# Patient Record
Sex: MALE | Race: WHITE | Employment: UNEMPLOYED | URBAN - METROPOLITAN AREA
[De-identification: names, ages, dates, MRNs, and addresses within clinical notes are randomized per-mention and may not be internally consistent; named-entity substitution may affect disease eponyms.]

---

## 2017-02-06 ENCOUNTER — HOSPITAL ENCOUNTER (OUTPATIENT)
Dept: WOUND CARE | Age: 52
Discharge: HOME | End: 2017-02-06
Payer: MEDICARE

## 2017-02-06 PROCEDURE — 87106 FUNGI IDENTIFICATION YEAST: CPT

## 2017-02-06 PROCEDURE — 86403 PARTICLE AGGLUT ANTBDY SCRN: CPT

## 2017-02-06 PROCEDURE — 6370000000 HC RX 637 (ALT 250 FOR IP): Performed by: SURGERY

## 2017-02-06 PROCEDURE — 87102 FUNGUS ISOLATION CULTURE: CPT

## 2017-02-06 PROCEDURE — 87205 SMEAR GRAM STAIN: CPT

## 2017-02-06 PROCEDURE — 87176 TISSUE HOMOGENIZATION CULTR: CPT

## 2017-02-06 PROCEDURE — G0463 HOSPITAL OUTPT CLINIC VISIT: HCPCS

## 2017-02-06 PROCEDURE — 87070 CULTURE OTHR SPECIMN AEROBIC: CPT

## 2017-02-06 PROCEDURE — 99213 OFFICE O/P EST LOW 20 MIN: CPT

## 2017-02-06 PROCEDURE — 11042 DBRDMT SUBQ TIS 1ST 20SQCM/<: CPT

## 2017-02-06 PROCEDURE — 87075 CULTR BACTERIA EXCEPT BLOOD: CPT

## 2017-02-06 RX ADMIN — LIDOCAINE HYDROCHLORIDE: 20 JELLY TOPICAL at 15:38

## 2017-02-11 LAB
CULTURE: ABNORMAL
DIRECT EXAM: ABNORMAL
DIRECT EXAM: ABNORMAL
Lab: ABNORMAL
SPECIMEN DESCRIPTION: ABNORMAL
STATUS: ABNORMAL

## 2017-02-13 ENCOUNTER — HOSPITAL ENCOUNTER (OUTPATIENT)
Dept: WOUND CARE | Age: 52
Discharge: HOME | End: 2017-02-13
Payer: MEDICARE

## 2017-02-14 LAB
CULTURE: ABNORMAL
Lab: ABNORMAL
SPECIMEN DESCRIPTION: ABNORMAL
STATUS: ABNORMAL

## 2017-02-16 ENCOUNTER — HOSPITAL ENCOUNTER (OUTPATIENT)
Dept: WOUND CARE | Age: 52
Discharge: HOME | End: 2017-02-16
Payer: MEDICARE

## 2017-03-01 ENCOUNTER — HOSPITAL ENCOUNTER (OUTPATIENT)
Dept: WOUND CARE | Age: 52
Discharge: HOME | End: 2017-03-01
Payer: MEDICARE

## 2017-03-01 ENCOUNTER — HOSPITAL ENCOUNTER (EMERGENCY)
Age: 52
Discharge: LEFT BEFORE DISCHARGE | End: 2017-03-01
Payer: MEDICARE

## 2017-03-01 VITALS
HEART RATE: 89 BPM | HEIGHT: 74 IN | TEMPERATURE: 98.4 F | OXYGEN SATURATION: 98 % | SYSTOLIC BLOOD PRESSURE: 108 MMHG | DIASTOLIC BLOOD PRESSURE: 59 MMHG | RESPIRATION RATE: 16 BRPM | WEIGHT: 240 LBS | BODY MASS INDEX: 30.8 KG/M2

## 2017-03-01 VITALS
HEART RATE: 109 BPM | RESPIRATION RATE: 16 BRPM | DIASTOLIC BLOOD PRESSURE: 69 MMHG | SYSTOLIC BLOOD PRESSURE: 112 MMHG | TEMPERATURE: 98.1 F

## 2017-03-01 DIAGNOSIS — E11.49 OTHER DIABETIC NEUROLOGICAL COMPLICATION ASSOCIATED WITH TYPE 2 DIABETES MELLITUS (HCC): ICD-10-CM

## 2017-03-01 DIAGNOSIS — L03.115 CELLULITIS OF RIGHT FOOT: ICD-10-CM

## 2017-03-01 PROBLEM — E11.40 DIABETIC NEUROPATHY (HCC): Status: ACTIVE | Noted: 2017-03-01

## 2017-03-01 PROCEDURE — 6370000000 HC RX 637 (ALT 250 FOR IP): Performed by: SURGERY

## 2017-03-01 PROCEDURE — 11042 DBRDMT SUBQ TIS 1ST 20SQCM/<: CPT

## 2017-03-01 PROCEDURE — 11043 DBRDMT MUSC&/FSCA 1ST 20/<: CPT

## 2017-03-01 PROCEDURE — 4500000002 HC ER NO CHARGE

## 2017-03-01 PROCEDURE — 11044 DBRDMT BONE 1ST 20 SQ CM/<: CPT

## 2017-03-01 RX ADMIN — LIDOCAINE HYDROCHLORIDE: 20 JELLY TOPICAL at 11:53

## 2017-03-01 ASSESSMENT — PAIN DESCRIPTION - LOCATION: LOCATION: FOOT

## 2017-03-01 ASSESSMENT — PAIN DESCRIPTION - FREQUENCY: FREQUENCY: CONTINUOUS

## 2017-03-01 ASSESSMENT — PAIN SCALES - GENERAL
PAINLEVEL_OUTOF10: 9
PAINLEVEL_OUTOF10: 9

## 2017-03-01 ASSESSMENT — PAIN DESCRIPTION - PAIN TYPE: TYPE: CHRONIC PAIN

## 2017-03-01 ASSESSMENT — PAIN DESCRIPTION - ONSET: ONSET: ON-GOING

## 2017-03-01 ASSESSMENT — PAIN DESCRIPTION - ORIENTATION: ORIENTATION: RIGHT;LEFT

## 2017-03-01 ASSESSMENT — PAIN DESCRIPTION - DESCRIPTORS: DESCRIPTORS: STABBING;ACHING

## 2017-03-03 ENCOUNTER — APPOINTMENT (OUTPATIENT)
Dept: GENERAL RADIOLOGY | Age: 52
DRG: 710 | End: 2017-03-03
Payer: MEDICARE

## 2017-03-03 ENCOUNTER — HOSPITAL ENCOUNTER (INPATIENT)
Age: 52
LOS: 4 days | Discharge: HOME HEALTH | DRG: 710 | End: 2017-03-07
Attending: EMERGENCY MEDICINE | Admitting: FAMILY MEDICINE
Payer: MEDICARE

## 2017-03-03 DIAGNOSIS — A41.9 SEPSIS DUE TO CELLULITIS (HCC): ICD-10-CM

## 2017-03-03 DIAGNOSIS — L03.115 CELLULITIS OF RIGHT FOOT: ICD-10-CM

## 2017-03-03 DIAGNOSIS — L03.90 SEPSIS DUE TO CELLULITIS (HCC): ICD-10-CM

## 2017-03-03 DIAGNOSIS — L03.116 CELLULITIS OF LEFT FOOT: ICD-10-CM

## 2017-03-03 DIAGNOSIS — M86.9 OSTEOMYELITIS OF TOE OF RIGHT FOOT (HCC): Primary | ICD-10-CM

## 2017-03-03 LAB
ABSOLUTE EOS #: 0.1 K/UL (ref 0–0.4)
ABSOLUTE LYMPH #: 1.2 K/UL (ref 1–4.8)
ABSOLUTE MONO #: 0.7 K/UL (ref 0.1–1.3)
ANION GAP SERPL CALCULATED.3IONS-SCNC: 12 MMOL/L (ref 9–17)
BASOPHILS # BLD: 1 % (ref 0–2)
BASOPHILS ABSOLUTE: 0.1 K/UL (ref 0–0.2)
BUN BLDV-MCNC: 6 MG/DL (ref 6–20)
BUN/CREAT BLD: ABNORMAL (ref 9–20)
C-REACTIVE PROTEIN: 149.6 MG/L (ref 0–5)
CALCIUM SERPL-MCNC: 9.3 MG/DL (ref 8.6–10.4)
CHLORIDE BLD-SCNC: 93 MMOL/L (ref 98–107)
CO2: 31 MMOL/L (ref 20–31)
CREAT SERPL-MCNC: 0.8 MG/DL (ref 0.7–1.2)
DIFFERENTIAL TYPE: ABNORMAL
EOSINOPHILS RELATIVE PERCENT: 1 % (ref 0–4)
GFR AFRICAN AMERICAN: >60 ML/MIN
GFR NON-AFRICAN AMERICAN: >60 ML/MIN
GFR SERPL CREATININE-BSD FRML MDRD: ABNORMAL ML/MIN/{1.73_M2}
GFR SERPL CREATININE-BSD FRML MDRD: ABNORMAL ML/MIN/{1.73_M2}
GLUCOSE BLD-MCNC: 119 MG/DL (ref 75–110)
GLUCOSE BLD-MCNC: 129 MG/DL (ref 70–99)
GLUCOSE BLD-MCNC: 85 MG/DL
GLUCOSE BLD-MCNC: 99 MG/DL (ref 75–110)
HCT VFR BLD CALC: 39.1 % (ref 41–53)
HEMOGLOBIN: 12.7 G/DL (ref 13.5–17.5)
LACTIC ACID: 1.6 MMOL/L (ref 0.5–2.2)
LYMPHOCYTES # BLD: 9 % (ref 24–44)
MCH RBC QN AUTO: 30 PG (ref 26–34)
MCHC RBC AUTO-ENTMCNC: 32.5 G/DL (ref 31–37)
MCV RBC AUTO: 92.4 FL (ref 80–100)
MONOCYTES # BLD: 5 % (ref 1–7)
PDW BLD-RTO: 12.8 % (ref 11.5–14.9)
PLATELET # BLD: 331 K/UL (ref 150–450)
PLATELET ESTIMATE: ABNORMAL
PMV BLD AUTO: 7 FL (ref 6–12)
POTASSIUM SERPL-SCNC: 4.2 MMOL/L (ref 3.7–5.3)
RBC # BLD: 4.23 M/UL (ref 4.5–5.9)
RBC # BLD: ABNORMAL 10*6/UL
SEG NEUTROPHILS: 84 % (ref 36–66)
SEGMENTED NEUTROPHILS ABSOLUTE COUNT: 11.4 K/UL (ref 1.3–9.1)
SODIUM BLD-SCNC: 136 MMOL/L (ref 135–144)
WBC # BLD: 13.5 K/UL (ref 3.5–11)
WBC # BLD: ABNORMAL 10*3/UL

## 2017-03-03 PROCEDURE — 6370000000 HC RX 637 (ALT 250 FOR IP): Performed by: EMERGENCY MEDICINE

## 2017-03-03 PROCEDURE — 2060000000 HC ICU INTERMEDIATE R&B

## 2017-03-03 PROCEDURE — 82947 ASSAY GLUCOSE BLOOD QUANT: CPT | Performed by: FAMILY MEDICINE

## 2017-03-03 PROCEDURE — 6360000002 HC RX W HCPCS: Performed by: EMERGENCY MEDICINE

## 2017-03-03 PROCEDURE — 82947 ASSAY GLUCOSE BLOOD QUANT: CPT

## 2017-03-03 PROCEDURE — 80048 BASIC METABOLIC PNL TOTAL CA: CPT

## 2017-03-03 PROCEDURE — 6370000000 HC RX 637 (ALT 250 FOR IP): Performed by: FAMILY MEDICINE

## 2017-03-03 PROCEDURE — 36415 COLL VENOUS BLD VENIPUNCTURE: CPT

## 2017-03-03 PROCEDURE — 85025 COMPLETE CBC W/AUTO DIFF WBC: CPT

## 2017-03-03 PROCEDURE — 73620 X-RAY EXAM OF FOOT: CPT

## 2017-03-03 PROCEDURE — 96374 THER/PROPH/DIAG INJ IV PUSH: CPT

## 2017-03-03 PROCEDURE — 83605 ASSAY OF LACTIC ACID: CPT

## 2017-03-03 PROCEDURE — 99285 EMERGENCY DEPT VISIT HI MDM: CPT

## 2017-03-03 PROCEDURE — 6360000002 HC RX W HCPCS: Performed by: FAMILY MEDICINE

## 2017-03-03 PROCEDURE — 86140 C-REACTIVE PROTEIN: CPT

## 2017-03-03 PROCEDURE — 87040 BLOOD CULTURE FOR BACTERIA: CPT

## 2017-03-03 PROCEDURE — 2580000003 HC RX 258: Performed by: EMERGENCY MEDICINE

## 2017-03-03 PROCEDURE — 2580000003 HC RX 258: Performed by: FAMILY MEDICINE

## 2017-03-03 RX ORDER — TIZANIDINE 4 MG/1
4 TABLET ORAL EVERY 6 HOURS PRN
Status: DISCONTINUED | OUTPATIENT
Start: 2017-03-03 | End: 2017-03-07 | Stop reason: HOSPADM

## 2017-03-03 RX ORDER — BUPROPION HYDROCHLORIDE 150 MG/1
150 TABLET ORAL DAILY
Status: DISCONTINUED | OUTPATIENT
Start: 2017-03-03 | End: 2017-03-07 | Stop reason: HOSPADM

## 2017-03-03 RX ORDER — DEXTROSE MONOHYDRATE 50 MG/ML
100 INJECTION, SOLUTION INTRAVENOUS PRN
Status: DISCONTINUED | OUTPATIENT
Start: 2017-03-03 | End: 2017-03-07 | Stop reason: HOSPADM

## 2017-03-03 RX ORDER — PANTOPRAZOLE SODIUM 40 MG/1
40 TABLET, DELAYED RELEASE ORAL
Status: DISCONTINUED | OUTPATIENT
Start: 2017-03-04 | End: 2017-03-07 | Stop reason: HOSPADM

## 2017-03-03 RX ORDER — DEXTROSE MONOHYDRATE 25 G/50ML
12.5 INJECTION, SOLUTION INTRAVENOUS PRN
Status: DISCONTINUED | OUTPATIENT
Start: 2017-03-03 | End: 2017-03-07 | Stop reason: HOSPADM

## 2017-03-03 RX ORDER — ACETAMINOPHEN 325 MG/1
650 TABLET ORAL ONCE
Status: COMPLETED | OUTPATIENT
Start: 2017-03-03 | End: 2017-03-03

## 2017-03-03 RX ORDER — TAMSULOSIN HYDROCHLORIDE 0.4 MG/1
0.4 CAPSULE ORAL DAILY
Status: DISCONTINUED | OUTPATIENT
Start: 2017-03-03 | End: 2017-03-07 | Stop reason: HOSPADM

## 2017-03-03 RX ORDER — NICOTINE POLACRILEX 4 MG
15 LOZENGE BUCCAL PRN
Status: DISCONTINUED | OUTPATIENT
Start: 2017-03-03 | End: 2017-03-07 | Stop reason: HOSPADM

## 2017-03-03 RX ORDER — ONDANSETRON 2 MG/ML
4 INJECTION INTRAMUSCULAR; INTRAVENOUS EVERY 6 HOURS PRN
Status: DISCONTINUED | OUTPATIENT
Start: 2017-03-03 | End: 2017-03-07 | Stop reason: HOSPADM

## 2017-03-03 RX ORDER — PAROXETINE HYDROCHLORIDE 20 MG/1
20 TABLET, FILM COATED ORAL DAILY
Status: DISCONTINUED | OUTPATIENT
Start: 2017-03-03 | End: 2017-03-07 | Stop reason: HOSPADM

## 2017-03-03 RX ORDER — ZOLPIDEM TARTRATE 5 MG/1
5 TABLET ORAL NIGHTLY PRN
Status: DISCONTINUED | OUTPATIENT
Start: 2017-03-03 | End: 2017-03-07 | Stop reason: HOSPADM

## 2017-03-03 RX ORDER — 0.9 % SODIUM CHLORIDE 0.9 %
1000 INTRAVENOUS SOLUTION INTRAVENOUS ONCE
Status: COMPLETED | OUTPATIENT
Start: 2017-03-03 | End: 2017-03-03

## 2017-03-03 RX ORDER — FUROSEMIDE 20 MG/1
20 TABLET ORAL 2 TIMES DAILY
Status: DISCONTINUED | OUTPATIENT
Start: 2017-03-03 | End: 2017-03-07 | Stop reason: HOSPADM

## 2017-03-03 RX ORDER — OXYCODONE HYDROCHLORIDE 5 MG/1
15 TABLET ORAL EVERY 4 HOURS PRN
Status: DISCONTINUED | OUTPATIENT
Start: 2017-03-03 | End: 2017-03-07 | Stop reason: HOSPADM

## 2017-03-03 RX ORDER — LISINOPRIL 10 MG/1
10 TABLET ORAL DAILY
Status: DISCONTINUED | OUTPATIENT
Start: 2017-03-03 | End: 2017-03-07 | Stop reason: HOSPADM

## 2017-03-03 RX ORDER — BUPROPION HYDROCHLORIDE 300 MG/1
300 TABLET ORAL DAILY
Status: DISCONTINUED | OUTPATIENT
Start: 2017-03-03 | End: 2017-03-07 | Stop reason: HOSPADM

## 2017-03-03 RX ADMIN — CEFEPIME 2 G: 2 INJECTION, POWDER, FOR SOLUTION INTRAVENOUS at 21:53

## 2017-03-03 RX ADMIN — ENOXAPARIN SODIUM 30 MG: 30 INJECTION SUBCUTANEOUS at 22:38

## 2017-03-03 RX ADMIN — SODIUM CHLORIDE 1000 ML: 9 INJECTION, SOLUTION INTRAVENOUS at 16:52

## 2017-03-03 RX ADMIN — ACETAMINOPHEN 650 MG: 325 TABLET ORAL at 17:56

## 2017-03-03 RX ADMIN — DEXTROSE MONOHYDRATE 1750 MG: 50 INJECTION, SOLUTION INTRAVENOUS at 16:51

## 2017-03-03 RX ADMIN — FUROSEMIDE 20 MG: 20 TABLET ORAL at 22:38

## 2017-03-03 ASSESSMENT — ENCOUNTER SYMPTOMS
SORE THROAT: 0
VOMITING: 0
COUGH: 0
ABDOMINAL PAIN: 0
DIARRHEA: 0
NAUSEA: 0
BACK PAIN: 0
EYE PAIN: 0
SHORTNESS OF BREATH: 0

## 2017-03-04 ENCOUNTER — APPOINTMENT (OUTPATIENT)
Dept: MRI IMAGING | Age: 52
DRG: 710 | End: 2017-03-04
Payer: MEDICARE

## 2017-03-04 PROBLEM — M86.9 OSTEOMYELITIS (HCC): Status: ACTIVE | Noted: 2017-03-04

## 2017-03-04 LAB
ABSOLUTE EOS #: 0.2 K/UL (ref 0–0.4)
ABSOLUTE LYMPH #: 1.3 K/UL (ref 1–4.8)
ABSOLUTE MONO #: 0.9 K/UL (ref 0.1–1.3)
ANION GAP SERPL CALCULATED.3IONS-SCNC: 9 MMOL/L (ref 9–17)
BASOPHILS # BLD: 0 % (ref 0–2)
BASOPHILS ABSOLUTE: 0.1 K/UL (ref 0–0.2)
BUN BLDV-MCNC: 6 MG/DL (ref 6–20)
BUN/CREAT BLD: ABNORMAL (ref 9–20)
CALCIUM SERPL-MCNC: 9 MG/DL (ref 8.6–10.4)
CHLORIDE BLD-SCNC: 98 MMOL/L (ref 98–107)
CO2: 34 MMOL/L (ref 20–31)
CREAT SERPL-MCNC: 0.84 MG/DL (ref 0.7–1.2)
DIFFERENTIAL TYPE: ABNORMAL
EOSINOPHILS RELATIVE PERCENT: 1 % (ref 0–4)
GFR AFRICAN AMERICAN: >60 ML/MIN
GFR NON-AFRICAN AMERICAN: >60 ML/MIN
GFR SERPL CREATININE-BSD FRML MDRD: ABNORMAL ML/MIN/{1.73_M2}
GFR SERPL CREATININE-BSD FRML MDRD: ABNORMAL ML/MIN/{1.73_M2}
GLUCOSE BLD-MCNC: 104 MG/DL (ref 70–99)
GLUCOSE BLD-MCNC: 142 MG/DL (ref 75–110)
GLUCOSE BLD-MCNC: 81 MG/DL (ref 75–110)
GLUCOSE BLD-MCNC: 87 MG/DL (ref 75–110)
GLUCOSE BLD-MCNC: 90 MG/DL (ref 75–110)
HCT VFR BLD CALC: 33.6 % (ref 41–53)
HEMOGLOBIN: 10.9 G/DL (ref 13.5–17.5)
LYMPHOCYTES # BLD: 11 % (ref 24–44)
MCH RBC QN AUTO: 29.8 PG (ref 26–34)
MCHC RBC AUTO-ENTMCNC: 32.6 G/DL (ref 31–37)
MCV RBC AUTO: 91.4 FL (ref 80–100)
MONOCYTES # BLD: 8 % (ref 1–7)
PDW BLD-RTO: 12.4 % (ref 11.5–14.9)
PLATELET # BLD: 283 K/UL (ref 150–450)
PLATELET ESTIMATE: ABNORMAL
PMV BLD AUTO: 6.8 FL (ref 6–12)
POTASSIUM SERPL-SCNC: 4.7 MMOL/L (ref 3.7–5.3)
RBC # BLD: 3.68 M/UL (ref 4.5–5.9)
RBC # BLD: ABNORMAL 10*6/UL
SEG NEUTROPHILS: 80 % (ref 36–66)
SEGMENTED NEUTROPHILS ABSOLUTE COUNT: 9.6 K/UL (ref 1.3–9.1)
SODIUM BLD-SCNC: 141 MMOL/L (ref 135–144)
WBC # BLD: 12.1 K/UL (ref 3.5–11)
WBC # BLD: ABNORMAL 10*3/UL

## 2017-03-04 PROCEDURE — 36415 COLL VENOUS BLD VENIPUNCTURE: CPT

## 2017-03-04 PROCEDURE — 80048 BASIC METABOLIC PNL TOTAL CA: CPT

## 2017-03-04 PROCEDURE — 82947 ASSAY GLUCOSE BLOOD QUANT: CPT

## 2017-03-04 PROCEDURE — 99232 SBSQ HOSP IP/OBS MODERATE 35: CPT | Performed by: FAMILY MEDICINE

## 2017-03-04 PROCEDURE — 99254 IP/OBS CNSLTJ NEW/EST MOD 60: CPT | Performed by: INTERNAL MEDICINE

## 2017-03-04 PROCEDURE — 85025 COMPLETE CBC W/AUTO DIFF WBC: CPT

## 2017-03-04 PROCEDURE — 93923 UPR/LXTR ART STDY 3+ LVLS: CPT

## 2017-03-04 PROCEDURE — 6370000000 HC RX 637 (ALT 250 FOR IP): Performed by: FAMILY MEDICINE

## 2017-03-04 PROCEDURE — 6360000002 HC RX W HCPCS: Performed by: FAMILY MEDICINE

## 2017-03-04 PROCEDURE — 6360000004 HC RX CONTRAST MEDICATION: Performed by: INTERNAL MEDICINE

## 2017-03-04 PROCEDURE — A9579 GAD-BASE MR CONTRAST NOS,1ML: HCPCS | Performed by: INTERNAL MEDICINE

## 2017-03-04 PROCEDURE — 2580000003 HC RX 258: Performed by: INTERNAL MEDICINE

## 2017-03-04 PROCEDURE — 6370000000 HC RX 637 (ALT 250 FOR IP): Performed by: PODIATRIST

## 2017-03-04 PROCEDURE — 73723 MRI JOINT LWR EXTR W/O&W/DYE: CPT

## 2017-03-04 PROCEDURE — 2580000003 HC RX 258: Performed by: FAMILY MEDICINE

## 2017-03-04 PROCEDURE — 2060000000 HC ICU INTERMEDIATE R&B

## 2017-03-04 RX ORDER — SODIUM HYPOCHLORITE 1.25 MG/ML
SOLUTION TOPICAL DAILY
Status: DISCONTINUED | OUTPATIENT
Start: 2017-03-04 | End: 2017-03-07 | Stop reason: HOSPADM

## 2017-03-04 RX ORDER — CLOBETASOL PROPIONATE 0.5 MG/G
CREAM TOPICAL 2 TIMES DAILY
Status: DISCONTINUED | OUTPATIENT
Start: 2017-03-04 | End: 2017-03-07 | Stop reason: HOSPADM

## 2017-03-04 RX ORDER — SODIUM CHLORIDE 0.9 % (FLUSH) 0.9 %
10 SYRINGE (ML) INJECTION PRN
Status: DISCONTINUED | OUTPATIENT
Start: 2017-03-04 | End: 2017-03-07 | Stop reason: HOSPADM

## 2017-03-04 RX ORDER — CETIRIZINE HYDROCHLORIDE 10 MG/1
10 TABLET ORAL DAILY
Status: DISCONTINUED | OUTPATIENT
Start: 2017-03-04 | End: 2017-03-07 | Stop reason: HOSPADM

## 2017-03-04 RX ADMIN — TAMSULOSIN HYDROCHLORIDE 0.4 MG: 0.4 CAPSULE ORAL at 09:32

## 2017-03-04 RX ADMIN — OXYCODONE HYDROCHLORIDE 15 MG: 5 TABLET ORAL at 11:29

## 2017-03-04 RX ADMIN — CEFEPIME 2 G: 2 INJECTION, POWDER, FOR SOLUTION INTRAVENOUS at 09:47

## 2017-03-04 RX ADMIN — PANTOPRAZOLE SODIUM 40 MG: 40 TABLET, DELAYED RELEASE ORAL at 06:29

## 2017-03-04 RX ADMIN — MUPIROCIN: 20 OINTMENT TOPICAL at 09:47

## 2017-03-04 RX ADMIN — BUPROPION HYDROCHLORIDE 300 MG: 300 TABLET, FILM COATED, EXTENDED RELEASE ORAL at 09:38

## 2017-03-04 RX ADMIN — ENOXAPARIN SODIUM 30 MG: 30 INJECTION SUBCUTANEOUS at 23:25

## 2017-03-04 RX ADMIN — FUROSEMIDE 20 MG: 20 TABLET ORAL at 18:41

## 2017-03-04 RX ADMIN — ENOXAPARIN SODIUM 30 MG: 30 INJECTION SUBCUTANEOUS at 09:32

## 2017-03-04 RX ADMIN — FUROSEMIDE 20 MG: 20 TABLET ORAL at 09:32

## 2017-03-04 RX ADMIN — CETIRIZINE HYDROCHLORIDE 10 MG: 10 TABLET, FILM COATED ORAL at 23:25

## 2017-03-04 RX ADMIN — PAROXETINE HYDROCHLORIDE 20 MG: 20 TABLET, FILM COATED ORAL at 09:36

## 2017-03-04 RX ADMIN — CLOBETASOL PROPIONATE: 0.5 CREAM TOPICAL at 23:26

## 2017-03-04 RX ADMIN — BUPROPION HYDROCHLORIDE 150 MG: 150 TABLET, FILM COATED, EXTENDED RELEASE ORAL at 09:41

## 2017-03-04 RX ADMIN — VANCOMYCIN HYDROCHLORIDE 1500 MG: 5 INJECTION, POWDER, LYOPHILIZED, FOR SOLUTION INTRAVENOUS at 06:28

## 2017-03-04 RX ADMIN — CEFEPIME 2 G: 2 INJECTION, POWDER, FOR SOLUTION INTRAVENOUS at 23:25

## 2017-03-04 RX ADMIN — METFORMIN HYDROCHLORIDE 500 MG: 500 TABLET, FILM COATED ORAL at 12:58

## 2017-03-04 RX ADMIN — OXYCODONE HYDROCHLORIDE 15 MG: 5 TABLET ORAL at 20:13

## 2017-03-04 RX ADMIN — METFORMIN HYDROCHLORIDE 500 MG: 500 TABLET, FILM COATED ORAL at 09:32

## 2017-03-04 RX ADMIN — OXYCODONE HYDROCHLORIDE 15 MG: 5 TABLET ORAL at 15:38

## 2017-03-04 RX ADMIN — Medication 10 ML: at 17:56

## 2017-03-04 RX ADMIN — LISINOPRIL 10 MG: 10 TABLET ORAL at 09:36

## 2017-03-04 RX ADMIN — GADOPENTETATE DIMEGLUMINE 20 ML: 469.01 INJECTION INTRAVENOUS at 17:55

## 2017-03-04 RX ADMIN — DAKIN'S SOLUTION 0.125% (QUARTER STRENGTH) 473 ML: 0.12 SOLUTION at 18:41

## 2017-03-04 RX ADMIN — VANCOMYCIN HYDROCHLORIDE 1500 MG: 5 INJECTION, POWDER, LYOPHILIZED, FOR SOLUTION INTRAVENOUS at 18:41

## 2017-03-04 ASSESSMENT — PAIN DESCRIPTION - PAIN TYPE: TYPE: ACUTE PAIN

## 2017-03-04 ASSESSMENT — PAIN DESCRIPTION - PROGRESSION: CLINICAL_PROGRESSION: GRADUALLY WORSENING

## 2017-03-04 ASSESSMENT — PAIN SCALES - GENERAL
PAINLEVEL_OUTOF10: 5
PAINLEVEL_OUTOF10: 7
PAINLEVEL_OUTOF10: 7
PAINLEVEL_OUTOF10: 8
PAINLEVEL_OUTOF10: 8

## 2017-03-04 ASSESSMENT — PAIN DESCRIPTION - ONSET: ONSET: GRADUAL

## 2017-03-04 ASSESSMENT — PAIN DESCRIPTION - FREQUENCY: FREQUENCY: INTERMITTENT

## 2017-03-04 ASSESSMENT — PAIN DESCRIPTION - DESCRIPTORS: DESCRIPTORS: DISCOMFORT

## 2017-03-04 ASSESSMENT — PAIN DESCRIPTION - LOCATION: LOCATION: FOOT;TOE (COMMENT WHICH ONE)

## 2017-03-04 ASSESSMENT — PAIN DESCRIPTION - ORIENTATION: ORIENTATION: RIGHT;LEFT

## 2017-03-05 LAB
ABSOLUTE EOS #: 0.3 K/UL (ref 0–0.4)
ABSOLUTE LYMPH #: 1.5 K/UL (ref 1–4.8)
ABSOLUTE MONO #: 0.7 K/UL (ref 0.1–1.3)
ANION GAP SERPL CALCULATED.3IONS-SCNC: 9 MMOL/L (ref 9–17)
BASOPHILS # BLD: 1 % (ref 0–2)
BASOPHILS ABSOLUTE: 0.1 K/UL (ref 0–0.2)
BUN BLDV-MCNC: 9 MG/DL (ref 6–20)
BUN/CREAT BLD: ABNORMAL (ref 9–20)
C-REACTIVE PROTEIN: 111.9 MG/L (ref 0–5)
CALCIUM SERPL-MCNC: 9.4 MG/DL (ref 8.6–10.4)
CHLORIDE BLD-SCNC: 96 MMOL/L (ref 98–107)
CO2: 33 MMOL/L (ref 20–31)
CREAT SERPL-MCNC: 0.84 MG/DL (ref 0.7–1.2)
DIFFERENTIAL TYPE: ABNORMAL
EOSINOPHILS RELATIVE PERCENT: 4 % (ref 0–4)
GFR AFRICAN AMERICAN: >60 ML/MIN
GFR NON-AFRICAN AMERICAN: >60 ML/MIN
GFR SERPL CREATININE-BSD FRML MDRD: ABNORMAL ML/MIN/{1.73_M2}
GFR SERPL CREATININE-BSD FRML MDRD: ABNORMAL ML/MIN/{1.73_M2}
GLUCOSE BLD-MCNC: 102 MG/DL (ref 75–110)
GLUCOSE BLD-MCNC: 104 MG/DL (ref 75–110)
GLUCOSE BLD-MCNC: 83 MG/DL (ref 75–110)
GLUCOSE BLD-MCNC: 91 MG/DL (ref 75–110)
GLUCOSE BLD-MCNC: 99 MG/DL (ref 70–99)
HCT VFR BLD CALC: 33.9 % (ref 41–53)
HEMOGLOBIN: 11.6 G/DL (ref 13.5–17.5)
LYMPHOCYTES # BLD: 20 % (ref 24–44)
MCH RBC QN AUTO: 31.3 PG (ref 26–34)
MCHC RBC AUTO-ENTMCNC: 34.3 G/DL (ref 31–37)
MCV RBC AUTO: 91.1 FL (ref 80–100)
MONOCYTES # BLD: 9 % (ref 1–7)
PDW BLD-RTO: 12.7 % (ref 11.5–14.9)
PLATELET # BLD: 308 K/UL (ref 150–450)
PLATELET ESTIMATE: ABNORMAL
PMV BLD AUTO: 6.8 FL (ref 6–12)
POTASSIUM SERPL-SCNC: 5 MMOL/L (ref 3.7–5.3)
RBC # BLD: 3.72 M/UL (ref 4.5–5.9)
RBC # BLD: ABNORMAL 10*6/UL
SEG NEUTROPHILS: 66 % (ref 36–66)
SEGMENTED NEUTROPHILS ABSOLUTE COUNT: 4.9 K/UL (ref 1.3–9.1)
SODIUM BLD-SCNC: 138 MMOL/L (ref 135–144)
VANCOMYCIN TROUGH DATE LAST DOSE: NORMAL
VANCOMYCIN TROUGH DOSE AMOUNT: 1500
VANCOMYCIN TROUGH TIME LAST DOSE: 739
VANCOMYCIN TROUGH: 10.6 UG/ML (ref 10–20)
WBC # BLD: 7.5 K/UL (ref 3.5–11)
WBC # BLD: ABNORMAL 10*3/UL

## 2017-03-05 PROCEDURE — 2060000000 HC ICU INTERMEDIATE R&B

## 2017-03-05 PROCEDURE — 2580000003 HC RX 258: Performed by: FAMILY MEDICINE

## 2017-03-05 PROCEDURE — 80202 ASSAY OF VANCOMYCIN: CPT

## 2017-03-05 PROCEDURE — 6370000000 HC RX 637 (ALT 250 FOR IP): Performed by: FAMILY MEDICINE

## 2017-03-05 PROCEDURE — 36415 COLL VENOUS BLD VENIPUNCTURE: CPT

## 2017-03-05 PROCEDURE — 86140 C-REACTIVE PROTEIN: CPT

## 2017-03-05 PROCEDURE — 85025 COMPLETE CBC W/AUTO DIFF WBC: CPT

## 2017-03-05 PROCEDURE — 6360000002 HC RX W HCPCS: Performed by: FAMILY MEDICINE

## 2017-03-05 PROCEDURE — 80048 BASIC METABOLIC PNL TOTAL CA: CPT

## 2017-03-05 PROCEDURE — 82947 ASSAY GLUCOSE BLOOD QUANT: CPT

## 2017-03-05 PROCEDURE — 99232 SBSQ HOSP IP/OBS MODERATE 35: CPT | Performed by: FAMILY MEDICINE

## 2017-03-05 RX ADMIN — OXYCODONE HYDROCHLORIDE 15 MG: 5 TABLET ORAL at 10:39

## 2017-03-05 RX ADMIN — BUPROPION HYDROCHLORIDE 150 MG: 150 TABLET, FILM COATED, EXTENDED RELEASE ORAL at 11:05

## 2017-03-05 RX ADMIN — OXYCODONE HYDROCHLORIDE 15 MG: 5 TABLET ORAL at 15:33

## 2017-03-05 RX ADMIN — CLOBETASOL PROPIONATE: 0.5 CREAM TOPICAL at 10:44

## 2017-03-05 RX ADMIN — TAMSULOSIN HYDROCHLORIDE 0.4 MG: 0.4 CAPSULE ORAL at 10:42

## 2017-03-05 RX ADMIN — METFORMIN HYDROCHLORIDE 500 MG: 500 TABLET, FILM COATED ORAL at 10:41

## 2017-03-05 RX ADMIN — OXYCODONE HYDROCHLORIDE 15 MG: 5 TABLET ORAL at 06:38

## 2017-03-05 RX ADMIN — FUROSEMIDE 20 MG: 20 TABLET ORAL at 18:32

## 2017-03-05 RX ADMIN — OXYCODONE HYDROCHLORIDE 15 MG: 5 TABLET ORAL at 20:22

## 2017-03-05 RX ADMIN — PANTOPRAZOLE SODIUM 40 MG: 40 TABLET, DELAYED RELEASE ORAL at 06:38

## 2017-03-05 RX ADMIN — CEFEPIME 2 G: 2 INJECTION, POWDER, FOR SOLUTION INTRAVENOUS at 10:41

## 2017-03-05 RX ADMIN — BUPROPION HYDROCHLORIDE 300 MG: 300 TABLET, FILM COATED, EXTENDED RELEASE ORAL at 10:40

## 2017-03-05 RX ADMIN — PAROXETINE HYDROCHLORIDE 20 MG: 20 TABLET, FILM COATED ORAL at 10:44

## 2017-03-05 RX ADMIN — OXYCODONE HYDROCHLORIDE 15 MG: 5 TABLET ORAL at 01:25

## 2017-03-05 RX ADMIN — METFORMIN HYDROCHLORIDE 500 MG: 500 TABLET, FILM COATED ORAL at 18:32

## 2017-03-05 RX ADMIN — CETIRIZINE HYDROCHLORIDE 10 MG: 10 TABLET, FILM COATED ORAL at 10:43

## 2017-03-05 RX ADMIN — FUROSEMIDE 20 MG: 20 TABLET ORAL at 10:42

## 2017-03-05 RX ADMIN — VANCOMYCIN HYDROCHLORIDE 1500 MG: 5 INJECTION, POWDER, LYOPHILIZED, FOR SOLUTION INTRAVENOUS at 07:39

## 2017-03-05 RX ADMIN — VANCOMYCIN HYDROCHLORIDE 1250 MG: 5 INJECTION, POWDER, LYOPHILIZED, FOR SOLUTION INTRAVENOUS at 22:57

## 2017-03-05 RX ADMIN — ENOXAPARIN SODIUM 30 MG: 30 INJECTION SUBCUTANEOUS at 10:42

## 2017-03-05 ASSESSMENT — PAIN SCALES - GENERAL
PAINLEVEL_OUTOF10: 9
PAINLEVEL_OUTOF10: 8
PAINLEVEL_OUTOF10: 8
PAINLEVEL_OUTOF10: 5
PAINLEVEL_OUTOF10: 4
PAINLEVEL_OUTOF10: 8
PAINLEVEL_OUTOF10: 5
PAINLEVEL_OUTOF10: 3
PAINLEVEL_OUTOF10: 8
PAINLEVEL_OUTOF10: 8

## 2017-03-06 ENCOUNTER — ANESTHESIA (OUTPATIENT)
Dept: OPERATING ROOM | Age: 52
DRG: 710 | End: 2017-03-06
Payer: MEDICARE

## 2017-03-06 ENCOUNTER — ANESTHESIA EVENT (OUTPATIENT)
Dept: OPERATING ROOM | Age: 52
DRG: 710 | End: 2017-03-06
Payer: MEDICARE

## 2017-03-06 VITALS — DIASTOLIC BLOOD PRESSURE: 62 MMHG | TEMPERATURE: 94.8 F | OXYGEN SATURATION: 99 % | SYSTOLIC BLOOD PRESSURE: 99 MMHG

## 2017-03-06 LAB
ABSOLUTE EOS #: 0.2 K/UL (ref 0–0.4)
ABSOLUTE LYMPH #: 1.4 K/UL (ref 1–4.8)
ABSOLUTE MONO #: 0.6 K/UL (ref 0.1–1.3)
ANION GAP SERPL CALCULATED.3IONS-SCNC: 11 MMOL/L (ref 9–17)
BASOPHILS # BLD: 1 % (ref 0–2)
BASOPHILS ABSOLUTE: 0.1 K/UL (ref 0–0.2)
BUN BLDV-MCNC: 9 MG/DL (ref 6–20)
BUN/CREAT BLD: ABNORMAL (ref 9–20)
CALCIUM SERPL-MCNC: 9.6 MG/DL (ref 8.6–10.4)
CHLORIDE BLD-SCNC: 95 MMOL/L (ref 98–107)
CO2: 32 MMOL/L (ref 20–31)
CREAT SERPL-MCNC: 0.85 MG/DL (ref 0.7–1.2)
DIFFERENTIAL TYPE: ABNORMAL
EOSINOPHILS RELATIVE PERCENT: 3 % (ref 0–4)
GFR AFRICAN AMERICAN: >60 ML/MIN
GFR NON-AFRICAN AMERICAN: >60 ML/MIN
GFR SERPL CREATININE-BSD FRML MDRD: ABNORMAL ML/MIN/{1.73_M2}
GFR SERPL CREATININE-BSD FRML MDRD: ABNORMAL ML/MIN/{1.73_M2}
GLUCOSE BLD-MCNC: 106 MG/DL (ref 75–110)
GLUCOSE BLD-MCNC: 119 MG/DL (ref 75–110)
GLUCOSE BLD-MCNC: 134 MG/DL (ref 70–99)
GLUCOSE BLD-MCNC: 161 MG/DL (ref 75–110)
GLUCOSE BLD-MCNC: 85 MG/DL (ref 75–110)
HCT VFR BLD CALC: 36.7 % (ref 41–53)
HEMOGLOBIN: 12.6 G/DL (ref 13.5–17.5)
LYMPHOCYTES # BLD: 19 % (ref 24–44)
MCH RBC QN AUTO: 31.1 PG (ref 26–34)
MCHC RBC AUTO-ENTMCNC: 34.3 G/DL (ref 31–37)
MCV RBC AUTO: 90.9 FL (ref 80–100)
MONOCYTES # BLD: 8 % (ref 1–7)
PDW BLD-RTO: 12.6 % (ref 11.5–14.9)
PLATELET # BLD: 371 K/UL (ref 150–450)
PLATELET ESTIMATE: ABNORMAL
PMV BLD AUTO: 6.9 FL (ref 6–12)
POTASSIUM SERPL-SCNC: 4.4 MMOL/L (ref 3.7–5.3)
RBC # BLD: 4.04 M/UL (ref 4.5–5.9)
RBC # BLD: ABNORMAL 10*6/UL
SEG NEUTROPHILS: 69 % (ref 36–66)
SEGMENTED NEUTROPHILS ABSOLUTE COUNT: 5.1 K/UL (ref 1.3–9.1)
SODIUM BLD-SCNC: 138 MMOL/L (ref 135–144)
WBC # BLD: 7.4 K/UL (ref 3.5–11)
WBC # BLD: ABNORMAL 10*3/UL

## 2017-03-06 PROCEDURE — 2720000010 HC SURG SUPPLY STERILE: Performed by: PODIATRIST

## 2017-03-06 PROCEDURE — 0H9NXZZ DRAINAGE OF LEFT FOOT SKIN, EXTERNAL APPROACH: ICD-10-PCS | Performed by: PODIATRIST

## 2017-03-06 PROCEDURE — 3700000000 HC ANESTHESIA ATTENDED CARE: Performed by: PODIATRIST

## 2017-03-06 PROCEDURE — 2580000003 HC RX 258: Performed by: NURSE ANESTHETIST, CERTIFIED REGISTERED

## 2017-03-06 PROCEDURE — 7100000001 HC PACU RECOVERY - ADDTL 15 MIN: Performed by: PODIATRIST

## 2017-03-06 PROCEDURE — 87186 SC STD MICRODIL/AGAR DIL: CPT

## 2017-03-06 PROCEDURE — 6370000000 HC RX 637 (ALT 250 FOR IP): Performed by: FAMILY MEDICINE

## 2017-03-06 PROCEDURE — 88311 DECALCIFY TISSUE: CPT

## 2017-03-06 PROCEDURE — 2500000003 HC RX 250 WO HCPCS: Performed by: PODIATRIST

## 2017-03-06 PROCEDURE — 3700000001 HC ADD 15 MINUTES (ANESTHESIA): Performed by: PODIATRIST

## 2017-03-06 PROCEDURE — 1200000000 HC SEMI PRIVATE

## 2017-03-06 PROCEDURE — 0H9MXZZ DRAINAGE OF RIGHT FOOT SKIN, EXTERNAL APPROACH: ICD-10-PCS | Performed by: PODIATRIST

## 2017-03-06 PROCEDURE — 3600000002 HC SURGERY LEVEL 2 BASE: Performed by: PODIATRIST

## 2017-03-06 PROCEDURE — 82947 ASSAY GLUCOSE BLOOD QUANT: CPT | Performed by: FAMILY MEDICINE

## 2017-03-06 PROCEDURE — 6360000002 HC RX W HCPCS: Performed by: FAMILY MEDICINE

## 2017-03-06 PROCEDURE — 85025 COMPLETE CBC W/AUTO DIFF WBC: CPT

## 2017-03-06 PROCEDURE — 3600000012 HC SURGERY LEVEL 2 ADDTL 15MIN: Performed by: PODIATRIST

## 2017-03-06 PROCEDURE — 99232 SBSQ HOSP IP/OBS MODERATE 35: CPT | Performed by: FAMILY MEDICINE

## 2017-03-06 PROCEDURE — 87075 CULTR BACTERIA EXCEPT BLOOD: CPT

## 2017-03-06 PROCEDURE — 87205 SMEAR GRAM STAIN: CPT

## 2017-03-06 PROCEDURE — 87070 CULTURE OTHR SPECIMN AEROBIC: CPT

## 2017-03-06 PROCEDURE — 99233 SBSQ HOSP IP/OBS HIGH 50: CPT | Performed by: INTERNAL MEDICINE

## 2017-03-06 PROCEDURE — 2580000003 HC RX 258: Performed by: FAMILY MEDICINE

## 2017-03-06 PROCEDURE — 88305 TISSUE EXAM BY PATHOLOGIST: CPT

## 2017-03-06 PROCEDURE — 80048 BASIC METABOLIC PNL TOTAL CA: CPT

## 2017-03-06 PROCEDURE — 36415 COLL VENOUS BLD VENIPUNCTURE: CPT

## 2017-03-06 PROCEDURE — 2500000003 HC RX 250 WO HCPCS: Performed by: NURSE ANESTHETIST, CERTIFIED REGISTERED

## 2017-03-06 PROCEDURE — 0Y6S0Z3 DETACHMENT AT LEFT 2ND TOE, LOW, OPEN APPROACH: ICD-10-PCS | Performed by: PODIATRIST

## 2017-03-06 PROCEDURE — 6360000002 HC RX W HCPCS: Performed by: NURSE ANESTHETIST, CERTIFIED REGISTERED

## 2017-03-06 PROCEDURE — 0Y6R0Z3 DETACHMENT AT RIGHT 2ND TOE, LOW, OPEN APPROACH: ICD-10-PCS | Performed by: PODIATRIST

## 2017-03-06 PROCEDURE — 7100000000 HC PACU RECOVERY - FIRST 15 MIN: Performed by: PODIATRIST

## 2017-03-06 PROCEDURE — 86403 PARTICLE AGGLUT ANTBDY SCRN: CPT

## 2017-03-06 RX ORDER — HYDROCODONE BITARTRATE AND ACETAMINOPHEN 5; 325 MG/1; MG/1
2 TABLET ORAL PRN
Status: DISCONTINUED | OUTPATIENT
Start: 2017-03-06 | End: 2017-03-06 | Stop reason: HOSPADM

## 2017-03-06 RX ORDER — LABETALOL HYDROCHLORIDE 5 MG/ML
5 INJECTION, SOLUTION INTRAVENOUS EVERY 10 MIN PRN
Status: DISCONTINUED | OUTPATIENT
Start: 2017-03-06 | End: 2017-03-06 | Stop reason: HOSPADM

## 2017-03-06 RX ORDER — FENTANYL CITRATE 50 UG/ML
50 INJECTION, SOLUTION INTRAMUSCULAR; INTRAVENOUS EVERY 5 MIN PRN
Status: DISCONTINUED | OUTPATIENT
Start: 2017-03-06 | End: 2017-03-06 | Stop reason: HOSPADM

## 2017-03-06 RX ORDER — SODIUM CHLORIDE, SODIUM LACTATE, POTASSIUM CHLORIDE, CALCIUM CHLORIDE 600; 310; 30; 20 MG/100ML; MG/100ML; MG/100ML; MG/100ML
INJECTION, SOLUTION INTRAVENOUS CONTINUOUS PRN
Status: DISCONTINUED | OUTPATIENT
Start: 2017-03-06 | End: 2017-03-06 | Stop reason: SDUPTHER

## 2017-03-06 RX ORDER — BUPIVACAINE HYDROCHLORIDE 5 MG/ML
INJECTION, SOLUTION EPIDURAL; INTRACAUDAL PRN
Status: DISCONTINUED | OUTPATIENT
Start: 2017-03-06 | End: 2017-03-06 | Stop reason: HOSPADM

## 2017-03-06 RX ORDER — HYDRALAZINE HYDROCHLORIDE 20 MG/ML
5 INJECTION INTRAMUSCULAR; INTRAVENOUS EVERY 10 MIN PRN
Status: DISCONTINUED | OUTPATIENT
Start: 2017-03-06 | End: 2017-03-06 | Stop reason: HOSPADM

## 2017-03-06 RX ORDER — FENTANYL CITRATE 50 UG/ML
25 INJECTION, SOLUTION INTRAMUSCULAR; INTRAVENOUS EVERY 5 MIN PRN
Status: DISCONTINUED | OUTPATIENT
Start: 2017-03-06 | End: 2017-03-06 | Stop reason: HOSPADM

## 2017-03-06 RX ORDER — MORPHINE SULFATE 2 MG/ML
1 INJECTION, SOLUTION INTRAMUSCULAR; INTRAVENOUS EVERY 5 MIN PRN
Status: DISCONTINUED | OUTPATIENT
Start: 2017-03-06 | End: 2017-03-06 | Stop reason: HOSPADM

## 2017-03-06 RX ORDER — FENTANYL CITRATE 50 UG/ML
INJECTION, SOLUTION INTRAMUSCULAR; INTRAVENOUS PRN
Status: DISCONTINUED | OUTPATIENT
Start: 2017-03-06 | End: 2017-03-06 | Stop reason: SDUPTHER

## 2017-03-06 RX ORDER — MEPERIDINE HYDROCHLORIDE 25 MG/ML
12.5 INJECTION INTRAMUSCULAR; INTRAVENOUS; SUBCUTANEOUS EVERY 5 MIN PRN
Status: DISCONTINUED | OUTPATIENT
Start: 2017-03-06 | End: 2017-03-06 | Stop reason: HOSPADM

## 2017-03-06 RX ORDER — MIDAZOLAM HYDROCHLORIDE 1 MG/ML
INJECTION INTRAMUSCULAR; INTRAVENOUS PRN
Status: DISCONTINUED | OUTPATIENT
Start: 2017-03-06 | End: 2017-03-06 | Stop reason: SDUPTHER

## 2017-03-06 RX ORDER — ONDANSETRON 2 MG/ML
4 INJECTION INTRAMUSCULAR; INTRAVENOUS
Status: DISCONTINUED | OUTPATIENT
Start: 2017-03-06 | End: 2017-03-06 | Stop reason: HOSPADM

## 2017-03-06 RX ORDER — ONDANSETRON 2 MG/ML
INJECTION INTRAMUSCULAR; INTRAVENOUS PRN
Status: DISCONTINUED | OUTPATIENT
Start: 2017-03-06 | End: 2017-03-06 | Stop reason: SDUPTHER

## 2017-03-06 RX ORDER — PROPOFOL 10 MG/ML
INJECTION, EMULSION INTRAVENOUS PRN
Status: DISCONTINUED | OUTPATIENT
Start: 2017-03-06 | End: 2017-03-06 | Stop reason: SDUPTHER

## 2017-03-06 RX ORDER — HYDROCODONE BITARTRATE AND ACETAMINOPHEN 5; 325 MG/1; MG/1
1 TABLET ORAL PRN
Status: DISCONTINUED | OUTPATIENT
Start: 2017-03-06 | End: 2017-03-06 | Stop reason: HOSPADM

## 2017-03-06 RX ORDER — METOCLOPRAMIDE HYDROCHLORIDE 5 MG/ML
10 INJECTION INTRAMUSCULAR; INTRAVENOUS
Status: DISCONTINUED | OUTPATIENT
Start: 2017-03-06 | End: 2017-03-06 | Stop reason: HOSPADM

## 2017-03-06 RX ORDER — DIPHENHYDRAMINE HYDROCHLORIDE 50 MG/ML
12.5 INJECTION INTRAMUSCULAR; INTRAVENOUS
Status: DISCONTINUED | OUTPATIENT
Start: 2017-03-06 | End: 2017-03-06 | Stop reason: HOSPADM

## 2017-03-06 RX ORDER — SODIUM CHLORIDE 9 MG/ML
INJECTION, SOLUTION INTRAVENOUS CONTINUOUS PRN
Status: DISCONTINUED | OUTPATIENT
Start: 2017-03-06 | End: 2017-03-06 | Stop reason: SDUPTHER

## 2017-03-06 RX ORDER — LIDOCAINE HYDROCHLORIDE 10 MG/ML
INJECTION, SOLUTION INFILTRATION; PERINEURAL PRN
Status: DISCONTINUED | OUTPATIENT
Start: 2017-03-06 | End: 2017-03-06 | Stop reason: SDUPTHER

## 2017-03-06 RX ADMIN — ONDANSETRON 4 MG: 2 INJECTION, SOLUTION INTRAMUSCULAR; INTRAVENOUS at 13:25

## 2017-03-06 RX ADMIN — VANCOMYCIN HYDROCHLORIDE 1250 MG: 5 INJECTION, POWDER, LYOPHILIZED, FOR SOLUTION INTRAVENOUS at 23:50

## 2017-03-06 RX ADMIN — CLOBETASOL PROPIONATE: 0.5 CREAM TOPICAL at 22:05

## 2017-03-06 RX ADMIN — OXYCODONE HYDROCHLORIDE 15 MG: 5 TABLET ORAL at 18:35

## 2017-03-06 RX ADMIN — LIDOCAINE HYDROCHLORIDE 50 MG: 10 INJECTION, SOLUTION INFILTRATION; PERINEURAL at 12:20

## 2017-03-06 RX ADMIN — SODIUM CHLORIDE: 9 INJECTION, SOLUTION INTRAVENOUS at 13:39

## 2017-03-06 RX ADMIN — FUROSEMIDE 20 MG: 20 TABLET ORAL at 22:05

## 2017-03-06 RX ADMIN — PROPOFOL 200 MG: 10 INJECTION, EMULSION INTRAVENOUS at 12:20

## 2017-03-06 RX ADMIN — SODIUM CHLORIDE, POTASSIUM CHLORIDE, SODIUM LACTATE AND CALCIUM CHLORIDE: 600; 310; 30; 20 INJECTION, SOLUTION INTRAVENOUS at 12:16

## 2017-03-06 RX ADMIN — FENTANYL CITRATE 50 MCG: 50 INJECTION, SOLUTION INTRAMUSCULAR; INTRAVENOUS at 13:05

## 2017-03-06 RX ADMIN — VANCOMYCIN HYDROCHLORIDE 1250 MG: 5 INJECTION, POWDER, LYOPHILIZED, FOR SOLUTION INTRAVENOUS at 06:41

## 2017-03-06 RX ADMIN — ZOLPIDEM TARTRATE 5 MG: 5 TABLET, FILM COATED ORAL at 22:05

## 2017-03-06 RX ADMIN — OXYCODONE HYDROCHLORIDE 15 MG: 5 TABLET ORAL at 00:25

## 2017-03-06 RX ADMIN — FENTANYL CITRATE 50 MCG: 50 INJECTION, SOLUTION INTRAMUSCULAR; INTRAVENOUS at 12:25

## 2017-03-06 RX ADMIN — OXYCODONE HYDROCHLORIDE 15 MG: 5 TABLET ORAL at 04:41

## 2017-03-06 RX ADMIN — CEFEPIME 2 G: 2 INJECTION, POWDER, FOR SOLUTION INTRAVENOUS at 22:05

## 2017-03-06 RX ADMIN — ENOXAPARIN SODIUM 30 MG: 30 INJECTION SUBCUTANEOUS at 22:05

## 2017-03-06 RX ADMIN — MIDAZOLAM HYDROCHLORIDE 2 MG: 1 INJECTION, SOLUTION INTRAMUSCULAR; INTRAVENOUS at 12:16

## 2017-03-06 RX ADMIN — OXYCODONE HYDROCHLORIDE 15 MG: 5 TABLET ORAL at 22:12

## 2017-03-06 RX ADMIN — OXYCODONE HYDROCHLORIDE 15 MG: 5 TABLET ORAL at 08:51

## 2017-03-06 RX ADMIN — CEFEPIME 2 G: 2 INJECTION, POWDER, FOR SOLUTION INTRAVENOUS at 01:31

## 2017-03-06 RX ADMIN — CEFEPIME 2 G: 2 INJECTION, POWDER, FOR SOLUTION INTRAVENOUS at 08:59

## 2017-03-06 ASSESSMENT — PAIN SCALES - GENERAL
PAINLEVEL_OUTOF10: 3
PAINLEVEL_OUTOF10: 0
PAINLEVEL_OUTOF10: 8
PAINLEVEL_OUTOF10: 7
PAINLEVEL_OUTOF10: 0
PAINLEVEL_OUTOF10: 7
PAINLEVEL_OUTOF10: 8
PAINLEVEL_OUTOF10: 8
PAINLEVEL_OUTOF10: 0
PAINLEVEL_OUTOF10: 0
PAINLEVEL_OUTOF10: 8
PAINLEVEL_OUTOF10: 0
PAINLEVEL_OUTOF10: 0
PAINLEVEL_OUTOF10: 8

## 2017-03-06 ASSESSMENT — PAIN DESCRIPTION - FREQUENCY: FREQUENCY: CONTINUOUS

## 2017-03-06 ASSESSMENT — PAIN DESCRIPTION - PAIN TYPE: TYPE: ACUTE PAIN

## 2017-03-06 ASSESSMENT — PAIN DESCRIPTION - ONSET: ONSET: ON-GOING

## 2017-03-06 ASSESSMENT — PAIN DESCRIPTION - LOCATION: LOCATION: FOOT;TOE (COMMENT WHICH ONE)

## 2017-03-06 ASSESSMENT — PAIN SCALES - WONG BAKER: WONGBAKER_NUMERICALRESPONSE: 0

## 2017-03-06 ASSESSMENT — PAIN DESCRIPTION - ORIENTATION: ORIENTATION: RIGHT;LEFT

## 2017-03-06 ASSESSMENT — PAIN DESCRIPTION - DESCRIPTORS: DESCRIPTORS: DISCOMFORT

## 2017-03-07 ENCOUNTER — APPOINTMENT (OUTPATIENT)
Dept: INTERVENTIONAL RADIOLOGY/VASCULAR | Age: 52
DRG: 710 | End: 2017-03-07
Payer: MEDICARE

## 2017-03-07 VITALS
DIASTOLIC BLOOD PRESSURE: 66 MMHG | SYSTOLIC BLOOD PRESSURE: 119 MMHG | TEMPERATURE: 98.1 F | WEIGHT: 229.06 LBS | HEIGHT: 74 IN | BODY MASS INDEX: 29.4 KG/M2 | OXYGEN SATURATION: 98 % | RESPIRATION RATE: 18 BRPM | HEART RATE: 88 BPM

## 2017-03-07 LAB
ABSOLUTE EOS #: 0.1 K/UL (ref 0–0.4)
ABSOLUTE LYMPH #: 1.3 K/UL (ref 1–4.8)
ABSOLUTE MONO #: 0.5 K/UL (ref 0.1–1.3)
ANION GAP SERPL CALCULATED.3IONS-SCNC: 11 MMOL/L (ref 9–17)
BASOPHILS # BLD: 1 % (ref 0–2)
BASOPHILS ABSOLUTE: 0 K/UL (ref 0–0.2)
BUN BLDV-MCNC: 9 MG/DL (ref 6–20)
BUN/CREAT BLD: ABNORMAL (ref 9–20)
CALCIUM SERPL-MCNC: 8.6 MG/DL (ref 8.6–10.4)
CHLORIDE BLD-SCNC: 99 MMOL/L (ref 98–107)
CO2: 27 MMOL/L (ref 20–31)
CREAT SERPL-MCNC: 0.67 MG/DL (ref 0.7–1.2)
DIFFERENTIAL TYPE: ABNORMAL
EOSINOPHILS RELATIVE PERCENT: 2 % (ref 0–4)
GFR AFRICAN AMERICAN: >60 ML/MIN
GFR NON-AFRICAN AMERICAN: >60 ML/MIN
GFR SERPL CREATININE-BSD FRML MDRD: ABNORMAL ML/MIN/{1.73_M2}
GFR SERPL CREATININE-BSD FRML MDRD: ABNORMAL ML/MIN/{1.73_M2}
GLUCOSE BLD-MCNC: 112 MG/DL (ref 75–110)
GLUCOSE BLD-MCNC: 117 MG/DL (ref 75–110)
GLUCOSE BLD-MCNC: 153 MG/DL (ref 70–99)
GLUCOSE BLD-MCNC: 161 MG/DL (ref 75–110)
HCT VFR BLD CALC: 34.3 % (ref 41–53)
HEMOGLOBIN: 11.5 G/DL (ref 13.5–17.5)
LYMPHOCYTES # BLD: 20 % (ref 24–44)
MCH RBC QN AUTO: 30.9 PG (ref 26–34)
MCHC RBC AUTO-ENTMCNC: 33.5 G/DL (ref 31–37)
MCV RBC AUTO: 92.2 FL (ref 80–100)
MONOCYTES # BLD: 8 % (ref 1–7)
PDW BLD-RTO: 12.6 % (ref 11.5–14.9)
PLATELET # BLD: 351 K/UL (ref 150–450)
PLATELET ESTIMATE: ABNORMAL
PMV BLD AUTO: 6.9 FL (ref 6–12)
POTASSIUM SERPL-SCNC: 3.5 MMOL/L (ref 3.7–5.3)
RBC # BLD: 3.72 M/UL (ref 4.5–5.9)
RBC # BLD: ABNORMAL 10*6/UL
SEG NEUTROPHILS: 69 % (ref 36–66)
SEGMENTED NEUTROPHILS ABSOLUTE COUNT: 4.4 K/UL (ref 1.3–9.1)
SODIUM BLD-SCNC: 137 MMOL/L (ref 135–144)
VANCOMYCIN TROUGH DATE LAST DOSE: NORMAL
VANCOMYCIN TROUGH DOSE AMOUNT: NORMAL
VANCOMYCIN TROUGH TIME LAST DOSE: 638
VANCOMYCIN TROUGH: 14.9 UG/ML (ref 10–20)
WBC # BLD: 6.4 K/UL (ref 3.5–11)
WBC # BLD: ABNORMAL 10*3/UL

## 2017-03-07 PROCEDURE — 80202 ASSAY OF VANCOMYCIN: CPT

## 2017-03-07 PROCEDURE — 6370000000 HC RX 637 (ALT 250 FOR IP): Performed by: FAMILY MEDICINE

## 2017-03-07 PROCEDURE — 36415 COLL VENOUS BLD VENIPUNCTURE: CPT

## 2017-03-07 PROCEDURE — 6360000002 HC RX W HCPCS: Performed by: FAMILY MEDICINE

## 2017-03-07 PROCEDURE — 2580000003 HC RX 258: Performed by: FAMILY MEDICINE

## 2017-03-07 PROCEDURE — C1751 CATH, INF, PER/CENT/MIDLINE: HCPCS

## 2017-03-07 PROCEDURE — 80048 BASIC METABOLIC PNL TOTAL CA: CPT

## 2017-03-07 PROCEDURE — 85025 COMPLETE CBC W/AUTO DIFF WBC: CPT

## 2017-03-07 PROCEDURE — 02HV33Z INSERTION OF INFUSION DEVICE INTO SUPERIOR VENA CAVA, PERCUTANEOUS APPROACH: ICD-10-PCS | Performed by: RADIOLOGY

## 2017-03-07 PROCEDURE — 99239 HOSP IP/OBS DSCHRG MGMT >30: CPT | Performed by: FAMILY MEDICINE

## 2017-03-07 PROCEDURE — 2580000003 HC RX 258: Performed by: RADIOLOGY

## 2017-03-07 PROCEDURE — 82947 ASSAY GLUCOSE BLOOD QUANT: CPT

## 2017-03-07 PROCEDURE — 36592 COLLECT BLOOD FROM PICC: CPT

## 2017-03-07 PROCEDURE — 36569 INSJ PICC 5 YR+ W/O IMAGING: CPT | Performed by: RADIOLOGY

## 2017-03-07 PROCEDURE — 99233 SBSQ HOSP IP/OBS HIGH 50: CPT | Performed by: INTERNAL MEDICINE

## 2017-03-07 PROCEDURE — 76937 US GUIDE VASCULAR ACCESS: CPT | Performed by: RADIOLOGY

## 2017-03-07 PROCEDURE — 77001 FLUOROGUIDE FOR VEIN DEVICE: CPT | Performed by: RADIOLOGY

## 2017-03-07 RX ORDER — SODIUM CHLORIDE 0.9 % (FLUSH) 0.9 %
10 SYRINGE (ML) INJECTION PRN
Status: DISCONTINUED | OUTPATIENT
Start: 2017-03-07 | End: 2017-03-07 | Stop reason: HOSPADM

## 2017-03-07 RX ORDER — CIPROFLOXACIN 500 MG/1
500 TABLET, FILM COATED ORAL 2 TIMES DAILY
Qty: 90 TABLET | Refills: 0 | Status: SHIPPED | OUTPATIENT
Start: 2017-03-07 | End: 2017-05-10 | Stop reason: SDUPTHER

## 2017-03-07 RX ORDER — SODIUM CHLORIDE 0.9 % (FLUSH) 0.9 %
10 SYRINGE (ML) INJECTION EVERY 12 HOURS SCHEDULED
Status: DISCONTINUED | OUTPATIENT
Start: 2017-03-07 | End: 2017-03-07 | Stop reason: HOSPADM

## 2017-03-07 RX ORDER — SODIUM CHLORIDE 0.9 % (FLUSH) 0.9 %
10 SYRINGE (ML) INJECTION EVERY 12 HOURS SCHEDULED
Qty: 60 SYRINGE | Refills: 2
Start: 2017-03-07 | End: 2017-07-11

## 2017-03-07 RX ORDER — SODIUM CHLORIDE 0.9 % (FLUSH) 0.9 %
10 SYRINGE (ML) INJECTION PRN
Qty: 30 SYRINGE | Refills: 0
Start: 2017-03-07 | End: 2017-07-11

## 2017-03-07 RX ADMIN — BUPROPION HYDROCHLORIDE 150 MG: 150 TABLET, FILM COATED, EXTENDED RELEASE ORAL at 10:40

## 2017-03-07 RX ADMIN — MUPIROCIN: 20 OINTMENT TOPICAL at 10:37

## 2017-03-07 RX ADMIN — OXYCODONE HYDROCHLORIDE 15 MG: 5 TABLET ORAL at 02:30

## 2017-03-07 RX ADMIN — METFORMIN HYDROCHLORIDE 500 MG: 500 TABLET, FILM COATED ORAL at 13:10

## 2017-03-07 RX ADMIN — OXYCODONE HYDROCHLORIDE 15 MG: 5 TABLET ORAL at 14:55

## 2017-03-07 RX ADMIN — PANTOPRAZOLE SODIUM 40 MG: 40 TABLET, DELAYED RELEASE ORAL at 06:38

## 2017-03-07 RX ADMIN — Medication 10 ML: at 10:58

## 2017-03-07 RX ADMIN — OXYCODONE HYDROCHLORIDE 15 MG: 5 TABLET ORAL at 19:11

## 2017-03-07 RX ADMIN — PAROXETINE HYDROCHLORIDE 20 MG: 20 TABLET, FILM COATED ORAL at 10:42

## 2017-03-07 RX ADMIN — OXYCODONE HYDROCHLORIDE 15 MG: 5 TABLET ORAL at 06:28

## 2017-03-07 RX ADMIN — VANCOMYCIN HYDROCHLORIDE 1250 MG: 5 INJECTION, POWDER, LYOPHILIZED, FOR SOLUTION INTRAVENOUS at 06:38

## 2017-03-07 RX ADMIN — FUROSEMIDE 20 MG: 20 TABLET ORAL at 10:55

## 2017-03-07 RX ADMIN — FUROSEMIDE 20 MG: 20 TABLET ORAL at 19:11

## 2017-03-07 RX ADMIN — CETIRIZINE HYDROCHLORIDE 10 MG: 10 TABLET, FILM COATED ORAL at 10:55

## 2017-03-07 RX ADMIN — OXYCODONE HYDROCHLORIDE 15 MG: 5 TABLET ORAL at 10:55

## 2017-03-07 RX ADMIN — CLOBETASOL PROPIONATE: 0.5 CREAM TOPICAL at 10:37

## 2017-03-07 RX ADMIN — VANCOMYCIN HYDROCHLORIDE 1250 MG: 5 INJECTION, POWDER, LYOPHILIZED, FOR SOLUTION INTRAVENOUS at 15:05

## 2017-03-07 RX ADMIN — LISINOPRIL 10 MG: 10 TABLET ORAL at 10:55

## 2017-03-07 RX ADMIN — ENOXAPARIN SODIUM 30 MG: 30 INJECTION SUBCUTANEOUS at 10:57

## 2017-03-07 RX ADMIN — CEFEPIME 2 G: 2 INJECTION, POWDER, FOR SOLUTION INTRAVENOUS at 10:37

## 2017-03-07 RX ADMIN — TAMSULOSIN HYDROCHLORIDE 0.4 MG: 0.4 CAPSULE ORAL at 10:55

## 2017-03-07 RX ADMIN — METFORMIN HYDROCHLORIDE 500 MG: 500 TABLET, FILM COATED ORAL at 19:11

## 2017-03-07 RX ADMIN — BUPROPION HYDROCHLORIDE 300 MG: 300 TABLET, FILM COATED, EXTENDED RELEASE ORAL at 10:41

## 2017-03-07 RX ADMIN — METFORMIN HYDROCHLORIDE 500 MG: 500 TABLET, FILM COATED ORAL at 10:55

## 2017-03-07 ASSESSMENT — PAIN SCALES - GENERAL
PAINLEVEL_OUTOF10: 0
PAINLEVEL_OUTOF10: 7
PAINLEVEL_OUTOF10: 0
PAINLEVEL_OUTOF10: 0
PAINLEVEL_OUTOF10: 9
PAINLEVEL_OUTOF10: 8
PAINLEVEL_OUTOF10: 7
PAINLEVEL_OUTOF10: 0
PAINLEVEL_OUTOF10: 7

## 2017-03-07 ASSESSMENT — PAIN DESCRIPTION - LOCATION
LOCATION: FOOT

## 2017-03-07 ASSESSMENT — PAIN DESCRIPTION - DESCRIPTORS
DESCRIPTORS: BURNING

## 2017-03-07 ASSESSMENT — PAIN DESCRIPTION - PAIN TYPE
TYPE: ACUTE PAIN

## 2017-03-07 ASSESSMENT — PAIN DESCRIPTION - FREQUENCY: FREQUENCY: CONTINUOUS

## 2017-03-07 ASSESSMENT — PAIN DESCRIPTION - ONSET: ONSET: ON-GOING

## 2017-03-07 ASSESSMENT — PAIN DESCRIPTION - ORIENTATION: ORIENTATION: RIGHT;LEFT

## 2017-03-09 LAB
CULTURE: NORMAL
Lab: NORMAL
SPECIMEN DESCRIPTION: NORMAL
STATUS: NORMAL
STATUS: NORMAL

## 2017-03-11 LAB
CULTURE: ABNORMAL
CULTURE: NORMAL
CULTURE: NORMAL
DIRECT EXAM: ABNORMAL
DIRECT EXAM: ABNORMAL
DIRECT EXAM: NORMAL
DIRECT EXAM: NORMAL
Lab: ABNORMAL
Lab: NORMAL
ORGANISM: ABNORMAL
SPECIMEN DESCRIPTION: ABNORMAL
SPECIMEN DESCRIPTION: ABNORMAL
SPECIMEN DESCRIPTION: NORMAL
SPECIMEN DESCRIPTION: NORMAL
STATUS: ABNORMAL
STATUS: NORMAL

## 2017-03-14 LAB — SURGICAL PATHOLOGY REPORT: NORMAL

## 2017-03-27 ENCOUNTER — TELEPHONE (OUTPATIENT)
Dept: INFECTIOUS DISEASES | Age: 52
End: 2017-03-27

## 2017-04-11 ENCOUNTER — TELEPHONE (OUTPATIENT)
Dept: INFECTIOUS DISEASES | Age: 52
End: 2017-04-11

## 2017-04-12 ENCOUNTER — OFFICE VISIT (OUTPATIENT)
Dept: INFECTIOUS DISEASES | Age: 52
End: 2017-04-12
Payer: MEDICARE

## 2017-04-12 VITALS
DIASTOLIC BLOOD PRESSURE: 82 MMHG | TEMPERATURE: 99.4 F | HEART RATE: 100 BPM | BODY MASS INDEX: 29.9 KG/M2 | WEIGHT: 233 LBS | HEIGHT: 74 IN | SYSTOLIC BLOOD PRESSURE: 124 MMHG

## 2017-04-12 DIAGNOSIS — M86.9 OSTEOMYELITIS OF TOE OF RIGHT FOOT (HCC): Primary | ICD-10-CM

## 2017-04-12 PROCEDURE — 99215 OFFICE O/P EST HI 40 MIN: CPT | Performed by: INTERNAL MEDICINE

## 2017-04-26 ENCOUNTER — OFFICE VISIT (OUTPATIENT)
Dept: INFECTIOUS DISEASES | Age: 52
End: 2017-04-26
Payer: MEDICARE

## 2017-04-26 VITALS
WEIGHT: 233.03 LBS | TEMPERATURE: 97.8 F | HEIGHT: 74 IN | BODY MASS INDEX: 29.91 KG/M2 | RESPIRATION RATE: 16 BRPM | SYSTOLIC BLOOD PRESSURE: 137 MMHG | HEART RATE: 103 BPM | DIASTOLIC BLOOD PRESSURE: 95 MMHG

## 2017-04-26 DIAGNOSIS — L03.032 CELLULITIS OF TOE OF LEFT FOOT: Primary | ICD-10-CM

## 2017-04-26 PROCEDURE — 99214 OFFICE O/P EST MOD 30 MIN: CPT | Performed by: INTERNAL MEDICINE

## 2017-04-26 RX ORDER — CIPROFLOXACIN 500 MG/1
TABLET, FILM COATED ORAL
COMMUNITY
Start: 2017-04-24 | End: 2017-05-24 | Stop reason: ALTCHOICE

## 2017-04-26 RX ORDER — METRONIDAZOLE 500 MG/1
500 TABLET ORAL 3 TIMES DAILY
Qty: 42 TABLET | Refills: 0 | Status: SHIPPED | OUTPATIENT
Start: 2017-04-26 | End: 2017-05-10 | Stop reason: SDUPTHER

## 2017-04-27 ENCOUNTER — TELEPHONE (OUTPATIENT)
Dept: INFECTIOUS DISEASES | Age: 52
End: 2017-04-27

## 2017-04-27 DIAGNOSIS — L03.119 CELLULITIS OF FOOT: Primary | ICD-10-CM

## 2017-04-27 DIAGNOSIS — S91.309D: ICD-10-CM

## 2017-04-27 RX ORDER — GAUZE BANDAGE 4.5"X147"
1 BANDAGE TOPICAL 2 TIMES DAILY
Qty: 60 EACH | Refills: 5 | Status: SHIPPED | OUTPATIENT
Start: 2017-04-27 | End: 2017-06-12 | Stop reason: SDUPTHER

## 2017-04-27 RX ORDER — GAUZE BANDAGE 4" X 4"
1 SPONGE TOPICAL 2 TIMES DAILY
Qty: 60 EACH | Refills: 5 | Status: SHIPPED | OUTPATIENT
Start: 2017-04-27 | End: 2017-06-12 | Stop reason: SDUPTHER

## 2017-04-27 RX ORDER — NON-ADHERENT BANDAGE 5"X9"
1 BANDAGE TOPICAL 2 TIMES DAILY
Qty: 60 EACH | Refills: 5 | Status: SHIPPED | OUTPATIENT
Start: 2017-04-27 | End: 2017-06-12 | Stop reason: SDUPTHER

## 2017-05-10 ENCOUNTER — OFFICE VISIT (OUTPATIENT)
Dept: INFECTIOUS DISEASES | Age: 52
End: 2017-05-10
Payer: MEDICARE

## 2017-05-10 VITALS — HEIGHT: 74 IN | BODY MASS INDEX: 30.26 KG/M2 | WEIGHT: 235.8 LBS

## 2017-05-10 DIAGNOSIS — M86.9 OSTEOMYELITIS OF TOE OF RIGHT FOOT (HCC): ICD-10-CM

## 2017-05-10 DIAGNOSIS — L03.032 CELLULITIS OF TOE OF LEFT FOOT: Primary | ICD-10-CM

## 2017-05-10 DIAGNOSIS — L03.116 CELLULITIS OF LEFT FOOT: ICD-10-CM

## 2017-05-10 PROCEDURE — 99214 OFFICE O/P EST MOD 30 MIN: CPT | Performed by: INTERNAL MEDICINE

## 2017-05-10 RX ORDER — METRONIDAZOLE 500 MG/1
500 TABLET ORAL 3 TIMES DAILY
Qty: 42 TABLET | Refills: 0 | Status: SHIPPED | OUTPATIENT
Start: 2017-05-10 | End: 2017-05-24 | Stop reason: ALTCHOICE

## 2017-05-10 RX ORDER — CIPROFLOXACIN 500 MG/1
500 TABLET, FILM COATED ORAL 2 TIMES DAILY
Qty: 28 TABLET | Refills: 0 | Status: SHIPPED | OUTPATIENT
Start: 2017-05-10 | End: 2017-05-24 | Stop reason: ALTCHOICE

## 2017-05-15 PROBLEM — L03.115 CELLULITIS OF RIGHT FOOT: Status: RESOLVED | Noted: 2017-03-01 | Resolved: 2017-05-15

## 2017-05-17 ENCOUNTER — TELEPHONE (OUTPATIENT)
Dept: INFECTIOUS DISEASES | Age: 52
End: 2017-05-17

## 2017-05-17 DIAGNOSIS — M86.9 OSTEOMYELITIS, OSTEOMYELITIS OF UNSPECIFIED SITE, UNSPECIFIED CHRONICITY: ICD-10-CM

## 2017-05-17 DIAGNOSIS — E11.49 OTHER DIABETIC NEUROLOGICAL COMPLICATION ASSOCIATED WITH TYPE 2 DIABETES MELLITUS (HCC): ICD-10-CM

## 2017-05-17 DIAGNOSIS — L03.116 CELLULITIS OF LEFT FOOT: ICD-10-CM

## 2017-05-17 DIAGNOSIS — L03.032 CELLULITIS OF TOE OF LEFT FOOT: Primary | ICD-10-CM

## 2017-05-22 ENCOUNTER — TELEPHONE (OUTPATIENT)
Dept: INFECTIOUS DISEASES | Age: 52
End: 2017-05-22

## 2017-05-24 ENCOUNTER — OFFICE VISIT (OUTPATIENT)
Dept: INFECTIOUS DISEASES | Age: 52
End: 2017-05-24
Payer: MEDICARE

## 2017-05-24 ENCOUNTER — HOSPITAL ENCOUNTER (OUTPATIENT)
Age: 52
Setting detail: SPECIMEN
Discharge: HOME | End: 2017-05-24
Payer: MEDICARE

## 2017-05-24 VITALS
HEIGHT: 74 IN | HEART RATE: 79 BPM | DIASTOLIC BLOOD PRESSURE: 46 MMHG | SYSTOLIC BLOOD PRESSURE: 95 MMHG | TEMPERATURE: 98.7 F | WEIGHT: 232.81 LBS | BODY MASS INDEX: 29.88 KG/M2 | RESPIRATION RATE: 16 BRPM

## 2017-05-24 DIAGNOSIS — L03.116 CELLULITIS OF LEFT FOOT: Primary | ICD-10-CM

## 2017-05-24 PROCEDURE — 99215 OFFICE O/P EST HI 40 MIN: CPT | Performed by: INTERNAL MEDICINE

## 2017-05-29 LAB
CULTURE: ABNORMAL
DIRECT EXAM: ABNORMAL
Lab: ABNORMAL
SPECIMEN DESCRIPTION: ABNORMAL
STATUS: ABNORMAL

## 2017-06-12 ENCOUNTER — TELEPHONE (OUTPATIENT)
Dept: INFECTIOUS DISEASES | Age: 52
End: 2017-06-12

## 2017-06-12 DIAGNOSIS — L03.119 CELLULITIS OF FOOT: ICD-10-CM

## 2017-06-12 DIAGNOSIS — S91.309D: ICD-10-CM

## 2017-06-12 RX ORDER — NON-ADHERENT BANDAGE 5"X9"
1 BANDAGE TOPICAL 2 TIMES DAILY
Qty: 60 EACH | Refills: 5 | Status: SHIPPED | OUTPATIENT
Start: 2017-06-12 | End: 2018-09-28 | Stop reason: SDUPTHER

## 2017-06-12 RX ORDER — GAUZE BANDAGE 4" X 4"
1 SPONGE TOPICAL 2 TIMES DAILY
Qty: 60 EACH | Refills: 5 | Status: SHIPPED | OUTPATIENT
Start: 2017-06-12 | End: 2018-09-28 | Stop reason: SDUPTHER

## 2017-06-12 RX ORDER — GAUZE BANDAGE 4.5"X147"
1 BANDAGE TOPICAL 2 TIMES DAILY
Qty: 60 EACH | Refills: 5 | Status: SHIPPED | OUTPATIENT
Start: 2017-06-12 | End: 2021-11-17

## 2017-06-14 ENCOUNTER — OFFICE VISIT (OUTPATIENT)
Dept: INFECTIOUS DISEASES | Age: 52
End: 2017-06-14
Payer: MEDICARE

## 2017-06-14 ENCOUNTER — TELEPHONE (OUTPATIENT)
Dept: INFECTIOUS DISEASES | Age: 52
End: 2017-06-14

## 2017-06-14 VITALS
TEMPERATURE: 98 F | HEART RATE: 96 BPM | BODY MASS INDEX: 30.54 KG/M2 | DIASTOLIC BLOOD PRESSURE: 71 MMHG | SYSTOLIC BLOOD PRESSURE: 114 MMHG | HEIGHT: 74 IN | WEIGHT: 238 LBS | RESPIRATION RATE: 16 BRPM

## 2017-06-14 DIAGNOSIS — L03.032 CELLULITIS OF TOE OF LEFT FOOT: Primary | ICD-10-CM

## 2017-06-14 PROCEDURE — 99215 OFFICE O/P EST HI 40 MIN: CPT | Performed by: INTERNAL MEDICINE

## 2017-06-14 RX ORDER — FLUCONAZOLE 200 MG/1
200 TABLET ORAL DAILY
Qty: 14 TABLET | Refills: 0 | Status: SHIPPED | OUTPATIENT
Start: 2017-06-14 | End: 2017-06-28

## 2017-06-23 ENCOUNTER — HOSPITAL ENCOUNTER (OUTPATIENT)
Dept: VASCULAR LAB | Age: 52
Discharge: HOME | End: 2017-06-23
Payer: MEDICARE

## 2017-06-23 ENCOUNTER — HOSPITAL ENCOUNTER (OUTPATIENT)
Dept: NUCLEAR MEDICINE | Age: 52
Discharge: HOME | End: 2017-06-23
Payer: MEDICARE

## 2017-06-23 DIAGNOSIS — L03.116 CELLULITIS OF LEFT FOOT: ICD-10-CM

## 2017-06-23 DIAGNOSIS — E11.49 OTHER DIABETIC NEUROLOGICAL COMPLICATION ASSOCIATED WITH TYPE 2 DIABETES MELLITUS (HCC): ICD-10-CM

## 2017-06-23 DIAGNOSIS — L03.032 CELLULITIS OF TOE OF LEFT FOOT: ICD-10-CM

## 2017-06-23 DIAGNOSIS — M86.9 OSTEOMYELITIS, OSTEOMYELITIS OF UNSPECIFIED SITE, UNSPECIFIED CHRONICITY: ICD-10-CM

## 2017-06-23 PROCEDURE — 93970 EXTREMITY STUDY: CPT

## 2017-06-23 PROCEDURE — A9503 TC99M MEDRONATE: HCPCS | Performed by: FAMILY MEDICINE

## 2017-06-23 PROCEDURE — 3430000000 HC RX DIAGNOSTIC RADIOPHARMACEUTICAL: Performed by: FAMILY MEDICINE

## 2017-06-23 PROCEDURE — 78315 BONE IMAGING 3 PHASE: CPT

## 2017-06-23 RX ORDER — TC 99M MEDRONATE 20 MG/10ML
28 INJECTION, POWDER, LYOPHILIZED, FOR SOLUTION INTRAVENOUS
Status: COMPLETED | OUTPATIENT
Start: 2017-06-23 | End: 2017-06-23

## 2017-06-23 RX ADMIN — Medication 28 MILLICURIE: at 09:15

## 2017-06-28 ENCOUNTER — OFFICE VISIT (OUTPATIENT)
Dept: INFECTIOUS DISEASES | Age: 52
End: 2017-06-28
Payer: MEDICARE

## 2017-06-28 VITALS
RESPIRATION RATE: 16 BRPM | HEART RATE: 66 BPM | HEIGHT: 74 IN | DIASTOLIC BLOOD PRESSURE: 76 MMHG | WEIGHT: 235 LBS | SYSTOLIC BLOOD PRESSURE: 125 MMHG | TEMPERATURE: 98 F | BODY MASS INDEX: 30.16 KG/M2

## 2017-06-28 DIAGNOSIS — L03.032 CELLULITIS OF TOE OF LEFT FOOT: ICD-10-CM

## 2017-06-28 DIAGNOSIS — R19.7 DIARRHEA, UNSPECIFIED TYPE: ICD-10-CM

## 2017-06-28 DIAGNOSIS — L03.116 CELLULITIS OF LEFT FOOT: ICD-10-CM

## 2017-06-28 DIAGNOSIS — N39.41 URGENCY INCONTINENCE: ICD-10-CM

## 2017-06-28 DIAGNOSIS — M86.9 OSTEOMYELITIS, OSTEOMYELITIS OF UNSPECIFIED SITE, UNSPECIFIED CHRONICITY: Primary | ICD-10-CM

## 2017-06-28 PROCEDURE — 99215 OFFICE O/P EST HI 40 MIN: CPT | Performed by: INTERNAL MEDICINE

## 2017-06-28 RX ORDER — DOXYCYCLINE HYCLATE 100 MG
100 TABLET ORAL 2 TIMES DAILY
Qty: 28 TABLET | Refills: 0 | Status: SHIPPED | OUTPATIENT
Start: 2017-06-28 | End: 2017-07-12

## 2017-06-29 ENCOUNTER — TELEPHONE (OUTPATIENT)
Dept: INFECTIOUS DISEASES | Age: 52
End: 2017-06-29

## 2017-07-11 ENCOUNTER — TELEPHONE (OUTPATIENT)
Dept: INFECTIOUS DISEASES | Age: 52
End: 2017-07-11

## 2017-07-11 DIAGNOSIS — L03.032 CELLULITIS OF TOE OF LEFT FOOT: ICD-10-CM

## 2017-07-11 DIAGNOSIS — M86.9 OSTEOMYELITIS, OSTEOMYELITIS OF UNSPECIFIED SITE, UNSPECIFIED CHRONICITY: Primary | ICD-10-CM

## 2017-07-13 ENCOUNTER — TELEPHONE (OUTPATIENT)
Dept: INFECTIOUS DISEASES | Age: 52
End: 2017-07-13

## 2017-07-13 DIAGNOSIS — L03.116 CELLULITIS OF LEFT FOOT: ICD-10-CM

## 2017-07-13 DIAGNOSIS — M86.9 OSTEOMYELITIS, OSTEOMYELITIS OF UNSPECIFIED SITE, UNSPECIFIED CHRONICITY: Primary | ICD-10-CM

## 2017-07-13 RX ORDER — SULFAMETHOXAZOLE AND TRIMETHOPRIM 800; 160 MG/1; MG/1
1 TABLET ORAL 2 TIMES DAILY
Qty: 60 TABLET | Refills: 1 | Status: SHIPPED | OUTPATIENT
Start: 2017-07-13 | End: 2017-07-18 | Stop reason: DRUGHIGH

## 2017-07-17 ENCOUNTER — TELEPHONE (OUTPATIENT)
Dept: INFECTIOUS DISEASES | Age: 52
End: 2017-07-17

## 2017-07-18 ENCOUNTER — HOSPITAL ENCOUNTER (OUTPATIENT)
Age: 52
Setting detail: SPECIMEN
Discharge: HOME | End: 2017-07-18
Payer: MEDICARE

## 2017-07-18 ENCOUNTER — OFFICE VISIT (OUTPATIENT)
Dept: PODIATRY | Age: 52
End: 2017-07-18
Payer: MEDICARE

## 2017-07-18 VITALS
HEART RATE: 84 BPM | BODY MASS INDEX: 28.88 KG/M2 | WEIGHT: 225 LBS | SYSTOLIC BLOOD PRESSURE: 147 MMHG | DIASTOLIC BLOOD PRESSURE: 87 MMHG | HEIGHT: 74 IN

## 2017-07-18 DIAGNOSIS — L97.522 ULCER OF LEFT FOOT, WITH FAT LAYER EXPOSED (HCC): Primary | ICD-10-CM

## 2017-07-18 DIAGNOSIS — L97.509 TYPE 2 DIABETES MELLITUS WITH FOOT ULCER, WITHOUT LONG-TERM CURRENT USE OF INSULIN (HCC): ICD-10-CM

## 2017-07-18 DIAGNOSIS — L03.116 CELLULITIS OF FOOT, LEFT: ICD-10-CM

## 2017-07-18 DIAGNOSIS — E11.621 TYPE 2 DIABETES MELLITUS WITH FOOT ULCER, WITHOUT LONG-TERM CURRENT USE OF INSULIN (HCC): ICD-10-CM

## 2017-07-18 DIAGNOSIS — E11.42 TYPE 2 DIABETES MELLITUS WITH DIABETIC POLYNEUROPATHY, WITHOUT LONG-TERM CURRENT USE OF INSULIN (HCC): ICD-10-CM

## 2017-07-18 LAB
CULTURE: NORMAL
DIRECT EXAM: NORMAL
Lab: NORMAL
SPECIMEN DESCRIPTION: NORMAL
STATUS: NORMAL

## 2017-07-18 PROCEDURE — 73630 X-RAY EXAM OF FOOT: CPT | Performed by: PODIATRIST

## 2017-07-18 PROCEDURE — 99203 OFFICE O/P NEW LOW 30 MIN: CPT | Performed by: PODIATRIST

## 2017-07-18 PROCEDURE — 11042 DBRDMT SUBQ TIS 1ST 20SQCM/<: CPT | Performed by: PODIATRIST

## 2017-07-18 RX ORDER — SULFAMETHOXAZOLE AND TRIMETHOPRIM 800; 160 MG/1; MG/1
1 TABLET ORAL
COMMUNITY
End: 2017-07-20

## 2017-07-20 ENCOUNTER — HOSPITAL ENCOUNTER (OUTPATIENT)
Dept: WOUND CARE | Age: 52
Discharge: HOME | End: 2017-07-20
Payer: MEDICARE

## 2017-07-20 VITALS
HEART RATE: 82 BPM | TEMPERATURE: 98.6 F | RESPIRATION RATE: 16 BRPM | HEIGHT: 74 IN | BODY MASS INDEX: 28.88 KG/M2 | WEIGHT: 225 LBS | DIASTOLIC BLOOD PRESSURE: 66 MMHG | SYSTOLIC BLOOD PRESSURE: 138 MMHG

## 2017-07-20 DIAGNOSIS — L03.116 CELLULITIS OF FOOT, LEFT: ICD-10-CM

## 2017-07-20 DIAGNOSIS — E11.621 TYPE 2 DIABETES MELLITUS WITH FOOT ULCER, WITHOUT LONG-TERM CURRENT USE OF INSULIN (HCC): ICD-10-CM

## 2017-07-20 DIAGNOSIS — E11.42 TYPE 2 DIABETES MELLITUS WITH DIABETIC POLYNEUROPATHY, WITHOUT LONG-TERM CURRENT USE OF INSULIN (HCC): ICD-10-CM

## 2017-07-20 DIAGNOSIS — M86.172 OSTEOMYELITIS OF FOOT, LEFT, ACUTE (HCC): ICD-10-CM

## 2017-07-20 DIAGNOSIS — L97.523 SKIN ULCER OF LEFT FOOT WITH NECROSIS OF MUSCLE (HCC): ICD-10-CM

## 2017-07-20 DIAGNOSIS — L97.509 TYPE 2 DIABETES MELLITUS WITH FOOT ULCER, WITHOUT LONG-TERM CURRENT USE OF INSULIN (HCC): ICD-10-CM

## 2017-07-20 PROCEDURE — 99213 OFFICE O/P EST LOW 20 MIN: CPT | Performed by: PODIATRIST

## 2017-07-20 PROCEDURE — 6370000000 HC RX 637 (ALT 250 FOR IP): Performed by: PODIATRIST

## 2017-07-20 PROCEDURE — 99213 OFFICE O/P EST LOW 20 MIN: CPT

## 2017-07-20 RX ORDER — DOXYCYCLINE HYCLATE 100 MG/1
100 CAPSULE ORAL 2 TIMES DAILY
Qty: 28 CAPSULE | Refills: 0 | Status: SHIPPED | OUTPATIENT
Start: 2017-07-20 | End: 2017-08-03

## 2017-07-20 RX ORDER — GREEN TEA/HOODIA GORDONII 315-12.5MG
1 CAPSULE ORAL DAILY
Qty: 30 TABLET | Refills: 3 | Status: SHIPPED | OUTPATIENT
Start: 2017-07-20 | End: 2018-07-06

## 2017-07-20 RX ADMIN — LIDOCAINE HYDROCHLORIDE: 20 JELLY TOPICAL at 10:48

## 2017-07-20 ASSESSMENT — PAIN DESCRIPTION - PAIN TYPE: TYPE: CHRONIC PAIN

## 2017-07-20 ASSESSMENT — PAIN DESCRIPTION - DESCRIPTORS: DESCRIPTORS: CONSTANT;SHARP;THROBBING

## 2017-07-20 ASSESSMENT — PAIN DESCRIPTION - PROGRESSION: CLINICAL_PROGRESSION: GRADUALLY WORSENING

## 2017-07-20 ASSESSMENT — PAIN DESCRIPTION - ORIENTATION: ORIENTATION: LEFT

## 2017-07-20 ASSESSMENT — PAIN SCALES - GENERAL: PAINLEVEL_OUTOF10: 8

## 2017-07-20 ASSESSMENT — PAIN DESCRIPTION - FREQUENCY: FREQUENCY: CONTINUOUS

## 2017-07-20 ASSESSMENT — PAIN DESCRIPTION - LOCATION: LOCATION: FOOT

## 2017-07-20 ASSESSMENT — PAIN DESCRIPTION - ONSET: ONSET: ON-GOING

## 2017-07-23 LAB
CULTURE: ABNORMAL
DIRECT EXAM: ABNORMAL
DIRECT EXAM: ABNORMAL
Lab: ABNORMAL
ORGANISM: ABNORMAL
SPECIMEN DESCRIPTION: ABNORMAL
STATUS: ABNORMAL

## 2017-07-28 ENCOUNTER — HOSPITAL ENCOUNTER (OUTPATIENT)
Dept: WOUND CARE | Age: 52
Discharge: HOME | End: 2017-07-28
Payer: MEDICARE

## 2017-07-28 VITALS
RESPIRATION RATE: 16 BRPM | HEART RATE: 78 BPM | SYSTOLIC BLOOD PRESSURE: 115 MMHG | TEMPERATURE: 97.3 F | DIASTOLIC BLOOD PRESSURE: 69 MMHG

## 2017-07-28 PROCEDURE — 11045 DBRDMT SUBQ TISS EACH ADDL: CPT

## 2017-07-28 PROCEDURE — 11045 DBRDMT SUBQ TISS EACH ADDL: CPT | Performed by: NURSE PRACTITIONER

## 2017-07-28 PROCEDURE — 11042 DBRDMT SUBQ TIS 1ST 20SQCM/<: CPT

## 2017-07-28 PROCEDURE — 11042 DBRDMT SUBQ TIS 1ST 20SQCM/<: CPT | Performed by: NURSE PRACTITIONER

## 2017-07-28 PROCEDURE — 6370000000 HC RX 637 (ALT 250 FOR IP): Performed by: NURSE PRACTITIONER

## 2017-07-28 RX ADMIN — LIDOCAINE HYDROCHLORIDE: 20 JELLY TOPICAL at 11:34

## 2017-07-28 ASSESSMENT — PAIN DESCRIPTION - DESCRIPTORS: DESCRIPTORS: ACHING;BURNING;THROBBING

## 2017-07-28 ASSESSMENT — PAIN DESCRIPTION - LOCATION: LOCATION: FOOT

## 2017-07-28 ASSESSMENT — PAIN DESCRIPTION - PROGRESSION: CLINICAL_PROGRESSION: GRADUALLY WORSENING

## 2017-07-28 ASSESSMENT — PAIN SCALES - GENERAL: PAINLEVEL_OUTOF10: 8

## 2017-07-28 ASSESSMENT — PAIN DESCRIPTION - FREQUENCY: FREQUENCY: CONTINUOUS

## 2017-07-28 ASSESSMENT — PAIN DESCRIPTION - PAIN TYPE: TYPE: CHRONIC PAIN

## 2017-08-03 ENCOUNTER — HOSPITAL ENCOUNTER (OUTPATIENT)
Dept: WOUND CARE | Age: 52
Discharge: HOME | End: 2017-08-03
Payer: MEDICARE

## 2017-08-03 VITALS — TEMPERATURE: 97.7 F

## 2017-08-03 DIAGNOSIS — E11.42 TYPE 2 DIABETES MELLITUS WITH DIABETIC POLYNEUROPATHY, WITHOUT LONG-TERM CURRENT USE OF INSULIN (HCC): ICD-10-CM

## 2017-08-03 DIAGNOSIS — E11.621 TYPE 2 DIABETES MELLITUS WITH FOOT ULCER, WITHOUT LONG-TERM CURRENT USE OF INSULIN (HCC): Primary | ICD-10-CM

## 2017-08-03 DIAGNOSIS — L97.509 TYPE 2 DIABETES MELLITUS WITH FOOT ULCER, WITHOUT LONG-TERM CURRENT USE OF INSULIN (HCC): Primary | ICD-10-CM

## 2017-08-03 DIAGNOSIS — L97.523 SKIN ULCER OF LEFT FOOT WITH NECROSIS OF MUSCLE (HCC): ICD-10-CM

## 2017-08-03 PROCEDURE — 11045 DBRDMT SUBQ TISS EACH ADDL: CPT

## 2017-08-03 PROCEDURE — 6370000000 HC RX 637 (ALT 250 FOR IP): Performed by: PODIATRIST

## 2017-08-03 PROCEDURE — 11042 DBRDMT SUBQ TIS 1ST 20SQCM/<: CPT

## 2017-08-03 PROCEDURE — 11042 DBRDMT SUBQ TIS 1ST 20SQCM/<: CPT | Performed by: PODIATRIST

## 2017-08-03 RX ADMIN — LIDOCAINE HYDROCHLORIDE: 20 JELLY TOPICAL at 11:09

## 2017-08-04 RX ORDER — DOXYCYCLINE HYCLATE 100 MG/1
100 CAPSULE ORAL 2 TIMES DAILY
Qty: 60 CAPSULE | Refills: 0 | Status: SHIPPED | OUTPATIENT
Start: 2017-08-04 | End: 2017-09-03

## 2017-08-07 ENCOUNTER — TELEPHONE (OUTPATIENT)
Dept: INFECTIOUS DISEASES | Age: 52
End: 2017-08-07

## 2017-08-17 ENCOUNTER — HOSPITAL ENCOUNTER (OUTPATIENT)
Dept: WOUND CARE | Age: 52
Discharge: HOME | End: 2017-08-17
Payer: MEDICARE

## 2017-08-17 VITALS
RESPIRATION RATE: 18 BRPM | SYSTOLIC BLOOD PRESSURE: 136 MMHG | HEART RATE: 93 BPM | DIASTOLIC BLOOD PRESSURE: 68 MMHG | TEMPERATURE: 99.1 F

## 2017-08-17 DIAGNOSIS — E11.621 TYPE 2 DIABETES MELLITUS WITH FOOT ULCER, WITHOUT LONG-TERM CURRENT USE OF INSULIN (HCC): Primary | ICD-10-CM

## 2017-08-17 DIAGNOSIS — L97.509 TYPE 2 DIABETES MELLITUS WITH FOOT ULCER, WITHOUT LONG-TERM CURRENT USE OF INSULIN (HCC): Primary | ICD-10-CM

## 2017-08-17 DIAGNOSIS — L97.523 SKIN ULCER OF LEFT FOOT WITH NECROSIS OF MUSCLE (HCC): ICD-10-CM

## 2017-08-17 DIAGNOSIS — E11.42 TYPE 2 DIABETES MELLITUS WITH DIABETIC POLYNEUROPATHY, WITHOUT LONG-TERM CURRENT USE OF INSULIN (HCC): ICD-10-CM

## 2017-08-17 PROCEDURE — 6370000000 HC RX 637 (ALT 250 FOR IP): Performed by: PODIATRIST

## 2017-08-17 PROCEDURE — 11042 DBRDMT SUBQ TIS 1ST 20SQCM/<: CPT

## 2017-08-17 PROCEDURE — 11045 DBRDMT SUBQ TISS EACH ADDL: CPT

## 2017-08-17 PROCEDURE — 11042 DBRDMT SUBQ TIS 1ST 20SQCM/<: CPT | Performed by: PODIATRIST

## 2017-08-17 RX ADMIN — LIDOCAINE HYDROCHLORIDE: 20 JELLY TOPICAL at 10:56

## 2017-08-17 ASSESSMENT — PAIN DESCRIPTION - DESCRIPTORS: DESCRIPTORS: ACHING;BURNING;THROBBING

## 2017-08-17 ASSESSMENT — PAIN DESCRIPTION - PAIN TYPE: TYPE: CHRONIC PAIN

## 2017-08-17 ASSESSMENT — PAIN DESCRIPTION - FREQUENCY: FREQUENCY: CONTINUOUS

## 2017-08-17 ASSESSMENT — PAIN DESCRIPTION - ONSET: ONSET: ON-GOING

## 2017-08-17 ASSESSMENT — PAIN DESCRIPTION - PROGRESSION: CLINICAL_PROGRESSION: NOT CHANGED

## 2017-08-17 ASSESSMENT — PAIN DESCRIPTION - ORIENTATION: ORIENTATION: LEFT

## 2017-08-17 ASSESSMENT — PAIN DESCRIPTION - LOCATION: LOCATION: FOOT

## 2017-08-17 ASSESSMENT — PAIN SCALES - GENERAL: PAINLEVEL_OUTOF10: 8

## 2017-08-24 ENCOUNTER — HOSPITAL ENCOUNTER (OUTPATIENT)
Dept: WOUND CARE | Age: 52
Discharge: HOME | End: 2017-08-24
Payer: MEDICARE

## 2017-08-31 ENCOUNTER — HOSPITAL ENCOUNTER (OUTPATIENT)
Dept: WOUND CARE | Age: 52
Discharge: HOME | End: 2017-08-31
Payer: MEDICARE

## 2017-08-31 VITALS — TEMPERATURE: 96.1 F | SYSTOLIC BLOOD PRESSURE: 125 MMHG | HEART RATE: 102 BPM | DIASTOLIC BLOOD PRESSURE: 75 MMHG

## 2017-08-31 DIAGNOSIS — L97.509 TYPE 2 DIABETES MELLITUS WITH FOOT ULCER, WITHOUT LONG-TERM CURRENT USE OF INSULIN (HCC): Primary | ICD-10-CM

## 2017-08-31 DIAGNOSIS — E11.621 TYPE 2 DIABETES MELLITUS WITH FOOT ULCER, WITHOUT LONG-TERM CURRENT USE OF INSULIN (HCC): Primary | ICD-10-CM

## 2017-08-31 DIAGNOSIS — L97.523 SKIN ULCER OF LEFT FOOT WITH NECROSIS OF MUSCLE (HCC): ICD-10-CM

## 2017-08-31 DIAGNOSIS — E11.42 TYPE 2 DIABETES MELLITUS WITH DIABETIC POLYNEUROPATHY, WITHOUT LONG-TERM CURRENT USE OF INSULIN (HCC): ICD-10-CM

## 2017-08-31 PROCEDURE — 11045 DBRDMT SUBQ TISS EACH ADDL: CPT

## 2017-08-31 PROCEDURE — 11042 DBRDMT SUBQ TIS 1ST 20SQCM/<: CPT | Performed by: PODIATRIST

## 2017-08-31 PROCEDURE — 11045 DBRDMT SUBQ TISS EACH ADDL: CPT | Performed by: PODIATRIST

## 2017-08-31 PROCEDURE — 11042 DBRDMT SUBQ TIS 1ST 20SQCM/<: CPT

## 2017-08-31 PROCEDURE — 6370000000 HC RX 637 (ALT 250 FOR IP): Performed by: PODIATRIST

## 2017-08-31 RX ADMIN — LIDOCAINE HYDROCHLORIDE: 20 JELLY TOPICAL at 09:53

## 2017-08-31 ASSESSMENT — PAIN DESCRIPTION - PROGRESSION: CLINICAL_PROGRESSION: NOT CHANGED

## 2017-08-31 ASSESSMENT — PAIN DESCRIPTION - FREQUENCY: FREQUENCY: CONTINUOUS

## 2017-08-31 ASSESSMENT — PAIN DESCRIPTION - ORIENTATION: ORIENTATION: LEFT

## 2017-08-31 ASSESSMENT — PAIN DESCRIPTION - PAIN TYPE: TYPE: CHRONIC PAIN

## 2017-08-31 ASSESSMENT — PAIN DESCRIPTION - ONSET: ONSET: ON-GOING

## 2017-08-31 ASSESSMENT — PAIN DESCRIPTION - LOCATION: LOCATION: FOOT

## 2017-08-31 ASSESSMENT — PAIN DESCRIPTION - DESCRIPTORS: DESCRIPTORS: ACHING;BURNING;THROBBING

## 2017-08-31 ASSESSMENT — PAIN SCALES - GENERAL: PAINLEVEL_OUTOF10: 8

## 2017-09-08 ENCOUNTER — HOSPITAL ENCOUNTER (OUTPATIENT)
Dept: WOUND CARE | Age: 52
Discharge: HOME | End: 2017-09-08
Payer: MEDICARE

## 2017-09-08 VITALS
RESPIRATION RATE: 18 BRPM | HEART RATE: 101 BPM | SYSTOLIC BLOOD PRESSURE: 135 MMHG | TEMPERATURE: 99.5 F | DIASTOLIC BLOOD PRESSURE: 80 MMHG

## 2017-09-08 DIAGNOSIS — E11.42 TYPE 2 DIABETES MELLITUS WITH DIABETIC POLYNEUROPATHY, WITHOUT LONG-TERM CURRENT USE OF INSULIN (HCC): ICD-10-CM

## 2017-09-08 DIAGNOSIS — M86.172 OSTEOMYELITIS OF FOOT, LEFT, ACUTE (HCC): ICD-10-CM

## 2017-09-08 DIAGNOSIS — L97.509 TYPE 2 DIABETES MELLITUS WITH FOOT ULCER, WITHOUT LONG-TERM CURRENT USE OF INSULIN (HCC): Primary | ICD-10-CM

## 2017-09-08 DIAGNOSIS — E11.621 TYPE 2 DIABETES MELLITUS WITH FOOT ULCER, WITHOUT LONG-TERM CURRENT USE OF INSULIN (HCC): Primary | ICD-10-CM

## 2017-09-08 DIAGNOSIS — L97.523 SKIN ULCER OF LEFT FOOT WITH NECROSIS OF MUSCLE (HCC): ICD-10-CM

## 2017-09-08 PROCEDURE — 6370000000 HC RX 637 (ALT 250 FOR IP): Performed by: PODIATRIST

## 2017-09-08 PROCEDURE — 11042 DBRDMT SUBQ TIS 1ST 20SQCM/<: CPT

## 2017-09-08 PROCEDURE — 11045 DBRDMT SUBQ TISS EACH ADDL: CPT | Performed by: PODIATRIST

## 2017-09-08 PROCEDURE — 11042 DBRDMT SUBQ TIS 1ST 20SQCM/<: CPT | Performed by: PODIATRIST

## 2017-09-08 PROCEDURE — 11045 DBRDMT SUBQ TISS EACH ADDL: CPT

## 2017-09-08 RX ADMIN — LIDOCAINE HYDROCHLORIDE: 20 JELLY TOPICAL at 10:10

## 2017-09-08 ASSESSMENT — PAIN DESCRIPTION - LOCATION: LOCATION: FOOT

## 2017-09-08 ASSESSMENT — PAIN DESCRIPTION - FREQUENCY: FREQUENCY: CONTINUOUS

## 2017-09-08 ASSESSMENT — PAIN DESCRIPTION - ONSET: ONSET: ON-GOING

## 2017-09-08 ASSESSMENT — PAIN DESCRIPTION - PAIN TYPE: TYPE: CHRONIC PAIN

## 2017-09-08 ASSESSMENT — PAIN DESCRIPTION - DESCRIPTORS: DESCRIPTORS: ACHING;BURNING;STABBING;PRESSURE

## 2017-09-08 ASSESSMENT — PAIN DESCRIPTION - PROGRESSION: CLINICAL_PROGRESSION: NOT CHANGED

## 2017-09-08 ASSESSMENT — PAIN DESCRIPTION - ORIENTATION: ORIENTATION: LEFT

## 2017-09-21 ENCOUNTER — HOSPITAL ENCOUNTER (OUTPATIENT)
Dept: WOUND CARE | Age: 52
Discharge: HOME | End: 2017-09-21
Payer: MEDICARE

## 2017-09-21 VITALS
SYSTOLIC BLOOD PRESSURE: 121 MMHG | HEIGHT: 74 IN | BODY MASS INDEX: 29.26 KG/M2 | DIASTOLIC BLOOD PRESSURE: 68 MMHG | TEMPERATURE: 97.9 F | HEART RATE: 79 BPM | WEIGHT: 228 LBS | RESPIRATION RATE: 18 BRPM

## 2017-09-21 PROCEDURE — 11042 DBRDMT SUBQ TIS 1ST 20SQCM/<: CPT | Performed by: NURSE PRACTITIONER

## 2017-09-21 PROCEDURE — 11042 DBRDMT SUBQ TIS 1ST 20SQCM/<: CPT

## 2017-09-21 PROCEDURE — 6370000000 HC RX 637 (ALT 250 FOR IP): Performed by: NURSE PRACTITIONER

## 2017-09-21 RX ORDER — TETRACYCLINE HYDROCHLORIDE 500 MG/1
500 CAPSULE ORAL 2 TIMES DAILY
COMMUNITY
End: 2017-09-21

## 2017-09-21 RX ORDER — DOXYCYCLINE HYCLATE 100 MG/1
100 CAPSULE ORAL 2 TIMES DAILY
COMMUNITY
End: 2017-12-01 | Stop reason: ALTCHOICE

## 2017-09-21 RX ADMIN — LIDOCAINE HYDROCHLORIDE: 20 JELLY TOPICAL at 10:59

## 2017-09-21 ASSESSMENT — PAIN DESCRIPTION - PAIN TYPE: TYPE: CHRONIC PAIN

## 2017-09-21 ASSESSMENT — PAIN DESCRIPTION - DESCRIPTORS: DESCRIPTORS: CONSTANT;DISCOMFORT;THROBBING;SHARP

## 2017-09-21 ASSESSMENT — PAIN DESCRIPTION - PROGRESSION: CLINICAL_PROGRESSION: NOT CHANGED

## 2017-09-21 ASSESSMENT — PAIN DESCRIPTION - FREQUENCY: FREQUENCY: CONTINUOUS

## 2017-09-21 ASSESSMENT — PAIN DESCRIPTION - ONSET: ONSET: ON-GOING

## 2017-09-21 ASSESSMENT — PAIN SCALES - GENERAL: PAINLEVEL_OUTOF10: 8

## 2017-09-21 ASSESSMENT — PAIN DESCRIPTION - ORIENTATION: ORIENTATION: LEFT

## 2017-09-28 ENCOUNTER — HOSPITAL ENCOUNTER (OUTPATIENT)
Dept: WOUND CARE | Age: 52
Discharge: HOME | End: 2017-09-28
Payer: MEDICARE

## 2017-10-06 ENCOUNTER — HOSPITAL ENCOUNTER (OUTPATIENT)
Dept: WOUND CARE | Age: 52
Discharge: HOME | End: 2017-10-06
Payer: MEDICARE

## 2017-10-06 ENCOUNTER — HOSPITAL ENCOUNTER (OUTPATIENT)
Dept: NUCLEAR MEDICINE | Age: 52
Discharge: HOME | End: 2017-10-06
Payer: MEDICARE

## 2017-10-06 VITALS
RESPIRATION RATE: 18 BRPM | DIASTOLIC BLOOD PRESSURE: 62 MMHG | WEIGHT: 228 LBS | BODY MASS INDEX: 29.26 KG/M2 | SYSTOLIC BLOOD PRESSURE: 106 MMHG | HEIGHT: 74 IN | TEMPERATURE: 97.7 F | HEART RATE: 89 BPM

## 2017-10-06 DIAGNOSIS — Z91.199 NON COMPLIANCE WITH MEDICAL TREATMENT: ICD-10-CM

## 2017-10-06 DIAGNOSIS — M86.60 CHRONIC OSTEOMYELITIS (HCC): ICD-10-CM

## 2017-10-06 DIAGNOSIS — M86.172 OSTEOMYELITIS OF FOOT, LEFT, ACUTE (HCC): ICD-10-CM

## 2017-10-06 DIAGNOSIS — L03.116 CELLULITIS OF FOOT, LEFT: ICD-10-CM

## 2017-10-06 DIAGNOSIS — L97.509 TYPE 2 DIABETES MELLITUS WITH FOOT ULCER, WITHOUT LONG-TERM CURRENT USE OF INSULIN (HCC): ICD-10-CM

## 2017-10-06 DIAGNOSIS — E11.42 TYPE 2 DIABETES MELLITUS WITH DIABETIC POLYNEUROPATHY, WITHOUT LONG-TERM CURRENT USE OF INSULIN (HCC): ICD-10-CM

## 2017-10-06 DIAGNOSIS — L97.522 SKIN ULCER OF LEFT FOOT WITH FAT LAYER EXPOSED (HCC): Primary | ICD-10-CM

## 2017-10-06 DIAGNOSIS — E11.621 TYPE 2 DIABETES MELLITUS WITH FOOT ULCER, WITHOUT LONG-TERM CURRENT USE OF INSULIN (HCC): ICD-10-CM

## 2017-10-06 PROCEDURE — A9503 TC99M MEDRONATE: HCPCS | Performed by: FAMILY MEDICINE

## 2017-10-06 PROCEDURE — 11042 DBRDMT SUBQ TIS 1ST 20SQCM/<: CPT | Performed by: PODIATRIST

## 2017-10-06 PROCEDURE — 3430000000 HC RX DIAGNOSTIC RADIOPHARMACEUTICAL: Performed by: FAMILY MEDICINE

## 2017-10-06 PROCEDURE — 11045 DBRDMT SUBQ TISS EACH ADDL: CPT | Performed by: PODIATRIST

## 2017-10-06 PROCEDURE — 11042 DBRDMT SUBQ TIS 1ST 20SQCM/<: CPT

## 2017-10-06 PROCEDURE — 6370000000 HC RX 637 (ALT 250 FOR IP): Performed by: PODIATRIST

## 2017-10-06 PROCEDURE — 11045 DBRDMT SUBQ TISS EACH ADDL: CPT

## 2017-10-06 PROCEDURE — 78315 BONE IMAGING 3 PHASE: CPT

## 2017-10-06 RX ORDER — TC 99M MEDRONATE 20 MG/10ML
27.1 INJECTION, POWDER, LYOPHILIZED, FOR SOLUTION INTRAVENOUS
Status: COMPLETED | OUTPATIENT
Start: 2017-10-06 | End: 2017-10-06

## 2017-10-06 RX ADMIN — LIDOCAINE HYDROCHLORIDE: 20 JELLY TOPICAL at 11:11

## 2017-10-06 RX ADMIN — Medication 27.1 MILLICURIE: at 12:00

## 2017-10-06 ASSESSMENT — PAIN DESCRIPTION - ONSET: ONSET: ON-GOING

## 2017-10-06 ASSESSMENT — PAIN DESCRIPTION - PROGRESSION: CLINICAL_PROGRESSION: NOT CHANGED

## 2017-10-06 ASSESSMENT — PAIN SCALES - GENERAL: PAINLEVEL_OUTOF10: 8

## 2017-10-06 ASSESSMENT — PAIN DESCRIPTION - PAIN TYPE: TYPE: CHRONIC PAIN

## 2017-10-06 ASSESSMENT — PAIN DESCRIPTION - FREQUENCY: FREQUENCY: CONTINUOUS

## 2017-10-06 ASSESSMENT — PAIN DESCRIPTION - ORIENTATION: ORIENTATION: LEFT

## 2017-10-06 ASSESSMENT — PAIN DESCRIPTION - LOCATION: LOCATION: FOOT

## 2017-10-06 NOTE — PROGRESS NOTES
[facility]  Progress Note      Cheri Pruett  AGE: 47 y.o. GENDER: male  : 1965  TODAY'S DATE:  10/6/2017    Chief Complaint   Patient presents with    Wound Check     History of the Present Illness     Cheri Pruett is a 47 y.o. male who presents today for evaluation and treatment for a diabetic foot ulcer  wound which is located on the left foot. Patient presented to clinic today with no dressing to the ulcerated foot again. Cat hair packed around the toes, in the webspace. Wearing CROCS, using his ulcerative foot to push him along again. His mother is with him today. States the redness to his left lower extremity comes and goes depending on the amount of activity. States that he has not been walking much on the foot except when he needs to. Denies n/v/f/c. History of Wound: He has had a history of ulceration on both feet. Started with ulcerations on both 2nd toes, he had them amputated. He then had toes 1, 3 amputated on the left. After surgery, about 5 months ago, according to him he developed a large ulcer including toes 1-2-3 left, and metatarsal area. He was seen in the wound care center before, but says he did not like the doctor. He is know to be non-compliant. His BG has been uncontrolled. He is also seeing Dr. Alex Woodward and Dr. Jung Park. He was on IV antibiotics with a PICC but has since stopped. His left leg and foot stay discolored and red with pain. He does not elevate. He was scheduled for an MRI but he cancelled it. He is worried about another amputation. His wife is dressing the foot with antibiotic ointment and and dressing. Has been dressing the wounds with silvercel.   Wound Type:diabetic  Wound Location:left foot  Modifying factors:edema, diabetes, poor glucose control, chronic pressure, shear force, obesity, decreased tissue oxygenation, poor hygiene and non-adherence    Lab Results   Component Value Date    LABA1C 5.7 2017     PAST gradually appeared, 2/15/17 (Active)   Wound Cleansed Rinsed/Irrigated with saline 3/1/2017 11:12 AM   Wound Length (cm) 0.8 cm 3/1/2017 12:04 PM   Wound Width (cm) 0.5 cm 3/1/2017 12:04 PM   Wound Depth (cm)  0.1 3/1/2017 12:04 PM   Calculated Wound Size (cm^2) (l*w) 0.4 cm^2 3/1/2017 12:04 PM   Change in Wound Size % (l*w) 0 3/1/2017 12:04 PM   Mickie-wound Assessment Dark edges;Edema;Fragile 3/1/2017 11:12 AM   Red%Wound Bed 100 3/1/2017 11:12 AM   Drainage Amount Moderate 3/1/2017 11:12 AM   Drainage Description Serosanguinous 3/1/2017 11:12 AM   Debridement per physician Subcutaneous 3/1/2017 12:04 PM   Time out Yes 3/1/2017 12:04 PM   Procedural Pain 0 3/1/2017 12:04 PM   Post procedural Pain 0 3/1/2017 12:04 PM   Number of days:218       Wound 03/01/17 Wound #6 left 3rd toe, gradually appeared, 2/15/17 (Active)   Wound Cleansed Rinsed/Irrigated with saline 3/1/2017 11:12 AM   Wound Length (cm) 0.9 cm 3/1/2017 12:04 PM   Wound Width (cm) 1 cm 3/1/2017 12:04 PM   Wound Depth (cm)  0.1 3/1/2017 12:04 PM   Calculated Wound Size (cm^2) (l*w) 0.9 cm^2 3/1/2017 12:04 PM   Change in Wound Size % (l*w) 0 3/1/2017 12:04 PM   Mickie-wound Assessment Dark edges;Edema;Fragile 3/1/2017 11:12 AM   Red%Wound Bed 50 3/1/2017 11:12 AM   Yellow%Wound Bed 50 3/1/2017 11:12 AM   Drainage Amount Moderate 3/1/2017 11:12 AM   Drainage Description Serosanguinous 3/1/2017 11:12 AM   Debridement per physician Muscle 3/1/2017 12:04 PM   Time out Yes 3/1/2017 12:04 PM   Procedural Pain 0 3/1/2017 12:04 PM   Post procedural Pain 0 3/1/2017 12:04 PM   Number of days:218       Wound 07/20/17 wound # 7  left foot amp site 7 months (Active)   Wound Type Wound 10/6/2017 11:12 AM   Dressing Status Old drainage 10/6/2017 11:12 AM   Dressing Changed Changed/New 10/6/2017 11:12 AM   Wound Cleansed Rinsed/Irrigated with saline 10/6/2017 11:12 AM   Wound Length (cm) 9.2 cm 10/6/2017 11:20 AM   Wound Width (cm) 11.5 cm 10/6/2017 11:20 AM   Wound Depth process or erosive changes. No emphysema noted. Assessment:     1. Skin ulcer of left foot with fat layer exposed ([address]    2. Type 2 diabetes mellitus with foot ulcer, without long-term current use of insulin ([address]    3. Osteomyelitis of foot, left, acute (HCC)    4. Cellulitis of foot, left    5. Type 2 diabetes mellitus with diabetic polyneuropathy, without long-term current use of insulin ([address]    6. Non compliance with medical treatment      Diabetic foot wounds left foot. S/p multiple amputations left and right. Plan:   Cheri Pruett Age:  47 y.o. Gender:  male   :  1965  Today's Date:  10/6/2017    Pt was evaluated and examined. Patient was given personalized discharge instructions. Pt will follow up in 1 weeks or sooner if any problems arise. Diagnosis was discussed with the pt and all of their questions were answered in detail. Proper foot hygiene and care was discussed with the pt. Patient to check feet daily and contact the office with any questions/problems/concerns. Other comorbidity noted and will be managed by PCP. Procedure:      Procedure:  Wound Location: left  Lidocaine gel soaked gauze was applied at beginning of wound evaluation. The wound(s) was excisionally debrided sharply of fibrotic, necrotic, and hyperkeratotic tissue, including a layer of surrounding healthy tissue using curette. The wound was debrided down through and including subcutaneous. Percent of Wound Debrided: 100%    Total Surface Area Debrided:  (see above for sq cm)    Bleeding: None    Patient tolerated procedure well and was given proper instruction. Post debridement wound description:  good granulation tissue  Post debridement Wound Location: left foot    Wound was copiously irrigated with normal saline solution. Bleeding:  Minimal.  Hemostasis:  pressure     Patient tolerated procedure well. Pain 0 / 10 during procedure and pain 0 / 10 after procedure.     Plan:      Plan for wound  Dress per physician order  Treatment: Silvercel, Drawtex and DSD  Discussed proper hygiene for cleaning the ulceration. Bone scan today. Will need MRI with and without contrast of the left foot   Continue daily dressing changes BID, he was informed of the importance of having his foot covered at all times. There have been some probable medication and lifestyle compliance issues here. I have discussed with him the great importance of following the treatment plan exactly as directed in order to achieve a good medical outcome. Follow up in wound care in one week.       Nii Fernando DPM on 10/6/2017 at 29:52 AM  Board Certified, American Board of Podiatric Surgery  Fellow, [employer] and [employer]

## 2017-10-20 ENCOUNTER — HOSPITAL ENCOUNTER (OUTPATIENT)
Dept: WOUND CARE | Age: 52
Discharge: HOME | End: 2017-10-20
Payer: MEDICARE

## 2017-10-20 VITALS
BODY MASS INDEX: 28.88 KG/M2 | HEART RATE: 96 BPM | RESPIRATION RATE: 18 BRPM | WEIGHT: 225 LBS | DIASTOLIC BLOOD PRESSURE: 90 MMHG | SYSTOLIC BLOOD PRESSURE: 139 MMHG | HEIGHT: 74 IN | TEMPERATURE: 97.5 F

## 2017-10-20 DIAGNOSIS — M86.172 OSTEOMYELITIS OF FOOT, LEFT, ACUTE (HCC): ICD-10-CM

## 2017-10-20 DIAGNOSIS — E11.621 TYPE 2 DIABETES MELLITUS WITH FOOT ULCER, WITHOUT LONG-TERM CURRENT USE OF INSULIN (HCC): ICD-10-CM

## 2017-10-20 DIAGNOSIS — L03.116 CELLULITIS OF FOOT, LEFT: ICD-10-CM

## 2017-10-20 DIAGNOSIS — E11.42 TYPE 2 DIABETES MELLITUS WITH DIABETIC POLYNEUROPATHY, WITHOUT LONG-TERM CURRENT USE OF INSULIN (HCC): ICD-10-CM

## 2017-10-20 DIAGNOSIS — Z91.199 NON COMPLIANCE WITH MEDICAL TREATMENT: ICD-10-CM

## 2017-10-20 DIAGNOSIS — L97.522 SKIN ULCER OF LEFT FOOT WITH FAT LAYER EXPOSED (HCC): Primary | ICD-10-CM

## 2017-10-20 DIAGNOSIS — L97.509 TYPE 2 DIABETES MELLITUS WITH FOOT ULCER, WITHOUT LONG-TERM CURRENT USE OF INSULIN (HCC): ICD-10-CM

## 2017-10-20 PROCEDURE — 11042 DBRDMT SUBQ TIS 1ST 20SQCM/<: CPT

## 2017-10-20 PROCEDURE — 11042 DBRDMT SUBQ TIS 1ST 20SQCM/<: CPT | Performed by: PODIATRIST

## 2017-10-20 PROCEDURE — 6370000000 HC RX 637 (ALT 250 FOR IP): Performed by: PODIATRIST

## 2017-10-20 PROCEDURE — 11045 DBRDMT SUBQ TISS EACH ADDL: CPT | Performed by: PODIATRIST

## 2017-10-20 PROCEDURE — 11045 DBRDMT SUBQ TISS EACH ADDL: CPT

## 2017-10-20 RX ADMIN — LIDOCAINE HYDROCHLORIDE: 20 JELLY TOPICAL at 10:53

## 2017-10-20 ASSESSMENT — PAIN DESCRIPTION - PROGRESSION: CLINICAL_PROGRESSION: NOT CHANGED

## 2017-10-20 ASSESSMENT — PAIN DESCRIPTION - PAIN TYPE: TYPE: CHRONIC PAIN

## 2017-10-20 ASSESSMENT — PAIN DESCRIPTION - ORIENTATION: ORIENTATION: LEFT

## 2017-10-20 ASSESSMENT — PAIN DESCRIPTION - FREQUENCY: FREQUENCY: CONTINUOUS

## 2017-10-20 ASSESSMENT — PAIN DESCRIPTION - DESCRIPTORS: DESCRIPTORS: BURNING;THROBBING;ACHING

## 2017-10-20 ASSESSMENT — PAIN DESCRIPTION - LOCATION: LOCATION: FOOT

## 2017-10-20 ASSESSMENT — PAIN DESCRIPTION - ONSET: ONSET: ON-GOING

## 2017-10-20 ASSESSMENT — PAIN SCALES - GENERAL: PAINLEVEL_OUTOF10: 8

## 2017-10-20 NOTE — PROGRESS NOTES
[facility]  Progress Note      Zeferino Edwards  AGE: 47 y.o. GENDER: male  : 1965  TODAY'S DATE:  10/20/2017    Chief Complaint   Patient presents with    Wound Check     left foot wounds     History of the Present Illness     Zeferino Edwards is a 47 y.o. male who presents today for evaluation and treatment for a diabetic foot ulcer  wound which is located on the left foot. Wearing CROCS/sandals today. His mother and wife are with him today. States the redness to his left lower extremity comes and goes depending on the amount of activity. He has been off of his foot for the past week and his leg looks better. Still having pain which requires heavy duty pain meds. States that he has not been walking much on the foot except when he needs to around the house. Denies n/v/f/c. History of Wound: He has had a history of ulceration on both feet. Started with ulcerations on both 2nd toes, he had them amputated. He then had toes 1, 3 amputated on the left. After surgery, about 5 months ago, according to him he developed a large ulcer including toes 1-2-3 left, and metatarsal area. He was seen in the wound care center before, but says he did not like the doctor. He is know to be non-compliant. His BG has been uncontrolled. He is also seeing Dr. Tammy Walker and Dr. Angelia Siddiqui. He was on IV antibiotics with a PICC but has since stopped. His left leg and foot stay discolored and red with pain. He does not elevate. He was scheduled for an MRI but he cancelled it. He is worried about another amputation. His wife is dressing the foot with antibiotic ointment and and dressing. Has been dressing the wounds with silvercel.   Wound Type:diabetic  Wound Location:left foot  Modifying factors:edema, diabetes, poor glucose control, chronic pressure, shear force, obesity, decreased tissue oxygenation, poor hygiene and non-adherence    Lab Results   Component Value Date    LABA1C 5.7 2017 PAST MEDICAL HISTORY        Diagnosis Date    Anxiety     Arthritis     lower back, knees    BPH (benign prostatic hyperplasia)     Chronic kidney disease     prostate    Depression     Diabetes mellitus ([facility]     Diabetic neuropathy (HCC)     GERD (gastroesophageal reflux disease)     Hyperglycemia     Hypertension     Hypothyroidism     Impacted tooth     Pneumonia        MEDICATIONS    Current Outpatient Prescriptions on File Prior to Encounter   Medication Sig Dispense Refill    morphine (MS CONTIN) 15 MG extended release tablet Take 1 tablet by mouth 2 times daily . 60 tablet 0    oxyCODONE (OXY-IR) 15 MG immediate release tablet Take 2 tablets by mouth every 6 hours as needed for Pain .  240 tablet 0    doxycycline hyclate (VIBRAMYCIN) 100 MG capsule Take 100 mg by mouth 2 times daily X 30 days      Lactobacillus (PROBIOTIC ACIDOPHILUS) TABS Take 1 tablet by mouth daily 30 tablet 3    diazepam (VALIUM) 5 MG tablet Take 1 tablet by mouth every 6 hours as needed for Anxiety 120 tablet 2    metolazone (ZAROXOLYN) 2.5 MG tablet Take every mon, wed, friday 15 tablet 5    tiZANidine (ZANAFLEX) 4 MG tablet TAKE ONE TABLET BY MOUTH EVERY 6 HOURS AS NEEDED FOR MUSCLE SPASMS 120 tablet 2    lisinopril (PRINIVIL;ZESTRIL) 10 MG tablet TAKE ONE TABLET BY MOUTH DAILY 30 tablet 11    buPROPion (WELLBUTRIN XL) 300 MG extended release tablet TAKE ONE TABLET BY MOUTH DAILY 30 tablet 11    tamsulosin (FLOMAX) 0.4 MG capsule TAKE ONE CAPSULE BY MOUTH DAILY 30 capsule 11    furosemide (LASIX) 20 MG tablet Take 1 tablet by mouth 2 times daily 60 tablet 11    omeprazole (PRILOSEC) 40 MG delayed release capsule TAKE ONE CAPSULE BY MOUTH EVERY NIGHT AT BEDTIME 30 capsule 11    metFORMIN (GLUCOPHAGE) 500 MG tablet TAKE TWO TABLETS BY MOUTH EVERY MORNING AND TAKE ONE TABLET BY MOUTH EVERY EVENING 90 tablet 11    PARoxetine (PAXIL) 20 MG tablet TAKE ONE TABLET BY MOUTH FOUR TIMES A  tablet 5    Gauze 5th toe, gradually appeared, 2/15/17 (Active)   Wound Cleansed Rinsed/Irrigated with saline 3/1/2017 11:12 AM   Wound Length (cm) 0.8 cm 3/1/2017 12:04 PM   Wound Width (cm) 0.5 cm 3/1/2017 12:04 PM   Wound Depth (cm)  0.1 3/1/2017 12:04 PM   Calculated Wound Size (cm^2) (l*w) 0.4 cm^2 3/1/2017 12:04 PM   Change in Wound Size % (l*w) 0 3/1/2017 12:04 PM   Mickie-wound Assessment Dark edges;Edema;Fragile 3/1/2017 11:12 AM   Red%Wound Bed 100 3/1/2017 11:12 AM   Drainage Amount Moderate 3/1/2017 11:12 AM   Drainage Description Serosanguinous 3/1/2017 11:12 AM   Debridement per physician Subcutaneous 3/1/2017 12:04 PM   Time out Yes 3/1/2017 12:04 PM   Procedural Pain 0 3/1/2017 12:04 PM   Post procedural Pain 0 3/1/2017 12:04 PM   Number of days: 232       Wound 03/01/17 Wound #6 left 3rd toe, gradually appeared, 2/15/17 (Active)   Wound Cleansed Rinsed/Irrigated with saline 3/1/2017 11:12 AM   Wound Length (cm) 0.9 cm 3/1/2017 12:04 PM   Wound Width (cm) 1 cm 3/1/2017 12:04 PM   Wound Depth (cm)  0.1 3/1/2017 12:04 PM   Calculated Wound Size (cm^2) (l*w) 0.9 cm^2 3/1/2017 12:04 PM   Change in Wound Size % (l*w) 0 3/1/2017 12:04 PM   Mickie-wound Assessment Dark edges;Edema;Fragile 3/1/2017 11:12 AM   Red%Wound Bed 50 3/1/2017 11:12 AM   Yellow%Wound Bed 50 3/1/2017 11:12 AM   Drainage Amount Moderate 3/1/2017 11:12 AM   Drainage Description Serosanguinous 3/1/2017 11:12 AM   Debridement per physician Muscle 3/1/2017 12:04 PM   Time out Yes 3/1/2017 12:04 PM   Procedural Pain 0 3/1/2017 12:04 PM   Post procedural Pain 0 3/1/2017 12:04 PM   Number of days: 232       Wound 07/20/17 wound # 7  left foot amp site 7 months (Active)   Wound Type Wound 10/20/2017 10:44 AM   Dressing Status Old drainage 10/20/2017 10:44 AM   Dressing Changed Changed/New 10/20/2017 10:44 AM   Wound Cleansed Rinsed/Irrigated with saline 10/20/2017 10:44 AM   Wound Length (cm) 8.1 cm 10/20/2017 10:44 AM   Wound Width (cm) 10.5 cm 10/20/2017 10:44 AM Wound Depth (cm)  0.1 10/20/2017 10:44 AM   Calculated Wound Size (cm^2) (l*w) 85.05 cm^2 10/20/2017 10:44 AM   Change in Wound Size % (l*w) -51.74 10/20/2017 10:44 AM   Wound Assessment Intact;Fragile;Red;Yellow 10/20/2017 10:44 AM   Margins Attached edges 10/20/2017 10:44 AM   Mickie-wound Assessment Calloused;Fragile 10/20/2017 10:44 AM   Pink%Wound Bed 0 10/20/2017 10:44 AM   Red%Wound Bed 80 10/20/2017 10:44 AM   Yellow%Wound Bed 20 10/20/2017 10:44 AM   Black%Wound Bed 5 10/6/2017 11:12 AM   Drainage Amount Large 10/20/2017 10:44 AM   Drainage Description Serosanguinous 10/20/2017 10:44 AM   Odor None 10/20/2017 10:44 AM   Debridement per physician Subcutaneous 10/6/2017 11:20 AM   Time out Yes 10/6/2017 11:20 AM   Procedural Pain 0 10/6/2017 11:20 AM   Post procedural Pain 0 10/6/2017 11:20 AM   Number of days: 92     Ulceration is much improved, no edema to leg today, no erythema to leg today. Vascular:  DP/PT pulses palpable 2/4, Bilateral.    CFT <3 seconds to digits remaining digits. Hair growth absent to level of digits, Bilateral.  Edema present, Left. Erythema present, Left. Neurological:  Sensation absent to light touch to level of digits, Bilateral.  Protective sensation  absent via 5.07/10g Clinton-Narda monofilament 10/10 sites, Bilateral.  Vibratory sensation absent to 1st MPJ, Bilateral.     Musculoskeletal:  Muscle strength 5/5 all LE groups tested, Bilateral.     Radiographs: 3 views left Foot:  Partial amputation of toes one, two, and three. No definite evidence of a lytic process or erosive changes. No emphysema noted. Assessment:     1. Skin ulcer of left foot with fat layer exposed ([address]    2. Non compliance with medical treatment    3. Type 2 diabetes mellitus with foot ulcer, without long-term current use of insulin ([address]    4. Osteomyelitis of foot, left, acute ([address]    5.  Type 2 diabetes mellitus with diabetic polyneuropathy, without long-term current use of insulin ([facility]    6. Cellulitis of foot, left      Diabetic foot wounds left foot. S/p multiple amputations left and right. Plan:   Constance Guillen Age:  47 y.o. Gender:  male   :  1965  Today's Date:  10/20/2017    Pt was evaluated and examined. Patient was given personalized discharge instructions. Pt will follow up in 1 weeks or sooner if any problems arise. Diagnosis was discussed with the pt and all of their questions were answered in detail. Proper foot hygiene and care was discussed with the pt. Patient to check feet daily and contact the office with any questions/problems/concerns. Other comorbidity noted and will be managed by PCP. Procedure:      Procedure:  Wound Location: left  Lidocaine gel soaked gauze was applied at beginning of wound evaluation. The wound(s) was excisionally debrided sharply of fibrotic, necrotic, and hyperkeratotic tissue, including a layer of surrounding healthy tissue using curette. The wound was debrided down through and including subcutaneous. Percent of Wound Debrided: 100%    Total Surface Area Debrided:  (see above for sq cm)    Bleeding: None    Patient tolerated procedure well and was given proper instruction. Post debridement wound description:  good granulation tissue  Post debridement Wound Location: left foot    Wound was copiously irrigated with normal saline solution. Bleeding:  Minimal.  Hemostasis:  pressure     Patient tolerated procedure well. Pain 0 / 10 during procedure and pain 0 / 10 after procedure. Plan:      Plan for wound  Dress per physician order  Treatment: Silvercel, Drawtex and DSD  Discussed proper hygiene for cleaning the ulceration. Bone scan results reviewed. Continue daily dressing changes BID, he was informed of the importance of having his foot covered at all times. He is very frustrated with the progression of the ulcerations and pain in his leg.  He has had very inconsistent care due to missed appointments, failure to do dressing changes, refusal to get an MRI, delay in getting the bone scan, uncertainty if he is taking antibiotics, poor glucose control. Patient presented to clinic in the past, with no dressing to the ulcerated with Cat hair packed around the toes, in the webspaces. There have been some probable medication and lifestyle compliance issues here. I have discussed with him the great importance of following the treatment plan exactly as directed in order to achieve a good medical outcome. Follow up in wound care in one week. I feel that we need to do a better job with offloading. I will order a CROW boot. I have ordered a CROW boot. Due to the patient's diagnosis and related symptoms this is medically necessary for the treatment. The function of this device is to restrict and limit motion and provide stabilization, offloading and compression to the affected area. This device will allow the patient to ambulate while keeping pressure off of the ulceration site. His foot is deformed and needs the custom CROW to aid in healing. The CROW will prevent further ulceration, amputation, and infection. He will also slowly increase strength and ROM of the extremity. Specific instructions on weight bearing and ROM exercises were discussed and given to the patient. The goals and function of this device was explained in detail to the patient.   All the questions were answered satisfactorily      Radha Dorsey DPM on 10/20/2017 at 25:28 AM  Board Certified, American Board of Podiatric Surgery  Fellow, [employer] and [employer]

## 2017-10-20 NOTE — PLAN OF CARE
Problem: Wound:  Goal: Will show signs of wound healing; wound closure and no evidence of infection  Will show signs of wound healing; wound closure and no evidence of infection   Outcome: Ongoing  Wound stable will send ot [facility] for tatianna boot fitting

## 2017-10-27 ENCOUNTER — HOSPITAL ENCOUNTER (OUTPATIENT)
Dept: WOUND CARE | Age: 52
Discharge: HOME | End: 2017-10-27
Payer: MEDICARE

## 2017-11-03 ENCOUNTER — HOSPITAL ENCOUNTER (EMERGENCY)
Age: 52
Discharge: HOME | End: 2017-11-03
Attending: EMERGENCY MEDICINE
Payer: MEDICARE

## 2017-11-03 ENCOUNTER — HOSPITAL ENCOUNTER (OUTPATIENT)
Dept: WOUND CARE | Age: 52
Discharge: HOME | End: 2017-11-03
Payer: MEDICARE

## 2017-11-03 VITALS
SYSTOLIC BLOOD PRESSURE: 117 MMHG | BODY MASS INDEX: 28.88 KG/M2 | TEMPERATURE: 97.3 F | WEIGHT: 225 LBS | HEIGHT: 74 IN | DIASTOLIC BLOOD PRESSURE: 70 MMHG | RESPIRATION RATE: 18 BRPM

## 2017-11-03 VITALS
TEMPERATURE: 98.4 F | DIASTOLIC BLOOD PRESSURE: 55 MMHG | HEIGHT: 74 IN | HEART RATE: 92 BPM | WEIGHT: 230 LBS | SYSTOLIC BLOOD PRESSURE: 103 MMHG | BODY MASS INDEX: 29.52 KG/M2 | RESPIRATION RATE: 16 BRPM | OXYGEN SATURATION: 98 %

## 2017-11-03 DIAGNOSIS — E11.621 TYPE 2 DIABETES MELLITUS WITH FOOT ULCER, WITHOUT LONG-TERM CURRENT USE OF INSULIN (HCC): ICD-10-CM

## 2017-11-03 DIAGNOSIS — L97.522 SKIN ULCER OF LEFT FOOT WITH FAT LAYER EXPOSED (HCC): Primary | ICD-10-CM

## 2017-11-03 DIAGNOSIS — Z91.199 NON COMPLIANCE WITH MEDICAL TREATMENT: ICD-10-CM

## 2017-11-03 DIAGNOSIS — L97.509 TYPE 2 DIABETES MELLITUS WITH FOOT ULCER, WITHOUT LONG-TERM CURRENT USE OF INSULIN (HCC): ICD-10-CM

## 2017-11-03 DIAGNOSIS — L02.91 ABSCESS: Primary | ICD-10-CM

## 2017-11-03 PROCEDURE — 6370000000 HC RX 637 (ALT 250 FOR IP): Performed by: PODIATRIST

## 2017-11-03 PROCEDURE — 99283 EMERGENCY DEPT VISIT LOW MDM: CPT

## 2017-11-03 PROCEDURE — 99214 OFFICE O/P EST MOD 30 MIN: CPT

## 2017-11-03 PROCEDURE — 99213 OFFICE O/P EST LOW 20 MIN: CPT | Performed by: PODIATRIST

## 2017-11-03 PROCEDURE — 2500000003 HC RX 250 WO HCPCS: Performed by: NURSE PRACTITIONER

## 2017-11-03 PROCEDURE — 6370000000 HC RX 637 (ALT 250 FOR IP): Performed by: NURSE PRACTITIONER

## 2017-11-03 PROCEDURE — 10060 I&D ABSCESS SIMPLE/SINGLE: CPT

## 2017-11-03 RX ORDER — SULFAMETHOXAZOLE AND TRIMETHOPRIM 800; 160 MG/1; MG/1
1 TABLET ORAL 2 TIMES DAILY
Qty: 14 TABLET | Refills: 0 | Status: SHIPPED | OUTPATIENT
Start: 2017-11-03 | End: 2017-11-10

## 2017-11-03 RX ORDER — SULFAMETHOXAZOLE AND TRIMETHOPRIM 200; 40 MG/5ML; MG/5ML
160 SUSPENSION ORAL ONCE
Status: DISCONTINUED | OUTPATIENT
Start: 2017-11-03 | End: 2017-11-03

## 2017-11-03 RX ORDER — IBUPROFEN 200 MG
400 TABLET ORAL ONCE
Status: DISCONTINUED | OUTPATIENT
Start: 2017-11-03 | End: 2017-11-03

## 2017-11-03 RX ORDER — HYDROCODONE BITARTRATE AND ACETAMINOPHEN 5; 325 MG/1; MG/1
1 TABLET ORAL ONCE
Status: COMPLETED | OUTPATIENT
Start: 2017-11-03 | End: 2017-11-03

## 2017-11-03 RX ORDER — LIDOCAINE HYDROCHLORIDE 10 MG/ML
20 INJECTION, SOLUTION INFILTRATION; PERINEURAL ONCE
Status: COMPLETED | OUTPATIENT
Start: 2017-11-03 | End: 2017-11-03

## 2017-11-03 RX ORDER — CEPHALEXIN 500 MG/1
500 CAPSULE ORAL 4 TIMES DAILY
Qty: 28 CAPSULE | Refills: 0 | Status: SHIPPED | OUTPATIENT
Start: 2017-11-03 | End: 2017-11-10

## 2017-11-03 RX ORDER — SULFAMETHOXAZOLE AND TRIMETHOPRIM 800; 160 MG/1; MG/1
1 TABLET ORAL ONCE
Status: COMPLETED | OUTPATIENT
Start: 2017-11-03 | End: 2017-11-03

## 2017-11-03 RX ORDER — CEPHALEXIN 250 MG/1
500 CAPSULE ORAL ONCE
Status: COMPLETED | OUTPATIENT
Start: 2017-11-03 | End: 2017-11-03

## 2017-11-03 RX ADMIN — LIDOCAINE HYDROCHLORIDE 20 ML: 10 INJECTION, SOLUTION INFILTRATION; PERINEURAL at 11:31

## 2017-11-03 RX ADMIN — SULFAMETHOXAZOLE AND TRIMETHOPRIM 1 TABLET: 800; 160 TABLET ORAL at 11:28

## 2017-11-03 RX ADMIN — LIDOCAINE HYDROCHLORIDE: 20 JELLY TOPICAL at 09:11

## 2017-11-03 RX ADMIN — HYDROCODONE BITARTRATE AND ACETAMINOPHEN 1 TABLET: 5; 325 TABLET ORAL at 10:49

## 2017-11-03 RX ADMIN — CEPHALEXIN 500 MG: 250 CAPSULE ORAL at 11:28

## 2017-11-03 ASSESSMENT — PAIN DESCRIPTION - LOCATION
LOCATION: FOOT
LOCATION: OTHER (COMMENT)

## 2017-11-03 ASSESSMENT — ENCOUNTER SYMPTOMS
SHORTNESS OF BREATH: 0
TROUBLE SWALLOWING: 0
ROS SKIN COMMENTS: ABSCESS
VOMITING: 0
NAUSEA: 0
COUGH: 0

## 2017-11-03 ASSESSMENT — PAIN DESCRIPTION - DESCRIPTORS: DESCRIPTORS: ACHING

## 2017-11-03 ASSESSMENT — PAIN DESCRIPTION - ONSET: ONSET: ON-GOING

## 2017-11-03 ASSESSMENT — PAIN DESCRIPTION - ORIENTATION
ORIENTATION: LEFT

## 2017-11-03 ASSESSMENT — PAIN SCALES - GENERAL
PAINLEVEL_OUTOF10: 9
PAINLEVEL_OUTOF10: 9
PAINLEVEL_OUTOF10: 8
PAINLEVEL_OUTOF10: 6
PAINLEVEL_OUTOF10: 9

## 2017-11-03 ASSESSMENT — PAIN SCALES - WONG BAKER: WONGBAKER_NUMERICALRESPONSE: 0

## 2017-11-03 ASSESSMENT — PAIN DESCRIPTION - PROGRESSION
CLINICAL_PROGRESSION: NOT CHANGED
CLINICAL_PROGRESSION: GRADUALLY IMPROVING

## 2017-11-03 ASSESSMENT — PAIN DESCRIPTION - FREQUENCY
FREQUENCY: CONTINUOUS
FREQUENCY: CONTINUOUS

## 2017-11-03 ASSESSMENT — PAIN DESCRIPTION - PAIN TYPE
TYPE: ACUTE PAIN
TYPE: CHRONIC PAIN

## 2017-11-03 NOTE — ED PROVIDER NOTES
[address] ED  eMERGENCY dEPARTMENT eNCOUnter      Pt Name: Apryl Velasco  MRN: [id number]  [place] 1965  Date of evaluation: 11/3/2017  Provider: Sugar Flores 2726       Chief Complaint   Patient presents with    Abscess         HISTORY OF PRESENT ILLNESS  (Location/Symptom, Timing/Onset, Context/Setting, Quality, Duration, Modifying Factors, Severity.)   Apryl Velasco is a 47 y.o. male who presents to the emergency department complaining of abscess to left axilla. States that they noticed this area painful, red and swollen for the past 2 days. He said abscess has been draining intermittently Denies discharge or drainage from the abscess. + family history of MRSA. + DM. Denies fever, chills, nausea or vomiting. Denies IV drug use. Reports pain:  Quality: achy, sharp  Duration: constant  Modifying Factors: worse with movement and palpation  Severity: moderate    Nursing Notes were reviewed and I agree. REVIEW OF SYSTEMS    (2-9 systems for level 4, 10 or more for level 5)     Review of Systems   Constitutional: Negative for chills and fever. HENT: Negative for trouble swallowing. Respiratory: Negative for cough and shortness of breath. Cardiovascular: Negative for chest pain and palpitations. Gastrointestinal: Negative for nausea and vomiting. Skin:        abscess     Except as noted above the remainder of the review of systems was reviewed and negative. PAST MEDICAL HISTORY         Diagnosis Date    Anxiety     Arthritis     lower back, knees    BPH (benign prostatic hyperplasia)     Chronic kidney disease     prostate    Depression     Diabetes mellitus (HCC)     Diabetic neuropathy (HCC)     GERD (gastroesophageal reflux disease)     Hyperglycemia     Hypertension     Hypothyroidism     Impacted tooth     Pneumonia      Reviewed.   SURGICAL HISTORY           Procedure Laterality Date    APPENDECTOMY      BACK SURGERY Lower x 4. Had abscess after appendectomy.  KNEE SURGERY Right     scope    TOE AMPUTATION Bilateral 3/6/2017    TOE AMPUTATION LEFT HALLUX AND 2ND DIGIT AND RIGHT 2ND DIGIT performed by Katie Young DPM at [address] CURRENT MEDICATIONS       Discharge Medication List as of 11/3/2017 11:23 AM      CONTINUE these medications which have NOT CHANGED    Details   ciclopirox (LOPROX) 0.77 % cream APPLY TO AFFECTED AREA(S) AND RUB  IN A THIN FILM TWO TIMES A DAY (AM AND PM), Disp-90 g, R-2, Normal      oxyCODONE (OXY-IR) 15 MG immediate release tablet Take 2 tablets by mouth every 6 hours as needed for Pain ., Disp-240 tablet, R-0Normal      morphine (MS CONTIN) 15 MG extended release tablet Take 1 tablet by mouth 2 times daily . , Disp-60 tablet, R-0Normal      doxycycline hyclate (VIBRAMYCIN) 100 MG capsule Take 100 mg by mouth 2 times daily X 30 daysHistorical Med      Lactobacillus (PROBIOTIC ACIDOPHILUS) TABS Take 1 tablet by mouth daily, Disp-30 tablet, R-3Normal      diazepam (VALIUM) 5 MG tablet Take 1 tablet by mouth every 6 hours as needed for Anxiety, Disp-120 tablet, R-2Normal      metolazone (ZAROXOLYN) 2.5 MG tablet Take every mon, wed, friday, Disp-15 tablet, R-5Normal      tiZANidine (ZANAFLEX) 4 MG tablet TAKE ONE TABLET BY MOUTH EVERY 6 HOURS AS NEEDED FOR MUSCLE SPASMS, Disp-120 tablet, R-2Normal      lisinopril (PRINIVIL;ZESTRIL) 10 MG tablet TAKE ONE TABLET BY MOUTH DAILY, Disp-30 tablet, R-11Normal      buPROPion (WELLBUTRIN XL) 300 MG extended release tablet TAKE ONE TABLET BY MOUTH DAILY, Disp-30 tablet, R-11Normal      tamsulosin (FLOMAX) 0.4 MG capsule TAKE ONE CAPSULE BY MOUTH DAILY, Disp-30 capsule, R-11Normal      furosemide (LASIX) 20 MG tablet Take 1 tablet by mouth 2 times daily, Disp-60 tablet, R-11Normal      omeprazole (PRILOSEC) 40 MG delayed release capsule TAKE ONE CAPSULE BY MOUTH EVERY NIGHT AT BEDTIME, Disp-30 capsule, R-11Normal and well-nourished. No distress. HENT:   Head: Normocephalic and atraumatic. Right Ear: External ear normal.   Left Ear: External ear normal.   Nose: Nose normal.   Eyes: Right eye exhibits no discharge. Left eye exhibits no discharge. No scleral icterus. Neck: Normal range of motion. No tracheal deviation present. Pulmonary/Chest: Effort normal. No stridor. No respiratory distress. Musculoskeletal: Normal range of motion. He exhibits no edema. Neurological: He is alert and oriented to person, place, and time. Coordination normal.   Skin: Skin is warm and dry. He is not diaphoretic. 1 cm fluctuant tender abscess to left axilla. Area of cellulitis right above the abscess, tender, no fluctuance. Psychiatric: He has a normal mood and affect. His behavior is normal.       DIAGNOSTIC RESULTS     RADIOLOGY:   none    LABS:  Labs Reviewed - No data to display    All other labs were within normal range or not returned as of this dictation. EMERGENCY DEPARTMENT COURSE and DIFFERENTIAL DIAGNOSIS/MDM:   Patient to ED for evaluation of abscess. I&D. Antibiotics, warm compresses. Ok to discharge home. Follow-up with family doctor for a recheck. Return to ED if worsening abscess or surrounding cellulitis. Presents with cutaneous abscess requiring incision and drainage. Patient verbalized consent for treatment. Patient was given oral antibiotics for bacterial coverage and both verbal and written instructions to follow up to ED or Family Doctor in two days for recheck and/or packing change. Was instructed to return for any worsening of the abscess or surrounding cellulitis. Vitals:    Vitals:    11/03/17 1004   BP: (!) 103/55   Pulse: 92   Resp: 16   Temp: 98.4 °F (36.9 °C)   SpO2: 98%   Weight: 230 lb (104.3 kg)   Height: 6' 2\" (1.88 m)       Vitals reviewed.      PROCEDURES:  Incision/Drainage  Date/Time: 11/3/2017 12:30 PM  Performed by: Vera Peers by: Deonte Rivas

## 2017-11-03 NOTE — PLAN OF CARE
Problem: Wound:  Goal: Will show signs of wound healing; wound closure and no evidence of infection  Will show signs of wound healing; wound closure and no evidence of infection   Outcome: Ongoing  Some wound improvement noted, patient presents with no dressing today, after being educated numerous times on the importance of keeping the wound covered.      Problem: Falls - Risk of:  Goal: Will remain free from falls  Will remain free from falls   Outcome: Met This Shift  Safety maintained no falls this visit     Problem: Pain:  Goal: Pain level will decrease  Pain level will decrease   Outcome: Met This Shift  Patient encouraged to keep a dressing on the wound to promote healing as well as educated on the need to elevate LLE

## 2017-11-03 NOTE — PROGRESS NOTES
[facility]  Progress Note      Fabian Duran  AGE: 47 y.o. GENDER: male  : 1965  TODAY'S DATE:  11/3/2017    Chief Complaint   Patient presents with    Wound Check     History of the Present Illness     Fabian Duran is a 47 y.o. male who presents today for evaluation and treatment for a diabetic foot ulcer  wound which is located on the left foot. Wearing CROCS/sandals today. His mother is with him today. He had not dressing on again today. He has not followed up for the boot yet. It has been 2 weeks since I ordered it. States the redness to his left lower extremity comes and goes depending on the amount of activity. He has been off of his foot for the past week and his leg looks better. Still having pain which requires heavy duty pain meds. States that he has not been walking much on the foot except when he needs to around the house. Denies n/v/f/c. History of Wound: He has had a history of ulceration on both feet. Started with ulcerations on both 2nd toes, he had them amputated. He then had toes 1, 3 amputated on the left. After surgery, about 5 months ago, according to him he developed a large ulcer including toes 1-2-3 left, and metatarsal area. He was seen in the wound care center before, but says he did not like the doctor. He is know to be non-compliant. His BG has been uncontrolled. He is also seeing Dr. Sharda Rodriguez and Dr. Ilda Blake. He was on IV antibiotics with a PICC but has since stopped. His left leg and foot stay discolored and red with pain. He does not elevate. He was scheduled for an MRI but he cancelled it. He is worried about another amputation. His wife is dressing the foot with antibiotic ointment and and dressing. Has been dressing the wounds with silvercel.   Wound Type:diabetic  Wound Location:left foot  Modifying factors:edema, diabetes, poor glucose control, chronic pressure, shear force, obesity, decreased tissue oxygenation, poor hygiene lb (102.1 kg)   BMI 28.89 kg/m²   General Appearance: alert and oriented to person, place and time, well-developed and well-nourished, in no acute distress  Wound 03/01/17 Wound #5 right 5th toe, gradually appeared, 2/15/17 (Active)   Wound Cleansed Rinsed/Irrigated with saline 3/1/2017 11:12 AM   Wound Length (cm) 0.8 cm 3/1/2017 12:04 PM   Wound Width (cm) 0.5 cm 3/1/2017 12:04 PM   Wound Depth (cm)  0.1 3/1/2017 12:04 PM   Calculated Wound Size (cm^2) (l*w) 0.4 cm^2 3/1/2017 12:04 PM   Change in Wound Size % (l*w) 0 3/1/2017 12:04 PM   Mickie-wound Assessment Dark edges;Edema;Fragile 3/1/2017 11:12 AM   Red%Wound Bed 100 3/1/2017 11:12 AM   Drainage Amount Moderate 3/1/2017 11:12 AM   Drainage Description Serosanguinous 3/1/2017 11:12 AM   Debridement per physician Subcutaneous 3/1/2017 12:04 PM   Time out Yes 3/1/2017 12:04 PM   Procedural Pain 0 3/1/2017 12:04 PM   Post procedural Pain 0 3/1/2017 12:04 PM   Number of days: 246       Wound 03/01/17 Wound #6 left 3rd toe, gradually appeared, 2/15/17 (Active)   Wound Cleansed Rinsed/Irrigated with saline 3/1/2017 11:12 AM   Wound Length (cm) 0.9 cm 3/1/2017 12:04 PM   Wound Width (cm) 1 cm 3/1/2017 12:04 PM   Wound Depth (cm)  0.1 3/1/2017 12:04 PM   Calculated Wound Size (cm^2) (l*w) 0.9 cm^2 3/1/2017 12:04 PM   Change in Wound Size % (l*w) 0 3/1/2017 12:04 PM   Mickie-wound Assessment Dark edges;Edema;Fragile 3/1/2017 11:12 AM   Red%Wound Bed 50 3/1/2017 11:12 AM   Yellow%Wound Bed 50 3/1/2017 11:12 AM   Drainage Amount Moderate 3/1/2017 11:12 AM   Drainage Description Serosanguinous 3/1/2017 11:12 AM   Debridement per physician Muscle 3/1/2017 12:04 PM   Time out Yes 3/1/2017 12:04 PM   Procedural Pain 0 3/1/2017 12:04 PM   Post procedural Pain 0 3/1/2017 12:04 PM   Number of days: 246       Wound 07/20/17 wound # 7  left foot amp site 7 months (Active)   Wound Type Wound 11/3/2017  9:12 AM   Dressing Status Old drainage 11/3/2017  9:12 AM   Dressing Changed treatment      Diabetic foot wounds left foot. S/p multiple amputations left and right. Plan:   Cheri Pruett Age:  47 y.o. Gender:  male   :  1965  Today's Date:  11/3/2017    Pt was evaluated and examined. Patient was given personalized discharge instructions. Pt will follow up in 1 weeks or sooner if any problems arise. Diagnosis was discussed with the pt and all of their questions were answered in detail. Proper foot hygiene and care was discussed with the pt. Patient to check feet daily and contact the office with any questions/problems/concerns. Other comorbidity noted and will be managed by PCP. Procedure:      Procedure: No debridement today due to patient complaint of pain. Plan:      Plan for wound  Dress per physician order  Treatment: Silvercel, Drawtex and DSD  Discussed proper hygiene for cleaning the ulceration. Continue daily dressing changes BID, he was informed of the importance of having his foot covered at all times. He is very frustrated with the progression of the ulcerations and pain in his leg. He has had very inconsistent care due to missed appointments, failure to do dressing changes, refusal to get an MRI, delay in getting the bone scan, uncertainty if he is taking antibiotics, poor glucose control. Patient presented to clinic in the past, with no dressing to the ulcerated with Cat hair packed around the toes, in the webspaces. There have been some probable medication and lifestyle compliance issues here. I have discussed with him the great importance of following the treatment plan exactly as directed in order to achieve a good medical outcome. Follow up in wound care in one week. I feel that we need to do a better job with offloading. I will order a CROW boot. I have ordered him to follow up for a CROW boot. Due to the patient's diagnosis and related symptoms this is medically necessary for the treatment.  The function of this device is to restrict and limit motion and provide stabilization, offloading and compression to the affected area. This device will allow the patient to ambulate while keeping pressure off of the ulceration site. His foot is deformed and needs the custom CROW to aid in healing. The CROW will prevent further ulceration, amputation, and infection. He will also slowly increase strength and ROM of the extremity. Specific instructions on weight bearing and ROM exercises were discussed and given to the patient. The goals and function of this device was explained in detail to the patient.   All the questions were answered satisfactorily      Sunita Forte DPM on 11/3/2017 at 0:07 AM  Board Certified, American Board of Podiatric Surgery  Fellow, [employer] [facility] [employer] and [facility]

## 2017-11-17 ENCOUNTER — HOSPITAL ENCOUNTER (OUTPATIENT)
Dept: WOUND CARE | Age: 52
Discharge: HOME | End: 2017-11-17
Payer: MEDICARE

## 2017-12-01 ENCOUNTER — HOSPITAL ENCOUNTER (OUTPATIENT)
Dept: WOUND CARE | Age: 52
Discharge: HOME | End: 2017-12-01
Payer: MEDICARE

## 2017-12-01 VITALS
DIASTOLIC BLOOD PRESSURE: 75 MMHG | HEART RATE: 99 BPM | TEMPERATURE: 98.2 F | SYSTOLIC BLOOD PRESSURE: 125 MMHG | RESPIRATION RATE: 16 BRPM

## 2017-12-01 DIAGNOSIS — E11.621 TYPE 2 DIABETES MELLITUS WITH FOOT ULCER, WITHOUT LONG-TERM CURRENT USE OF INSULIN (HCC): ICD-10-CM

## 2017-12-01 DIAGNOSIS — L97.509 TYPE 2 DIABETES MELLITUS WITH FOOT ULCER, WITHOUT LONG-TERM CURRENT USE OF INSULIN (HCC): ICD-10-CM

## 2017-12-01 DIAGNOSIS — L97.522 SKIN ULCER OF LEFT FOOT WITH FAT LAYER EXPOSED (HCC): Primary | ICD-10-CM

## 2017-12-01 DIAGNOSIS — E11.42 TYPE 2 DIABETES MELLITUS WITH DIABETIC POLYNEUROPATHY, WITHOUT LONG-TERM CURRENT USE OF INSULIN (HCC): ICD-10-CM

## 2017-12-01 DIAGNOSIS — M86.172 OSTEOMYELITIS OF FOOT, LEFT, ACUTE (HCC): ICD-10-CM

## 2017-12-01 DIAGNOSIS — Z91.199 NON COMPLIANCE WITH MEDICAL TREATMENT: ICD-10-CM

## 2017-12-01 PROCEDURE — 11042 DBRDMT SUBQ TIS 1ST 20SQCM/<: CPT | Performed by: PODIATRIST

## 2017-12-01 PROCEDURE — 11042 DBRDMT SUBQ TIS 1ST 20SQCM/<: CPT

## 2017-12-01 PROCEDURE — 11045 DBRDMT SUBQ TISS EACH ADDL: CPT

## 2017-12-01 PROCEDURE — 6370000000 HC RX 637 (ALT 250 FOR IP): Performed by: PODIATRIST

## 2017-12-01 PROCEDURE — 11045 DBRDMT SUBQ TISS EACH ADDL: CPT | Performed by: PODIATRIST

## 2017-12-01 RX ADMIN — LIDOCAINE HYDROCHLORIDE: 20 JELLY TOPICAL at 11:17

## 2017-12-01 ASSESSMENT — PAIN SCALES - GENERAL: PAINLEVEL_OUTOF10: 8

## 2017-12-01 ASSESSMENT — PAIN DESCRIPTION - PROGRESSION: CLINICAL_PROGRESSION: NOT CHANGED

## 2017-12-01 ASSESSMENT — PAIN DESCRIPTION - PAIN TYPE: TYPE: CHRONIC PAIN

## 2017-12-01 ASSESSMENT — PAIN DESCRIPTION - ORIENTATION: ORIENTATION: LEFT

## 2017-12-01 ASSESSMENT — PAIN DESCRIPTION - FREQUENCY: FREQUENCY: CONTINUOUS

## 2017-12-01 ASSESSMENT — PAIN DESCRIPTION - LOCATION: LOCATION: FOOT

## 2017-12-01 ASSESSMENT — PAIN DESCRIPTION - ONSET: ONSET: ON-GOING

## 2017-12-01 ASSESSMENT — PAIN DESCRIPTION - DESCRIPTORS: DESCRIPTORS: BURNING;THROBBING

## 2017-12-01 NOTE — PROGRESS NOTES
[facility]  Progress Note      Jaime Barkley  AGE: 47 y.o. GENDER: male  : 1965  TODAY'S DATE:  2017    Chief Complaint   Patient presents with    Wound Check     left foot     History of the Present Illness     Jaime Barkley is a 47 y.o. male who presents today for evaluation and treatment for a diabetic foot ulcer  wound which is located on the left foot. Wearing CROCS/sandals today. His sister is with him today. Patient present again with no dressings on his feet. Sister states a packaged was delivered to their kitchen at 2AM last night but they have not looked inside to see whether those were his dressings. Meanwhile he has not made an appointment with Larua Stiles to get a CROW boot despite being instructed to do so beginning >1 month ago. Patient states his legs appear swollen but he has not seen his PCP since 2017 despite being instructed to return in October. Denies n/v/f/c. History of Wound: He has had a history of ulceration on both feet. Started with ulcerations on both 2nd toes, he had them amputated. He then had toes 1, 3 amputated on the left. After surgery, about 5 months ago, according to him he developed a large ulcer including toes 1-2-3 left, and metatarsal area. He was seen in the wound care center before, but says he did not like the doctor. He is know to be non-compliant. His BG has been uncontrolled. He is also seeing Dr. Sharmaine Tapia and Dr. Micah Jerry. He was on IV antibiotics with a PICC but has since stopped. His left leg and foot stay discolored and red with pain. He does not elevate. He was scheduled for an MRI but he cancelled it. He is worried about another amputation. His wife is dressing the foot with antibiotic ointment and and dressing. Has been dressing the wounds with silvercel.   Wound Type:diabetic  Wound Location:left foot  Modifying factors:edema, diabetes, poor glucose control, chronic pressure, shear force, obesity, ONE TABLET BY MOUTH EVERY EVENING 90 tablet 11    PARoxetine (PAXIL) 20 MG tablet TAKE ONE TABLET BY MOUTH FOUR TIMES A  tablet 5    Gauze Pads & Dressings (KERLIX GAUZE ROLL LARGE) MISC 1 Units by Does not apply route 2 times daily 60 each 5    Wound Dressings (ADAPTIC NON-ADHERING DRESSING) PADS Apply 1 Units topically 2 times daily 60 each 5    Gauze Pads & Dressings (COMBINE ABD) 5\"X9\" PADS 1 Units by Does not apply route 2 times daily 60 each 5    ONE TOUCH ULTRASOFT LANCETS MISC 1 each by Does not apply route 2 times daily 100 each 3    Blood Glucose Monitoring Suppl (ONE TOUCH ULTRA SYSTEM KIT) W/DEVICE KIT 1 kit by Does not apply route 2 times daily 1 kit 0    glucose blood VI test strips (ONE TOUCH TEST STRIPS) strip 1 each by In Vitro route 2 times daily As needed. 100 each 3     No current facility-administered medications on file prior to encounter. ALLERGIES    Allergies   Allergen Reactions    Fiorinal [Butalbital-Aspirin-Caffeine] Other (See Comments)     Generalized blisters    Peanut Butter Flavor [Flavoring Agent] Nausea And Vomiting       PAST SURGICAL HISTORY    Past Surgical History:   Procedure Laterality Date    APPENDECTOMY      BACK SURGERY      Lower x 4. Had abscess after appendectomy.  KNEE SURGERY Right     scope    TOE AMPUTATION Bilateral 3/6/2017    TOE AMPUTATION LEFT HALLUX AND 2ND DIGIT AND RIGHT 2ND DIGIT performed by Jane Escobar DPM at [place] [id number]    family history includes Cancer in his maternal grandfather; Diabetes in his father; No Known Problems in his mother. SOCIAL HISTORY    Social History   Substance Use Topics    Smoking status: Never Smoker    Smokeless tobacco: Never Used    Alcohol use No       Review of Systems denies F/C/N/V.     Objective:      /75   Pulse 99   Temp 98.2 °F (36.8 °C) (Tympanic)   Resp 16   General Appearance: alert and oriented to person, place and time, well-developed and well-nourished, in no acute distress     Wound 03/01/17 Wound #5 right 5th toe, gradually appeared, 2/15/17 (Active)   Wound Cleansed Rinsed/Irrigated with saline 3/1/2017 11:12 AM   Wound Length (cm) 0.8 cm 3/1/2017 12:04 PM   Wound Width (cm) 0.5 cm 3/1/2017 12:04 PM   Wound Depth (cm)  0.1 3/1/2017 12:04 PM   Calculated Wound Size (cm^2) (l*w) 0.4 cm^2 3/1/2017 12:04 PM   Change in Wound Size % (l*w) 0 3/1/2017 12:04 PM   Drainage Amount Moderate 3/1/2017 11:12 AM   Drainage Description Serosanguinous 3/1/2017 11:12 AM   Mickie-wound Assessment Dark edges;Edema;Fragile 3/1/2017 11:12 AM   Red%Wound Bed 100 3/1/2017 11:12 AM   Debridement per physician Subcutaneous 3/1/2017 12:04 PM   Time out Yes 3/1/2017 12:04 PM   Procedural Pain 0 3/1/2017 12:04 PM   Post procedural Pain 0 3/1/2017 12:04 PM   Number of days: 275       Wound 03/01/17 Wound #6 left 3rd toe, gradually appeared, 2/15/17 (Active)   Wound Cleansed Rinsed/Irrigated with saline 3/1/2017 11:12 AM   Wound Length (cm) 0.9 cm 3/1/2017 12:04 PM   Wound Width (cm) 1 cm 3/1/2017 12:04 PM   Wound Depth (cm)  0.1 3/1/2017 12:04 PM   Calculated Wound Size (cm^2) (l*w) 0.9 cm^2 3/1/2017 12:04 PM   Change in Wound Size % (l*w) 0 3/1/2017 12:04 PM   Drainage Amount Moderate 3/1/2017 11:12 AM   Drainage Description Serosanguinous 3/1/2017 11:12 AM   Mickie-wound Assessment Dark edges;Edema;Fragile 3/1/2017 11:12 AM   Red%Wound Bed 50 3/1/2017 11:12 AM   Yellow%Wound Bed 50 3/1/2017 11:12 AM   Debridement per physician Muscle 3/1/2017 12:04 PM   Time out Yes 3/1/2017 12:04 PM   Procedural Pain 0 3/1/2017 12:04 PM   Post procedural Pain 0 3/1/2017 12:04 PM   Number of days: 275       Wound 07/20/17 wound # 7  left foot amp site 7 months (Active)   Wound Type Wound 12/1/2017 11:14 AM   Dressing Status Old drainage 11/3/2017  9:12 AM   Dressing Changed Changed/New 11/3/2017  9:12 AM   Wound Cleansed Rinsed/Irrigated with saline; Wound cleanser 12/1/2017 11:14 AM   Wound Length (cm) 8 cm 12/1/2017 11:14 AM   Wound Width (cm) 11 cm 12/1/2017 11:14 AM   Wound Depth (cm)  0.1 12/1/2017 11:14 AM   Calculated Wound Size (cm^2) (l*w) 88 cm^2 12/1/2017 11:14 AM   Change in Wound Size % (l*w) -57 12/1/2017 11:14 AM   Wound Assessment Granulation tissue 12/1/2017 11:14 AM   Drainage Amount Moderate 12/1/2017 11:14 AM   Drainage Description Serosanguinous 12/1/2017 11:14 AM   Odor None 12/1/2017 11:14 AM   Margins Attached edges 10/20/2017 10:44 AM   Mickie-wound Assessment Bleeding;Fragile 12/1/2017 11:14 AM   [place]%Wound Bed 0 10/20/2017 10:44 AM   Red%Wound Bed 80 12/1/2017 11:14 AM   Yellow%Wound Bed 20 10/20/2017 10:44 AM   Black%Wound Bed 20 12/1/2017 11:14 AM   Debridement per physician None 11/3/2017  9:27 AM   Time out Yes 10/6/2017 11:20 AM   Procedural Pain 0 10/6/2017 11:20 AM   Post procedural Pain 0 10/6/2017 11:20 AM   Number of days: 134     Ulceration is much improved, however edema to bilateral lower extremities is worse today     Vascular:  DP/PT pulses palpable 2/4, Bilateral.    CFT <3 seconds to digits remaining digits. Hair growth absent to level of digits, Bilateral.  Edema present, Left. Erythema present, Left. Neurological:  Sensation absent to light touch to level of digits, Bilateral.  Protective sensation  absent via 5.07/10g [place]arda monofilament 10/10 sites, Bilateral.  Vibratory sensation absent to 1st MPJ, Bilateral.     Musculoskeletal:  Muscle strength 5/5 all LE groups tested, Bilateral.     Radiographs: 3 views left Foot:  Partial amputation of toes one, two, and three. No definite evidence of a lytic process or erosive changes. No emphysema noted. Assessment:     1. Skin ulcer of left foot with fat layer exposed ([address]    2. Type 2 diabetes mellitus with foot ulcer, without long-term current use of insulin ([address]    3. Osteomyelitis of foot, left, acute ([address]    4. Non compliance with medical treatment    5.  Type 2 diabetes

## 2017-12-21 ENCOUNTER — HOSPITAL ENCOUNTER (OUTPATIENT)
Dept: WOUND CARE | Age: 52
Discharge: HOME | End: 2017-12-21
Payer: MEDICARE

## 2017-12-21 VITALS
BODY MASS INDEX: 29.52 KG/M2 | HEIGHT: 74 IN | HEART RATE: 113 BPM | WEIGHT: 230 LBS | DIASTOLIC BLOOD PRESSURE: 70 MMHG | SYSTOLIC BLOOD PRESSURE: 108 MMHG | TEMPERATURE: 97.2 F | RESPIRATION RATE: 18 BRPM

## 2017-12-21 DIAGNOSIS — L97.522 SKIN ULCER OF LEFT FOOT WITH FAT LAYER EXPOSED (HCC): Primary | ICD-10-CM

## 2017-12-21 DIAGNOSIS — E11.621 TYPE 2 DIABETES MELLITUS WITH FOOT ULCER, WITHOUT LONG-TERM CURRENT USE OF INSULIN (HCC): ICD-10-CM

## 2017-12-21 DIAGNOSIS — E11.42 TYPE 2 DIABETES MELLITUS WITH DIABETIC POLYNEUROPATHY, WITHOUT LONG-TERM CURRENT USE OF INSULIN (HCC): ICD-10-CM

## 2017-12-21 DIAGNOSIS — L97.509 TYPE 2 DIABETES MELLITUS WITH FOOT ULCER, WITHOUT LONG-TERM CURRENT USE OF INSULIN (HCC): ICD-10-CM

## 2017-12-21 DIAGNOSIS — Z91.199 NON COMPLIANCE WITH MEDICAL TREATMENT: ICD-10-CM

## 2017-12-21 PROCEDURE — 6370000000 HC RX 637 (ALT 250 FOR IP): Performed by: PODIATRIST

## 2017-12-21 PROCEDURE — 11045 DBRDMT SUBQ TISS EACH ADDL: CPT | Performed by: PODIATRIST

## 2017-12-21 PROCEDURE — 11042 DBRDMT SUBQ TIS 1ST 20SQCM/<: CPT | Performed by: PODIATRIST

## 2017-12-21 PROCEDURE — 11042 DBRDMT SUBQ TIS 1ST 20SQCM/<: CPT

## 2017-12-21 RX ADMIN — LIDOCAINE HYDROCHLORIDE: 20 JELLY TOPICAL at 10:22

## 2017-12-21 ASSESSMENT — PAIN DESCRIPTION - PROGRESSION: CLINICAL_PROGRESSION: NOT CHANGED

## 2017-12-21 ASSESSMENT — PAIN DESCRIPTION - ONSET: ONSET: ON-GOING

## 2017-12-21 ASSESSMENT — PAIN SCALES - GENERAL: PAINLEVEL_OUTOF10: 8

## 2017-12-21 ASSESSMENT — PAIN DESCRIPTION - DESCRIPTORS: DESCRIPTORS: BURNING;THROBBING

## 2017-12-21 ASSESSMENT — PAIN DESCRIPTION - FREQUENCY: FREQUENCY: CONTINUOUS

## 2017-12-21 ASSESSMENT — PAIN DESCRIPTION - PAIN TYPE: TYPE: CHRONIC PAIN

## 2017-12-21 ASSESSMENT — PAIN DESCRIPTION - LOCATION: LOCATION: FOOT

## 2017-12-21 NOTE — PROGRESS NOTES
[facility]  Return Patient Progress Note      Coco Forte  AGE: 47 y.o. GENDER: male  : 1965  TODAY'S DATE:  2017    Chief Complaint   Patient presents with    Wound Check     left foot wounds     History of the Present Illness     Coco Forte is a 47 y.o. male who presents today for evaluation and treatment for a diabetic foot ulcer  wound which is located on the left foot. Patient states that he has been putting dressings on the foot. Patient states that he has missed several appointments because he has frequent diarrhea and urinary problems and cannot be away from the bathroom. Patient states that he has been unable to make it to  [facility] to get casted for a boot. Denies n/v/f/c. History of Wound: He has had a history of ulceration on both feet. Started with ulcerations on both 2nd toes, he had them amputated. He then had toes 1, 3 amputated on the left. After surgery, about 5 months ago, according to him he developed a large ulcer including toes 1-2-3 left, and metatarsal area. He was seen in the wound care center before, but says he did not like the doctor. He is know to be non-compliant. His BG has been uncontrolled. He is also seeing Dr. Ca Woodson and Dr. Cynthia Gomez. He was on IV antibiotics with a PICC but has since stopped. His left leg and foot stay discolored and red with pain. He does not elevate. He was scheduled for an MRI but he cancelled it. He is worried about another amputation. His wife is dressing the foot with antibiotic ointment and and dressing. Has been dressing the wounds with silvercel.   Wound Type:diabetic  Wound Location:left foot  Modifying factors:edema, diabetes, poor glucose control, chronic pressure, shear force, obesity, decreased tissue oxygenation, poor hygiene and non-adherence    Lab Results   Component Value Date    LABA1C 5.1 2017     PAST MEDICAL HISTORY        Diagnosis Date    Anxiety     Arthritis     lower back, knees    BPH (benign prostatic hyperplasia)     Chronic kidney disease     prostate    Depression     Diabetes mellitus ([facility]     Diabetic neuropathy (HCC)     GERD (gastroesophageal reflux disease)     Hyperglycemia     Hypertension     Hypothyroidism     Impacted tooth     Pneumonia        MEDICATIONS    Current Outpatient Prescriptions on File Prior to Encounter   Medication Sig Dispense Refill    PARoxetine (PAXIL) 20 MG tablet TAKE ONE TABLET BY MOUTH FOUR TIMES A  tablet 4    oxyCODONE (OXY-IR) 30 MG immediate release tablet Take 1 tablet by mouth every 4 hours as needed for Pain . 150 tablet 0    morphine (MS CONTIN) 15 MG extended release tablet Take 1 tablet by mouth 2 times daily .  60 tablet 0    ciclopirox (LOPROX) 0.77 % cream APPLY TO AFFECTED AREA(S) AND RUB  IN A THIN FILM TWO TIMES A DAY (AM AND PM) 90 g 2    NYAMYC 732973 UNIT/GM powder APPLY TO AFFECTED AREA(S) FOUR TIMES A DAY 30 g 2    Lactobacillus (PROBIOTIC ACIDOPHILUS) TABS Take 1 tablet by mouth daily 30 tablet 3    diazepam (VALIUM) 5 MG tablet Take 1 tablet by mouth every 6 hours as needed for Anxiety 120 tablet 2    metolazone (ZAROXOLYN) 2.5 MG tablet Take every mon, wed, friday 15 tablet 5    tiZANidine (ZANAFLEX) 4 MG tablet TAKE ONE TABLET BY MOUTH EVERY 6 HOURS AS NEEDED FOR MUSCLE SPASMS 120 tablet 2    lisinopril (PRINIVIL;ZESTRIL) 10 MG tablet TAKE ONE TABLET BY MOUTH DAILY 30 tablet 11    buPROPion (WELLBUTRIN XL) 300 MG extended release tablet TAKE ONE TABLET BY MOUTH DAILY 30 tablet 11    tamsulosin (FLOMAX) 0.4 MG capsule TAKE ONE CAPSULE BY MOUTH DAILY 30 capsule 11    furosemide (LASIX) 20 MG tablet Take 1 tablet by mouth 2 times daily 60 tablet 11    omeprazole (PRILOSEC) 40 MG delayed release capsule TAKE ONE CAPSULE BY MOUTH EVERY NIGHT AT BEDTIME 30 capsule 11    metFORMIN (GLUCOPHAGE) 500 MG tablet TAKE TWO TABLETS BY MOUTH EVERY MORNING AND TAKE ONE TABLET BY MOUTH EVERY EVENING Wound Width (cm) 9.7 cm 12/21/2017 10:45 AM   Wound Depth (cm)  0.1 12/21/2017 10:45 AM   Calculated Wound Size (cm^2) (l*w) 69.84 cm^2 12/21/2017 10:45 AM   Change in Wound Size % (l*w) -24.6 12/21/2017 10:45 AM   Wound Assessment Drainage;Pink;Red 12/21/2017 10:18 AM   Drainage Amount Moderate 12/21/2017 10:18 AM   Drainage Description Serosanguinous 12/21/2017 10:18 AM   Odor None 12/21/2017 10:18 AM   Margins Defined edges 12/21/2017 10:18 AM   Mickie-wound Assessment Fragile 12/21/2017 10:18 AM   [place]%Wound Bed 80 12/21/2017 10:18 AM   Red%Wound Bed 5 12/21/2017 10:18 AM   Yellow%Wound Bed 15 12/21/2017 10:18 AM   Black%Wound Bed 20 12/1/2017 11:14 AM   Debridement per physician Subcutaneous 12/21/2017 10:45 AM   Time out Yes 12/21/2017 10:45 AM   Procedural Pain 0 12/21/2017 10:45 AM   Post procedural Pain 0 12/21/2017 10:45 AM   Number of days: 154     Ulceration is much improved, however edema to bilateral lower extremities remains    Vascular:  DP/PT pulses palpable 2/4, Bilateral.    CFT <3 seconds to digits remaining digits. Hair growth absent to level of digits, Bilateral.  Edema present, Left. Erythema present, Left. Neurological:  Sensation absent to light touch to level of digits, Bilateral.  Protective sensation  absent via 5.07/10g [place]arda monofilament 10/10 sites, Bilateral.  Vibratory sensation absent to 1st MPJ, Bilateral.     Musculoskeletal:  Muscle strength 5/5 all LE groups tested, Bilateral.     Radiographs: 3 views left Foot:  Partial amputation of toes one, two, and three. No definite evidence of a lytic process or erosive changes. No emphysema noted. Assessment:     1. Skin ulcer of left foot with fat layer exposed ([address]    2. Type 2 diabetes mellitus with foot ulcer, without long-term current use of insulin ([address]    3. Non compliance with medical treatment    4.  Type 2 diabetes mellitus with diabetic polyneuropathy, without long-term current use of insulin ([address]

## 2018-01-14 ENCOUNTER — HOSPITAL ENCOUNTER (INPATIENT)
Age: 53
LOS: 2 days | Discharge: HOME HEALTH | DRG: 383 | End: 2018-01-16
Attending: EMERGENCY MEDICINE | Admitting: FAMILY MEDICINE
Payer: MEDICARE

## 2018-01-14 ENCOUNTER — APPOINTMENT (OUTPATIENT)
Dept: CT IMAGING | Age: 53
DRG: 383 | End: 2018-01-14
Payer: MEDICARE

## 2018-01-14 ENCOUNTER — APPOINTMENT (OUTPATIENT)
Dept: GENERAL RADIOLOGY | Age: 53
DRG: 383 | End: 2018-01-14
Payer: MEDICARE

## 2018-01-14 DIAGNOSIS — L03.116 CELLULITIS OF LEFT LOWER EXTREMITY: ICD-10-CM

## 2018-01-14 DIAGNOSIS — A41.9 SEPSIS, DUE TO UNSPECIFIED ORGANISM: Primary | ICD-10-CM

## 2018-01-14 PROBLEM — L03.90 CELLULITIS: Status: ACTIVE | Noted: 2018-01-14

## 2018-01-14 LAB
ABSOLUTE EOS #: 0 K/UL (ref 0–0.4)
ABSOLUTE IMMATURE GRANULOCYTE: ABNORMAL K/UL (ref 0–0.3)
ABSOLUTE LYMPH #: 0.8 K/UL (ref 1–4.8)
ABSOLUTE MONO #: 1.3 K/UL (ref 0.1–1.3)
ALBUMIN SERPL-MCNC: 3.7 G/DL (ref 3.5–5.2)
ALBUMIN/GLOBULIN RATIO: ABNORMAL (ref 1–2.5)
ALP BLD-CCNC: 87 U/L (ref 40–129)
ALT SERPL-CCNC: 20 U/L (ref 5–41)
ANION GAP SERPL CALCULATED.3IONS-SCNC: 12 MMOL/L (ref 9–17)
AST SERPL-CCNC: 69 U/L
BASOPHILS # BLD: 1 % (ref 0–2)
BASOPHILS ABSOLUTE: 0.1 K/UL (ref 0–0.2)
BILIRUB SERPL-MCNC: 0.6 MG/DL (ref 0.3–1.2)
BUN BLDV-MCNC: 16 MG/DL (ref 6–20)
BUN/CREAT BLD: ABNORMAL (ref 9–20)
CALCIUM SERPL-MCNC: 8.9 MG/DL (ref 8.6–10.4)
CHLORIDE BLD-SCNC: 93 MMOL/L (ref 98–107)
CO2: 28 MMOL/L (ref 20–31)
CREAT SERPL-MCNC: 0.88 MG/DL (ref 0.7–1.2)
DIFFERENTIAL TYPE: ABNORMAL
EOSINOPHILS RELATIVE PERCENT: 0 % (ref 0–4)
GFR AFRICAN AMERICAN: >60 ML/MIN
GFR NON-AFRICAN AMERICAN: >60 ML/MIN
GFR SERPL CREATININE-BSD FRML MDRD: ABNORMAL ML/MIN/{1.73_M2}
GFR SERPL CREATININE-BSD FRML MDRD: ABNORMAL ML/MIN/{1.73_M2}
GLUCOSE BLD-MCNC: 117 MG/DL (ref 70–99)
HCT VFR BLD CALC: 36 % (ref 41–53)
HEMOGLOBIN: 11.6 G/DL (ref 13.5–17.5)
IMMATURE GRANULOCYTES: ABNORMAL %
INR BLD: 1.1
LACTIC ACID, WHOLE BLOOD: NORMAL MMOL/L (ref 0.7–2.1)
LACTIC ACID: 1.3 MMOL/L (ref 0.5–2.2)
LYMPHOCYTES # BLD: 7 % (ref 24–44)
MCH RBC QN AUTO: 27.5 PG (ref 26–34)
MCHC RBC AUTO-ENTMCNC: 32.3 G/DL (ref 31–37)
MCV RBC AUTO: 85.1 FL (ref 80–100)
MONOCYTES # BLD: 11 % (ref 1–7)
PARTIAL THROMBOPLASTIN TIME: 38.1 SEC (ref 23–31)
PDW BLD-RTO: 15.7 % (ref 11.5–14.9)
PLATELET # BLD: 362 K/UL (ref 150–450)
PLATELET ESTIMATE: ABNORMAL
PMV BLD AUTO: 8 FL (ref 6–12)
POTASSIUM SERPL-SCNC: 4.1 MMOL/L (ref 3.7–5.3)
PROTHROMBIN TIME: 12 SEC (ref 9.7–12)
RBC # BLD: 4.23 M/UL (ref 4.5–5.9)
RBC # BLD: ABNORMAL 10*6/UL
SEG NEUTROPHILS: 81 % (ref 36–66)
SEGMENTED NEUTROPHILS ABSOLUTE COUNT: 9.4 K/UL (ref 1.3–9.1)
SODIUM BLD-SCNC: 133 MMOL/L (ref 135–144)
TOTAL PROTEIN: 8.2 G/DL (ref 6.4–8.3)
WBC # BLD: 11.6 K/UL (ref 3.5–11)
WBC # BLD: ABNORMAL 10*3/UL

## 2018-01-14 PROCEDURE — 87147 CULTURE TYPE IMMUNOLOGIC: CPT

## 2018-01-14 PROCEDURE — 96366 THER/PROPH/DIAG IV INF ADDON: CPT

## 2018-01-14 PROCEDURE — 73590 X-RAY EXAM OF LOWER LEG: CPT

## 2018-01-14 PROCEDURE — 73630 X-RAY EXAM OF FOOT: CPT

## 2018-01-14 PROCEDURE — 6360000002 HC RX W HCPCS: Performed by: FAMILY MEDICINE

## 2018-01-14 PROCEDURE — 6370000000 HC RX 637 (ALT 250 FOR IP): Performed by: EMERGENCY MEDICINE

## 2018-01-14 PROCEDURE — 2580000003 HC RX 258: Performed by: EMERGENCY MEDICINE

## 2018-01-14 PROCEDURE — 70450 CT HEAD/BRAIN W/O DYE: CPT

## 2018-01-14 PROCEDURE — 99285 EMERGENCY DEPT VISIT HI MDM: CPT

## 2018-01-14 PROCEDURE — 83605 ASSAY OF LACTIC ACID: CPT

## 2018-01-14 PROCEDURE — 93970 EXTREMITY STUDY: CPT

## 2018-01-14 PROCEDURE — 85025 COMPLETE CBC W/AUTO DIFF WBC: CPT

## 2018-01-14 PROCEDURE — 85610 PROTHROMBIN TIME: CPT

## 2018-01-14 PROCEDURE — 96365 THER/PROPH/DIAG IV INF INIT: CPT

## 2018-01-14 PROCEDURE — 87205 SMEAR GRAM STAIN: CPT

## 2018-01-14 PROCEDURE — 87149 DNA/RNA DIRECT PROBE: CPT

## 2018-01-14 PROCEDURE — 87186 SC STD MICRODIL/AGAR DIL: CPT

## 2018-01-14 PROCEDURE — 2060000000 HC ICU INTERMEDIATE R&B

## 2018-01-14 PROCEDURE — 87040 BLOOD CULTURE FOR BACTERIA: CPT

## 2018-01-14 PROCEDURE — 96375 TX/PRO/DX INJ NEW DRUG ADDON: CPT

## 2018-01-14 PROCEDURE — 85730 THROMBOPLASTIN TIME PARTIAL: CPT

## 2018-01-14 PROCEDURE — 71046 X-RAY EXAM CHEST 2 VIEWS: CPT

## 2018-01-14 PROCEDURE — 6360000002 HC RX W HCPCS: Performed by: EMERGENCY MEDICINE

## 2018-01-14 PROCEDURE — 36415 COLL VENOUS BLD VENIPUNCTURE: CPT

## 2018-01-14 PROCEDURE — 80053 COMPREHEN METABOLIC PANEL: CPT

## 2018-01-14 RX ORDER — ACETAMINOPHEN 325 MG/1
650 TABLET ORAL EVERY 4 HOURS PRN
Status: DISCONTINUED | OUTPATIENT
Start: 2018-01-14 | End: 2018-01-16 | Stop reason: HOSPADM

## 2018-01-14 RX ORDER — SODIUM CHLORIDE 0.9 % (FLUSH) 0.9 %
10 SYRINGE (ML) INJECTION EVERY 12 HOURS SCHEDULED
Status: DISCONTINUED | OUTPATIENT
Start: 2018-01-14 | End: 2018-01-16 | Stop reason: HOSPADM

## 2018-01-14 RX ORDER — LORAZEPAM 2 MG/ML
1 INJECTION INTRAMUSCULAR ONCE
Status: COMPLETED | OUTPATIENT
Start: 2018-01-14 | End: 2018-01-14

## 2018-01-14 RX ORDER — 0.9 % SODIUM CHLORIDE 0.9 %
1000 INTRAVENOUS SOLUTION INTRAVENOUS ONCE
Status: COMPLETED | OUTPATIENT
Start: 2018-01-14 | End: 2018-01-14

## 2018-01-14 RX ORDER — FENTANYL CITRATE 50 UG/ML
50 INJECTION, SOLUTION INTRAMUSCULAR; INTRAVENOUS ONCE
Status: COMPLETED | OUTPATIENT
Start: 2018-01-14 | End: 2018-01-14

## 2018-01-14 RX ORDER — ACETAMINOPHEN 500 MG
1000 TABLET ORAL ONCE
Status: COMPLETED | OUTPATIENT
Start: 2018-01-14 | End: 2018-01-14

## 2018-01-14 RX ORDER — SODIUM CHLORIDE 9 MG/ML
INJECTION, SOLUTION INTRAVENOUS CONTINUOUS
Status: DISCONTINUED | OUTPATIENT
Start: 2018-01-14 | End: 2018-01-16 | Stop reason: HOSPADM

## 2018-01-14 RX ORDER — FENTANYL CITRATE 50 UG/ML
100 INJECTION, SOLUTION INTRAMUSCULAR; INTRAVENOUS ONCE
Status: COMPLETED | OUTPATIENT
Start: 2018-01-14 | End: 2018-01-14

## 2018-01-14 RX ORDER — SODIUM CHLORIDE 0.9 % (FLUSH) 0.9 %
10 SYRINGE (ML) INJECTION PRN
Status: DISCONTINUED | OUTPATIENT
Start: 2018-01-14 | End: 2018-01-16 | Stop reason: HOSPADM

## 2018-01-14 RX ADMIN — ACETAMINOPHEN 1000 MG: 500 TABLET ORAL at 16:10

## 2018-01-14 RX ADMIN — FENTANYL CITRATE 100 MCG: 50 INJECTION, SOLUTION INTRAMUSCULAR; INTRAVENOUS at 18:35

## 2018-01-14 RX ADMIN — SODIUM CHLORIDE 1000 ML: 9 INJECTION, SOLUTION INTRAVENOUS at 16:10

## 2018-01-14 RX ADMIN — FENTANYL CITRATE 50 MCG: 50 INJECTION, SOLUTION INTRAMUSCULAR; INTRAVENOUS at 16:10

## 2018-01-14 RX ADMIN — VANCOMYCIN HYDROCHLORIDE 1500 MG: 1 INJECTION, POWDER, LYOPHILIZED, FOR SOLUTION INTRAVENOUS at 17:02

## 2018-01-14 RX ADMIN — CEFEPIME HYDROCHLORIDE 2 G: 2 INJECTION, POWDER, FOR SOLUTION INTRAVENOUS at 16:41

## 2018-01-14 RX ADMIN — SODIUM CHLORIDE 1000 ML: 9 INJECTION, SOLUTION INTRAVENOUS at 17:02

## 2018-01-14 RX ADMIN — LORAZEPAM 1 MG: 2 INJECTION INTRAMUSCULAR; INTRAVENOUS at 18:41

## 2018-01-14 RX ADMIN — SODIUM CHLORIDE: 9 INJECTION, SOLUTION INTRAVENOUS at 19:57

## 2018-01-14 RX ADMIN — ENOXAPARIN SODIUM 30 MG: 30 INJECTION SUBCUTANEOUS at 23:48

## 2018-01-14 RX ADMIN — ACETAMINOPHEN 650 MG: 325 TABLET, FILM COATED ORAL at 23:48

## 2018-01-14 ASSESSMENT — PAIN DESCRIPTION - LOCATION: LOCATION: FOOT

## 2018-01-14 ASSESSMENT — PAIN DESCRIPTION - ONSET: ONSET: ON-GOING

## 2018-01-14 ASSESSMENT — PAIN DESCRIPTION - FREQUENCY: FREQUENCY: CONTINUOUS

## 2018-01-14 ASSESSMENT — PAIN SCALES - GENERAL
PAINLEVEL_OUTOF10: 6
PAINLEVEL_OUTOF10: 8
PAINLEVEL_OUTOF10: 3
PAINLEVEL_OUTOF10: 8
PAINLEVEL_OUTOF10: 7
PAINLEVEL_OUTOF10: 7

## 2018-01-14 ASSESSMENT — ENCOUNTER SYMPTOMS
SHORTNESS OF BREATH: 0
RHINORRHEA: 0
ABDOMINAL PAIN: 0

## 2018-01-14 ASSESSMENT — PAIN DESCRIPTION - DESCRIPTORS: DESCRIPTORS: ACHING;DISCOMFORT

## 2018-01-14 ASSESSMENT — PAIN DESCRIPTION - PAIN TYPE: TYPE: CHRONIC PAIN

## 2018-01-14 NOTE — ED PROVIDER NOTES
[address]  Emergency Department Encounter  Emergency Medicine Resident     Pt Name: Kathy Grier  MRN: [id number]  [place] 1965  Date of evaluation: 1/14/18  PCP:  Malcolm Tamez MD    [address] [place] [address]       Chief Complaint   Patient presents with    Foot Pain    Altered Mental Status    Fever       HISTORY OF PRESENT ILLNESS  (Location/Symptom, Timing/Onset, Context/Setting, Quality, Duration, Modifying Factors, Severity, Associated signs/symptoms)     Kathy Grier is a 47 y.o. male who presents With a several day history of left foot and left lower extremity redness and swelling. Also with fevers and altered mental status per family members. Patient does have a history of having osteomyelitis in the left foot several months ago. Patient is a diabetic and has diabetic neuropathy. Sees Dr. Rubina Valdes as his podiatrist.  Patient has been having redness and swelling of the left lower extremity for the past several days. This has caused him to have unsteady gait and has fallen and also hit his head. Patient at bedside is complaining of pain only to the left foot. Denies any pain elsewhere. He is following all commands although is only oriented to person and family members. Denies any chest pain, shortness of breath, abdominal pain. PAST MEDICAL / SURGICAL / SOCIAL / FAMILY HISTORY      has a past medical history of Anxiety; Arthritis; BPH (benign prostatic hyperplasia); Chronic kidney disease; Depression; Diabetes mellitus ([address] Diabetic neuropathy ([address] GERD (gastroesophageal reflux disease); Hyperglycemia; Hypertension; Hypothyroidism; Impacted tooth; and Pneumonia. has a past surgical history that includes Appendectomy; knee surgery (Right); Tonsillectomy; back surgery; and Toe amputation (Bilateral, 3/6/2017).     Social History     Social History    Marital status:      Spouse name: N/A    Number of children: N/A    Years of education: N/A Occupational History    Not on file. Social History Main Topics    Smoking status: Never Smoker    Smokeless tobacco: Never Used    Alcohol use No    Drug use: No    Sexual activity: Not on file     Other Topics Concern    Not on file     Social History Narrative    No narrative on file       Family History   Problem Relation Age of Onset    Diabetes Father     Cancer Maternal Grandfather      Colon Cancer; Prostate Cancer    No Known Problems Mother        Allergies:  Fiorinal [butalbital-aspirin-caffeine] and Peanut butter flavor [flavoring agent]    Home Medications:  Prior to Admission medications    Medication Sig Start Date End Date Taking? Authorizing Provider   morphine (MS CONTIN) 15 MG extended release tablet Take 1 tablet by mouth 2 times daily for 30 days. 1/2/18 2/1/18  Malcolm Tamez MD   oxyCODONE (OXY-IR) 30 MG immediate release tablet Take 1 tablet by mouth every 4 hours as needed for Pain for up to 30 days.  1/2/18 2/1/18  Malcolm Tamez MD   PARoxetine (PAXIL) 20 MG tablet TAKE ONE TABLET BY MOUTH FOUR TIMES A DAY 12/18/17   Malcolm Tamez MD   ciclopirox (LOPROX) 0.77 % cream APPLY TO AFFECTED AREA(S) AND RUB  IN A THIN FILM TWO TIMES A DAY (AM AND PM) 12/12/17   Malcolm Tamez MD   [facility] 324582 UNIT/GM powder APPLY TO AFFECTED AREA(S) FOUR TIMES A DAY 12/11/17   Malcolm Tamez MD   Lactobacillus (PROBIOTIC ACIDOPHILUS) TABS Take 1 tablet by mouth daily 7/20/17   Miriam Ruiz DPM   diazepam (VALIUM) 5 MG tablet Take 1 tablet by mouth every 6 hours as needed for Anxiety 7/11/17   Malcolm Tamez MD   metolazone (ZAROXOLYN) 2.5 MG tablet Take every mon, wed, friday 7/11/17   Malcolm Tamez MD   tiZANidine (ZANAFLEX) 4 MG tablet TAKE ONE TABLET BY MOUTH EVERY 6 HOURS AS NEEDED FOR MUSCLE SPASMS 7/11/17   Malcolm Tamez MD   lisinopril (PRINIVIL;ZESTRIL) 10 MG tablet TAKE ONE TABLET BY MOUTH DAILY 7/11/17   Malcolm Tamez MD

## 2018-01-15 LAB
ANION GAP SERPL CALCULATED.3IONS-SCNC: 9 MMOL/L (ref 9–17)
BUN BLDV-MCNC: 9 MG/DL (ref 6–20)
BUN/CREAT BLD: ABNORMAL (ref 9–20)
CALCIUM SERPL-MCNC: 8 MG/DL (ref 8.6–10.4)
CHLORIDE BLD-SCNC: 100 MMOL/L (ref 98–107)
CO2: 29 MMOL/L (ref 20–31)
CREAT SERPL-MCNC: 0.59 MG/DL (ref 0.7–1.2)
GFR AFRICAN AMERICAN: >60 ML/MIN
GFR NON-AFRICAN AMERICAN: >60 ML/MIN
GFR SERPL CREATININE-BSD FRML MDRD: ABNORMAL ML/MIN/{1.73_M2}
GFR SERPL CREATININE-BSD FRML MDRD: ABNORMAL ML/MIN/{1.73_M2}
GLUCOSE BLD-MCNC: 100 MG/DL (ref 70–99)
GLUCOSE BLD-MCNC: 117 MG/DL (ref 75–110)
POTASSIUM SERPL-SCNC: 3.5 MMOL/L (ref 3.7–5.3)
SODIUM BLD-SCNC: 138 MMOL/L (ref 135–144)

## 2018-01-15 PROCEDURE — 36415 COLL VENOUS BLD VENIPUNCTURE: CPT

## 2018-01-15 PROCEDURE — 99222 1ST HOSP IP/OBS MODERATE 55: CPT | Performed by: FAMILY MEDICINE

## 2018-01-15 PROCEDURE — 2580000003 HC RX 258: Performed by: EMERGENCY MEDICINE

## 2018-01-15 PROCEDURE — 6370000000 HC RX 637 (ALT 250 FOR IP): Performed by: FAMILY MEDICINE

## 2018-01-15 PROCEDURE — 2500000003 HC RX 250 WO HCPCS: Performed by: FAMILY MEDICINE

## 2018-01-15 PROCEDURE — 82947 ASSAY GLUCOSE BLOOD QUANT: CPT

## 2018-01-15 PROCEDURE — 6360000002 HC RX W HCPCS: Performed by: FAMILY MEDICINE

## 2018-01-15 PROCEDURE — 2060000000 HC ICU INTERMEDIATE R&B

## 2018-01-15 PROCEDURE — 2580000003 HC RX 258: Performed by: FAMILY MEDICINE

## 2018-01-15 PROCEDURE — 80048 BASIC METABOLIC PNL TOTAL CA: CPT

## 2018-01-15 RX ORDER — OXYCODONE HYDROCHLORIDE 5 MG/1
30 TABLET ORAL EVERY 4 HOURS PRN
Status: DISCONTINUED | OUTPATIENT
Start: 2018-01-15 | End: 2018-01-16 | Stop reason: HOSPADM

## 2018-01-15 RX ORDER — LACTOBACILLUS RHAMNOSUS GG 10B CELL
1 CAPSULE ORAL DAILY
Status: DISCONTINUED | OUTPATIENT
Start: 2018-01-15 | End: 2018-01-16 | Stop reason: HOSPADM

## 2018-01-15 RX ORDER — PANTOPRAZOLE SODIUM 40 MG/1
40 TABLET, DELAYED RELEASE ORAL
Status: DISCONTINUED | OUTPATIENT
Start: 2018-01-15 | End: 2018-01-16 | Stop reason: HOSPADM

## 2018-01-15 RX ORDER — METOLAZONE 2.5 MG/1
2.5 TABLET ORAL
Status: DISCONTINUED | OUTPATIENT
Start: 2018-01-15 | End: 2018-01-16 | Stop reason: HOSPADM

## 2018-01-15 RX ORDER — FUROSEMIDE 20 MG/1
20 TABLET ORAL 2 TIMES DAILY
Status: DISCONTINUED | OUTPATIENT
Start: 2018-01-15 | End: 2018-01-16 | Stop reason: HOSPADM

## 2018-01-15 RX ORDER — BUPROPION HYDROCHLORIDE 300 MG/1
300 TABLET ORAL DAILY
Status: DISCONTINUED | OUTPATIENT
Start: 2018-01-15 | End: 2018-01-16 | Stop reason: HOSPADM

## 2018-01-15 RX ORDER — PAROXETINE HYDROCHLORIDE 20 MG/1
20 TABLET, FILM COATED ORAL 4 TIMES DAILY
Status: DISCONTINUED | OUTPATIENT
Start: 2018-01-15 | End: 2018-01-15

## 2018-01-15 RX ORDER — TIZANIDINE 4 MG/1
4 TABLET ORAL EVERY 6 HOURS PRN
Status: DISCONTINUED | OUTPATIENT
Start: 2018-01-15 | End: 2018-01-16 | Stop reason: HOSPADM

## 2018-01-15 RX ORDER — TAMSULOSIN HYDROCHLORIDE 0.4 MG/1
0.4 CAPSULE ORAL DAILY
Status: DISCONTINUED | OUTPATIENT
Start: 2018-01-15 | End: 2018-01-16 | Stop reason: HOSPADM

## 2018-01-15 RX ORDER — ONDANSETRON 2 MG/ML
4 INJECTION INTRAMUSCULAR; INTRAVENOUS EVERY 6 HOURS PRN
Status: DISCONTINUED | OUTPATIENT
Start: 2018-01-15 | End: 2018-01-16 | Stop reason: HOSPADM

## 2018-01-15 RX ORDER — PAROXETINE HYDROCHLORIDE 20 MG/1
20 TABLET, FILM COATED ORAL DAILY
Status: DISCONTINUED | OUTPATIENT
Start: 2018-01-16 | End: 2018-01-16 | Stop reason: HOSPADM

## 2018-01-15 RX ORDER — POTASSIUM CHLORIDE 20MEQ/15ML
40 LIQUID (ML) ORAL PRN
Status: DISCONTINUED | OUTPATIENT
Start: 2018-01-15 | End: 2018-01-16 | Stop reason: HOSPADM

## 2018-01-15 RX ORDER — MORPHINE SULFATE 15 MG/1
15 TABLET, FILM COATED, EXTENDED RELEASE ORAL 2 TIMES DAILY
Status: DISCONTINUED | OUTPATIENT
Start: 2018-01-15 | End: 2018-01-16 | Stop reason: HOSPADM

## 2018-01-15 RX ORDER — POTASSIUM CHLORIDE 7.45 MG/ML
10 INJECTION INTRAVENOUS PRN
Status: DISCONTINUED | OUTPATIENT
Start: 2018-01-15 | End: 2018-01-16 | Stop reason: HOSPADM

## 2018-01-15 RX ORDER — POTASSIUM CHLORIDE 20 MEQ/1
40 TABLET, EXTENDED RELEASE ORAL PRN
Status: DISCONTINUED | OUTPATIENT
Start: 2018-01-15 | End: 2018-01-16 | Stop reason: HOSPADM

## 2018-01-15 RX ORDER — DIAZEPAM 5 MG/1
5 TABLET ORAL EVERY 6 HOURS PRN
Status: DISCONTINUED | OUTPATIENT
Start: 2018-01-15 | End: 2018-01-16 | Stop reason: HOSPADM

## 2018-01-15 RX ORDER — LISINOPRIL 10 MG/1
10 TABLET ORAL DAILY
Status: DISCONTINUED | OUTPATIENT
Start: 2018-01-15 | End: 2018-01-16 | Stop reason: HOSPADM

## 2018-01-15 RX ADMIN — VANCOMYCIN HYDROCHLORIDE 1500 MG: 10 INJECTION, POWDER, LYOPHILIZED, FOR SOLUTION INTRAVENOUS at 09:36

## 2018-01-15 RX ADMIN — OXYCODONE HYDROCHLORIDE 30 MG: 5 TABLET ORAL at 09:42

## 2018-01-15 RX ADMIN — MORPHINE SULFATE 15 MG: 15 TABLET, EXTENDED RELEASE ORAL at 08:21

## 2018-01-15 RX ADMIN — MICONAZOLE NITRATE: 20.6 POWDER TOPICAL at 08:32

## 2018-01-15 RX ADMIN — WATER 2 G: 1 INJECTION INTRAMUSCULAR; INTRAVENOUS; SUBCUTANEOUS at 18:27

## 2018-01-15 RX ADMIN — ONDANSETRON 4 MG: 2 INJECTION INTRAMUSCULAR; INTRAVENOUS at 06:05

## 2018-01-15 RX ADMIN — FUROSEMIDE 20 MG: 20 TABLET ORAL at 18:27

## 2018-01-15 RX ADMIN — POTASSIUM CHLORIDE 40 MEQ: 20 TABLET, EXTENDED RELEASE ORAL at 11:44

## 2018-01-15 RX ADMIN — ENOXAPARIN SODIUM 30 MG: 30 INJECTION SUBCUTANEOUS at 21:30

## 2018-01-15 RX ADMIN — OXYCODONE HYDROCHLORIDE 30 MG: 5 TABLET ORAL at 13:44

## 2018-01-15 RX ADMIN — FUROSEMIDE 20 MG: 20 TABLET ORAL at 08:21

## 2018-01-15 RX ADMIN — SODIUM CHLORIDE: 9 INJECTION, SOLUTION INTRAVENOUS at 06:05

## 2018-01-15 RX ADMIN — OXYCODONE HYDROCHLORIDE 30 MG: 5 TABLET ORAL at 22:34

## 2018-01-15 RX ADMIN — PANTOPRAZOLE SODIUM 40 MG: 40 TABLET, DELAYED RELEASE ORAL at 08:32

## 2018-01-15 RX ADMIN — METFORMIN HYDROCHLORIDE 1000 MG: 1000 TABLET ORAL at 08:22

## 2018-01-15 RX ADMIN — ENOXAPARIN SODIUM 30 MG: 30 INJECTION SUBCUTANEOUS at 08:22

## 2018-01-15 RX ADMIN — OXYCODONE HYDROCHLORIDE 30 MG: 5 TABLET ORAL at 17:52

## 2018-01-15 RX ADMIN — LISINOPRIL 10 MG: 10 TABLET ORAL at 08:21

## 2018-01-15 RX ADMIN — BUPROPION HYDROCHLORIDE 300 MG: 300 TABLET, EXTENDED RELEASE ORAL at 08:21

## 2018-01-15 RX ADMIN — METOLAZONE 2.5 MG: 2.5 TABLET ORAL at 08:32

## 2018-01-15 RX ADMIN — TAMSULOSIN HYDROCHLORIDE 0.4 MG: 0.4 CAPSULE ORAL at 08:21

## 2018-01-15 RX ADMIN — WATER 2 G: 1 INJECTION INTRAMUSCULAR; INTRAVENOUS; SUBCUTANEOUS at 08:21

## 2018-01-15 RX ADMIN — MICONAZOLE NITRATE: 20.6 POWDER TOPICAL at 21:30

## 2018-01-15 RX ADMIN — Medication 1 CAPSULE: at 08:21

## 2018-01-15 ASSESSMENT — PAIN SCALES - GENERAL
PAINLEVEL_OUTOF10: 6
PAINLEVEL_OUTOF10: 8
PAINLEVEL_OUTOF10: 7
PAINLEVEL_OUTOF10: 6
PAINLEVEL_OUTOF10: 8
PAINLEVEL_OUTOF10: 8

## 2018-01-15 NOTE — ED NOTES
Pt to CT then transported to [facility] from [address] [place] [address], [address] [place] [address]  01/14/18 2061

## 2018-01-15 NOTE — FLOWSHEET NOTE
Called Doctor Shady Hernandez about Pt feeling nauseous,and worries about it could be because he has not been able to get any of his home pain medications. Orders for IV zofran 4mg q6hrs PRN and daily BMP labs. Doctor will see Pt in the morning.

## 2018-01-15 NOTE — CONSULTS
12/11/17  Yes Wilda Ponce MD   Lactobacillus (PROBIOTIC ACIDOPHILUS) TABS Take 1 tablet by mouth daily 7/20/17  Yes Zara Wright DPM   tiZANidine (ZANAFLEX) 4 MG tablet TAKE ONE TABLET BY MOUTH EVERY 6 HOURS AS NEEDED FOR MUSCLE SPASMS 7/11/17  Yes Wilda Ponce MD   lisinopril (PRINIVIL;ZESTRIL) 10 MG tablet TAKE ONE TABLET BY MOUTH DAILY 7/11/17  Yes Wilda Ponce MD   buPROPion (WELLBUTRIN XL) 300 MG extended release tablet TAKE ONE TABLET BY MOUTH DAILY 7/11/17  Yes Wilda Ponce MD   tamsulosin (FLOMAX) 0.4 MG capsule TAKE ONE CAPSULE BY MOUTH DAILY 7/11/17  Yes Wilda Ponce MD   furosemide (LASIX) 20 MG tablet Take 1 tablet by mouth 2 times daily 7/11/17  Yes Wilda Ponce MD   omeprazole (PRILOSEC) 40 MG delayed release capsule TAKE ONE CAPSULE BY MOUTH EVERY NIGHT AT BEDTIME 7/11/17  Yes Wilda Ponce MD   metFORMIN (GLUCOPHAGE) 500 MG tablet TAKE TWO TABLETS BY MOUTH EVERY MORNING AND TAKE ONE TABLET BY MOUTH EVERY EVENING 7/3/17  Yes Wilda Ponce MD   Wound Dressings (ADAPTIC NON-ADHERING DRESSING) PADS Apply 1 Units topically 2 times daily 6/12/17  Yes Albino May MD   diazepam (VALIUM) 5 MG tablet Take 1 tablet by mouth every 6 hours as needed for Anxiety 7/11/17   Wilda Ponce MD   metolazone (ZAROXOLYN) 2.5 MG tablet Take every mon, wed, friday 7/11/17   Wilda Ponce MD   Gauze Pads & Dressings [facility] GAUZE ROLL LARGE) MISC 1 Units by Does not apply route 2 times daily 6/12/17 7/12/17  Albino May MD   Gauze Pads & Dressings (COMBINE ABD) 5\"X9\" PADS 1 Units by Does not apply route 2 times daily 6/12/17   Albino May MD   ONE TOUCH ULTRASOFT LANCETS MISC 1 each by Does not apply route 2 times daily 4/7/17   Wilda Ponce MD   Blood Glucose Monitoring Suppl (ONE TOUCH ULTRA SYSTEM KIT) W/DEVICE KIT 1 kit by Does not apply route 2 times daily 4/7/17   Wilda Ponce MD   glucose blood VI test strips (ONE TOUCH TEST STRIPS) level of the digits, bilaterally. Protective sensation absent via 5.07/10g [place] monofilament. Musculoskeletal: Muscle strength 5/5 for all lower extremity muscle groups, Bilateral.  S/p amputations to left 1, and 2 and partial 3 digit. Dermatologic:    Full thickness ulcer #1 located distal stumps of metatarsal 1 and 2 and sub metatarsal heads 1 and 2  and measures approximately 9xdg6omc9.3cm at its widest margins. Base is mostly granular; base of sub metatarsal 1 is fibrotic. Periwound skin is erythematous and raw. Moderate amount of serosangious drainage noted with associated mal odor. Does not sinus track, or undermine. No fluctuance, crepitus, or induration. Central aspect of wound to sub met 1 probes to bone. Full thickness ulcer#2 located distal stump of third digit measures approximately 1. 9X1.1X 0.2. Base is mostly granular. Periwound skin is erythematous and raw. Moderate amount of serosanguineous drainage noted with so similar. Does not probe to bone, sinus track or undermine. No fluctuance, crepitus or induration noted. Full thickness ulcer#3 located distal stump of fourth digit measures approximately 2X2.2X 0.1. Base is mostly granular. Periwound skin is erythematous and raw. Moderate amount of serosanguineous drainage noted with so similar. Does not probe to bone, sinus track or undermine. No fluctuance, crepitus or induration noted.     Labs:  Lab Results   Component Value Date    WBC 11.6 (H) 01/14/2018    HGB 11.6 (L) 01/14/2018    HCT 36.0 (L) 01/14/2018     01/14/2018    LYMPHOPCT 7 (L) 01/14/2018    MONOPCT 11 (H) 01/14/2018    BASOPCT 1 01/14/2018     Lab Results   Component Value Date     01/15/2018    K 3.5 (L) 01/15/2018     01/15/2018    CO2 29 01/15/2018    BUN 9 01/15/2018    CREATININE 0.59 (L) 01/15/2018    GLUCOSE 100 (H) 01/15/2018    CALCIUM 8.0 (L) 01/15/2018     Imaging:   VL DUP LOWER EXTREMITY VENOUS BILATERAL   Final Result CT Head WO Contrast   Final Result   No acute intracranial abnormality. XR TIBIA FIBULA LEFT (2 VIEWS)   Final Result   Age indeterminate osteochondral defect in the lateral femoral condyle. Diffuse soft tissue swelling. No erosive changes. XR FOOT LEFT (MIN 3 VIEWS)   Final Result   1. Amputation of the 1st, 2nd and 3rd toes in the interval.  Relatively   irregular margins of these amputation sites may be postoperative, however an   acute inflammatory process cannot be excluded in the appropriate clinical   setting, as there is apparent soft tissue over ulceration overlying the great   toe.      2. Osseous irregularity of the distal 4th toe is noted. In the absence of   prior surgery in this region, an underlying acute inflammatory process in   osteomyelitis should be considered. Further evaluation may be warranted with MRI if clinically appropriate. XR CHEST STANDARD (2 VW)   Final Result   Shallow lung inflation. Opacities in the lung bases are noted, favored to   represent atelectasis, however an underlying inflammatory process cannot be   excluded in the appropriate clinical setting. VL Lower Extremity Arterial Segmental Pressures W Ppg    (Results Pending)       ASSESSMENT:     Patient is a 47 y.o. male with diabetic foot ulcerations with associated cellulitis    -DM2 with polyneuropathy   -S/p multiple amputations left and right.   -h/o noncompliance with medical treatment       Patient Active Problem List   Diagnosis    Anxiety disorder    Benign prostatic hyperplasia    Depression    Dermatitis    Edema    Esophageal reflux    Essential hypertension    Hypothyroidism    Prostate cancer screening    Tinea corporis    Type 2 diabetes mellitus (HCC)    Right cervical radiculopathy    Cellulitis of toe of left foot    Cellulitis of left foot    Diabetic neuropathy (HCC)    Osteomyelitis ([address]    Skin ulcer of left foot with fat layer exposed ([address]  Type 2 diabetes mellitus with foot ulcer, without long-term current use of insulin (HCC)    Type 2 diabetes mellitus with diabetic polyneuropathy, without long-term current use of insulin (HCC)    Cellulitis of foot, left    Osteomyelitis of foot, left, acute (HCC)    Non compliance with medical treatment    Cellulitis       PLAN:     Patient examined and evaluated. Diagnosis and treatment options discussed in detail. F/up MRI left foot  F/up PVR/PPG  Local wound care daily with silvercel, 4x4s, abds, kerlix, and ACE bandage. No acute surgical intervention neccessary at this time. IM- medical management   Discussed with Dr. Nestor Gray    Electronically signed by Mike Stephenson DPM  on 1/15/2018 at 6:01 PM    I performed a history and physical examination of the patient and discussed management with the resident. I reviewed the residents note and agree with the documented findings and plan of care. Any areas of disagreement are noted on the chart. I was personally present for the key portions of any procedures. I have documented in the chart those procedures where I was not present during the key portions. I have reviewed the Podiatry Resident progress note. I agree with the chief complaint, past medical history, past surgical history, allergies, medications, social and family history as documented unless otherwise noted below. Documentation of the HPI, Physical Exam and Medical Decision Making performed by medical students or scribes is based on my personal performance of the HPI, PE and MDM. I have personally evaluated this patient and have completed at least one if not all key elements of the E/M (history, physical exam, and MDM). Additional findings are as noted.      Sha Monk DPM on 1/18/2018 at 4:99 AM  Board Certified, American Board of Podiatric Surgery  Fellow, [employer] and [employer]

## 2018-01-15 NOTE — FLOWSHEET NOTE
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  Patient discussed ups and downs of physical issues and resulting emotional ups and certainly downs through this struggle  He states that his alexandra life has kept him from being more down and has sustained him through this  Grateful for all of the spiritual support and of his alexandra community   Prayer and blessing     01/15/18 1399   Encounter Summary   Services provided to: Patient and family together   Referral/Consult From: [address] [place] [address]; Family members; Methodist/alexandra community;Friends/neighbors   Place of [facility] [place]   Continue Visiting (1/15/18)   Complexity of Encounter Moderate   Length of Encounter 30 minutes   Spiritual Assessment Completed Yes   Spiritual/Zoroastrian   Type Spiritual support   Assessment Coping;Doubtful;Concerns with suffering; Approachable   Intervention Active listening;Explored feelings, thoughts, concerns;Explored coping resources; Discussed relationship with God;Discussed illness/injury and it's impact; Discussed meaning/purpose;Sustaining presence/ Ministry of presence;Prayer;Nurtured hope   Outcome Engaged in conversation; Shared reminiscences; Shared life review;Expressed feelings/needs/concerns;Encouraged;Coping;Expressed gratitude;Comfort; Connection/belonging

## 2018-01-15 NOTE — ED PROVIDER NOTES
[facility]  eMERGENCY dEPARTMENT eNCOUnter   Attending Attestation     Pt Name: Agapito Hairston  MRN: [id number]  [place] 1965  Date of evaluation: 1/15/18    History, EXAM, MDM:    Agapito Hairston is a 47 y.o. male who presents with Foot Pain; Altered Mental Status; and Fever    47year-old diabetic presenting with left foot infection. On physical examination patient's tachycardic no tachypnea blood pressures low 100s over 50s. No medical rigidity speech is fluent no headache. Lungs are clear, no erythema induration ulcerative wound to the left foot. Laboratories a show a leukocytosis at 11.6, lactic acid is 1.3    X-ray of the tib-fib shows diffuse soft tissue swelling consistent with a cellulitis  X-ray of the foot show signs of osteomyelitis  Chest x-ray shows no sign of pneumonia. CT head shows no abnormalities. Patient confused, lactic decision making capacity, requesting discharge home and he'll just \"come back in the morning\" but does not understand severity of his illness, has no reasoning for why he wants to leave the hospital.  Wife and mother confirm that the patient is confused and not making sense. We had a CT scan of the head that did not show any acute abnormalities. Admitting for treatment of cellulitis and osteomyelitis. Vitals:   Vitals:    01/14/18 1830 01/14/18 1845 01/14/18 1900 01/14/18 1933   BP: (!) 108/55 (!) 114/52 (!) 106/53 105/60   Pulse: 111 98 105 97   Resp: 17 13 15 18   Temp:    98.6 °F (37 °C)   TempSrc:    Oral   SpO2:  95%  96%   Weight:       Height:         I performed a history and physical examination of the patient and discussed management with the resident. I reviewed the residents note and agree with the documented findings and plan of care. Any areas of disagreement are noted on the chart. I was personally present for the key portions of any procedures.  I have documented in the chart those procedures where I was not present

## 2018-01-15 NOTE — FLOWSHEET NOTE
01/15/18 1215   Encounter Summary   Services provided to: Patient   Referral/Consult From: Rounding   Place of [address] [place] [address] Visiting (1/15/18)   Complexity of Encounter Low   Length of Encounter 15 minutes   Spiritual/Adventism   Type Ritual   Intervention Anointing   Sacraments   Sacrament of Sick-Anointing Anointed  (Fr Davies 1/15/18)

## 2018-01-16 ENCOUNTER — APPOINTMENT (OUTPATIENT)
Dept: MRI IMAGING | Age: 53
DRG: 383 | End: 2018-01-16
Payer: MEDICARE

## 2018-01-16 VITALS
HEIGHT: 74 IN | OXYGEN SATURATION: 96 % | WEIGHT: 230 LBS | DIASTOLIC BLOOD PRESSURE: 56 MMHG | TEMPERATURE: 98.4 F | RESPIRATION RATE: 16 BRPM | SYSTOLIC BLOOD PRESSURE: 102 MMHG | HEART RATE: 71 BPM | BODY MASS INDEX: 29.52 KG/M2

## 2018-01-16 LAB
ABSOLUTE EOS #: 0.2 K/UL (ref 0–0.4)
ABSOLUTE IMMATURE GRANULOCYTE: ABNORMAL K/UL (ref 0–0.3)
ABSOLUTE LYMPH #: 1.4 K/UL (ref 1–4.8)
ABSOLUTE MONO #: 0.9 K/UL (ref 0.1–1.3)
ANION GAP SERPL CALCULATED.3IONS-SCNC: 13 MMOL/L (ref 9–17)
BASOPHILS # BLD: 1 % (ref 0–2)
BASOPHILS ABSOLUTE: 0 K/UL (ref 0–0.2)
BUN BLDV-MCNC: 6 MG/DL (ref 6–20)
BUN/CREAT BLD: ABNORMAL (ref 9–20)
CALCIUM SERPL-MCNC: 8.1 MG/DL (ref 8.6–10.4)
CHLORIDE BLD-SCNC: 96 MMOL/L (ref 98–107)
CO2: 28 MMOL/L (ref 20–31)
CREAT SERPL-MCNC: 0.73 MG/DL (ref 0.7–1.2)
CULTURE: ABNORMAL
DIFFERENTIAL TYPE: ABNORMAL
EOSINOPHILS RELATIVE PERCENT: 3 % (ref 0–4)
GFR AFRICAN AMERICAN: >60 ML/MIN
GFR NON-AFRICAN AMERICAN: >60 ML/MIN
GFR SERPL CREATININE-BSD FRML MDRD: ABNORMAL ML/MIN/{1.73_M2}
GFR SERPL CREATININE-BSD FRML MDRD: ABNORMAL ML/MIN/{1.73_M2}
GLUCOSE BLD-MCNC: 111 MG/DL (ref 70–99)
HCT VFR BLD CALC: 32.4 % (ref 41–53)
HEMOGLOBIN: 10.7 G/DL (ref 13.5–17.5)
IMMATURE GRANULOCYTES: ABNORMAL %
LYMPHOCYTES # BLD: 22 % (ref 24–44)
Lab: ABNORMAL
MCH RBC QN AUTO: 28.3 PG (ref 26–34)
MCHC RBC AUTO-ENTMCNC: 32.9 G/DL (ref 31–37)
MCV RBC AUTO: 86 FL (ref 80–100)
MONOCYTES # BLD: 14 % (ref 1–7)
NRBC AUTOMATED: ABNORMAL PER 100 WBC
ORGANISM: ABNORMAL
PDW BLD-RTO: 15.8 % (ref 11.5–14.9)
PLATELET # BLD: 263 K/UL (ref 150–450)
PLATELET ESTIMATE: ABNORMAL
PMV BLD AUTO: 7.9 FL (ref 6–12)
POTASSIUM SERPL-SCNC: 3.2 MMOL/L (ref 3.7–5.3)
RBC # BLD: 3.77 M/UL (ref 4.5–5.9)
RBC # BLD: ABNORMAL 10*6/UL
SEG NEUTROPHILS: 60 % (ref 36–66)
SEGMENTED NEUTROPHILS ABSOLUTE COUNT: 3.9 K/UL (ref 1.3–9.1)
SODIUM BLD-SCNC: 137 MMOL/L (ref 135–144)
SPECIMEN DESCRIPTION: ABNORMAL
SPECIMEN DESCRIPTION: ABNORMAL
STATUS: ABNORMAL
WBC # BLD: 6.4 K/UL (ref 3.5–11)
WBC # BLD: ABNORMAL 10*3/UL

## 2018-01-16 PROCEDURE — 2580000003 HC RX 258: Performed by: FAMILY MEDICINE

## 2018-01-16 PROCEDURE — 73718 MRI LOWER EXTREMITY W/O DYE: CPT

## 2018-01-16 PROCEDURE — 99238 HOSP IP/OBS DSCHRG MGMT 30/<: CPT | Performed by: FAMILY MEDICINE

## 2018-01-16 PROCEDURE — 85025 COMPLETE CBC W/AUTO DIFF WBC: CPT

## 2018-01-16 PROCEDURE — 6370000000 HC RX 637 (ALT 250 FOR IP): Performed by: FAMILY MEDICINE

## 2018-01-16 PROCEDURE — 36415 COLL VENOUS BLD VENIPUNCTURE: CPT

## 2018-01-16 PROCEDURE — 93923 UPR/LXTR ART STDY 3+ LVLS: CPT

## 2018-01-16 PROCEDURE — 2500000003 HC RX 250 WO HCPCS: Performed by: FAMILY MEDICINE

## 2018-01-16 PROCEDURE — 80048 BASIC METABOLIC PNL TOTAL CA: CPT

## 2018-01-16 PROCEDURE — 6360000002 HC RX W HCPCS: Performed by: FAMILY MEDICINE

## 2018-01-16 RX ORDER — SULFAMETHOXAZOLE AND TRIMETHOPRIM 800; 160 MG/1; MG/1
1 TABLET ORAL 2 TIMES DAILY
Qty: 20 TABLET | Refills: 0 | Status: SHIPPED | OUTPATIENT
Start: 2018-01-16 | End: 2018-02-25

## 2018-01-16 RX ORDER — POTASSIUM BICARBONATE 25 MEQ/1
50 TABLET, EFFERVESCENT ORAL ONCE
Status: COMPLETED | OUTPATIENT
Start: 2018-01-16 | End: 2018-01-16

## 2018-01-16 RX ADMIN — OXYCODONE HYDROCHLORIDE 30 MG: 5 TABLET ORAL at 06:46

## 2018-01-16 RX ADMIN — VANCOMYCIN HYDROCHLORIDE 1500 MG: 10 INJECTION, POWDER, LYOPHILIZED, FOR SOLUTION INTRAVENOUS at 11:08

## 2018-01-16 RX ADMIN — POTASSIUM BICARBONATE 50 MEQ: 25 TABLET, EFFERVESCENT ORAL at 10:06

## 2018-01-16 RX ADMIN — BUPROPION HYDROCHLORIDE 300 MG: 300 TABLET, EXTENDED RELEASE ORAL at 10:06

## 2018-01-16 RX ADMIN — PANTOPRAZOLE SODIUM 40 MG: 40 TABLET, DELAYED RELEASE ORAL at 06:42

## 2018-01-16 RX ADMIN — OXYCODONE HYDROCHLORIDE 30 MG: 5 TABLET ORAL at 12:21

## 2018-01-16 RX ADMIN — MICONAZOLE NITRATE: 20.6 POWDER TOPICAL at 10:21

## 2018-01-16 RX ADMIN — ENOXAPARIN SODIUM 30 MG: 30 INJECTION SUBCUTANEOUS at 10:07

## 2018-01-16 RX ADMIN — OXYCODONE HYDROCHLORIDE 30 MG: 5 TABLET ORAL at 02:46

## 2018-01-16 RX ADMIN — FUROSEMIDE 20 MG: 20 TABLET ORAL at 10:06

## 2018-01-16 RX ADMIN — WATER 2 G: 1 INJECTION INTRAMUSCULAR; INTRAVENOUS; SUBCUTANEOUS at 06:42

## 2018-01-16 RX ADMIN — SODIUM CHLORIDE: 9 INJECTION, SOLUTION INTRAVENOUS at 06:43

## 2018-01-16 RX ADMIN — PAROXETINE HYDROCHLORIDE 20 MG: 20 TABLET, FILM COATED ORAL at 11:08

## 2018-01-16 RX ADMIN — LISINOPRIL 10 MG: 10 TABLET ORAL at 10:05

## 2018-01-16 RX ADMIN — METFORMIN HYDROCHLORIDE 1000 MG: 1000 TABLET ORAL at 10:06

## 2018-01-16 RX ADMIN — Medication 1 CAPSULE: at 10:05

## 2018-01-16 RX ADMIN — TAMSULOSIN HYDROCHLORIDE 0.4 MG: 0.4 CAPSULE ORAL at 10:05

## 2018-01-16 ASSESSMENT — PAIN SCALES - GENERAL
PAINLEVEL_OUTOF10: 9
PAINLEVEL_OUTOF10: 6
PAINLEVEL_OUTOF10: 6
PAINLEVEL_OUTOF10: 8
PAINLEVEL_OUTOF10: 7
PAINLEVEL_OUTOF10: 8

## 2018-01-16 NOTE — DISCHARGE SUMMARY
Discharge Summary    Jesse Sanchez  :  1965  MRN:  [id number]    Admit date:  2018  Discharge date:      Admitting Physician:  Andie Valderrama MD    PCP: Delio Schmidt MD    Discharge Diagnoses:    Patient Active Problem List   Diagnosis    Anxiety disorder    Benign prostatic hyperplasia    Depression    Dermatitis    Edema    Esophageal reflux    Essential hypertension    Hypothyroidism    Prostate cancer screening    Tinea corporis    Type 2 diabetes mellitus ([address]    Right cervical radiculopathy    Cellulitis of toe of left foot    Cellulitis of left foot    Diabetic neuropathy ([address]    Osteomyelitis ([address]    Skin ulcer of left foot with fat layer exposed ([address]    Type 2 diabetes mellitus with foot ulcer, without long-term current use of insulin ([address]    Type 2 diabetes mellitus with diabetic polyneuropathy, without long-term current use of insulin (HCC)    Cellulitis of foot, left    Osteomyelitis of foot, left, acute ([address]    Non compliance with medical treatment    Cellulitis       Discharged Condition:  good    Hospital Course:   Patient admitted for cellulitis left leg. Podiatry consulted. No fever or chills. Treated with iv abx. Told to follow up with wound care.      Discharge Medications:       Sreedharnetta Prince   Home Medication Instructions WAO:[id number]    Printed on:18 0451   Medication Information                      Blood Glucose Monitoring Suppl (ONE TOUCH ULTRA SYSTEM KIT) W/DEVICE KIT  1 kit by Does not apply route 2 times daily             buPROPion (WELLBUTRIN XL) 300 MG extended release tablet  TAKE ONE TABLET BY MOUTH DAILY             ciclopirox (LOPROX) 0.77 % cream  APPLY TO AFFECTED AREA(S) AND RUB  IN A THIN FILM TWO TIMES A DAY (AM AND PM)             diazepam (VALIUM) 5 MG tablet  Take 1 tablet by mouth every 6 hours as needed for Anxiety             furosemide (LASIX) 20 MG tablet  Take 1 tablet by mouth 2 times daily Gauze Pads & Dressings (COMBINE ABD) 5\"X9\" PADS  1 Units by Does not apply route 2 times daily             Gauze Pads & Dressings (KERLIX GAUZE ROLL LARGE) MISC  1 Units by Does not apply route 2 times daily             glucose blood VI test strips (ONE TOUCH TEST STRIPS) strip  1 each by In Vitro route 2 times daily As needed. Lactobacillus (PROBIOTIC ACIDOPHILUS) TABS  Take 1 tablet by mouth daily             lisinopril (PRINIVIL;ZESTRIL) 10 MG tablet  TAKE ONE TABLET BY MOUTH DAILY             metFORMIN (GLUCOPHAGE) 500 MG tablet  TAKE TWO TABLETS BY MOUTH EVERY MORNING AND TAKE ONE TABLET BY MOUTH EVERY EVENING             metolazone (ZAROXOLYN) 2.5 MG tablet  Take every mon, wed, friday             morphine (MS CONTIN) 15 MG extended release tablet  Take 1 tablet by mouth 2 times daily for 30 days. [id number] [id number] UNIT/GM powder  APPLY TO AFFECTED AREA(S) FOUR TIMES A DAY             omeprazole (PRILOSEC) 40 MG delayed release capsule  TAKE ONE CAPSULE BY MOUTH EVERY NIGHT AT BEDTIME             ONE TOUCH ULTRASOFT LANCETS MISC  1 each by Does not apply route 2 times daily             oxyCODONE (OXY-IR) 30 MG immediate release tablet  Take 1 tablet by mouth every 4 hours as needed for Pain for up to 30 days. PARoxetine (PAXIL) 20 MG tablet  TAKE ONE TABLET BY MOUTH FOUR TIMES A DAY             sulfamethoxazole-trimethoprim (BACTRIM DS) 800-160 MG per tablet  Take 1 tablet by mouth 2 times daily             tamsulosin (FLOMAX) 0.4 MG capsule  TAKE ONE CAPSULE BY MOUTH DAILY             tiZANidine (ZANAFLEX) 4 MG tablet  TAKE ONE TABLET BY MOUTH EVERY 6 HOURS AS NEEDED FOR MUSCLE SPASMS             Wound Dressings (ADAPTIC NON-ADHERING DRESSING) PADS  Apply 1 Units topically 2 times daily                 Significant Diagnostic Studies:      Radiology Review:     Other diagnostic test:      Disposition:   Home with home health  Follow up with Vijay Bravo MD in 2

## 2018-01-16 NOTE — CARE COORDINATION
up these medications from [facility] [address] [facility], [facility] [address] [place] [address] [phone] Linda Carroll [phone]    sulfamethoxazole-trimethoprim      Schedule an appointment with [facility] as soon as possible for a visit in 1 week(s)    [place]      Schedule an appointment with Анна Matson MD as soon as possible for a visit in 2 week(s)    [address]   [facility] [address]   [phone]   Read        Read these attachments   1 Cellulitis (English)   2 sulfamethoxazole and trimethoprim (English)   Go     [id number] RETURN  PATIENT 10:15 AM   Katarina Chavez DPM   [address] your After Visit Summary and more online at [email or web address]. After Visit Summary   Instructions        Need Help? After Visit Summary  (Discharge Instructions)     This summary was created for you.     Thank you for entrusting your care to us. The following information includes details about your hospital/visit stay along with steps you should take to help with your recovery once you leave the hospital. Follow-up with your Primary Care Provider when condition worsens. Seek emergency care when necessary. At your follow-up appointment, ask your physician about any tests or studies that were not available at discharge. In this packet, you will find information about the topics listed below:      Instructions about your medications including a list of your home medications   A summary of your hospital visit   Follow-up appointments once you have left the hospital   Your care plan at home        You may receive a survey regarding the care you received during your stay. Your input is valuable to us. We encourage you to complete and return your survey in the envelope provided.    We hope you will choose us in the future for your healthcare needs.        Your medications have changed     START taking:    sulfamethoxazole-trimethoprim 800-160 MG per tablet (BACTRIM DS)   Review your updated medication list below. Care Plan Once You Return Home   Do   Read   Go          Activity instructions     Activity as tolerated       Diet instructions     Good nutrition is important when healing from an illness, injury, or surgery.  Follow any nutrition recommendations given to you during your hospital stay. If you were given an oral nutrition supplement while in the hospital, continue to take this supplement at home.  You can take it with meals, in-between meals, and/or before bedtime. These supplements can be purchased at most local grocery stores, pharmacies, and [employer]. If you have any questions about your diet or nutrition, call the hospital and ask for the dietitian.     General Diet          Your Visit     Here you will find information about your visit, including the reason for your visit. Please take this sheet with you when you visit your doctor or other health care provider in the future. It will help determine the best possible medical care for you at that time. If you have any questions once you leave the hospital, please call the department phone number listed below. Why you were here     Your primary diagnosis was: Cellulitis   Your diagnoses also included: Anxiety Disorder, Depression, Edema, Essential Hypertension, Type 2 Diabetes Mellitus (Hcc), Diabetic Neuropathy (Hcc)   Preventive Care      Date Due   Hepatitis C screening is recommended for all adults regardless of risk factors born between [facility] at least once (lifetime) who have never been tested. 1965   Eye Exam By An Eye Doctor 06/04/1974   Cholesterol Screening 06/04/1974   HIV screening is recommended for all people regardless of risk factors  aged 15-65 years at least once (lifetime) who have never been HIV tested.  06/04/1979   Tetanus Combination Vaccine (1 - Tdap) 06/04/1983   Colonoscopy 06/04/2014   Urine Check For Kidney Problems 01/16/2018   TSH testing 05/16/2018   Hemoglobin A1C (Test For Long-Term Glucose Control) 12/13/2018   Potassium monitoring 01/15/2019   Creatinine monitoring 01/15/2019          Follow Up Information and Future Appointments     Follow Up Information and Future Appointments          Follow up with Sylvia Hernandez DPM   keep scheduled appointment this thursday  Tonja Bartlett [address]   [address]   [phone]           Schedule an appointment with [address] [place] [address] 118 as soon as possible for a visit in 1 week(s)  [address] [address]           Schedule an appointment with Erin Greco MD as soon as possible for a visit in 2 week(s)  [address]   [place] [address]   [phone]    [id number] RETURN  PATIENT with AHSAN Dasilva [facility]   Thursday Jan 18, 2018 10:15 AM  [facility]   [address] N[address]   [phone]    You are allergic to the following     You are allergic to the following   Allergen Reactions   Fiorinal (Butalbital-Aspirin-Caffeine) Other (See Comments)   Generalized blisters       Peanut Butter Flavor (Flavoring Agent) Nausea And Vomiting   Your Latest Vitals          Blood Pressure 102/56              BMI 29.53              Weight 230 lb              Height 6' 2\"              Temperature (Oral) 98.4 °F              Pulse 71              Respiration 16              Oxygen Saturation 96%              BSA 2.33 m²               Daily Medication List (This medication list can be shared with any healthcare provider who is helping you manage your medications)     There are NEW medications for you. START taking them after you leave the hospital     There are NEW medications for you.  START taking them after you leave the hospital    Next Dose Due AM NOON PM NIGHT   sulfamethoxazole-trimethoprim 800-160 MG per tablet   Commonly known as: BACTRIM ULTRA SYSTEM KIT w/Device Kit   1 kit by Does not apply route 2 times daily         ONE TOUCH ULTRASOFT LANCETS Misc   1 each by Does not apply route 2 times daily         oxyCODONE 30 MG immediate release tablet   Commonly known as: OXY-IR   Take 1 tablet by mouth every 4 hours as needed for Pain for up to 30 days. PARoxetine 20 MG tablet   Commonly known as: PAXIL   TAKE ONE TABLET BY MOUTH FOUR TIMES A DAY  Continue home regimen        PROBIOTIC ACIDOPHILUS Tabs   Take 1 tablet by mouth daily  9am        tamsulosin 0.4 MG capsule   Commonly known as: FLOMAX   TAKE ONE CAPSULE BY MOUTH DAILY  9am        tiZANidine 4 MG tablet   Commonly known as: ZANAFLEX   TAKE ONE TABLET BY MOUTH EVERY 6 HOURS AS NEEDED FOR MUSCLE SPASMS         * This list has 2 medication(s) that are the same as other medications prescribed for you. Read the directions carefully, and ask your doctor or other care provider to review them with you.          Where to  your medications         these medications at [place] [phone] -  [phone]      sulfamethoxazole-trimethoprim        Address: [place] [address]   Phone: [phone]         Maury Palmer BTN#[id number] ([de-identified])  (:1965 47 y.o. M)   PCP: Gaby Calderón ([phone]) [facility]     The information on all pages of the After Visit Summary has been reviewed with me, the patient and/or responsible adult, by my health care provider(s). I had the opportunity to ask questions regarding this information. I understand I should dispose of my armband safely at home to protect my health information.  A complete copy of the After Visit Summary has been given to me, the patient and/or responsible adult.     Patient Signature/Responsible Adult:____________________     Clinician with silvercel, 4x4s, abds, kerlix, and ACE bandage     Patient's personal belongings (please select all that are sent with patient):  Glasses     RN SIGNATURE:  Electronically signed by Stewart Stiles RN on 1/16/18 at 3:56 PM     CASE MANAGEMENT/SOCIAL WORK SECTION     Inpatient Status Date: inpt     [facility] Readmission Risk Assessment Score:  Risk Score: 19.75   (Score > 14= high risk for readmission)     Discharging to Facility/ Agency   [address] home health care  Phone [phone]  Fax [phone]     Dialysis Facility (if applicable)   ? Name:  ? Address:  ? Dialysis Schedule:  ?  Phone:  ? Fax:     / signature: Electronically signed by Esther Szymanski RN on 1/16/18 at 9:44 AM     PHYSICIAN SECTION     Prognosis: Good     Condition at Discharge: Stable     Rehab Potential (if transferring to Rehab): Good     Recommended Labs or Other Treatments After Discharge:      Physician Certification: I certify the above information and transfer of [place]  is necessary for the continuing treatment of the diagnosis listed and that he requires Home Care for less 30 days.      Update Admission H&P: No change in H&P     PHYSICIAN SIGNATURE: Dr. Joanie Garcia Electronically signed by Alvaro Sharp RN on 1/16/18 at 3:59 PM

## 2018-01-16 NOTE — PLAN OF CARE
Problem: Falls - Risk of  Goal: Absence of falls  Outcome: Ongoing  No falls this shift. No falls during this shift.  Call light within reach, wheels locked, bed in lowest position, side rails 2/4    Problem: Pain:  Goal: Control of acute pain  Control of acute pain   Outcome: Ongoing  See MAR for medication administration

## 2018-01-18 ENCOUNTER — HOSPITAL ENCOUNTER (OUTPATIENT)
Dept: WOUND CARE | Age: 53
Discharge: HOME | End: 2018-01-18
Payer: MEDICARE

## 2018-01-18 VITALS — RESPIRATION RATE: 18 BRPM | SYSTOLIC BLOOD PRESSURE: 114 MMHG | TEMPERATURE: 97.3 F | DIASTOLIC BLOOD PRESSURE: 66 MMHG

## 2018-01-18 DIAGNOSIS — E11.42 TYPE 2 DIABETES MELLITUS WITH DIABETIC POLYNEUROPATHY, WITHOUT LONG-TERM CURRENT USE OF INSULIN (HCC): ICD-10-CM

## 2018-01-18 DIAGNOSIS — M86.172 OSTEOMYELITIS OF FOOT, LEFT, ACUTE (HCC): ICD-10-CM

## 2018-01-18 DIAGNOSIS — L97.509 TYPE 2 DIABETES MELLITUS WITH FOOT ULCER, WITHOUT LONG-TERM CURRENT USE OF INSULIN (HCC): ICD-10-CM

## 2018-01-18 DIAGNOSIS — L97.524 SKIN ULCER OF LEFT FOOT WITH NECROSIS OF BONE (HCC): Primary | ICD-10-CM

## 2018-01-18 DIAGNOSIS — L03.116 CELLULITIS OF FOOT, LEFT: ICD-10-CM

## 2018-01-18 DIAGNOSIS — E11.621 TYPE 2 DIABETES MELLITUS WITH FOOT ULCER, WITHOUT LONG-TERM CURRENT USE OF INSULIN (HCC): ICD-10-CM

## 2018-01-18 PROCEDURE — 6370000000 HC RX 637 (ALT 250 FOR IP): Performed by: PODIATRIST

## 2018-01-18 PROCEDURE — 11044 DBRDMT BONE 1ST 20 SQ CM/<: CPT

## 2018-01-18 PROCEDURE — 11044 DBRDMT BONE 1ST 20 SQ CM/<: CPT | Performed by: PODIATRIST

## 2018-01-18 RX ADMIN — LIDOCAINE HYDROCHLORIDE: 20 JELLY TOPICAL at 10:37

## 2018-01-18 ASSESSMENT — PAIN DESCRIPTION - DESCRIPTORS: DESCRIPTORS: SHARP;THROBBING;CONSTANT

## 2018-01-18 ASSESSMENT — PAIN SCALES - GENERAL: PAINLEVEL_OUTOF10: 8

## 2018-01-18 ASSESSMENT — PAIN DESCRIPTION - ORIENTATION: ORIENTATION: LEFT

## 2018-01-18 ASSESSMENT — PAIN DESCRIPTION - ONSET: ONSET: ON-GOING

## 2018-01-18 ASSESSMENT — PAIN DESCRIPTION - PROGRESSION: CLINICAL_PROGRESSION: GRADUALLY WORSENING

## 2018-01-18 ASSESSMENT — PAIN DESCRIPTION - FREQUENCY: FREQUENCY: CONTINUOUS

## 2018-01-18 NOTE — PLAN OF CARE
Problem: Falls - Risk of  Goal: Absence of falls  Outcome: Met This Shift  Safety maintained.  No falls this visit

## 2018-01-18 NOTE — PROGRESS NOTES
tablet TAKE TWO TABLETS BY MOUTH EVERY MORNING AND TAKE ONE TABLET BY MOUTH EVERY EVENING 90 tablet 11    Gauze Pads & Dressings (KERLIX GAUZE ROLL LARGE) MISC 1 Units by Does not apply route 2 times daily 60 each 5    Wound Dressings (ADAPTIC NON-ADHERING DRESSING) PADS Apply 1 Units topically 2 times daily 60 each 5    Gauze Pads & Dressings (COMBINE ABD) 5\"X9\" PADS 1 Units by Does not apply route 2 times daily 60 each 5    ONE TOUCH ULTRASOFT LANCETS MISC 1 each by Does not apply route 2 times daily 100 each 3    Blood Glucose Monitoring Suppl (ONE TOUCH ULTRA SYSTEM KIT) W/DEVICE KIT 1 kit by Does not apply route 2 times daily 1 kit 0    glucose blood VI test strips (ONE TOUCH TEST STRIPS) strip 1 each by In Vitro route 2 times daily As needed. 100 each 3     No current facility-administered medications on file prior to encounter. ALLERGIES    Allergies   Allergen Reactions    Fiorinal [Butalbital-Aspirin-Caffeine] Other (See Comments)     Generalized blisters    Peanut Butter Flavor [Flavoring Agent] Nausea And Vomiting       PAST SURGICAL HISTORY    Past Surgical History:   Procedure Laterality Date    APPENDECTOMY      BACK SURGERY      Lower x 4. Had abscess after appendectomy.  KNEE SURGERY Right     scope    TOE AMPUTATION Bilateral 3/6/2017    TOE AMPUTATION LEFT HALLUX AND 2ND DIGIT AND RIGHT 2ND DIGIT performed by Rebecca Guillaume DPM at [place] [address]    family history includes Cancer in his maternal grandfather; Diabetes in his father; No Known Problems in his mother. SOCIAL HISTORY    Social History   Substance Use Topics    Smoking status: Never Smoker    Smokeless tobacco: Never Used    Alcohol use No       Review of Systems denies F/C/N/V.     Objective:      /66   Temp 97.3 °F (36.3 °C) (Tympanic)   Resp 18      General Appearance: alert and oriented to person, place and time, well-developed and well-nourished, in no acute distress    Wound Care Documentation:  Wound 03/01/17 Wound #5 right 5th toe, gradually appeared, 2/15/17 (Active)   Wound Cleansed Rinsed/Irrigated with saline 3/1/2017 11:12 AM   Wound Length (cm) 0.8 cm 3/1/2017 12:04 PM   Wound Width (cm) 0.5 cm 3/1/2017 12:04 PM   Wound Depth (cm)  0.1 3/1/2017 12:04 PM   Calculated Wound Size (cm^2) (l*w) 0.4 cm^2 3/1/2017 12:04 PM   Change in Wound Size % (l*w) 0 3/1/2017 12:04 PM   Drainage Amount Moderate 3/1/2017 11:12 AM   Drainage Description Serosanguinous 3/1/2017 11:12 AM   Mickie-wound Assessment Dark edges;Edema;Fragile 3/1/2017 11:12 AM   Red%Wound Bed 100 3/1/2017 11:12 AM   Debridement per physician Subcutaneous 3/1/2017 12:04 PM   Time out Yes 3/1/2017 12:04 PM   Procedural Pain 0 3/1/2017 12:04 PM   Post procedural Pain 0 3/1/2017 12:04 PM   Number of days: 322       Wound 03/01/17 Wound #6 left 3rd toe, gradually appeared, 2/15/17 (Active)   Wound Cleansed Rinsed/Irrigated with saline 3/1/2017 11:12 AM   Wound Length (cm) 0.9 cm 3/1/2017 12:04 PM   Wound Width (cm) 1 cm 3/1/2017 12:04 PM   Wound Depth (cm)  0.1 3/1/2017 12:04 PM   Calculated Wound Size (cm^2) (l*w) 0.9 cm^2 3/1/2017 12:04 PM   Change in Wound Size % (l*w) 0 3/1/2017 12:04 PM   Drainage Amount Moderate 3/1/2017 11:12 AM   Drainage Description Serosanguinous 3/1/2017 11:12 AM   Mickie-wound Assessment Dark edges;Edema;Fragile 3/1/2017 11:12 AM   Red%Wound Bed 50 3/1/2017 11:12 AM   Yellow%Wound Bed 50 3/1/2017 11:12 AM   Debridement per physician Muscle 3/1/2017 12:04 PM   Time out Yes 3/1/2017 12:04 PM   Procedural Pain 0 3/1/2017 12:04 PM   Post procedural Pain 0 3/1/2017 12:04 PM   Number of days: 322       Wound 07/20/17 wound # 7  left foot amp site 7 months (Active)   Wound Image   1/18/2018 10:28 AM   Wound Type Wound 1/18/2018 10:28 AM   Dressing Status Old drainage 1/18/2018 10:28 AM   Dressing Changed Changed/New 1/18/2018 10:28 AM   Dressing/Treatment Ace Wrap;Vaseline gauze 1/16/2018 without long-term current use of insulin (HCC)      Diabetic foot wounds left foot. S/p multiple amputations left and right. Plan:   Amari Julian Age:  47 y.o. Gender:  male   :  1965  Today's Date:  2018    Pt was evaluated and examined. Patient was given personalized discharge instructions. Pt will follow up in 1 weeks or sooner if any problems arise. Zuleyka Solares fitted pt for [employer] and made modifications as necessary. Diagnosis was discussed with the pt and all of their questions were answered in detail. Proper foot hygiene and care was discussed with the pt. Patient to check feet daily and contact the office with any questions/problems/concerns. Other comorbidity noted and will be managed by PCP. Procedure: With no anesthesia, performed sharp, excisional debridement to Left foot down to and including the level of bone using a dermal curette and tissue nippers. Hemostasis obtained with direct pressure. Pt related no pain post-debridement. Plan:        Cleanse bilateral feet with liquid antibacterial soap and water, rinse well      Dressing Orders: Silvercel to Left foot wound cover with Silvercel, gauze and Kerlix- wear dressing at [address] Change dressings BID due to strike through      Increase protein to diet (meat, cheese, eggs, fish, peanut butter, nuts and beans). Multivitamin daily. Zuleyka Solares dropped off CROW boot in wound care clinic today. Patient to return to wound care in 1 week. Electronically signed by Tess Farrar DPM on 2018 at 11:10 AM    I performed a history and physical examination of the patient and discussed management with the resident. I reviewed the residents note and agree with the documented findings and plan of care. Any areas of disagreement are noted on the chart. I was personally present for the key portions of any procedures. I have documented in the chart those procedures where I was not present during the key portions.

## 2018-01-18 NOTE — PLAN OF CARE
Problem: Wound:  Goal: Will show signs of wound healing; wound closure and no evidence of infection  Will show signs of wound healing; wound closure and no evidence of infection   Outcome: Ongoing  Wound stable. Silvercel and dry dressing to wound. Pt to wear crow boot.

## 2018-01-19 NOTE — PROGRESS NOTES
Date Patient Discharged:01/16/18      Today's Date:01/19/18      Spoke with: 3 times no ans    Do you understand the purpose of your medications?:      Do you understand the side effects of your new medications?:      Would you like to speak with a pharmacist about any of your medications or the side effects?:      Were you able to get your prescriptions filled?:      Did you understand your discharge instructions and what you are responsible for in managing your health after discharge?:      While you were here, was your call light answered promptly?:      Was the area around your room kept quiet at night?:      Were your room and bathroom kept clean?:
Dr. Yoandy Scanlon office has been notfied of new patient consult.
present for the key portions of any procedures. I have documented in the chart those procedures where I was not present during the key portions. I have reviewed the Podiatry Resident progress note. I agree with the chief complaint, past medical history, past surgical history, allergies, medications, social and family history as documented unless otherwise noted below. Documentation of the HPI, Physical Exam and Medical Decision Making performed by medical students or scribes is based on my personal performance of the HPI, PE and MDM. I have personally evaluated this patient and have completed at least one if not all key elements of the E/M (history, physical exam, and MDM). Additional findings are as noted.      Ana Ramirez DPM on 1/18/2018 at 2:98 AM  Board Certified, American Board of Podiatric Surgery  Fellow, [employer] [facility] [employer] [facility]

## 2018-01-20 LAB
CULTURE: NORMAL
CULTURE: NORMAL
Lab: NORMAL
SPECIMEN DESCRIPTION: NORMAL
SPECIMEN DESCRIPTION: NORMAL
STATUS: NORMAL

## 2018-01-23 NOTE — H&P
Pt admitted for worsening cellulitis of legs. H ad P service failed to do H and P. Please see first progress note. MEDS: see list  ROS: non contributory  PE:  Significant for erythema to lower extremities with swelling.    IMP: cellulitis legs  PLAN: IV antibiotic and vascular eval.

## 2018-01-25 ENCOUNTER — HOSPITAL ENCOUNTER (OUTPATIENT)
Dept: WOUND CARE | Age: 53
Discharge: HOME | End: 2018-01-25
Payer: MEDICARE

## 2018-02-08 ENCOUNTER — HOSPITAL ENCOUNTER (OUTPATIENT)
Dept: WOUND CARE | Age: 53
Discharge: HOME | DRG: 314 | End: 2018-02-08
Payer: MEDICARE

## 2018-02-08 VITALS
SYSTOLIC BLOOD PRESSURE: 86 MMHG | TEMPERATURE: 96.1 F | DIASTOLIC BLOOD PRESSURE: 44 MMHG | HEIGHT: 74 IN | WEIGHT: 214 LBS | BODY MASS INDEX: 27.46 KG/M2 | RESPIRATION RATE: 18 BRPM

## 2018-02-08 DIAGNOSIS — E11.621 TYPE 2 DIABETES MELLITUS WITH FOOT ULCER, WITHOUT LONG-TERM CURRENT USE OF INSULIN (HCC): ICD-10-CM

## 2018-02-08 DIAGNOSIS — L97.509 TYPE 2 DIABETES MELLITUS WITH FOOT ULCER, WITHOUT LONG-TERM CURRENT USE OF INSULIN (HCC): ICD-10-CM

## 2018-02-08 DIAGNOSIS — M86.172 OSTEOMYELITIS OF FOOT, LEFT, ACUTE (HCC): ICD-10-CM

## 2018-02-08 DIAGNOSIS — E11.42 TYPE 2 DIABETES MELLITUS WITH DIABETIC POLYNEUROPATHY, WITHOUT LONG-TERM CURRENT USE OF INSULIN (HCC): ICD-10-CM

## 2018-02-08 DIAGNOSIS — L97.524 SKIN ULCER OF LEFT FOOT WITH NECROSIS OF BONE (HCC): Primary | ICD-10-CM

## 2018-02-08 PROCEDURE — 6370000000 HC RX 637 (ALT 250 FOR IP): Performed by: PODIATRIST

## 2018-02-08 PROCEDURE — 11042 DBRDMT SUBQ TIS 1ST 20SQCM/<: CPT | Performed by: PODIATRIST

## 2018-02-08 PROCEDURE — 0JBR0ZZ EXCISION OF LEFT FOOT SUBCUTANEOUS TISSUE AND FASCIA, OPEN APPROACH: ICD-10-PCS | Performed by: PODIATRIST

## 2018-02-08 PROCEDURE — 11045 DBRDMT SUBQ TISS EACH ADDL: CPT | Performed by: PODIATRIST

## 2018-02-08 PROCEDURE — 0QBP0ZZ EXCISION OF LEFT METATARSAL, OPEN APPROACH: ICD-10-PCS | Performed by: PODIATRIST

## 2018-02-08 PROCEDURE — 11045 DBRDMT SUBQ TISS EACH ADDL: CPT

## 2018-02-08 PROCEDURE — 11042 DBRDMT SUBQ TIS 1ST 20SQCM/<: CPT

## 2018-02-08 RX ORDER — LIDOCAINE HYDROCHLORIDE 40 MG/ML
SOLUTION TOPICAL ONCE
Status: COMPLETED | OUTPATIENT
Start: 2018-02-08 | End: 2018-02-08

## 2018-02-08 RX ADMIN — LIDOCAINE HYDROCHLORIDE: 40 SOLUTION TOPICAL at 10:31

## 2018-02-08 ASSESSMENT — PAIN DESCRIPTION - LOCATION: LOCATION: LEG

## 2018-02-08 ASSESSMENT — PAIN SCALES - GENERAL: PAINLEVEL_OUTOF10: 8

## 2018-02-08 ASSESSMENT — PAIN DESCRIPTION - PAIN TYPE: TYPE: CHRONIC PAIN

## 2018-02-08 ASSESSMENT — PAIN DESCRIPTION - FREQUENCY: FREQUENCY: CONTINUOUS

## 2018-02-08 ASSESSMENT — PAIN DESCRIPTION - PROGRESSION: CLINICAL_PROGRESSION: GRADUALLY WORSENING

## 2018-02-08 ASSESSMENT — PAIN DESCRIPTION - ORIENTATION: ORIENTATION: LEFT

## 2018-02-08 ASSESSMENT — PAIN DESCRIPTION - ONSET: ONSET: ON-GOING

## 2018-02-08 ASSESSMENT — PAIN DESCRIPTION - DESCRIPTORS: DESCRIPTORS: ACHING

## 2018-02-08 NOTE — PROGRESS NOTES
[address]  Return Patient History and Physical      Yoko Rodas  AGE: 47 y.o. GENDER: male  : 1965  TODAY'S DATE:  2018    Chief Complaint:   Chief Complaint   Patient presents with    Wound Check     left foot         History of the Present Illness       Yoko Rodas is a 47 y.o. male who presents today for evaluation and treatment for a diabetic wound which is located on the left    History of Wound: He has the [address] has not been wearing it as he feels that it is too high. He did not call [employer]. His wife has bar doing the dressings. He has slippers on today  Wound Type:diabetic  Wound Location:left foot  Modifying factors:diabetes, poor glucose control, chronic pressure, decreased mobility, shear force, obesity, decreased tissue oxygenation, poor hygiene and non-adherence    Pain Level: 8   Pain Assessment: 0-10     Abx :Yes   Cultures :wereobtained  Pain : 6-8/10    PAST MEDICAL HISTORY        Diagnosis Date    Anxiety     Arthritis     lower back, knees    BPH (benign prostatic hyperplasia)     Chronic kidney disease     prostate    Depression     Diabetes mellitus ([facility] [address]     Diabetic neuropathy (HCC)     GERD (gastroesophageal reflux disease)     Hyperglycemia     Hypertension     Hypothyroidism     Impacted tooth     Pneumonia        MEDICATIONS    Current Outpatient Prescriptions on File Prior to Encounter   Medication Sig Dispense Refill    diazepam (VALIUM) 5 MG tablet TAKE ONE TABLET BY MOUTH EVERY 6 HOURS AS NEEDED FOR ANXIETY 120 tablet 1    oxyCODONE (OXY-IR) 30 MG immediate release tablet Take 1 tablet by mouth every 4 hours as needed for Pain for up to 30 days. 150 tablet 0    morphine (MS CONTIN) 15 MG extended release tablet Take 1 tablet by mouth 2 times daily for 30 days.  60 tablet 0    sulfamethoxazole-trimethoprim (BACTRIM DS) 800-160 MG per tablet Take 1 tablet by mouth 2 times daily 20 tablet 0    PARoxetine (PAXIL) 20 MG tablet TAKE ONE TABLET BY MOUTH FOUR TIMES A  tablet 4    ciclopirox (LOPROX) 0.77 % cream APPLY TO AFFECTED AREA(S) AND RUB  IN A THIN FILM TWO TIMES A DAY (AM AND PM) 90 g 2    NYAMYC 329017 UNIT/GM powder APPLY TO AFFECTED AREA(S) FOUR TIMES A DAY 30 g 2    Lactobacillus (PROBIOTIC ACIDOPHILUS) TABS Take 1 tablet by mouth daily 30 tablet 3    diazepam (VALIUM) 5 MG tablet Take 1 tablet by mouth every 6 hours as needed for Anxiety 120 tablet 2    metolazone (ZAROXOLYN) 2.5 MG tablet Take every mon, wed, friday 15 tablet 5    tiZANidine (ZANAFLEX) 4 MG tablet TAKE ONE TABLET BY MOUTH EVERY 6 HOURS AS NEEDED FOR MUSCLE SPASMS 120 tablet 2    lisinopril (PRINIVIL;ZESTRIL) 10 MG tablet TAKE ONE TABLET BY MOUTH DAILY 30 tablet 11    buPROPion (WELLBUTRIN XL) 300 MG extended release tablet TAKE ONE TABLET BY MOUTH DAILY 30 tablet 11    tamsulosin (FLOMAX) 0.4 MG capsule TAKE ONE CAPSULE BY MOUTH DAILY 30 capsule 11    furosemide (LASIX) 20 MG tablet Take 1 tablet by mouth 2 times daily 60 tablet 11    omeprazole (PRILOSEC) 40 MG delayed release capsule TAKE ONE CAPSULE BY MOUTH EVERY NIGHT AT BEDTIME 30 capsule 11    metFORMIN (GLUCOPHAGE) 500 MG tablet TAKE TWO TABLETS BY MOUTH EVERY MORNING AND TAKE ONE TABLET BY MOUTH EVERY EVENING 90 tablet 11    Gauze Pads & Dressings (KERLIX GAUZE ROLL LARGE) MISC 1 Units by Does not apply route 2 times daily 60 each 5    Wound Dressings (ADAPTIC NON-ADHERING DRESSING) PADS Apply 1 Units topically 2 times daily 60 each 5    Gauze Pads & Dressings (COMBINE ABD) 5\"X9\" PADS 1 Units by Does not apply route 2 times daily 60 each 5    ONE TOUCH ULTRASOFT LANCETS MISC 1 each by Does not apply route 2 times daily 100 each 3    Blood Glucose Monitoring Suppl (ONE TOUCH ULTRA SYSTEM KIT) W/DEVICE KIT 1 kit by Does not apply route 2 times daily 1 kit 0    glucose blood VI test strips (ONE TOUCH TEST STRIPS) strip 1 each by In Vitro route 2 times daily As needed.  [address] Mickie-wound Assessment Dark edges;Edema;Fragile 3/1/2017 11:12 AM   Red%Wound Bed 100 3/1/2017 11:12 AM   Debridement per physician Subcutaneous 3/1/2017 12:04 PM   Time out Yes 3/1/2017 12:04 PM   Procedural Pain 0 3/1/2017 12:04 PM   Post procedural Pain 0 3/1/2017 12:04 PM   Number of days: 343       Wound 03/01/17 Wound #6 left 3rd toe, gradually appeared, 2/15/17 (Active)   Wound Cleansed Rinsed/Irrigated with saline 3/1/2017 11:12 AM   Wound Length (cm) 0.9 cm 3/1/2017 12:04 PM   Wound Width (cm) 1 cm 3/1/2017 12:04 PM   Wound Depth (cm)  0.1 3/1/2017 12:04 PM   Calculated Wound Size (cm^2) (l*w) 0.9 cm^2 3/1/2017 12:04 PM   Change in Wound Size % (l*w) 0 3/1/2017 12:04 PM   Drainage Amount Moderate 3/1/2017 11:12 AM   Drainage Description Serosanguinous 3/1/2017 11:12 AM   Mickie-wound Assessment Dark edges;Edema;Fragile 3/1/2017 11:12 AM   Red%Wound Bed 50 3/1/2017 11:12 AM   Yellow%Wound Bed 50 3/1/2017 11:12 AM   Debridement per physician Muscle 3/1/2017 12:04 PM   Time out Yes 3/1/2017 12:04 PM   Procedural Pain 0 3/1/2017 12:04 PM   Post procedural Pain 0 3/1/2017 12:04 PM   Number of days: 343       Wound 07/20/17 wound # 7  left foot amp site 7 months (Active)   Wound Image   1/18/2018 10:28 AM   Wound Type Wound 2/8/2018 10:22 AM   Dressing Status Old drainage 2/8/2018 10:22 AM   Dressing Changed Changed/New 2/8/2018 10:22 AM   Dressing/Treatment Ace Wrap;Vaseline gauze 1/16/2018 11:24 AM   Wound Cleansed Rinsed/Irrigated with saline 2/8/2018 10:22 AM   Wound Length (cm) 8 cm 2/8/2018 10:45 AM   Wound Width (cm) 8.2 cm 2/8/2018 10:45 AM   Wound Depth (cm)  0.4 2/8/2018 10:45 AM   Calculated Wound Size (cm^2) (l*w) 65.6 cm^2 2/8/2018 10:45 AM   Change in Wound Size % (l*w) -17.04 2/8/2018 10:45 AM   Wound Assessment Clean;Granulation tissue; Hyper granulation tissue 2/8/2018 10:22 AM   Drainage Amount Moderate 2/8/2018 10:22 AM   Drainage Description Serosanguinous 2/8/2018 10:22 AM   Odor None 2/8/2018 10:22 AM   Margins Defined edges 12/21/2017 10:18 AM   Exposed structure Bone 2/8/2018 10:22 AM   Mickie-wound Assessment Calloused 2/8/2018 10:22 AM   [place]%Wound Bed 50 1/18/2018 10:28 AM   Red%Wound Bed 80 2/8/2018 10:22 AM   Yellow%Wound Bed 10 2/8/2018 10:22 AM   Black%Wound Bed 10 2/8/2018 10:22 AM   Debridement per physician Bone 1/18/2018 11:06 AM   Time out Yes 1/18/2018 11:06 AM   Procedural Pain 0 1/18/2018 11:06 AM   Post procedural Pain 0 1/18/2018 11:06 AM   Number of days: 203       Patient tolerated procedure well and was given proper instruction. Post debridement wound description:  clean  Post debridement Wound Location:left foot    Plan for wound  Dress per physician order  Treatment: silvercel, dratex daily,   CROW boot at all times  Will try to get an even-up for the right side     1. Discussed appropriate home care of this wound. 2. Dressings: No orders of the defined types were placed in this encounter. 3. Follow up: 2 weeks. 4. Detailed home instructions and education material given to patient prior to discharge.      Electronically signed by Iván Trejo DPM on 2/8/2018 at 10:48 AM

## 2018-02-10 ENCOUNTER — HOSPITAL ENCOUNTER (INPATIENT)
Age: 53
LOS: 4 days | Discharge: HOME | DRG: 314 | End: 2018-02-14
Attending: EMERGENCY MEDICINE | Admitting: FAMILY MEDICINE
Payer: MEDICARE

## 2018-02-10 ENCOUNTER — APPOINTMENT (OUTPATIENT)
Dept: GENERAL RADIOLOGY | Age: 53
DRG: 314 | End: 2018-02-10
Payer: MEDICARE

## 2018-02-10 ENCOUNTER — APPOINTMENT (OUTPATIENT)
Dept: ULTRASOUND IMAGING | Age: 53
DRG: 314 | End: 2018-02-10
Payer: MEDICARE

## 2018-02-10 DIAGNOSIS — E11.49 OTHER DIABETIC NEUROLOGICAL COMPLICATION ASSOCIATED WITH TYPE 2 DIABETES MELLITUS (HCC): ICD-10-CM

## 2018-02-10 DIAGNOSIS — J18.9 PNEUMONIA DUE TO ORGANISM: ICD-10-CM

## 2018-02-10 DIAGNOSIS — N17.9 ACUTE RENAL FAILURE, UNSPECIFIED ACUTE RENAL FAILURE TYPE (HCC): Primary | ICD-10-CM

## 2018-02-10 DIAGNOSIS — M62.82 NON-TRAUMATIC RHABDOMYOLYSIS: ICD-10-CM

## 2018-02-10 DIAGNOSIS — M86.672 OTHER CHRONIC OSTEOMYELITIS OF LEFT FOOT (HCC): ICD-10-CM

## 2018-02-10 LAB
ABSOLUTE EOS #: 0 K/UL (ref 0–0.4)
ABSOLUTE IMMATURE GRANULOCYTE: ABNORMAL K/UL (ref 0–0.3)
ABSOLUTE LYMPH #: 0.8 K/UL (ref 1–4.8)
ABSOLUTE MONO #: 1.4 K/UL (ref 0.1–1.3)
ANION GAP SERPL CALCULATED.3IONS-SCNC: 20 MMOL/L (ref 9–17)
BASOPHILS # BLD: 0 % (ref 0–2)
BASOPHILS ABSOLUTE: 0 K/UL (ref 0–0.2)
BNP INTERPRETATION: ABNORMAL
BUN BLDV-MCNC: 60 MG/DL (ref 6–20)
BUN/CREAT BLD: ABNORMAL (ref 9–20)
CALCIUM SERPL-MCNC: 8.6 MG/DL (ref 8.6–10.4)
CHLORIDE BLD-SCNC: 91 MMOL/L (ref 98–107)
CO2: 21 MMOL/L (ref 20–31)
CREAT SERPL-MCNC: 8.57 MG/DL (ref 0.7–1.2)
DIFFERENTIAL TYPE: ABNORMAL
DIRECT EXAM: NORMAL
EKG ATRIAL RATE: 92 BPM
EKG P AXIS: 23 DEGREES
EKG P-R INTERVAL: 160 MS
EKG Q-T INTERVAL: 344 MS
EKG QRS DURATION: 80 MS
EKG QTC CALCULATION (BAZETT): 425 MS
EKG R AXIS: 58 DEGREES
EKG T AXIS: 40 DEGREES
EKG VENTRICULAR RATE: 92 BPM
EOSINOPHILS RELATIVE PERCENT: 0 % (ref 0–4)
GFR AFRICAN AMERICAN: 8 ML/MIN
GFR NON-AFRICAN AMERICAN: 7 ML/MIN
GFR SERPL CREATININE-BSD FRML MDRD: ABNORMAL ML/MIN/{1.73_M2}
GFR SERPL CREATININE-BSD FRML MDRD: ABNORMAL ML/MIN/{1.73_M2}
GLUCOSE BLD-MCNC: 146 MG/DL (ref 70–99)
HCT VFR BLD CALC: 33.1 % (ref 41–53)
HEMOGLOBIN: 10.9 G/DL (ref 13.5–17.5)
IMMATURE GRANULOCYTES: ABNORMAL %
LYMPHOCYTES # BLD: 6 % (ref 24–44)
Lab: NORMAL
MCH RBC QN AUTO: 28.3 PG (ref 26–34)
MCHC RBC AUTO-ENTMCNC: 32.9 G/DL (ref 31–37)
MCV RBC AUTO: 86.1 FL (ref 80–100)
MONOCYTES # BLD: 11 % (ref 1–7)
MYOGLOBIN: 4357 NG/ML (ref 28–72)
NRBC AUTOMATED: ABNORMAL PER 100 WBC
PDW BLD-RTO: 16.4 % (ref 11.5–14.9)
PLATELET # BLD: 260 K/UL (ref 150–450)
PLATELET ESTIMATE: ABNORMAL
PMV BLD AUTO: 7.5 FL (ref 6–12)
POTASSIUM SERPL-SCNC: 4.9 MMOL/L (ref 3.7–5.3)
PRO-BNP: 599 PG/ML
RBC # BLD: 3.84 M/UL (ref 4.5–5.9)
RBC # BLD: ABNORMAL 10*6/UL
SEG NEUTROPHILS: 83 % (ref 36–66)
SEGMENTED NEUTROPHILS ABSOLUTE COUNT: 10.3 K/UL (ref 1.3–9.1)
SODIUM BLD-SCNC: 132 MMOL/L (ref 135–144)
SPECIMEN DESCRIPTION: NORMAL
SPECIMEN DESCRIPTION: NORMAL
STATUS: NORMAL
TOTAL CK: 5513 U/L (ref 39–308)
TROPONIN INTERP: ABNORMAL
TROPONIN INTERP: ABNORMAL
TROPONIN T: 0.13 NG/ML
TROPONIN T: 0.15 NG/ML
WBC # BLD: 12.5 K/UL (ref 3.5–11)
WBC # BLD: ABNORMAL 10*3/UL

## 2018-02-10 PROCEDURE — 71045 X-RAY EXAM CHEST 1 VIEW: CPT

## 2018-02-10 PROCEDURE — 51702 INSERT TEMP BLADDER CATH: CPT

## 2018-02-10 PROCEDURE — 93005 ELECTROCARDIOGRAM TRACING: CPT

## 2018-02-10 PROCEDURE — 85025 COMPLETE CBC W/AUTO DIFF WBC: CPT

## 2018-02-10 PROCEDURE — 84484 ASSAY OF TROPONIN QUANT: CPT

## 2018-02-10 PROCEDURE — 80048 BASIC METABOLIC PNL TOTAL CA: CPT

## 2018-02-10 PROCEDURE — 2580000003 HC RX 258: Performed by: EMERGENCY MEDICINE

## 2018-02-10 PROCEDURE — 36415 COLL VENOUS BLD VENIPUNCTURE: CPT

## 2018-02-10 PROCEDURE — 82550 ASSAY OF CK (CPK): CPT

## 2018-02-10 PROCEDURE — 87804 INFLUENZA ASSAY W/OPTIC: CPT

## 2018-02-10 PROCEDURE — 6370000000 HC RX 637 (ALT 250 FOR IP): Performed by: FAMILY MEDICINE

## 2018-02-10 PROCEDURE — 6360000002 HC RX W HCPCS: Performed by: FAMILY MEDICINE

## 2018-02-10 PROCEDURE — 83880 ASSAY OF NATRIURETIC PEPTIDE: CPT

## 2018-02-10 PROCEDURE — 83874 ASSAY OF MYOGLOBIN: CPT

## 2018-02-10 PROCEDURE — 6360000002 HC RX W HCPCS: Performed by: EMERGENCY MEDICINE

## 2018-02-10 PROCEDURE — 99285 EMERGENCY DEPT VISIT HI MDM: CPT

## 2018-02-10 PROCEDURE — 76770 US EXAM ABDO BACK WALL COMP: CPT

## 2018-02-10 PROCEDURE — 2580000003 HC RX 258: Performed by: INTERNAL MEDICINE

## 2018-02-10 PROCEDURE — 2060000000 HC ICU INTERMEDIATE R&B

## 2018-02-10 RX ORDER — SODIUM CHLORIDE 9 MG/ML
INJECTION, SOLUTION INTRAVENOUS CONTINUOUS
Status: DISCONTINUED | OUTPATIENT
Start: 2018-02-10 | End: 2018-02-10

## 2018-02-10 RX ORDER — SELENIUM 50 MCG
1 TABLET ORAL DAILY
Status: DISCONTINUED | OUTPATIENT
Start: 2018-02-11 | End: 2018-02-12

## 2018-02-10 RX ORDER — ACETAMINOPHEN 325 MG/1
650 TABLET ORAL EVERY 4 HOURS PRN
Status: DISCONTINUED | OUTPATIENT
Start: 2018-02-10 | End: 2018-02-14 | Stop reason: HOSPADM

## 2018-02-10 RX ORDER — ACETAMINOPHEN 325 MG/1
650 TABLET ORAL ONCE
Status: DISCONTINUED | OUTPATIENT
Start: 2018-02-10 | End: 2018-02-14 | Stop reason: HOSPADM

## 2018-02-10 RX ORDER — LEVOFLOXACIN 5 MG/ML
250 INJECTION, SOLUTION INTRAVENOUS
Status: DISCONTINUED | OUTPATIENT
Start: 2018-02-12 | End: 2018-02-14

## 2018-02-10 RX ORDER — BUPROPION HYDROCHLORIDE 300 MG/1
300 TABLET ORAL DAILY
Status: DISCONTINUED | OUTPATIENT
Start: 2018-02-11 | End: 2018-02-14 | Stop reason: HOSPADM

## 2018-02-10 RX ORDER — 0.9 % SODIUM CHLORIDE 0.9 %
1000 INTRAVENOUS SOLUTION INTRAVENOUS ONCE
Status: DISCONTINUED | OUTPATIENT
Start: 2018-02-10 | End: 2018-02-10

## 2018-02-10 RX ORDER — MORPHINE SULFATE 15 MG/1
15 TABLET, FILM COATED, EXTENDED RELEASE ORAL 2 TIMES DAILY
Status: DISCONTINUED | OUTPATIENT
Start: 2018-02-10 | End: 2018-02-12

## 2018-02-10 RX ORDER — SODIUM CHLORIDE 9 MG/ML
INJECTION, SOLUTION INTRAVENOUS CONTINUOUS
Status: DISCONTINUED | OUTPATIENT
Start: 2018-02-10 | End: 2018-02-13

## 2018-02-10 RX ORDER — SODIUM CHLORIDE 0.9 % (FLUSH) 0.9 %
10 SYRINGE (ML) INJECTION EVERY 12 HOURS SCHEDULED
Status: DISCONTINUED | OUTPATIENT
Start: 2018-02-10 | End: 2018-02-14 | Stop reason: HOSPADM

## 2018-02-10 RX ORDER — DIAZEPAM 5 MG/1
5 TABLET ORAL EVERY 6 HOURS PRN
Status: DISCONTINUED | OUTPATIENT
Start: 2018-02-10 | End: 2018-02-14 | Stop reason: HOSPADM

## 2018-02-10 RX ORDER — 0.9 % SODIUM CHLORIDE 0.9 %
1000 INTRAVENOUS SOLUTION INTRAVENOUS ONCE
Status: COMPLETED | OUTPATIENT
Start: 2018-02-10 | End: 2018-02-10

## 2018-02-10 RX ORDER — LEVOFLOXACIN 5 MG/ML
500 INJECTION, SOLUTION INTRAVENOUS EVERY 24 HOURS
Status: DISCONTINUED | OUTPATIENT
Start: 2018-02-10 | End: 2018-02-10 | Stop reason: CLARIF

## 2018-02-10 RX ORDER — SODIUM CHLORIDE 0.9 % (FLUSH) 0.9 %
10 SYRINGE (ML) INJECTION PRN
Status: DISCONTINUED | OUTPATIENT
Start: 2018-02-10 | End: 2018-02-14 | Stop reason: HOSPADM

## 2018-02-10 RX ORDER — HEPARIN SODIUM 5000 [USP'U]/ML
5000 INJECTION, SOLUTION INTRAVENOUS; SUBCUTANEOUS 2 TIMES DAILY
Status: DISCONTINUED | OUTPATIENT
Start: 2018-02-10 | End: 2018-02-14 | Stop reason: HOSPADM

## 2018-02-10 RX ORDER — PANTOPRAZOLE SODIUM 40 MG/1
40 TABLET, DELAYED RELEASE ORAL
Status: DISCONTINUED | OUTPATIENT
Start: 2018-02-11 | End: 2018-02-14 | Stop reason: HOSPADM

## 2018-02-10 RX ORDER — TAMSULOSIN HYDROCHLORIDE 0.4 MG/1
0.4 CAPSULE ORAL DAILY
Status: DISCONTINUED | OUTPATIENT
Start: 2018-02-11 | End: 2018-02-14 | Stop reason: HOSPADM

## 2018-02-10 RX ADMIN — SODIUM CHLORIDE: 9 INJECTION, SOLUTION INTRAVENOUS at 20:30

## 2018-02-10 RX ADMIN — SODIUM CHLORIDE 125 ML/HR: 9 INJECTION, SOLUTION INTRAVENOUS at 16:49

## 2018-02-10 RX ADMIN — HEPARIN SODIUM 5000 UNITS: 5000 INJECTION, SOLUTION INTRAVENOUS; SUBCUTANEOUS at 21:32

## 2018-02-10 RX ADMIN — DIAZEPAM 5 MG: 5 TABLET ORAL at 21:32

## 2018-02-10 RX ADMIN — LEVOFLOXACIN 500 MG: 5 INJECTION, SOLUTION INTRAVENOUS at 15:15

## 2018-02-10 RX ADMIN — SODIUM CHLORIDE 1000 ML: 9 INJECTION, SOLUTION INTRAVENOUS at 12:36

## 2018-02-10 RX ADMIN — SODIUM CHLORIDE 1000 ML: 9 INJECTION, SOLUTION INTRAVENOUS at 14:23

## 2018-02-10 RX ADMIN — MORPHINE SULFATE 15 MG: 15 TABLET, EXTENDED RELEASE ORAL at 21:32

## 2018-02-10 ASSESSMENT — PAIN SCALES - GENERAL
PAINLEVEL_OUTOF10: 8

## 2018-02-10 ASSESSMENT — PAIN DESCRIPTION - LOCATION
LOCATION: GENERALIZED
LOCATION: BUTTOCKS;LEG

## 2018-02-10 ASSESSMENT — PAIN DESCRIPTION - PAIN TYPE
TYPE: ACUTE PAIN
TYPE: ACUTE PAIN

## 2018-02-10 ASSESSMENT — PAIN DESCRIPTION - ORIENTATION: ORIENTATION: RIGHT;LEFT

## 2018-02-10 ASSESSMENT — ENCOUNTER SYMPTOMS
VOMITING: 0
DIARRHEA: 0
NAUSEA: 0
COUGH: 0
ABDOMINAL PAIN: 0

## 2018-02-10 ASSESSMENT — PAIN DESCRIPTION - FREQUENCY: FREQUENCY: CONTINUOUS

## 2018-02-10 ASSESSMENT — PAIN DESCRIPTION - DESCRIPTORS: DESCRIPTORS: ACHING

## 2018-02-10 NOTE — PROGRESS NOTES
Pharmacy Note     Renal Dose Adjustment    Jesus Murguia is a 47 y.o. male. Pharmacist assessment of renally cleared medications. Recent Labs      02/10/18   1309   BUN  60*       Recent Labs      02/10/18   1309   CREATININE  8.57*       Estimated Creatinine Clearance: 12 mL/min (based on SCr of 8.57 mg/dL).       Height:   Ht Readings from Last 1 Encounters:   02/10/18 6' 2\" (1.88 m)     Weight:  Wt Readings from Last 1 Encounters:   02/10/18 214 lb (97.1 kg)       The following medication dose has been adjusted based upon renal function per P&T Guidelines:             Levofloxacin has been adjusted to 500mg ONCE and 250mg q48    -Nayeli Harden PharmD, BCPS 2/10/2018 6:42 PM

## 2018-02-10 NOTE — ED PROVIDER NOTES
[address]  eMERGENCY dEPARTMENT eNCOUnter      Pt Name: Emerick Nissen  MRN: [id number]  [place] 1965  Date of evaluation: 2/10/18      CHIEF COMPLAINT       Chief Complaint   Patient presents with    Other     c/o generalized weakness     HISTORY OF PRESENT ILLNESS   HPI 47 y.o. male presents with complaints of generalized weakness. Symptoms are rated as severe. He is unable to sit up in bed. Progressively worsening over the last few days. EMS was called and they found the patient had a low-grade fever of 101. He denies any cough runny nose or congestion. The patient has osteomyelitis of the left foot for which she's been on Bactrim for quite some time. He was admitted to the hospital in January 2018 where he was treated for osteomyelitis and leg cellulitis. He still urinating. States that he gets up once or twice in the evening urinate. REVIEW OF SYSTEMS       Review of Systems   Constitutional: Positive for activity change, fatigue and fever. Negative for appetite change. HENT: Positive for congestion. Eyes: Negative for visual disturbance. Respiratory: Negative for cough. Cardiovascular: Negative for chest pain. Gastrointestinal: Negative for abdominal pain, diarrhea, nausea and vomiting. Genitourinary: Negative for decreased urine volume. Musculoskeletal: Negative for arthralgias. Skin: Positive for wound (Chronic left lower extremity). Neurological: Positive for weakness (Generalized) and light-headedness.        PAST MEDICAL HISTORY     Past Medical History:   Diagnosis Date    Anxiety     Arthritis     lower back, knees    BPH (benign prostatic hyperplasia)     Chronic kidney disease     prostate    Depression     Diabetes mellitus ([facility] [address]     Diabetic neuropathy (HCC)     GERD (gastroesophageal reflux disease)     Hyperglycemia     Hypertension     Hypothyroidism     Impacted tooth     Pneumonia        SURGICAL HISTORY       Past Surgical ONE TOUCH ULTRASOFT LANCETS MISC    1 each by Does not apply route 2 times daily    OXYCODONE (OXY-IR) 30 MG IMMEDIATE RELEASE TABLET    Take 1 tablet by mouth every 4 hours as needed for Pain for up to 30 days. PAROXETINE (PAXIL) 20 MG TABLET    TAKE ONE TABLET BY MOUTH FOUR TIMES A DAY    SULFAMETHOXAZOLE-TRIMETHOPRIM (BACTRIM DS) 800-160 MG PER TABLET    Take 1 tablet by mouth 2 times daily    TAMSULOSIN (FLOMAX) 0.4 MG CAPSULE    TAKE ONE CAPSULE BY MOUTH DAILY    TIZANIDINE (ZANAFLEX) 4 MG TABLET    TAKE ONE TABLET BY MOUTH EVERY 6 HOURS AS NEEDED FOR MUSCLE SPASMS    WOUND DRESSINGS (ADAPTIC NON-ADHERING DRESSING) PADS    Apply 1 Units topically 2 times daily       ALLERGIES     is allergic to fiorinal [butalbital-aspirin-caffeine] and peanut butter flavor [flavoring agent]. FAMILY HISTORY     indicated that the status of his mother is unknown. He indicated that the status of his father is unknown. He indicated that the status of his maternal grandfather is unknown. SOCIAL HISTORY      reports that he has never smoked. He has never used smokeless tobacco. He reports that he does not drink alcohol or use drugs.     PHYSICAL EXAM     INITIAL VITALS: BP (!) 111/53   Pulse 83   Temp 99.1 °F (37.3 °C) (Oral)   Resp 11   Ht 6' 2\" (1.88 m)   Wt 214 lb (97.1 kg)   SpO2 98%   BMI 27.48 kg/m²   Gen.: NAD  Head: Normocephalic, atraumatic  Eye: Pupils equal round reactive to light, no conjunctivitis  Heart: Regular rate and rhythm no murmurs  ENT: Dry oral mucosa  Lungs: Clear to auscultation bilaterally, no respiratory distress  Chest wall: No crepitus, no tenderness palpation  Abdomen: Soft, nontender, nondistended, with no peritoneal signs  Neurologic: Patient is alert and oriented x3, motor and sensation is intact in all 4 extremities, cerebellar function is normal  MSK: Normal muscle block but extremely weak and cannot sit up in bed unassisted  Extremities: Bilateral lower extremity edema with

## 2018-02-11 PROBLEM — M62.82 NON-TRAUMATIC RHABDOMYOLYSIS: Status: ACTIVE | Noted: 2018-02-11

## 2018-02-11 LAB
ABSOLUTE EOS #: 0.2 K/UL (ref 0–0.4)
ABSOLUTE IMMATURE GRANULOCYTE: ABNORMAL K/UL (ref 0–0.3)
ABSOLUTE LYMPH #: 1.2 K/UL (ref 1–4.8)
ABSOLUTE MONO #: 1.2 K/UL (ref 0.1–1.3)
ANION GAP SERPL CALCULATED.3IONS-SCNC: 15 MMOL/L (ref 9–17)
BASOPHILS # BLD: 1 % (ref 0–2)
BASOPHILS ABSOLUTE: 0 K/UL (ref 0–0.2)
BUN BLDV-MCNC: 50 MG/DL (ref 6–20)
BUN/CREAT BLD: ABNORMAL (ref 9–20)
CALCIUM SERPL-MCNC: 8.4 MG/DL (ref 8.6–10.4)
CHLORIDE BLD-SCNC: 98 MMOL/L (ref 98–107)
CO2: 22 MMOL/L (ref 20–31)
CREAT SERPL-MCNC: 5.84 MG/DL (ref 0.7–1.2)
DIFFERENTIAL TYPE: ABNORMAL
EOSINOPHILS RELATIVE PERCENT: 2 % (ref 0–4)
GFR AFRICAN AMERICAN: 12 ML/MIN
GFR NON-AFRICAN AMERICAN: 10 ML/MIN
GFR SERPL CREATININE-BSD FRML MDRD: ABNORMAL ML/MIN/{1.73_M2}
GFR SERPL CREATININE-BSD FRML MDRD: ABNORMAL ML/MIN/{1.73_M2}
GLUCOSE BLD-MCNC: 107 MG/DL (ref 70–99)
HCT VFR BLD CALC: 36.2 % (ref 41–53)
HEMOGLOBIN: 11.7 G/DL (ref 13.5–17.5)
IMMATURE GRANULOCYTES: ABNORMAL %
LYMPHOCYTES # BLD: 15 % (ref 24–44)
MAGNESIUM: 1.8 MG/DL (ref 1.6–2.6)
MCH RBC QN AUTO: 27.8 PG (ref 26–34)
MCHC RBC AUTO-ENTMCNC: 32.4 G/DL (ref 31–37)
MCV RBC AUTO: 85.7 FL (ref 80–100)
MONOCYTES # BLD: 15 % (ref 1–7)
NRBC AUTOMATED: ABNORMAL PER 100 WBC
PDW BLD-RTO: 16.4 % (ref 11.5–14.9)
PHOSPHORUS: 5.2 MG/DL (ref 2.5–4.5)
PLATELET # BLD: 265 K/UL (ref 150–450)
PLATELET ESTIMATE: ABNORMAL
PMV BLD AUTO: 8 FL (ref 6–12)
POTASSIUM SERPL-SCNC: 4.5 MMOL/L (ref 3.7–5.3)
RBC # BLD: 4.22 M/UL (ref 4.5–5.9)
RBC # BLD: ABNORMAL 10*6/UL
SEG NEUTROPHILS: 67 % (ref 36–66)
SEGMENTED NEUTROPHILS ABSOLUTE COUNT: 5.5 K/UL (ref 1.3–9.1)
SODIUM BLD-SCNC: 135 MMOL/L (ref 135–144)
WBC # BLD: 8.1 K/UL (ref 3.5–11)
WBC # BLD: ABNORMAL 10*3/UL

## 2018-02-11 PROCEDURE — 2500000003 HC RX 250 WO HCPCS: Performed by: FAMILY MEDICINE

## 2018-02-11 PROCEDURE — 80048 BASIC METABOLIC PNL TOTAL CA: CPT

## 2018-02-11 PROCEDURE — 36415 COLL VENOUS BLD VENIPUNCTURE: CPT

## 2018-02-11 PROCEDURE — 85025 COMPLETE CBC W/AUTO DIFF WBC: CPT

## 2018-02-11 PROCEDURE — 2060000000 HC ICU INTERMEDIATE R&B

## 2018-02-11 PROCEDURE — 6370000000 HC RX 637 (ALT 250 FOR IP): Performed by: FAMILY MEDICINE

## 2018-02-11 PROCEDURE — 99222 1ST HOSP IP/OBS MODERATE 55: CPT | Performed by: FAMILY MEDICINE

## 2018-02-11 PROCEDURE — 83735 ASSAY OF MAGNESIUM: CPT

## 2018-02-11 PROCEDURE — 2580000003 HC RX 258: Performed by: INTERNAL MEDICINE

## 2018-02-11 PROCEDURE — 84100 ASSAY OF PHOSPHORUS: CPT

## 2018-02-11 PROCEDURE — 6360000002 HC RX W HCPCS: Performed by: FAMILY MEDICINE

## 2018-02-11 RX ADMIN — HEPARIN SODIUM 5000 UNITS: 5000 INJECTION, SOLUTION INTRAVENOUS; SUBCUTANEOUS at 20:56

## 2018-02-11 RX ADMIN — PANTOPRAZOLE SODIUM 40 MG: 40 TABLET, DELAYED RELEASE ORAL at 05:35

## 2018-02-11 RX ADMIN — BUPROPION HYDROCHLORIDE 300 MG: 300 TABLET, EXTENDED RELEASE ORAL at 10:01

## 2018-02-11 RX ADMIN — SODIUM CHLORIDE: 9 INJECTION, SOLUTION INTRAVENOUS at 00:44

## 2018-02-11 RX ADMIN — MICONAZOLE NITRATE: 20.6 POWDER TOPICAL at 20:56

## 2018-02-11 RX ADMIN — DIAZEPAM 5 MG: 5 TABLET ORAL at 22:12

## 2018-02-11 RX ADMIN — MORPHINE SULFATE 15 MG: 15 TABLET, EXTENDED RELEASE ORAL at 10:01

## 2018-02-11 RX ADMIN — MORPHINE SULFATE 15 MG: 15 TABLET, EXTENDED RELEASE ORAL at 20:54

## 2018-02-11 RX ADMIN — SODIUM CHLORIDE: 9 INJECTION, SOLUTION INTRAVENOUS at 09:57

## 2018-02-11 RX ADMIN — HEPARIN SODIUM 5000 UNITS: 5000 INJECTION, SOLUTION INTRAVENOUS; SUBCUTANEOUS at 10:01

## 2018-02-11 RX ADMIN — SODIUM CHLORIDE: 9 INJECTION, SOLUTION INTRAVENOUS at 20:16

## 2018-02-11 RX ADMIN — TAMSULOSIN HYDROCHLORIDE 0.4 MG: 0.4 CAPSULE ORAL at 10:01

## 2018-02-11 RX ADMIN — Medication 1 CAPSULE: at 10:03

## 2018-02-11 ASSESSMENT — PAIN SCALES - GENERAL
PAINLEVEL_OUTOF10: 8
PAINLEVEL_OUTOF10: 0
PAINLEVEL_OUTOF10: 0
PAINLEVEL_OUTOF10: 8
PAINLEVEL_OUTOF10: 8

## 2018-02-11 ASSESSMENT — PAIN DESCRIPTION - LOCATION: LOCATION: GROIN

## 2018-02-11 ASSESSMENT — PAIN DESCRIPTION - ORIENTATION: ORIENTATION: RIGHT;LEFT

## 2018-02-11 ASSESSMENT — PAIN DESCRIPTION - PAIN TYPE: TYPE: SURGICAL PAIN

## 2018-02-11 NOTE — CONSULTS
Labs      02/10/18   1324   WBC  12.5*   RBC  3.84*   HGB  10.9*   HCT  33.1*   MCV  86.1   MCH  28.3   MCHC  32.9   RDW  16.4*   PLT  260   MPV  7.5      BMP: Recent Labs      02/10/18   1309   NA  132*   K  4.9   CL  91*   CO2  21   BUN  60*   CREATININE  8.57*   GLUCOSE  146*   CALCIUM  8.6          Radiology:  Reviewed as available. Assessment:  1. Acute renal failure , oliguric . Differential diagnoses includes ATN, rule out obstruction and urinary retention, rectum-induced acute interstitial nephritis tubular injury, use of diuretics with lisinopril    2. Type 2 diabetes non-insulin-dependent diabetes mellitus  #3 essential hypertension  4. Chest x-ray such was suspicious for pneumonia       Plan:  1. Comprehensive urine testing including Urinalysis, Urine sodium, potassium, chloride, Urine protein and creatinine to quantify the proteinuria if present. Will check urinary eosinophils. 2.  Check Post void bladder scan and Renal Ultrasound to assess for urinary obstruction and to assess the kidney size, echogenicity. 3. Check CPK  4. Strict I's and O's  5. IV fluids normal saline 150 and was per hour. 6. We'll continue to monitor closely the kidney function does not improve or she develops complications of renal failure or overt signs of uremia than we'll plan for dialysis. 7. Check BMP again in the morning and reassess for need for dialysis. Thank you for the consultation.       Electronically signed by Pato Montenegro MD on 2/10/2018 at 7:22 PM

## 2018-02-11 NOTE — FLOWSHEET NOTE
02/11/18 1012   Encounter Summary   Services provided to: Patient   Referral/Consult From: [place]   Continue Visiting (2/11/18)   Complexity of Encounter Low   Length of Encounter 15 minutes   Routine   Type Initial   Spiritual/Pentecostalism   Type Ritual   Sacraments   Sacrament of Sick-Anointing Anointed  (Adeline Costa 2/11/18)

## 2018-02-11 NOTE — CARE COORDINATION
CASE MANAGEMENT NOTE:    Admission Date:  2/10/2018 Leobardo Dailey is a 47 y.o.  male    Admitted for : Acute renal failure ([facility]F) ([facility] [N17.9]    Met with:  Patient and wife George Diazvern    PCP:  Dr Garcia Figures:  [employer]      Current Residence/ Living Arrangements:  independently at home             Current Services PTA:  No    Is patient agreeable to VNS: No    Freedom of choice provided: NA         VNS chosen:  NA    DME:  straight cane and walker    Home Oxygen: No    Nebulizer: No    Supplier: N/A    Potential Assistance Needed: [facility], Dr Erik Frias for Left Foot wound    SNF needed: No    Pharmacy:  [email or web address]       Does Patient want to use MEDS to BEDS? No    Family Members/Caregivers that pt would like involved in their care:    Yes    If yes, list name here:  [place] [id number]    Transportation Provider:  Family                      Discharge Plan:  2/11/18 - Cherise Adv - Patient lives with spouse in 2 story home, B/B on 1st floor. .  DME: Walker and cane. Had [address] in the past and do not want to resume their services, Patient refusing to use any VNS services at this time. .  On admission Cre 5.84, BUN 50, corinne 5000, Continuous IV fluids. Was just here on 1/15/18. Plan is to return home with no needs . //pf               Readmission Risk              Readmission Risk:        23.75       Age 72 or Greater:  0    Admitted from SNF or Requires Paid or Family Care:  0    Currently has CHF,COPD,ARF,CRI,or is on dialysis:  4    Takes more than 5 Prescription Medications:  4    Takes Digoxin,Insulin,Anticoagulants,Narcotics or ASA/Plavix:  [address] in Past 12 Months:  10    On Disability:  0    Patient Considers own Health:  3.75          Electronically signed by: Kristan Joya RN on 2/11/2018 at 12:41 PM

## 2018-02-11 NOTE — PLAN OF CARE
Problem: Falls - Risk of  Goal: Absence of falls  Outcome: Ongoing  Bed in lowest and locked position. 2 bedrails used for pt safety. Bed alarm in use in pt's room. Hourly rounds done by staff and RN. Phone and call light within pt's reach. Problem: Skin Integrity - Impaired:  Goal: Skin integrity is maintained or improved  Outcome: Ongoing  Pt has had poor oral intake. Pt has been resting with eyes closed most of shift and has only eaten lunch as of yet. Pt is receiving IV fluids as ordered. Pt is repositioned every 2 hours by staff.

## 2018-02-11 NOTE — FLOWSHEET NOTE
Patient anxious but hopeful; listening presence;     02/11/18 1535   Encounter Summary   Services provided to: Patient and family together   [facility]   Support System Spouse   Continue Visiting (2/11/18)   Complexity of Encounter Moderate   Length of Encounter 15 minutes   Spiritual Assessment Completed Yes   Spiritual/Mosque   Type Spiritual support   Assessment Approachable; Anxious; Hopeful;Coping   Intervention Active listening;Nurtured hope;Prayer;Sustaining presence/ Ministry of presence; Discussed illness/injury and it's impact   Outcome Expressed gratitude;Engaged in conversation;Coping; Hopeful;Receptive   Sacraments   Communion Patient received communion;Family received communion

## 2018-02-11 NOTE — PLAN OF CARE
Problem: Falls - Risk of  Goal: Absence of falls  Outcome: Ongoing  No falls during this shift.  Call light within reach, wheels locked, bed in lowest position, side rails 2/4    Problem: Pain:  Goal: Pain level will decrease  Pain level will decrease   Outcome: Ongoing  No report of pain, patient sleeping comfortably,

## 2018-02-11 NOTE — PROGRESS NOTES
History and Physical    Patient:  Yoko Rodas  MRN: [id number]    CHIEF COMPLAINT:  weakness    History Obtained From:  spouse, electronic medical record, ER doc  PCP: Michael Izaguirre MD    HISTORY OF PRESENT ILLNESS:   The patient is a 47 y.o. male who presents with weakness. Has h/o osteomyelitis and is on chronic abx and pain meds. Started having weakness recently that continued to get worse and pt was found to have acute renal failure and elevated myoglobin and CK. He is currently sleeping and having a lot of pain per wife and nurse. Past Medical History:        Diagnosis Date    Anxiety     Arthritis     lower back, knees    BPH (benign prostatic hyperplasia)     Chronic kidney disease     prostate    Depression     Diabetes mellitus (HCC)     Diabetic neuropathy (HCC)     GERD (gastroesophageal reflux disease)     Hyperglycemia     Hypertension     Hypothyroidism     Impacted tooth     Pneumonia        Past Surgical History:        Procedure Laterality Date    APPENDECTOMY      BACK SURGERY      Lower x 4. Had abscess after appendectomy.  KNEE SURGERY Right     scope    TOE AMPUTATION Bilateral 3/6/2017    TOE AMPUTATION LEFT HALLUX AND 2ND DIGIT AND RIGHT 2ND DIGIT performed by John Segura DPM at [facility]         Medications Prior to Admission:    Prior to Admission medications    Medication Sig Start Date End Date Taking? Authorizing Provider   oxyCODONE (OXY-IR) 30 MG immediate release tablet Take 1 tablet by mouth every 4 hours as needed for Pain for up to 30 days. 1/26/18 2/25/18 Yes Michael Izaguirre MD   morphine (MS CONTIN) 15 MG extended release tablet Take 1 tablet by mouth 2 times daily for 30 days.  1/26/18 2/25/18 Yes Michael Izaguirre MD   PARoxetine (PAXIL) 20 MG tablet TAKE ONE TABLET BY MOUTH FOUR TIMES A DAY 12/18/17  Yes Michael Izaguirre MD   diazepam (VALIUM) 5 MG tablet Take 1 tablet by mouth every 6 hours as needed for

## 2018-02-12 LAB
-: ABNORMAL
ALBUMIN SERPL-MCNC: 3.3 G/DL (ref 3.5–5.2)
AMORPHOUS: ABNORMAL
ANION GAP SERPL CALCULATED.3IONS-SCNC: 12 MMOL/L (ref 9–17)
BACTERIA: ABNORMAL
BILIRUBIN URINE: NEGATIVE
BUN BLDV-MCNC: 28 MG/DL (ref 6–20)
BUN/CREAT BLD: ABNORMAL (ref 9–20)
CALCIUM SERPL-MCNC: 9 MG/DL (ref 8.6–10.4)
CASTS UA: ABNORMAL /LPF
CHLORIDE BLD-SCNC: 105 MMOL/L (ref 98–107)
CO2: 22 MMOL/L (ref 20–31)
COLOR: YELLOW
COMMENT UA: ABNORMAL
COMPLEMENT C3: 148 MG/DL (ref 90–180)
COMPLEMENT C4: 32 MG/DL (ref 10–40)
CREAT SERPL-MCNC: 2.88 MG/DL (ref 0.7–1.2)
CREATININE URINE: 50.5 MG/DL (ref 39–259)
CRYSTALS, UA: ABNORMAL /HPF
EPITHELIAL CELLS UA: ABNORMAL /HPF
GFR AFRICAN AMERICAN: 28 ML/MIN
GFR NON-AFRICAN AMERICAN: 23 ML/MIN
GFR SERPL CREATININE-BSD FRML MDRD: ABNORMAL ML/MIN/{1.73_M2}
GFR SERPL CREATININE-BSD FRML MDRD: ABNORMAL ML/MIN/{1.73_M2}
GLUCOSE BLD-MCNC: 116 MG/DL (ref 70–99)
GLUCOSE URINE: NEGATIVE
KETONES, URINE: NEGATIVE
LEUKOCYTE ESTERASE, URINE: ABNORMAL
MUCUS: ABNORMAL
NITRITE, URINE: NEGATIVE
OTHER OBSERVATIONS UA: ABNORMAL
PH UA: 6 (ref 5–8)
PHOSPHORUS: 3.7 MG/DL (ref 2.5–4.5)
POTASSIUM SERPL-SCNC: 4.3 MMOL/L (ref 3.7–5.3)
PROTEIN UA: ABNORMAL
RBC UA: ABNORMAL /HPF
RENAL EPITHELIAL, UA: ABNORMAL /HPF
SODIUM BLD-SCNC: 139 MMOL/L (ref 135–144)
SODIUM,UR: 81 MMOL/L
SPECIFIC GRAVITY UA: 1.01 (ref 1–1.03)
TOTAL CK: 1234 U/L (ref 39–308)
TRICHOMONAS: ABNORMAL
TURBIDITY: ABNORMAL
URINE HGB: ABNORMAL
UROBILINOGEN, URINE: NORMAL
WBC UA: ABNORMAL /HPF
YEAST: ABNORMAL

## 2018-02-12 PROCEDURE — 2580000003 HC RX 258: Performed by: INTERNAL MEDICINE

## 2018-02-12 PROCEDURE — 6370000000 HC RX 637 (ALT 250 FOR IP): Performed by: FAMILY MEDICINE

## 2018-02-12 PROCEDURE — 36415 COLL VENOUS BLD VENIPUNCTURE: CPT

## 2018-02-12 PROCEDURE — 80069 RENAL FUNCTION PANEL: CPT

## 2018-02-12 PROCEDURE — 86038 ANTINUCLEAR ANTIBODIES: CPT

## 2018-02-12 PROCEDURE — 87205 SMEAR GRAM STAIN: CPT

## 2018-02-12 PROCEDURE — 86160 COMPLEMENT ANTIGEN: CPT

## 2018-02-12 PROCEDURE — 82570 ASSAY OF URINE CREATININE: CPT

## 2018-02-12 PROCEDURE — 84300 ASSAY OF URINE SODIUM: CPT

## 2018-02-12 PROCEDURE — 2060000000 HC ICU INTERMEDIATE R&B

## 2018-02-12 PROCEDURE — 87070 CULTURE OTHR SPECIMN AEROBIC: CPT

## 2018-02-12 PROCEDURE — 99253 IP/OBS CNSLTJ NEW/EST LOW 45: CPT | Performed by: PODIATRIST

## 2018-02-12 PROCEDURE — 86235 NUCLEAR ANTIGEN ANTIBODY: CPT

## 2018-02-12 PROCEDURE — 87186 SC STD MICRODIL/AGAR DIL: CPT

## 2018-02-12 PROCEDURE — 82550 ASSAY OF CK (CPK): CPT

## 2018-02-12 PROCEDURE — 6360000002 HC RX W HCPCS: Performed by: FAMILY MEDICINE

## 2018-02-12 PROCEDURE — 2580000003 HC RX 258: Performed by: FAMILY MEDICINE

## 2018-02-12 PROCEDURE — 86403 PARTICLE AGGLUT ANTBDY SCRN: CPT

## 2018-02-12 PROCEDURE — 87086 URINE CULTURE/COLONY COUNT: CPT

## 2018-02-12 PROCEDURE — 99232 SBSQ HOSP IP/OBS MODERATE 35: CPT | Performed by: FAMILY MEDICINE

## 2018-02-12 PROCEDURE — 81001 URINALYSIS AUTO W/SCOPE: CPT

## 2018-02-12 PROCEDURE — 87075 CULTR BACTERIA EXCEPT BLOOD: CPT

## 2018-02-12 PROCEDURE — 86225 DNA ANTIBODY NATIVE: CPT

## 2018-02-12 RX ORDER — MORPHINE SULFATE 30 MG/1
30 TABLET, FILM COATED, EXTENDED RELEASE ORAL 2 TIMES DAILY
Status: DISCONTINUED | OUTPATIENT
Start: 2018-02-12 | End: 2018-02-13

## 2018-02-12 RX ORDER — LACTOBACILLUS RHAMNOSUS GG 10B CELL
1 CAPSULE ORAL
Status: DISCONTINUED | OUTPATIENT
Start: 2018-02-12 | End: 2018-02-14 | Stop reason: HOSPADM

## 2018-02-12 RX ADMIN — MORPHINE SULFATE 30 MG: 30 TABLET, EXTENDED RELEASE ORAL at 20:51

## 2018-02-12 RX ADMIN — Medication 1 CAPSULE: at 14:34

## 2018-02-12 RX ADMIN — SODIUM CHLORIDE: 9 INJECTION, SOLUTION INTRAVENOUS at 10:34

## 2018-02-12 RX ADMIN — DIAZEPAM 5 MG: 5 TABLET ORAL at 18:27

## 2018-02-12 RX ADMIN — MORPHINE SULFATE 30 MG: 30 TABLET, EXTENDED RELEASE ORAL at 09:23

## 2018-02-12 RX ADMIN — Medication 10 ML: at 20:52

## 2018-02-12 RX ADMIN — BUPROPION HYDROCHLORIDE 300 MG: 300 TABLET, EXTENDED RELEASE ORAL at 09:23

## 2018-02-12 RX ADMIN — HEPARIN SODIUM 5000 UNITS: 5000 INJECTION, SOLUTION INTRAVENOUS; SUBCUTANEOUS at 09:23

## 2018-02-12 RX ADMIN — PANTOPRAZOLE SODIUM 40 MG: 40 TABLET, DELAYED RELEASE ORAL at 06:31

## 2018-02-12 RX ADMIN — DIAZEPAM 5 MG: 5 TABLET ORAL at 04:13

## 2018-02-12 RX ADMIN — SODIUM CHLORIDE: 9 INJECTION, SOLUTION INTRAVENOUS at 18:27

## 2018-02-12 RX ADMIN — LEVOFLOXACIN 250 MG: 5 INJECTION, SOLUTION INTRAVENOUS at 00:06

## 2018-02-12 RX ADMIN — TAMSULOSIN HYDROCHLORIDE 0.4 MG: 0.4 CAPSULE ORAL at 09:23

## 2018-02-12 RX ADMIN — MICONAZOLE NITRATE: 20.6 POWDER TOPICAL at 09:23

## 2018-02-12 RX ADMIN — DIAZEPAM 5 MG: 5 TABLET ORAL at 10:34

## 2018-02-12 RX ADMIN — SODIUM CHLORIDE: 9 INJECTION, SOLUTION INTRAVENOUS at 04:01

## 2018-02-12 RX ADMIN — HEPARIN SODIUM 5000 UNITS: 5000 INJECTION, SOLUTION INTRAVENOUS; SUBCUTANEOUS at 20:51

## 2018-02-12 RX ADMIN — MICONAZOLE NITRATE: 20.6 POWDER TOPICAL at 20:51

## 2018-02-12 ASSESSMENT — PAIN SCALES - GENERAL
PAINLEVEL_OUTOF10: 5
PAINLEVEL_OUTOF10: 5
PAINLEVEL_OUTOF10: 8
PAINLEVEL_OUTOF10: 8

## 2018-02-12 NOTE — CARE COORDINATION
ONGOING DISCHARGE PLAN:    Spoke with patient regarding discharge plan and patient confirms that plan is still home with spouse. The patient continues to decline VNS and discharge needs. Active orders for IV levaquin. BUN and Creat down to 28 and 2.88 this afternoon. Will continue to follow for additional discharge needs.     Electronically signed by Zhou Vinson RN on 2/12/2018 at 2:28 PM

## 2018-02-12 NOTE — FLOWSHEET NOTE
02/12/18 1822   Encounter Summary   Services provided to: Patient and family together   Referral/Consult From: [address]; Family members   Continue Visiting (2/12/18)   Complexity of Encounter Moderate   Length of Encounter 15 minutes   Spiritual Assessment Completed Yes   Spiritual/Judaism   Type Spiritual support   Assessment Approachable; Anxious; Hopeful;Coping   Intervention Active listening;Nurtured hope;Prayer;Communion;Sustaining presence/ Ministry of presence; Discussed illness/injury and it's impact; Discussed belief system/Taoist practices/alexandra   Outcome Comfort;Expressed gratitude;Engaged in conversation;Coping; Hopeful;Receptive   Sacraments   Communion Patient received communion;Family received communion

## 2018-02-12 NOTE — PLAN OF CARE
Problem: Falls - Risk of  Goal: Absence of falls  Outcome: Ongoing  Pt assessed as a fall risk this shift. Remains free from falls and accidental injury at this time. Fall precautions in place, including falling star sign and fall risk band on pt. Floor free from obstacles, and bed is locked and in lowest position. Adequate lighting provided. Pt encouraged to call before getting OOB for any need. Bed alarm activated. Will continue to monitor needs during hourly rounding, and reinforce education on use of call light. Problem: Pain:  Goal: Pain level will decrease  Pain level will decrease   Outcome: Ongoing  Pt medicated with pain medication prn. Assessed all pain characteristics including level, type, location, frequency, and onset. Non-pharmacologic interventions offered to pt as well. Pt states pain is tolerable at this time. Will continue to monitor    Goal: Control of acute pain  Control of acute pain   Outcome: Ongoing  Pt medicated with pain medication prn. Assessed all pain characteristics including level, type, location, frequency, and onset. Non-pharmacologic interventions offered to pt as well. Pt does not complain of acute pain at this time. Will continue to monitor      Problem: Skin Integrity - Impaired:  Goal: Skin integrity is maintained or improved  Outcome: Ongoing  Skin assessment complete. Pt turned and repositioned per self. Area kept free from moisture. Proper nourishment and fluids encouraged, as appropriate. Skin remains clean, dry, and intact. Will continue to monitor for additional needs and changes in skin breakdown.

## 2018-02-12 NOTE — CONSULTS
End Date Taking? Authorizing Provider   oxyCODONE (OXY-IR) 30 MG immediate release tablet Take 1 tablet by mouth every 4 hours as needed for Pain for up to 30 days. 1/26/18 2/25/18 Yes Pamela Cruz MD   morphine (MS CONTIN) 15 MG extended release tablet Take 1 tablet by mouth 2 times daily for 30 days.  1/26/18 2/25/18 Yes Pamela Cruz MD   PARoxetine (PAXIL) 20 MG tablet TAKE ONE TABLET BY MOUTH FOUR TIMES A DAY 12/18/17  Yes Pamela Cruz MD   diazepam (VALIUM) 5 MG tablet Take 1 tablet by mouth every 6 hours as needed for Anxiety 7/11/17  Yes Pamela Cruz MD   metolazone (ZAROXOLYN) 2.5 MG tablet Take every mon, wed, friday 7/11/17  Yes Pamela Cruz MD   tiZANidine (ZANAFLEX) 4 MG tablet TAKE ONE TABLET BY MOUTH EVERY 6 HOURS AS NEEDED FOR MUSCLE SPASMS 7/11/17  Yes Pamela Cruz MD   lisinopril (PRINIVIL;ZESTRIL) 10 MG tablet TAKE ONE TABLET BY MOUTH DAILY 7/11/17  Yes Pamela Cruz MD   tamsulosin (FLOMAX) 0.4 MG capsule TAKE ONE CAPSULE BY MOUTH DAILY 7/11/17  Yes Pamela Cruz MD   furosemide (LASIX) 20 MG tablet Take 1 tablet by mouth 2 times daily 7/11/17  Yes Pamela Cruz MD   omeprazole (PRILOSEC) 40 MG delayed release capsule TAKE ONE CAPSULE BY MOUTH EVERY NIGHT AT BEDTIME 7/11/17  Yes Pamela Cruz MD   metFORMIN (GLUCOPHAGE) 500 MG tablet TAKE TWO TABLETS BY MOUTH EVERY MORNING AND TAKE ONE TABLET BY MOUTH EVERY EVENING 7/3/17  Yes Pamela Cruz MD   diazepam (VALIUM) 5 MG tablet TAKE ONE TABLET BY MOUTH EVERY 6 HOURS AS NEEDED FOR ANXIETY 1/27/18 2/26/18  Pamela Cruz MD   sulfamethoxazole-trimethoprim (BACTRIM DS) 800-160 MG per tablet Take 1 tablet by mouth 2 times daily 1/16/18 2/25/18  Pamela Cruz MD   ciclopirox (LOPROX) 0.77 % cream APPLY TO AFFECTED AREA(S) AND RUB  IN A THIN FILM TWO TIMES A DAY (AM AND PM) 12/12/17   Pamela Cruz MD   [facility] [id number] UNIT/GM powder APPLY TO AFFECTED AREA(S) FOUR TIMES A that he does not drink alcohol. reports that he does not use drugs. Review of Systems  All systems reviewed as negative unless otherwise stated in HPI    PHYSICAL EXAM:     Vitals:   Vitals:    02/12/18 1209   BP: 132/63   Pulse: 84   Resp: 16   Temp: 98.1 °F (36.7 °C)   SpO2: 100%     General: AAO x 3 in NAD. Lower Extremity Physical Exam:    Vascular: DP pulses are palpable and PT are non-palpable, Bilateral.  CFT <5 seconds to all remaining digits. Moderate david-malleolar edema present, Bilateral.     Neurological: Epicritic sensation diminished via light touch to level of digits, bilateral.     Musculoskeletal: Muscle strength 5/5 for all lower extremity muscle groups, Bilateral.  Pain absent upon palpation of ulcers, Left. H/o left partial hallux, partial 2nd, partial 3rd amputations. H/o right partial 2nd amputation. Dermatologic: Skin is noted to be warm and dry, Bilateral.   No fluctuance, crepitus, induration, david-wound or ascending erythema noted to following ulcers. All ulcerations with hyperkeratotic borders and yellow serous drainage without malodor. LLE:  #1 - sub 1st metatarsal head: full thickness ulceration measuring approximately [id number] with +PTB, granular base  #2 - sub 2nd metatarsal head: full thickness ulceration measuring approximately 3x2x0. 3cm with neg PTB, granula base  #3 and #4 - distal 2nd/3rd digit amputation site - full thickness ulceration measuring approximately 1x0.5x0.2 with neg PTB, mixed granular/fibrotic base  RLE: without ulcerations              Labs:  Lab Results   Component Value Date    WBC 8.1 02/11/2018    HGB 11.7 (L) 02/11/2018    HCT 36.2 (L) 02/11/2018     02/11/2018    LYMPHOPCT 15 (L) 02/11/2018    MONOPCT 15 (H) 02/11/2018    BASOPCT 1 02/11/2018     Lab Results   Component Value Date     02/12/2018    K 4.3 02/12/2018     02/12/2018    CO2 22 02/12/2018    BUN 28 (H) 02/12/2018    CREATININE 2.88 (H) 02/12/2018    GLUCOSE 116

## 2018-02-12 NOTE — PROGRESS NOTES
PHUR 6.0 09/19/2015    SPECGRAV 1.044 09/19/2015    LEUKOCYTESUR NEGATIVE 09/19/2015    UROBILINOGEN Normal 09/19/2015    BILIRUBINUR NEGATIVE 09/19/2015    GLUCOSEU 3+ 09/19/2015    KETUA NEGATIVE 09/19/2015     Urine Sodium:   No results found for: DAYAN  Urine Potassium:  No results found for: KUR  Urine Chloride:  No results found for: CLUR  Urine Osmolarity: No results found for: OSMOU  Urine Protein:   No results found for: TPU  Urine Creatinine:   No results found for: LABCREA  Urine Eosinophils:  No components found for: UEOS      IMPRESSION/RECOMMENDATIONS:      1. Acute kidney injury - most consistent with Myoglobinuric acute tubular necrosis As well as prerenal azotemia. Patient confessed to not taking Bactrim, so acute interstitial nephritis secondary to Bactrim is considered less likely at this time. Renal function is improving and patient appears to be in diuretic phase of ATN. CPK has dropped from 5513 to a level of 1234. There are no indications for acute hemodialysis. Plan: Continue IV fluids 0.9 normal saline at 100 mL/h. Strict input and output documentation. Physical metabolic profile in a.m. Check CPK level daily. Avoid exposure to nephrotoxic agents. ALEXIS and complements.     ALESIA MoralesCP  Attending Nephrologist  2/12/2018 2:49 PM

## 2018-02-12 NOTE — PLAN OF CARE
Problem: Falls - Risk of  Goal: Absence of falls  Outcome: Ongoing  The patient remained free from falls this shift, call light within reach, bed in locked and lowest position. Side rails up x2. Continue to monitor closely. Problem: Pain:  Goal: Pain level will decrease  Pain level will decrease   Outcome: Ongoing  Patient stated that he was in a lot of pain. RN gave patient MS Contin (See MAR) for pain. Patient stated that he was still having pain, even after the medication. Problem: Skin Integrity - Impaired:  Goal: Skin integrity is maintained or improved  Outcome: Ongoing  Skin integrity remained intact this shift.

## 2018-02-12 NOTE — FLOWSHEET NOTE
02/12/18 1138   Wound 07/20/17 wound # 7  left foot amp site 7 months   Date First Assessed/Time First Assessed: 07/20/17 1040   Wound Description (Comments): wound # 7  left foot amp site 7 months  Pre-existing: Yes  Photo Taken: Yes   Wound Type Wound   Dressing Status Clean;Dry; Intact   Dressing Changed Changed/New   Dressing/Treatment Dry dressing  (fibracol)   Wound Cleansed Wound cleanser   Dressing Change Due 02/13/18   Wound Assessment Drainage;Fragile;Granulation tissue;Red;Slough;Edema   Drainage Amount Moderate   Drainage Description Tan;Purulent   Odor Mild   Margins Epibole (rolled edges)   Exposed structure (STEPHEN; edematous and slough)   Mickie-wound Assessment Calloused;Dry;Excoriated     Dr. William Yun notified of details of wounds. Orly Crimes for podiatry was consulted. RN spoke with Nicky Turcios, podiatry resident. Will be up this afternoon to assess.      Electronically signed by Noemí Rudolph RN on 2/12/2018 at 2:55 PM

## 2018-02-13 PROBLEM — Z79.4 TYPE 2 DIABETES MELLITUS WITH FOOT ULCER, WITH LONG-TERM CURRENT USE OF INSULIN (HCC): Status: ACTIVE | Noted: 2017-07-20

## 2018-02-13 LAB
ALBUMIN SERPL-MCNC: 2.9 G/DL (ref 3.5–5.2)
ANA REFERENCE RANGE:: ABNORMAL
ANION GAP SERPL CALCULATED.3IONS-SCNC: 11 MMOL/L (ref 9–17)
ANTI DNA DOUBLE STRANDED: 41 IU/ML
ANTI JO-1 IGG: 31 U/ML
ANTI RNP AB: 51 U/ML
ANTI SSA: 367 U/ML
ANTI SSB: 17 U/ML
ANTI-CENTROMERE: 17 U/ML
ANTI-NUCLEAR ANTIBODY (ANA): POSITIVE
ANTI-SCLERODERMA: 44 U/ML
ANTI-SMITH: 25 U/ML
BUN BLDV-MCNC: 17 MG/DL (ref 6–20)
BUN/CREAT BLD: ABNORMAL (ref 9–20)
CALCIUM SERPL-MCNC: 8.5 MG/DL (ref 8.6–10.4)
CHLORIDE BLD-SCNC: 109 MMOL/L (ref 98–107)
CO2: 23 MMOL/L (ref 20–31)
CREAT SERPL-MCNC: 1.87 MG/DL (ref 0.7–1.2)
CULTURE: NO GROWTH
CULTURE: NORMAL
GFR AFRICAN AMERICAN: 46 ML/MIN
GFR NON-AFRICAN AMERICAN: 38 ML/MIN
GFR SERPL CREATININE-BSD FRML MDRD: ABNORMAL ML/MIN/{1.73_M2}
GFR SERPL CREATININE-BSD FRML MDRD: ABNORMAL ML/MIN/{1.73_M2}
GLUCOSE BLD-MCNC: 103 MG/DL (ref 70–99)
HISTONE ANTIBODY: 57 U/ML
Lab: NORMAL
PHOSPHORUS: 4.2 MG/DL (ref 2.5–4.5)
POTASSIUM SERPL-SCNC: 4.4 MMOL/L (ref 3.7–5.3)
SODIUM BLD-SCNC: 143 MMOL/L (ref 135–144)
SPECIMEN DESCRIPTION: NORMAL
SPECIMEN DESCRIPTION: NORMAL
STATUS: NORMAL
TOTAL CK: 457 U/L (ref 39–308)

## 2018-02-13 PROCEDURE — 36415 COLL VENOUS BLD VENIPUNCTURE: CPT

## 2018-02-13 PROCEDURE — 2580000003 HC RX 258: Performed by: INTERNAL MEDICINE

## 2018-02-13 PROCEDURE — 80069 RENAL FUNCTION PANEL: CPT

## 2018-02-13 PROCEDURE — 2060000000 HC ICU INTERMEDIATE R&B

## 2018-02-13 PROCEDURE — 99232 SBSQ HOSP IP/OBS MODERATE 35: CPT | Performed by: FAMILY MEDICINE

## 2018-02-13 PROCEDURE — 82550 ASSAY OF CK (CPK): CPT

## 2018-02-13 PROCEDURE — 6370000000 HC RX 637 (ALT 250 FOR IP): Performed by: FAMILY MEDICINE

## 2018-02-13 PROCEDURE — 99233 SBSQ HOSP IP/OBS HIGH 50: CPT | Performed by: PODIATRIST

## 2018-02-13 PROCEDURE — 6360000002 HC RX W HCPCS: Performed by: FAMILY MEDICINE

## 2018-02-13 PROCEDURE — 51798 US URINE CAPACITY MEASURE: CPT

## 2018-02-13 RX ORDER — MORPHINE SULFATE 30 MG/1
30 TABLET, FILM COATED, EXTENDED RELEASE ORAL 3 TIMES DAILY
Status: DISCONTINUED | OUTPATIENT
Start: 2018-02-13 | End: 2018-02-14 | Stop reason: HOSPADM

## 2018-02-13 RX ORDER — SODIUM CHLORIDE 450 MG/100ML
INJECTION, SOLUTION INTRAVENOUS CONTINUOUS
Status: DISCONTINUED | OUTPATIENT
Start: 2018-02-13 | End: 2018-02-14 | Stop reason: HOSPADM

## 2018-02-13 RX ADMIN — SODIUM CHLORIDE: 9 INJECTION, SOLUTION INTRAVENOUS at 04:18

## 2018-02-13 RX ADMIN — MORPHINE SULFATE 30 MG: 30 TABLET, EXTENDED RELEASE ORAL at 08:25

## 2018-02-13 RX ADMIN — SODIUM CHLORIDE: 4.5 INJECTION, SOLUTION INTRAVENOUS at 19:29

## 2018-02-13 RX ADMIN — PANTOPRAZOLE SODIUM 40 MG: 40 TABLET, DELAYED RELEASE ORAL at 05:44

## 2018-02-13 RX ADMIN — DIAZEPAM 5 MG: 5 TABLET ORAL at 21:25

## 2018-02-13 RX ADMIN — LEVOFLOXACIN 250 MG: 5 INJECTION, SOLUTION INTRAVENOUS at 23:57

## 2018-02-13 RX ADMIN — MORPHINE SULFATE 30 MG: 30 TABLET, EXTENDED RELEASE ORAL at 21:31

## 2018-02-13 RX ADMIN — HEPARIN SODIUM 5000 UNITS: 5000 INJECTION, SOLUTION INTRAVENOUS; SUBCUTANEOUS at 08:27

## 2018-02-13 RX ADMIN — TAMSULOSIN HYDROCHLORIDE 0.4 MG: 0.4 CAPSULE ORAL at 08:25

## 2018-02-13 RX ADMIN — MICONAZOLE NITRATE: 20.6 POWDER TOPICAL at 08:27

## 2018-02-13 RX ADMIN — MICONAZOLE NITRATE: 20.6 POWDER TOPICAL at 19:22

## 2018-02-13 RX ADMIN — DIAZEPAM 5 MG: 5 TABLET ORAL at 04:30

## 2018-02-13 RX ADMIN — Medication 1 CAPSULE: at 08:25

## 2018-02-13 RX ADMIN — BUPROPION HYDROCHLORIDE 300 MG: 300 TABLET, EXTENDED RELEASE ORAL at 08:25

## 2018-02-13 RX ADMIN — MORPHINE SULFATE 30 MG: 30 TABLET, EXTENDED RELEASE ORAL at 14:36

## 2018-02-13 RX ADMIN — HEPARIN SODIUM 5000 UNITS: 5000 INJECTION, SOLUTION INTRAVENOUS; SUBCUTANEOUS at 19:22

## 2018-02-13 RX ADMIN — DIAZEPAM 5 MG: 5 TABLET ORAL at 11:00

## 2018-02-13 RX ADMIN — SODIUM CHLORIDE: 4.5 INJECTION, SOLUTION INTRAVENOUS at 11:04

## 2018-02-13 ASSESSMENT — PAIN DESCRIPTION - FREQUENCY: FREQUENCY: CONTINUOUS

## 2018-02-13 ASSESSMENT — PAIN SCALES - GENERAL
PAINLEVEL_OUTOF10: 8
PAINLEVEL_OUTOF10: 7
PAINLEVEL_OUTOF10: 6
PAINLEVEL_OUTOF10: 6
PAINLEVEL_OUTOF10: 5
PAINLEVEL_OUTOF10: 8

## 2018-02-13 ASSESSMENT — PAIN DESCRIPTION - DESCRIPTORS: DESCRIPTORS: DISCOMFORT

## 2018-02-13 ASSESSMENT — PAIN DESCRIPTION - LOCATION: LOCATION: FOOT

## 2018-02-13 ASSESSMENT — PAIN DESCRIPTION - PROGRESSION: CLINICAL_PROGRESSION: NOT CHANGED

## 2018-02-13 ASSESSMENT — PAIN DESCRIPTION - ONSET: ONSET: ON-GOING

## 2018-02-13 ASSESSMENT — PAIN DESCRIPTION - ORIENTATION: ORIENTATION: LEFT

## 2018-02-13 ASSESSMENT — PAIN DESCRIPTION - PAIN TYPE: TYPE: ACUTE PAIN

## 2018-02-13 NOTE — PROGRESS NOTES
Value Date    NITRU NEGATIVE 02/12/2018    COLORU YELLOW 02/12/2018    PHUR 6.0 02/12/2018    WBCUA 5 TO 10 02/12/2018    RBCUA TOO NUMEROUS TO COUNT 02/12/2018    MUCUS NOT REPORTED 02/12/2018    TRICHOMONAS NOT REPORTED 02/12/2018    YEAST NOT REPORTED 02/12/2018    BACTERIA FEW 02/12/2018    SPECGRAV 1.013 02/12/2018    LEUKOCYTESUR SMALL 02/12/2018    UROBILINOGEN Normal 02/12/2018    BILIRUBINUR NEGATIVE 02/12/2018    GLUCOSEU NEGATIVE 02/12/2018    KETUA NEGATIVE 02/12/2018    AMORPHOUS NOT REPORTED 02/12/2018     Urine Sodium:     Lab Results   Component Value Date    DAYAN 81 02/12/2018     Urine Potassium:  No results found for: KUR  Urine Chloride:  No results found for: CLUR  Urine Osmolarity: No results found for: OSMOU  Urine Protein:   No results found for: TPU  Urine Creatinine:     Lab Results   Component Value Date    LABCREA 50.5 02/12/2018     Urine Eosinophils:  No components found for: UEOS      IMPRESSION/RECOMMENDATIONS:      1. Acute kidney injury - most consistent with Myoglobinuric acute tubular necrosis As well as prerenal azotemia. Patient confessed to not taking Bactrim, so acute interstitial nephritis secondary to Bactrim is considered less likely at this time. Renal function is improving and patient appears to be in diuretic phase of ATN  CPK trending down below 1000  There are no indications for acute hemodialysis. 2_ urinary retention,  montemayor catheter removed. Voiding trial , PVR  Consider urology consultation IN PATIENT vs out patient  Hx of BPH on Flomax at home. 3-DM NIDDM  4. ESSENTIAL HTN  5. Microscopic hematuria- likely due to montemayor trauma, f/u serologies. Plan:   Continue IV fluids Change to 0.45% saline  Strict input and output documentation. Physical metabolic profile in a.m. Check CPK level daily. Avoid exposure to nephrotoxic agents. ALEXIS and complements.     Jannet Barnard MD  Attending Nephrologist  2/13/2018 10:07 AM

## 2018-02-13 NOTE — FLOWSHEET NOTE
02/13/18 1400   Encounter Summary   Services provided to: Patient   Referral/Consult From: [place]   Continue Visiting (2/13/18)   Complexity of Encounter Low   Length of Encounter 15 minutes   Spiritual/Denominational   Type Spiritual support   Intervention (Visited by Fr. Geneva Viera)

## 2018-02-13 NOTE — PROGRESS NOTES
right partial 2nd amputation.        Dermatologic: Skin is noted to be warm and dry, Bilateral.   No fluctuance, crepitus, induration, david-wound or erythema noted  All ulcerations with hyperkeratotic borders and yellow serous drainage without malodor. LLE:  #1 - sub 1st metatarsal head: full thickness ulceration measuring approximately [id number] with +PTB, granular base  #2 - sub 2nd metatarsal head: full thickness ulceration measuring approximately 3x2x0. 3cm with neg PTB, granular base  #3 and #4 - distal 2nd/3rd digit amputation site - full thickness ulceration measuring approximately 1x0.5x0.2 with neg PTB, mixed granular/fibrotic base    Imaging:   MRI Left Foot 1/16/18  Impression   1. Postoperative changes in the 1st, 2nd, and 3rd digits. 2. Findings compatible with osteomyelitis in the residual 1st proximal   phalanx.  There appears to be soft tissue irregularity along the plantar   aspect of the residual great toe, suggesting ulceration.  No evidence of   abscess. 3. T2 signal abnormality without corresponding T1 signal abnormality in the   residual 2nd proximal phalanx, 3rd proximal phalanx, and 3rd middle   phalangeal stump, suggesting reactive osteitis. 4. Suspected signal abnormality in the 4th distal phalanx, although   suboptimally visualized because of small size.  This is concerning for   osteomyelitis.  Correlation for soft tissue wound in the 4th digit is   recommended.  No evidence of abscess. 5. Fluid signal at the intersection of the flexor hallucis longus and flexor   digitorum longus tendons, suggesting tenosynovitis.        Assessment    Patient is a 47 y.o. male with osteomyelitis of left first proximal proximal phalanx  · Multiple chronic ulcerations of the left foot  · Possible osteomyelitis of 4th distal phalanx  · H/o partial digital amputations: bilateral 2nd, L hallux/3rd  · DM type 2 with peripheral polyneuropathy    Principal Problem:    Acute renal failure (ARF) (HCC)  Active

## 2018-02-13 NOTE — PROGRESS NOTES
Progress Note    2/13/2018 7:42 AM  Subjective: Interval History: Pt states feeling better. Labs just drawn, pending. States able to urinate. Did not get triple phase bone scan done as ordered. Diet: DIET RENAL;    Medications:   Reviewed medications    Labs:   CBC:   Recent Labs      02/11/18   0618   WBC  8.1   HGB  11.7*   PLT  265     BMP:    Recent Labs      02/12/18   1215   NA  139   K  4.3   CL  105   CO2  22   BUN  28*   CREATININE  2.88*   GLUCOSE  116*     Hepatic: No results for input(s): AST, ALT, ALB, BILITOT, ALKPHOS in the last 72 hours. Troponin: No results for input(s): TROPONINI in the last 72 hours. BNP: No results for input(s): BNP in the last 72 hours. Lipids: No results for input(s): CHOL, HDL in the last 72 hours. Invalid input(s): LDLCALCU  INR: No results for input(s): INR in the last 72 hours. Objective:   Vitals: /62   Pulse 61   Temp 97.5 °F (36.4 °C) (Oral)   Resp 15   Ht 6' 2\" (1.88 m)   Wt 222 lb 3.6 oz (100.8 kg)   SpO2 93%   BMI 28.53 kg/m²   General appearance: alert and cooperative with exam  Neck: no adenopathy, no carotid bruit, no JVD, supple, symmetrical, trachea midline and thyroid not enlarged, symmetric, no tenderness/mass/nodules  Lungs: clear to auscultation bilaterally  Heart: regular rate and rhythm, S1, S2 normal, no murmur, click, rub or gallop  Abdomen: soft, non-tender; bowel sounds normal; no masses,  no organomegaly  Extremities: left foot with dressing  Neurologic: Mental status: Alert, oriented, thought content appropriate    Assessment and Plan:   1. Persistent osteomyelitis- on levaquin. Still refusing further amputation  2. Acute tubular necrosis- etiology still unclear. Per nephrology  3. Dc montemayor before uti occurs  4. Increase morphine to TID for pain in left leg  5.  Open wounds foot- per podiatry    Patient Active Problem List:     Anxiety disorder     Benign prostatic hyperplasia     Depression     Dermatitis     Edema Esophageal reflux     Essential hypertension     Hypothyroidism     Prostate cancer screening     Tinea corporis     Type 2 diabetes mellitus (HCC)     Right cervical radiculopathy     Cellulitis of toe of left foot     Cellulitis of left foot     Diabetic neuropathy (HCC)     Osteomyelitis (HCC)     Skin ulcer of left foot with necrosis of bone (HCC)     Type 2 diabetes mellitus with foot ulcer, without long-term current use of insulin (HCC)     Type 2 diabetes mellitus with diabetic polyneuropathy, without long-term current use of insulin (HCC)     Cellulitis of foot, left     Osteomyelitis of foot, left, acute (HCC)     Non compliance with medical treatment     Cellulitis     Acute renal failure (ARF) ([facility] [address]     Non-traumatic rhabdomyolysis      Electronically signed by Delio Schmidt MD on 2/13/2018 at 7:42 AM

## 2018-02-14 VITALS
HEART RATE: 60 BPM | RESPIRATION RATE: 20 BRPM | HEIGHT: 74 IN | WEIGHT: 222.22 LBS | OXYGEN SATURATION: 98 % | BODY MASS INDEX: 28.52 KG/M2 | SYSTOLIC BLOOD PRESSURE: 99 MMHG | TEMPERATURE: 97.5 F | DIASTOLIC BLOOD PRESSURE: 66 MMHG

## 2018-02-14 LAB
ANION GAP SERPL CALCULATED.3IONS-SCNC: 12 MMOL/L (ref 9–17)
BUN BLDV-MCNC: 12 MG/DL (ref 6–20)
BUN/CREAT BLD: ABNORMAL (ref 9–20)
CALCIUM SERPL-MCNC: 8.2 MG/DL (ref 8.6–10.4)
CHLORIDE BLD-SCNC: 106 MMOL/L (ref 98–107)
CO2: 22 MMOL/L (ref 20–31)
CREAT SERPL-MCNC: 1.27 MG/DL (ref 0.7–1.2)
GFR AFRICAN AMERICAN: >60 ML/MIN
GFR NON-AFRICAN AMERICAN: 59 ML/MIN
GFR SERPL CREATININE-BSD FRML MDRD: ABNORMAL ML/MIN/{1.73_M2}
GFR SERPL CREATININE-BSD FRML MDRD: ABNORMAL ML/MIN/{1.73_M2}
GLUCOSE BLD-MCNC: 118 MG/DL (ref 70–99)
POTASSIUM SERPL-SCNC: 3.9 MMOL/L (ref 3.7–5.3)
SODIUM BLD-SCNC: 140 MMOL/L (ref 135–144)
TOTAL CK: 240 U/L (ref 39–308)

## 2018-02-14 PROCEDURE — 82550 ASSAY OF CK (CPK): CPT

## 2018-02-14 PROCEDURE — 2580000003 HC RX 258: Performed by: INTERNAL MEDICINE

## 2018-02-14 PROCEDURE — 36415 COLL VENOUS BLD VENIPUNCTURE: CPT

## 2018-02-14 PROCEDURE — 80048 BASIC METABOLIC PNL TOTAL CA: CPT

## 2018-02-14 PROCEDURE — 2580000003 HC RX 258: Performed by: FAMILY MEDICINE

## 2018-02-14 PROCEDURE — 6370000000 HC RX 637 (ALT 250 FOR IP): Performed by: FAMILY MEDICINE

## 2018-02-14 PROCEDURE — 6360000002 HC RX W HCPCS: Performed by: FAMILY MEDICINE

## 2018-02-14 PROCEDURE — 99239 HOSP IP/OBS DSCHRG MGMT >30: CPT | Performed by: FAMILY MEDICINE

## 2018-02-14 RX ORDER — LEVOFLOXACIN 5 MG/ML
500 INJECTION, SOLUTION INTRAVENOUS EVERY 24 HOURS
Status: DISCONTINUED | OUTPATIENT
Start: 2018-02-14 | End: 2018-02-14 | Stop reason: HOSPADM

## 2018-02-14 RX ORDER — GAUZE BANDAGE 3"X4"
SPONGE TOPICAL
Qty: 1 EACH | Refills: 0 | Status: SHIPPED | OUTPATIENT
Start: 2018-02-14 | End: 2018-12-03

## 2018-02-14 RX ORDER — MORPHINE SULFATE 30 MG/1
30 TABLET, FILM COATED, EXTENDED RELEASE ORAL 3 TIMES DAILY
Qty: 90 TABLET | Refills: 0 | Status: SHIPPED | OUTPATIENT
Start: 2018-02-14 | End: 2018-03-12 | Stop reason: SDUPTHER

## 2018-02-14 RX ORDER — ACETAMINOPHEN 325 MG/1
650 TABLET ORAL EVERY 6 HOURS PRN
Qty: 120 TABLET | Refills: 3 | COMMUNITY
Start: 2018-02-14 | End: 2018-07-06

## 2018-02-14 RX ORDER — FOAM BANDAGE 8"X5.5"
BANDAGE TOPICAL
Qty: 1 EACH | Refills: 0 | Status: SHIPPED | OUTPATIENT
Start: 2018-02-14 | End: 2019-02-20

## 2018-02-14 RX ADMIN — Medication 10 ML: at 09:00

## 2018-02-14 RX ADMIN — HEPARIN SODIUM 5000 UNITS: 5000 INJECTION, SOLUTION INTRAVENOUS; SUBCUTANEOUS at 08:54

## 2018-02-14 RX ADMIN — Medication 1 CAPSULE: at 08:53

## 2018-02-14 RX ADMIN — MORPHINE SULFATE 30 MG: 30 TABLET, EXTENDED RELEASE ORAL at 08:53

## 2018-02-14 RX ADMIN — MORPHINE SULFATE 30 MG: 30 TABLET, EXTENDED RELEASE ORAL at 14:27

## 2018-02-14 RX ADMIN — TAMSULOSIN HYDROCHLORIDE 0.4 MG: 0.4 CAPSULE ORAL at 08:53

## 2018-02-14 RX ADMIN — DIAZEPAM 5 MG: 5 TABLET ORAL at 05:38

## 2018-02-14 RX ADMIN — BUPROPION HYDROCHLORIDE 300 MG: 300 TABLET, EXTENDED RELEASE ORAL at 08:53

## 2018-02-14 RX ADMIN — SODIUM CHLORIDE: 4.5 INJECTION, SOLUTION INTRAVENOUS at 05:39

## 2018-02-14 RX ADMIN — MICONAZOLE NITRATE: 20.6 POWDER TOPICAL at 09:00

## 2018-02-14 RX ADMIN — DIAZEPAM 5 MG: 5 TABLET ORAL at 14:27

## 2018-02-14 RX ADMIN — PANTOPRAZOLE SODIUM 40 MG: 40 TABLET, DELAYED RELEASE ORAL at 05:38

## 2018-02-14 ASSESSMENT — PAIN SCALES - GENERAL
PAINLEVEL_OUTOF10: 5
PAINLEVEL_OUTOF10: 7
PAINLEVEL_OUTOF10: 7

## 2018-02-14 NOTE — PROGRESS NOTES
Department of Internal Medicine  Nephrology [facility], [place]    Progress Note        SUBJECTIVE:  We are following this patient for the management of acute kidney injury. Emerick Nissen is a 47 y.o. male the past medical history of type 2 DM,HTN and BPH [baseline serum creatinine 0.7 mg/dL January 16 2018], who presented with complaints of Mildly, increased somnolence and general weakness. He was admitted 3 weeks ago with left foot osteomyelitis of first phalanx and cellulitis and was treated with antibiotics on discharge patient's serum creatinine was 0.7 mg/dL. He was discharged home on Bactrim but he never filled the prescription due to the fact that he was worried about side effects. his serum creatinine was noted to be 8.57 mg/dL and BUN of 60. Renal ultrasound did not show hydronephrosis. Interval history  Today, he is feeling better. he is nonoliguric and is  feel better. cpk and scr continues to improve . Barrera catheter removed yesterday      Physical Exam:    VITALS:  BP 99/66   Pulse 60   Temp 97.5 °F (36.4 °C) (Oral)   Resp 20   Ht 6' 2\" (1.88 m)   Wt 222 lb 3.6 oz (100.8 kg)   SpO2 98%   BMI 28.53 kg/m²   24HR INTAKE/OUTPUT:      Intake/Output Summary (Last 24 hours) at 02/14/18 1525  Last data filed at 02/14/18 1059   Gross per 24 hour   Intake          1134.99 ml   Output             2175 ml   Net         -1040.01 ml       Constitutional:  Awake and alert; not in distress. Cardiovascular/Edema:  S1-S2 no pericardial rub. Respiratory:  Clinically clear. Gastrointestinal:  Soft with normal bowel sounds. CBC:   No results for input(s): WBC, HGB, PLT in the last 72 hours.   BMP:    Recent Labs      02/12/18   1215  02/13/18   0723  02/14/18   0623   NA  139  143  140   K  4.3  4.4  3.9   CL  105  109*  106   CO2  22  23  22   BUN  28*  17  12   CREATININE  2.88*  1.87*  1.27*   GLUCOSE  116*  103*  118*       Lab Results   Component Value Date    NITRU NEGATIVE

## 2018-02-14 NOTE — PROGRESS NOTES
Progress Note  Podiatric Medicine and Surgery     CC: Left foot ulcerations     Subjective     Patient seen and examined at bedside. Afebrile, vital signs stable   States that he is not having any pain in the foot. States that he is ready to go home. Plan is to discharge home once okay by nephrology. Relates no new problems or questions. HPI 2/12/18:  Scott Motley is a 47 y.o. male with pmh significant for CKD, DM type 2 with peripheral neuropathy, and multiple b/l digit amputations who presents to the hospital complaining of generalized weakness, admitted for acute renal failure and pneumonia. The consult is for evaluation of osteomyelitis of left foot. Patient is known to Dr. Lisa Rey and is being managed for chronic left foot ulcerations at [facility], last evaluated on 2/8/18. Patient's MRI on 1/6/18 showed findings suggesting osteomyelitis of the first proximal phalanx and osteitis of the 2nd proximal phalanx, 3rd proximal and middle phalanx, and possibly the 4th distal phalanx. Patient relates that he was off antibiotics at the time he presented to the ED on 2/10/18 but had been on po bactrim a few months ago and IV abx via PICC prior to that which he states was stopped due to kidney issues. He is not amenable to surgical intervention at this time. He has had daily dressing changes with silvercel per his wife and was supposed to be wearing a CROW boot but was non-compliant per [facility] notes. He is currently afebrile, vitally stable aside from mild hypotension of 103/54. Last white count obtained on 2/11 was 8.1. He is on Levaquin for PNA. Denies n/v/f/c or left foot pain. ROS: Negative except for HPI. Denies N/V/F/C/SOB/CP.     Medications:  Scheduled Meds:   morphine  30 mg Oral TID    lactobacillus  1 capsule Oral Daily with breakfast    sodium chloride flush  10 mL Intravenous 2 times per day    heparin (porcine)  5,000 Units Subcutaneous BID    acetaminophen  650 mg Oral Once    levofloxacin    Dermatologic: Skin is noted to be warm and dry, Bilateral.   No fluctuance, crepitus, induration, david-wound or erythema noted  All ulcerations with hyperkeratotic borders and yellow serous drainage without malodor. LLE:  #1 - sub 1st metatarsal head: full thickness ulceration measuring approximately [id number] with +PTB, granular base  #2 - sub 2nd metatarsal head: full thickness ulceration measuring approximately 3x2x0. 3cm with neg PTB, granular base  #3 and #4 - distal 2nd/3rd digit amputation site - full thickness ulceration measuring approximately 1x0.5x0.2 with neg PTB, mixed granular/fibrotic base    Imaging:   MRI Left Foot 1/16/18  Impression   1. Postoperative changes in the 1st, 2nd, and 3rd digits. 2. Findings compatible with osteomyelitis in the residual 1st proximal   phalanx.  There appears to be soft tissue irregularity along the plantar   aspect of the residual great toe, suggesting ulceration.  No evidence of   abscess. 3. T2 signal abnormality without corresponding T1 signal abnormality in the   residual 2nd proximal phalanx, 3rd proximal phalanx, and 3rd middle   phalangeal stump, suggesting reactive osteitis. 4. Suspected signal abnormality in the 4th distal phalanx, although   suboptimally visualized because of small size.  This is concerning for   osteomyelitis.  Correlation for soft tissue wound in the 4th digit is   recommended.  No evidence of abscess. 5. Fluid signal at the intersection of the flexor hallucis longus and flexor   digitorum longus tendons, suggesting tenosynovitis.        Assessment    Patient is a 47 y.o. male with osteomyelitis of left first proximal proximal phalanx  · Multiple chronic ulcerations of the left foot  · Possible osteomyelitis of 4th distal phalanx  · H/o partial digital amputations: bilateral 2nd, L hallux/3rd  · DM type 2 with peripheral polyneuropathy    Principal Problem:    Acute renal failure (ARF) ([facility])  Active Problems:    Anxiety disorder Essential hypertension    Type 2 diabetes mellitus (HCC)    Cellulitis of left foot    Osteomyelitis (HCC)    Non-traumatic rhabdomyolysis    Acute renal failure (HCC)    Ulcerated, foot, left, with necrosis of bone ([facility] [address]  Resolved Problems:    * No resolved hospital problems. *       Plan      - Patient examined and evaluated at bedside with Dr. Inder Salomon   - Patient does not want surgery at this time and elects for conservative treatment of the ulcerations despite underlying osteo   - Treatment options discussed in detail with the patient  - Dressing changed to left foot with fibracol to wound bases and DSD        - Follow wound cultures - S. aureus no sensitivity yet   - Daily dressing changes at home with fibracol, aquacel and DSD   - Follow-up in wound care clinic with Dr. Inder Salomon following dishcarge; call for appointment   - NWB to left foot     Robbie Brown DPM PGY-2  Podiatric Medicine & Surgery   Pager: [phone]    I performed a history and physical examination of the patient and discussed management with the resident. I reviewed the residents note and agree with the documented findings and plan of care. Any areas of disagreement are noted on the chart. I was personally present for the key portions of any procedures. I have documented in the chart those procedures where I was not present during the key portions. I have reviewed the Podiatry Resident progress note. I agree with the chief complaint, past medical history, past surgical history, allergies, medications, social and family history as documented unless otherwise noted below. Documentation of the HPI, Physical Exam and Medical Decision Making performed by medical students or scribes is based on my personal performance of the HPI, PE and MDM. I have personally evaluated this patient and have completed at least one if not all key elements of the E/M (history, physical exam, and MDM). Additional findings are as noted.      Juan Francisco Verdin, DPM on 2/16/2018 at 27:05 PM  Board Certified, American Board of Podiatric Surgery  Fellow, [employer] and [facility]

## 2018-02-14 NOTE — PROGRESS NOTES
left leg with discoloration due to chronic wound, wrap/ dressing on left foot. Neurologic: Mental status: Alert, oriented, thought content appropriate    Assessment and Plan:   1. Rhabdomyolysis  2. Renal failure- acute  3. DM with neuropathy  4. H/o Osteomyelitis  5. Chronic pain    Continue home meds with MS tid and no oxy. Discharge to home when okay with nephrology.      Patient Active Problem List:     Anxiety disorder     Benign prostatic hyperplasia     Depression     Dermatitis     Edema     Esophageal reflux     Essential hypertension     Hypothyroidism     Prostate cancer screening     Tinea corporis     Type 2 diabetes mellitus (HCC)     Right cervical radiculopathy     Cellulitis of toe of left foot     Cellulitis of left foot     Diabetic neuropathy (HCC)     Osteomyelitis (HCC)     Skin ulcer of left foot with necrosis of bone ([facility] [address]     Type 2 diabetes mellitus with foot ulcer, with long-term current use of insulin (HCC)     Type 2 diabetes mellitus with diabetic polyneuropathy, without long-term current use of insulin (HCC)     Cellulitis of foot, left     Osteomyelitis of foot, left, acute (HCC)     Non compliance with medical treatment     Cellulitis     Acute renal failure (ARF) (HCC)     Non-traumatic rhabdomyolysis     Acute renal failure (HCC)     Ulcerated, foot, left, with necrosis of bone ([address]      Electronically signed by Kelly Castro MD on 2/14/2018 at 10:35 AM

## 2018-02-14 NOTE — PLAN OF CARE
Problem: Falls - Risk of  Goal: Absence of falls  Outcome: Ongoing  No falls noted this shift. Patient ambulates with x1 staff assistance without difficulty. Bed kept in low position. Safe environment maintained. Bedside table & call light in reach. Uses call light appropriately when needing assistance. Problem: Pain:  Goal: Pain level will decrease  Pain level will decrease   Outcome: Ongoing  No pain at this time. 0/10 pain scale. Problem: Skin Integrity - Impaired:  Goal: Skin integrity is maintained or improved  No new skin breakdown noted this shift. Continue to monitor.

## 2018-02-17 LAB
CULTURE: ABNORMAL
DIRECT EXAM: ABNORMAL
DIRECT EXAM: ABNORMAL
Lab: ABNORMAL
ORGANISM: ABNORMAL
SPECIMEN DESCRIPTION: ABNORMAL
SPECIMEN DESCRIPTION: ABNORMAL
STATUS: ABNORMAL

## 2018-02-17 NOTE — PROGRESS NOTES
.Date Patient Discharged:02/14/18      Today's Date:02/16/18      Spoke with: Spouse Gris      Do you understand the purpose of your medications?:  Yes    Do you understand the side effects of your new medications?:  Yes    Would you like to speak with a pharmacist about any of your medications or the side effects?:  No    Were you able to get your prescriptions filled?:  Yes    Did you understand your discharge instructions and what you are responsible for in managing your health after discharge?:  Yes    While you were here, was your call light answered promptly?:  Yes    Was the area around your room kept quiet at night?:  Yes the best they could    Were your room and bathroom kept clean?:Yes

## 2018-03-07 PROBLEM — I80.3 THROMBOPHLEBITIS LEG: Status: ACTIVE | Noted: 2018-03-07

## 2018-03-15 ENCOUNTER — HOSPITAL ENCOUNTER (OUTPATIENT)
Dept: WOUND CARE | Age: 53
Discharge: HOME | End: 2018-03-15
Payer: MEDICARE

## 2018-03-15 VITALS
SYSTOLIC BLOOD PRESSURE: 149 MMHG | RESPIRATION RATE: 20 BRPM | TEMPERATURE: 98.2 F | DIASTOLIC BLOOD PRESSURE: 81 MMHG | HEART RATE: 103 BPM | WEIGHT: 214 LBS | BODY MASS INDEX: 27.46 KG/M2 | HEIGHT: 74 IN

## 2018-03-15 DIAGNOSIS — L97.524 ULCERATED, FOOT, LEFT, WITH NECROSIS OF BONE (HCC): Primary | ICD-10-CM

## 2018-03-15 DIAGNOSIS — Z91.199 NON COMPLIANCE WITH MEDICAL TREATMENT: ICD-10-CM

## 2018-03-15 DIAGNOSIS — L97.509 TYPE 2 DIABETES MELLITUS WITH FOOT ULCER, WITH LONG-TERM CURRENT USE OF INSULIN (HCC): ICD-10-CM

## 2018-03-15 DIAGNOSIS — E11.621 TYPE 2 DIABETES MELLITUS WITH FOOT ULCER, WITH LONG-TERM CURRENT USE OF INSULIN (HCC): ICD-10-CM

## 2018-03-15 DIAGNOSIS — Z79.4 TYPE 2 DIABETES MELLITUS WITH FOOT ULCER, WITH LONG-TERM CURRENT USE OF INSULIN (HCC): ICD-10-CM

## 2018-03-15 PROCEDURE — 6370000000 HC RX 637 (ALT 250 FOR IP): Performed by: PODIATRIST

## 2018-03-15 PROCEDURE — 11045 DBRDMT SUBQ TISS EACH ADDL: CPT

## 2018-03-15 PROCEDURE — 11045 DBRDMT SUBQ TISS EACH ADDL: CPT | Performed by: PODIATRIST

## 2018-03-15 PROCEDURE — 11042 DBRDMT SUBQ TIS 1ST 20SQCM/<: CPT

## 2018-03-15 PROCEDURE — 11042 DBRDMT SUBQ TIS 1ST 20SQCM/<: CPT | Performed by: PODIATRIST

## 2018-03-15 RX ORDER — LIDOCAINE HYDROCHLORIDE 40 MG/ML
SOLUTION TOPICAL ONCE
Status: COMPLETED | OUTPATIENT
Start: 2018-03-15 | End: 2018-03-15

## 2018-03-15 RX ADMIN — LIDOCAINE HYDROCHLORIDE 5 ML: 40 SOLUTION TOPICAL at 10:27

## 2018-03-15 ASSESSMENT — PAIN DESCRIPTION - FREQUENCY: FREQUENCY: CONTINUOUS

## 2018-03-15 ASSESSMENT — PAIN DESCRIPTION - DESCRIPTORS: DESCRIPTORS: CONSTANT;THROBBING;STABBING

## 2018-03-15 ASSESSMENT — PAIN DESCRIPTION - PAIN TYPE: TYPE: CHRONIC PAIN

## 2018-03-15 ASSESSMENT — PAIN DESCRIPTION - LOCATION: LOCATION: FOOT

## 2018-03-15 ASSESSMENT — PAIN DESCRIPTION - ORIENTATION: ORIENTATION: LEFT

## 2018-03-15 ASSESSMENT — PAIN SCALES - GENERAL: PAINLEVEL_OUTOF10: 8

## 2018-03-15 NOTE — PROGRESS NOTES
 Cellulitis of foot, left L03. 116    Osteomyelitis of foot, left, acute ([facility]) M86.172    Non compliance with medical treatment Z91.19    Cellulitis L03.90    Acute renal failure (ARF) ([facility]) N17.9    Non-traumatic rhabdomyolysis M62.82    Acute renal failure ([facility]) N17.9    Ulcerated, foot, left, with necrosis of bone ([facility]) L97.524    Thrombophlebitis leg ([facility]) I80.299    Type 2 diabetes mellitus with foot ulcer, with long-term current use of insulin ([facility]) E11.621, L97.509, Z79.4        Procedure Note  Indications:  Based on my examination of this patient's wound(s)/ulcer(s) today, debridement is required to promote healing and evaluate the wound base. Performed by: Gloria Ponce DPM    Consent obtained:  Yes    Time out taken:  Yes    Pain Control: Lidocaine jelly     Debridement:Excisional Debridement    Using curette the wound(s)/ulcer(s) was/were sharply debrided down through and including the removal of subcutaneous tissue.         Devitalized Tissue Debrided:  biofilm and slough    Pre Debridement Measurements:  Are located in the [facility]  Documentation Flow Sheet    Wound/Ulcer #: 7    Post Debridement Measurements:  Wound/Ulcer Descriptions are Pre Debridement except measurements:    Wound 03/01/17 Wound #5 right 5th toe, gradually appeared, 2/15/17 (Active)   Wound Cleansed Rinsed/Irrigated with saline 3/1/2017 11:12 AM   Wound Length (cm) 0.8 cm 3/1/2017 12:04 PM   Wound Width (cm) 0.5 cm 3/1/2017 12:04 PM   Wound Depth (cm)  0.1 3/1/2017 12:04 PM   Calculated Wound Size (cm^2) (l*w) 0.4 cm^2 3/1/2017 12:04 PM   Change in Wound Size % (l*w) 0 3/1/2017 12:04 PM   Drainage Amount Moderate 3/1/2017 11:12 AM   Drainage Description Serosanguinous 3/1/2017 11:12 AM   Mickie-wound Assessment Dark edges;Edema;Fragile 3/1/2017 11:12 AM   Red%Wound Bed 100 3/1/2017 11:12 AM   Debridement per physician Subcutaneous 3/1/2017 12:04 PM   Time out Yes 3/1/2017 12:04 PM   Procedural Pain 0 3/1/2017 12:04 PM Bed 80 3/15/2018 10:29 AM   Yellow%Wound Bed 20 3/15/2018 10:29 AM   Black%Wound Bed 10 2/8/2018 10:22 AM   Debridement per physician Bone 1/18/2018 11:06 AM   Time out Yes 1/18/2018 11:06 AM   Procedural Pain 0 1/18/2018 11:06 AM   Post procedural Pain 0 1/18/2018 11:06 AM   Number of days: 238       Incision 03/06/17 Foot Left (Active)   Number of days: 373       Incision 03/06/17 Foot Right (Active)   Number of days: 373       Percent of Wound(s)/Ulcer(s) Debrided: 100%    Total Surface Area Debrided:  72 sq cm       Diabetic/Pressure/Non Pressure Ulcers only:  Ulcer: Non-Pressure ulcer, fat layer exposed      Estimated Blood Loss:  Minimal    Hemostasis Achieved:  by pressure    Procedural Pain:  0  / 10     Post Procedural Pain:  0 / 10     Response to treatment:  Well tolerated by patient. Plan:     Treatment Note please see attached Discharge Instructions    Written patient dismissal instructions given to patient and signed by patient or POA. Discharge Instructions                 [facility]  CENTER                                                               [facility] Instructions / Physician Orders  3/15/18      Home Care-none      SUPPLIES ORDERED THRU:      Wound Location:  Left foot      Cleanse with:  Liquid antibacterial soap and water,rinse well      Dressing Orders: Silvercel to wound, then cover with dry dressing, wrap with kerlix-wear dressing at [address]  Frequency: 2x daily due to strike through in 12 hours      Additional Orders: Increase protein to diet (meat, cheese, eggs, fish, peanut butter, nuts and beans)  Multivitamin daily  tatianna boot at all times when up walking  Call Maryam Weathers for Even up device for right foot  Your next appointment with [address] [place] [address] is in 1 week      (Please note your next appointment above and if you are unable to keep, kindly give a 24 hour notice.  Thank you.)      If you experience any of the following, please call the [facility] during business hours:  [phone]      * Increase in Pain  * Temperature over 101  * Increase in drainage from your wound  * Drainage with a foul odor  * Bleeding  * Increase in swelling  * Need for compression bandage changes due to slippage, breakthrough drainage.      If you need medical attention outside of the business hours of the [address] please contact your PCP or go to the nearest emergency room.     Patient Signature_________________________________________Date_______________    Electronically signed by Hank Carter DPM on 3/15/2018 at 10:14 AM          Electronically signed by Marce Golden DPM on 3/15/2018 at 10:17 AM    I have not seen him in over a month, since he was in the hospital. No dressing on his foot today. Not wearing the offloading boot again. I performed a history and physical examination of the patient and discussed management with the resident. I reviewed the residents note and agree with the documented findings and plan of care. Any areas of disagreement are noted on the chart. I was personally present for the key portions of any procedures. I have documented in the chart those procedures where I was not present during the key portions. I have reviewed the Podiatry Resident progress note. I agree with the chief complaint, past medical history, past surgical history, allergies, medications, social and family history as documented unless otherwise noted below. Documentation of the HPI, Physical Exam and Medical Decision Making performed by medical students or scribes is based on my personal performance of the HPI, PE and MDM. I have personally evaluated this patient and have completed at least one if not all key elements of the E/M (history, physical exam, and MDM). Additional findings are as noted.      Hank Carter DPM on 3/16/2018 at 28:42 PM  Board Certified, American Board of Podiatric Surgery  Fellow, [facility]

## 2018-04-05 ENCOUNTER — HOSPITAL ENCOUNTER (OUTPATIENT)
Dept: WOUND CARE | Age: 53
Discharge: HOME | End: 2018-04-05
Payer: MEDICARE

## 2018-04-11 ENCOUNTER — HOSPITAL ENCOUNTER (OUTPATIENT)
Dept: VASCULAR LAB | Age: 53
Discharge: HOME | End: 2018-04-11
Payer: MEDICARE

## 2018-04-11 DIAGNOSIS — I80.3 THROMBOPHLEBITIS LEG: ICD-10-CM

## 2018-04-11 PROCEDURE — 93970 EXTREMITY STUDY: CPT

## 2018-04-20 ENCOUNTER — HOSPITAL ENCOUNTER (OUTPATIENT)
Dept: WOUND CARE | Age: 53
Discharge: HOME | End: 2018-04-20
Payer: MEDICARE

## 2018-04-20 VITALS
SYSTOLIC BLOOD PRESSURE: 142 MMHG | BODY MASS INDEX: 27.46 KG/M2 | HEIGHT: 74 IN | WEIGHT: 214 LBS | RESPIRATION RATE: 20 BRPM | TEMPERATURE: 98.4 F | HEART RATE: 77 BPM | DIASTOLIC BLOOD PRESSURE: 81 MMHG

## 2018-04-20 DIAGNOSIS — E11.621 TYPE 2 DIABETES MELLITUS WITH FOOT ULCER, WITH LONG-TERM CURRENT USE OF INSULIN (HCC): ICD-10-CM

## 2018-04-20 DIAGNOSIS — L03.116 CELLULITIS OF FOOT, LEFT: ICD-10-CM

## 2018-04-20 DIAGNOSIS — R60.0 LOCALIZED EDEMA: ICD-10-CM

## 2018-04-20 DIAGNOSIS — L97.509 TYPE 2 DIABETES MELLITUS WITH FOOT ULCER, WITH LONG-TERM CURRENT USE OF INSULIN (HCC): ICD-10-CM

## 2018-04-20 DIAGNOSIS — Z91.199 NON COMPLIANCE WITH MEDICAL TREATMENT: ICD-10-CM

## 2018-04-20 DIAGNOSIS — Z79.4 TYPE 2 DIABETES MELLITUS WITH FOOT ULCER, WITH LONG-TERM CURRENT USE OF INSULIN (HCC): ICD-10-CM

## 2018-04-20 DIAGNOSIS — M86.172 OSTEOMYELITIS OF FOOT, LEFT, ACUTE (HCC): ICD-10-CM

## 2018-04-20 DIAGNOSIS — L97.524 ULCERATED, FOOT, LEFT, WITH NECROSIS OF BONE (HCC): Primary | ICD-10-CM

## 2018-04-20 DIAGNOSIS — L03.116 CELLULITIS OF LEFT FOOT: ICD-10-CM

## 2018-04-20 PROCEDURE — 11042 DBRDMT SUBQ TIS 1ST 20SQCM/<: CPT | Performed by: PODIATRIST

## 2018-04-20 PROCEDURE — 11042 DBRDMT SUBQ TIS 1ST 20SQCM/<: CPT

## 2018-04-20 PROCEDURE — 11045 DBRDMT SUBQ TISS EACH ADDL: CPT | Performed by: PODIATRIST

## 2018-04-20 PROCEDURE — 11045 DBRDMT SUBQ TISS EACH ADDL: CPT

## 2018-04-20 PROCEDURE — 6370000000 HC RX 637 (ALT 250 FOR IP): Performed by: PODIATRIST

## 2018-04-20 RX ORDER — LIDOCAINE HYDROCHLORIDE 40 MG/ML
SOLUTION TOPICAL ONCE
Status: COMPLETED | OUTPATIENT
Start: 2018-04-20 | End: 2018-04-20

## 2018-04-20 RX ADMIN — LIDOCAINE HYDROCHLORIDE 10 ML: 40 SOLUTION TOPICAL at 09:46

## 2018-04-20 ASSESSMENT — PAIN SCALES - GENERAL
PAINLEVEL_OUTOF10: 8
PAINLEVEL_OUTOF10: 7

## 2018-04-20 ASSESSMENT — PAIN DESCRIPTION - PAIN TYPE: TYPE: CHRONIC PAIN

## 2018-04-20 ASSESSMENT — PAIN DESCRIPTION - LOCATION: LOCATION: FOOT

## 2018-04-20 ASSESSMENT — PAIN DESCRIPTION - FREQUENCY: FREQUENCY: CONTINUOUS

## 2018-04-20 ASSESSMENT — PAIN DESCRIPTION - PROGRESSION: CLINICAL_PROGRESSION: GRADUALLY WORSENING

## 2018-04-20 ASSESSMENT — PAIN DESCRIPTION - ONSET: ONSET: ON-GOING

## 2018-04-20 ASSESSMENT — PAIN DESCRIPTION - ORIENTATION: ORIENTATION: LEFT

## 2018-04-20 ASSESSMENT — PAIN DESCRIPTION - DESCRIPTORS: DESCRIPTORS: CONSTANT;THROBBING;STABBING

## 2018-05-04 ENCOUNTER — HOSPITAL ENCOUNTER (OUTPATIENT)
Dept: WOUND CARE | Age: 53
Discharge: HOME | End: 2018-05-04
Payer: MEDICARE

## 2018-05-11 ENCOUNTER — HOSPITAL ENCOUNTER (OUTPATIENT)
Dept: WOUND CARE | Age: 53
Discharge: HOME | End: 2018-05-11
Payer: MEDICARE

## 2018-05-18 ENCOUNTER — HOSPITAL ENCOUNTER (OUTPATIENT)
Dept: WOUND CARE | Age: 53
Discharge: HOME | End: 2018-05-18
Payer: MEDICARE

## 2018-05-25 ENCOUNTER — HOSPITAL ENCOUNTER (OUTPATIENT)
Dept: WOUND CARE | Age: 53
Discharge: HOME | End: 2018-05-25
Payer: MEDICARE

## 2018-05-25 VITALS
DIASTOLIC BLOOD PRESSURE: 64 MMHG | WEIGHT: 249 LBS | BODY MASS INDEX: 31.95 KG/M2 | HEIGHT: 74 IN | SYSTOLIC BLOOD PRESSURE: 109 MMHG | TEMPERATURE: 98.1 F | RESPIRATION RATE: 20 BRPM | HEART RATE: 89 BPM

## 2018-05-25 DIAGNOSIS — E11.621 TYPE 2 DIABETES MELLITUS WITH FOOT ULCER, WITH LONG-TERM CURRENT USE OF INSULIN (HCC): ICD-10-CM

## 2018-05-25 DIAGNOSIS — L97.524 ULCERATED, FOOT, LEFT, WITH NECROSIS OF BONE (HCC): Primary | ICD-10-CM

## 2018-05-25 DIAGNOSIS — Z91.199 NON COMPLIANCE WITH MEDICAL TREATMENT: ICD-10-CM

## 2018-05-25 DIAGNOSIS — Z79.4 TYPE 2 DIABETES MELLITUS WITH FOOT ULCER, WITH LONG-TERM CURRENT USE OF INSULIN (HCC): ICD-10-CM

## 2018-05-25 DIAGNOSIS — L97.509 TYPE 2 DIABETES MELLITUS WITH FOOT ULCER, WITH LONG-TERM CURRENT USE OF INSULIN (HCC): ICD-10-CM

## 2018-05-25 PROCEDURE — 6370000000 HC RX 637 (ALT 250 FOR IP): Performed by: PODIATRIST

## 2018-05-25 PROCEDURE — 11042 DBRDMT SUBQ TIS 1ST 20SQCM/<: CPT | Performed by: PODIATRIST

## 2018-05-25 PROCEDURE — 11042 DBRDMT SUBQ TIS 1ST 20SQCM/<: CPT

## 2018-05-25 RX ORDER — LIDOCAINE HYDROCHLORIDE 40 MG/ML
SOLUTION TOPICAL ONCE
Status: COMPLETED | OUTPATIENT
Start: 2018-05-25 | End: 2018-05-25

## 2018-05-25 RX ADMIN — LIDOCAINE HYDROCHLORIDE 5 ML: 40 SOLUTION TOPICAL at 10:34

## 2018-05-25 ASSESSMENT — PAIN DESCRIPTION - DESCRIPTORS: DESCRIPTORS: CONSTANT;STABBING;THROBBING

## 2018-05-25 ASSESSMENT — PAIN SCALES - GENERAL: PAINLEVEL_OUTOF10: 8

## 2018-05-25 ASSESSMENT — PAIN DESCRIPTION - LOCATION: LOCATION: FOOT

## 2018-05-25 ASSESSMENT — PAIN DESCRIPTION - FREQUENCY: FREQUENCY: CONTINUOUS

## 2018-05-25 ASSESSMENT — PAIN DESCRIPTION - PAIN TYPE: TYPE: CHRONIC PAIN

## 2018-05-25 ASSESSMENT — PAIN DESCRIPTION - ORIENTATION: ORIENTATION: LEFT

## 2018-06-15 ENCOUNTER — HOSPITAL ENCOUNTER (OUTPATIENT)
Dept: WOUND CARE | Age: 53
Discharge: HOME | End: 2018-06-15

## 2018-06-21 ENCOUNTER — HOSPITAL ENCOUNTER (OUTPATIENT)
Dept: WOUND CARE | Age: 53
Discharge: HOME | End: 2018-06-21
Payer: MEDICARE

## 2018-06-28 ENCOUNTER — HOSPITAL ENCOUNTER (OUTPATIENT)
Dept: WOUND CARE | Age: 53
Discharge: HOME | End: 2018-06-28
Payer: MEDICARE

## 2018-07-06 ENCOUNTER — HOSPITAL ENCOUNTER (OUTPATIENT)
Dept: WOUND CARE | Age: 53
Discharge: HOME | End: 2018-07-06
Payer: MEDICARE

## 2018-07-06 VITALS
BODY MASS INDEX: 31.95 KG/M2 | TEMPERATURE: 98.6 F | HEIGHT: 74 IN | DIASTOLIC BLOOD PRESSURE: 65 MMHG | SYSTOLIC BLOOD PRESSURE: 110 MMHG | RESPIRATION RATE: 18 BRPM | WEIGHT: 249 LBS | HEART RATE: 98 BPM

## 2018-07-06 PROCEDURE — 11042 DBRDMT SUBQ TIS 1ST 20SQCM/<: CPT

## 2018-07-06 PROCEDURE — 11042 DBRDMT SUBQ TIS 1ST 20SQCM/<: CPT | Performed by: NURSE PRACTITIONER

## 2018-07-06 PROCEDURE — 6370000000 HC RX 637 (ALT 250 FOR IP): Performed by: NURSE PRACTITIONER

## 2018-07-06 RX ORDER — LIDOCAINE HYDROCHLORIDE 40 MG/ML
SOLUTION TOPICAL ONCE
Status: COMPLETED | OUTPATIENT
Start: 2018-07-06 | End: 2018-07-06

## 2018-07-06 RX ADMIN — LIDOCAINE HYDROCHLORIDE 5 ML: 40 SOLUTION TOPICAL at 11:14

## 2018-07-06 ASSESSMENT — PAIN DESCRIPTION - DESCRIPTORS: DESCRIPTORS: CONSTANT;STABBING;THROBBING

## 2018-07-06 ASSESSMENT — PAIN DESCRIPTION - PROGRESSION: CLINICAL_PROGRESSION: NOT CHANGED

## 2018-07-06 ASSESSMENT — PAIN SCALES - GENERAL: PAINLEVEL_OUTOF10: 8

## 2018-07-06 ASSESSMENT — PAIN DESCRIPTION - LOCATION: LOCATION: FOOT

## 2018-07-06 ASSESSMENT — PAIN DESCRIPTION - ONSET: ONSET: ON-GOING

## 2018-07-06 ASSESSMENT — PAIN DESCRIPTION - FREQUENCY: FREQUENCY: INTERMITTENT

## 2018-07-06 ASSESSMENT — PAIN DESCRIPTION - ORIENTATION: ORIENTATION: LEFT

## 2018-07-06 ASSESSMENT — PAIN DESCRIPTION - PAIN TYPE: TYPE: CHRONIC PAIN

## 2018-07-06 NOTE — PLAN OF CARE
Problem: Pain:  Goal: Pain level will decrease  Pain level will decrease   Outcome: Ongoing    Goal: Control of acute pain  Control of acute pain   Outcome: Ongoing    Goal: Control of chronic pain  Control of chronic pain   Outcome: Ongoing      Problem: Falls - Risk of:  Goal: Will remain free from falls  Will remain free from falls   Outcome: Ongoing    Goal: Absence of physical injury  Absence of physical injury   Outcome: Ongoing      Problem: Wound:  Goal: Will show signs of wound healing; wound closure and no evidence of infection  Will show signs of wound healing; wound closure and no evidence of infection   Outcome: Ongoing

## 2018-07-06 NOTE — PROGRESS NOTES
[facility]   Progress Note and Procedure Note      [address] [place] [address] RECORD NUMBER:  [id number]  AGE: 46 y.o. GENDER: male  : 1965  EPISODE DATE:  2018    Subjective:     Chief Complaint   Patient presents with    Wound Check     right great toe, left foot         HISTORY of PRESENT ILLNESS HPI     Stacie Maldonado is a 46 y.o. male who presents today for wound/ulcer evaluation. History of Wound Context: 46year old male for wound care follow up. He has been using silver alginate to the wounds. He has been sick with bronchitis and has not been as active. The wounds look improved. His toenail on the right great toe is missing- he states it fell off. Wound/Ulcer Pain Timing/Severity: none  Quality of pain: N/A  Severity:  0 / 10   Modifying Factors: None  Associated Signs/Symptoms: edema and drainage    Ulcer Identification:  Ulcer Type: arterial, diabetic, pressure and neuropathic  Contributing Factors: edema, venous stasis, diabetes, poor glucose control, chronic pressure, shear force and arterial insufficiency          PAST MEDICAL HISTORY        Diagnosis Date    Anxiety     Arthritis     lower back, knees    BPH (benign prostatic hyperplasia)     Chronic kidney disease     prostate    Depression     Diabetes [facility]     Diabetic neuropathy (HCC)     GERD (gastroesophageal reflux disease)     Hyperglycemia     Hypertension     Hypothyroidism     Impacted tooth     Pneumonia        PAST SURGICAL HISTORY    Past Surgical History:   Procedure Laterality Date    APPENDECTOMY      BACK SURGERY      Lower x 4. Had abscess after appendectomy.      KNEE SURGERY Right     scope    TOE AMPUTATION Bilateral 3/6/2017    TOE AMPUTATION LEFT HALLUX AND 2ND DIGIT AND RIGHT 2ND DIGIT performed by Blondie Homans, DPM at [facility]    Family History   Problem Relation Age of Onset    Diabetes Father     Cancer  omeprazole (PRILOSEC) 40 MG delayed release capsule TAKE ONE CAPSULE BY MOUTH EVERY NIGHT AT BEDTIME 30 capsule 11    Wound Dressings (ADAPTIC NON-ADHERING DRESSING) PADS Apply 1 Units topically 2 times daily 60 each 5    Gauze Pads & Dressings (COMBINE ABD) 5\"X9\" PADS 1 Units by Does not apply route 2 times daily 60 each 5    Gauze Pads & Dressings (KERLIX GAUZE ROLL LARGE) MISC 1 Units by Does not apply route 2 times daily 60 each 5     No current facility-administered medications on file prior to encounter. REVIEW OF SYSTEMS    Pertinent items are noted in HPI. Objective:     Patient Active Problem List   Diagnosis Code    Anxiety disorder F41.9    Benign prostatic hyperplasia N40.0    Depression F32.9    Dermatitis L30.9    Edema R60.9    Esophageal reflux K21.9    Essential hypertension I10    Hypothyroidism E03.9    Prostate cancer screening Z12.5    Tinea corporis B35.4    Right cervical radiculopathy M54.12    Cellulitis of left foot L03. 116    Osteomyelitis ([address] M86.9    Cellulitis of foot, left L03. 116    Osteomyelitis of foot, left, acute ([facility]) M86.172    Non compliance with medical treatment Z91.19    Cellulitis L03.90    Acute renal failure (ARF) ([facility]) N17.9    Non-traumatic rhabdomyolysis M62.82    Acute renal failure ([facility]) N17.9    Ulcerated, foot, left, with necrosis of bone ([facility]) L97.524    Thrombophlebitis leg ([facility]) I80.299    Type 2 diabetes mellitus with foot ulcer, with long-term current use of insulin ([facility]) E11.621, L97.509, Z79.4        /65   Pulse 98   Temp 98.6 °F (37 °C) (Tympanic)   Resp 18   Ht 6' 2\" (1.88 m)   Wt 249 lb (112.9 kg)   BMI 31.97 kg/m²       Wt Readings from Last 3 Encounters:   07/06/18 249 lb (112.9 kg)   05/25/18 249 lb (112.9 kg)   04/24/18 225 lb 3.2 oz (102.2 kg)       PHYSICAL EXAM    General Appearance: in no acute distress and alert  Skin: warm and dry and multiple ulcerations to left foot, wound beds clean with small amount of drainage. Right great toe with toenail no longer present, wound bed clean. Assessment:      Patient Active Problem List   Diagnosis Code    Anxiety disorder F41.9    Benign prostatic hyperplasia N40.0    Depression F32.9    Dermatitis L30.9    Edema R60.9    Esophageal reflux K21.9    Essential hypertension I10    Hypothyroidism E03.9    Prostate cancer screening Z12.5    Tinea corporis B35.4    Right cervical radiculopathy M54.12    Cellulitis of left foot L03. 116    Osteomyelitis ([address] M86.9    Cellulitis of foot, left L03. 116    Osteomyelitis of foot, left, acute ([facility]) M86.172    Non compliance with medical treatment Z91.19    Cellulitis L03.90    Acute renal failure (ARF) ([facility]) N17.9    Non-traumatic rhabdomyolysis M62.82    Acute renal failure ([facility]) N17.9    Ulcerated, foot, left, with necrosis of bone ([facility]) L97.524    Thrombophlebitis leg ([facility]) I80.299    Type 2 diabetes mellitus with foot ulcer, with long-term current use of insulin ([facility]) E11.621, L97.509, Z79.4        Procedure Note  Indications:  Based on my examination of this patient's wound(s)/ulcer(s) today, debridement is required to promote healing and evaluate the wound base. Performed by: MALISSA Arguello CNP    Consent obtained:  Yes    Time out taken:  Yes    Pain Control: Anesthetic  Anesthetic: 4% Lidocaine Liquid Topical       Debridement:Excisional Debridement    Using curette the wound(s)/ulcer(s) was/were sharply debrided down through and including the removal of subcutaneous tissue.         Devitalized Tissue Debrided:  fibrin, biofilm and slough    Pre Debridement Measurements:  Are located in the [place]  Documentation Flow Sheet    Wound/Ulcer #: 7,8    Post Debridement Measurements:  Wound/Ulcer Descriptions are Pre Debridement except measurements:    Wound 03/01/17 Wound #5 right 5th toe, gradually appeared, 2/15/17 (Active)   Wound Cleansed Rinsed/Irrigated with saline 3/1/2017

## 2018-08-03 ENCOUNTER — TELEPHONE (OUTPATIENT)
Dept: WOUND CARE | Age: 53
End: 2018-08-03

## 2018-08-03 ENCOUNTER — HOSPITAL ENCOUNTER (OUTPATIENT)
Dept: WOUND CARE | Age: 53
Discharge: HOME | End: 2018-08-03
Payer: MEDICARE

## 2018-09-12 PROBLEM — L03.90 CELLULITIS: Status: RESOLVED | Noted: 2018-01-14 | Resolved: 2018-09-12

## 2018-09-12 PROBLEM — N17.9 ACUTE RENAL FAILURE (ARF) (HCC): Status: RESOLVED | Noted: 2018-02-10 | Resolved: 2018-09-12

## 2018-09-12 PROBLEM — L03.116 CELLULITIS OF FOOT, LEFT: Status: RESOLVED | Noted: 2017-07-20 | Resolved: 2018-09-12

## 2018-10-20 ENCOUNTER — HOSPITAL ENCOUNTER (OUTPATIENT)
Age: 53
Setting detail: OBSERVATION
Discharge: HOME | End: 2018-10-21
Attending: EMERGENCY MEDICINE | Admitting: FAMILY MEDICINE
Payer: MEDICARE

## 2018-10-20 ENCOUNTER — APPOINTMENT (OUTPATIENT)
Dept: GENERAL RADIOLOGY | Age: 53
End: 2018-10-20
Payer: MEDICARE

## 2018-10-20 VITALS
OXYGEN SATURATION: 97 % | DIASTOLIC BLOOD PRESSURE: 55 MMHG | WEIGHT: 255 LBS | TEMPERATURE: 98.5 F | SYSTOLIC BLOOD PRESSURE: 118 MMHG | HEIGHT: 76 IN | BODY MASS INDEX: 31.05 KG/M2 | HEART RATE: 71 BPM | RESPIRATION RATE: 16 BRPM

## 2018-10-20 DIAGNOSIS — I95.9 HYPOTENSION, UNSPECIFIED HYPOTENSION TYPE: Primary | ICD-10-CM

## 2018-10-20 DIAGNOSIS — T48.201A: ICD-10-CM

## 2018-10-20 LAB
-: ABNORMAL
ABSOLUTE EOS #: 0.2 K/UL (ref 0–0.4)
ABSOLUTE IMMATURE GRANULOCYTE: ABNORMAL K/UL (ref 0–0.3)
ABSOLUTE LYMPH #: 2.4 K/UL (ref 1–4.8)
ABSOLUTE MONO #: 1 K/UL (ref 0.1–1.3)
ACETAMINOPHEN LEVEL: <5 UG/ML (ref 10–30)
ALBUMIN SERPL-MCNC: 4 G/DL (ref 3.5–5.2)
ALBUMIN/GLOBULIN RATIO: ABNORMAL (ref 1–2.5)
ALP BLD-CCNC: 126 U/L (ref 40–129)
ALT SERPL-CCNC: 8 U/L (ref 5–41)
AMORPHOUS: ABNORMAL
ANION GAP SERPL CALCULATED.3IONS-SCNC: 11 MMOL/L (ref 9–17)
AST SERPL-CCNC: 13 U/L
BACTERIA: ABNORMAL
BASOPHILS # BLD: 1 % (ref 0–2)
BASOPHILS ABSOLUTE: 0.1 K/UL (ref 0–0.2)
BILIRUB SERPL-MCNC: 0.23 MG/DL (ref 0.3–1.2)
BILIRUBIN URINE: NEGATIVE
BUN BLDV-MCNC: 14 MG/DL (ref 6–20)
BUN/CREAT BLD: ABNORMAL (ref 9–20)
CALCIUM SERPL-MCNC: 9.8 MG/DL (ref 8.6–10.4)
CASTS UA: ABNORMAL /LPF
CHLORIDE BLD-SCNC: 95 MMOL/L (ref 98–107)
CO2: 25 MMOL/L (ref 20–31)
COLOR: YELLOW
COMMENT UA: ABNORMAL
CREAT SERPL-MCNC: 1.13 MG/DL (ref 0.7–1.2)
CRYSTALS, UA: ABNORMAL /HPF
DIFFERENTIAL TYPE: ABNORMAL
EKG ATRIAL RATE: 70 BPM
EKG P AXIS: 42 DEGREES
EKG P-R INTERVAL: 178 MS
EKG Q-T INTERVAL: 420 MS
EKG QRS DURATION: 86 MS
EKG QTC CALCULATION (BAZETT): 453 MS
EKG R AXIS: 26 DEGREES
EKG T AXIS: 57 DEGREES
EKG VENTRICULAR RATE: 70 BPM
EOSINOPHILS RELATIVE PERCENT: 1 % (ref 0–4)
EPITHELIAL CELLS UA: ABNORMAL /HPF
ETHANOL PERCENT: <0.01 %
ETHANOL: <10 MG/DL
GFR AFRICAN AMERICAN: >60 ML/MIN
GFR NON-AFRICAN AMERICAN: >60 ML/MIN
GFR SERPL CREATININE-BSD FRML MDRD: ABNORMAL ML/MIN/{1.73_M2}
GFR SERPL CREATININE-BSD FRML MDRD: ABNORMAL ML/MIN/{1.73_M2}
GLUCOSE BLD-MCNC: 130 MG/DL (ref 70–99)
GLUCOSE URINE: NEGATIVE
HCT VFR BLD CALC: 40.4 % (ref 41–53)
HEMOGLOBIN: 13.5 G/DL (ref 13.5–17.5)
IMMATURE GRANULOCYTES: ABNORMAL %
KETONES, URINE: NEGATIVE
LACTIC ACID: 2.3 MMOL/L (ref 0.5–2.2)
LEUKOCYTE ESTERASE, URINE: NEGATIVE
LYMPHOCYTES # BLD: 22 % (ref 24–44)
MCH RBC QN AUTO: 26.5 PG (ref 26–34)
MCHC RBC AUTO-ENTMCNC: 33.5 G/DL (ref 31–37)
MCV RBC AUTO: 79.1 FL (ref 80–100)
MONOCYTES # BLD: 9 % (ref 1–7)
MUCUS: ABNORMAL
NITRITE, URINE: NEGATIVE
NRBC AUTOMATED: ABNORMAL PER 100 WBC
OTHER OBSERVATIONS UA: ABNORMAL
PDW BLD-RTO: 16.1 % (ref 11.5–14.9)
PH UA: 7.5 (ref 5–8)
PLATELET # BLD: 474 K/UL (ref 150–450)
PLATELET ESTIMATE: ABNORMAL
PMV BLD AUTO: 7.4 FL (ref 6–12)
POTASSIUM SERPL-SCNC: 4.2 MMOL/L (ref 3.7–5.3)
PROTEIN UA: ABNORMAL
RBC # BLD: 5.1 M/UL (ref 4.5–5.9)
RBC # BLD: ABNORMAL 10*6/UL
RBC UA: ABNORMAL /HPF
RENAL EPITHELIAL, UA: ABNORMAL /HPF
SALICYLATE LEVEL: <1 MG/DL (ref 3–10)
SEG NEUTROPHILS: 67 % (ref 36–66)
SEGMENTED NEUTROPHILS ABSOLUTE COUNT: 7.3 K/UL (ref 1.3–9.1)
SODIUM BLD-SCNC: 131 MMOL/L (ref 135–144)
SPECIFIC GRAVITY UA: 1.02 (ref 1–1.03)
TOTAL PROTEIN: 8.7 G/DL (ref 6.4–8.3)
TRICHOMONAS: ABNORMAL
TRICYCLIC ANTIDEP,URINE: NEGATIVE
TROPONIN INTERP: NORMAL
TROPONIN T: <0.03 NG/ML
TURBIDITY: ABNORMAL
URINE HGB: ABNORMAL
UROBILINOGEN, URINE: NORMAL
WBC # BLD: 10.9 K/UL (ref 3.5–11)
WBC # BLD: ABNORMAL 10*3/UL
WBC UA: ABNORMAL /HPF
YEAST: ABNORMAL

## 2018-10-20 PROCEDURE — 83605 ASSAY OF LACTIC ACID: CPT

## 2018-10-20 PROCEDURE — 71045 X-RAY EXAM CHEST 1 VIEW: CPT

## 2018-10-20 PROCEDURE — 84484 ASSAY OF TROPONIN QUANT: CPT

## 2018-10-20 PROCEDURE — 80307 DRUG TEST PRSMV CHEM ANLYZR: CPT

## 2018-10-20 PROCEDURE — 36415 COLL VENOUS BLD VENIPUNCTURE: CPT

## 2018-10-20 PROCEDURE — 80053 COMPREHEN METABOLIC PANEL: CPT

## 2018-10-20 PROCEDURE — 99285 EMERGENCY DEPT VISIT HI MDM: CPT

## 2018-10-20 PROCEDURE — 2580000003 HC RX 258: Performed by: EMERGENCY MEDICINE

## 2018-10-20 PROCEDURE — G0480 DRUG TEST DEF 1-7 CLASSES: HCPCS

## 2018-10-20 PROCEDURE — 87040 BLOOD CULTURE FOR BACTERIA: CPT

## 2018-10-20 PROCEDURE — 85025 COMPLETE CBC W/AUTO DIFF WBC: CPT

## 2018-10-20 PROCEDURE — 81001 URINALYSIS AUTO W/SCOPE: CPT

## 2018-10-20 PROCEDURE — 6370000000 HC RX 637 (ALT 250 FOR IP): Performed by: EMERGENCY MEDICINE

## 2018-10-20 RX ORDER — 0.9 % SODIUM CHLORIDE 0.9 %
1000 INTRAVENOUS SOLUTION INTRAVENOUS ONCE
Status: DISCONTINUED | OUTPATIENT
Start: 2018-10-20 | End: 2018-10-21 | Stop reason: HOSPADM

## 2018-10-20 RX ORDER — SODIUM CHLORIDE 0.9 % (FLUSH) 0.9 %
10 SYRINGE (ML) INJECTION PRN
Status: CANCELLED | OUTPATIENT
Start: 2018-10-20

## 2018-10-20 RX ORDER — ONDANSETRON 2 MG/ML
4 INJECTION INTRAMUSCULAR; INTRAVENOUS EVERY 8 HOURS PRN
Status: CANCELLED | OUTPATIENT
Start: 2018-10-20

## 2018-10-20 RX ORDER — SODIUM CHLORIDE 0.9 % (FLUSH) 0.9 %
10 SYRINGE (ML) INJECTION EVERY 12 HOURS SCHEDULED
Status: CANCELLED | OUTPATIENT
Start: 2018-10-20

## 2018-10-20 RX ORDER — HYDROCODONE BITARTRATE AND ACETAMINOPHEN 5; 325 MG/1; MG/1
1 TABLET ORAL EVERY 4 HOURS PRN
Status: CANCELLED | OUTPATIENT
Start: 2018-10-20

## 2018-10-20 RX ORDER — HYDROCODONE BITARTRATE AND ACETAMINOPHEN 5; 325 MG/1; MG/1
2 TABLET ORAL ONCE
Status: COMPLETED | OUTPATIENT
Start: 2018-10-21 | End: 2018-10-20

## 2018-10-20 RX ORDER — HYDROCODONE BITARTRATE AND ACETAMINOPHEN 5; 325 MG/1; MG/1
2 TABLET ORAL EVERY 4 HOURS PRN
Status: CANCELLED | OUTPATIENT
Start: 2018-10-20

## 2018-10-20 RX ORDER — 0.9 % SODIUM CHLORIDE 0.9 %
1000 INTRAVENOUS SOLUTION INTRAVENOUS ONCE
Status: COMPLETED | OUTPATIENT
Start: 2018-10-20 | End: 2018-10-21

## 2018-10-20 RX ORDER — ACETAMINOPHEN 325 MG/1
650 TABLET ORAL EVERY 4 HOURS PRN
Status: CANCELLED | OUTPATIENT
Start: 2018-10-20

## 2018-10-20 RX ORDER — SODIUM CHLORIDE 9 MG/ML
INJECTION, SOLUTION INTRAVENOUS CONTINUOUS
Status: CANCELLED | OUTPATIENT
Start: 2018-10-21

## 2018-10-20 RX ADMIN — SODIUM CHLORIDE 1000 ML: 9 INJECTION, SOLUTION INTRAVENOUS at 22:44

## 2018-10-20 RX ADMIN — HYDROCODONE BITARTRATE AND ACETAMINOPHEN 2 TABLET: 5; 325 TABLET ORAL at 23:57

## 2018-10-20 RX ADMIN — SODIUM CHLORIDE 1000 ML: 9 INJECTION, SOLUTION INTRAVENOUS at 23:47

## 2018-10-20 ASSESSMENT — PAIN DESCRIPTION - ORIENTATION: ORIENTATION: LEFT

## 2018-10-20 ASSESSMENT — ENCOUNTER SYMPTOMS
NAUSEA: 0
COUGH: 0
SHORTNESS OF BREATH: 0
COLOR CHANGE: 1
VOMITING: 0
ABDOMINAL PAIN: 0

## 2018-10-20 ASSESSMENT — PAIN DESCRIPTION - DESCRIPTORS: DESCRIPTORS: THROBBING;SHARP

## 2018-10-20 ASSESSMENT — PAIN SCALES - GENERAL
PAINLEVEL_OUTOF10: 9
PAINLEVEL_OUTOF10: 9

## 2018-10-20 ASSESSMENT — PAIN DESCRIPTION - LOCATION: LOCATION: FOOT

## 2018-10-20 ASSESSMENT — PAIN DESCRIPTION - FREQUENCY: FREQUENCY: CONTINUOUS

## 2018-10-21 PROCEDURE — G0378 HOSPITAL OBSERVATION PER HR: HCPCS

## 2018-10-21 PROCEDURE — 93005 ELECTROCARDIOGRAM TRACING: CPT

## 2018-10-21 NOTE — ED PROVIDER NOTES
[address] ED  eMERGENCY dEPARTMENT eNCOUnter      Pt Name: Felisha Erickson  MRN: [id number]  [place] 1965  Date of evaluation: 10/20/18      CHIEF COMPLAINT     Hypotension / syncope / drug overdose. HISTORY OF PRESENT ILLNESS   HPI 46 y.o. male presents after syncopal episode. The patient had an episode of syncope about an hour prior to presentation. He tried to stand up, but passed out and fell over onto the floor. Wife states that he had passed out just momentarily. The patient states that he ran out of his pain medication when she's been taking for chronic left foot pain because of poor healing ulcer at about 4 amputation site. He has ran out of his pain medication he took 10 or 11 Zanaflex over an 8 hour period trying to relieve his foot pain. He denies any headache or neck pain. He denies any chest pain abdominal pain or difficulty breathing. He does say that he feels fatigued and lightheaded when he stands up. No fevers or chills. No difficulty urinating or pain with urination. He has chronic discoloration of his left leg for the last year, no worsening leg swelling or calf tenderness. Her DVT or PE.    REVIEW OF SYSTEMS     Review of Systems   Constitutional: Positive for activity change, appetite change and fatigue. Negative for chills and fever. HENT: Negative for congestion. Eyes: Negative for visual disturbance. Respiratory: Negative for cough and shortness of breath. Cardiovascular: Negative for chest pain. Gastrointestinal: Negative for abdominal pain, nausea and vomiting. Genitourinary: Negative for dysuria. Musculoskeletal: Positive for gait problem. Skin: Positive for color change (Chronic), rash and wound. Neurological: Positive for dizziness, syncope and light-headedness. Negative for headaches. Hematological: Negative for adenopathy.          PAST MEDICAL HISTORY     Past Medical History:   Diagnosis Date    Anxiety     Arthritis     lower FOUR TIMES A DAY, Disp-120 tablet, R-3Normal      Blood Glucose Monitoring Suppl (ONE TOUCH ULTRA 2) w/Device KIT DAILY Starting Fri 3/30/2018, Disp-1 kit, R-5, Normal      ONETOUCH DELICA LANCETS 13O MISC TEST TWO TIMES A DAY, Disp-100 each, R-2Normal      ONE TOUCH ULTRA TEST strip TEST TWO TIMES A DAY AS NEEDED, Disp-50 strip, R-2Normal      Wound Dressings (FIBRACOL) MISC Apply to wound base daily. , Disp-1 each, R-0Normal      !! Wound Dressings (AQUACEL FOAM 4\"X4\") PADS Apply daily over fibracol and wound and cover with dry gauze and kerlix, Disp-1 each, R-0Normal      ciclopirox (LOPROX) 0.77 % cream APPLY TO AFFECTED AREA(S) AND RUB  IN A THIN FILM TWO TIMES A DAY (AM AND PM), Disp-90 g, R-2, Normal      NYAMYC 666751 UNIT/GM powder APPLY TO AFFECTED AREA(S) FOUR TIMES A DAY, Disp-30 g, R-2Normal      tamsulosin (FLOMAX) 0.4 MG capsule TAKE ONE CAPSULE BY MOUTH DAILY, Disp-30 capsule, R-11Normal      omeprazole (PRILOSEC) 40 MG delayed release capsule TAKE ONE CAPSULE BY MOUTH EVERY NIGHT AT BEDTIME, Disp-30 capsule, R-11Normal      Gauze Pads & Dressings (KERLIX GAUZE ROLL LARGE) MISC 2 TIMES DAILY Starting 6/12/2017, Until Wed 7/12/17, Disp-60 each, R-5, Normal       !! - Potential duplicate medications found. Please discuss with provider. ALLERGIES     is allergic to fiorinal [butalbital-aspirin-caffeine] and peanut butter flavor [flavoring agent]. FAMILY HISTORY     indicated that the status of his mother is unknown. He indicated that the status of his father is unknown. He indicated that the status of his maternal grandfather is unknown. SOCIAL HISTORY      reports that he has never smoked. He has never used smokeless tobacco. He reports that he uses drugs, including Marijuana, about 2 times per week. He reports that he does not drink alcohol.     PHYSICAL EXAM     INITIAL VITALS: BP (!) 118/55   Pulse 71   Temp 98.5 °F (36.9 °C) (Oral)   Resp 16   Ht 6' 4\" (1.93 m)   Wt 255 lb

## 2018-10-21 NOTE — ED NOTES
Pt is given verbal consent for wife to answer, pt is unwilling to answer triage questions.      Meaghan Ortiz RN  10/20/18 2712

## 2018-10-25 ENCOUNTER — TELEPHONE (OUTPATIENT)
Dept: PODIATRY | Age: 53
End: 2018-10-25

## 2018-10-27 LAB
CULTURE: NORMAL
CULTURE: NORMAL
Lab: NORMAL
Lab: NORMAL
SPECIMEN DESCRIPTION: NORMAL
SPECIMEN DESCRIPTION: NORMAL
STATUS: NORMAL
STATUS: NORMAL

## 2018-11-05 ENCOUNTER — OFFICE VISIT (OUTPATIENT)
Dept: PODIATRY | Age: 53
End: 2018-11-05
Payer: MEDICARE

## 2018-11-05 VITALS — BODY MASS INDEX: 32.08 KG/M2 | HEIGHT: 74 IN | WEIGHT: 250 LBS

## 2018-11-05 DIAGNOSIS — E11.42 TYPE 2 DIABETES MELLITUS WITH DIABETIC POLYNEUROPATHY, WITHOUT LONG-TERM CURRENT USE OF INSULIN (HCC): ICD-10-CM

## 2018-11-05 DIAGNOSIS — L97.522 ULCER OF LEFT FOOT, WITH FAT LAYER EXPOSED (HCC): Primary | ICD-10-CM

## 2018-11-05 DIAGNOSIS — L97.509 TYPE 2 DIABETES MELLITUS WITH FOOT ULCER, WITHOUT LONG-TERM CURRENT USE OF INSULIN (HCC): ICD-10-CM

## 2018-11-05 DIAGNOSIS — E11.621 TYPE 2 DIABETES MELLITUS WITH FOOT ULCER, WITHOUT LONG-TERM CURRENT USE OF INSULIN (HCC): ICD-10-CM

## 2018-11-05 PROCEDURE — 11045 DBRDMT SUBQ TISS EACH ADDL: CPT | Performed by: PODIATRIST

## 2018-11-05 PROCEDURE — G8417 CALC BMI ABV UP PARAM F/U: HCPCS | Performed by: PODIATRIST

## 2018-11-05 PROCEDURE — 11042 DBRDMT SUBQ TIS 1ST 20SQCM/<: CPT | Performed by: PODIATRIST

## 2018-11-05 PROCEDURE — 3044F HG A1C LEVEL LT 7.0%: CPT | Performed by: PODIATRIST

## 2018-11-05 PROCEDURE — 99213 OFFICE O/P EST LOW 20 MIN: CPT | Performed by: PODIATRIST

## 2018-11-05 PROCEDURE — 3017F COLORECTAL CA SCREEN DOC REV: CPT | Performed by: PODIATRIST

## 2018-11-05 PROCEDURE — G8482 FLU IMMUNIZE ORDER/ADMIN: HCPCS | Performed by: PODIATRIST

## 2018-11-05 PROCEDURE — 2022F DILAT RTA XM EVC RTNOPTHY: CPT | Performed by: PODIATRIST

## 2018-11-05 PROCEDURE — 1036F TOBACCO NON-USER: CPT | Performed by: PODIATRIST

## 2018-11-05 PROCEDURE — G8427 DOCREV CUR MEDS BY ELIG CLIN: HCPCS | Performed by: PODIATRIST

## 2018-12-03 ENCOUNTER — OFFICE VISIT (OUTPATIENT)
Dept: PODIATRY | Age: 53
End: 2018-12-03
Payer: MEDICARE

## 2018-12-03 VITALS — WEIGHT: 250 LBS | BODY MASS INDEX: 32.08 KG/M2 | HEIGHT: 74 IN

## 2018-12-03 DIAGNOSIS — E11.42 TYPE 2 DIABETES MELLITUS WITH DIABETIC POLYNEUROPATHY, WITHOUT LONG-TERM CURRENT USE OF INSULIN (HCC): ICD-10-CM

## 2018-12-03 DIAGNOSIS — L97.522 ULCER OF LEFT FOOT, WITH FAT LAYER EXPOSED (HCC): Primary | ICD-10-CM

## 2018-12-03 DIAGNOSIS — L97.509 TYPE 2 DIABETES MELLITUS WITH FOOT ULCER, WITHOUT LONG-TERM CURRENT USE OF INSULIN (HCC): ICD-10-CM

## 2018-12-03 DIAGNOSIS — E11.621 TYPE 2 DIABETES MELLITUS WITH FOOT ULCER, WITHOUT LONG-TERM CURRENT USE OF INSULIN (HCC): ICD-10-CM

## 2018-12-03 PROCEDURE — 11042 DBRDMT SUBQ TIS 1ST 20SQCM/<: CPT | Performed by: PODIATRIST

## 2018-12-03 PROCEDURE — 99999 PR OFFICE/OUTPT VISIT,PROCEDURE ONLY: CPT | Performed by: PODIATRIST

## 2018-12-03 NOTE — PROGRESS NOTES
Surgical History:   Procedure Laterality Date    APPENDECTOMY      BACK SURGERY      Lower x 4. Had abscess after appendectomy.  KNEE SURGERY Right     scope    TOE AMPUTATION Bilateral 3/6/2017    TOE AMPUTATION LEFT HALLUX AND 2ND DIGIT AND RIGHT 2ND DIGIT performed by Ирина Vera DPM at [place] [address]    family history includes Cancer in his maternal grandfather; Diabetes in his father; No Known Problems in his mother. SOCIAL HISTORY    Social History   Substance Use Topics    Smoking status: Never Smoker    Smokeless tobacco: Never Used    Alcohol use No       Review of Systems    Objective:      Ht 6' 2\" (1.88 m)   Wt 250 lb (113.4 kg)   BMI 32.10 kg/m²   General Appearance: sleepy but able to be awakened, and oriented to person, place and time, well-developed and well-nourished, in no acute distress    Wound:         Wound #:   1    diabetic foot ulcer   left foot    Wound measurements:  #1  14.0 cm by 12.5 cm  by   3 mm         Wound Depth: subcutaneous. Drainage:    moderate Type:serosanguinous    Wound Bed status:   Granulation Tissue: 80%   Necrotic 20%  Type: maceration, fibrin, hair, debris  Epithelialization 0%   Hypergranular 0%      Erythema absent       Odor:no    Maceration:yes    Undermining/tract/tunneling:no    Culture taken:   no             Vascular:  DP/PT pulses palpable 2/4, Bilateral.    CFT <3 seconds to digits 1-5, Bilateral .   Hair growth absent to level of digits, Bilateral.  Edema present, Bilateral.  Erythema absent, Bilateral.     Neurological:  Sensation absent to light touch to level of digits, Bilateral.  Protective sensation  absent via 5.07/10g [place] monofilament 10/10 sites, Bilateral.  Vibratory sensation absent to 1st MPJ, Bilateral.     Musculoskeletal:  Muscle strength 5/5 all LE groups tested, Bilateral.         Assessment:         1. Ulcer of left foot, with fat layer exposed ([address]    2.  Type 2

## 2018-12-17 ENCOUNTER — TELEPHONE (OUTPATIENT)
Dept: PRIMARY CARE CLINIC | Age: 53
End: 2018-12-17

## 2018-12-17 DIAGNOSIS — F41.9 ANXIETY DISORDER, UNSPECIFIED TYPE: ICD-10-CM

## 2018-12-17 RX ORDER — DIAZEPAM 5 MG/1
TABLET ORAL
Qty: 120 TABLET | Refills: 0 | Status: SHIPPED | OUTPATIENT
Start: 2018-12-17 | End: 2019-01-14 | Stop reason: SDUPTHER

## 2018-12-18 DIAGNOSIS — I80.3 THROMBOPHLEBITIS LEG: ICD-10-CM

## 2018-12-18 DIAGNOSIS — L97.524 ULCERATED, FOOT, LEFT, WITH NECROSIS OF BONE (HCC): ICD-10-CM

## 2018-12-18 RX ORDER — OXYCODONE HYDROCHLORIDE 20 MG/1
20 TABLET ORAL EVERY 6 HOURS PRN
Qty: 120 TABLET | Refills: 0 | Status: SHIPPED | OUTPATIENT
Start: 2018-12-18 | End: 2018-12-21 | Stop reason: SDUPTHER

## 2018-12-18 NOTE — TELEPHONE ENCOUNTER
Medication has not been sent to pharmacy yet to know there is a prior authorization needed Cuevas Bird notified in person and let patient know once script has been sent to the pharmacy they will notify us if prior authorization is needed and prior authorization will then be done and sent to insurance.

## 2018-12-21 ENCOUNTER — OFFICE VISIT (OUTPATIENT)
Dept: PRIMARY CARE CLINIC | Age: 53
End: 2018-12-21
Payer: MEDICARE

## 2018-12-21 VITALS
WEIGHT: 260 LBS | HEART RATE: 88 BPM | DIASTOLIC BLOOD PRESSURE: 70 MMHG | SYSTOLIC BLOOD PRESSURE: 120 MMHG | OXYGEN SATURATION: 90 % | BODY MASS INDEX: 33.37 KG/M2 | HEIGHT: 74 IN

## 2018-12-21 DIAGNOSIS — Z12.5 SCREENING PSA (PROSTATE SPECIFIC ANTIGEN): ICD-10-CM

## 2018-12-21 DIAGNOSIS — M86.172 OSTEOMYELITIS OF FOOT, LEFT, ACUTE (HCC): ICD-10-CM

## 2018-12-21 DIAGNOSIS — Z79.4 TYPE 2 DIABETES MELLITUS WITH FOOT ULCER, WITH LONG-TERM CURRENT USE OF INSULIN (HCC): Primary | ICD-10-CM

## 2018-12-21 DIAGNOSIS — E03.9 ACQUIRED HYPOTHYROIDISM: ICD-10-CM

## 2018-12-21 DIAGNOSIS — I80.3 THROMBOPHLEBITIS LEG: ICD-10-CM

## 2018-12-21 DIAGNOSIS — L97.524 ULCERATED, FOOT, LEFT, WITH NECROSIS OF BONE (HCC): ICD-10-CM

## 2018-12-21 DIAGNOSIS — E11.49 OTHER DIABETIC NEUROLOGICAL COMPLICATION ASSOCIATED WITH TYPE 2 DIABETES MELLITUS (HCC): ICD-10-CM

## 2018-12-21 DIAGNOSIS — E11.621 TYPE 2 DIABETES MELLITUS WITH FOOT ULCER, WITH LONG-TERM CURRENT USE OF INSULIN (HCC): Primary | ICD-10-CM

## 2018-12-21 DIAGNOSIS — M86.672 OTHER CHRONIC OSTEOMYELITIS OF LEFT FOOT (HCC): ICD-10-CM

## 2018-12-21 DIAGNOSIS — L97.509 TYPE 2 DIABETES MELLITUS WITH FOOT ULCER, WITH LONG-TERM CURRENT USE OF INSULIN (HCC): Primary | ICD-10-CM

## 2018-12-21 DIAGNOSIS — I10 ESSENTIAL HYPERTENSION: ICD-10-CM

## 2018-12-21 LAB
CREATININE URINE: 241.23 MG/DL
MICROALBUMIN/CREAT 24H UR: 1.1 MG/G{CREAT}

## 2018-12-21 PROCEDURE — G8427 DOCREV CUR MEDS BY ELIG CLIN: HCPCS | Performed by: FAMILY MEDICINE

## 2018-12-21 PROCEDURE — 3017F COLORECTAL CA SCREEN DOC REV: CPT | Performed by: FAMILY MEDICINE

## 2018-12-21 PROCEDURE — 3044F HG A1C LEVEL LT 7.0%: CPT | Performed by: FAMILY MEDICINE

## 2018-12-21 PROCEDURE — 1036F TOBACCO NON-USER: CPT | Performed by: FAMILY MEDICINE

## 2018-12-21 PROCEDURE — G8482 FLU IMMUNIZE ORDER/ADMIN: HCPCS | Performed by: FAMILY MEDICINE

## 2018-12-21 PROCEDURE — 99214 OFFICE O/P EST MOD 30 MIN: CPT | Performed by: FAMILY MEDICINE

## 2018-12-21 PROCEDURE — G8417 CALC BMI ABV UP PARAM F/U: HCPCS | Performed by: FAMILY MEDICINE

## 2018-12-21 PROCEDURE — 2022F DILAT RTA XM EVC RTNOPTHY: CPT | Performed by: FAMILY MEDICINE

## 2018-12-21 RX ORDER — MORPHINE SULFATE 30 MG/1
30 TABLET, FILM COATED, EXTENDED RELEASE ORAL 3 TIMES DAILY
Qty: 90 TABLET | Refills: 0 | Status: SHIPPED | OUTPATIENT
Start: 2018-12-21 | End: 2019-01-22 | Stop reason: SDUPTHER

## 2018-12-21 RX ORDER — OXYCODONE HYDROCHLORIDE 20 MG/1
20 TABLET ORAL EVERY 6 HOURS PRN
Qty: 120 TABLET | Refills: 0 | Status: SHIPPED | OUTPATIENT
Start: 2018-12-21 | End: 2019-01-14 | Stop reason: SDUPTHER

## 2018-12-21 ASSESSMENT — ENCOUNTER SYMPTOMS
SHORTNESS OF BREATH: 0
SORE THROAT: 0
VOMITING: 0
COUGH: 0
EYE DISCHARGE: 0
EYE REDNESS: 0
WHEEZING: 0
ABDOMINAL PAIN: 0
NAUSEA: 0
RHINORRHEA: 0
DIARRHEA: 0

## 2018-12-21 NOTE — PROGRESS NOTES
continue current medications, diet andexercise. Patient agreed with treatment plan. Follow up as directed.      Electronicallysigned by Shailesh Rodriguez MD on 12/21/2018 at 11:32 AM

## 2018-12-26 DIAGNOSIS — E11.621 TYPE 2 DIABETES MELLITUS WITH FOOT ULCER, WITH LONG-TERM CURRENT USE OF INSULIN (HCC): ICD-10-CM

## 2018-12-26 DIAGNOSIS — Z79.4 TYPE 2 DIABETES MELLITUS WITH FOOT ULCER, WITH LONG-TERM CURRENT USE OF INSULIN (HCC): ICD-10-CM

## 2018-12-26 DIAGNOSIS — L97.509 TYPE 2 DIABETES MELLITUS WITH FOOT ULCER, WITH LONG-TERM CURRENT USE OF INSULIN (HCC): ICD-10-CM

## 2019-01-11 ENCOUNTER — TELEPHONE (OUTPATIENT)
Dept: PODIATRY | Age: 54
End: 2019-01-11

## 2019-01-14 DIAGNOSIS — L97.524 ULCERATED, FOOT, LEFT, WITH NECROSIS OF BONE (HCC): ICD-10-CM

## 2019-01-14 DIAGNOSIS — I80.3 THROMBOPHLEBITIS LEG: ICD-10-CM

## 2019-01-14 DIAGNOSIS — F41.9 ANXIETY DISORDER, UNSPECIFIED TYPE: ICD-10-CM

## 2019-01-14 RX ORDER — OXYCODONE HYDROCHLORIDE 20 MG/1
20 TABLET ORAL EVERY 6 HOURS PRN
Qty: 120 TABLET | Refills: 0 | Status: SHIPPED | OUTPATIENT
Start: 2019-01-14 | End: 2019-02-11 | Stop reason: SDUPTHER

## 2019-01-14 RX ORDER — DIAZEPAM 5 MG/1
TABLET ORAL
Qty: 120 TABLET | Refills: 0 | Status: SHIPPED | OUTPATIENT
Start: 2019-01-14 | End: 2019-02-11 | Stop reason: SDUPTHER

## 2019-01-22 DIAGNOSIS — E11.49 OTHER DIABETIC NEUROLOGICAL COMPLICATION ASSOCIATED WITH TYPE 2 DIABETES MELLITUS (HCC): ICD-10-CM

## 2019-01-22 DIAGNOSIS — M86.672 OTHER CHRONIC OSTEOMYELITIS OF LEFT FOOT (HCC): ICD-10-CM

## 2019-01-23 RX ORDER — MORPHINE SULFATE 30 MG/1
30 TABLET, FILM COATED, EXTENDED RELEASE ORAL 3 TIMES DAILY
Qty: 90 TABLET | Refills: 0 | Status: SHIPPED | OUTPATIENT
Start: 2019-01-23 | End: 2019-02-19 | Stop reason: SDUPTHER

## 2019-02-01 ENCOUNTER — CLINICAL DOCUMENTATION (OUTPATIENT)
Dept: PODIATRY | Age: 54
End: 2019-02-01

## 2019-02-07 RX ORDER — NYSTATIN 100000 [USP'U]/G
POWDER TOPICAL
Qty: 60 G | Refills: 1 | Status: SHIPPED | OUTPATIENT
Start: 2019-02-07 | End: 2020-04-20

## 2019-02-11 DIAGNOSIS — F41.9 ANXIETY DISORDER, UNSPECIFIED TYPE: ICD-10-CM

## 2019-02-11 DIAGNOSIS — L97.524 ULCERATED, FOOT, LEFT, WITH NECROSIS OF BONE (HCC): ICD-10-CM

## 2019-02-11 DIAGNOSIS — I80.3 THROMBOPHLEBITIS LEG: ICD-10-CM

## 2019-02-11 RX ORDER — OXYCODONE HYDROCHLORIDE 20 MG/1
20 TABLET ORAL EVERY 6 HOURS PRN
Qty: 120 TABLET | Refills: 0 | Status: SHIPPED | OUTPATIENT
Start: 2019-02-11 | End: 2019-03-11 | Stop reason: SDUPTHER

## 2019-02-11 RX ORDER — DIAZEPAM 5 MG/1
TABLET ORAL
Qty: 120 TABLET | Refills: 0 | Status: SHIPPED | OUTPATIENT
Start: 2019-02-11 | End: 2019-03-11 | Stop reason: SDUPTHER

## 2019-02-19 DIAGNOSIS — M86.672 OTHER CHRONIC OSTEOMYELITIS OF LEFT FOOT (HCC): ICD-10-CM

## 2019-02-19 DIAGNOSIS — E11.49 OTHER DIABETIC NEUROLOGICAL COMPLICATION ASSOCIATED WITH TYPE 2 DIABETES MELLITUS (HCC): ICD-10-CM

## 2019-02-19 RX ORDER — MORPHINE SULFATE 30 MG/1
30 TABLET, FILM COATED, EXTENDED RELEASE ORAL 3 TIMES DAILY
Qty: 90 TABLET | Refills: 0 | Status: SHIPPED | OUTPATIENT
Start: 2019-02-19 | End: 2019-03-18 | Stop reason: SDUPTHER

## 2019-02-20 ENCOUNTER — OFFICE VISIT (OUTPATIENT)
Dept: PODIATRY | Age: 54
End: 2019-02-20
Payer: MEDICARE

## 2019-02-20 VITALS — HEIGHT: 74 IN | WEIGHT: 250 LBS | BODY MASS INDEX: 32.08 KG/M2

## 2019-02-20 DIAGNOSIS — E11.42 TYPE 2 DIABETES MELLITUS WITH DIABETIC POLYNEUROPATHY, WITHOUT LONG-TERM CURRENT USE OF INSULIN (HCC): ICD-10-CM

## 2019-02-20 DIAGNOSIS — L97.509 TYPE 2 DIABETES MELLITUS WITH FOOT ULCER, WITHOUT LONG-TERM CURRENT USE OF INSULIN (HCC): ICD-10-CM

## 2019-02-20 DIAGNOSIS — L97.522 ULCER OF LEFT FOOT, WITH FAT LAYER EXPOSED (HCC): Primary | ICD-10-CM

## 2019-02-20 DIAGNOSIS — E11.621 TYPE 2 DIABETES MELLITUS WITH FOOT ULCER, WITHOUT LONG-TERM CURRENT USE OF INSULIN (HCC): ICD-10-CM

## 2019-02-20 PROCEDURE — G8417 CALC BMI ABV UP PARAM F/U: HCPCS | Performed by: PODIATRIST

## 2019-02-20 PROCEDURE — G8427 DOCREV CUR MEDS BY ELIG CLIN: HCPCS | Performed by: PODIATRIST

## 2019-02-20 PROCEDURE — 1036F TOBACCO NON-USER: CPT | Performed by: PODIATRIST

## 2019-02-20 PROCEDURE — G8482 FLU IMMUNIZE ORDER/ADMIN: HCPCS | Performed by: PODIATRIST

## 2019-02-20 PROCEDURE — 3046F HEMOGLOBIN A1C LEVEL >9.0%: CPT | Performed by: PODIATRIST

## 2019-02-20 PROCEDURE — 3017F COLORECTAL CA SCREEN DOC REV: CPT | Performed by: PODIATRIST

## 2019-02-20 PROCEDURE — 11042 DBRDMT SUBQ TIS 1ST 20SQCM/<: CPT | Performed by: PODIATRIST

## 2019-02-20 PROCEDURE — 11045 DBRDMT SUBQ TISS EACH ADDL: CPT | Performed by: PODIATRIST

## 2019-02-20 PROCEDURE — 2022F DILAT RTA XM EVC RTNOPTHY: CPT | Performed by: PODIATRIST

## 2019-02-20 PROCEDURE — 99213 OFFICE O/P EST LOW 20 MIN: CPT | Performed by: PODIATRIST

## 2019-03-11 DIAGNOSIS — L97.524 ULCERATED, FOOT, LEFT, WITH NECROSIS OF BONE (HCC): ICD-10-CM

## 2019-03-11 DIAGNOSIS — F41.9 ANXIETY DISORDER, UNSPECIFIED TYPE: ICD-10-CM

## 2019-03-11 DIAGNOSIS — I80.3 THROMBOPHLEBITIS LEG: ICD-10-CM

## 2019-03-11 RX ORDER — DIAZEPAM 5 MG/1
TABLET ORAL
Qty: 120 TABLET | Refills: 0 | Status: SHIPPED | OUTPATIENT
Start: 2019-03-11 | End: 2019-04-08 | Stop reason: SDUPTHER

## 2019-03-11 RX ORDER — OXYCODONE HYDROCHLORIDE 20 MG/1
20 TABLET ORAL EVERY 6 HOURS PRN
Qty: 120 TABLET | Refills: 0 | Status: SHIPPED | OUTPATIENT
Start: 2019-03-11 | End: 2019-04-08 | Stop reason: SDUPTHER

## 2019-03-14 DIAGNOSIS — M86.672 OTHER CHRONIC OSTEOMYELITIS OF LEFT FOOT (HCC): ICD-10-CM

## 2019-03-14 DIAGNOSIS — Z79.4 TYPE 2 DIABETES MELLITUS WITH FOOT ULCER, WITH LONG-TERM CURRENT USE OF INSULIN (HCC): ICD-10-CM

## 2019-03-14 DIAGNOSIS — E11.49 OTHER DIABETIC NEUROLOGICAL COMPLICATION ASSOCIATED WITH TYPE 2 DIABETES MELLITUS (HCC): ICD-10-CM

## 2019-03-14 DIAGNOSIS — L97.509 TYPE 2 DIABETES MELLITUS WITH FOOT ULCER, WITH LONG-TERM CURRENT USE OF INSULIN (HCC): ICD-10-CM

## 2019-03-14 DIAGNOSIS — E11.621 TYPE 2 DIABETES MELLITUS WITH FOOT ULCER, WITH LONG-TERM CURRENT USE OF INSULIN (HCC): ICD-10-CM

## 2019-03-18 RX ORDER — MORPHINE SULFATE 30 MG/1
30 TABLET, FILM COATED, EXTENDED RELEASE ORAL 3 TIMES DAILY
Qty: 90 TABLET | Refills: 0 | Status: SHIPPED | OUTPATIENT
Start: 2019-03-18 | End: 2019-04-15 | Stop reason: SDUPTHER

## 2019-03-19 ENCOUNTER — OFFICE VISIT (OUTPATIENT)
Dept: PODIATRY | Age: 54
End: 2019-03-19
Payer: MEDICARE

## 2019-03-19 VITALS — WEIGHT: 250 LBS | BODY MASS INDEX: 32.08 KG/M2 | HEIGHT: 74 IN

## 2019-03-19 DIAGNOSIS — L97.522 ULCER OF LEFT FOOT, WITH FAT LAYER EXPOSED (HCC): Primary | ICD-10-CM

## 2019-03-19 DIAGNOSIS — L97.509 TYPE 2 DIABETES MELLITUS WITH FOOT ULCER, WITHOUT LONG-TERM CURRENT USE OF INSULIN (HCC): ICD-10-CM

## 2019-03-19 DIAGNOSIS — E11.621 TYPE 2 DIABETES MELLITUS WITH FOOT ULCER, WITHOUT LONG-TERM CURRENT USE OF INSULIN (HCC): ICD-10-CM

## 2019-03-19 DIAGNOSIS — E11.42 TYPE 2 DIABETES MELLITUS WITH DIABETIC POLYNEUROPATHY, WITHOUT LONG-TERM CURRENT USE OF INSULIN (HCC): ICD-10-CM

## 2019-03-19 PROCEDURE — 3046F HEMOGLOBIN A1C LEVEL >9.0%: CPT | Performed by: PODIATRIST

## 2019-03-19 PROCEDURE — G8427 DOCREV CUR MEDS BY ELIG CLIN: HCPCS | Performed by: PODIATRIST

## 2019-03-19 PROCEDURE — 11042 DBRDMT SUBQ TIS 1ST 20SQCM/<: CPT | Performed by: PODIATRIST

## 2019-03-19 PROCEDURE — 99213 OFFICE O/P EST LOW 20 MIN: CPT | Performed by: PODIATRIST

## 2019-03-19 PROCEDURE — 1036F TOBACCO NON-USER: CPT | Performed by: PODIATRIST

## 2019-03-19 PROCEDURE — 11045 DBRDMT SUBQ TISS EACH ADDL: CPT | Performed by: PODIATRIST

## 2019-03-19 PROCEDURE — 2022F DILAT RTA XM EVC RTNOPTHY: CPT | Performed by: PODIATRIST

## 2019-03-19 PROCEDURE — G8482 FLU IMMUNIZE ORDER/ADMIN: HCPCS | Performed by: PODIATRIST

## 2019-03-19 PROCEDURE — 3017F COLORECTAL CA SCREEN DOC REV: CPT | Performed by: PODIATRIST

## 2019-03-19 PROCEDURE — G8417 CALC BMI ABV UP PARAM F/U: HCPCS | Performed by: PODIATRIST

## 2019-03-20 DIAGNOSIS — M54.12 RIGHT CERVICAL RADICULOPATHY: Primary | ICD-10-CM

## 2019-03-21 RX ORDER — TIZANIDINE 4 MG/1
TABLET ORAL
Qty: 120 TABLET | Refills: 0 | Status: SHIPPED | OUTPATIENT
Start: 2019-03-21 | End: 2020-04-20

## 2019-03-29 LAB
AVERAGE GLUCOSE: 85
CHOLESTEROL, FASTING: 128
HBA1C MFR BLD: 4.6 %
HDLC SERPL-MCNC: 33 MG/DL (ref 35–70)
LDL CHOLESTEROL CALCULATED: 82 MG/DL (ref 0–160)
PROSTATE SPECIFIC ANTIGEN: NORMAL NG/ML
SEDIMENTATION RATE, ERYTHROCYTE: NORMAL
T4 FREE: NORMAL
TRIGLYCERIDE, FASTING: 67
TSH SERPL DL<=0.05 MIU/L-ACNC: NORMAL UIU/ML

## 2019-04-01 ENCOUNTER — OFFICE VISIT (OUTPATIENT)
Dept: PRIMARY CARE CLINIC | Age: 54
End: 2019-04-01
Payer: MEDICARE

## 2019-04-01 VITALS
DIASTOLIC BLOOD PRESSURE: 82 MMHG | OXYGEN SATURATION: 98 % | HEIGHT: 74 IN | WEIGHT: 260.6 LBS | HEART RATE: 72 BPM | BODY MASS INDEX: 33.45 KG/M2 | SYSTOLIC BLOOD PRESSURE: 134 MMHG

## 2019-04-01 DIAGNOSIS — Z13.6 ENCOUNTER FOR LIPID SCREENING FOR CARDIOVASCULAR DISEASE: ICD-10-CM

## 2019-04-01 DIAGNOSIS — E03.9 ACQUIRED HYPOTHYROIDISM: ICD-10-CM

## 2019-04-01 DIAGNOSIS — Z13.220 ENCOUNTER FOR LIPID SCREENING FOR CARDIOVASCULAR DISEASE: ICD-10-CM

## 2019-04-01 DIAGNOSIS — L97.524 ULCERATED, FOOT, LEFT, WITH NECROSIS OF BONE (HCC): ICD-10-CM

## 2019-04-01 DIAGNOSIS — S91.309D: ICD-10-CM

## 2019-04-01 DIAGNOSIS — Z79.4 TYPE 2 DIABETES MELLITUS WITH FOOT ULCER, WITH LONG-TERM CURRENT USE OF INSULIN (HCC): Primary | ICD-10-CM

## 2019-04-01 DIAGNOSIS — Z79.899 HIGH RISK MEDICATION USE: ICD-10-CM

## 2019-04-01 DIAGNOSIS — L97.509 TYPE 2 DIABETES MELLITUS WITH FOOT ULCER, WITH LONG-TERM CURRENT USE OF INSULIN (HCC): Primary | ICD-10-CM

## 2019-04-01 DIAGNOSIS — E11.621 TYPE 2 DIABETES MELLITUS WITH FOOT ULCER, WITH LONG-TERM CURRENT USE OF INSULIN (HCC): Primary | ICD-10-CM

## 2019-04-01 LAB — HBA1C MFR BLD: 6.2 %

## 2019-04-01 PROCEDURE — 83036 HEMOGLOBIN GLYCOSYLATED A1C: CPT | Performed by: FAMILY MEDICINE

## 2019-04-01 PROCEDURE — G8427 DOCREV CUR MEDS BY ELIG CLIN: HCPCS | Performed by: FAMILY MEDICINE

## 2019-04-01 PROCEDURE — 3017F COLORECTAL CA SCREEN DOC REV: CPT | Performed by: FAMILY MEDICINE

## 2019-04-01 PROCEDURE — 99214 OFFICE O/P EST MOD 30 MIN: CPT | Performed by: FAMILY MEDICINE

## 2019-04-01 PROCEDURE — G8417 CALC BMI ABV UP PARAM F/U: HCPCS | Performed by: FAMILY MEDICINE

## 2019-04-01 PROCEDURE — 3044F HG A1C LEVEL LT 7.0%: CPT | Performed by: FAMILY MEDICINE

## 2019-04-01 PROCEDURE — 2022F DILAT RTA XM EVC RTNOPTHY: CPT | Performed by: FAMILY MEDICINE

## 2019-04-01 PROCEDURE — 1036F TOBACCO NON-USER: CPT | Performed by: FAMILY MEDICINE

## 2019-04-01 ASSESSMENT — ENCOUNTER SYMPTOMS
EYE DISCHARGE: 0
RHINORRHEA: 0
COUGH: 0
NAUSEA: 0
SHORTNESS OF BREATH: 0
EYE REDNESS: 0
VOMITING: 0
DIARRHEA: 0
SORE THROAT: 0
WHEEZING: 0
ABDOMINAL PAIN: 0
BACK PAIN: 1

## 2019-04-01 NOTE — PROGRESS NOTES
[address] [place] [address] PRIMARY CARE  [address] [place] [address]  Dept: [phone]    Olive Webster is a 46 y.o. male who presents today for his medical conditions/complaintsas noted below. Chief Complaint   Patient presents with    Medication Check    Diabetes     Follow up       HPI:     HPI  Pt still with podiatry regarding non healing wounds feet. Pt states pain level getting worse. No fever or chills. Pt states has had some boils in the groin region. No chest pain. Has not been able to decrease pain medication. Pt sates still having severe pain. Wanting to increase medication. Most of the pain currently in the foot. No results found for: LDLCHOLESTEROL, LDLCALC    (goal LDL is <100)   AST (U/L)   Date Value   10/20/2018 13     ALT (U/L)   Date Value   10/20/2018 8     BUN (mg/dL)   Date Value   10/20/2018 14     BP Readings from Last 3 Encounters:   04/01/19 134/82   12/21/18 120/70   10/20/18 (!) 118/55          (goal 120/80)    Past Medical History:   Diagnosis Date    Anxiety     Arthritis     lower back, knees    BPH (benign prostatic hyperplasia)     Chronic kidney disease     prostate    Depression     Diabetes mellitus ([address]     Diabetic neuropathy ([address]     GERD (gastroesophageal reflux disease)     Hyperglycemia     Hypertension     Hypothyroidism     Impacted tooth     Pneumonia       Past Surgical History:   Procedure Laterality Date    APPENDECTOMY      BACK SURGERY      Lower x 4. Had abscess after appendectomy.      KNEE SURGERY Right     scope    TOE AMPUTATION Bilateral 3/6/2017    TOE AMPUTATION LEFT HALLUX AND 2ND DIGIT AND RIGHT 2ND DIGIT performed by Tabby Crocker DPM at [facility]         Family History   Problem Relation Age of Onset    Diabetes Father     Cancer Maternal Grandfather         Colon Cancer; Prostate Cancer    No Known Problems Mother        Social History     Tobacco Use  Smoking status: Never Smoker    Smokeless tobacco: Never Used   Substance Use Topics    Alcohol use: No      Current Outpatient Medications   Medication Sig Dispense Refill    tiZANidine (ZANAFLEX) 4 MG tablet TAKE ONE TABLET BY MOUTH EVERY 6 HOURS AS NEEDED FOR MUSCLE SPASMS 120 tablet 0    ONE TOUCH ULTRA TEST strip USE ONE STRIP TO TEST TWO TIMES A DAY AS NEEDED 100 strip 2    morphine (MS CONTIN) 30 MG extended release tablet Take 1 tablet by mouth 3 times daily for 30 days. 90 tablet 0    oxyCODONE (ROXICODONE) 20 MG immediate release tablet Take 1 tablet by mouth every 6 hours as needed for Pain for up to 30 days.  120 tablet 0    diazepam (VALIUM) 5 MG tablet TAKE ONE TABLET BY MOUTH EVERY 6 HOURS AS NEEDED FOR ANXIETY 120 tablet 0    NYAMYC 757929 UNIT/GM powder APPLY TO AFFECTED AREA(S) FOUR TIMES A DAY 60 g 1    metFORMIN (GLUCOPHAGE) 500 MG tablet Take 1 tablet by mouth 2 times daily (with meals) 90 tablet 11    PARoxetine (PAXIL) 20 MG tablet TAKE ONE TABLET BY MOUTH FOUR TIMES A  tablet 3    buPROPion (WELLBUTRIN XL) 300 MG extended release tablet TAKE ONE TABLET BY MOUTH DAILY 30 tablet 10    ciclopirox (LOPROX) 0.77 % cream APPLY TO AFFECTED AREA(S) AND RUB IN A THIN FILM TWICE A DAY (MORNING AND EVENING) 90 g 1    Wound Dressings (ADAPTIC NON-ADHERING DRESSING) PADS Apply 1 Units topically 2 times daily 60 each 5    Gauze Pads & Dressings (COMBINE ABD) 5\"X9\" PADS 1 Units by Does not apply route 2 times daily 60 each 5    lisinopril (PRINIVIL;ZESTRIL) 10 MG tablet TAKE ONE TABLET BY MOUTH DAILY 30 tablet 10    Blood Glucose Monitoring Suppl (ONE TOUCH ULTRA 2) w/Device KIT 1 kit by Other route daily 1 kit 5    ONETOUCH DELICA LANCETS 18U MISC TEST TWO TIMES A  each 2    ciclopirox (LOPROX) 0.77 % cream APPLY TO AFFECTED AREA(S) AND RUB  IN A THIN FILM TWO TIMES A DAY (AM AND PM) 90 g 2    tamsulosin (FLOMAX) 0.4 MG capsule TAKE ONE CAPSULE BY MOUTH DAILY 30 capsule 11    omeprazole (PRILOSEC) 40 MG delayed release capsule TAKE ONE CAPSULE BY MOUTH EVERY NIGHT AT BEDTIME 30 capsule 11    Gauze Pads & Dressings (KERLIX GAUZE ROLL LARGE) MISC 1 Units by Does not apply route 2 times daily 60 each 5     No current facility-administered medications for this visit. Allergies   Allergen Reactions    Fiorinal [Butalbital-Aspirin-Caffeine] Other (See Comments)     Generalized blisters    Peanut Butter Flavor [Flavoring Agent] Nausea And Vomiting       Health Maintenance   Topic Date Due    Diabetic retinal exam  06/04/1974    Lipid screen  06/04/1974    HIV screen  06/04/1979    Hepatitis B Vaccine (1 of 3 - Risk 3-dose series) 06/04/1983    Shingles Vaccine (1 of 2) 06/04/2014    Colon cancer screen colonoscopy  06/04/2014    TSH testing  05/16/2018    A1C test (Diabetic or Prediabetic)  09/12/2019    Potassium monitoring  10/20/2019    Creatinine monitoring  10/20/2019    Diabetic microalbuminuria test  12/21/2019    DTaP/Tdap/Td vaccine (2 - Td) 04/24/2028    Flu vaccine  Completed    Pneumococcal 0-64 years at Risk Vaccine  Completed    Hepatitis C screen  Completed       Subjective:      Review of Systems   Constitutional: Negative for chills and fever. HENT: Negative for rhinorrhea and sore throat. Eyes: Negative for discharge and redness. Respiratory: Negative for cough, shortness of breath and wheezing. Cardiovascular: Negative for chest pain and palpitations. Gastrointestinal: Negative for abdominal pain, diarrhea, nausea and vomiting. Genitourinary: Negative for dysuria and frequency. Musculoskeletal: Positive for arthralgias, back pain and gait problem. Negative for myalgias. Neurological: Negative for dizziness, light-headedness and headaches. Psychiatric/Behavioral: Negative for sleep disturbance.        Objective:     /82   Pulse 72   Ht 6' 2.04\" (1.881 m)   Wt 260 lb 9.6 oz (118.2 kg)   SpO2 98%   BMI 33.42 kg/m² Physical Exam   Constitutional: He is oriented to person, place, and time. He appears well-developed and well-nourished. No distress. HENT:   Head: Normocephalic and atraumatic. Mouth/Throat: Oropharynx is clear and moist.   Eyes: Pupils are equal, round, and reactive to light. Conjunctivae are normal. Right eye exhibits no discharge. Left eye exhibits no discharge. No scleral icterus. Neck: No tracheal deviation present. No thyromegaly present. Cardiovascular: Normal rate, regular rhythm and normal heart sounds. No carotid bruits   Pulmonary/Chest: Effort normal and breath sounds normal. No respiratory distress. He has no wheezes. Musculoskeletal: He exhibits no edema. Wraps on both feet   Lymphadenopathy:     He has no cervical adenopathy. Neurological: He is alert and oriented to person, place, and time. Skin: Skin is warm. No rash noted. Psychiatric: He has a normal mood and affect. His behavior is normal. Thought content normal.   Nursing note and vitals reviewed. Assessment:       Diagnosis Orders   1. Type 2 diabetes mellitus with foot ulcer, with long-term current use of insulin ([facility])  [place] COLLECTION CAPILLARY BLOOD SPECIMEN    POCT glycosylated hemoglobin (Hb A1C)   2. Wound, open, foot, unspecified laterality, subsequent encounter  Rafal Boateng MD, Pain Management, [place]   3. Acquired hypothyroidism     4. Encounter for lipid screening for cardiovascular disease     5. Ulcerated, foot, left, with necrosis of bone ([address]     6. High risk medication use  Urine Drug Screen        Plan:    pt to take thyroid daily  Labs reviewed  Will get colonoscopy from Dr. Gautam Cannon  Pt to get eye exam done   Refer to pain management. Pt advised about new law requiring people on high dose opioids to be seen also with pain management. Return in about 3 months (around 7/1/2019).     Orders Placed This Encounter   Procedures    Urine Drug Screen     Standing Status:   Future     Standing Expiration Date:   4/1/2020   Ld Coe MD, Pain Management, [facility]     Referral Priority:   Routine     Referral Type:   Eval and Treat     Referral Reason:   Specialty Services Required     Referred to Provider:   Leilani French MD     Requested Specialty:   Pain Management     Number of Visits Requested:   1    POCT glycosylated hemoglobin (Hb A1C)    [place] COLLECTION CAPILLARY BLOOD SPECIMEN     No orders of the defined types were placed in this encounter. Patient given educationalmaterials - see patient instructions. Discussed use, benefit, and side effectsof prescribed medications. All patient questions answered. Pt voiced understanding. Reviewed health maintenance. Instructed to continue current medications, diet andexercise. Patient agreed with treatment plan. Follow up as directed.      Electronicallysigned by Gertrudis Chanel MD on 4/1/2019 at 5:21 PM

## 2019-04-02 DIAGNOSIS — E03.9 ACQUIRED HYPOTHYROIDISM: ICD-10-CM

## 2019-04-02 DIAGNOSIS — Z12.5 SCREENING PSA (PROSTATE SPECIFIC ANTIGEN): ICD-10-CM

## 2019-04-02 DIAGNOSIS — L97.509 TYPE 2 DIABETES MELLITUS WITH FOOT ULCER, WITH LONG-TERM CURRENT USE OF INSULIN (HCC): ICD-10-CM

## 2019-04-02 DIAGNOSIS — Z79.4 TYPE 2 DIABETES MELLITUS WITH FOOT ULCER, WITH LONG-TERM CURRENT USE OF INSULIN (HCC): ICD-10-CM

## 2019-04-02 DIAGNOSIS — E11.621 TYPE 2 DIABETES MELLITUS WITH FOOT ULCER, WITH LONG-TERM CURRENT USE OF INSULIN (HCC): ICD-10-CM

## 2019-04-02 DIAGNOSIS — M86.172 OSTEOMYELITIS OF FOOT, LEFT, ACUTE (HCC): ICD-10-CM

## 2019-04-02 DIAGNOSIS — I10 ESSENTIAL HYPERTENSION: ICD-10-CM

## 2019-04-03 RX ORDER — LEVOTHYROXINE SODIUM 0.05 MG/1
50 TABLET ORAL DAILY
Qty: 90 TABLET | Refills: 3 | OUTPATIENT
Start: 2019-04-03 | End: 2021-11-26 | Stop reason: SDUPTHER

## 2019-04-04 DIAGNOSIS — Z79.899 HIGH RISK MEDICATION USE: ICD-10-CM

## 2019-04-08 ENCOUNTER — TELEPHONE (OUTPATIENT)
Dept: PRIMARY CARE CLINIC | Age: 54
End: 2019-04-08

## 2019-04-08 DIAGNOSIS — F41.9 ANXIETY DISORDER, UNSPECIFIED TYPE: ICD-10-CM

## 2019-04-08 DIAGNOSIS — I80.3 THROMBOPHLEBITIS LEG: ICD-10-CM

## 2019-04-08 DIAGNOSIS — L97.524 ULCERATED, FOOT, LEFT, WITH NECROSIS OF BONE (HCC): ICD-10-CM

## 2019-04-08 RX ORDER — OXYCODONE HYDROCHLORIDE 20 MG/1
20 TABLET ORAL EVERY 6 HOURS PRN
Qty: 120 TABLET | Refills: 0 | Status: SHIPPED | OUTPATIENT
Start: 2019-04-08 | End: 2019-04-23

## 2019-04-08 RX ORDER — DIAZEPAM 5 MG/1
TABLET ORAL
Qty: 120 TABLET | Refills: 0 | Status: SHIPPED | OUTPATIENT
Start: 2019-04-08 | End: 2019-05-08 | Stop reason: SDUPTHER

## 2019-04-08 NOTE — TELEPHONE ENCOUNTER
Last visit: 4/1/19  Last Med refill: 3/11/19  Does patient have enough medication for 72 hours: Pt wife states Pt will be out by Pt appointment on 4/15/19.     Next Visit Date:  Future Appointments   Date Time Provider Tasha Prado   4/15/2019  2:00 PM Gerardo Cuevas MD [facility]   4/16/2019  2:45 PM Feroz Mackenzie  [address] Maintenance   Topic Date Due    Diabetic retinal exam  06/04/1974    HIV screen  06/04/1979    Hepatitis B Vaccine (1 of 3 - Risk 3-dose series) 06/04/1983    Shingles Vaccine (1 of 2) 06/04/2014    Colon cancer screen colonoscopy  06/04/2014    Diabetic microalbuminuria test  12/21/2019    Lipid screen  03/29/2020    TSH testing  03/29/2020    Potassium monitoring  03/29/2020    Creatinine monitoring  03/29/2020    A1C test (Diabetic or Prediabetic)  04/01/2020    DTaP/Tdap/Td vaccine (2 - Td) 04/24/2028    Flu vaccine  Completed    Pneumococcal 0-64 years at Risk Vaccine  Completed    Hepatitis C screen  Completed       Hemoglobin A1C (%)   Date Value   04/01/2019 6.2   03/29/2019 4.6   09/12/2018 5.1             ( goal A1C is < 7)   No results found for: LABMICR  LDL Calculated (mg/dL)   Date Value   03/29/2019 82       (goal LDL is <100)   AST (U/L)   Date Value   10/20/2018 13     ALT (U/L)   Date Value   10/20/2018 8     BUN (mg/dL)   Date Value   10/20/2018 14     BP Readings from Last 3 Encounters:   04/01/19 134/82   12/21/18 120/70   10/20/18 (!) 118/55          (goal 120/80)    All Future Testing planned in [facility]  Lab Frequency Next Occurrence   CBC Auto Differential Once 03/05/2019               Patient Active Problem List:     Anxiety disorder     Benign prostatic hyperplasia     Depression     Edema     Esophageal reflux     Essential hypertension     Hypothyroidism     Tinea corporis     Right cervical radiculopathy     Osteomyelitis (HCC)     Osteomyelitis of foot, left, acute (HCC)     Non compliance with medical treatment     Non-traumatic rhabdomyolysis     Ulcerated, foot, left, with necrosis of bone (HCC)     Thrombophlebitis leg     Type 2 diabetes mellitus with foot ulcer, with long-term current use of insulin (HCC)     Hypotension

## 2019-04-10 RX ORDER — PAROXETINE HYDROCHLORIDE 20 MG/1
TABLET, FILM COATED ORAL
Qty: 120 TABLET | Refills: 2 | Status: SHIPPED | OUTPATIENT
Start: 2019-04-10 | End: 2019-07-24 | Stop reason: SDUPTHER

## 2019-04-10 NOTE — TELEPHONE ENCOUNTER
Last visit: 4/1/19  Last Med refill: 11/28/18   Next Visit Date:  Future Appointments   Date Time Provider Tasha Prado   4/16/2019  2:45 PM Yvonne Sykes 25 Maintenance   Topic Date Due    Diabetic retinal exam  06/04/1974    HIV screen  06/04/1979    Hepatitis B Vaccine (1 of 3 - Risk 3-dose series) 06/04/1983    Shingles Vaccine (1 of 2) 06/04/2014    Colon cancer screen colonoscopy  06/04/2014    Diabetic microalbuminuria test  12/21/2019    Lipid screen  03/29/2020    TSH testing  03/29/2020    Potassium monitoring  03/29/2020    Creatinine monitoring  03/29/2020    A1C test (Diabetic or Prediabetic)  04/01/2020    DTaP/Tdap/Td vaccine (2 - Td) 04/24/2028    Flu vaccine  Completed    Pneumococcal 0-64 years Vaccine  Completed    Hepatitis C screen  Completed       Hemoglobin A1C (%)   Date Value   04/01/2019 6.2   03/29/2019 4.6   09/12/2018 5.1             ( goal A1C is < 7)   No results found for: LABMICR  LDL Calculated (mg/dL)   Date Value   03/29/2019 82       (goal LDL is <100)   AST (U/L)   Date Value   10/20/2018 13     ALT (U/L)   Date Value   10/20/2018 8     BUN (mg/dL)   Date Value   10/20/2018 14     BP Readings from Last 3 Encounters:   04/01/19 134/82   12/21/18 120/70   10/20/18 (!) 118/55          (goal 120/80)    All Future Testing planned in [facility]  Lab Frequency Next Occurrence   CBC Auto Differential Once 03/05/2019               Patient Active Problem List:     Anxiety disorder     Benign prostatic hyperplasia     Depression     Edema     Esophageal reflux     Essential hypertension     Hypothyroidism     Tinea corporis     Right cervical radiculopathy     Osteomyelitis (HCC)     Osteomyelitis of foot, left, acute (HCC)     Non compliance with medical treatment     Non-traumatic rhabdomyolysis     Ulcerated, foot, left, with necrosis of bone (HCC)     Thrombophlebitis leg     Type 2 diabetes mellitus with foot ulcer, with long-term current use of insulin (HCC)     Hypotension

## 2019-04-10 NOTE — TELEPHONE ENCOUNTER
Last visit:4/1/19   Last Med refill: 11/28/18 #120 3 refills  Does patient have enough medication for 72 hours:  Next Visit Date:  Future Appointments   Date Time Provider Tasha Prado   4/16/2019  2:45 PM Yvonne Cifuentes 25 Maintenance   Topic Date Due    Diabetic retinal exam  06/04/1974    HIV screen  06/04/1979    Hepatitis B Vaccine (1 of 3 - Risk 3-dose series) 06/04/1983    Shingles Vaccine (1 of 2) 06/04/2014    Colon cancer screen colonoscopy  06/04/2014    Diabetic microalbuminuria test  12/21/2019    Lipid screen  03/29/2020    TSH testing  03/29/2020    Potassium monitoring  03/29/2020    Creatinine monitoring  03/29/2020    A1C test (Diabetic or Prediabetic)  04/01/2020    DTaP/Tdap/Td vaccine (2 - Td) 04/24/2028    Flu vaccine  Completed    Pneumococcal 0-64 years Vaccine  Completed    Hepatitis C screen  Completed       Hemoglobin A1C (%)   Date Value   04/01/2019 6.2   03/29/2019 4.6   09/12/2018 5.1             ( goal A1C is < 7)   No results found for: LABMICR  LDL Calculated (mg/dL)   Date Value   03/29/2019 82       (goal LDL is <100)   AST (U/L)   Date Value   10/20/2018 13     ALT (U/L)   Date Value   10/20/2018 8     BUN (mg/dL)   Date Value   10/20/2018 14     BP Readings from Last 3 Encounters:   04/01/19 134/82   12/21/18 120/70   10/20/18 (!) 118/55          (goal 120/80)    All Future Testing planned in [facility]  Lab Frequency Next Occurrence   CBC Auto Differential Once 03/05/2019               Patient Active Problem List:     Anxiety disorder     Benign prostatic hyperplasia     Depression     Edema     Esophageal reflux     Essential hypertension     Hypothyroidism     Tinea corporis     Right cervical radiculopathy     Osteomyelitis (HCC)     Osteomyelitis of foot, left, acute (HCC)     Non compliance with medical treatment     Non-traumatic rhabdomyolysis     Ulcerated, foot, left, with necrosis of bone ([facility] Thrombophlebitis leg     Type 2 diabetes mellitus with foot ulcer, with long-term current use of insulin (HCC)     Hypotension

## 2019-04-15 DIAGNOSIS — E11.49 OTHER DIABETIC NEUROLOGICAL COMPLICATION ASSOCIATED WITH TYPE 2 DIABETES MELLITUS (HCC): ICD-10-CM

## 2019-04-15 DIAGNOSIS — M86.672 OTHER CHRONIC OSTEOMYELITIS OF LEFT FOOT (HCC): ICD-10-CM

## 2019-04-15 RX ORDER — MORPHINE SULFATE 30 MG/1
30 TABLET, FILM COATED, EXTENDED RELEASE ORAL 3 TIMES DAILY
Qty: 90 TABLET | Refills: 0 | Status: SHIPPED | OUTPATIENT
Start: 2019-04-15 | End: 2019-05-17 | Stop reason: SDUPTHER

## 2019-04-15 NOTE — TELEPHONE ENCOUNTER
Pt said you were going to fill this one more time for him. Pt has not got into P.M. Yet. Bandar/pranav listed. Last appt 4/1/19. Last filled 3/18/19.

## 2019-04-16 ENCOUNTER — OFFICE VISIT (OUTPATIENT)
Dept: PODIATRY | Age: 54
End: 2019-04-16
Payer: MEDICARE

## 2019-04-16 VITALS — WEIGHT: 250 LBS | BODY MASS INDEX: 32.08 KG/M2 | HEIGHT: 74 IN

## 2019-04-16 DIAGNOSIS — M19.072 PRIMARY OSTEOARTHRITIS OF LEFT ANKLE: ICD-10-CM

## 2019-04-16 DIAGNOSIS — E11.621 TYPE 2 DIABETES MELLITUS WITH FOOT ULCER, WITHOUT LONG-TERM CURRENT USE OF INSULIN (HCC): ICD-10-CM

## 2019-04-16 DIAGNOSIS — E11.42 TYPE 2 DIABETES MELLITUS WITH DIABETIC POLYNEUROPATHY, WITHOUT LONG-TERM CURRENT USE OF INSULIN (HCC): ICD-10-CM

## 2019-04-16 DIAGNOSIS — L97.522 ULCER OF LEFT FOOT, WITH FAT LAYER EXPOSED (HCC): ICD-10-CM

## 2019-04-16 DIAGNOSIS — L97.509 TYPE 2 DIABETES MELLITUS WITH FOOT ULCER, WITHOUT LONG-TERM CURRENT USE OF INSULIN (HCC): ICD-10-CM

## 2019-04-16 DIAGNOSIS — M25.572 ACUTE LEFT ANKLE PAIN: Primary | ICD-10-CM

## 2019-04-16 PROCEDURE — G8427 DOCREV CUR MEDS BY ELIG CLIN: HCPCS | Performed by: PODIATRIST

## 2019-04-16 PROCEDURE — 3017F COLORECTAL CA SCREEN DOC REV: CPT | Performed by: PODIATRIST

## 2019-04-16 PROCEDURE — 11042 DBRDMT SUBQ TIS 1ST 20SQCM/<: CPT | Performed by: PODIATRIST

## 2019-04-16 PROCEDURE — 11045 DBRDMT SUBQ TISS EACH ADDL: CPT | Performed by: PODIATRIST

## 2019-04-16 PROCEDURE — 1036F TOBACCO NON-USER: CPT | Performed by: PODIATRIST

## 2019-04-16 PROCEDURE — G8417 CALC BMI ABV UP PARAM F/U: HCPCS | Performed by: PODIATRIST

## 2019-04-16 PROCEDURE — 2022F DILAT RTA XM EVC RTNOPTHY: CPT | Performed by: PODIATRIST

## 2019-04-16 PROCEDURE — 3044F HG A1C LEVEL LT 7.0%: CPT | Performed by: PODIATRIST

## 2019-04-16 PROCEDURE — 99213 OFFICE O/P EST LOW 20 MIN: CPT | Performed by: PODIATRIST

## 2019-04-16 NOTE — PROGRESS NOTES
daily for 30 days. 90 tablet 0    PARoxetine (PAXIL) 20 MG tablet TAKE ONE TABLET BY MOUTH FOUR TIMES A  tablet 2    ciclopirox (LOPROX) 0.77 % cream APPLY TO AFFECTED AREA(S) AND RUB IN A THIN FILM TWO TIMES A DAY (MORNING AND EVENING) 90 g 0    oxyCODONE (ROXICODONE) 20 MG immediate release tablet Take 1 tablet by mouth every 6 hours as needed for Pain for up to 30 days.  120 tablet 0    diazepam (VALIUM) 5 MG tablet TAKE ONE TABLET BY MOUTH EVERY 6 HOURS AS NEEDED FOR ANXIETY 120 tablet 0    levothyroxine (SYNTHROID) 50 MCG tablet Take 1 tablet by mouth daily 90 tablet 3    tiZANidine (ZANAFLEX) 4 MG tablet TAKE ONE TABLET BY MOUTH EVERY 6 HOURS AS NEEDED FOR MUSCLE SPASMS 120 tablet 0    ONE TOUCH ULTRA TEST strip USE ONE STRIP TO TEST TWO TIMES A DAY AS NEEDED 100 strip 2    NYAMYC 969003 UNIT/GM powder APPLY TO AFFECTED AREA(S) FOUR TIMES A DAY 60 g 1    metFORMIN (GLUCOPHAGE) 500 MG tablet Take 1 tablet by mouth 2 times daily (with meals) 90 tablet 11    buPROPion (WELLBUTRIN XL) 300 MG extended release tablet TAKE ONE TABLET BY MOUTH DAILY 30 tablet 10    Wound Dressings (ADAPTIC NON-ADHERING DRESSING) PADS Apply 1 Units topically 2 times daily 60 each 5    Gauze Pads & Dressings (COMBINE ABD) 5\"X9\" PADS 1 Units by Does not apply route 2 times daily 60 each 5    lisinopril (PRINIVIL;ZESTRIL) 10 MG tablet TAKE ONE TABLET BY MOUTH DAILY 30 tablet 10    Blood Glucose Monitoring Suppl (ONE TOUCH ULTRA 2) w/Device KIT 1 kit by Other route daily 1 kit 5    ONETOUCH DELICA LANCETS 10U MISC TEST TWO TIMES A  each 2    ciclopirox (LOPROX) 0.77 % cream APPLY TO AFFECTED AREA(S) AND RUB  IN A THIN FILM TWO TIMES A DAY (AM AND PM) 90 g 2    tamsulosin (FLOMAX) 0.4 MG capsule TAKE ONE CAPSULE BY MOUTH DAILY 30 capsule 11    omeprazole (PRILOSEC) 40 MG delayed release capsule TAKE ONE CAPSULE BY MOUTH EVERY NIGHT AT BEDTIME 30 capsule 11    Gauze Pads & Dressings (KERLIX GAUZE ROLL LARGE) MISC 1 Units by Does not apply route 2 times daily 60 each 5     No current facility-administered medications on file prior to visit. ALLERGIES    Allergies   Allergen Reactions    Fiorinal [Butalbital-Aspirin-Caffeine] Other (See Comments)     Generalized blisters    Peanut Butter Flavor [Flavoring Agent] Nausea And Vomiting       PAST SURGICAL HISTORY    Past Surgical History:   Procedure Laterality Date    APPENDECTOMY      BACK SURGERY      Lower x 4. Had abscess after appendectomy.  KNEE SURGERY Right     scope    TOE AMPUTATION Bilateral 3/6/2017    TOE AMPUTATION LEFT HALLUX AND 2ND DIGIT AND RIGHT 2ND DIGIT performed by Seda Anton DPM at [place] [address]    family history includes Cancer in his maternal grandfather; Diabetes in his father; No Known Problems in his mother. SOCIAL HISTORY    Social History     Tobacco Use    Smoking status: Never Smoker    Smokeless tobacco: Never Used   Substance Use Topics    Alcohol use: No    Drug use: Yes     Frequency: 2.0 times per week     Types: Marijuana       Review of Systems    Objective:      Ht 6' 2\" (1.88 m)   Wt 250 lb (113.4 kg)   BMI 32.10 kg/m²   General Appearance: sleepy but able to be awakened, and oriented to person, place and time, well-developed and well-nourished, in no acute distress    Wound:         Wound #:   1    diabetic foot ulcer   left foot    Wound measurements:  #1  11 cm by 11 cm  by   3 mm           Wound Depth: subcutaneous.      Drainage:    moderate Type:serosanguinous    Wound Bed status:   Granulation Tissue: 80%   Necrotic 20%  Type: maceration, fibrin, hair, debris  Epithelialization 0%   Hypergranular 0%      Erythema absent       Odor:no    Maceration:yes    Undermining/tract/tunneling:no    Culture taken:   no             Vascular:  DP/PT pulses palpable 2/4, Bilateral.    CFT <3 seconds to digits 1-5, Bilateral .   Hair growth absent to level of digits, Bilateral.  Edema present, Bilateral.  Erythema absent, Bilateral.     Neurological:  Sensation absent to light touch to level of digits, Bilateral.  Protective sensation  absent via 5.07/10g [place] monofilament 10/10 sites, Bilateral.  Vibratory sensation absent to 1st MPJ, Bilateral.     Musculoskeletal:  Muscle strength 5/5 all LE groups tested, Bilateral.     Radiographs: 3 views left ankle: The fifth metatarsal and cuboid are prominent plantarly. Degenerative changes in the midfoot, rearfoot, and ankle. No erosive changes. Periosteal reaction of the fibular shaft. Collapse of the medial arch. Assessment:         1. Acute left ankle pain    2. Ulcer of left foot, with fat layer exposed ([address]    3. Type 2 diabetes mellitus with foot ulcer, without long-term current use of insulin ([address]    4. Toe amputation status, left ([address]    5. Type 2 diabetes mellitus with diabetic polyneuropathy, without long-term current use of insulin ([address]    6. Primary osteoarthritis of left ankle          Plan:   Cristina Garcia Age:  46 y.o. Gender:  male   :  1965  Today's Date:  2019      Procedure: With the patient in supine position, Lidocaine soaked gauze was applied per physician order at beginning of wound evaluation. It was subsequently removed. Using a curette, #15 blade scalpel, scissors and Pick-up, the wound was debrided down to and included the removal of the following tissue:     [] epidermis, [] dermis, [x]  subcutaneous tissue,  [] muscle/fascia tissue,   and/or  [] bone     Also debrided was all  [x] fibrin, [] biofilm, [x] slough,  [x] necrotic,  [] hypergranulation tissue, and/or  [x] hyperkeratotic tissue. Wound was copiously irrigated with normal saline solution. Bleeding:  Minimal.  Hemostasis:  pressure      50%  of wound debrided. Patient tolerated procedure well. Pain 0 / 10 during procedure and pain 0 / 10 after procedure.          Plan for wound  Dress per physician order  Treatment: Silvercel daily  May try betadine dressing. Discussed ankle xrays    He has a CROW boot, I asked him again to start wearing it. We called the supply company for wound supplies and they stated that the supplies were delivered. We informed patient of this and he told us he has not gotten them. When he returned home today, he called back and found them on his front porch. They have been on his front porch for over a week. 1. Discussed appropriate home care of this wound. 2. Dressings:   Orders Placed This Encounter   Procedures    XR ANKLE LEFT (MIN 3 VIEWS)    IA DEBRIDEMENT, SKIN, SUB-Q TISSUE,=<20 SQ CM    IA DEBRIDEMENT, SKIN, SUB-Q TISSUE,EACH ADD 20 SQ CM      3. Follow up: 4 week. 4. Detailed home instructions and education material given to patient prior to discharge.      Electronically signed by Shaila Segura DPM on 4/16/2019 at 4:49 PM  4/16/2019

## 2019-04-17 ENCOUNTER — TELEPHONE (OUTPATIENT)
Dept: PRIMARY CARE CLINIC | Age: 54
End: 2019-04-17

## 2019-04-17 NOTE — TELEPHONE ENCOUNTER
PM referral sent to Dr. Nayeli Alcaraz. They called to advise that unable to take on as new patient. Asked if anyone else in the practice would be able to accept, but still unable to treat patient due to amount of pain medication that he currently takes/prescribed. Will need to refer to another PM clinic, referral to [facility] Dr. Toni Olsen group? Please advise. Call patient back to update.

## 2019-04-23 ENCOUNTER — OFFICE VISIT (OUTPATIENT)
Dept: PRIMARY CARE CLINIC | Age: 54
End: 2019-04-23
Payer: MEDICARE

## 2019-04-23 VITALS
DIASTOLIC BLOOD PRESSURE: 76 MMHG | WEIGHT: 252 LBS | OXYGEN SATURATION: 93 % | SYSTOLIC BLOOD PRESSURE: 128 MMHG | BODY MASS INDEX: 32.34 KG/M2 | HEART RATE: 77 BPM | HEIGHT: 74 IN

## 2019-04-23 DIAGNOSIS — M54.12 RIGHT CERVICAL RADICULOPATHY: Primary | ICD-10-CM

## 2019-04-23 DIAGNOSIS — M86.9 OSTEOMYELITIS, UNSPECIFIED SITE, UNSPECIFIED TYPE (HCC): ICD-10-CM

## 2019-04-23 PROCEDURE — 1036F TOBACCO NON-USER: CPT | Performed by: FAMILY MEDICINE

## 2019-04-23 PROCEDURE — G8427 DOCREV CUR MEDS BY ELIG CLIN: HCPCS | Performed by: FAMILY MEDICINE

## 2019-04-23 PROCEDURE — 99213 OFFICE O/P EST LOW 20 MIN: CPT | Performed by: FAMILY MEDICINE

## 2019-04-23 PROCEDURE — 3017F COLORECTAL CA SCREEN DOC REV: CPT | Performed by: FAMILY MEDICINE

## 2019-04-23 PROCEDURE — G8417 CALC BMI ABV UP PARAM F/U: HCPCS | Performed by: FAMILY MEDICINE

## 2019-04-23 RX ORDER — OXYCODONE HYDROCHLORIDE 15 MG/1
15 TABLET ORAL EVERY 6 HOURS PRN
Qty: 120 TABLET | Refills: 0
Start: 2019-04-23 | End: 2019-05-08 | Stop reason: SDUPTHER

## 2019-04-23 RX ORDER — OXYCODONE HYDROCHLORIDE 15 MG/1
15 TABLET ORAL EVERY 4 HOURS PRN
Qty: 120 TABLET | Refills: 0
Start: 2019-04-23 | End: 2019-04-23

## 2019-04-23 ASSESSMENT — ENCOUNTER SYMPTOMS
EYE DISCHARGE: 0
COUGH: 0
SORE THROAT: 0
VOMITING: 0
WHEEZING: 0
SHORTNESS OF BREATH: 0
RHINORRHEA: 0
DIARRHEA: 0
ABDOMINAL PAIN: 0
NAUSEA: 0
EYE REDNESS: 0

## 2019-04-23 NOTE — PROGRESS NOTES
Sig Dispense Refill    oxyCODONE (OXY-IR) 15 MG immediate release tablet Take 1 tablet by mouth every 6 hours as needed for Pain for up to 30 days. 120 tablet 0    povidone-iodine (BETADINE) 7.5 % external solution Apply to wound. Apply as a wet to dry dressing 1 Bottle 1    morphine (MS CONTIN) 30 MG extended release tablet Take 1 tablet by mouth 3 times daily for 30 days.  90 tablet 0    PARoxetine (PAXIL) 20 MG tablet TAKE ONE TABLET BY MOUTH FOUR TIMES A  tablet 2    ciclopirox (LOPROX) 0.77 % cream APPLY TO AFFECTED AREA(S) AND RUB IN A THIN FILM TWO TIMES A DAY (MORNING AND EVENING) 90 g 0    diazepam (VALIUM) 5 MG tablet TAKE ONE TABLET BY MOUTH EVERY 6 HOURS AS NEEDED FOR ANXIETY 120 tablet 0    levothyroxine (SYNTHROID) 50 MCG tablet Take 1 tablet by mouth daily 90 tablet 3    tiZANidine (ZANAFLEX) 4 MG tablet TAKE ONE TABLET BY MOUTH EVERY 6 HOURS AS NEEDED FOR MUSCLE SPASMS 120 tablet 0    ONE TOUCH ULTRA TEST strip USE ONE STRIP TO TEST TWO TIMES A DAY AS NEEDED 100 strip 2    NY[facility] 381019 UNIT/GM powder APPLY TO AFFECTED AREA(S) FOUR TIMES A DAY 60 g 1    metFORMIN (GLUCOPHAGE) 500 MG tablet Take 1 tablet by mouth 2 times daily (with meals) 90 tablet 11    buPROPion (WELLBUTRIN XL) 300 MG extended release tablet TAKE ONE TABLET BY MOUTH DAILY 30 tablet 10    Wound Dressings (ADAPTIC NON-ADHERING DRESSING) PADS Apply 1 Units topically 2 times daily 60 each 5    Gauze Pads & Dressings (COMBINE ABD) 5\"X9\" PADS 1 Units by Does not apply route 2 times daily 60 each 5    lisinopril (PRINIVIL;ZESTRIL) 10 MG tablet TAKE ONE TABLET BY MOUTH DAILY 30 tablet 10    Blood Glucose Monitoring Suppl (ONE TOUCH ULTRA 2) w/Device KIT 1 kit by Other route daily 1 kit 5    ONETOUCH DELICA LANCETS 65K MISC TEST TWO TIMES A  each 2    ciclopirox (LOPROX) 0.77 % cream APPLY TO AFFECTED AREA(S) AND RUB  IN A THIN FILM TWO TIMES A DAY (AM AND PM) 90 g 2    tamsulosin (FLOMAX) 0.4 MG capsule TAKE ONE CAPSULE BY MOUTH DAILY 30 capsule 11    omeprazole (PRILOSEC) 40 MG delayed release capsule TAKE ONE CAPSULE BY MOUTH EVERY NIGHT AT BEDTIME 30 capsule 11    Gauze Pads & Dressings (KERLIX GAUZE ROLL LARGE) MISC 1 Units by Does not apply route 2 times daily 60 each 5     No current facility-administered medications for this visit. Allergies   Allergen Reactions    Fiorinal [Butalbital-Aspirin-Caffeine] Other (See Comments)     Generalized blisters    Peanut Butter Flavor [Flavoring Agent] Nausea And Vomiting       Health Maintenance   Topic Date Due    Diabetic retinal exam  06/04/1974    HIV screen  06/04/1979    Hepatitis B Vaccine (1 of 3 - Risk 3-dose series) 06/04/1983    Shingles Vaccine (1 of 2) 06/04/2014    Diabetic microalbuminuria test  12/21/2019    Lipid screen  03/29/2020    TSH testing  03/29/2020    Potassium monitoring  03/29/2020    Creatinine monitoring  03/29/2020    A1C test (Diabetic or Prediabetic)  04/01/2020    Colon cancer screen colonoscopy  12/16/2020    DTaP/Tdap/Td vaccine (2 - Td) 04/24/2028    Flu vaccine  Completed    Pneumococcal 0-64 years Vaccine  Completed    Hepatitis C screen  Completed       Subjective:      Review of Systems   Constitutional: Negative for chills and fever. HENT: Negative for rhinorrhea and sore throat. Eyes: Negative for discharge and redness. Respiratory: Negative for cough, shortness of breath and wheezing. Cardiovascular: Negative for chest pain and palpitations. Gastrointestinal: Negative for abdominal pain, diarrhea, nausea and vomiting. Genitourinary: Negative for dysuria and frequency. Musculoskeletal: Negative for arthralgias and myalgias. Neurological: Negative for dizziness, light-headedness and headaches. Psychiatric/Behavioral: Negative for sleep disturbance.        Objective:     /76   Pulse 77   Ht 6' 2.04\" (1.881 m)   Wt 252 lb (114.3 kg)   SpO2 93%   BMI 32.32 kg/m²   Physical Exam Constitutional: He is oriented to person, place, and time. He appears well-developed and well-nourished. No distress. HENT:   Head: Normocephalic and atraumatic. Mouth/Throat: Oropharynx is clear and moist.   Eyes: Pupils are equal, round, and reactive to light. Conjunctivae are normal. Right eye exhibits no discharge. Left eye exhibits no discharge. No scleral icterus. Neck: No tracheal deviation present. No thyromegaly present. Cardiovascular: Normal rate, regular rhythm and normal heart sounds. No carotid bruits   Pulmonary/Chest: Effort normal and breath sounds normal. No respiratory distress. He has no wheezes. Musculoskeletal: He exhibits no edema. Lymphadenopathy:     He has no cervical adenopathy. Neurological: He is alert and oriented to person, place, and time. Skin: Skin is warm. No rash noted. Psychiatric: He has a normal mood and affect. His behavior is normal. Thought content normal.   Nursing note and vitals reviewed. Controlled Substances Monitoring:     RX Monitoring 4/23/2019   Attestation The Prescription Monitoring Report for this patient was reviewed today. Acute Pain Prescriptions Severe pain not adequately treated with lower dose. Chronic Pain Routine Monitoring Possible medication side effects, risk of tolerance/dependence & alternative treatments discussed. ;Potential drug abuse or diversion identified, see note documentation. Chronic Pain > 50 MEDD Re-evaluated the status of the patient's underlying condition causing pain. Chronic Pain > 80 MEDD Looked for signs of prescription misuse. Chronic Pain > 120 MEDD Engaged a pain medicine specialist as a prescriber or consultant. Assessment:       Diagnosis Orders   1. Right cervical radiculopathy  oxyCODONE (OXY-IR) 15 MG immediate release tablet    DISCONTINUED: oxyCODONE (OXY-IR) 15 MG immediate release tablet   2.  Osteomyelitis, unspecified site, unspecified type (HCC)  oxyCODONE (OXY-IR) 15 MG immediate release tablet    DISCONTINUED: oxyCODONE (OXY-IR) 15 MG immediate release tablet        Plan:    advised pt must be able to wean down on oxycodone to where it is not used everyday, but only two or three times a week  Will wean monthly    Return in about 6 weeks (around 6/4/2019). No orders of the defined types were placed in this encounter. Orders Placed This Encounter   Medications    DISCONTD: oxyCODONE (OXY-IR) 15 MG immediate release tablet     Sig: Take 1 tablet by mouth every 4 hours as needed for Pain for up to 3 days. Dispense:  120 tablet     Refill:  0     Reduce doses taken as pain becomes manageable    oxyCODONE (OXY-IR) 15 MG immediate release tablet     Sig: Take 1 tablet by mouth every 6 hours as needed for Pain for up to 30 days. Dispense:  120 tablet     Refill:  0     Reduce doses taken as pain becomes manageable       Patient given educationalmaterials - see patient instructions. Discussed use, benefit, and side effectsof prescribed medications. All patient questions answered. Pt voiced understanding. Reviewed health maintenance. Instructed to continue current medications, diet andexercise. Patient agreed with treatment plan. Follow up as directed.      Electronicallysigned by Jacy Grant MD on 4/23/2019 at 1:31 PM

## 2019-05-06 DIAGNOSIS — L97.509 TYPE 2 DIABETES MELLITUS WITH FOOT ULCER, WITH LONG-TERM CURRENT USE OF INSULIN (HCC): ICD-10-CM

## 2019-05-06 DIAGNOSIS — Z79.4 TYPE 2 DIABETES MELLITUS WITH FOOT ULCER, WITH LONG-TERM CURRENT USE OF INSULIN (HCC): ICD-10-CM

## 2019-05-06 DIAGNOSIS — E11.621 TYPE 2 DIABETES MELLITUS WITH FOOT ULCER, WITH LONG-TERM CURRENT USE OF INSULIN (HCC): ICD-10-CM

## 2019-05-07 RX ORDER — LANCETS 33 GAUGE
EACH MISCELLANEOUS
Qty: 100 EACH | Refills: 5 | Status: ON HOLD
Start: 2019-05-07 | End: 2020-04-21 | Stop reason: HOSPADM

## 2019-05-08 DIAGNOSIS — M54.12 RIGHT CERVICAL RADICULOPATHY: ICD-10-CM

## 2019-05-08 DIAGNOSIS — F41.9 ANXIETY DISORDER, UNSPECIFIED TYPE: ICD-10-CM

## 2019-05-08 DIAGNOSIS — M86.9 OSTEOMYELITIS, UNSPECIFIED SITE, UNSPECIFIED TYPE (HCC): ICD-10-CM

## 2019-05-08 RX ORDER — DIAZEPAM 5 MG/1
TABLET ORAL
Qty: 120 TABLET | Refills: 0 | Status: SHIPPED | OUTPATIENT
Start: 2019-05-08 | End: 2019-06-07 | Stop reason: SDUPTHER

## 2019-05-08 RX ORDER — OXYCODONE HYDROCHLORIDE 15 MG/1
15 TABLET ORAL EVERY 6 HOURS PRN
Qty: 120 TABLET | Refills: 0 | Status: SHIPPED | OUTPATIENT
Start: 2019-05-08 | End: 2021-05-17 | Stop reason: SDUPTHER

## 2019-05-08 NOTE — TELEPHONE ENCOUNTER
Last visit:4/23/19  Last Med refill: 4/8/19  On the Valium  4/23/19 on the Oxycodone (OXY-IR) 15 MG Immediate release tablet   Does patient have enough medication for 72 hours: Yes    Next Visit Date:  Future Appointments   Date Time Provider Tasha Prado   5/14/2019  2:30 PM Yvonne Gonzalez 25 Maintenance   Topic Date Due    Diabetic retinal exam  06/04/1974    HIV screen  06/04/1979    Hepatitis B Vaccine (1 of 3 - Risk 3-dose series) 06/04/1983    Shingles Vaccine (1 of 2) 06/04/2014    Diabetic microalbuminuria test  12/21/2019    Lipid screen  03/29/2020    TSH testing  03/29/2020    Potassium monitoring  03/29/2020    Creatinine monitoring  03/29/2020    A1C test (Diabetic or Prediabetic)  04/01/2020    Colon cancer screen colonoscopy  12/16/2020    DTaP/Tdap/Td vaccine (2 - Td) 04/24/2028    Flu vaccine  Completed    Pneumococcal 0-64 years Vaccine  Completed    Hepatitis C screen  Completed       Hemoglobin A1C (%)   Date Value   04/01/2019 6.2   03/29/2019 4.6   09/12/2018 5.1             ( goal A1C is < 7)   No results found for: LABMICR  LDL Calculated (mg/dL)   Date Value   03/29/2019 82       (goal LDL is <100)   AST (U/L)   Date Value   10/20/2018 13     ALT (U/L)   Date Value   10/20/2018 8     BUN (mg/dL)   Date Value   10/20/2018 14     BP Readings from Last 3 Encounters:   04/23/19 128/76   04/01/19 134/82   12/21/18 120/70          (goal 120/80)    All Future Testing planned in [facility]  Lab Frequency Next Occurrence   CBC Auto Differential Once 03/05/2019               Patient Active Problem List:     Anxiety disorder     Benign prostatic hyperplasia     Depression     Edema     Esophageal reflux     Essential hypertension     Hypothyroidism     Tinea corporis     Right cervical radiculopathy     Osteomyelitis (HCC)     Osteomyelitis of foot, left, acute (HCC)     Non compliance with medical treatment     Non-traumatic rhabdomyolysis Ulcerated, foot, left, with necrosis of bone (HCC)     Thrombophlebitis leg     Type 2 diabetes mellitus with foot ulcer, with long-term current use of insulin (HCC)     Hypotension

## 2019-05-16 DIAGNOSIS — M86.672 OTHER CHRONIC OSTEOMYELITIS OF LEFT FOOT (HCC): ICD-10-CM

## 2019-05-16 DIAGNOSIS — E11.49 OTHER DIABETIC NEUROLOGICAL COMPLICATION ASSOCIATED WITH TYPE 2 DIABETES MELLITUS (HCC): ICD-10-CM

## 2019-05-17 ENCOUNTER — HOSPITAL ENCOUNTER (INPATIENT)
Age: 54
LOS: 4 days | Discharge: HOME HEALTH | DRG: 241 | End: 2019-05-21
Attending: EMERGENCY MEDICINE | Admitting: FAMILY MEDICINE
Payer: MEDICARE

## 2019-05-17 ENCOUNTER — APPOINTMENT (OUTPATIENT)
Dept: GENERAL RADIOLOGY | Age: 54
DRG: 241 | End: 2019-05-17
Payer: MEDICARE

## 2019-05-17 DIAGNOSIS — D62 ACUTE BLOOD LOSS ANEMIA: ICD-10-CM

## 2019-05-17 DIAGNOSIS — I95.9 HYPOTENSION, UNSPECIFIED HYPOTENSION TYPE: Primary | ICD-10-CM

## 2019-05-17 DIAGNOSIS — K92.2 GASTROINTESTINAL HEMORRHAGE, UNSPECIFIED GASTROINTESTINAL HEMORRHAGE TYPE: ICD-10-CM

## 2019-05-17 LAB
-: ABNORMAL
ABSOLUTE EOS #: 0.06 K/UL (ref 0–0.4)
ABSOLUTE IMMATURE GRANULOCYTE: ABNORMAL K/UL (ref 0–0.3)
ABSOLUTE LYMPH #: 1.24 K/UL (ref 1–4.8)
ABSOLUTE MONO #: 0.56 K/UL (ref 0.1–1.3)
ALBUMIN SERPL-MCNC: 3.1 G/DL (ref 3.5–5.2)
ALBUMIN/GLOBULIN RATIO: ABNORMAL (ref 1–2.5)
ALLEN TEST: ABNORMAL
ALP BLD-CCNC: 59 U/L (ref 40–129)
ALT SERPL-CCNC: 6 U/L (ref 5–41)
AMORPHOUS: ABNORMAL
ANION GAP SERPL CALCULATED.3IONS-SCNC: 11 MMOL/L (ref 9–17)
AST SERPL-CCNC: 8 U/L
BACTERIA: ABNORMAL
BASOPHILS # BLD: 1 % (ref 0–2)
BASOPHILS ABSOLUTE: 0.06 K/UL (ref 0–0.2)
BILIRUB SERPL-MCNC: 0.15 MG/DL (ref 0.3–1.2)
BILIRUBIN URINE: NEGATIVE
BNP INTERPRETATION: ABNORMAL
BUN BLDV-MCNC: 14 MG/DL (ref 6–20)
BUN/CREAT BLD: ABNORMAL (ref 9–20)
CALCIUM SERPL-MCNC: 8.3 MG/DL (ref 8.6–10.4)
CARBOXYHEMOGLOBIN: 6.3 % (ref 0–5)
CASTS UA: ABNORMAL /LPF
CASTS UA: ABNORMAL /LPF
CHLORIDE BLD-SCNC: 104 MMOL/L (ref 98–107)
CO2: 23 MMOL/L (ref 20–31)
COLOR: YELLOW
COMMENT UA: ABNORMAL
CREAT SERPL-MCNC: 0.84 MG/DL (ref 0.7–1.2)
CRYSTALS, UA: ABNORMAL /HPF
DIFFERENTIAL TYPE: ABNORMAL
EOSINOPHILS RELATIVE PERCENT: 1 % (ref 0–4)
EPITHELIAL CELLS UA: ABNORMAL /HPF
FIO2: ABNORMAL
GFR AFRICAN AMERICAN: >60 ML/MIN
GFR NON-AFRICAN AMERICAN: >60 ML/MIN
GFR SERPL CREATININE-BSD FRML MDRD: ABNORMAL ML/MIN/{1.73_M2}
GFR SERPL CREATININE-BSD FRML MDRD: ABNORMAL ML/MIN/{1.73_M2}
GLUCOSE BLD-MCNC: 123 MG/DL (ref 70–99)
GLUCOSE BLD-MCNC: 92 MG/DL (ref 75–110)
GLUCOSE URINE: NEGATIVE
HCO3 VENOUS: 22.2 MMOL/L (ref 24–30)
HCT VFR BLD CALC: 17.5 % (ref 41–53)
HCT VFR BLD CALC: 22.1 % (ref 41–53)
HCT VFR BLD CALC: 24.7 % (ref 41–53)
HEMOGLOBIN: 5.4 G/DL (ref 13.5–17.5)
HEMOGLOBIN: 6.8 G/DL (ref 13.5–17.5)
HEMOGLOBIN: 7.8 G/DL (ref 13.5–17.5)
IMMATURE GRANULOCYTES: ABNORMAL %
INR BLD: 1.2
KETONES, URINE: NEGATIVE
LACTIC ACID, SEPSIS WHOLE BLOOD: ABNORMAL MMOL/L (ref 0.5–1.9)
LACTIC ACID, SEPSIS WHOLE BLOOD: NORMAL MMOL/L (ref 0.5–1.9)
LACTIC ACID, SEPSIS: 1.8 MMOL/L (ref 0.5–1.9)
LACTIC ACID, SEPSIS: 2.6 MMOL/L (ref 0.5–1.9)
LEUKOCYTE ESTERASE, URINE: ABNORMAL
LIPASE: 24 U/L (ref 13–60)
LYMPHOCYTES # BLD: 20 % (ref 24–44)
MAGNESIUM: 1.3 MG/DL (ref 1.6–2.6)
MCH RBC QN AUTO: 23.4 PG (ref 26–34)
MCHC RBC AUTO-ENTMCNC: 30.7 G/DL (ref 31–37)
MCV RBC AUTO: 76.3 FL (ref 80–100)
METHEMOGLOBIN: 1 % (ref 0–1.9)
MODE: ABNORMAL
MONOCYTES # BLD: 9 % (ref 1–7)
MORPHOLOGY: ABNORMAL
MUCUS: ABNORMAL
NEGATIVE BASE EXCESS, VEN: 0.3 MMOL/L (ref 0–2)
NITRITE, URINE: NEGATIVE
NOTIFICATION TIME: ABNORMAL
NOTIFICATION: ABNORMAL
NRBC AUTOMATED: ABNORMAL PER 100 WBC
O2 DEVICE/FLOW/%: ABNORMAL
O2 SAT, VEN: 92.9 % (ref 60–85)
OTHER OBSERVATIONS UA: ABNORMAL
OXYHEMOGLOBIN: ABNORMAL % (ref 95–98)
PARTIAL THROMBOPLASTIN TIME: 32.4 SEC (ref 24–36)
PATHOLOGIST REVIEW: NORMAL
PATIENT TEMP: 37
PCO2, VEN, TEMP ADJ: ABNORMAL MMHG (ref 39–55)
PCO2, VEN: 25.9 (ref 39–55)
PDW BLD-RTO: 18.5 % (ref 11.5–14.9)
PEEP/CPAP: ABNORMAL
PH UA: 6 (ref 5–8)
PH VENOUS: 7.54 (ref 7.32–7.42)
PH, VEN, TEMP ADJ: ABNORMAL (ref 7.32–7.42)
PLATELET # BLD: 252 K/UL (ref 150–450)
PLATELET ESTIMATE: ABNORMAL
PMV BLD AUTO: 7.7 FL (ref 6–12)
PO2, VEN, TEMP ADJ: ABNORMAL MMHG (ref 30–50)
PO2, VEN: 143 (ref 30–50)
POSITIVE BASE EXCESS, VEN: ABNORMAL MMOL/L (ref 0–2)
POTASSIUM SERPL-SCNC: 3.5 MMOL/L (ref 3.7–5.3)
PRO-BNP: 620 PG/ML
PROTEIN UA: NEGATIVE
PROTHROMBIN TIME: 14.8 SEC (ref 11.8–14.6)
PSV: ABNORMAL
PT. POSITION: ABNORMAL
RBC # BLD: 2.29 M/UL (ref 4.5–5.9)
RBC # BLD: ABNORMAL 10*6/UL
RBC UA: ABNORMAL /HPF
RENAL EPITHELIAL, UA: ABNORMAL /HPF
RESPIRATORY RATE: ABNORMAL
SAMPLE SITE: ABNORMAL
SEG NEUTROPHILS: 69 % (ref 36–66)
SEGMENTED NEUTROPHILS ABSOLUTE COUNT: 4.28 K/UL (ref 1.3–9.1)
SET RATE: ABNORMAL
SODIUM BLD-SCNC: 138 MMOL/L (ref 135–144)
SPECIFIC GRAVITY UA: 1.02 (ref 1–1.03)
TEXT FOR RESPIRATORY: ABNORMAL
TOTAL HB: ABNORMAL G/DL (ref 12–16)
TOTAL PROTEIN: 6.1 G/DL (ref 6.4–8.3)
TOTAL RATE: ABNORMAL
TRICHOMONAS: ABNORMAL
TROPONIN INTERP: NORMAL
TROPONIN INTERP: NORMAL
TROPONIN T: NORMAL NG/ML
TROPONIN T: NORMAL NG/ML
TROPONIN, HIGH SENSITIVITY: 10 NG/L (ref 0–22)
TROPONIN, HIGH SENSITIVITY: 11 NG/L (ref 0–22)
TURBIDITY: ABNORMAL
URINE HGB: NEGATIVE
UROBILINOGEN, URINE: NORMAL
VT: ABNORMAL
WBC # BLD: 6.2 K/UL (ref 3.5–11)
WBC # BLD: ABNORMAL 10*3/UL
WBC UA: ABNORMAL /HPF
YEAST: ABNORMAL

## 2019-05-17 PROCEDURE — 6370000000 HC RX 637 (ALT 250 FOR IP): Performed by: FAMILY MEDICINE

## 2019-05-17 PROCEDURE — 6360000002 HC RX W HCPCS: Performed by: EMERGENCY MEDICINE

## 2019-05-17 PROCEDURE — 85018 HEMOGLOBIN: CPT

## 2019-05-17 PROCEDURE — C9113 INJ PANTOPRAZOLE SODIUM, VIA: HCPCS | Performed by: EMERGENCY MEDICINE

## 2019-05-17 PROCEDURE — 81001 URINALYSIS AUTO W/SCOPE: CPT

## 2019-05-17 PROCEDURE — 85014 HEMATOCRIT: CPT

## 2019-05-17 PROCEDURE — 82800 BLOOD PH: CPT

## 2019-05-17 PROCEDURE — 83735 ASSAY OF MAGNESIUM: CPT

## 2019-05-17 PROCEDURE — 85025 COMPLETE CBC W/AUTO DIFF WBC: CPT

## 2019-05-17 PROCEDURE — 99285 EMERGENCY DEPT VISIT HI MDM: CPT

## 2019-05-17 PROCEDURE — 86901 BLOOD TYPING SEROLOGIC RH(D): CPT

## 2019-05-17 PROCEDURE — 6370000000 HC RX 637 (ALT 250 FOR IP): Performed by: EMERGENCY MEDICINE

## 2019-05-17 PROCEDURE — 96365 THER/PROPH/DIAG IV INF INIT: CPT

## 2019-05-17 PROCEDURE — 36430 TRANSFUSION BLD/BLD COMPNT: CPT

## 2019-05-17 PROCEDURE — 86920 COMPATIBILITY TEST SPIN: CPT

## 2019-05-17 PROCEDURE — 36415 COLL VENOUS BLD VENIPUNCTURE: CPT

## 2019-05-17 PROCEDURE — 86900 BLOOD TYPING SEROLOGIC ABO: CPT

## 2019-05-17 PROCEDURE — 99222 1ST HOSP IP/OBS MODERATE 55: CPT | Performed by: FAMILY MEDICINE

## 2019-05-17 PROCEDURE — 83605 ASSAY OF LACTIC ACID: CPT

## 2019-05-17 PROCEDURE — 87086 URINE CULTURE/COLONY COUNT: CPT

## 2019-05-17 PROCEDURE — 84484 ASSAY OF TROPONIN QUANT: CPT

## 2019-05-17 PROCEDURE — 71045 X-RAY EXAM CHEST 1 VIEW: CPT

## 2019-05-17 PROCEDURE — 86850 RBC ANTIBODY SCREEN: CPT

## 2019-05-17 PROCEDURE — 82947 ASSAY GLUCOSE BLOOD QUANT: CPT

## 2019-05-17 PROCEDURE — 2580000003 HC RX 258: Performed by: FAMILY MEDICINE

## 2019-05-17 PROCEDURE — 83880 ASSAY OF NATRIURETIC PEPTIDE: CPT

## 2019-05-17 PROCEDURE — 83690 ASSAY OF LIPASE: CPT

## 2019-05-17 PROCEDURE — 85730 THROMBOPLASTIN TIME PARTIAL: CPT

## 2019-05-17 PROCEDURE — P9016 RBC LEUKOCYTES REDUCED: HCPCS

## 2019-05-17 PROCEDURE — 85610 PROTHROMBIN TIME: CPT

## 2019-05-17 PROCEDURE — 2580000003 HC RX 258: Performed by: EMERGENCY MEDICINE

## 2019-05-17 PROCEDURE — 93005 ELECTROCARDIOGRAM TRACING: CPT

## 2019-05-17 PROCEDURE — 82805 BLOOD GASES W/O2 SATURATION: CPT

## 2019-05-17 PROCEDURE — 2060000000 HC ICU INTERMEDIATE R&B

## 2019-05-17 PROCEDURE — 80053 COMPREHEN METABOLIC PANEL: CPT

## 2019-05-17 RX ORDER — MAGNESIUM SULFATE 1 G/100ML
1 INJECTION INTRAVENOUS PRN
Status: DISCONTINUED | OUTPATIENT
Start: 2019-05-17 | End: 2019-05-21 | Stop reason: HOSPADM

## 2019-05-17 RX ORDER — SODIUM CHLORIDE 0.9 % (FLUSH) 0.9 %
10 SYRINGE (ML) INJECTION PRN
Status: DISCONTINUED | OUTPATIENT
Start: 2019-05-17 | End: 2019-05-21 | Stop reason: HOSPADM

## 2019-05-17 RX ORDER — TIZANIDINE 4 MG/1
4 TABLET ORAL EVERY 6 HOURS PRN
Status: DISCONTINUED | OUTPATIENT
Start: 2019-05-17 | End: 2019-05-21 | Stop reason: HOSPADM

## 2019-05-17 RX ORDER — BUPROPION HYDROCHLORIDE 300 MG/1
300 TABLET ORAL DAILY
Status: DISCONTINUED | OUTPATIENT
Start: 2019-05-18 | End: 2019-05-21 | Stop reason: HOSPADM

## 2019-05-17 RX ORDER — MORPHINE SULFATE 30 MG/1
30 TABLET, FILM COATED, EXTENDED RELEASE ORAL 3 TIMES DAILY
Qty: 90 TABLET | Refills: 0 | Status: SHIPPED | OUTPATIENT
Start: 2019-05-17 | End: 2019-06-14 | Stop reason: SDUPTHER

## 2019-05-17 RX ORDER — SODIUM CHLORIDE 0.9 % (FLUSH) 0.9 %
10 SYRINGE (ML) INJECTION EVERY 12 HOURS SCHEDULED
Status: DISCONTINUED | OUTPATIENT
Start: 2019-05-17 | End: 2019-05-21 | Stop reason: HOSPADM

## 2019-05-17 RX ORDER — LISINOPRIL 10 MG/1
10 TABLET ORAL DAILY
Status: DISCONTINUED | OUTPATIENT
Start: 2019-05-18 | End: 2019-05-21 | Stop reason: HOSPADM

## 2019-05-17 RX ORDER — NICOTINE POLACRILEX 4 MG
15 LOZENGE BUCCAL PRN
Status: DISCONTINUED | OUTPATIENT
Start: 2019-05-17 | End: 2019-05-21 | Stop reason: HOSPADM

## 2019-05-17 RX ORDER — ONDANSETRON 2 MG/ML
4 INJECTION INTRAMUSCULAR; INTRAVENOUS EVERY 6 HOURS PRN
Status: DISCONTINUED | OUTPATIENT
Start: 2019-05-17 | End: 2019-05-21 | Stop reason: HOSPADM

## 2019-05-17 RX ORDER — DEXTROSE MONOHYDRATE 50 MG/ML
100 INJECTION, SOLUTION INTRAVENOUS PRN
Status: DISCONTINUED | OUTPATIENT
Start: 2019-05-17 | End: 2019-05-21 | Stop reason: HOSPADM

## 2019-05-17 RX ORDER — OXYCODONE HYDROCHLORIDE 5 MG/1
15 TABLET ORAL EVERY 6 HOURS PRN
Status: DISCONTINUED | OUTPATIENT
Start: 2019-05-17 | End: 2019-05-21 | Stop reason: HOSPADM

## 2019-05-17 RX ORDER — OXYCODONE HYDROCHLORIDE 5 MG/1
15 TABLET ORAL ONCE
Status: COMPLETED | OUTPATIENT
Start: 2019-05-17 | End: 2019-05-17

## 2019-05-17 RX ORDER — 0.9 % SODIUM CHLORIDE 0.9 %
250 INTRAVENOUS SOLUTION INTRAVENOUS ONCE
Status: DISCONTINUED | OUTPATIENT
Start: 2019-05-17 | End: 2019-05-21 | Stop reason: HOSPADM

## 2019-05-17 RX ORDER — ACETAMINOPHEN 325 MG/1
650 TABLET ORAL EVERY 4 HOURS PRN
Status: DISCONTINUED | OUTPATIENT
Start: 2019-05-17 | End: 2019-05-21 | Stop reason: HOSPADM

## 2019-05-17 RX ORDER — TAMSULOSIN HYDROCHLORIDE 0.4 MG/1
0.4 CAPSULE ORAL DAILY
Status: DISCONTINUED | OUTPATIENT
Start: 2019-05-18 | End: 2019-05-21 | Stop reason: HOSPADM

## 2019-05-17 RX ORDER — PAROXETINE HYDROCHLORIDE 20 MG/1
20 TABLET, FILM COATED ORAL DAILY
Status: DISCONTINUED | OUTPATIENT
Start: 2019-05-18 | End: 2019-05-21 | Stop reason: HOSPADM

## 2019-05-17 RX ORDER — 0.9 % SODIUM CHLORIDE 0.9 %
1000 INTRAVENOUS SOLUTION INTRAVENOUS ONCE
Status: COMPLETED | OUTPATIENT
Start: 2019-05-17 | End: 2019-05-17

## 2019-05-17 RX ORDER — SODIUM CHLORIDE 9 MG/ML
INJECTION, SOLUTION INTRAVENOUS CONTINUOUS
Status: DISCONTINUED | OUTPATIENT
Start: 2019-05-17 | End: 2019-05-21 | Stop reason: HOSPADM

## 2019-05-17 RX ORDER — 0.9 % SODIUM CHLORIDE 0.9 %
10 VIAL (ML) INJECTION DAILY
Status: DISCONTINUED | OUTPATIENT
Start: 2019-05-20 | End: 2019-05-19

## 2019-05-17 RX ORDER — MAGNESIUM SULFATE 1 G/100ML
1 INJECTION INTRAVENOUS
Status: DISPENSED | OUTPATIENT
Start: 2019-05-17 | End: 2019-05-17

## 2019-05-17 RX ORDER — DIAZEPAM 5 MG/1
5 TABLET ORAL EVERY 6 HOURS PRN
Status: DISCONTINUED | OUTPATIENT
Start: 2019-05-17 | End: 2019-05-21 | Stop reason: HOSPADM

## 2019-05-17 RX ORDER — PANTOPRAZOLE SODIUM 40 MG/10ML
40 INJECTION, POWDER, LYOPHILIZED, FOR SOLUTION INTRAVENOUS DAILY
Status: DISCONTINUED | OUTPATIENT
Start: 2019-05-20 | End: 2019-05-19

## 2019-05-17 RX ORDER — MORPHINE SULFATE 30 MG/1
30 TABLET, FILM COATED, EXTENDED RELEASE ORAL EVERY 8 HOURS
Status: DISCONTINUED | OUTPATIENT
Start: 2019-05-17 | End: 2019-05-21 | Stop reason: HOSPADM

## 2019-05-17 RX ORDER — DEXTROSE MONOHYDRATE 25 G/50ML
12.5 INJECTION, SOLUTION INTRAVENOUS PRN
Status: DISCONTINUED | OUTPATIENT
Start: 2019-05-17 | End: 2019-05-21 | Stop reason: HOSPADM

## 2019-05-17 RX ORDER — MORPHINE SULFATE 4 MG/ML
4 INJECTION, SOLUTION INTRAMUSCULAR; INTRAVENOUS ONCE
Status: COMPLETED | OUTPATIENT
Start: 2019-05-17 | End: 2019-05-17

## 2019-05-17 RX ORDER — LEVOTHYROXINE SODIUM 0.05 MG/1
50 TABLET ORAL DAILY
Status: DISCONTINUED | OUTPATIENT
Start: 2019-05-18 | End: 2019-05-21 | Stop reason: HOSPADM

## 2019-05-17 RX ADMIN — MORPHINE SULFATE 30 MG: 30 TABLET, FILM COATED, EXTENDED RELEASE ORAL at 22:08

## 2019-05-17 RX ADMIN — PANTOPRAZOLE SODIUM 80 MG: 40 INJECTION, POWDER, FOR SOLUTION INTRAVENOUS at 06:40

## 2019-05-17 RX ADMIN — SODIUM CHLORIDE 1000 ML: 9 INJECTION, SOLUTION INTRAVENOUS at 06:26

## 2019-05-17 RX ADMIN — MAGNESIUM SULFATE HEPTAHYDRATE 1 G: 1 INJECTION, SOLUTION INTRAVENOUS at 11:52

## 2019-05-17 RX ADMIN — OXYCODONE HYDROCHLORIDE 15 MG: 5 TABLET ORAL at 19:52

## 2019-05-17 RX ADMIN — OXYCODONE HYDROCHLORIDE 15 MG: 5 TABLET ORAL at 11:47

## 2019-05-17 RX ADMIN — Medication 10 ML: at 22:13

## 2019-05-17 RX ADMIN — MORPHINE SULFATE 4 MG: 4 INJECTION, SOLUTION INTRAMUSCULAR; INTRAVENOUS at 17:24

## 2019-05-17 RX ADMIN — SODIUM CHLORIDE 8 MG/HR: 9 INJECTION, SOLUTION INTRAVENOUS at 08:15

## 2019-05-17 RX ADMIN — SODIUM CHLORIDE 1000 ML: 9 INJECTION, SOLUTION INTRAVENOUS at 06:22

## 2019-05-17 ASSESSMENT — ENCOUNTER SYMPTOMS
DIARRHEA: 0
EYE REDNESS: 0
ABDOMINAL PAIN: 0
SHORTNESS OF BREATH: 0
EYE DISCHARGE: 0
NAUSEA: 0
SORE THROAT: 0
VOMITING: 0
RHINORRHEA: 0
WHEEZING: 0
COUGH: 0

## 2019-05-17 ASSESSMENT — PAIN SCALES - GENERAL
PAINLEVEL_OUTOF10: 7
PAINLEVEL_OUTOF10: 8
PAINLEVEL_OUTOF10: 9
PAINLEVEL_OUTOF10: 8
PAINLEVEL_OUTOF10: 8
PAINLEVEL_OUTOF10: 9

## 2019-05-17 ASSESSMENT — PAIN DESCRIPTION - LOCATION: LOCATION: FOOT

## 2019-05-17 NOTE — H&P
ROLL LARGE) MISC 1 Units by Does not apply route 2 times daily 6/12/17 11/5/18  Dieter Torres MD    Scheduled Meds:   sodium chloride  250 mL Intravenous Once     Continuous Infusions:   pantoprozole (PROTONIX) infusion 8 mg/hr (05/17/19 0815)     PRN Meds:. IMMUNIZATIONS:  Immunization History   Administered Date(s) Administered    Influenza, Quadv, 3 yrs and older, IM, PF (Fluzone 3 yrs and older or Afluria 5 yrs and older) 10/04/2016, 09/19/2017, 09/12/2018    Pneumococcal 13-valent Conjugate ([id number]) 10/04/2016    Pneumococcal Polysaccharide ([id number]) 12/13/2017    Tdap (Boostrix, Adacel) 04/24/2018       REVIEW OF SYSTEMS:  Review of Systems   Constitutional: Positive for fatigue. Negative for chills and fever. HENT: Negative for rhinorrhea and sore throat. Eyes: Negative for discharge and redness. Respiratory: Negative for cough, shortness of breath and wheezing. Cardiovascular: Negative for chest pain and palpitations. Gastrointestinal: Negative for abdominal pain, diarrhea, nausea and vomiting. Genitourinary: Negative for dysuria and frequency. Musculoskeletal: Negative for arthralgias and myalgias. Neurological: Negative for dizziness, light-headedness and headaches. Psychiatric/Behavioral: Positive for confusion. Negative for sleep disturbance. No LMP for male patient. height is 6' 2\" (1.88 m) and weight is 250 lb (113.4 kg). His oral temperature is 97.7 °F (36.5 °C). His blood pressure is 103/51 (abnormal) and his pulse is 69. His respiration is 17 and oxygen saturation is 100%.    CBC:   Lab Results   Component Value Date    WBC 6.2 05/17/2019    HGB 5.4 05/17/2019     05/17/2019     BMP:    Lab Results   Component Value Date     05/17/2019    K 3.5 05/17/2019     05/17/2019    CO2 23 05/17/2019    BUN 14 05/17/2019    CREATININE 0.84 05/17/2019    CALCIUM 8.3 05/17/2019    GLUCOSE 123 05/17/2019     PT/INR:    Lab Results   Component Value Date

## 2019-05-17 NOTE — ED PROVIDER NOTES
eMERGENCY dEPARTMENT eNCOUnter    Pt Name: Charis Taylor  MRN: [id number]  [place] 1965  Date of evaluation: 5/17/19  CHIEF COMPLAINT       Chief Complaint   Patient presents with    Hyperglycemia     HISTORY OF PRESENT ILLNESS   HPI  HISTORY OF PRESENT ILLNESS:  Past medical history of diabetes presents for chief complaint of hyperglycemia and weakness. Patient woke this warning feeling generally weak. He denies any abdominal pain. No headache or blurry vision. No chest pain or shortness of breath. Patient did vomit 1 time. It was coffee-ground emesis. He has not had any diarrhea or bright red blood per rectum. No melena. No fevers or chills. Patient has no complaints at this time other than feeling lightheaded. Severity is severe. No aggravating or relieving factors. Timing is one day. Course is constant.   Context is diabetic  -----------------------  -----------------------  REVIEW OF SYSTEMS  ED Caveat: [none]  Gen:  No fever, no chills  CV: No CP, no palpitations  Resp: No SOB, no respiratory distress  GI: No V/D, no abd pain  : No dysuria, no increased frequency  Skin: No rash, no purulent lesions  Eyes: No blurry vision, No double vision  MSK: No back pain, no joint pain  Neuro: No HA, no sensation changes  Psych: No SI/HI  -----------------------  -----------------------  ALLERGIES  -per nursing records, reviewed    PAST MEDICAL HISTORY  -See HPI    SOCIAL HISTORY  -No daily drinking, no IV drugs  -----------------------  -----------------------  PHYSICAL EXAM  Gen: Alert, no acute distress  Skin: Warm, no rashes  Head: Normocephalic, atraumatic  Neck: No midline tenderness, no nuchal rigidity  Eye: EOMI, PERRLA, normal conjunctiva  ENT: Mucous membranes moist, no pharyngeal erythema  CV: Normal rate, no rubs  Resp: Respirations unlabored, lungs clear to auscultation  GI: Soft, non distended, no large abdominal masses, non tender, guaiac positive with brown stool  MSK: No midline back pain, no large joint effusions  Neuro: Alert and oriented, no focal neurological deficits observed  Psych: Cooperative, appropriate mood and affect  -----------------------  -----------------------  MEDICAL DECISION MAKING  Differential Diagnosis:  - Consideration is given for   uti, pneumonia, meningitis, cellulitis, sepsis, bacteremia, thyroid abnormality,, intracranial hemorhage, intraabdominal infection, cva, acs, cardiac arrhythmia, encephalitis, encephalopathy, medication overdose, drug overdose, seizure, elevated ammonia, GI bleed  -  #Impression/Plan:  - Clinically patient's presentation is most consistent with  GI bleed. Patient has guaiac positive brown stool. He did have an episode of coffee-ground emesis. Therefore 2 large bore IVs were established.  2 L wide open. Patient type and screen. Also gave Protonix. Are ready discussed with wife as patient was not initially very responsive about a central line. At this point he declined central line and like see patient's response to IV fluids. -  ##Reevaluation/Conversations on care:  - 6530  systolic  -EKG not acutely ischemic  - Patient will be signed out to oncoming team to follow-up on laboratory testing imaging and admission.  - CRITICAL CARE TIME  Total time: [65] minutes spent engaged in work directly related to patient care and/or available for direct patient care exclusive of procedures and exclusive of teaching time. Management: Bedside assessment, supervision of care, interpretation of results (chest x-ray, blood gases, EKG, blood pressure), case review with medical staff. ##Diagnosis:  -Weakness, GI bleed  -  -----------------------  -----------------------  Essie Guillen MD, [facility]  Emergency Medicine Attending  Questions? Please contact my cell phone anytime.   [phone]  *This charting supersedes any ED resident or staff charting and was written using speech recognition [address] [place] [address] History:   Diagnosis Date    Anxiety     Arthritis     lower back, knees    BPH (benign prostatic hyperplasia)     Chronic kidney disease     prostate    Depression     Diabetes mellitus (HCC)     Diabetic neuropathy (HCC)     GERD (gastroesophageal reflux disease)     Hyperglycemia     Hypertension     Hypothyroidism     Impacted tooth     Pneumonia      SURGICAL HISTORY       Past Surgical History:   Procedure Laterality Date    APPENDECTOMY      BACK SURGERY      Lower x 4. Had abscess after appendectomy.  KNEE SURGERY Right     scope    TOE AMPUTATION Bilateral 3/6/2017    TOE AMPUTATION LEFT HALLUX AND 2ND DIGIT AND RIGHT 2ND DIGIT performed by Derrell Hernandez DPM at [address] [place] [address]       Current Discharge Medication List      CONTINUE these medications which have NOT CHANGED    Details   oxyCODONE (OXY-IR) 15 MG immediate release tablet Take 1 tablet by mouth every 6 hours as needed for Pain for up to 30 days. Qty: 120 tablet, Refills: 0    Comments: Reduce doses taken as pain becomes manageable  Associated Diagnoses: Right cervical radiculopathy; Osteomyelitis, unspecified site, unspecified type ([facility])      diazepam (VALIUM) 5 MG tablet TAKE ONE TABLET BY MOUTH EVERY 6 HOURS AS NEEDED FOR ANXIETY  Qty: 120 tablet, Refills: 0    Associated Diagnoses: Anxiety disorder, unspecified type      povidone-iodine (BETADINE) 7.5 % external solution Apply to wound. Apply as a wet to dry dressing  Qty: 1 Bottle, Refills: 1      PARoxetine (PAXIL) 20 MG tablet TAKE ONE TABLET BY MOUTH FOUR TIMES A DAY  Qty: 120 tablet, Refills: 2      levothyroxine (SYNTHROID) 50 MCG tablet Take 1 tablet by mouth daily  Qty: 90 tablet, Refills: 3      tiZANidine (ZANAFLEX) 4 MG tablet TAKE ONE TABLET BY MOUTH EVERY 6 HOURS AS NEEDED FOR MUSCLE SPASMS  Qty: 120 tablet, Refills: 0    Associated Diagnoses: Right cervical radiculopathy      [facility] 241551 UNIT/GM powder APPLY TO 0.9 % sodium chloride bolus    0.9 % sodium chloride bolus    pantoprazole (PROTONIX) 80 mg in sodium chloride 0.9 % 100 mL infusion    sodium chloride flush 0.9 % injection 10 mL    sodium chloride flush 0.9 % injection 10 mL    acetaminophen (TYLENOL) tablet 650 mg    buPROPion (WELLBUTRIN XL) extended release tablet 300 mg    diazepam (VALIUM) tablet 5 mg    levothyroxine (SYNTHROID) tablet 50 mcg    lisinopril (PRINIVIL;ZESTRIL) tablet 10 mg    metFORMIN (GLUCOPHAGE) tablet 500 mg    oxyCODONE (ROXICODONE) immediate release tablet 15 mg    PARoxetine (PAXIL) tablet 20 mg    tamsulosin (FLOMAX) capsule 0.4 mg    tiZANidine (ZANAFLEX) tablet 4 mg    magnesium sulfate 1 g in dextrose 5% 100 mL IVPB    FOLLOWED BY Linked Order Group     pantoprazole (PROTONIX) 80 mg in sodium chloride 0.9 % 50 mL bolus     pantoprazole (PROTONIX) 80 mg in sodium chloride 0.9 % 100 mL infusion    AND Linked Order Group     pantoprazole (PROTONIX) injection 40 mg     sodium chloride (PF) 0.9 % injection 10 mL    glucose (GLUTOSE) 40 % oral gel 15 g    dextrose 50 % solution 12.5 g    glucagon (rDNA) injection 1 mg    dextrose 5 % solution    insulin lispro (HUMALOG) injection vial 0-12 Units    insulin lispro (HUMALOG) injection vial 0-6 Units    0.9 % sodium chloride infusion    oxyCODONE (ROXICODONE) immediate release tablet 15 mg    DISCONTD: magnesium sulfate 2 g in dextrose 5 % 100 mL IVPB    magnesium sulfate 1 g in dextrose 5% 100 mL IVPB    morphine (MS CONTIN) extended release tablet 30 mg    morphine sulfate (PF) injection 4 mg     CONSULTS:  IP CONSULT TO PRIMARY CARE PROVIDER  IP CONSULT TO GI  IP CONSULT TO GI    FINAL IMPRESSION      1. Hypotension, unspecified hypotension type    2. Gastrointestinal hemorrhage, unspecified gastrointestinal hemorrhage type    3.  Acute blood loss anemia          DISPOSITION/PLAN   DISPOSITION        PATIENT REFERRED TO:  [facility]  [address] [place] [address]  [phone]          DISCHARGE MEDICATIONS:  Current Discharge Medication List      START taking these medications    Details   morphine (MS CONTIN) 30 MG extended release tablet Take 1 tablet by mouth 3 times daily for 30 days. Qty: 90 tablet, Refills: 0    Comments: Reduce doses taken as pain becomes manageable  Associated Diagnoses: Other chronic osteomyelitis of left foot ([facility] [address]  Other diabetic neurological complication associated with type 2 diabetes mellitus (HCC)           Lizzy Eastman MD  Attending Emergency Physician                    Rose Yang MD  05/17/19 7100

## 2019-05-17 NOTE — FLOWSHEET NOTE
Writer is know to patient & spouse  [facility] at [address] [address] team will follow as needed     05/17/19 6236   Encounter Summary   Services provided to: Patient and family together   Support System Spouse, Yak[place]; Mosque/alexandra community;Friends/neighbors   Place of [facility] [place]    Continue Visiting   (5/17/19)   Complexity of Encounter Moderate   Length of Encounter 15 minutes   Spiritual Assessment Completed Yes   Crisis   Type Emotional distress   Assessment Anxious; Fearful; Approachable;Coping; Hopeful   Intervention [place];Prayer;Sustaining presence/ Ministry of presence; Active listening;Nurtured hope   Outcome Comfort;Encouraged;Expressed feelings/needs/concerns;Expressed gratitude

## 2019-05-17 NOTE — ED PROVIDER NOTES
SO Dr Yisel Pruitt    45 yo male presented with complaints of generalized weakness and fatigue with hyperglycemia. Patient noted to be hypotensive and pale. Hemoglobin 5. Patient had an episode of coffee ground emesis, occult blood positive. Suspect GI bleeding. Prior provider ordered type and screen, 2 units PRBCs, protonix drip, and we will admit to the ICU for monitoring and GI evaluation. 7:19 AM  Discussed with Dr Jadiel Morales to admit to the ICU with GI consult, Bridging orders placed.         Shabana Faye MD  06/30/19 1950

## 2019-05-18 PROBLEM — L03.116 CELLULITIS OF LEFT KNEE: Status: ACTIVE | Noted: 2019-05-18

## 2019-05-18 PROBLEM — D64.9 ANEMIA: Status: ACTIVE | Noted: 2019-05-18

## 2019-05-18 PROBLEM — E87.8 ELECTROLYTE DISORDER: Status: ACTIVE | Noted: 2019-05-18

## 2019-05-18 LAB
ANION GAP SERPL CALCULATED.3IONS-SCNC: 9 MMOL/L (ref 9–17)
BUN BLDV-MCNC: 7 MG/DL (ref 6–20)
BUN/CREAT BLD: ABNORMAL (ref 9–20)
CALCIUM SERPL-MCNC: 7.6 MG/DL (ref 8.6–10.4)
CHLORIDE BLD-SCNC: 108 MMOL/L (ref 98–107)
CO2: 25 MMOL/L (ref 20–31)
CREAT SERPL-MCNC: 0.64 MG/DL (ref 0.7–1.2)
CULTURE: NORMAL
GFR AFRICAN AMERICAN: >60 ML/MIN
GFR NON-AFRICAN AMERICAN: >60 ML/MIN
GFR SERPL CREATININE-BSD FRML MDRD: ABNORMAL ML/MIN/{1.73_M2}
GFR SERPL CREATININE-BSD FRML MDRD: ABNORMAL ML/MIN/{1.73_M2}
GLUCOSE BLD-MCNC: 116 MG/DL (ref 75–110)
GLUCOSE BLD-MCNC: 73 MG/DL (ref 75–110)
GLUCOSE BLD-MCNC: 75 MG/DL (ref 75–110)
GLUCOSE BLD-MCNC: 83 MG/DL (ref 75–110)
GLUCOSE BLD-MCNC: 85 MG/DL (ref 75–110)
GLUCOSE BLD-MCNC: 92 MG/DL (ref 70–99)
GLUCOSE BLD-MCNC: 94 MG/DL (ref 75–110)
HCT VFR BLD CALC: 21.5 % (ref 41–53)
HCT VFR BLD CALC: 26.7 % (ref 41–53)
HEMOGLOBIN: 6.8 G/DL (ref 13.5–17.5)
HEMOGLOBIN: 8.5 G/DL (ref 13.5–17.5)
Lab: NORMAL
MAGNESIUM: 1.5 MG/DL (ref 1.6–2.6)
POTASSIUM SERPL-SCNC: 3.2 MMOL/L (ref 3.7–5.3)
SODIUM BLD-SCNC: 142 MMOL/L (ref 135–144)
SPECIMEN DESCRIPTION: NORMAL

## 2019-05-18 PROCEDURE — 2580000003 HC RX 258: Performed by: FAMILY MEDICINE

## 2019-05-18 PROCEDURE — 93005 ELECTROCARDIOGRAM TRACING: CPT

## 2019-05-18 PROCEDURE — 85014 HEMATOCRIT: CPT

## 2019-05-18 PROCEDURE — 83735 ASSAY OF MAGNESIUM: CPT

## 2019-05-18 PROCEDURE — C9113 INJ PANTOPRAZOLE SODIUM, VIA: HCPCS | Performed by: FAMILY MEDICINE

## 2019-05-18 PROCEDURE — 85018 HEMOGLOBIN: CPT

## 2019-05-18 PROCEDURE — 99233 SBSQ HOSP IP/OBS HIGH 50: CPT | Performed by: FAMILY MEDICINE

## 2019-05-18 PROCEDURE — P9016 RBC LEUKOCYTES REDUCED: HCPCS

## 2019-05-18 PROCEDURE — 36415 COLL VENOUS BLD VENIPUNCTURE: CPT

## 2019-05-18 PROCEDURE — APPNB30 APP NON BILLABLE TIME 0-30 MINS: Performed by: NURSE PRACTITIONER

## 2019-05-18 PROCEDURE — 6360000002 HC RX W HCPCS: Performed by: FAMILY MEDICINE

## 2019-05-18 PROCEDURE — 6370000000 HC RX 637 (ALT 250 FOR IP): Performed by: FAMILY MEDICINE

## 2019-05-18 PROCEDURE — 99254 IP/OBS CNSLTJ NEW/EST MOD 60: CPT | Performed by: INTERNAL MEDICINE

## 2019-05-18 PROCEDURE — 82947 ASSAY GLUCOSE BLOOD QUANT: CPT

## 2019-05-18 PROCEDURE — 80048 BASIC METABOLIC PNL TOTAL CA: CPT

## 2019-05-18 PROCEDURE — 2060000000 HC ICU INTERMEDIATE R&B

## 2019-05-18 PROCEDURE — 36430 TRANSFUSION BLD/BLD COMPNT: CPT

## 2019-05-18 RX ORDER — CALCIUM CARBONATE 200(500)MG
1000 TABLET,CHEWABLE ORAL 2 TIMES DAILY
Status: DISCONTINUED | OUTPATIENT
Start: 2019-05-18 | End: 2019-05-21 | Stop reason: HOSPADM

## 2019-05-18 RX ORDER — POTASSIUM CHLORIDE 20 MEQ/1
40 TABLET, EXTENDED RELEASE ORAL PRN
Status: DISCONTINUED | OUTPATIENT
Start: 2019-05-18 | End: 2019-05-21 | Stop reason: HOSPADM

## 2019-05-18 RX ORDER — BACITRACIN, NEOMYCIN, POLYMYXIN B 400; 3.5; 5 [USP'U]/G; MG/G; [USP'U]/G
OINTMENT TOPICAL 3 TIMES DAILY
Status: DISCONTINUED | OUTPATIENT
Start: 2019-05-18 | End: 2019-05-21 | Stop reason: HOSPADM

## 2019-05-18 RX ORDER — LACTOBACILLUS RHAMNOSUS GG 10B CELL
1 CAPSULE ORAL 2 TIMES DAILY WITH MEALS
Status: DISCONTINUED | OUTPATIENT
Start: 2019-05-18 | End: 2019-05-21 | Stop reason: HOSPADM

## 2019-05-18 RX ORDER — POTASSIUM CHLORIDE 7.45 MG/ML
10 INJECTION INTRAVENOUS PRN
Status: DISCONTINUED | OUTPATIENT
Start: 2019-05-18 | End: 2019-05-21 | Stop reason: HOSPADM

## 2019-05-18 RX ORDER — CEPHALEXIN 500 MG/1
500 CAPSULE ORAL EVERY 6 HOURS SCHEDULED
Status: DISCONTINUED | OUTPATIENT
Start: 2019-05-18 | End: 2019-05-21 | Stop reason: HOSPADM

## 2019-05-18 RX ORDER — 0.9 % SODIUM CHLORIDE 0.9 %
250 INTRAVENOUS SOLUTION INTRAVENOUS ONCE
Status: COMPLETED | OUTPATIENT
Start: 2019-05-18 | End: 2019-05-18

## 2019-05-18 RX ADMIN — CEPHALEXIN 500 MG: 500 CAPSULE ORAL at 17:29

## 2019-05-18 RX ADMIN — TAMSULOSIN HYDROCHLORIDE 0.4 MG: 0.4 CAPSULE ORAL at 08:40

## 2019-05-18 RX ADMIN — POTASSIUM CHLORIDE 40 MEQ: 1500 TABLET, EXTENDED RELEASE ORAL at 09:16

## 2019-05-18 RX ADMIN — PAROXETINE HYDROCHLORIDE 20 MG: 20 TABLET, FILM COATED ORAL at 08:41

## 2019-05-18 RX ADMIN — MORPHINE SULFATE 30 MG: 30 TABLET, FILM COATED, EXTENDED RELEASE ORAL at 19:41

## 2019-05-18 RX ADMIN — MORPHINE SULFATE 30 MG: 30 TABLET, FILM COATED, EXTENDED RELEASE ORAL at 12:10

## 2019-05-18 RX ADMIN — CEPHALEXIN 500 MG: 500 CAPSULE ORAL at 12:10

## 2019-05-18 RX ADMIN — OXYCODONE HYDROCHLORIDE 15 MG: 5 TABLET ORAL at 17:28

## 2019-05-18 RX ADMIN — MORPHINE SULFATE 30 MG: 30 TABLET, FILM COATED, EXTENDED RELEASE ORAL at 06:18

## 2019-05-18 RX ADMIN — SODIUM CHLORIDE: 9 INJECTION, SOLUTION INTRAVENOUS at 00:20

## 2019-05-18 RX ADMIN — ANTACID TABLETS 1000 MG: 500 TABLET, CHEWABLE ORAL at 09:17

## 2019-05-18 RX ADMIN — PANTOPRAZOLE SODIUM 8 MG/HR: 40 INJECTION, POWDER, FOR SOLUTION INTRAVENOUS at 03:31

## 2019-05-18 RX ADMIN — PANTOPRAZOLE SODIUM 8 MG/HR: 40 INJECTION, POWDER, FOR SOLUTION INTRAVENOUS at 22:31

## 2019-05-18 RX ADMIN — POLYMYXIN B SULFATE, BACITRACIN ZINC, NEOMYCIN SULFATE: 5000; 3.5; 4 OINTMENT TOPICAL at 20:12

## 2019-05-18 RX ADMIN — METFORMIN HYDROCHLORIDE 500 MG: 500 TABLET, FILM COATED ORAL at 08:40

## 2019-05-18 RX ADMIN — POLYMYXIN B SULFATE, BACITRACIN ZINC, NEOMYCIN SULFATE: 5000; 3.5; 4 OINTMENT TOPICAL at 15:56

## 2019-05-18 RX ADMIN — PANTOPRAZOLE SODIUM 8 MG/HR: 40 INJECTION, POWDER, FOR SOLUTION INTRAVENOUS at 15:56

## 2019-05-18 RX ADMIN — ONDANSETRON HYDROCHLORIDE 4 MG: 2 INJECTION, SOLUTION INTRAMUSCULAR; INTRAVENOUS at 00:19

## 2019-05-18 RX ADMIN — Medication 1 CAPSULE: at 17:29

## 2019-05-18 RX ADMIN — Medication 1 CAPSULE: at 12:10

## 2019-05-18 RX ADMIN — OXYCODONE HYDROCHLORIDE 15 MG: 5 TABLET ORAL at 03:31

## 2019-05-18 RX ADMIN — SODIUM CHLORIDE 250 ML: 9 INJECTION, SOLUTION INTRAVENOUS at 09:43

## 2019-05-18 RX ADMIN — ANTACID TABLETS 1000 MG: 500 TABLET, CHEWABLE ORAL at 20:12

## 2019-05-18 RX ADMIN — BUPROPION HYDROCHLORIDE 300 MG: 300 TABLET, FILM COATED, EXTENDED RELEASE ORAL at 08:40

## 2019-05-18 RX ADMIN — LISINOPRIL 10 MG: 10 TABLET ORAL at 08:40

## 2019-05-18 ASSESSMENT — PAIN SCALES - GENERAL
PAINLEVEL_OUTOF10: 8
PAINLEVEL_OUTOF10: 7
PAINLEVEL_OUTOF10: 9

## 2019-05-19 LAB
ANION GAP SERPL CALCULATED.3IONS-SCNC: 10 MMOL/L (ref 9–17)
BUN BLDV-MCNC: 5 MG/DL (ref 6–20)
BUN/CREAT BLD: ABNORMAL (ref 9–20)
CALCIUM SERPL-MCNC: 8 MG/DL (ref 8.6–10.4)
CHLORIDE BLD-SCNC: 108 MMOL/L (ref 98–107)
CO2: 26 MMOL/L (ref 20–31)
CREAT SERPL-MCNC: 0.68 MG/DL (ref 0.7–1.2)
GFR AFRICAN AMERICAN: >60 ML/MIN
GFR NON-AFRICAN AMERICAN: >60 ML/MIN
GFR SERPL CREATININE-BSD FRML MDRD: ABNORMAL ML/MIN/{1.73_M2}
GFR SERPL CREATININE-BSD FRML MDRD: ABNORMAL ML/MIN/{1.73_M2}
GLUCOSE BLD-MCNC: 73 MG/DL (ref 75–110)
GLUCOSE BLD-MCNC: 91 MG/DL (ref 70–99)
GLUCOSE BLD-MCNC: 94 MG/DL (ref 75–110)
GLUCOSE BLD-MCNC: 94 MG/DL (ref 75–110)
HCT VFR BLD CALC: 25.9 % (ref 41–53)
HCT VFR BLD CALC: 26 % (ref 41–53)
HCT VFR BLD CALC: 28.2 % (ref 41–53)
HCT VFR BLD CALC: 28.4 % (ref 41–53)
HEMOGLOBIN: 8.4 G/DL (ref 13.5–17.5)
HEMOGLOBIN: 8.5 G/DL (ref 13.5–17.5)
HEMOGLOBIN: 9 G/DL (ref 13.5–17.5)
HEMOGLOBIN: 9.1 G/DL (ref 13.5–17.5)
MAGNESIUM: 1.4 MG/DL (ref 1.6–2.6)
POTASSIUM SERPL-SCNC: 3.6 MMOL/L (ref 3.7–5.3)
SODIUM BLD-SCNC: 144 MMOL/L (ref 135–144)

## 2019-05-19 PROCEDURE — 2580000003 HC RX 258: Performed by: FAMILY MEDICINE

## 2019-05-19 PROCEDURE — APPNB30 APP NON BILLABLE TIME 0-30 MINS: Performed by: NURSE PRACTITIONER

## 2019-05-19 PROCEDURE — 6370000000 HC RX 637 (ALT 250 FOR IP): Performed by: FAMILY MEDICINE

## 2019-05-19 PROCEDURE — 82947 ASSAY GLUCOSE BLOOD QUANT: CPT

## 2019-05-19 PROCEDURE — C9113 INJ PANTOPRAZOLE SODIUM, VIA: HCPCS | Performed by: FAMILY MEDICINE

## 2019-05-19 PROCEDURE — 36415 COLL VENOUS BLD VENIPUNCTURE: CPT

## 2019-05-19 PROCEDURE — 99232 SBSQ HOSP IP/OBS MODERATE 35: CPT | Performed by: INTERNAL MEDICINE

## 2019-05-19 PROCEDURE — 6360000002 HC RX W HCPCS: Performed by: FAMILY MEDICINE

## 2019-05-19 PROCEDURE — 85018 HEMOGLOBIN: CPT

## 2019-05-19 PROCEDURE — 80048 BASIC METABOLIC PNL TOTAL CA: CPT

## 2019-05-19 PROCEDURE — 85014 HEMATOCRIT: CPT

## 2019-05-19 PROCEDURE — 2060000000 HC ICU INTERMEDIATE R&B

## 2019-05-19 PROCEDURE — 83735 ASSAY OF MAGNESIUM: CPT

## 2019-05-19 RX ORDER — 0.9 % SODIUM CHLORIDE 0.9 %
10 VIAL (ML) INJECTION 2 TIMES DAILY
Status: DISCONTINUED | OUTPATIENT
Start: 2019-05-19 | End: 2019-05-21 | Stop reason: HOSPADM

## 2019-05-19 RX ORDER — PANTOPRAZOLE SODIUM 40 MG/10ML
40 INJECTION, POWDER, LYOPHILIZED, FOR SOLUTION INTRAVENOUS 2 TIMES DAILY
Status: DISCONTINUED | OUTPATIENT
Start: 2019-05-19 | End: 2019-05-20

## 2019-05-19 RX ORDER — PANTOPRAZOLE SODIUM 40 MG/1
40 TABLET, DELAYED RELEASE ORAL
Status: DISCONTINUED | OUTPATIENT
Start: 2019-05-19 | End: 2019-05-19

## 2019-05-19 RX ADMIN — POTASSIUM CHLORIDE 40 MEQ: 1500 TABLET, EXTENDED RELEASE ORAL at 08:26

## 2019-05-19 RX ADMIN — CEPHALEXIN 500 MG: 500 CAPSULE ORAL at 00:40

## 2019-05-19 RX ADMIN — CEPHALEXIN 500 MG: 500 CAPSULE ORAL at 05:51

## 2019-05-19 RX ADMIN — PANTOPRAZOLE SODIUM 8 MG/HR: 40 INJECTION, POWDER, FOR SOLUTION INTRAVENOUS at 08:26

## 2019-05-19 RX ADMIN — OXYCODONE HYDROCHLORIDE 15 MG: 5 TABLET ORAL at 17:42

## 2019-05-19 RX ADMIN — LEVOTHYROXINE SODIUM 50 MCG: 50 TABLET ORAL at 05:51

## 2019-05-19 RX ADMIN — METFORMIN HYDROCHLORIDE 500 MG: 500 TABLET, FILM COATED ORAL at 08:26

## 2019-05-19 RX ADMIN — ANTACID TABLETS 1000 MG: 500 TABLET, CHEWABLE ORAL at 20:38

## 2019-05-19 RX ADMIN — Medication 1 CAPSULE: at 08:27

## 2019-05-19 RX ADMIN — CEPHALEXIN 500 MG: 500 CAPSULE ORAL at 17:35

## 2019-05-19 RX ADMIN — ANTACID TABLETS 1000 MG: 500 TABLET, CHEWABLE ORAL at 08:26

## 2019-05-19 RX ADMIN — POLYMYXIN B SULFATE, BACITRACIN ZINC, NEOMYCIN SULFATE: 5000; 3.5; 4 OINTMENT TOPICAL at 15:27

## 2019-05-19 RX ADMIN — Medication 1 CAPSULE: at 17:38

## 2019-05-19 RX ADMIN — MORPHINE SULFATE 30 MG: 30 TABLET, FILM COATED, EXTENDED RELEASE ORAL at 03:55

## 2019-05-19 RX ADMIN — MAGNESIUM OXIDE TAB 400 MG (241.3 MG ELEMENTAL MG) 400 MG: 400 (241.3 MG) TAB at 12:35

## 2019-05-19 RX ADMIN — MORPHINE SULFATE 30 MG: 30 TABLET, FILM COATED, EXTENDED RELEASE ORAL at 11:11

## 2019-05-19 RX ADMIN — OXYCODONE HYDROCHLORIDE 15 MG: 5 TABLET ORAL at 23:22

## 2019-05-19 RX ADMIN — MORPHINE SULFATE 30 MG: 30 TABLET, FILM COATED, EXTENDED RELEASE ORAL at 20:38

## 2019-05-19 RX ADMIN — POLYMYXIN B SULFATE, BACITRACIN ZINC, NEOMYCIN SULFATE: 5000; 3.5; 4 OINTMENT TOPICAL at 20:39

## 2019-05-19 RX ADMIN — BUPROPION HYDROCHLORIDE 300 MG: 300 TABLET, FILM COATED, EXTENDED RELEASE ORAL at 08:26

## 2019-05-19 RX ADMIN — TAMSULOSIN HYDROCHLORIDE 0.4 MG: 0.4 CAPSULE ORAL at 08:26

## 2019-05-19 RX ADMIN — LISINOPRIL 10 MG: 10 TABLET ORAL at 08:26

## 2019-05-19 RX ADMIN — PAROXETINE HYDROCHLORIDE 20 MG: 20 TABLET, FILM COATED ORAL at 08:26

## 2019-05-19 RX ADMIN — ONDANSETRON HYDROCHLORIDE 4 MG: 2 INJECTION, SOLUTION INTRAMUSCULAR; INTRAVENOUS at 11:11

## 2019-05-19 RX ADMIN — POLYMYXIN B SULFATE, BACITRACIN ZINC, NEOMYCIN SULFATE: 5000; 3.5; 4 OINTMENT TOPICAL at 08:27

## 2019-05-19 RX ADMIN — MAGNESIUM OXIDE TAB 400 MG (241.3 MG ELEMENTAL MG) 400 MG: 400 (241.3 MG) TAB at 20:38

## 2019-05-19 RX ADMIN — SODIUM CHLORIDE: 9 INJECTION, SOLUTION INTRAVENOUS at 17:35

## 2019-05-19 RX ADMIN — CEPHALEXIN 500 MG: 500 CAPSULE ORAL at 11:12

## 2019-05-19 ASSESSMENT — PAIN SCALES - GENERAL
PAINLEVEL_OUTOF10: 8
PAINLEVEL_OUTOF10: 9
PAINLEVEL_OUTOF10: 7
PAINLEVEL_OUTOF10: 8

## 2019-05-19 ASSESSMENT — PAIN SCALES - WONG BAKER: WONGBAKER_NUMERICALRESPONSE: 0

## 2019-05-19 NOTE — CARE COORDINATION
ONGOING DISCHARGE PLAN:    Spoke with patient regarding discharge plan and patient confirms that plan is still to return home with no needs. Denies need for VNS. Remains on IV fluids    Repeat labs in morning. Will need a follow up appt as patient is an orange header patient 17% readmit risk. Will continue to follow for additional discharge needs.     Electronically signed by Rosalinda Watson RN on 5/19/2019 at 2:13 PM

## 2019-05-20 ENCOUNTER — ANESTHESIA EVENT (OUTPATIENT)
Dept: OPERATING ROOM | Age: 54
DRG: 241 | End: 2019-05-20
Payer: MEDICARE

## 2019-05-20 ENCOUNTER — ANESTHESIA (OUTPATIENT)
Dept: OPERATING ROOM | Age: 54
DRG: 241 | End: 2019-05-20
Payer: MEDICARE

## 2019-05-20 VITALS
RESPIRATION RATE: 14 BRPM | OXYGEN SATURATION: 98 % | SYSTOLIC BLOOD PRESSURE: 105 MMHG | DIASTOLIC BLOOD PRESSURE: 59 MMHG

## 2019-05-20 LAB
ABO/RH: NORMAL
ANION GAP SERPL CALCULATED.3IONS-SCNC: 8 MMOL/L (ref 9–17)
ANTIBODY SCREEN: NEGATIVE
ARM BAND NUMBER: NORMAL
BLD PROD TYP BPU: NORMAL
BLOOD BANK COMMENT: NORMAL
BUN BLDV-MCNC: 6 MG/DL (ref 6–20)
BUN/CREAT BLD: ABNORMAL (ref 9–20)
CALCIUM SERPL-MCNC: 8.4 MG/DL (ref 8.6–10.4)
CHLORIDE BLD-SCNC: 102 MMOL/L (ref 98–107)
CO2: 29 MMOL/L (ref 20–31)
CREAT SERPL-MCNC: 0.82 MG/DL (ref 0.7–1.2)
CROSSMATCH RESULT: NORMAL
DISPENSE STATUS BLOOD BANK: NORMAL
EXPIRATION DATE: NORMAL
GFR AFRICAN AMERICAN: >60 ML/MIN
GFR NON-AFRICAN AMERICAN: >60 ML/MIN
GFR SERPL CREATININE-BSD FRML MDRD: ABNORMAL ML/MIN/{1.73_M2}
GFR SERPL CREATININE-BSD FRML MDRD: ABNORMAL ML/MIN/{1.73_M2}
GLUCOSE BLD-MCNC: 109 MG/DL (ref 75–110)
GLUCOSE BLD-MCNC: 198 MG/DL (ref 75–110)
GLUCOSE BLD-MCNC: 79 MG/DL (ref 75–110)
GLUCOSE BLD-MCNC: 80 MG/DL (ref 75–110)
GLUCOSE BLD-MCNC: 84 MG/DL (ref 75–110)
GLUCOSE BLD-MCNC: 87 MG/DL (ref 75–110)
GLUCOSE BLD-MCNC: 90 MG/DL (ref 75–110)
GLUCOSE BLD-MCNC: 91 MG/DL (ref 70–99)
HCT VFR BLD CALC: 26.6 % (ref 41–53)
HCT VFR BLD CALC: 27.3 % (ref 41–53)
HCT VFR BLD CALC: 27.4 % (ref 41–53)
HCT VFR BLD CALC: 30 % (ref 41–53)
HEMOGLOBIN: 8.5 G/DL (ref 13.5–17.5)
HEMOGLOBIN: 8.7 G/DL (ref 13.5–17.5)
HEMOGLOBIN: 8.8 G/DL (ref 13.5–17.5)
HEMOGLOBIN: 9.6 G/DL (ref 13.5–17.5)
MAGNESIUM: 1.6 MG/DL (ref 1.6–2.6)
POTASSIUM SERPL-SCNC: 3.9 MMOL/L (ref 3.7–5.3)
SODIUM BLD-SCNC: 139 MMOL/L (ref 135–144)
TRANSFUSION STATUS: NORMAL
UNIT DIVISION: 0
UNIT NUMBER: NORMAL

## 2019-05-20 PROCEDURE — 85018 HEMOGLOBIN: CPT

## 2019-05-20 PROCEDURE — 88305 TISSUE EXAM BY PATHOLOGIST: CPT

## 2019-05-20 PROCEDURE — 2580000003 HC RX 258: Performed by: NURSE ANESTHETIST, CERTIFIED REGISTERED

## 2019-05-20 PROCEDURE — 2580000003 HC RX 258: Performed by: INTERNAL MEDICINE

## 2019-05-20 PROCEDURE — 7100000000 HC PACU RECOVERY - FIRST 15 MIN: Performed by: INTERNAL MEDICINE

## 2019-05-20 PROCEDURE — 99232 SBSQ HOSP IP/OBS MODERATE 35: CPT | Performed by: FAMILY MEDICINE

## 2019-05-20 PROCEDURE — 6370000000 HC RX 637 (ALT 250 FOR IP): Performed by: FAMILY MEDICINE

## 2019-05-20 PROCEDURE — 7100000001 HC PACU RECOVERY - ADDTL 15 MIN: Performed by: INTERNAL MEDICINE

## 2019-05-20 PROCEDURE — 2580000003 HC RX 258: Performed by: NURSE PRACTITIONER

## 2019-05-20 PROCEDURE — 85014 HEMATOCRIT: CPT

## 2019-05-20 PROCEDURE — C9113 INJ PANTOPRAZOLE SODIUM, VIA: HCPCS | Performed by: NURSE PRACTITIONER

## 2019-05-20 PROCEDURE — 2709999900 HC NON-CHARGEABLE SUPPLY: Performed by: INTERNAL MEDICINE

## 2019-05-20 PROCEDURE — 2500000003 HC RX 250 WO HCPCS: Performed by: NURSE ANESTHETIST, CERTIFIED REGISTERED

## 2019-05-20 PROCEDURE — 6360000002 HC RX W HCPCS: Performed by: FAMILY MEDICINE

## 2019-05-20 PROCEDURE — 88342 IMHCHEM/IMCYTCHM 1ST ANTB: CPT

## 2019-05-20 PROCEDURE — 0DB68ZX EXCISION OF STOMACH, VIA NATURAL OR ARTIFICIAL OPENING ENDOSCOPIC, DIAGNOSTIC: ICD-10-PCS | Performed by: INTERNAL MEDICINE

## 2019-05-20 PROCEDURE — 2580000003 HC RX 258: Performed by: FAMILY MEDICINE

## 2019-05-20 PROCEDURE — 1200000000 HC SEMI PRIVATE

## 2019-05-20 PROCEDURE — 6360000002 HC RX W HCPCS: Performed by: NURSE PRACTITIONER

## 2019-05-20 PROCEDURE — 99221 1ST HOSP IP/OBS SF/LOW 40: CPT | Performed by: PODIATRIST

## 2019-05-20 PROCEDURE — 83735 ASSAY OF MAGNESIUM: CPT

## 2019-05-20 PROCEDURE — 6370000000 HC RX 637 (ALT 250 FOR IP): Performed by: INTERNAL MEDICINE

## 2019-05-20 PROCEDURE — 6360000002 HC RX W HCPCS: Performed by: NURSE ANESTHETIST, CERTIFIED REGISTERED

## 2019-05-20 PROCEDURE — 3700000000 HC ANESTHESIA ATTENDED CARE: Performed by: INTERNAL MEDICINE

## 2019-05-20 PROCEDURE — 80048 BASIC METABOLIC PNL TOTAL CA: CPT

## 2019-05-20 PROCEDURE — 36415 COLL VENOUS BLD VENIPUNCTURE: CPT

## 2019-05-20 PROCEDURE — 3609012400 HC EGD TRANSORAL BIOPSY SINGLE/MULTIPLE: Performed by: INTERNAL MEDICINE

## 2019-05-20 RX ORDER — PROPOFOL 10 MG/ML
INJECTION, EMULSION INTRAVENOUS PRN
Status: DISCONTINUED | OUTPATIENT
Start: 2019-05-20 | End: 2019-05-20 | Stop reason: SDUPTHER

## 2019-05-20 RX ORDER — PANTOPRAZOLE SODIUM 40 MG/1
40 TABLET, DELAYED RELEASE ORAL
Status: DISCONTINUED | OUTPATIENT
Start: 2019-05-21 | End: 2019-05-21 | Stop reason: HOSPADM

## 2019-05-20 RX ORDER — LIDOCAINE HYDROCHLORIDE 10 MG/ML
INJECTION, SOLUTION EPIDURAL; INFILTRATION; INTRACAUDAL; PERINEURAL PRN
Status: DISCONTINUED | OUTPATIENT
Start: 2019-05-20 | End: 2019-05-20 | Stop reason: SDUPTHER

## 2019-05-20 RX ORDER — SODIUM CHLORIDE 9 MG/ML
INJECTION, SOLUTION INTRAVENOUS CONTINUOUS PRN
Status: DISCONTINUED | OUTPATIENT
Start: 2019-05-20 | End: 2019-05-20 | Stop reason: SDUPTHER

## 2019-05-20 RX ADMIN — SODIUM CHLORIDE: 9 INJECTION, SOLUTION INTRAVENOUS at 17:51

## 2019-05-20 RX ADMIN — OXYCODONE HYDROCHLORIDE 15 MG: 5 TABLET ORAL at 22:05

## 2019-05-20 RX ADMIN — SODIUM CHLORIDE: 9 INJECTION, SOLUTION INTRAVENOUS at 15:28

## 2019-05-20 RX ADMIN — PROPOFOL 120 MG: 10 INJECTION, EMULSION INTRAVENOUS at 15:33

## 2019-05-20 RX ADMIN — SODIUM CHLORIDE: 9 INJECTION, SOLUTION INTRAVENOUS at 14:23

## 2019-05-20 RX ADMIN — LIDOCAINE HYDROCHLORIDE 50 MG: 10 INJECTION, SOLUTION EPIDURAL; INFILTRATION; INTRACAUDAL; PERINEURAL at 15:37

## 2019-05-20 RX ADMIN — SODIUM CHLORIDE 10 ML: 9 INJECTION INTRAMUSCULAR; INTRAVENOUS; SUBCUTANEOUS at 09:36

## 2019-05-20 RX ADMIN — CEPHALEXIN 500 MG: 500 CAPSULE ORAL at 01:52

## 2019-05-20 RX ADMIN — SODIUM CHLORIDE: 9 INJECTION, SOLUTION INTRAVENOUS at 20:21

## 2019-05-20 RX ADMIN — MORPHINE SULFATE 30 MG: 30 TABLET, FILM COATED, EXTENDED RELEASE ORAL at 03:40

## 2019-05-20 RX ADMIN — PANTOPRAZOLE SODIUM 40 MG: 40 INJECTION, POWDER, FOR SOLUTION INTRAVENOUS at 09:36

## 2019-05-20 RX ADMIN — MAGNESIUM OXIDE TAB 400 MG (241.3 MG ELEMENTAL MG) 400 MG: 400 (241.3 MG) TAB at 19:55

## 2019-05-20 RX ADMIN — POLYMYXIN B SULFATE, BACITRACIN ZINC, NEOMYCIN SULFATE: 5000; 3.5; 4 OINTMENT TOPICAL at 20:00

## 2019-05-20 RX ADMIN — POLYMYXIN B SULFATE, BACITRACIN ZINC, NEOMYCIN SULFATE: 5000; 3.5; 4 OINTMENT TOPICAL at 09:36

## 2019-05-20 RX ADMIN — MAGNESIUM SULFATE HEPTAHYDRATE 1 G: 1 INJECTION, SOLUTION INTRAVENOUS at 09:36

## 2019-05-20 RX ADMIN — MORPHINE SULFATE 30 MG: 30 TABLET, FILM COATED, EXTENDED RELEASE ORAL at 19:55

## 2019-05-20 RX ADMIN — MORPHINE SULFATE 30 MG: 30 TABLET, FILM COATED, EXTENDED RELEASE ORAL at 11:42

## 2019-05-20 RX ADMIN — CEPHALEXIN 500 MG: 500 CAPSULE ORAL at 19:59

## 2019-05-20 RX ADMIN — MAGNESIUM SULFATE HEPTAHYDRATE 1 G: 1 INJECTION, SOLUTION INTRAVENOUS at 08:05

## 2019-05-20 RX ADMIN — ANTACID TABLETS 1000 MG: 500 TABLET, CHEWABLE ORAL at 19:55

## 2019-05-20 RX ADMIN — OXYCODONE HYDROCHLORIDE 15 MG: 5 TABLET ORAL at 08:03

## 2019-05-20 ASSESSMENT — ENCOUNTER SYMPTOMS
VOMITING: 0
COLOR CHANGE: 0
SHORTNESS OF BREATH: 0
DIARRHEA: 0
NAUSEA: 0

## 2019-05-20 ASSESSMENT — PAIN DESCRIPTION - ONSET
ONSET: ON-GOING
ONSET: ON-GOING

## 2019-05-20 ASSESSMENT — PAIN - FUNCTIONAL ASSESSMENT: PAIN_FUNCTIONAL_ASSESSMENT: 0-10

## 2019-05-20 ASSESSMENT — PAIN SCALES - GENERAL
PAINLEVEL_OUTOF10: 7
PAINLEVEL_OUTOF10: 0
PAINLEVEL_OUTOF10: 8
PAINLEVEL_OUTOF10: 8
PAINLEVEL_OUTOF10: 7
PAINLEVEL_OUTOF10: 8
PAINLEVEL_OUTOF10: 0
PAINLEVEL_OUTOF10: 7
PAINLEVEL_OUTOF10: 9
PAINLEVEL_OUTOF10: 8

## 2019-05-20 ASSESSMENT — PULMONARY FUNCTION TESTS
PIF_VALUE: 1

## 2019-05-20 ASSESSMENT — PAIN SCALES - WONG BAKER
WONGBAKER_NUMERICALRESPONSE: 0

## 2019-05-20 ASSESSMENT — PAIN DESCRIPTION - LOCATION
LOCATION: FOOT
LOCATION: FOOT

## 2019-05-20 ASSESSMENT — PAIN DESCRIPTION - PAIN TYPE
TYPE: CHRONIC PAIN
TYPE: CHRONIC PAIN

## 2019-05-20 ASSESSMENT — PAIN DESCRIPTION - DESCRIPTORS
DESCRIPTORS: ACHING
DESCRIPTORS: ACHING
DESCRIPTORS: ACHING;DISCOMFORT

## 2019-05-20 ASSESSMENT — PAIN DESCRIPTION - ORIENTATION
ORIENTATION: LEFT
ORIENTATION: LEFT

## 2019-05-20 ASSESSMENT — PAIN DESCRIPTION - FREQUENCY
FREQUENCY: CONTINUOUS
FREQUENCY: CONTINUOUS

## 2019-05-20 NOTE — ANESTHESIA POSTPROCEDURE EVALUATION
Department of Anesthesiology  Postprocedure Note    Patient: Jenifer Ireland  MRN: [id number]  YOB: 1965  Date of evaluation: 5/20/2019  Time:  4:32 PM     Procedure Summary     Date:  05/20/19 Room / Location:  [address] [place] [address] [address] / [address]    Anesthesia Start:  6648 Anesthesia Stop:  1544    Procedure:  EGD WITH BIOPSY (N/A Esophagus) Diagnosis:  (GI BLEED)    Surgeon:  Lizette Mccall MD Responsible Provider:  Inocencia Bland MD    Anesthesia Type:  MAC ASA Status:  3          Anesthesia Type: MAC    Mariah Phase I: Mariah Score: 9    Mariah Phase II:      Last vitals: Reviewed and per EMR flowsheets.        Anesthesia Post Evaluation    Comments: POST- ANESTHESIA EVALUATION       Pt Name: Jenifer Ireland  MRN: [id number]  YOB: 1965  Date of evaluation: 5/20/2019  Time:  4:32 PM      BP (!) 116/56   Pulse 60   Temp 97.5 °F (36.4 °C)   Resp 14   Ht 6' 2\" (1.88 m)   Wt 258 lb 13.1 oz (117.4 kg)   SpO2 98%   BMI 33.23 kg/m²      Consciousness Level  Awake  Cardiopulmonary Status  Stable  Pain Adequately Treated YES  Nausea / Vomiting  NO  Adequate Hydration  YES  Anesthesia Related Complications NONE      Electronically signed by Inocencia Bland MD on 5/20/2019 at 4:32 PM

## 2019-05-20 NOTE — ANESTHESIA PRE PROCEDURE
Department of Anesthesiology  Preprocedure Note       Name:  Parker Galvan   Age:  46 y.o.  :  1965                                          MRN:  [id number]         Date:  2019      Surgeon: Casie Hoff):  Alma Garcia MD    Procedure: EGD (N/A Esophagus)    Medications prior to admission:   Prior to Admission medications    Medication Sig Start Date End Date Taking? Authorizing Provider   morphine (MS CONTIN) 30 MG extended release tablet Take 1 tablet by mouth 3 times daily for 30 days. 19 Yes Perla Fan MD   oxyCODONE (OXY-IR) 15 MG immediate release tablet Take 1 tablet by mouth every 6 hours as needed for Pain for up to 30 days. 19 Yes Perla Fan MD   diazepam (VALIUM) 5 MG tablet TAKE ONE TABLET BY MOUTH EVERY 6 HOURS AS NEEDED FOR ANXIETY 19 Yes Perla Fan MD   povidone-iodine (BETADINE) 7.5 % external solution Apply to wound. Apply as a wet to dry dressing 19  Yes Karime Kan DPM   PARoxetine (PAXIL) 20 MG tablet TAKE ONE TABLET BY MOUTH FOUR TIMES A DAY 4/10/19  Yes Perla Fan MD   levothyroxine (SYNTHROID) 50 MCG tablet Take 1 tablet by mouth daily 4/3/19  Yes Perla Fan MD   tiZANidine (ZANAFLEX) 4 MG tablet TAKE ONE TABLET BY MOUTH EVERY 6 HOURS AS NEEDED FOR MUSCLE SPASMS 3/21/19  Yes Perla Fan MD   [facility] 582129 UNIT/GM powder APPLY TO AFFECTED AREA(S) FOUR TIMES A DAY 19  Yes Perla Fan MD   metFORMIN (GLUCOPHAGE) 500 MG tablet Take 1 tablet by mouth 2 times daily (with meals) 18  Yes Perla Fan MD   buPROPion (WELLBUTRIN XL) 300 MG extended release tablet TAKE ONE TABLET BY MOUTH DAILY 18  Yes Perla Fan MD   lisinopril (PRINIVIL;ZESTRIL) 10 MG tablet TAKE ONE TABLET BY MOUTH DAILY 18  Yes Perla Fan MD   ciclopirox (LOPROX) 0.77 % cream APPLY TO AFFECTED AREA(S) AND RUB  IN A THIN FILM TWO TIMES A DAY (AM AND PM) 17  Yes Kaley Skinner MD   omeprazole (PRILOSEC) 40 MG delayed release capsule TAKE ONE CAPSULE BY MOUTH EVERY NIGHT AT BEDTIME 7/11/17  Yes Kaley Skinner MD   [facility] [address] [facility] TEST TWO TIMES A DAY 5/7/19   Kaley Skinner MD   ONE TOUCH ULTRA TEST strip USE ONE STRIP TO TEST TWO TIMES A DAY AS NEEDED 3/18/19   Kaley Skinner MD   Wound Dressings (ADAPTIC NON-ADHERING DRESSING) PADS Apply 1 Units topically 2 times daily 9/28/18   Kaley Skinner MD   Gauze Pads & Dressings (COMBINE ABD) 5\"X9\" PADS 1 Units by Does not apply route 2 times daily 9/28/18   Kaley Skinner MD   Blood Glucose Monitoring Suppl (ONE TOUCH ULTRA 2) w/Device KIT 1 kit by Other route daily 3/30/18   Kaley Skinner MD   Gauze Pads & Dressings [facility] GAUZE [place] LARGE) [address] [facility] 1 Units by Does not apply route 2 times daily 6/12/17 11/5/18  Malick Dior MD       Current medications:    Current Facility-Administered Medications   Medication Dose Route Frequency Provider Last Rate Last Dose    [MAR Hold] magnesium oxide (MAG-OX) tablet 400 mg  400 mg Oral BID Keon Kim MD   400 mg at 05/19/19 2038    [MAR Hold] pantoprazole (PROTONIX) injection 40 mg  40 mg Intravenous BID MALISSA Spicer CNP   40 mg at 05/20/19 0936    And    [MAR Hold] sodium chloride (PF) 0.9 % injection 10 mL  10 mL Intravenous BID MALISSA Spicer - CNP   10 mL at 05/20/19 0936    [MAR Hold] potassium chloride (KLOR-CON M) extended release tablet 40 mEq  40 mEq Oral PRN Keon Kim MD   40 mEq at 05/19/19 0826    Or    [MAR Hold] potassium bicarb-citric acid (EFFER-K) effervescent tablet 40 mEq  40 mEq Oral PRN Keon Kim MD        Or   Shreya Dunbar Lathe Hold] potassium chloride 10 mEq/100 mL IVPB (Peripheral Line)  10 mEq Intravenous PRN MD Keely Loyola Lathe Hold] calcium carbonate (TUMS) chewable tablet 1,000 mg  1,000 mg Oral BID Keon Kim MD   1,000 mg at 05/19/19 2038    [MAR Hold]  [MAR Hold] dextrose 50 % solution 12.5 g  12.5 g Intravenous PRN Andrew Dewey MD        Gabby Laming Hold] glucagon (rDNA) injection 1 mg  1 mg Intramuscular PRN MD Keely Valencia Laming Hold] dextrose 5 % solution  100 mL/hr Intravenous PRN MD Keely Valencia Laming Hold] 0.9 % sodium chloride infusion   Intravenous Continuous Collin Nissen, MD 50 mL/hr at 05/20/19 1423      [MAR Hold] morphine (MS CONTIN) extended release tablet 30 mg  30 mg Oral Q8H Andrew Dewey MD   30 mg at 05/20/19 1142    [MAR Hold] ondansetron (ZOFRAN) injection 4 mg  4 mg Intravenous Q6H PRN Andrew Dewey MD   4 mg at 05/19/19 1111       Allergies: Allergies   Allergen Reactions    Fiorinal [Butalbital-Aspirin-Caffeine] Other (See Comments)     Generalized blisters    Peanut Butter Flavor [Flavoring Agent] Nausea And Vomiting       Problem List:    Patient Active Problem List   Diagnosis Code    Anxiety disorder F41.9    Benign prostatic hyperplasia N40.0    Depression F32.9    Edema R60.9    Esophageal reflux K21.9    Essential hypertension I10    Hypothyroidism E03.9    Tinea corporis B35.4    Right cervical radiculopathy M54.12    Osteomyelitis ([address] M86.9    Osteomyelitis of foot, left, acute ([facility]) M86.172    Non compliance with medical treatment Z91.19    Non-traumatic rhabdomyolysis M62.82    Ulcerated, foot, left, with necrosis of bone ([facility]) L97.524    Thrombophlebitis leg I80.3    Type 2 diabetes mellitus with foot ulcer, with long-term current use of insulin ([facility]) E11.621, L97.509, Z79.4    Hypotension I95.9    GI bleed K92.2    Cellulitis of left knee L03. 116    Electrolyte disorder E87.8    Anemia D64.9    Acute blood loss anemia D62    Hematemesis with nausea K92.0    Epigastric pain R10.13       Past Medical History:        Diagnosis Date    Anxiety     Arthritis     lower back, knees    BPH (benign prostatic hyperplasia)     Chronic kidney disease prostate    Depression     Diabetes mellitus ([facility]     Diabetic neuropathy (HCC)     GERD (gastroesophageal reflux disease)     Hyperglycemia     Hypertension     Hypothyroidism     Impacted tooth     Pneumonia        Past Surgical History:        Procedure Laterality Date    APPENDECTOMY      BACK SURGERY      Lower x 4. Had abscess after appendectomy.  KNEE SURGERY Right     scope    TOE AMPUTATION Bilateral 3/6/2017    TOE AMPUTATION LEFT HALLUX AND 2ND DIGIT AND RIGHT 2ND DIGIT performed by Dirk Jones DPM at [facility]         Social History:    Social History     Tobacco Use    Smoking status: Never Smoker    Smokeless tobacco: Never Used   Substance Use Topics    Alcohol use: No                                Counseling given: Not Answered      Vital Signs (Current):   Vitals:    05/19/19 1425 05/19/19 2019 05/20/19 0704 05/20/19 1413   BP: 112/71 117/73 102/65 (!) 152/81   Pulse: 82 85 75 72   Resp: 18 16 18 16   Temp: 98.4 °F (36.9 °C) 98.4 °F (36.9 °C) 98.4 °F (36.9 °C) 98.6 °F (37 °C)   TempSrc: Oral Oral Oral Oral   SpO2: 100% 100% 95% 100%   Weight:       Height:                                                  BP Readings from Last 3 Encounters:   05/20/19 (!) 152/81   04/23/19 128/76   04/01/19 134/82       NPO Status: Time of last liquid consumption: 2300                        Time of last solid consumption: 2300                        Date of last liquid consumption: 05/19/19                        Date of last solid food consumption: 05/19/19    BMI:   Wt Readings from Last 3 Encounters:   05/18/19 258 lb 13.1 oz (117.4 kg)   04/23/19 252 lb (114.3 kg)   04/16/19 250 lb (113.4 kg)     Body mass index is 33.23 kg/m².     CBC:   Lab Results   Component Value Date    WBC 6.2 05/17/2019    RBC 2.29 05/17/2019    HGB 8.8 05/20/2019    HCT 27.4 05/20/2019    MCV 76.3 05/17/2019    RDW 18.5 05/17/2019     05/17/2019       CMP:   Lab Results   Component Value

## 2019-05-20 NOTE — DISCHARGE INSTR - COC
Continuity of Care Form    Patient Name: Sadaf Lane   :  1965  MRN:  [id number]    Admit date:  2019  Discharge date:  ***    Code Status Order: Full Code   Advance Directives:   Advance Care Flowsheet Documentation     Date/Time Healthcare Directive Type of Healthcare Directive Copy in [address] Agent's Name Healthcare Agent's Phone Number    19 1419  No, patient does not have an advance directive for healthcare treatment declined info -- -- -- -- --    19 0050  No, patient does not have an advance directive for healthcare treatment -- -- -- -- --          Admitting Physician:  Apollo Chandler MD  PCP: Apollo Chandler MD    Discharging Nurse: [facility] Unit/Room#: 2076/2076-01  Discharging Unit Phone Number: ***    Emergency Contact:   Extended Emergency Contact Information  Primary Emergency Contact: Vi Clementyl  Address: [address] [place] [address] Phone: [phone]  Work Phone: [phone]  Mobile Phone: [phone]  Relation: Spouse  Hearing or visual needs: None  Other needs: None  Preferred language: English   needed? No  Secondary Emergency Contact: Tonya Layne  [place] Phone: [phone]  Relation: Parent    Past Surgical History:  Past Surgical History:   Procedure Laterality Date    APPENDECTOMY      BACK SURGERY      Lower x 4. Had abscess after appendectomy.      KNEE SURGERY Right     scope    TOE AMPUTATION Bilateral 3/6/2017    TOE AMPUTATION LEFT HALLUX AND 2ND DIGIT AND RIGHT 2ND DIGIT performed by Ruddy Whyte DPM at [address] ENDOSCOPY N/A 2019    EGD WITH BIOPSY performed by Zana Butcher MD at [address]       Immunization History:   Immunization History   Administered Date(s) Administered    Influenza, Quadv, 3 yrs and older, IM, PF (Fluzone 3 yrs and older or Afluria 5 yrs and older) 10/04/2016, 09/19/2017, 09/12/2018    Pneumococcal 13-valent Conjugate ([id number]) 10/04/2016    Pneumococcal Polysaccharide ([id number]) 12/13/2017    Tdap (Boostrix, Adacel) 04/24/2018       Active Problems:  Patient Active Problem List   Diagnosis Code    Anxiety disorder F41.9    Benign prostatic hyperplasia N40.0    Depression F32.9    Edema R60.9    Esophageal reflux K21.9    Essential hypertension I10    Hypothyroidism E03.9    Tinea corporis B35.4    Right cervical radiculopathy M54.12    Osteomyelitis ([address] M86.9    Osteomyelitis of foot, left, acute ([facility]) M86.172    Non compliance with medical treatment Z91.19    Non-traumatic rhabdomyolysis M62.82    Ulcerated, foot, left, with necrosis of bone ([facility]) L97.524    Thrombophlebitis leg I80.3    Type 2 diabetes mellitus with foot ulcer, with long-term current use of insulin ([facility]) E11.621, L97.509, Z79.4    Hypotension I95.9    GI bleed K92.2    Cellulitis of left knee L03. 116    Electrolyte disorder E87.8    Anemia D64.9    Acute blood loss anemia D62    Hematemesis with nausea K92.0    Epigastric pain R10.13       Isolation/Infection:   Isolation          Contact        Patient Infection Status     Infection Encounter Level?  Onset Date Added Added By Resolved Resolved By Review Date    MRSA No  03/10/17 Ariana Vega RN       2/2018 foot          Nurse Assessment:  Last Vital Signs: /68   Pulse 62   Temp 97.8 °F (36.6 °C) (Oral)   Resp 15   Ht 6' 2\" (1.88 m)   Wt 258 lb 13.1 oz (117.4 kg)   SpO2 100%   BMI 33.23 kg/m²     Last documented pain score (0-10 scale): Pain Level: 0  Last Weight:   Wt Readings from Last 1 Encounters:   05/18/19 258 lb 13.1 oz (117.4 kg)     Mental Status:  {IP PT MENTAL STATUS:20030}    IV Access:  {[place] IV ACCESS:724971649}    Nursing Mobility/ADLs:  Walking   {[facility] TRHD:505903977}  Transfer  {[facility] BJYJ:603418050}  Bathing  {MARK MICHAUD [facility]:908986409}  Dressing  {MARK MICHAUD

## 2019-05-20 NOTE — PLAN OF CARE
Hgb 6.8 this a.m. 2 Units PRBC transfused, no signs of active bleeding noted
PRE CONSULT ROUNDING NOTE  HPI  46year old male admitted for hyperglycemia and AMS. Our service was consulted for anemia and coffee ground emesis. He had two episodes of coffee ground emesis at home that is now resolved. He had epigastric pain that did not radiate and had no relation to food intake. He reports weight loss in the last week despite eating. He has not had melena, hematochezia or fevers. Hgb is currently 6.8 and he is receiving a transfusion. He denies NSAID use and is not taking anticoagulation medications. Endoscopy no egd, reports a normal colonoscopy several years ago, no reports are in epic  Social no cigarette smoking, marijuana use twice per week denies etoh  Family no hx of colon cancer  /66   Pulse 80   Temp 98.5 °F (36.9 °C) (Oral)   Resp 18   Ht 6' 2\" (1.88 m)   Wt 258 lb 13.1 oz (117.4 kg)   SpO2 100%   BMI 33.23 kg/m²     ROS meds labs and past medical records were reviewed    Exam  A&OX3 appropriate  S1S2 RRR  Lungs clear bilaterally  BSX 4 active non tender non distended  No edema or jaundice    Assessment  Anemia with hematemesis  Epigastric pain    Plan  Will plan on egd Monday unless he has active bleeding  Trend the h/h and transfuse for hgb <7, recheck the hgb after the current transfusion is completed  ppi drip    Formal gi consult to follow  . Brittney Menchaca, APRN - CNP
Problem: Falls - Risk of:  Goal: Will remain free from falls  Description  Will remain free from falls  5/19/2019 0451 by Valentina Ash RN  Outcome: Ongoing  Note:   Pt. Free of falls and injuries this shift. Problem: Nutritional:  Goal: Nutritional status will improve  Description  Nutritional status will improve  5/19/2019 0451 by Valentina Ash RN  Outcome: Ongoing  Note:   Nutrition status has improved this shift.
Ongoing  Note:   He has been on room air majority of the shift and keeping O2 levels above 90%     Problem: Skin Integrity:  Goal: Demonstration of wound healing without infection will improve  Description  Demonstration of wound healing without infection will improve  5/20/2019 0445 by Henry Lloyd RN  Outcome: Ongoing  5/19/2019 1746 by Quentin Power RN  Outcome: Ongoing  Note:   Turning and repositioning himself as needed, encouraging activity status as ordered, continuing to monitor for skin impairment risk. His spouse does his daily dressing changes.    Goal: Complications related to intravenous access or infusion will be avoided or minimized  Description  Complications related to intravenous access or infusion will be avoided or minimized  5/20/2019 0445 by Henry Lloyd RN  Outcome: Ongoing  5/19/2019 1746 by Quentin Power RN  Outcome: Ongoing     Problem: HEMODYNAMIC STATUS  Goal: Patient has stable vital signs and fluid balance  5/20/2019 0445 by Henry Lloyd RN  Outcome: Ongoing  5/19/2019 1746 by Quentin Power RN  Outcome: Ongoing     Problem: ACTIVITY INTOLERANCE/IMPAIRED MOBILITY  Goal: Mobility/activity is maintained at optimum level for patient  5/20/2019 0445 by Henry Lloyd RN  Outcome: Ongoing  5/19/2019 1746 by Quentin Power RN  Outcome: Ongoing     Problem: COMMUNICATION IMPAIRMENT  Goal: Ability to express needs and understand communication  5/20/2019 0445 by Henry Lloyd RN  Outcome: Ongoing  5/19/2019 1746 by Quentin Power RN  Outcome: Ongoing     Problem: Pain:  Goal: Pain level will decrease  Description  Pain level will decrease  Outcome: Ongoing  Goal: Control of acute pain  Description  Control of acute pain  Outcome: Ongoing  Goal: Control of chronic pain  Description  Control of chronic pain  Outcome: Ongoing

## 2019-05-20 NOTE — CARE COORDINATION
ONGOING DISCHARGE PLAN:    Spoke with patient regarding discharge plan and patient confirms that plan is still home with spouse. Patient to have EDG today. Patient last H&H was 8.8 and 27.4. Will continue to follow for additional discharge needs.     Electronically signed by Xenia Pearson RN on 5/20/2019 at 12:43 PM

## 2019-05-20 NOTE — CONSULTS
Authorizing Provider   morphine (MS CONTIN) 30 MG extended release tablet Take 1 tablet by mouth 3 times daily for 30 days. 5/17/19 6/16/19 Yes Lizabeth Cha MD   oxyCODONE (OXY-IR) 15 MG immediate release tablet Take 1 tablet by mouth every 6 hours as needed for Pain for up to 30 days. 5/8/19 6/7/19 Yes Lizabeth Cha MD   diazepam (VALIUM) 5 MG tablet TAKE ONE TABLET BY MOUTH EVERY 6 HOURS AS NEEDED FOR ANXIETY 5/8/19 6/6/19 Yes Lizabeth Cha MD   povidone-iodine (BETADINE) 7.5 % external solution Apply to wound. Apply as a wet to dry dressing 4/16/19  Yes Cyndi Coyne DPM   PARoxetine (PAXIL) 20 MG tablet TAKE ONE TABLET BY MOUTH FOUR TIMES A DAY 4/10/19  Yes Lizabeth Cha MD   levothyroxine (SYNTHROID) 50 MCG tablet Take 1 tablet by mouth daily 4/3/19  Yes Lizabeth Cha MD   tiZANidine (ZANAFLEX) 4 MG tablet TAKE ONE TABLET BY MOUTH EVERY 6 HOURS AS NEEDED FOR MUSCLE SPASMS 3/21/19  Yes Lizabeth Cha MD   [address] [address] UNIT/GM powder APPLY TO AFFECTED AREA(S) FOUR TIMES A DAY 2/7/19  Yes Lizabeth Cha MD   metFORMIN (GLUCOPHAGE) 500 MG tablet Take 1 tablet by mouth 2 times daily (with meals) 12/6/18  Yes Lizabeth Cha MD   buPROPion (WELLBUTRIN XL) 300 MG extended release tablet TAKE ONE TABLET BY MOUTH DAILY 11/28/18  Yes Lizabeth Cha MD   lisinopril (PRINIVIL;ZESTRIL) 10 MG tablet TAKE ONE TABLET BY MOUTH DAILY 7/27/18  Yes Lizabeth Cha MD   ciclopirox (LOPROX) 0.77 % cream APPLY TO AFFECTED AREA(S) AND RUB  IN A THIN FILM TWO TIMES A DAY (AM AND PM) 12/12/17  Yes Lizabeth Cha MD   omeprazole (PRILOSEC) 40 MG delayed release capsule TAKE ONE CAPSULE BY MOUTH EVERY NIGHT AT BEDTIME 7/11/17  Yes Lizabeth Cha MD   [facility] [address] [facility] TEST TWO TIMES A DAY 5/7/19   Lizabeth Cha MD   ONE TOUCH ULTRA TEST strip USE ONE STRIP TO TEST TWO TIMES A DAY AS NEEDED 3/18/19   Lizabeth Cha MD   Wound Dressings (ADAPTIC week.    Objective     Vitals:  Patient Vitals for the past 8 hrs:   BP Temp Temp src Pulse Resp SpO2   19 1745 130/68 97.8 °F (36.6 °C) Oral 62 15 100 %   19 1630 (!) 105/57 -- -- 61 13 92 %   19 1620 (!) 116/56 97.5 °F (36.4 °C) -- 60 14 98 %   19 1610 (!) 110/56 -- -- 57 11 100 %   19 1600 120/61 -- -- 56 11 100 %   19 1550 (!) 112/57 -- -- 59 14 98 %   19 1545 (!) 106/53 97.7 °F (36.5 °C) Infrared 63 10 92 %   19 1413 (!) 152/81 98.6 °F (37 °C) Oral 72 16 100 %     Average, Min, and Max for last 24 hours Vitals:  TEMPERATURE:  Temp  Av.1 °F (36.7 °C)  Min: 97.5 °F (36.4 °C)  Max: 98.6 °F (37 °C)    RESPIRATIONS RANGE: Resp  Av.6  Min: 0  Max: 18    PULSE RANGE: Pulse  Av  Min: 56  Max: 85    BLOOD PRESSURE RANGE:  Systolic (91NCM), ZPE:298 , Min:102 , TFZ:870   ; Diastolic (70TKD), XWM:48, Min:53, Max:83      PULSE OXIMETRY RANGE: SpO2  Av.2 %  Min: 92 %  Max: 100 %  I&O:  I/O last 3 completed shifts: In: 9812 [P.O.:240; I.V.:958]  Out: 1300 [Urine:1300]    CBC:  Recent Labs     19  0508 19  1157 19  1802   HGB 9.6* 8.8* 8.5*   HCT 30.0* 27.4* 26.6*        BMP:  Recent Labs     19  0541 19  0502 19  0508    144 139   K 3.2* 3.6* 3.9   * 108* 102   CO2 25 26 29   BUN 7 5* 6   CREATININE 0.64* 0.68* 0.82   GLUCOSE 92 91 91   CALCIUM 7.6* 8.0* 8.4*        Coags:  No results for input(s): APTT, PROT, INR in the last 72 hours. Lab Results   Component Value Date    LABA1C 6.2 2019     No results found for: SEDRATE  Lab Results   Component Value Date    .9 (H) 2017     Lower Extremity Physical Exam:    Vascular: DP and PT pulses are palpable. CFT <3 seconds to remaining digits. Hair growth is absent to the level of the digits. No edema. Neuro: Saph/sural/SP/DP/plantar sensation diminished to light touch.     Musculoskeletal: Muscle strength is 4/5 to all lower extremity muscle

## 2019-05-21 VITALS
OXYGEN SATURATION: 98 % | TEMPERATURE: 99.3 F | SYSTOLIC BLOOD PRESSURE: 118 MMHG | HEART RATE: 80 BPM | HEIGHT: 74 IN | WEIGHT: 246.69 LBS | BODY MASS INDEX: 31.66 KG/M2 | DIASTOLIC BLOOD PRESSURE: 80 MMHG | RESPIRATION RATE: 16 BRPM

## 2019-05-21 PROBLEM — K29.60 EROSIVE GASTRITIS: Status: ACTIVE | Noted: 2019-05-21

## 2019-05-21 LAB
ANION GAP SERPL CALCULATED.3IONS-SCNC: 8 MMOL/L (ref 9–17)
BUN BLDV-MCNC: 8 MG/DL (ref 6–20)
BUN/CREAT BLD: ABNORMAL (ref 9–20)
CALCIUM SERPL-MCNC: 7.7 MG/DL (ref 8.6–10.4)
CHLORIDE BLD-SCNC: 104 MMOL/L (ref 98–107)
CO2: 29 MMOL/L (ref 20–31)
CREAT SERPL-MCNC: 0.76 MG/DL (ref 0.7–1.2)
GFR AFRICAN AMERICAN: >60 ML/MIN
GFR NON-AFRICAN AMERICAN: >60 ML/MIN
GFR SERPL CREATININE-BSD FRML MDRD: ABNORMAL ML/MIN/{1.73_M2}
GFR SERPL CREATININE-BSD FRML MDRD: ABNORMAL ML/MIN/{1.73_M2}
GLUCOSE BLD-MCNC: 105 MG/DL (ref 70–99)
GLUCOSE BLD-MCNC: 121 MG/DL (ref 75–110)
HCT VFR BLD CALC: 27.1 % (ref 41–53)
HCT VFR BLD CALC: 27.6 % (ref 41–53)
HEMOGLOBIN: 8.8 G/DL (ref 13.5–17.5)
HEMOGLOBIN: 8.8 G/DL (ref 13.5–17.5)
MAGNESIUM: 1.8 MG/DL (ref 1.6–2.6)
POTASSIUM SERPL-SCNC: 4.1 MMOL/L (ref 3.7–5.3)
SODIUM BLD-SCNC: 141 MMOL/L (ref 135–144)

## 2019-05-21 PROCEDURE — 6370000000 HC RX 637 (ALT 250 FOR IP): Performed by: INTERNAL MEDICINE

## 2019-05-21 PROCEDURE — 36415 COLL VENOUS BLD VENIPUNCTURE: CPT

## 2019-05-21 PROCEDURE — 99238 HOSP IP/OBS DSCHRG MGMT 30/<: CPT | Performed by: FAMILY MEDICINE

## 2019-05-21 PROCEDURE — 83735 ASSAY OF MAGNESIUM: CPT

## 2019-05-21 PROCEDURE — 82947 ASSAY GLUCOSE BLOOD QUANT: CPT

## 2019-05-21 PROCEDURE — APPNB30 APP NON BILLABLE TIME 0-30 MINS: Performed by: NURSE PRACTITIONER

## 2019-05-21 PROCEDURE — 2580000003 HC RX 258: Performed by: INTERNAL MEDICINE

## 2019-05-21 PROCEDURE — 85014 HEMATOCRIT: CPT

## 2019-05-21 PROCEDURE — 85018 HEMOGLOBIN: CPT

## 2019-05-21 PROCEDURE — 80048 BASIC METABOLIC PNL TOTAL CA: CPT

## 2019-05-21 RX ORDER — LACTOBACILLUS RHAMNOSUS GG 10B CELL
1 CAPSULE ORAL 2 TIMES DAILY WITH MEALS
Qty: 60 CAPSULE | Refills: 0 | Status: SHIPPED | OUTPATIENT
Start: 2019-05-21

## 2019-05-21 RX ORDER — CEPHALEXIN 500 MG/1
500 CAPSULE ORAL 4 TIMES DAILY
Qty: 40 CAPSULE | Refills: 0 | Status: SHIPPED | OUTPATIENT
Start: 2019-05-21 | End: 2019-06-20 | Stop reason: ALTCHOICE

## 2019-05-21 RX ORDER — PANTOPRAZOLE SODIUM 40 MG/1
40 TABLET, DELAYED RELEASE ORAL
Qty: 30 TABLET | Refills: 11 | Status: SHIPPED | OUTPATIENT
Start: 2019-05-22

## 2019-05-21 RX ORDER — TAMSULOSIN HYDROCHLORIDE 0.4 MG/1
0.4 CAPSULE ORAL DAILY
Qty: 30 CAPSULE | Refills: 3 | Status: SHIPPED | OUTPATIENT
Start: 2019-05-21 | End: 2020-04-20

## 2019-05-21 RX ORDER — CALCIUM CARBONATE 200(500)MG
1000 TABLET,CHEWABLE ORAL 2 TIMES DAILY
Qty: 120 TABLET | Refills: 0 | Status: SHIPPED | OUTPATIENT
Start: 2019-05-21 | End: 2019-06-20

## 2019-05-21 RX ADMIN — CEPHALEXIN 500 MG: 500 CAPSULE ORAL at 05:25

## 2019-05-21 RX ADMIN — Medication 1 CAPSULE: at 08:12

## 2019-05-21 RX ADMIN — CEPHALEXIN 500 MG: 500 CAPSULE ORAL at 00:07

## 2019-05-21 RX ADMIN — PANTOPRAZOLE SODIUM 40 MG: 40 TABLET, DELAYED RELEASE ORAL at 05:25

## 2019-05-21 RX ADMIN — Medication 10 ML: at 08:13

## 2019-05-21 RX ADMIN — LEVOTHYROXINE SODIUM 50 MCG: 50 TABLET ORAL at 05:25

## 2019-05-21 RX ADMIN — ANTACID TABLETS 1000 MG: 500 TABLET, CHEWABLE ORAL at 08:12

## 2019-05-21 RX ADMIN — PAROXETINE HYDROCHLORIDE 20 MG: 20 TABLET, FILM COATED ORAL at 08:13

## 2019-05-21 RX ADMIN — MAGNESIUM OXIDE TAB 400 MG (241.3 MG ELEMENTAL MG) 400 MG: 400 (241.3 MG) TAB at 08:13

## 2019-05-21 RX ADMIN — MORPHINE SULFATE 30 MG: 30 TABLET, FILM COATED, EXTENDED RELEASE ORAL at 03:57

## 2019-05-21 RX ADMIN — TAMSULOSIN HYDROCHLORIDE 0.4 MG: 0.4 CAPSULE ORAL at 08:13

## 2019-05-21 RX ADMIN — LISINOPRIL 10 MG: 10 TABLET ORAL at 08:12

## 2019-05-21 RX ADMIN — SODIUM CHLORIDE 10 ML: 9 INJECTION INTRAMUSCULAR; INTRAVENOUS; SUBCUTANEOUS at 08:13

## 2019-05-21 RX ADMIN — BUPROPION HYDROCHLORIDE 300 MG: 300 TABLET, FILM COATED, EXTENDED RELEASE ORAL at 08:12

## 2019-05-21 RX ADMIN — POLYMYXIN B SULFATE, BACITRACIN ZINC, NEOMYCIN SULFATE: 5000; 3.5; 4 OINTMENT TOPICAL at 08:14

## 2019-05-21 ASSESSMENT — PAIN SCALES - GENERAL
PAINLEVEL_OUTOF10: 8
PAINLEVEL_OUTOF10: 7

## 2019-05-21 NOTE — PROGRESS NOTES
[facility]    Gastroenterology Daily Progress Note      Patient:   Gerson Bell   :    1965   Facility:   [address] [place] [address]  Date:     2019  Consultant:   Nacho Dominique CNP      SUBJECTIVE  46 y.o. male admitted 2019 with GI bleed [K92.2]  GI bleed [K92.2]  GI bleed [K92.2] and seen for melena and hematemesis. The pt was seen and examined. He is s/p EGD yesterday that showed mild erosive gastritis. He denies abdominal pain and nausea, he has not had overt bleeding and is tolerating a regular diet. Hgb is 8.8.         OBJECTIVE  Scheduled Meds:   pantoprazole  40 mg Oral QAM AC    magnesium oxide  400 mg Oral BID    sodium chloride (PF)  10 mL Intravenous BID    calcium carbonate  1,000 mg Oral BID    neomycin-bacitracin-polymyxin   Topical TID    cephALEXin  500 mg Oral 4 times per day    lactobacillus  1 capsule Oral BID WC    sodium chloride  250 mL Intravenous Once    sodium chloride flush  10 mL Intravenous 2 times per day    buPROPion  300 mg Oral Daily    levothyroxine  50 mcg Oral Daily    lisinopril  10 mg Oral Daily    PARoxetine  20 mg Oral Daily    tamsulosin  0.4 mg Oral Daily    morphine  30 mg Oral Q8H       Vital Signs:  /80   Pulse 80   Temp 99.3 °F (37.4 °C) (Oral)   Resp 16   Ht 6' 2\" (1.88 m)   Wt 246 lb 11.1 oz (111.9 kg)   SpO2 98%   BMI 31.67 kg/m²      Physical Exam:   General appearance: Alert & oriented, NAD  Lungs: CTA bilaterally    Heart: S1S2 RRR  Abdomen: Soft, Nontender, Not distended, BS WNL  Skin: No jaundice, No clubbing, No cyanosis    Lab and Imaging Review     CBC  Recent Labs     19  1802 19  2331 19  0541   HGB 8.5* 8.8* 8.8*   HCT 26.6* 27.6* 27.1*       BMP  Recent Labs     19  0502 19  0508 19  0541    139 141   K 3.6* 3.9 4.1   * 102 104   CO2 26 29 29   BUN 5* 6 8   CREATININE 0.68* 0.82 0.76   GLUCOSE 91 91 105*   CALCIUM 8.0* 8.4* 7.7*
Pt arrived to floor via stretcher from ED and was transfered to bed. Vitals taken, telemetry applied. Admission and assessment complete. No distress noted. See doc flowsheet and admission navigator for details. POC and education initiated and reviewed with patient. Call light within reach, and pt educated on its use. Bed in lowest position, and locked. Side rails up x 2. Denied further questions or needs at this time. Will continue to monitor.
organomegaly  Extremities: extremities normal, atraumatic, no cyanosis or edema  Neurologic: Mental status: Alert, oriented, thought content appropriate    Assessment and Plan:   1. Gastritis with GI bleed. Stable at present. 2. Diabetes mellitus stable  3. Ulceration left foot  4. Discharged home today.   Follow-up with podiatry one week    Patient Active Problem List:     Anxiety disorder     Benign prostatic hyperplasia     Depression     Edema     Esophageal reflux     Essential hypertension     Hypothyroidism     Tinea corporis     Right cervical radiculopathy     Osteomyelitis (HCC)     Osteomyelitis of foot, left, acute (HCC)     Non compliance with medical treatment     Non-traumatic rhabdomyolysis     Ulcerated, foot, left, with necrosis of bone ([address]     Thrombophlebitis leg     Type 2 diabetes mellitus with foot ulcer, with long-term current use of insulin ([address]     Hypotension     GI bleed     Cellulitis of left knee     Electrolyte disorder     Anemia     Acute blood loss anemia     Hematemesis with nausea     Epigastric pain      Electronically signed by Bethanie Hoang MD on 5/21/2019 at 7:36 AM

## 2019-05-21 NOTE — DISCHARGE SUMMARY
Discharge Summary    [facility]  :  1965  MRN:  [id number]    Admit date:  2019  Discharge date:  2019    Admitting Physician:  Keri Jay MD    PCP: Keri Jay MD    Discharge Diagnoses:    Patient Active Problem List   Diagnosis    Anxiety disorder    Benign prostatic hyperplasia    Depression    Edema    Esophageal reflux    Essential hypertension    Hypothyroidism    Tinea corporis    Right cervical radiculopathy    Osteomyelitis ([address]    Osteomyelitis of foot, left, acute ([address]    Non compliance with medical treatment    Non-traumatic rhabdomyolysis    Ulcerated, foot, left, with necrosis of bone (HCC)    Thrombophlebitis leg    Type 2 diabetes mellitus with foot ulcer, with long-term current use of insulin (HCC)    Hypotension    GI bleed    Cellulitis of left knee    Electrolyte disorder    Anemia    Acute blood loss anemia    Hematemesis with nausea    Epigastric pain    Erosive gastritis       Discharged Condition:  good    Hospital Course:   Patient admitted for GI bleed. Noted to have marked anemia with hemoglobin down in the 5 range. Patient was transfused. GI was consult. Patient underwent EGD which showed erosive gastritis. Otherwise was normal.  Patient's hemoglobin stayed stable. Home medications were continued.   Patient with no further signs of bleeding at discharge      Discharge Medications:       Duane Conroy   Home Medication Instructions [facility]:[id number]    Printed on:19 6014   Medication Information                      Blood Glucose Monitoring Suppl (ONE TOUCH ULTRA 2) w/Device KIT  1 kit by Other route daily             buPROPion (WELLBUTRIN XL) 300 MG extended release tablet  TAKE ONE TABLET BY MOUTH DAILY             calcium carbonate (TUMS) 500 MG chewable tablet  Take 2 tablets by mouth 2 times daily             cephALEXin (KEFLEX) 500 MG capsule  Take 1 capsule by mouth 4 times daily ciclopirox (LOPROX) 0.77 % cream  APPLY TO AFFECTED AREA(S) AND RUB  IN A THIN FILM TWO TIMES A DAY (AM AND PM)             diazepam (VALIUM) 5 MG tablet  TAKE ONE TABLET BY MOUTH EVERY 6 HOURS AS NEEDED FOR ANXIETY             Gauze Pads & Dressings (COMBINE ABD) 5\"X9\" PADS  1 Units by Does not apply route 2 times daily             Gauze Pads & Dressings (KERLIX GAUZE ROLL LARGE) MISC  1 Units by Does not apply route 2 times daily             lactobacillus (CULTURELLE) capsule  Take 1 capsule by mouth 2 times daily (with meals)             levothyroxine (SYNTHROID) 50 MCG tablet  Take 1 tablet by mouth daily             lisinopril (PRINIVIL;ZESTRIL) 10 MG tablet  TAKE ONE TABLET BY MOUTH DAILY             magnesium oxide (MAG-OX) 400 (241.3 Mg) MG TABS tablet  Take 1 tablet by mouth 2 times daily             metFORMIN (GLUCOPHAGE) 500 MG tablet  Take 1 tablet by mouth 2 times daily (with meals)             morphine (MS CONTIN) 30 MG extended release tablet  Take 1 tablet by mouth 3 times daily for 30 days. [id number] [id number] UNIT/GM powder  APPLY TO AFFECTED AREA(S) FOUR TIMES A DAY             ONE TOUCH ULTRA TEST strip  USE ONE STRIP TO TEST TWO TIMES A DAY AS NEEDED             ONETOUCH DELICA LANCETS 39B MISC  TEST TWO TIMES A DAY             oxyCODONE (OXY-IR) 15 MG immediate release tablet  Take 1 tablet by mouth every 6 hours as needed for Pain for up to 30 days. pantoprazole (PROTONIX) 40 MG tablet  Take 1 tablet by mouth every morning (before breakfast)             PARoxetine (PAXIL) 20 MG tablet  TAKE ONE TABLET BY MOUTH FOUR TIMES A DAY             povidone-iodine (BETADINE) 7.5 % external solution  Apply to wound. Apply as a wet to dry dressing             tamsulosin (FLOMAX) 0.4 MG capsule  Take 1 capsule by mouth daily             tiZANidine (ZANAFLEX) 4 MG tablet  TAKE ONE TABLET BY MOUTH EVERY 6 HOURS AS NEEDED FOR MUSCLE SPASMS             Wound Dressings (ADAPTIC NON-ADHERING

## 2019-05-22 LAB — SURGICAL PATHOLOGY REPORT: NORMAL

## 2019-05-23 ENCOUNTER — TELEPHONE (OUTPATIENT)
Dept: PRIMARY CARE CLINIC | Age: 54
End: 2019-05-23

## 2019-05-28 ENCOUNTER — OFFICE VISIT (OUTPATIENT)
Dept: PRIMARY CARE CLINIC | Age: 54
End: 2019-05-28
Payer: MEDICARE

## 2019-05-28 VITALS
WEIGHT: 249.6 LBS | HEIGHT: 74 IN | DIASTOLIC BLOOD PRESSURE: 80 MMHG | BODY MASS INDEX: 32.03 KG/M2 | SYSTOLIC BLOOD PRESSURE: 130 MMHG | HEART RATE: 81 BPM | OXYGEN SATURATION: 93 %

## 2019-05-28 DIAGNOSIS — L97.524 ULCERATED, FOOT, LEFT, WITH NECROSIS OF BONE (HCC): Primary | ICD-10-CM

## 2019-05-28 DIAGNOSIS — M54.12 RIGHT CERVICAL RADICULOPATHY: ICD-10-CM

## 2019-05-28 DIAGNOSIS — M86.172 OSTEOMYELITIS OF FOOT, LEFT, ACUTE (HCC): ICD-10-CM

## 2019-05-28 DIAGNOSIS — M86.9 OSTEOMYELITIS, UNSPECIFIED SITE, UNSPECIFIED TYPE (HCC): ICD-10-CM

## 2019-05-28 DIAGNOSIS — Z89.421 H/O AMPUTATION OF LESSER TOE, RIGHT (HCC): ICD-10-CM

## 2019-05-28 PROCEDURE — 1111F DSCHRG MED/CURRENT MED MERGE: CPT | Performed by: FAMILY MEDICINE

## 2019-05-28 PROCEDURE — 99214 OFFICE O/P EST MOD 30 MIN: CPT | Performed by: FAMILY MEDICINE

## 2019-05-28 RX ORDER — OXYCODONE HYDROCHLORIDE 10 MG/1
10 TABLET ORAL EVERY 4 HOURS PRN
Qty: 150 TABLET | Refills: 0 | Status: SHIPPED | OUTPATIENT
Start: 2019-05-28 | End: 2019-06-07 | Stop reason: SDUPTHER

## 2019-05-28 ASSESSMENT — ENCOUNTER SYMPTOMS
VOMITING: 0
EYE DISCHARGE: 0
ABDOMINAL PAIN: 0
SORE THROAT: 0
COUGH: 0
RHINORRHEA: 0
NAUSEA: 0
SHORTNESS OF BREATH: 0
DIARRHEA: 0
WHEEZING: 0
EYE REDNESS: 0

## 2019-05-28 NOTE — PROGRESS NOTES
Post-Discharge Transitional Care Management Services or Hospital Follow Up      Marcelo Christianson   YOB: 1965    Date of Office Visit:  5/28/2019  Date of Hospital Admission: 5/17/19  Date of Hospital Discharge: 5/21/19  Readmission Risk Score(high >=14%.  Medium >=10%):Readmission Risk Score: 18      Care management risk score Rising risk (score 2-5) and Complex Care (Scores >=6): 5     Non face to face  following discharge, date last encounter closed (first attempt may have been earlier): 5/23/2019  9:59 AM 5/23/2019  9:59 AM    Call initiated 2 business days of discharge: Yes     Patient Active Problem List   Diagnosis    Anxiety disorder    Benign prostatic hyperplasia    Depression    Edema    Esophageal reflux    Essential hypertension    Hypothyroidism    Tinea corporis    Right cervical radiculopathy    Osteomyelitis ([address]    Osteomyelitis of foot, left, acute ([address]    Non compliance with medical treatment    Non-traumatic rhabdomyolysis    Ulcerated, foot, left, with necrosis of bone (HCC)    Thrombophlebitis leg    Type 2 diabetes mellitus with foot ulcer, with long-term current use of insulin (HCC)    Hypotension    GI bleed    Cellulitis of left knee    Electrolyte disorder    Anemia    Acute blood loss anemia    Hematemesis with nausea    Epigastric pain    Erosive gastritis    Ulcerated, foot, left, with fat layer exposed ([address]       Allergies   Allergen Reactions    Fiorinal [Butalbital-Aspirin-Caffeine] Other (See Comments)     Generalized blisters    Peanut Butter Flavor [Flavoring Agent] Nausea And Vomiting       Medications listed as ordered at the time of discharge from [facility] Medication Instructions TAYLER:    Printed on:05/28/19 8561   Medication Information                      Blood Glucose Monitoring Suppl (ONE TOUCH ULTRA 2) w/Device KIT  1 kit by Other route daily             buPROPion (WELLBUTRIN XL) 300 MG extended release tablet  TAKE ONE TABLET BY MOUTH DAILY             calcium carbonate (TUMS) 500 MG chewable tablet  Take 2 tablets by mouth 2 times daily             cephALEXin (KEFLEX) 500 MG capsule  Take 1 capsule by mouth 4 times daily             ciclopirox (LOPROX) 0.77 % cream  APPLY TO AFFECTED AREA(S) AND RUB  IN A THIN FILM TWO TIMES A DAY (AM AND PM)             diazepam (VALIUM) 5 MG tablet  TAKE ONE TABLET BY MOUTH EVERY 6 HOURS AS NEEDED FOR ANXIETY             Gauze Pads & Dressings (COMBINE ABD) 5\"X9\" PADS  1 Units by Does not apply route 2 times daily             Gauze Pads & Dressings (KERLIX GAUZE ROLL LARGE) MISC  1 Units by Does not apply route 2 times daily             lactobacillus (CULTURELLE) capsule  Take 1 capsule by mouth 2 times daily (with meals)             levothyroxine (SYNTHROID) 50 MCG tablet  Take 1 tablet by mouth daily             lisinopril (PRINIVIL;ZESTRIL) 10 MG tablet  TAKE ONE TABLET BY MOUTH DAILY             magnesium oxide (MAG-OX) 400 (241.3 Mg) MG TABS tablet  Take 1 tablet by mouth 2 times daily             metFORMIN (GLUCOPHAGE) 500 MG tablet  Take 1 tablet by mouth 2 times daily (with meals)             morphine (MS CONTIN) 30 MG extended release tablet  Take 1 tablet by mouth 3 times daily for 30 days. [address] [id number] UNIT/GM powder  APPLY TO AFFECTED AREA(S) FOUR TIMES A DAY             ONE TOUCH ULTRA TEST strip  USE ONE STRIP TO TEST TWO TIMES A DAY AS NEEDED             ONETOUCH DELICA LANCETS 97V MISC  TEST TWO TIMES A DAY             oxyCODONE (OXY-IR) 15 MG immediate release tablet  Take 1 tablet by mouth every 6 hours as needed for Pain for up to 30 days. oxyCODONE HCl (OXY-IR) 10 MG immediate release tablet  Take 1 tablet by mouth every 4 hours as needed for Pain for up to 30 days.  Intended supply: 30 days             pantoprazole (PROTONIX) 40 MG tablet  Take 1 tablet by mouth every morning (before breakfast)             PARoxetine (PAXIL) 20 MG tablet  TAKE ONE TABLET BY MOUTH FOUR TIMES A DAY             povidone-iodine (BETADINE) 7.5 % external solution  Apply to wound. Apply as a wet to dry dressing             tamsulosin (FLOMAX) 0.4 MG capsule  Take 1 capsule by mouth daily             tiZANidine (ZANAFLEX) 4 MG tablet  TAKE ONE TABLET BY MOUTH EVERY 6 HOURS AS NEEDED FOR MUSCLE SPASMS             Wound Dressings (ADAPTIC NON-ADHERING DRESSING) PADS  Apply 1 Units topically 2 times daily                   Medications marked \"taking\" at this time  Outpatient Medications Marked as Taking for the 5/28/19 encounter (Office Visit) with Arlette Venegas MD   Medication Sig Dispense Refill    oxyCODONE HCl (OXY-IR) 10 MG immediate release tablet Take 1 tablet by mouth every 4 hours as needed for Pain for up to 30 days. Intended supply: 30 days 150 tablet 0    calcium carbonate (TUMS) 500 MG chewable tablet Take 2 tablets by mouth 2 times daily 120 tablet 0    lactobacillus (CULTURELLE) capsule Take 1 capsule by mouth 2 times daily (with meals) 60 capsule 0    cephALEXin (KEFLEX) 500 MG capsule Take 1 capsule by mouth 4 times daily 40 capsule 0    magnesium oxide (MAG-OX) 400 (241.3 Mg) MG TABS tablet Take 1 tablet by mouth 2 times daily 30 tablet 11    tamsulosin (FLOMAX) 0.4 MG capsule Take 1 capsule by mouth daily 30 capsule 3    pantoprazole (PROTONIX) 40 MG tablet Take 1 tablet by mouth every morning (before breakfast) 30 tablet 11    morphine (MS CONTIN) 30 MG extended release tablet Take 1 tablet by mouth 3 times daily for 30 days. 90 tablet 0    oxyCODONE (OXY-IR) 15 MG immediate release tablet Take 1 tablet by mouth every 6 hours as needed for Pain for up to 30 days.  120 tablet 0    diazepam (VALIUM) 5 MG tablet TAKE ONE TABLET BY MOUTH EVERY 6 HOURS AS NEEDED FOR ANXIETY 120 tablet 0    ONETOUCH DELICA LANCETS 26N MISC TEST TWO TIMES A  each 5    povidone-iodine (BETADINE) 7.5 % external solution Apply to wound. Apply as a wet to dry dressing 1 Bottle 1    PARoxetine (PAXIL) 20 MG tablet TAKE ONE TABLET BY MOUTH FOUR TIMES A  tablet 2    levothyroxine (SYNTHROID) 50 MCG tablet Take 1 tablet by mouth daily 90 tablet 3    tiZANidine (ZANAFLEX) 4 MG tablet TAKE ONE TABLET BY MOUTH EVERY 6 HOURS AS NEEDED FOR MUSCLE SPASMS 120 tablet 0    ONE TOUCH ULTRA TEST strip USE ONE STRIP TO TEST TWO TIMES A DAY AS NEEDED 100 strip 2    NYAMYC 647479 UNIT/GM powder APPLY TO AFFECTED AREA(S) FOUR TIMES A DAY 60 g 1    metFORMIN (GLUCOPHAGE) 500 MG tablet Take 1 tablet by mouth 2 times daily (with meals) 90 tablet 11    buPROPion (WELLBUTRIN XL) 300 MG extended release tablet TAKE ONE TABLET BY MOUTH DAILY 30 tablet 10    Wound Dressings (ADAPTIC NON-ADHERING DRESSING) PADS Apply 1 Units topically 2 times daily 60 each 5    Gauze Pads & Dressings (COMBINE ABD) 5\"X9\" PADS 1 Units by Does not apply route 2 times daily 60 each 5    lisinopril (PRINIVIL;ZESTRIL) 10 MG tablet TAKE ONE TABLET BY MOUTH DAILY 30 tablet 10    Blood Glucose Monitoring Suppl (ONE TOUCH ULTRA 2) w/Device KIT 1 kit by Other route daily 1 kit 5    ciclopirox (LOPROX) 0.77 % cream APPLY TO AFFECTED AREA(S) AND RUB  IN A THIN FILM TWO TIMES A DAY (AM AND PM) 90 g 2        Medications patient taking as of now reconciled against medications ordered at time of hospital discharge: Yes    Chief Complaint   Patient presents with    Follow-Up from Hospital     TCM       HPI    Inpatient course: Discharge summary reviewed- see chart. Interval history/Current status: Pt admitted for cellulitis of the feet. ID consulted. No fever or chills. Pt was admitted initially for chest pain and hypotension, hypertension. Review of Systems   Constitutional: Negative for chills and fever. HENT: Negative for rhinorrhea and sore throat. Eyes: Negative for discharge and redness. Respiratory: Negative for cough, shortness of breath and wheezing. Cardiovascular: Negative for chest pain and palpitations. Gastrointestinal: Negative for abdominal pain, diarrhea, nausea and vomiting. Genitourinary: Negative for dysuria and frequency. Musculoskeletal: Negative for arthralgias and myalgias. Neurological: Negative for dizziness, light-headedness and headaches. Psychiatric/Behavioral: Negative for sleep disturbance. Vitals:    05/28/19 1453   BP: 130/80   Pulse: 81   SpO2: 93%   Weight: 249 lb 9.6 oz (113.2 kg)   Height: 6' 2.04\" (1.881 m)     Body mass index is 32.01 kg/m². Wt Readings from Last 3 Encounters:   05/28/19 249 lb 9.6 oz (113.2 kg)   05/21/19 246 lb 11.1 oz (111.9 kg)   04/23/19 252 lb (114.3 kg)     BP Readings from Last 3 Encounters:   05/28/19 130/80   05/21/19 118/80   05/20/19 (!) 105/59       Physical Exam   Constitutional: He is oriented to person, place, and time. He appears well-developed and well-nourished. No distress. HENT:   Head: Normocephalic and atraumatic. Mouth/Throat: Oropharynx is clear and moist.   Eyes: Pupils are equal, round, and reactive to light. Conjunctivae are normal. Right eye exhibits no discharge. Left eye exhibits no discharge. No scleral icterus. Neck: No tracheal deviation present. No thyromegaly present. Cardiovascular: Normal rate, regular rhythm and normal heart sounds. No carotid bruits   Pulmonary/Chest: Effort normal and breath sounds normal. No respiratory distress. He has no wheezes. Musculoskeletal: He exhibits no edema. Lymphadenopathy:     He has no cervical adenopathy. Neurological: He is alert and oriented to person, place, and time. Skin: Skin is warm. No rash noted. Second toe amupated on right foot. Great toe and second toe missing from the left foot. Psychiatric: He has a normal mood and affect. His behavior is normal. Thought content normal.   Nursing note and vitals reviewed. Pt sates as long as progress, does not want amputation. Assessment/Plan:  1. Ulcerated, foot, left, with necrosis of bone (HCC)  Stable. Sees wound care  - [place] DISCHARGE MEDS RECONCILED W/ CURRENT OUTPATIENT MED LIST    2. Osteomyelitis of foot, left, acute ([facility] [address]  Stable   - [place] DISCHARGE MEDS RECONCILED W/ CURRENT OUTPATIENT MED LIST    3. H/O amputation of lesser toe, right (HCC)  Stable. Old amputations.         Medical Decision Making: moderate complexity      Wean oxycodone, change to 10mg 5 times a day   Continue to wean

## 2019-05-30 LAB
EKG ATRIAL RATE: 70 BPM
EKG ATRIAL RATE: 78 BPM
EKG P AXIS: 53 DEGREES
EKG P AXIS: 57 DEGREES
EKG P-R INTERVAL: 164 MS
EKG P-R INTERVAL: 166 MS
EKG Q-T INTERVAL: 408 MS
EKG Q-T INTERVAL: 458 MS
EKG QRS DURATION: 86 MS
EKG QRS DURATION: 86 MS
EKG QTC CALCULATION (BAZETT): 465 MS
EKG QTC CALCULATION (BAZETT): 494 MS
EKG R AXIS: 39 DEGREES
EKG R AXIS: 62 DEGREES
EKG T AXIS: 22 DEGREES
EKG T AXIS: 53 DEGREES
EKG VENTRICULAR RATE: 70 BPM
EKG VENTRICULAR RATE: 78 BPM

## 2019-05-31 ENCOUNTER — TELEPHONE (OUTPATIENT)
Dept: GASTROENTEROLOGY | Age: 54
End: 2019-05-31

## 2019-06-06 DIAGNOSIS — M54.12 RIGHT CERVICAL RADICULOPATHY: ICD-10-CM

## 2019-06-06 DIAGNOSIS — M86.9 OSTEOMYELITIS, UNSPECIFIED SITE, UNSPECIFIED TYPE (HCC): ICD-10-CM

## 2019-06-06 DIAGNOSIS — F41.9 ANXIETY DISORDER, UNSPECIFIED TYPE: ICD-10-CM

## 2019-06-06 DIAGNOSIS — L97.524 ULCERATED, FOOT, LEFT, WITH NECROSIS OF BONE (HCC): ICD-10-CM

## 2019-06-06 DIAGNOSIS — M86.172 OSTEOMYELITIS OF FOOT, LEFT, ACUTE (HCC): ICD-10-CM

## 2019-06-06 RX ORDER — OXYCODONE HYDROCHLORIDE 15 MG/1
15 TABLET ORAL EVERY 6 HOURS PRN
Qty: 120 TABLET | Refills: 0 | Status: CANCELLED | OUTPATIENT
Start: 2019-06-06 | End: 2019-07-06

## 2019-06-07 RX ORDER — DIAZEPAM 5 MG/1
TABLET ORAL
Qty: 120 TABLET | Refills: 0 | Status: SHIPPED | OUTPATIENT
Start: 2019-06-07 | End: 2019-06-28 | Stop reason: SDUPTHER

## 2019-06-07 RX ORDER — OXYCODONE HYDROCHLORIDE 10 MG/1
10 TABLET ORAL EVERY 4 HOURS PRN
Qty: 150 TABLET | Refills: 0 | Status: SHIPPED | OUTPATIENT
Start: 2019-06-07 | End: 2019-06-27

## 2019-06-14 DIAGNOSIS — M86.672 OTHER CHRONIC OSTEOMYELITIS OF LEFT FOOT (HCC): ICD-10-CM

## 2019-06-14 DIAGNOSIS — E11.49 OTHER DIABETIC NEUROLOGICAL COMPLICATION ASSOCIATED WITH TYPE 2 DIABETES MELLITUS (HCC): ICD-10-CM

## 2019-06-14 RX ORDER — MORPHINE SULFATE 30 MG/1
30 TABLET, FILM COATED, EXTENDED RELEASE ORAL 3 TIMES DAILY
Qty: 90 TABLET | Refills: 0 | Status: SHIPPED | OUTPATIENT
Start: 2019-06-14 | End: 2019-07-15 | Stop reason: SDUPTHER

## 2019-06-20 ENCOUNTER — OFFICE VISIT (OUTPATIENT)
Dept: PODIATRY | Age: 54
End: 2019-06-20
Payer: MEDICARE

## 2019-06-20 VITALS — WEIGHT: 250 LBS | HEIGHT: 74 IN | BODY MASS INDEX: 32.08 KG/M2

## 2019-06-20 DIAGNOSIS — E11.42 TYPE 2 DIABETES MELLITUS WITH DIABETIC POLYNEUROPATHY, WITHOUT LONG-TERM CURRENT USE OF INSULIN (HCC): ICD-10-CM

## 2019-06-20 DIAGNOSIS — L97.522 ULCER OF LEFT FOOT, WITH FAT LAYER EXPOSED (HCC): Primary | ICD-10-CM

## 2019-06-20 DIAGNOSIS — E11.621 TYPE 2 DIABETES MELLITUS WITH FOOT ULCER, WITHOUT LONG-TERM CURRENT USE OF INSULIN (HCC): ICD-10-CM

## 2019-06-20 DIAGNOSIS — L97.509 TYPE 2 DIABETES MELLITUS WITH FOOT ULCER, WITHOUT LONG-TERM CURRENT USE OF INSULIN (HCC): ICD-10-CM

## 2019-06-20 DIAGNOSIS — L30.9 DERMATITIS OF RIGHT FOOT: ICD-10-CM

## 2019-06-20 PROCEDURE — 11042 DBRDMT SUBQ TIS 1ST 20SQCM/<: CPT | Performed by: PODIATRIST

## 2019-06-20 PROCEDURE — 99999 PR OFFICE/OUTPT VISIT,PROCEDURE ONLY: CPT | Performed by: PODIATRIST

## 2019-06-20 PROCEDURE — 11045 DBRDMT SUBQ TISS EACH ADDL: CPT | Performed by: PODIATRIST

## 2019-06-20 RX ORDER — FLUOCINONIDE 0.5 MG/G
OINTMENT TOPICAL
Qty: 1 TUBE | Refills: 5 | Status: SHIPPED | OUTPATIENT
Start: 2019-06-20 | End: 2020-04-20

## 2019-06-20 NOTE — PROGRESS NOTES
[facility]  Return Patient History and Physical      Cecilia Valle  AGE: 53 y.o. GENDER: male  : 1965  TODAY'S DATE:  2019    Chief Complaint:   Chief Complaint   Patient presents with    Wound Check     wound care b/l    Diabetes     blood sugar 126          History of the Present Illness       Cecilia Valle is a 53 y.o. male who presents today for evaluation and treatment for a diabetic foot ulcer  wound which is located on the left. Was in the hospital with a GI bleed. Now home, back to using tea tree oil on the ulceration. I have specifically ordered them to not apply this to the ulceration. nsists on still wearing a slide on pair of sandals, worn out. Not on antibiotics. No fever or chills. Having left ankle pain, no injury. He has a rough patch of skin on his right foot. History of Wound: I saw him in the wound care center for quite a long time. He had many issues with compliance and stopped showing up. Wound Type:diabetic  Wound Location:left foot  Modifying factors:venous stasis, lymphedema, diabetes, poor glucose control, chronic pressure, decreased mobility, shear force, obesity, decreased tissue oxygenation, poor hygiene, non-adherence and substance abuse    Lab Results   Component Value Date    LABA1C 6.2 2019                 Abx : Yes   Cultures :wereobtained  Pain : 5/10    PAST MEDICAL HISTORY        Diagnosis Date    Anxiety     Arthritis     lower back, knees    BPH (benign prostatic hyperplasia)     Chronic kidney disease     prostate    Depression     Diabetes mellitus (HCC)     Diabetic neuropathy (HCC)     GERD (gastroesophageal reflux disease)     Hyperglycemia     Hypertension     Hypothyroidism     Impacted tooth     Pneumonia        MEDICATIONS    Current Outpatient Medications on File Prior to Visit   Medication Sig Dispense Refill    metFORMIN (GLUCOPHAGE) 500 MG tablet Take 1 tablet by mouth 2 times daily (with meals) 90 tablet 11    morphine (MS CONTIN) 30 MG extended release tablet Take 1 tablet by mouth 3 times daily for 30 days. 90 tablet 0    ciclopirox (LOPROX) 0.77 % cream APPLY TO AFFECTED AREA(S) AND RUB IN A THIN FILM TWO TIMES A DAY IN THE MORNING AND EVENING 90 g 2    diazepam (VALIUM) 5 MG tablet TAKE ONE TABLET BY MOUTH EVERY 6 HOURS AS NEEDED FOR ANXIETY 120 tablet 0    oxyCODONE HCl (OXY-IR) 10 MG immediate release tablet Take 1 tablet by mouth every 4 hours as needed for Pain for up to 30 days. Intended supply: 30 days 150 tablet 0    calcium carbonate (TUMS) 500 MG chewable tablet Take 2 tablets by mouth 2 times daily 120 tablet 0    lactobacillus (CULTURELLE) capsule Take 1 capsule by mouth 2 times daily (with meals) 60 capsule 0    magnesium oxide (MAG-OX) 400 (241.3 Mg) MG TABS tablet Take 1 tablet by mouth 2 times daily 30 tablet 11    tamsulosin (FLOMAX) 0.4 MG capsule Take 1 capsule by mouth daily 30 capsule 3    pantoprazole (PROTONIX) 40 MG tablet Take 1 tablet by mouth every morning (before breakfast) 30 tablet 11    ONETOUCH DELICA LANCETS 62M MISC TEST TWO TIMES A  each 5    povidone-iodine (BETADINE) 7.5 % external solution Apply to wound. Apply as a wet to dry dressing 1 Bottle 1    PARoxetine (PAXIL) 20 MG tablet TAKE ONE TABLET BY MOUTH FOUR TIMES A  tablet 2    levothyroxine (SYNTHROID) 50 MCG tablet Take 1 tablet by mouth daily 90 tablet 3    tiZANidine (ZANAFLEX) 4 MG tablet TAKE ONE TABLET BY MOUTH EVERY 6 HOURS AS NEEDED FOR MUSCLE SPASMS 120 tablet 0    ONE TOUCH ULTRA TEST strip USE ONE STRIP TO TEST TWO TIMES A DAY AS NEEDED 100 strip 2    NYAMYC [id number] UNIT/GM powder APPLY TO AFFECTED AREA(S) FOUR TIMES A DAY 60 g 1    buPROPion (WELLBUTRIN XL) 300 MG extended release tablet TAKE ONE TABLET BY MOUTH DAILY 30 tablet 10    Wound Dressings (ADAPTIC NON-ADHERING DRESSING) PADS Apply 1 Units topically 2 times daily 60 each 5    Gauze Pads & 1    diabetic foot ulcer   left foot    Wound measurements:  #1 8.5 cm by 7.5 cm  by   3 mm      Improved     Wound Depth: subcutaneous. Drainage:    moderate Type:serosanguinous    Wound Bed status:   Granulation Tissue: 80%   Necrotic 20%  Type: maceration, fibrin, hair, debris  Epithelialization 0%   Hypergranular 0%      Erythema absent       Odor:no    Maceration:yes    Undermining/tract/tunneling:no    Culture taken:   no           Dr, scaly skin dorsal right foot. Vascular:  DP/PT pulses palpable 2/4, Bilateral.    CFT <3 seconds to digits 1-5, Bilateral .   Hair growth absent to level of digits, Bilateral.  Edema present, Bilateral.  Erythema absent, Bilateral.     Neurological:  Sensation absent to light touch to level of digits, Bilateral.  Protective sensation  absent via 5.07/10g Howe-Narda monofilament 10/10 sites, Bilateral.  Vibratory sensation absent to 1st MPJ, Bilateral.     Musculoskeletal:  Muscle strength 5/5 all LE groups tested, Bilateral.     Radiographs: 3 views left ankle: The fifth metatarsal and cuboid are prominent plantarly. Degenerative changes in the midfoot, rearfoot, and ankle. No erosive changes. Periosteal reaction of the fibular shaft. Collapse of the medial arch. Assessment:         1. Ulcer of left foot, with fat layer exposed ([address]    2. Type 2 diabetes mellitus with foot ulcer, without long-term current use of insulin ([address]    3. Toe amputation status, left ([address]    4. Type 2 diabetes mellitus with diabetic polyneuropathy, without long-term current use of insulin ([address]    5. Dermatitis of right foot          Plan:   Loraine Mcdonald Age:  53 y.o. Gender:  male   :  1965  Today's Date:  2019      Procedure: With the patient in supine position, Lidocaine soaked gauze was applied per physician order at beginning of wound evaluation. It was subsequently removed.     Using a curette, #15 blade scalpel, scissors and Pick-up, the wound

## 2019-06-27 ENCOUNTER — OFFICE VISIT (OUTPATIENT)
Dept: PRIMARY CARE CLINIC | Age: 54
End: 2019-06-27
Payer: MEDICARE

## 2019-06-27 VITALS
BODY MASS INDEX: 30.62 KG/M2 | DIASTOLIC BLOOD PRESSURE: 82 MMHG | HEIGHT: 74 IN | OXYGEN SATURATION: 94 % | SYSTOLIC BLOOD PRESSURE: 130 MMHG | WEIGHT: 238.6 LBS | HEART RATE: 103 BPM

## 2019-06-27 DIAGNOSIS — L97.509 TYPE 2 DIABETES MELLITUS WITH FOOT ULCER, WITH LONG-TERM CURRENT USE OF INSULIN (HCC): ICD-10-CM

## 2019-06-27 DIAGNOSIS — I73.9 PAD (PERIPHERAL ARTERY DISEASE) (HCC): Primary | ICD-10-CM

## 2019-06-27 DIAGNOSIS — M86.172 OSTEOMYELITIS OF FOOT, LEFT, ACUTE (HCC): ICD-10-CM

## 2019-06-27 DIAGNOSIS — M86.672 OTHER CHRONIC OSTEOMYELITIS OF LEFT FOOT (HCC): ICD-10-CM

## 2019-06-27 DIAGNOSIS — E11.621 TYPE 2 DIABETES MELLITUS WITH FOOT ULCER, WITH LONG-TERM CURRENT USE OF INSULIN (HCC): ICD-10-CM

## 2019-06-27 DIAGNOSIS — L97.524 ULCERATED, FOOT, LEFT, WITH NECROSIS OF BONE (HCC): ICD-10-CM

## 2019-06-27 DIAGNOSIS — Z79.4 TYPE 2 DIABETES MELLITUS WITH FOOT ULCER, WITH LONG-TERM CURRENT USE OF INSULIN (HCC): ICD-10-CM

## 2019-06-27 PROCEDURE — 3044F HG A1C LEVEL LT 7.0%: CPT | Performed by: FAMILY MEDICINE

## 2019-06-27 PROCEDURE — 2022F DILAT RTA XM EVC RTNOPTHY: CPT | Performed by: FAMILY MEDICINE

## 2019-06-27 PROCEDURE — 99214 OFFICE O/P EST MOD 30 MIN: CPT | Performed by: FAMILY MEDICINE

## 2019-06-27 PROCEDURE — G8417 CALC BMI ABV UP PARAM F/U: HCPCS | Performed by: FAMILY MEDICINE

## 2019-06-27 PROCEDURE — 3017F COLORECTAL CA SCREEN DOC REV: CPT | Performed by: FAMILY MEDICINE

## 2019-06-27 PROCEDURE — 1036F TOBACCO NON-USER: CPT | Performed by: FAMILY MEDICINE

## 2019-06-27 PROCEDURE — G8427 DOCREV CUR MEDS BY ELIG CLIN: HCPCS | Performed by: FAMILY MEDICINE

## 2019-06-27 RX ORDER — OXYCODONE HYDROCHLORIDE 10 MG/1
10 TABLET ORAL EVERY 6 HOURS PRN
Qty: 120 TABLET | Refills: 0 | Status: SHIPPED | OUTPATIENT
Start: 2019-06-27 | End: 2019-07-30 | Stop reason: SDUPTHER

## 2019-06-27 ASSESSMENT — ENCOUNTER SYMPTOMS
SORE THROAT: 0
RHINORRHEA: 0
COUGH: 0
DIARRHEA: 0
WHEEZING: 0
ABDOMINAL PAIN: 0
SHORTNESS OF BREATH: 0
NAUSEA: 0
EYE DISCHARGE: 0
VOMITING: 0
EYE REDNESS: 0

## 2019-06-27 NOTE — PROGRESS NOTES
> 120 MEDD (historical values) -   Chronic Pain > 120 MEDD Obtained or confirmed \"Medication Contract\" on file. ;Naloxone script offered, but declined by patient. ;Engaged a pain medicine specialist as a prescriber or consultant. Assessment:       Diagnosis Orders   1. PAD (peripheral artery disease) ([facility])  Ultrasound doppler arterial legs bilateral   2. Ulcerated, foot, left, with necrosis of bone ([facility])  oxyCODONE HCl (OXY-IR) 10 MG immediate release tablet   3. Osteomyelitis of foot, left, acute ([facility])  oxyCODONE HCl (OXY-IR) 10 MG immediate release tablet   4. Other chronic osteomyelitis of left foot ([facility] [address]     5. Type 2 diabetes mellitus with foot ulcer, with long-term current use of insulin ([facility])          Plan:    decrease oxycodone to four times a day, will hold on decreasing for now. Pt advised if not successful, would need to see pain management. Pt to see eye doctor. Return in about 1 month (around 7/25/2019). Orders Placed This Encounter   Procedures    Ultrasound doppler arterial legs bilateral     Standing Status:   Future     Standing Expiration Date:   8/26/2019     Orders Placed This Encounter   Medications    oxyCODONE HCl (OXY-IR) 10 MG immediate release tablet     Sig: Take 1 tablet by mouth every 6 hours as needed for Pain for up to 30 days. Intended supply: 30 days     Dispense:  120 tablet     Refill:  0     Reduce doses taken as pain becomes manageable       Patient given educationalmaterials - see patient instructions. Discussed use, benefit, and side effectsof prescribed medications. All patient questions answered. Pt voiced understanding. Reviewed health maintenance. Instructed to continue current medications, diet andexercise. Patient agreed with treatment plan. Follow up as directed.      Electronicallysigned by Gerardo Cuevas MD on 6/27/2019 at 1:24 PM

## 2019-06-28 DIAGNOSIS — F41.9 ANXIETY DISORDER, UNSPECIFIED TYPE: ICD-10-CM

## 2019-06-28 RX ORDER — DIAZEPAM 5 MG/1
TABLET ORAL
Qty: 120 TABLET | Refills: 2 | Status: SHIPPED | OUTPATIENT
Start: 2019-06-28 | End: 2019-07-28

## 2019-07-12 DIAGNOSIS — E11.49 OTHER DIABETIC NEUROLOGICAL COMPLICATION ASSOCIATED WITH TYPE 2 DIABETES MELLITUS (HCC): ICD-10-CM

## 2019-07-12 DIAGNOSIS — M86.672 OTHER CHRONIC OSTEOMYELITIS OF LEFT FOOT (HCC): ICD-10-CM

## 2019-07-15 RX ORDER — MORPHINE SULFATE 30 MG/1
30 TABLET, FILM COATED, EXTENDED RELEASE ORAL 3 TIMES DAILY
Qty: 90 TABLET | Refills: 0 | Status: SHIPPED | OUTPATIENT
Start: 2019-07-15 | End: 2019-08-12 | Stop reason: SDUPTHER

## 2019-07-18 ENCOUNTER — OFFICE VISIT (OUTPATIENT)
Dept: PODIATRY | Age: 54
End: 2019-07-18
Payer: MEDICARE

## 2019-07-18 VITALS — HEIGHT: 74 IN | BODY MASS INDEX: 31.06 KG/M2 | WEIGHT: 242 LBS

## 2019-07-18 DIAGNOSIS — E11.42 TYPE 2 DIABETES MELLITUS WITH DIABETIC POLYNEUROPATHY, WITHOUT LONG-TERM CURRENT USE OF INSULIN (HCC): ICD-10-CM

## 2019-07-18 DIAGNOSIS — L97.509 TYPE 2 DIABETES MELLITUS WITH FOOT ULCER, WITHOUT LONG-TERM CURRENT USE OF INSULIN (HCC): ICD-10-CM

## 2019-07-18 DIAGNOSIS — E11.621 TYPE 2 DIABETES MELLITUS WITH FOOT ULCER, WITHOUT LONG-TERM CURRENT USE OF INSULIN (HCC): ICD-10-CM

## 2019-07-18 DIAGNOSIS — L97.522 ULCER OF LEFT FOOT, WITH FAT LAYER EXPOSED (HCC): Primary | ICD-10-CM

## 2019-07-18 PROCEDURE — 99999 PR OFFICE/OUTPT VISIT,PROCEDURE ONLY: CPT | Performed by: PODIATRIST

## 2019-07-18 PROCEDURE — 11042 DBRDMT SUBQ TIS 1ST 20SQCM/<: CPT | Performed by: PODIATRIST

## 2019-07-18 PROCEDURE — 11045 DBRDMT SUBQ TISS EACH ADDL: CPT | Performed by: PODIATRIST

## 2019-07-29 ENCOUNTER — TELEPHONE (OUTPATIENT)
Dept: PRIMARY CARE CLINIC | Age: 54
End: 2019-07-29

## 2019-07-29 DIAGNOSIS — M86.172 OSTEOMYELITIS OF FOOT, LEFT, ACUTE (HCC): ICD-10-CM

## 2019-07-29 DIAGNOSIS — L97.524 ULCERATED, FOOT, LEFT, WITH NECROSIS OF BONE (HCC): ICD-10-CM

## 2019-07-30 RX ORDER — OXYCODONE HYDROCHLORIDE 10 MG/1
10 TABLET ORAL EVERY 6 HOURS PRN
Qty: 120 TABLET | Refills: 0 | Status: SHIPPED | OUTPATIENT
Start: 2019-07-30 | End: 2019-09-03 | Stop reason: SDUPTHER

## 2019-07-30 NOTE — TELEPHONE ENCOUNTER
Patients wife called back and would like to know if he can also have his two day fabi period back for filling medications.

## 2019-07-31 RX ORDER — PAROXETINE HYDROCHLORIDE 20 MG/1
TABLET, FILM COATED ORAL
Qty: 120 TABLET | Refills: 1 | Status: SHIPPED | OUTPATIENT
Start: 2019-07-31 | End: 2019-10-29 | Stop reason: SDUPTHER

## 2019-08-12 ENCOUNTER — TELEPHONE (OUTPATIENT)
Dept: PRIMARY CARE CLINIC | Age: 54
End: 2019-08-12

## 2019-08-12 DIAGNOSIS — M86.672 OTHER CHRONIC OSTEOMYELITIS OF LEFT FOOT (HCC): ICD-10-CM

## 2019-08-12 DIAGNOSIS — E11.49 OTHER DIABETIC NEUROLOGICAL COMPLICATION ASSOCIATED WITH TYPE 2 DIABETES MELLITUS (HCC): ICD-10-CM

## 2019-08-12 RX ORDER — MORPHINE SULFATE 30 MG/1
30 TABLET, FILM COATED, EXTENDED RELEASE ORAL 3 TIMES DAILY
Qty: 90 TABLET | Refills: 0 | Status: SHIPPED | OUTPATIENT
Start: 2019-08-12 | End: 2019-09-03

## 2019-08-12 NOTE — TELEPHONE ENCOUNTER
Last appt 6/27/19. Last filled 7/15/19. Pt asking for a 2 day fabi period, per Lebron Hernandez (here in our office). Bandar/pranav listed.

## 2019-08-15 ENCOUNTER — OFFICE VISIT (OUTPATIENT)
Dept: PODIATRY | Age: 54
End: 2019-08-15
Payer: MEDICARE

## 2019-08-15 VITALS — HEIGHT: 74 IN | BODY MASS INDEX: 31.06 KG/M2 | WEIGHT: 242 LBS

## 2019-08-15 DIAGNOSIS — E11.42 TYPE 2 DIABETES MELLITUS WITH DIABETIC POLYNEUROPATHY, WITHOUT LONG-TERM CURRENT USE OF INSULIN (HCC): ICD-10-CM

## 2019-08-15 DIAGNOSIS — E11.621 TYPE 2 DIABETES MELLITUS WITH FOOT ULCER, WITHOUT LONG-TERM CURRENT USE OF INSULIN (HCC): ICD-10-CM

## 2019-08-15 DIAGNOSIS — L97.509 TYPE 2 DIABETES MELLITUS WITH FOOT ULCER, WITHOUT LONG-TERM CURRENT USE OF INSULIN (HCC): ICD-10-CM

## 2019-08-15 DIAGNOSIS — L97.522 ULCER OF LEFT FOOT, WITH FAT LAYER EXPOSED (HCC): Primary | ICD-10-CM

## 2019-08-15 PROCEDURE — 11042 DBRDMT SUBQ TIS 1ST 20SQCM/<: CPT | Performed by: PODIATRIST

## 2019-08-15 PROCEDURE — 11045 DBRDMT SUBQ TISS EACH ADDL: CPT | Performed by: PODIATRIST

## 2019-08-16 NOTE — PROGRESS NOTES
[facility]  Return Patient History and Physical      Rona Galvan  AGE: 53 y.o. GENDER: male  : 1965  TODAY'S DATE:  2019    Chief Complaint:   Chief Complaint   Patient presents with    Wound Check     wound care left     Diabetes     blood sugar 109          History of the Present Illness       Rona Galvan is a 53 y.o. male who presents today for evaluation and treatment for a diabetic foot ulcer  wound which is located on the left. Having vascular studies again. No dressing on today, just a sock and sandal. Not wearing any offloading device that I have given him. Still Insists on still wearing a slide on pair of sandals, worn out. Not on antibiotics. No fever or chills. Having left ankle pain, no injury. History of Wound: I saw him in the wound care center for quite a long time. He had many issues with compliance and stopped showing up. Wound Type:diabetic  Wound Location:left foot  Modifying factors:venous stasis, lymphedema, diabetes, poor glucose control, chronic pressure, decreased mobility, shear force, obesity, decreased tissue oxygenation, poor hygiene, non-adherence and substance abuse    Lab Results   Component Value Date    LABA1C 6.2 2019                 Abx : Yes   Cultures :wereobtained  Pain : 5/10    PAST MEDICAL HISTORY        Diagnosis Date    Anxiety     Arthritis     lower back, knees    BPH (benign prostatic hyperplasia)     Chronic kidney disease     prostate    Depression     Diabetes mellitus (HCC)     Diabetic neuropathy (HCC)     GERD (gastroesophageal reflux disease)     Hyperglycemia     Hypertension     Hypothyroidism     Impacted tooth     Pneumonia        MEDICATIONS    Current Outpatient Medications on File Prior to Visit   Medication Sig Dispense Refill    morphine (MS CONTIN) 30 MG extended release tablet Take 1 tablet by mouth 3 times daily for 30 days.  90 tablet 0    PARoxetine (PAXIL) 20 MG

## 2019-08-30 DIAGNOSIS — M86.172 OSTEOMYELITIS OF FOOT, LEFT, ACUTE (HCC): ICD-10-CM

## 2019-08-30 DIAGNOSIS — L97.524 ULCERATED, FOOT, LEFT, WITH NECROSIS OF BONE (HCC): ICD-10-CM

## 2019-08-30 RX ORDER — OXYCODONE HYDROCHLORIDE 10 MG/1
10 TABLET ORAL EVERY 6 HOURS PRN
Qty: 120 TABLET | Refills: 0 | Status: CANCELLED | OUTPATIENT
Start: 2019-08-30 | End: 2019-09-29

## 2019-09-03 ENCOUNTER — OFFICE VISIT (OUTPATIENT)
Dept: PRIMARY CARE CLINIC | Age: 54
End: 2019-09-03
Payer: MEDICARE

## 2019-09-03 VITALS
BODY MASS INDEX: 29.98 KG/M2 | HEIGHT: 74 IN | HEART RATE: 100 BPM | SYSTOLIC BLOOD PRESSURE: 130 MMHG | WEIGHT: 233.6 LBS | OXYGEN SATURATION: 99 % | DIASTOLIC BLOOD PRESSURE: 82 MMHG

## 2019-09-03 DIAGNOSIS — M86.172 OSTEOMYELITIS OF FOOT, LEFT, ACUTE (HCC): ICD-10-CM

## 2019-09-03 DIAGNOSIS — L97.509 TYPE 2 DIABETES MELLITUS WITH FOOT ULCER, WITH LONG-TERM CURRENT USE OF INSULIN (HCC): Primary | ICD-10-CM

## 2019-09-03 DIAGNOSIS — L97.524 ULCERATED, FOOT, LEFT, WITH NECROSIS OF BONE (HCC): ICD-10-CM

## 2019-09-03 DIAGNOSIS — E11.621 TYPE 2 DIABETES MELLITUS WITH FOOT ULCER, WITH LONG-TERM CURRENT USE OF INSULIN (HCC): Primary | ICD-10-CM

## 2019-09-03 DIAGNOSIS — Z79.4 TYPE 2 DIABETES MELLITUS WITH FOOT ULCER, WITH LONG-TERM CURRENT USE OF INSULIN (HCC): Primary | ICD-10-CM

## 2019-09-03 LAB — HBA1C MFR BLD: 5.8 %

## 2019-09-03 PROCEDURE — 3044F HG A1C LEVEL LT 7.0%: CPT | Performed by: FAMILY MEDICINE

## 2019-09-03 PROCEDURE — 83036 HEMOGLOBIN GLYCOSYLATED A1C: CPT | Performed by: FAMILY MEDICINE

## 2019-09-03 PROCEDURE — 99214 OFFICE O/P EST MOD 30 MIN: CPT | Performed by: FAMILY MEDICINE

## 2019-09-03 PROCEDURE — G8417 CALC BMI ABV UP PARAM F/U: HCPCS | Performed by: FAMILY MEDICINE

## 2019-09-03 PROCEDURE — G8427 DOCREV CUR MEDS BY ELIG CLIN: HCPCS | Performed by: FAMILY MEDICINE

## 2019-09-03 PROCEDURE — 2022F DILAT RTA XM EVC RTNOPTHY: CPT | Performed by: FAMILY MEDICINE

## 2019-09-03 PROCEDURE — 3017F COLORECTAL CA SCREEN DOC REV: CPT | Performed by: FAMILY MEDICINE

## 2019-09-03 PROCEDURE — 1036F TOBACCO NON-USER: CPT | Performed by: FAMILY MEDICINE

## 2019-09-03 RX ORDER — DIAZEPAM 5 MG/1
TABLET ORAL
COMMUNITY
Start: 2019-09-01 | End: 2019-09-27 | Stop reason: SDUPTHER

## 2019-09-03 RX ORDER — MORPHINE SULFATE 60 MG/1
60 TABLET, FILM COATED, EXTENDED RELEASE ORAL 2 TIMES DAILY
Qty: 60 TABLET | Refills: 0 | Status: SHIPPED | OUTPATIENT
Start: 2019-09-03 | End: 2019-09-27 | Stop reason: SDUPTHER

## 2019-09-03 RX ORDER — OXYCODONE HYDROCHLORIDE 10 MG/1
10 TABLET ORAL EVERY 6 HOURS PRN
Qty: 120 TABLET | Refills: 0 | Status: SHIPPED | OUTPATIENT
Start: 2019-09-03 | End: 2019-09-27 | Stop reason: SDUPTHER

## 2019-09-03 ASSESSMENT — ENCOUNTER SYMPTOMS
ABDOMINAL PAIN: 0
SHORTNESS OF BREATH: 0
EYE REDNESS: 0
SORE THROAT: 0
EYE DISCHARGE: 0
RHINORRHEA: 0
VOMITING: 0
DIARRHEA: 0
NAUSEA: 0
WHEEZING: 0
COUGH: 0

## 2019-09-03 NOTE — PROGRESS NOTES
[address] [facility] PRIMARY [facility]  [address] [address] [facility] [id number]  Dept: Tahira Mace Ventura Kulkarni is a 53 y.o. male who presents today for his medical conditions/complaintsas noted below. Chief Complaint   Patient presents with    1 Month Follow-Up     medication check        HPI:     HPI  Pt states had gone down on pain med, has noticed withdrawal and severe pain. Most of the pain in the feet. Now up to about 3 metformin per day. No fever or chills. No chest pain   Patient states pain becoming worse. Still seen podiatry for nonhealing ulcers on the feet. No plans as of yet for amputation. LDL Calculated (mg/dL)   Date Value   03/29/2019 82       (goal LDL is <100)   AST (U/L)   Date Value   05/17/2019 8     ALT (U/L)   Date Value   05/17/2019 6     BUN (mg/dL)   Date Value   05/21/2019 8     BP Readings from Last 3 Encounters:   09/03/19 130/82   06/27/19 130/82   05/28/19 130/80          (goal 120/80)    Past Medical History:   Diagnosis Date    Anxiety     Arthritis     lower back, knees    BPH (benign prostatic hyperplasia)     Chronic kidney disease     prostate    Depression     Diabetes mellitus ([address]     Diabetic neuropathy (HCC)     GERD (gastroesophageal reflux disease)     Hyperglycemia     Hypertension     Hypothyroidism     Impacted tooth     Pneumonia       Past Surgical History:   Procedure Laterality Date    APPENDECTOMY      BACK SURGERY      Lower x 4. Had abscess after appendectomy.      KNEE SURGERY Right     scope    TOE AMPUTATION Bilateral 3/6/2017    TOE AMPUTATION LEFT HALLUX AND 2ND DIGIT AND RIGHT 2ND DIGIT performed by Francie Cantu DPM at [address] ENDOSCOPY N/A 5/20/2019    EGD WITH BIOPSY performed by Earlene Russell MD at [address]       Family History   Problem Relation Age of Onset    Diabetes Father     Cancer Maternal Grandfather         Colon Cancer; Prostate Cancer    No Known Problems Mother        Social History     Tobacco Use    Smoking status: Never Smoker    Smokeless tobacco: Never Used   Substance Use Topics    Alcohol use: No      Current Outpatient Medications   Medication Sig Dispense Refill    oxyCODONE HCl (OXY-IR) 10 MG immediate release tablet Take 1 tablet by mouth every 6 hours as needed for Pain for up to 30 days. Intended supply: 30 days 120 tablet 0    metFORMIN (GLUCOPHAGE) 500 MG tablet Take 1 tablet by mouth 3 times daily (before meals) 90 tablet 11    PARoxetine (PAXIL) 20 MG tablet TAKE ONE TABLET BY MOUTH FOUR TIMES A  tablet 1    fluocinonide (LIDEX) 0.05 % ointment Apply topically 2 times daily. 1 Tube 5    metFORMIN (GLUCOPHAGE) 500 MG tablet Take 1 tablet by mouth 2 times daily (with meals) 90 tablet 11    ciclopirox (LOPROX) 0.77 % cream APPLY TO AFFECTED AREA(S) AND RUB IN A THIN FILM TWO TIMES A DAY IN THE MORNING AND EVENING 90 g 2    tamsulosin (FLOMAX) 0.4 MG capsule Take 1 capsule by mouth daily 30 capsule 3    pantoprazole (PROTONIX) 40 MG tablet Take 1 tablet by mouth every morning (before breakfast) 30 tablet 11    ONETOUCH DELICA LANCETS 75M MISC TEST TWO TIMES A  each 5    povidone-iodine (BETADINE) 7.5 % external solution Apply to wound. Apply as a wet to dry dressing 1 Bottle 1    levothyroxine (SYNTHROID) 50 MCG tablet Take 1 tablet by mouth daily 90 tablet 3    tiZANidine (ZANAFLEX) 4 MG tablet TAKE ONE TABLET BY MOUTH EVERY 6 HOURS AS NEEDED FOR MUSCLE SPASMS 120 tablet 0    ONE TOUCH ULTRA TEST strip USE ONE STRIP TO TEST TWO TIMES A DAY AS NEEDED 100 strip 2    NYAMYC 422837 UNIT/GM powder APPLY TO AFFECTED AREA(S) FOUR TIMES A DAY 60 g 1    buPROPion (WELLBUTRIN XL) 300 MG extended release tablet TAKE ONE TABLET BY MOUTH DAILY 30 tablet 10    Wound Dressings (ADAPTIC NON-ADHERING DRESSING) PADS Apply 1 Units topically 2 times daily 60 each 5    Gauze Pads & Dressings (COMBINE ABD) 5\"X9\" PADS 1 Units by Does not apply route 2 times daily 60 each 5    lisinopril (PRINIVIL;ZESTRIL) 10 MG tablet TAKE ONE TABLET BY MOUTH DAILY 30 tablet 10    Blood Glucose Monitoring Suppl (ONE TOUCH ULTRA 2) w/Device KIT 1 kit by Other route daily 1 kit 5    ciclopirox (LOPROX) 0.77 % cream APPLY TO AFFECTED AREA(S) AND RUB  IN A THIN FILM TWO TIMES A DAY (AM AND PM) 90 g 2    diazepam (VALIUM) 5 MG tablet       morphine (MS CONTIN) 60 MG extended release tablet Take 1 tablet by mouth 2 times daily for 30 days. 60 tablet 0    lactobacillus (CULTURELLE) capsule Take 1 capsule by mouth 2 times daily (with meals) (Patient not taking: Reported on 9/3/2019) 60 capsule 0    magnesium oxide (MAG-OX) 400 (241.3 Mg) MG TABS tablet Take 1 tablet by mouth 2 times daily (Patient not taking: Reported on 9/3/2019) 30 tablet 11    Gauze Pads & Dressings (KERLIX GAUZE ROLL LARGE) MISC 1 Units by Does not apply route 2 times daily 60 each 5     No current facility-administered medications for this visit. Allergies   Allergen Reactions    Fiorinal [Butalbital-Aspirin-Caffeine] Other (See Comments)     Generalized blisters    Peanut Butter Flavor [Flavoring Agent] Nausea And Vomiting       Health Maintenance   Topic Date Due    Diabetic retinal exam  06/04/1974    HIV screen  06/04/1979    Hepatitis B Vaccine (1 of 3 - Risk 3-dose series) 06/04/1983    Shingles Vaccine (1 of 2) 06/04/2014    Flu vaccine (1) 09/01/2019    Diabetic microalbuminuria test  12/21/2019    Lipid screen  03/29/2020    TSH testing  03/29/2020    A1C test (Diabetic or Prediabetic)  04/01/2020    Potassium monitoring  05/21/2020    Creatinine monitoring  05/21/2020    Colon cancer screen colonoscopy  12/16/2020    DTaP/Tdap/Td vaccine (2 - Td) 04/24/2028    Pneumococcal 0-64 years Vaccine  Completed    Hepatitis C screen  Completed       Subjective:      Review of Systems   Constitutional: Negative for chills and fever.

## 2019-09-16 ENCOUNTER — TELEPHONE (OUTPATIENT)
Dept: PRIMARY CARE CLINIC | Age: 54
End: 2019-09-16

## 2019-09-19 ENCOUNTER — OFFICE VISIT (OUTPATIENT)
Dept: PODIATRY | Age: 54
End: 2019-09-19
Payer: MEDICARE

## 2019-09-19 ENCOUNTER — HOSPITAL ENCOUNTER (OUTPATIENT)
Age: 54
Setting detail: SPECIMEN
Discharge: HOME | End: 2019-09-19
Payer: MEDICARE

## 2019-09-19 VITALS — HEIGHT: 74 IN | BODY MASS INDEX: 31.06 KG/M2 | WEIGHT: 242 LBS

## 2019-09-19 DIAGNOSIS — E11.42 TYPE 2 DIABETES MELLITUS WITH DIABETIC POLYNEUROPATHY, WITHOUT LONG-TERM CURRENT USE OF INSULIN (HCC): ICD-10-CM

## 2019-09-19 DIAGNOSIS — L97.522 ULCER OF LEFT FOOT, WITH FAT LAYER EXPOSED (HCC): Primary | ICD-10-CM

## 2019-09-19 DIAGNOSIS — E11.621 TYPE 2 DIABETES MELLITUS WITH FOOT ULCER, WITHOUT LONG-TERM CURRENT USE OF INSULIN (HCC): ICD-10-CM

## 2019-09-19 DIAGNOSIS — L97.509 TYPE 2 DIABETES MELLITUS WITH FOOT ULCER, WITHOUT LONG-TERM CURRENT USE OF INSULIN (HCC): ICD-10-CM

## 2019-09-19 PROCEDURE — 2022F DILAT RTA XM EVC RTNOPTHY: CPT | Performed by: PODIATRIST

## 2019-09-19 PROCEDURE — 99213 OFFICE O/P EST LOW 20 MIN: CPT | Performed by: PODIATRIST

## 2019-09-19 PROCEDURE — G8417 CALC BMI ABV UP PARAM F/U: HCPCS | Performed by: PODIATRIST

## 2019-09-19 PROCEDURE — 1036F TOBACCO NON-USER: CPT | Performed by: PODIATRIST

## 2019-09-19 PROCEDURE — 3044F HG A1C LEVEL LT 7.0%: CPT | Performed by: PODIATRIST

## 2019-09-19 PROCEDURE — 3017F COLORECTAL CA SCREEN DOC REV: CPT | Performed by: PODIATRIST

## 2019-09-19 PROCEDURE — G8427 DOCREV CUR MEDS BY ELIG CLIN: HCPCS | Performed by: PODIATRIST

## 2019-09-19 PROCEDURE — 11042 DBRDMT SUBQ TIS 1ST 20SQCM/<: CPT | Performed by: PODIATRIST

## 2019-09-20 ENCOUNTER — HOSPITAL ENCOUNTER (OUTPATIENT)
Dept: VASCULAR LAB | Age: 54
Discharge: HOME | End: 2019-09-20
Payer: MEDICARE

## 2019-09-20 PROCEDURE — 93923 UPR/LXTR ART STDY 3+ LVLS: CPT

## 2019-09-20 RX ORDER — SODIUM HYPOCHLORITE 1.25 MG/ML
SOLUTION TOPICAL EVERY 12 HOURS
Qty: 1 BOTTLE | Refills: 2 | Status: SHIPPED | OUTPATIENT
Start: 2019-09-20 | End: 2020-05-11

## 2019-09-20 NOTE — PROGRESS NOTES
subcutaneous. Drainage:    moderate Type:serosanguinous    Wound Bed status:   Granulation Tissue: 80%   Necrotic 20%  Type: maceration, fibrin, hair, debris, sock  Epithelialization 0%   Hypergranular 0%      Erythema absent       Odor:no    Maceration:yes    Undermining/tract/tunneling:no    Culture taken:   no             Vascular:  DP/PT pulses palpable 2/4, Bilateral.    CFT <3 seconds to digits 1-5, Bilateral .   Hair growth absent to level of digits, Bilateral.  Edema present, Bilateral.  Erythema absent, Bilateral.     Neurological:  Sensation absent to light touch to level of digits, Bilateral.  Protective sensation  absent via 5.07/10g Atkins-Narda monofilament 10/10 sites, Bilateral.  Vibratory sensation absent to 1st MPJ, Bilateral.     Musculoskeletal:  Muscle strength 5/5 all LE groups tested, Bilateral.     Radiographs: 3 views left ankle: The fifth metatarsal and cuboid are prominent plantarly. Degenerative changes in the midfoot, rearfoot, and ankle. No erosive changes. Periosteal reaction of the fibular shaft. Collapse of the medial arch. Assessment:         1. Ulcer of left foot, with fat layer exposed ([address]    2. Type 2 diabetes mellitus with foot ulcer, without long-term current use of insulin ([address]    3. Toe amputation status, left ([address]    4. Type 2 diabetes mellitus with diabetic polyneuropathy, without long-term current use of insulin ([address]          Plan:   Charis Taylor Age:  53 y.o. Gender:  male   :  1965  Today's Date:  2019      Procedure: With the patient in supine position, Lidocaine soaked gauze was applied per physician order at beginning of wound evaluation. It was subsequently removed.     Using a curette, #15 blade scalpel, scissors and Pick-up, the wound was debrided down to and included the removal of the following tissue:     [] epidermis, [] dermis, [x]  subcutaneous tissue,  [] muscle/fascia tissue,   and/or  [] bone     Also

## 2019-09-21 LAB
CULTURE: ABNORMAL
DIRECT EXAM: ABNORMAL
DIRECT EXAM: ABNORMAL
Lab: ABNORMAL
SPECIMEN DESCRIPTION: ABNORMAL

## 2019-09-23 ENCOUNTER — TELEPHONE (OUTPATIENT)
Dept: PODIATRY | Age: 54
End: 2019-09-23

## 2019-09-23 NOTE — TELEPHONE ENCOUNTER
Left message for patient to call me back. Spoke with Keegan and sylvia will not be covered until 10/5/19 and the Dakins solution is not covered at all with his insurance.

## 2019-09-27 ENCOUNTER — TELEPHONE (OUTPATIENT)
Dept: PRIMARY CARE CLINIC | Age: 54
End: 2019-09-27

## 2019-09-27 DIAGNOSIS — F34.1 DYSTHYMIA: Primary | ICD-10-CM

## 2019-09-27 DIAGNOSIS — M86.172 OSTEOMYELITIS OF FOOT, LEFT, ACUTE (HCC): ICD-10-CM

## 2019-09-27 DIAGNOSIS — L97.524 ULCERATED, FOOT, LEFT, WITH NECROSIS OF BONE (HCC): ICD-10-CM

## 2019-09-27 RX ORDER — MORPHINE SULFATE 60 MG/1
60 TABLET, FILM COATED, EXTENDED RELEASE ORAL 2 TIMES DAILY
Qty: 60 TABLET | Refills: 0 | Status: SHIPPED | OUTPATIENT
Start: 2019-09-27 | End: 2019-10-28 | Stop reason: SDUPTHER

## 2019-09-27 RX ORDER — OXYCODONE HYDROCHLORIDE 10 MG/1
10 TABLET ORAL EVERY 6 HOURS PRN
Qty: 120 TABLET | Refills: 0 | Status: SHIPPED | OUTPATIENT
Start: 2019-09-27 | End: 2019-10-28 | Stop reason: SDUPTHER

## 2019-09-27 RX ORDER — DIAZEPAM 5 MG/1
5 TABLET ORAL EVERY 6 HOURS PRN
Qty: 120 TABLET | Refills: 0 | Status: SHIPPED | OUTPATIENT
Start: 2019-09-27 | End: 2019-10-28 | Stop reason: SDUPTHER

## 2019-10-10 ENCOUNTER — TELEPHONE (OUTPATIENT)
Dept: PRIMARY CARE CLINIC | Age: 54
End: 2019-10-10

## 2019-10-10 RX ORDER — ATORVASTATIN CALCIUM 20 MG/1
20 TABLET, FILM COATED ORAL DAILY
Qty: 90 TABLET | Refills: 3 | Status: SHIPPED | OUTPATIENT
Start: 2019-10-10 | End: 2022-02-24

## 2019-10-11 ENCOUNTER — TELEPHONE (OUTPATIENT)
Dept: PRIMARY CARE CLINIC | Age: 54
End: 2019-10-11

## 2019-10-11 RX ORDER — AZITHROMYCIN 250 MG/1
250 TABLET, FILM COATED ORAL SEE ADMIN INSTRUCTIONS
Qty: 6 TABLET | Refills: 0 | Status: SHIPPED | OUTPATIENT
Start: 2019-10-11 | End: 2019-10-16

## 2019-10-28 ENCOUNTER — TELEPHONE (OUTPATIENT)
Dept: PRIMARY CARE CLINIC | Age: 54
End: 2019-10-28

## 2019-10-28 DIAGNOSIS — M86.172 OSTEOMYELITIS OF FOOT, LEFT, ACUTE (HCC): ICD-10-CM

## 2019-10-28 DIAGNOSIS — F34.1 DYSTHYMIA: ICD-10-CM

## 2019-10-28 DIAGNOSIS — L97.524 ULCERATED, FOOT, LEFT, WITH NECROSIS OF BONE (HCC): ICD-10-CM

## 2019-10-28 RX ORDER — MORPHINE SULFATE 60 MG/1
60 TABLET, FILM COATED, EXTENDED RELEASE ORAL 2 TIMES DAILY
Qty: 60 TABLET | Refills: 0 | Status: SHIPPED | OUTPATIENT
Start: 2019-10-28 | End: 2019-11-15 | Stop reason: SDUPTHER

## 2019-10-28 RX ORDER — OXYCODONE HYDROCHLORIDE 10 MG/1
10 TABLET ORAL EVERY 6 HOURS PRN
Qty: 120 TABLET | Refills: 0 | Status: SHIPPED | OUTPATIENT
Start: 2019-10-28 | End: 2019-11-15 | Stop reason: SDUPTHER

## 2019-10-28 RX ORDER — DIAZEPAM 5 MG/1
5 TABLET ORAL EVERY 6 HOURS PRN
Qty: 120 TABLET | Refills: 0 | Status: SHIPPED | OUTPATIENT
Start: 2019-10-28 | End: 2019-12-20 | Stop reason: SDUPTHER

## 2019-10-29 RX ORDER — PAROXETINE HYDROCHLORIDE 20 MG/1
TABLET, FILM COATED ORAL
Qty: 120 TABLET | Refills: 0 | Status: SHIPPED | OUTPATIENT
Start: 2019-10-29 | End: 2020-01-30 | Stop reason: SDUPTHER

## 2019-10-30 ENCOUNTER — OFFICE VISIT (OUTPATIENT)
Dept: PODIATRY | Age: 54
End: 2019-10-30
Payer: MEDICARE

## 2019-10-30 VITALS — WEIGHT: 242 LBS | HEIGHT: 74 IN | BODY MASS INDEX: 31.06 KG/M2

## 2019-10-30 DIAGNOSIS — L97.509 TYPE 2 DIABETES MELLITUS WITH FOOT ULCER, WITHOUT LONG-TERM CURRENT USE OF INSULIN (HCC): ICD-10-CM

## 2019-10-30 DIAGNOSIS — L97.522 ULCER OF LEFT FOOT, WITH FAT LAYER EXPOSED (HCC): Primary | ICD-10-CM

## 2019-10-30 DIAGNOSIS — E11.621 TYPE 2 DIABETES MELLITUS WITH FOOT ULCER, WITHOUT LONG-TERM CURRENT USE OF INSULIN (HCC): ICD-10-CM

## 2019-10-30 PROCEDURE — 99999 PR OFFICE/OUTPT VISIT,PROCEDURE ONLY: CPT | Performed by: PODIATRIST

## 2019-10-30 PROCEDURE — 11042 DBRDMT SUBQ TIS 1ST 20SQCM/<: CPT | Performed by: PODIATRIST

## 2019-11-02 ENCOUNTER — APPOINTMENT (OUTPATIENT)
Dept: GENERAL RADIOLOGY | Age: 54
DRG: 720 | End: 2019-11-02
Payer: MEDICARE

## 2019-11-02 ENCOUNTER — APPOINTMENT (OUTPATIENT)
Dept: CT IMAGING | Age: 54
DRG: 720 | End: 2019-11-02
Payer: MEDICARE

## 2019-11-02 ENCOUNTER — HOSPITAL ENCOUNTER (INPATIENT)
Age: 54
LOS: 6 days | Discharge: NURSING, REHAB OR LONG-TERM CARE | DRG: 720 | End: 2019-11-09
Attending: EMERGENCY MEDICINE | Admitting: FAMILY MEDICINE
Payer: MEDICARE

## 2019-11-02 DIAGNOSIS — A41.9 SEPSIS, DUE TO UNSPECIFIED ORGANISM, UNSPECIFIED WHETHER ACUTE ORGAN DYSFUNCTION PRESENT (HCC): ICD-10-CM

## 2019-11-02 DIAGNOSIS — N17.9 ACUTE RENAL FAILURE, UNSPECIFIED ACUTE RENAL FAILURE TYPE (HCC): ICD-10-CM

## 2019-11-02 DIAGNOSIS — J18.9 PNEUMONIA DUE TO ORGANISM: ICD-10-CM

## 2019-11-02 DIAGNOSIS — R77.8 ELEVATED TROPONIN: Primary | ICD-10-CM

## 2019-11-02 LAB
ABSOLUTE BANDS #: 1.78 K/UL (ref 0–1)
ABSOLUTE EOS #: 0 K/UL (ref 0–0.4)
ABSOLUTE IMMATURE GRANULOCYTE: ABNORMAL K/UL (ref 0–0.3)
ABSOLUTE LYMPH #: 0.59 K/UL (ref 1–4.8)
ABSOLUTE MONO #: 1.48 K/UL (ref 0.1–1.3)
ALBUMIN SERPL-MCNC: 3.6 G/DL (ref 3.5–5.2)
ALBUMIN/GLOBULIN RATIO: ABNORMAL (ref 1–2.5)
ALP BLD-CCNC: 122 U/L (ref 40–129)
ALT SERPL-CCNC: 40 U/L (ref 5–41)
AMMONIA: 33 UMOL/L (ref 16–60)
ANION GAP SERPL CALCULATED.3IONS-SCNC: 24 MMOL/L (ref 9–17)
AST SERPL-CCNC: 90 U/L
BANDS: 6 % (ref 0–10)
BASOPHILS # BLD: 0 % (ref 0–2)
BASOPHILS ABSOLUTE: 0 K/UL (ref 0–0.2)
BILIRUB SERPL-MCNC: 0.39 MG/DL (ref 0.3–1.2)
BUN BLDV-MCNC: 78 MG/DL (ref 6–20)
BUN/CREAT BLD: ABNORMAL (ref 9–20)
C-REACTIVE PROTEIN: 345.4 MG/L (ref 0–5)
CALCIUM SERPL-MCNC: 8.7 MG/DL (ref 8.6–10.4)
CHLORIDE BLD-SCNC: 91 MMOL/L (ref 98–107)
CO2: 16 MMOL/L (ref 20–31)
CREAT SERPL-MCNC: 10.61 MG/DL (ref 0.7–1.2)
DIFFERENTIAL TYPE: ABNORMAL
EOSINOPHILS RELATIVE PERCENT: 0 % (ref 0–4)
GFR AFRICAN AMERICAN: 6 ML/MIN
GFR NON-AFRICAN AMERICAN: 5 ML/MIN
GFR SERPL CREATININE-BSD FRML MDRD: ABNORMAL ML/MIN/{1.73_M2}
GFR SERPL CREATININE-BSD FRML MDRD: ABNORMAL ML/MIN/{1.73_M2}
GLUCOSE BLD-MCNC: 114 MG/DL (ref 70–99)
HCT VFR BLD CALC: 32.2 % (ref 41–53)
HEMOGLOBIN: 9.7 G/DL (ref 13.5–17.5)
IMMATURE GRANULOCYTES: ABNORMAL %
LACTIC ACID, SEPSIS WHOLE BLOOD: NORMAL MMOL/L (ref 0.5–1.9)
LACTIC ACID, SEPSIS: 1.8 MMOL/L (ref 0.5–1.9)
LIPASE: 21 U/L (ref 13–60)
LYMPHOCYTES # BLD: 2 % (ref 24–44)
MAGNESIUM: 2.2 MG/DL (ref 1.6–2.6)
MCH RBC QN AUTO: 21.1 PG (ref 26–34)
MCHC RBC AUTO-ENTMCNC: 30.2 G/DL (ref 31–37)
MCV RBC AUTO: 69.8 FL (ref 80–100)
MONOCYTES # BLD: 5 % (ref 1–7)
MORPHOLOGY: ABNORMAL
NRBC AUTOMATED: ABNORMAL PER 100 WBC
PDW BLD-RTO: 20.2 % (ref 11.5–14.9)
PLATELET # BLD: 485 K/UL (ref 150–450)
PLATELET ESTIMATE: ABNORMAL
PMV BLD AUTO: 8 FL (ref 6–12)
POTASSIUM SERPL-SCNC: 5.8 MMOL/L (ref 3.7–5.3)
RBC # BLD: 4.61 M/UL (ref 4.5–5.9)
RBC # BLD: ABNORMAL 10*6/UL
SEG NEUTROPHILS: 87 % (ref 36–66)
SEGMENTED NEUTROPHILS ABSOLUTE COUNT: 25.75 K/UL (ref 1.3–9.1)
SODIUM BLD-SCNC: 131 MMOL/L (ref 135–144)
TOTAL PROTEIN: 8.7 G/DL (ref 6.4–8.3)
TROPONIN INTERP: ABNORMAL
TROPONIN T: ABNORMAL NG/ML
TROPONIN, HIGH SENSITIVITY: 97 NG/L (ref 0–22)
WBC # BLD: 29.6 K/UL (ref 3.5–11)
WBC # BLD: ABNORMAL 10*3/UL

## 2019-11-02 PROCEDURE — 93005 ELECTROCARDIOGRAM TRACING: CPT | Performed by: EMERGENCY MEDICINE

## 2019-11-02 PROCEDURE — 2580000003 HC RX 258: Performed by: EMERGENCY MEDICINE

## 2019-11-02 PROCEDURE — 81001 URINALYSIS AUTO W/SCOPE: CPT

## 2019-11-02 PROCEDURE — 83735 ASSAY OF MAGNESIUM: CPT

## 2019-11-02 PROCEDURE — 82140 ASSAY OF AMMONIA: CPT

## 2019-11-02 PROCEDURE — 86140 C-REACTIVE PROTEIN: CPT

## 2019-11-02 PROCEDURE — 84484 ASSAY OF TROPONIN QUANT: CPT

## 2019-11-02 PROCEDURE — 87040 BLOOD CULTURE FOR BACTERIA: CPT

## 2019-11-02 PROCEDURE — 80053 COMPREHEN METABOLIC PANEL: CPT

## 2019-11-02 PROCEDURE — 85025 COMPLETE CBC W/AUTO DIFF WBC: CPT

## 2019-11-02 PROCEDURE — 99285 EMERGENCY DEPT VISIT HI MDM: CPT

## 2019-11-02 PROCEDURE — 83690 ASSAY OF LIPASE: CPT

## 2019-11-02 PROCEDURE — 71046 X-RAY EXAM CHEST 2 VIEWS: CPT

## 2019-11-02 PROCEDURE — 80307 DRUG TEST PRSMV CHEM ANLYZR: CPT

## 2019-11-02 PROCEDURE — 36415 COLL VENOUS BLD VENIPUNCTURE: CPT

## 2019-11-02 PROCEDURE — 87205 SMEAR GRAM STAIN: CPT

## 2019-11-02 PROCEDURE — 83605 ASSAY OF LACTIC ACID: CPT

## 2019-11-02 PROCEDURE — 85651 RBC SED RATE NONAUTOMATED: CPT

## 2019-11-02 PROCEDURE — 73630 X-RAY EXAM OF FOOT: CPT

## 2019-11-02 RX ORDER — 0.9 % SODIUM CHLORIDE 0.9 %
1000 INTRAVENOUS SOLUTION INTRAVENOUS ONCE
Status: COMPLETED | OUTPATIENT
Start: 2019-11-02 | End: 2019-11-03

## 2019-11-02 RX ADMIN — SODIUM CHLORIDE 1000 ML: 9 INJECTION, SOLUTION INTRAVENOUS at 23:41

## 2019-11-03 ENCOUNTER — APPOINTMENT (OUTPATIENT)
Dept: GENERAL RADIOLOGY | Age: 54
DRG: 720 | End: 2019-11-03
Payer: MEDICARE

## 2019-11-03 ENCOUNTER — APPOINTMENT (OUTPATIENT)
Dept: CT IMAGING | Age: 54
DRG: 720 | End: 2019-11-03
Payer: MEDICARE

## 2019-11-03 PROBLEM — R40.0 SOMNOLENCE: Status: ACTIVE | Noted: 2019-11-03

## 2019-11-03 PROBLEM — A41.9 SEVERE SEPSIS (HCC): Status: ACTIVE | Noted: 2019-11-03

## 2019-11-03 PROBLEM — R65.20 SEVERE SEPSIS (HCC): Status: ACTIVE | Noted: 2019-11-03

## 2019-11-03 LAB
-: ABNORMAL
ABSOLUTE EOS #: 0.17 K/UL (ref 0–0.4)
ABSOLUTE IMMATURE GRANULOCYTE: ABNORMAL K/UL (ref 0–0.3)
ABSOLUTE LYMPH #: 2.61 K/UL (ref 1–4.8)
ABSOLUTE MONO #: 0.87 K/UL (ref 0.1–1.3)
AMORPHOUS: ABNORMAL
AMPHETAMINE SCREEN URINE: NEGATIVE
ANION GAP SERPL CALCULATED.3IONS-SCNC: 14 MMOL/L (ref 9–17)
ANION GAP SERPL CALCULATED.3IONS-SCNC: 16 MMOL/L
ANION GAP SERPL CALCULATED.3IONS-SCNC: 19 MMOL/L (ref 9–17)
BACTERIA: ABNORMAL
BARBITURATE SCREEN URINE: NEGATIVE
BASOPHILS # BLD: 2 % (ref 0–2)
BASOPHILS ABSOLUTE: 0.35 K/UL (ref 0–0.2)
BENZODIAZEPINE SCREEN, URINE: POSITIVE
BETA-HYDROXYBUTYRATE: 0.36 MMOL/L (ref 0.02–0.27)
BILIRUBIN URINE: ABNORMAL
BUN BLDV-MCNC: 50 MG/DL (ref 6–20)
BUN BLDV-MCNC: 77 MG/DL (ref 6–20)
BUN BLDV-MCNC: 87 MG/DL (ref 6–20)
BUN/CREAT BLD: ABNORMAL (ref 9–20)
BUPRENORPHINE URINE: ABNORMAL
C-REACTIVE PROTEIN: 250.6 MG/L (ref 0–5)
CALCIUM IONIZED: 1.09 MMOL/L (ref 1.13–1.33)
CALCIUM SERPL-MCNC: 7.7 MG/DL (ref 8.6–10.4)
CALCIUM SERPL-MCNC: 7.8 MG/DL (ref 8.6–10.4)
CALCIUM SERPL-MCNC: 7.8 MG/DL (ref 8.6–10.4)
CANNABINOID SCREEN URINE: NEGATIVE
CASTS UA: ABNORMAL /LPF
CHLORIDE BLD-SCNC: 100 MMOL/L (ref 98–107)
CHLORIDE BLD-SCNC: 101 MMOL/L (ref 98–107)
CHLORIDE BLD-SCNC: 95 MMOL/L (ref 98–107)
CO2: 15 MMOL/L (ref 20–31)
CO2: 18 MMOL/L (ref 20–31)
CO2: 22 MMOL/L (ref 20–31)
COCAINE METABOLITE, URINE: NEGATIVE
COLOR: ABNORMAL
COMMENT UA: ABNORMAL
CREAT SERPL-MCNC: 10.52 MG/DL (ref 0.7–1.2)
CREAT SERPL-MCNC: 4.44 MG/DL (ref 0.7–1.2)
CREAT SERPL-MCNC: 7.82 MG/DL (ref 0.7–1.2)
CREATININE URINE: 229.3 MG/DL (ref 39–259)
CRYSTALS, UA: ABNORMAL /HPF
DIFFERENTIAL TYPE: ABNORMAL
EOSINOPHILS RELATIVE PERCENT: 1 % (ref 0–4)
EPITHELIAL CELLS UA: ABNORMAL /HPF
GFR AFRICAN AMERICAN: 17 ML/MIN
GFR AFRICAN AMERICAN: 6 ML/MIN
GFR AFRICAN AMERICAN: 9 ML/MIN
GFR NON-AFRICAN AMERICAN: 14 ML/MIN
GFR NON-AFRICAN AMERICAN: 5 ML/MIN
GFR NON-AFRICAN AMERICAN: 7 ML/MIN
GFR SERPL CREATININE-BSD FRML MDRD: ABNORMAL ML/MIN/{1.73_M2}
GLUCOSE BLD-MCNC: 113 MG/DL (ref 70–99)
GLUCOSE BLD-MCNC: 174 MG/DL (ref 70–99)
GLUCOSE BLD-MCNC: 66 MG/DL (ref 75–110)
GLUCOSE BLD-MCNC: 79 MG/DL (ref 75–110)
GLUCOSE BLD-MCNC: 83 MG/DL (ref 75–110)
GLUCOSE BLD-MCNC: 88 MG/DL (ref 75–110)
GLUCOSE BLD-MCNC: 91 MG/DL (ref 75–110)
GLUCOSE BLD-MCNC: 94 MG/DL (ref 70–99)
GLUCOSE BLD-MCNC: 99 MG/DL (ref 75–110)
GLUCOSE URINE: ABNORMAL
HCT VFR BLD CALC: 27.9 % (ref 41–53)
HEMOGLOBIN: 8.5 G/DL (ref 13.5–17.5)
IMMATURE GRANULOCYTES: ABNORMAL %
KETONES, URINE: NEGATIVE
LACTIC ACID, SEPSIS WHOLE BLOOD: NORMAL MMOL/L (ref 0.5–1.9)
LACTIC ACID, SEPSIS: 1.6 MMOL/L (ref 0.5–1.9)
LEUKOCYTE ESTERASE, URINE: NEGATIVE
LYMPHOCYTES # BLD: 15 % (ref 24–44)
MAGNESIUM: 1.7 MG/DL (ref 1.6–2.6)
MCH RBC QN AUTO: 21.1 PG (ref 26–34)
MCHC RBC AUTO-ENTMCNC: 30.4 G/DL (ref 31–37)
MCV RBC AUTO: 69.4 FL (ref 80–100)
MDMA URINE: ABNORMAL
METHADONE SCREEN, URINE: NEGATIVE
METHAMPHETAMINE, URINE: ABNORMAL
MONOCYTES # BLD: 5 % (ref 1–7)
MORPHOLOGY: ABNORMAL
MUCUS: ABNORMAL
NITRITE, URINE: NEGATIVE
NRBC AUTOMATED: ABNORMAL PER 100 WBC
OPIATES, URINE: POSITIVE
OTHER OBSERVATIONS UA: ABNORMAL
OXYCODONE SCREEN URINE: POSITIVE
PDW BLD-RTO: 19.9 % (ref 11.5–14.9)
PH UA: 5 (ref 5–8)
PHENCYCLIDINE, URINE: NEGATIVE
PHOSPHORUS: 6.8 MG/DL (ref 2.5–4.5)
PLATELET # BLD: 401 K/UL (ref 150–450)
PLATELET ESTIMATE: ABNORMAL
PMV BLD AUTO: 7 FL (ref 6–12)
POTASSIUM SERPL-SCNC: 4.3 MMOL/L (ref 3.7–5.3)
POTASSIUM SERPL-SCNC: 4.6 MMOL/L (ref 3.7–5.3)
POTASSIUM SERPL-SCNC: 4.8 MMOL/L (ref 3.7–5.3)
POTASSIUM, UR: 52.1 MMOL/L
PROPOXYPHENE, URINE: ABNORMAL
PROTEIN UA: ABNORMAL
RBC # BLD: 4.02 M/UL (ref 4.5–5.9)
RBC # BLD: ABNORMAL 10*6/UL
RBC UA: ABNORMAL /HPF
RENAL EPITHELIAL, UA: ABNORMAL /HPF
SEDIMENTATION RATE, ERYTHROCYTE: 38 MM (ref 0–15)
SEG NEUTROPHILS: 77 % (ref 36–66)
SEGMENTED NEUTROPHILS ABSOLUTE COUNT: 13.4 K/UL (ref 1.3–9.1)
SODIUM BLD-SCNC: 129 MMOL/L (ref 135–144)
SODIUM BLD-SCNC: 135 MMOL/L (ref 135–144)
SODIUM BLD-SCNC: 136 MMOL/L (ref 135–144)
SODIUM,UR: 35 MMOL/L
SPECIFIC GRAVITY UA: 1.02 (ref 1–1.03)
TEST INFORMATION: ABNORMAL
TOTAL CK: 3273 U/L (ref 39–308)
TOTAL CK: 6085 U/L (ref 39–308)
TRICHOMONAS: ABNORMAL
TRICYCLIC ANTIDEPRESSANTS, UR: ABNORMAL
TROPONIN INTERP: ABNORMAL
TROPONIN T: ABNORMAL NG/ML
TROPONIN, HIGH SENSITIVITY: 95 NG/L (ref 0–22)
TURBIDITY: ABNORMAL
UREA NITROGEN, UR: 207 MG/DL
URINE HGB: ABNORMAL
UROBILINOGEN, URINE: NORMAL
VANCOMYCIN TROUGH DATE LAST DOSE: ABNORMAL
VANCOMYCIN TROUGH DOSE AMOUNT: 1500
VANCOMYCIN TROUGH TIME LAST DOSE: 106
VANCOMYCIN TROUGH: 9.1 UG/ML (ref 10–20)
WBC # BLD: 17.4 K/UL (ref 3.5–11)
WBC # BLD: ABNORMAL 10*3/UL
WBC UA: ABNORMAL /HPF
YEAST: ABNORMAL

## 2019-11-03 PROCEDURE — 80202 ASSAY OF VANCOMYCIN: CPT

## 2019-11-03 PROCEDURE — 2580000003 HC RX 258: Performed by: FAMILY MEDICINE

## 2019-11-03 PROCEDURE — 2500000003 HC RX 250 WO HCPCS: Performed by: INTERNAL MEDICINE

## 2019-11-03 PROCEDURE — 05HM33Z INSERTION OF INFUSION DEVICE INTO RIGHT INTERNAL JUGULAR VEIN, PERCUTANEOUS APPROACH: ICD-10-PCS | Performed by: SURGERY

## 2019-11-03 PROCEDURE — 70450 CT HEAD/BRAIN W/O DYE: CPT

## 2019-11-03 PROCEDURE — 2580000003 HC RX 258: Performed by: INTERNAL MEDICINE

## 2019-11-03 PROCEDURE — 6360000002 HC RX W HCPCS: Performed by: EMERGENCY MEDICINE

## 2019-11-03 PROCEDURE — 2500000003 HC RX 250 WO HCPCS

## 2019-11-03 PROCEDURE — 82550 ASSAY OF CK (CPK): CPT

## 2019-11-03 PROCEDURE — 36415 COLL VENOUS BLD VENIPUNCTURE: CPT

## 2019-11-03 PROCEDURE — 99223 1ST HOSP IP/OBS HIGH 75: CPT | Performed by: FAMILY MEDICINE

## 2019-11-03 PROCEDURE — 83883 ASSAY NEPHELOMETRY NOT SPEC: CPT

## 2019-11-03 PROCEDURE — 2500000003 HC RX 250 WO HCPCS: Performed by: EMERGENCY MEDICINE

## 2019-11-03 PROCEDURE — 84133 ASSAY OF URINE POTASSIUM: CPT

## 2019-11-03 PROCEDURE — 71045 X-RAY EXAM CHEST 1 VIEW: CPT

## 2019-11-03 PROCEDURE — 2000000000 HC ICU R&B

## 2019-11-03 PROCEDURE — 80048 BASIC METABOLIC PNL TOTAL CA: CPT

## 2019-11-03 PROCEDURE — 82330 ASSAY OF CALCIUM: CPT

## 2019-11-03 PROCEDURE — 84484 ASSAY OF TROPONIN QUANT: CPT

## 2019-11-03 PROCEDURE — 84540 ASSAY OF URINE/UREA-N: CPT

## 2019-11-03 PROCEDURE — 86038 ANTINUCLEAR ANTIBODIES: CPT

## 2019-11-03 PROCEDURE — P9047 ALBUMIN (HUMAN), 25%, 50ML: HCPCS | Performed by: INTERNAL MEDICINE

## 2019-11-03 PROCEDURE — 84165 PROTEIN E-PHORESIS SERUM: CPT

## 2019-11-03 PROCEDURE — 86803 HEPATITIS C AB TEST: CPT

## 2019-11-03 PROCEDURE — 86162 COMPLEMENT TOTAL (CH50): CPT

## 2019-11-03 PROCEDURE — 6360000002 HC RX W HCPCS: Performed by: FAMILY MEDICINE

## 2019-11-03 PROCEDURE — 84300 ASSAY OF URINE SODIUM: CPT

## 2019-11-03 PROCEDURE — 6360000002 HC RX W HCPCS: Performed by: INTERNAL MEDICINE

## 2019-11-03 PROCEDURE — 83735 ASSAY OF MAGNESIUM: CPT

## 2019-11-03 PROCEDURE — 36556 INSERT NON-TUNNEL CV CATH: CPT

## 2019-11-03 PROCEDURE — 6370000000 HC RX 637 (ALT 250 FOR IP): Performed by: FAMILY MEDICINE

## 2019-11-03 PROCEDURE — 90935 HEMODIALYSIS ONE EVALUATION: CPT

## 2019-11-03 PROCEDURE — 83605 ASSAY OF LACTIC ACID: CPT

## 2019-11-03 PROCEDURE — 85025 COMPLETE CBC W/AUTO DIFF WBC: CPT

## 2019-11-03 PROCEDURE — 86160 COMPLEMENT ANTIGEN: CPT

## 2019-11-03 PROCEDURE — 84155 ASSAY OF PROTEIN SERUM: CPT

## 2019-11-03 PROCEDURE — 82010 KETONE BODYS QUAN: CPT

## 2019-11-03 PROCEDURE — 87340 HEPATITIS B SURFACE AG IA: CPT

## 2019-11-03 PROCEDURE — 82947 ASSAY GLUCOSE BLOOD QUANT: CPT

## 2019-11-03 PROCEDURE — 83516 IMMUNOASSAY NONANTIBODY: CPT

## 2019-11-03 PROCEDURE — 84100 ASSAY OF PHOSPHORUS: CPT

## 2019-11-03 PROCEDURE — 96374 THER/PROPH/DIAG INJ IV PUSH: CPT

## 2019-11-03 PROCEDURE — 74176 CT ABD & PELVIS W/O CONTRAST: CPT

## 2019-11-03 PROCEDURE — 2580000003 HC RX 258: Performed by: EMERGENCY MEDICINE

## 2019-11-03 PROCEDURE — 6370000000 HC RX 637 (ALT 250 FOR IP): Performed by: EMERGENCY MEDICINE

## 2019-11-03 PROCEDURE — C9113 INJ PANTOPRAZOLE SODIUM, VIA: HCPCS | Performed by: FAMILY MEDICINE

## 2019-11-03 PROCEDURE — 82570 ASSAY OF URINE CREATININE: CPT

## 2019-11-03 PROCEDURE — 2500000003 HC RX 250 WO HCPCS: Performed by: FAMILY MEDICINE

## 2019-11-03 PROCEDURE — 6360000002 HC RX W HCPCS: Performed by: SURGERY

## 2019-11-03 PROCEDURE — 86140 C-REACTIVE PROTEIN: CPT

## 2019-11-03 PROCEDURE — 5A1D70Z PERFORMANCE OF URINARY FILTRATION, INTERMITTENT, LESS THAN 6 HOURS PER DAY: ICD-10-PCS | Performed by: INTERNAL MEDICINE

## 2019-11-03 RX ORDER — SODIUM CHLORIDE 9 MG/ML
INJECTION, SOLUTION INTRAVENOUS CONTINUOUS
Status: DISCONTINUED | OUTPATIENT
Start: 2019-11-03 | End: 2019-11-03

## 2019-11-03 RX ORDER — LACTOBACILLUS RHAMNOSUS GG 10B CELL
1 CAPSULE ORAL 2 TIMES DAILY WITH MEALS
Status: DISCONTINUED | OUTPATIENT
Start: 2019-11-03 | End: 2019-11-09 | Stop reason: HOSPADM

## 2019-11-03 RX ORDER — PAROXETINE HYDROCHLORIDE 20 MG/1
20 TABLET, FILM COATED ORAL 4 TIMES DAILY
Status: DISCONTINUED | OUTPATIENT
Start: 2019-11-03 | End: 2019-11-09 | Stop reason: HOSPADM

## 2019-11-03 RX ORDER — LIDOCAINE HYDROCHLORIDE 20 MG/ML
INJECTION, SOLUTION INFILTRATION; PERINEURAL
Status: DISCONTINUED
Start: 2019-11-03 | End: 2019-11-03 | Stop reason: WASHOUT

## 2019-11-03 RX ORDER — HEPARIN SODIUM 5000 [USP'U]/ML
5000 INJECTION, SOLUTION INTRAVENOUS; SUBCUTANEOUS ONCE
Status: DISCONTINUED | OUTPATIENT
Start: 2019-11-03 | End: 2019-11-03

## 2019-11-03 RX ORDER — TAMSULOSIN HYDROCHLORIDE 0.4 MG/1
0.4 CAPSULE ORAL DAILY
Status: DISCONTINUED | OUTPATIENT
Start: 2019-11-03 | End: 2019-11-09 | Stop reason: HOSPADM

## 2019-11-03 RX ORDER — MORPHINE SULFATE 30 MG/1
60 TABLET, FILM COATED, EXTENDED RELEASE ORAL 2 TIMES DAILY
Status: DISCONTINUED | OUTPATIENT
Start: 2019-11-03 | End: 2019-11-03

## 2019-11-03 RX ORDER — BUPROPION HYDROCHLORIDE 300 MG/1
300 TABLET ORAL DAILY
Status: DISCONTINUED | OUTPATIENT
Start: 2019-11-03 | End: 2019-11-09 | Stop reason: HOSPADM

## 2019-11-03 RX ORDER — SODIUM CHLORIDE 0.9 % (FLUSH) 0.9 %
10 SYRINGE (ML) INJECTION PRN
Status: DISCONTINUED | OUTPATIENT
Start: 2019-11-03 | End: 2019-11-09 | Stop reason: HOSPADM

## 2019-11-03 RX ORDER — CIPROFLOXACIN 2 MG/ML
400 INJECTION, SOLUTION INTRAVENOUS EVERY 24 HOURS
Status: DISCONTINUED | OUTPATIENT
Start: 2019-11-03 | End: 2019-11-05

## 2019-11-03 RX ORDER — LIDOCAINE HYDROCHLORIDE 20 MG/ML
5 INJECTION, SOLUTION EPIDURAL; INFILTRATION; INTRACAUDAL; PERINEURAL ONCE
Status: DISCONTINUED | OUTPATIENT
Start: 2019-11-03 | End: 2019-11-09 | Stop reason: HOSPADM

## 2019-11-03 RX ORDER — FLUOCINONIDE 0.5 MG/G
OINTMENT TOPICAL 2 TIMES DAILY
Status: DISCONTINUED | OUTPATIENT
Start: 2019-11-03 | End: 2019-11-09 | Stop reason: HOSPADM

## 2019-11-03 RX ORDER — ASPIRIN 325 MG
325 TABLET ORAL ONCE
Status: COMPLETED | OUTPATIENT
Start: 2019-11-03 | End: 2019-11-03

## 2019-11-03 RX ORDER — LEVOTHYROXINE SODIUM 0.05 MG/1
50 TABLET ORAL DAILY
Status: DISCONTINUED | OUTPATIENT
Start: 2019-11-03 | End: 2019-11-09 | Stop reason: HOSPADM

## 2019-11-03 RX ORDER — PANTOPRAZOLE SODIUM 40 MG/1
40 TABLET, DELAYED RELEASE ORAL
Status: DISCONTINUED | OUTPATIENT
Start: 2019-11-04 | End: 2019-11-03

## 2019-11-03 RX ORDER — OXYCODONE HYDROCHLORIDE 10 MG/1
10 TABLET ORAL EVERY 6 HOURS PRN
Status: DISCONTINUED | OUTPATIENT
Start: 2019-11-03 | End: 2019-11-09 | Stop reason: HOSPADM

## 2019-11-03 RX ORDER — 0.9 % SODIUM CHLORIDE 0.9 %
1000 INTRAVENOUS SOLUTION INTRAVENOUS ONCE
Status: COMPLETED | OUTPATIENT
Start: 2019-11-03 | End: 2019-11-03

## 2019-11-03 RX ORDER — LIDOCAINE HYDROCHLORIDE 20 MG/ML
INJECTION, SOLUTION INFILTRATION; PERINEURAL
Status: COMPLETED
Start: 2019-11-03 | End: 2019-11-03

## 2019-11-03 RX ORDER — DIAZEPAM 5 MG/1
5 TABLET ORAL EVERY 6 HOURS PRN
Status: DISCONTINUED | OUTPATIENT
Start: 2019-11-03 | End: 2019-11-09 | Stop reason: HOSPADM

## 2019-11-03 RX ORDER — DEXTROSE MONOHYDRATE 50 MG/ML
100 INJECTION, SOLUTION INTRAVENOUS PRN
Status: DISCONTINUED | OUTPATIENT
Start: 2019-11-03 | End: 2019-11-09 | Stop reason: HOSPADM

## 2019-11-03 RX ORDER — DEXTROSE MONOHYDRATE 25 G/50ML
25 INJECTION, SOLUTION INTRAVENOUS ONCE
Status: COMPLETED | OUTPATIENT
Start: 2019-11-03 | End: 2019-11-03

## 2019-11-03 RX ORDER — HEPARIN SODIUM 1000 [USP'U]/ML
1400 INJECTION, SOLUTION INTRAVENOUS; SUBCUTANEOUS ONCE
Status: COMPLETED | OUTPATIENT
Start: 2019-11-03 | End: 2019-11-03

## 2019-11-03 RX ORDER — TIZANIDINE 2 MG/1
2 TABLET ORAL EVERY 8 HOURS PRN
Status: DISCONTINUED | OUTPATIENT
Start: 2019-11-03 | End: 2019-11-03

## 2019-11-03 RX ORDER — NICOTINE POLACRILEX 4 MG
15 LOZENGE BUCCAL PRN
Status: DISCONTINUED | OUTPATIENT
Start: 2019-11-03 | End: 2019-11-09 | Stop reason: HOSPADM

## 2019-11-03 RX ORDER — PANTOPRAZOLE SODIUM 40 MG/10ML
40 INJECTION, POWDER, LYOPHILIZED, FOR SOLUTION INTRAVENOUS DAILY
Status: DISCONTINUED | OUTPATIENT
Start: 2019-11-03 | End: 2019-11-05

## 2019-11-03 RX ORDER — LIDOCAINE HYDROCHLORIDE 10 MG/ML
INJECTION, SOLUTION INFILTRATION; PERINEURAL
Status: DISCONTINUED
Start: 2019-11-03 | End: 2019-11-03 | Stop reason: WASHOUT

## 2019-11-03 RX ORDER — MORPHINE SULFATE 30 MG/1
60 TABLET, FILM COATED, EXTENDED RELEASE ORAL 2 TIMES DAILY PRN
Status: DISCONTINUED | OUTPATIENT
Start: 2019-11-03 | End: 2019-11-09 | Stop reason: HOSPADM

## 2019-11-03 RX ORDER — LANOLIN ALCOHOL/MO/W.PET/CERES
400 CREAM (GRAM) TOPICAL 2 TIMES DAILY
Status: DISCONTINUED | OUTPATIENT
Start: 2019-11-03 | End: 2019-11-09 | Stop reason: HOSPADM

## 2019-11-03 RX ORDER — DEXTROSE MONOHYDRATE 25 G/50ML
12.5 INJECTION, SOLUTION INTRAVENOUS PRN
Status: DISCONTINUED | OUTPATIENT
Start: 2019-11-03 | End: 2019-11-09 | Stop reason: HOSPADM

## 2019-11-03 RX ORDER — SODIUM CHLORIDE 9 MG/ML
10 INJECTION INTRAVENOUS DAILY
Status: DISCONTINUED | OUTPATIENT
Start: 2019-11-03 | End: 2019-11-05

## 2019-11-03 RX ORDER — SODIUM CHLORIDE 0.9 % (FLUSH) 0.9 %
10 SYRINGE (ML) INJECTION EVERY 12 HOURS SCHEDULED
Status: DISCONTINUED | OUTPATIENT
Start: 2019-11-03 | End: 2019-11-09 | Stop reason: HOSPADM

## 2019-11-03 RX ORDER — ALBUMIN (HUMAN) 12.5 G/50ML
25 SOLUTION INTRAVENOUS EVERY 6 HOURS
Status: COMPLETED | OUTPATIENT
Start: 2019-11-03 | End: 2019-11-04

## 2019-11-03 RX ADMIN — ANTI-FUNGAL POWDER MICONAZOLE NITRATE TALC FREE: 1.42 POWDER TOPICAL at 19:52

## 2019-11-03 RX ADMIN — HEPARIN SODIUM 1400 UNITS: 1000 INJECTION, SOLUTION INTRAVENOUS; SUBCUTANEOUS at 11:30

## 2019-11-03 RX ADMIN — FLUOCINONIDE: 0.05 OINTMENT TOPICAL at 20:21

## 2019-11-03 RX ADMIN — CALCIUM GLUCONATE 1 G: 98 INJECTION, SOLUTION INTRAVENOUS at 03:05

## 2019-11-03 RX ADMIN — PANTOPRAZOLE SODIUM 40 MG: 40 INJECTION, POWDER, FOR SOLUTION INTRAVENOUS at 18:38

## 2019-11-03 RX ADMIN — NOREPINEPHRINE BITARTRATE 6 MCG/MIN: 1 INJECTION, SOLUTION, CONCENTRATE INTRAVENOUS at 07:50

## 2019-11-03 RX ADMIN — Medication 25 G: at 05:20

## 2019-11-03 RX ADMIN — SODIUM CHLORIDE 1000 ML: 9 INJECTION, SOLUTION INTRAVENOUS at 09:15

## 2019-11-03 RX ADMIN — CIPROFLOXACIN 400 MG: 2 INJECTION, SOLUTION INTRAVENOUS at 18:30

## 2019-11-03 RX ADMIN — SODIUM BICARBONATE: 84 INJECTION, SOLUTION INTRAVENOUS at 10:28

## 2019-11-03 RX ADMIN — LIDOCAINE HYDROCHLORIDE 40 MG: 20 INJECTION, SOLUTION INFILTRATION; PERINEURAL at 11:15

## 2019-11-03 RX ADMIN — VANCOMYCIN HYDROCHLORIDE 1000 MG: 1 INJECTION, POWDER, LYOPHILIZED, FOR SOLUTION INTRAVENOUS at 21:07

## 2019-11-03 RX ADMIN — PIPERACILLIN AND TAZOBACTAM 2.25 G: 2; .25 INJECTION, POWDER, FOR SOLUTION INTRAVENOUS at 18:30

## 2019-11-03 RX ADMIN — SODIUM CHLORIDE: 9 INJECTION, SOLUTION INTRAVENOUS at 01:38

## 2019-11-03 RX ADMIN — ALBUMIN (HUMAN) 25 G: 0.25 INJECTION, SOLUTION INTRAVENOUS at 15:27

## 2019-11-03 RX ADMIN — INSULIN HUMAN 10 UNITS: 100 INJECTION, SOLUTION PARENTERAL at 02:57

## 2019-11-03 RX ADMIN — PIPERACILLIN SODIUM AND TAZOBACTAM SODIUM 3.38 G: 3; .375 INJECTION, POWDER, LYOPHILIZED, FOR SOLUTION INTRAVENOUS at 00:27

## 2019-11-03 RX ADMIN — Medication 1500 MG: at 01:06

## 2019-11-03 RX ADMIN — NOREPINEPHRINE BITARTRATE 2 MCG/MIN: 1 INJECTION INTRAVENOUS at 03:03

## 2019-11-03 RX ADMIN — Medication 10 ML: at 19:52

## 2019-11-03 RX ADMIN — SODIUM BICARBONATE: 84 INJECTION, SOLUTION INTRAVENOUS at 17:12

## 2019-11-03 RX ADMIN — ALBUMIN (HUMAN) 25 G: 0.25 INJECTION, SOLUTION INTRAVENOUS at 23:53

## 2019-11-03 RX ADMIN — SODIUM BICARBONATE: 84 INJECTION, SOLUTION INTRAVENOUS at 02:55

## 2019-11-03 RX ADMIN — Medication 1.2 ML: at 17:37

## 2019-11-03 RX ADMIN — MICONAZOLE NITRATE: 20 CREAM TOPICAL at 19:53

## 2019-11-03 RX ADMIN — ASPIRIN 325 MG ORAL TABLET 325 MG: 325 PILL ORAL at 01:01

## 2019-11-03 RX ADMIN — SODIUM CHLORIDE 1000 ML: 9 INJECTION, SOLUTION INTRAVENOUS at 01:06

## 2019-11-03 RX ADMIN — ALBUMIN (HUMAN) 25 G: 0.25 INJECTION, SOLUTION INTRAVENOUS at 18:38

## 2019-11-03 RX ADMIN — Medication 25 G: at 02:57

## 2019-11-03 RX ADMIN — SODIUM CHLORIDE 10 ML: 9 INJECTION INTRAMUSCULAR; INTRAVENOUS; SUBCUTANEOUS at 18:38

## 2019-11-03 ASSESSMENT — ENCOUNTER SYMPTOMS
SHORTNESS OF BREATH: 0
COLOR CHANGE: 1
COUGH: 0
COUGH: 1

## 2019-11-03 ASSESSMENT — PAIN SCALES - GENERAL: PAINLEVEL_OUTOF10: 0

## 2019-11-04 ENCOUNTER — APPOINTMENT (OUTPATIENT)
Dept: ULTRASOUND IMAGING | Age: 54
DRG: 720 | End: 2019-11-04
Payer: MEDICARE

## 2019-11-04 LAB
-: NORMAL
ABSOLUTE EOS #: 0.09 K/UL (ref 0–0.4)
ABSOLUTE IMMATURE GRANULOCYTE: ABNORMAL K/UL (ref 0–0.3)
ABSOLUTE LYMPH #: 0.82 K/UL (ref 1–4.8)
ABSOLUTE MONO #: 1 K/UL (ref 0.1–1.3)
ANION GAP SERPL CALCULATED.3IONS-SCNC: 11 MMOL/L (ref 9–17)
BASOPHILS # BLD: 0 % (ref 0–2)
BASOPHILS ABSOLUTE: 0 K/UL (ref 0–0.2)
BUN BLDV-MCNC: 40 MG/DL (ref 6–20)
BUN/CREAT BLD: ABNORMAL (ref 9–20)
CALCIUM SERPL-MCNC: 8.1 MG/DL (ref 8.6–10.4)
CHLORIDE BLD-SCNC: 102 MMOL/L (ref 98–107)
CO2: 25 MMOL/L (ref 20–31)
COMPLEMENT C3: 101 MG/DL (ref 90–180)
COMPLEMENT C4: 20 MG/DL (ref 10–40)
CREAT SERPL-MCNC: 2.94 MG/DL (ref 0.7–1.2)
DATE, STOOL #1: NORMAL
DATE, STOOL #2: NORMAL
DATE, STOOL #3: NORMAL
DIFFERENTIAL TYPE: ABNORMAL
EOSINOPHILS RELATIVE PERCENT: 1 % (ref 0–4)
FERRITIN: 68 UG/L (ref 30–400)
FREE KAPPA/LAMBDA RATIO: 0.96 (ref 0.26–1.65)
GFR AFRICAN AMERICAN: 27 ML/MIN
GFR NON-AFRICAN AMERICAN: 22 ML/MIN
GFR SERPL CREATININE-BSD FRML MDRD: ABNORMAL ML/MIN/{1.73_M2}
GFR SERPL CREATININE-BSD FRML MDRD: ABNORMAL ML/MIN/{1.73_M2}
GLUCOSE BLD-MCNC: 86 MG/DL (ref 75–110)
GLUCOSE BLD-MCNC: 88 MG/DL (ref 75–110)
GLUCOSE BLD-MCNC: 91 MG/DL (ref 75–110)
GLUCOSE BLD-MCNC: 99 MG/DL (ref 70–99)
HCT VFR BLD CALC: 23 % (ref 41–53)
HEMOCCULT SP1 STL QL: POSITIVE
HEMOCCULT SP2 STL QL: NORMAL
HEMOCCULT SP3 STL QL: NORMAL
HEMOGLOBIN: 7.2 G/DL (ref 13.5–17.5)
HEPATITIS B SURFACE ANTIGEN: NONREACTIVE
HEPATITIS C ANTIBODY: NONREACTIVE
IMMATURE GRANULOCYTES: ABNORMAL %
IRON SATURATION: 8 % (ref 20–55)
IRON: 17 UG/DL (ref 59–158)
KAPPA FREE LIGHT CHAINS QNT: 3.15 MG/DL (ref 0.37–1.94)
LAMBDA FREE LIGHT CHAINS QNT: 3.28 MG/DL (ref 0.57–2.63)
LV EF: 55 %
LVEF MODALITY: NORMAL
LYMPHOCYTES # BLD: 9 % (ref 24–44)
MAGNESIUM: 1.6 MG/DL (ref 1.6–2.6)
MCH RBC QN AUTO: 21.4 PG (ref 26–34)
MCHC RBC AUTO-ENTMCNC: 31.3 G/DL (ref 31–37)
MCV RBC AUTO: 68.4 FL (ref 80–100)
MONOCYTES # BLD: 11 % (ref 1–7)
MORPHOLOGY: ABNORMAL
MRSA, DNA, NASAL: ABNORMAL
NRBC AUTOMATED: ABNORMAL PER 100 WBC
PDW BLD-RTO: 20.1 % (ref 11.5–14.9)
PHOSPHORUS: 3.8 MG/DL (ref 2.5–4.5)
PLATELET # BLD: 264 K/UL (ref 150–450)
PLATELET ESTIMATE: ABNORMAL
PMV BLD AUTO: 6.6 FL (ref 6–12)
POTASSIUM SERPL-SCNC: 4.2 MMOL/L (ref 3.7–5.3)
RBC # BLD: 3.36 M/UL (ref 4.5–5.9)
RBC # BLD: ABNORMAL 10*6/UL
REASON FOR REJECTION: NORMAL
SEDIMENTATION RATE, ERYTHROCYTE: 40 MM (ref 0–15)
SEG NEUTROPHILS: 79 % (ref 36–66)
SEGMENTED NEUTROPHILS ABSOLUTE COUNT: 7.19 K/UL (ref 1.3–9.1)
SODIUM BLD-SCNC: 138 MMOL/L (ref 135–144)
SPECIMEN DESCRIPTION: ABNORMAL
SPECIMEN DESCRIPTION: NORMAL
TIME, STOOL #1: 845
TIME, STOOL #2: NORMAL
TIME, STOOL #3: NORMAL
TOTAL CK: 2362 U/L (ref 39–308)
TOTAL IRON BINDING CAPACITY: 209 UG/DL (ref 250–450)
UNSATURATED IRON BINDING CAPACITY: 192 UG/DL (ref 112–347)
WBC # BLD: 9.1 K/UL (ref 3.5–11)
WBC # BLD: ABNORMAL 10*3/UL
ZZ NTE CLEAN UP: ORDERED TEST: NORMAL
ZZ NTE WITH NAME CLEAN UP: SPECIMEN SOURCE: NORMAL

## 2019-11-04 PROCEDURE — 6360000002 HC RX W HCPCS: Performed by: INTERNAL MEDICINE

## 2019-11-04 PROCEDURE — 86162 COMPLEMENT TOTAL (CH50): CPT

## 2019-11-04 PROCEDURE — 82550 ASSAY OF CK (CPK): CPT

## 2019-11-04 PROCEDURE — 84165 PROTEIN E-PHORESIS SERUM: CPT

## 2019-11-04 PROCEDURE — 86038 ANTINUCLEAR ANTIBODIES: CPT

## 2019-11-04 PROCEDURE — 6370000000 HC RX 637 (ALT 250 FOR IP): Performed by: FAMILY MEDICINE

## 2019-11-04 PROCEDURE — 86235 NUCLEAR ANTIGEN ANTIBODY: CPT

## 2019-11-04 PROCEDURE — 2000000000 HC ICU R&B

## 2019-11-04 PROCEDURE — 83516 IMMUNOASSAY NONANTIBODY: CPT

## 2019-11-04 PROCEDURE — C9113 INJ PANTOPRAZOLE SODIUM, VIA: HCPCS | Performed by: FAMILY MEDICINE

## 2019-11-04 PROCEDURE — 2580000003 HC RX 258: Performed by: EMERGENCY MEDICINE

## 2019-11-04 PROCEDURE — 82947 ASSAY GLUCOSE BLOOD QUANT: CPT

## 2019-11-04 PROCEDURE — 2580000003 HC RX 258: Performed by: FAMILY MEDICINE

## 2019-11-04 PROCEDURE — 83735 ASSAY OF MAGNESIUM: CPT

## 2019-11-04 PROCEDURE — 2500000003 HC RX 250 WO HCPCS: Performed by: EMERGENCY MEDICINE

## 2019-11-04 PROCEDURE — 94761 N-INVAS EAR/PLS OXIMETRY MLT: CPT

## 2019-11-04 PROCEDURE — 82272 OCCULT BLD FECES 1-3 TESTS: CPT

## 2019-11-04 PROCEDURE — 36415 COLL VENOUS BLD VENIPUNCTURE: CPT

## 2019-11-04 PROCEDURE — 2580000003 HC RX 258: Performed by: INTERNAL MEDICINE

## 2019-11-04 PROCEDURE — 85651 RBC SED RATE NONAUTOMATED: CPT

## 2019-11-04 PROCEDURE — 84100 ASSAY OF PHOSPHORUS: CPT

## 2019-11-04 PROCEDURE — P9047 ALBUMIN (HUMAN), 25%, 50ML: HCPCS | Performed by: INTERNAL MEDICINE

## 2019-11-04 PROCEDURE — 84155 ASSAY OF PROTEIN SERUM: CPT

## 2019-11-04 PROCEDURE — 76770 US EXAM ABDO BACK WALL COMP: CPT

## 2019-11-04 PROCEDURE — 87641 MR-STAPH DNA AMP PROBE: CPT

## 2019-11-04 PROCEDURE — 99232 SBSQ HOSP IP/OBS MODERATE 35: CPT | Performed by: FAMILY MEDICINE

## 2019-11-04 PROCEDURE — 6360000002 HC RX W HCPCS: Performed by: FAMILY MEDICINE

## 2019-11-04 PROCEDURE — 83540 ASSAY OF IRON: CPT

## 2019-11-04 PROCEDURE — 86160 COMPLEMENT ANTIGEN: CPT

## 2019-11-04 PROCEDURE — 86225 DNA ANTIBODY NATIVE: CPT

## 2019-11-04 PROCEDURE — 83883 ASSAY NEPHELOMETRY NOT SPEC: CPT

## 2019-11-04 PROCEDURE — 93306 TTE W/DOPPLER COMPLETE: CPT

## 2019-11-04 PROCEDURE — 82728 ASSAY OF FERRITIN: CPT

## 2019-11-04 PROCEDURE — 85025 COMPLETE CBC W/AUTO DIFF WBC: CPT

## 2019-11-04 PROCEDURE — 83550 IRON BINDING TEST: CPT

## 2019-11-04 PROCEDURE — 80048 BASIC METABOLIC PNL TOTAL CA: CPT

## 2019-11-04 RX ORDER — SODIUM CHLORIDE 9 MG/ML
INJECTION, SOLUTION INTRAVENOUS CONTINUOUS
Status: DISCONTINUED | OUTPATIENT
Start: 2019-11-04 | End: 2019-11-08

## 2019-11-04 RX ADMIN — Medication 10 ML: at 08:20

## 2019-11-04 RX ADMIN — PIPERACILLIN AND TAZOBACTAM 2.25 G: 2; .25 INJECTION, POWDER, FOR SOLUTION INTRAVENOUS at 02:37

## 2019-11-04 RX ADMIN — SODIUM CHLORIDE: 9 INJECTION, SOLUTION INTRAVENOUS at 10:37

## 2019-11-04 RX ADMIN — ANTI-FUNGAL POWDER MICONAZOLE NITRATE TALC FREE: 1.42 POWDER TOPICAL at 08:02

## 2019-11-04 RX ADMIN — MICONAZOLE NITRATE: 20 CREAM TOPICAL at 20:14

## 2019-11-04 RX ADMIN — PIPERACILLIN AND TAZOBACTAM 2.25 G: 2; .25 INJECTION, POWDER, FOR SOLUTION INTRAVENOUS at 10:37

## 2019-11-04 RX ADMIN — SODIUM BICARBONATE: 84 INJECTION, SOLUTION INTRAVENOUS at 08:43

## 2019-11-04 RX ADMIN — PIPERACILLIN AND TAZOBACTAM 2.25 G: 2; .25 INJECTION, POWDER, FOR SOLUTION INTRAVENOUS at 17:07

## 2019-11-04 RX ADMIN — Medication 10 ML: at 20:14

## 2019-11-04 RX ADMIN — FLUOCINONIDE: 0.05 OINTMENT TOPICAL at 20:14

## 2019-11-04 RX ADMIN — MAGNESIUM SULFATE HEPTAHYDRATE 2 G: 500 INJECTION, SOLUTION INTRAMUSCULAR; INTRAVENOUS at 11:19

## 2019-11-04 RX ADMIN — MICONAZOLE NITRATE: 20 CREAM TOPICAL at 08:02

## 2019-11-04 RX ADMIN — FLUOCINONIDE: 0.05 OINTMENT TOPICAL at 08:02

## 2019-11-04 RX ADMIN — PANTOPRAZOLE SODIUM 40 MG: 40 INJECTION, POWDER, FOR SOLUTION INTRAVENOUS at 08:19

## 2019-11-04 RX ADMIN — SODIUM BICARBONATE: 84 INJECTION, SOLUTION INTRAVENOUS at 00:44

## 2019-11-04 RX ADMIN — SODIUM CHLORIDE 10 ML: 9 INJECTION INTRAMUSCULAR; INTRAVENOUS; SUBCUTANEOUS at 08:19

## 2019-11-04 RX ADMIN — DIAZEPAM 5 MG: 5 TABLET ORAL at 23:38

## 2019-11-04 RX ADMIN — ALBUMIN (HUMAN) 25 G: 0.25 INJECTION, SOLUTION INTRAVENOUS at 07:18

## 2019-11-04 RX ADMIN — CIPROFLOXACIN 400 MG: 2 INJECTION, SOLUTION INTRAVENOUS at 08:19

## 2019-11-04 RX ADMIN — DIAZEPAM 5 MG: 5 TABLET ORAL at 01:41

## 2019-11-04 ASSESSMENT — PAIN SCALES - GENERAL: PAINLEVEL_OUTOF10: 0

## 2019-11-05 ENCOUNTER — TELEPHONE (OUTPATIENT)
Dept: PRIMARY CARE CLINIC | Age: 54
End: 2019-11-05

## 2019-11-05 LAB
-: ABNORMAL
ABSOLUTE EOS #: 0.16 K/UL (ref 0–0.4)
ABSOLUTE IMMATURE GRANULOCYTE: ABNORMAL K/UL (ref 0–0.3)
ABSOLUTE LYMPH #: 0.71 K/UL (ref 1–4.8)
ABSOLUTE MONO #: 0.87 K/UL (ref 0.1–1.3)
AMORPHOUS: ABNORMAL
ANA REFERENCE RANGE:: ABNORMAL
ANION GAP SERPL CALCULATED.3IONS-SCNC: 12 MMOL/L (ref 9–17)
ANTI DNA DOUBLE STRANDED: 50 IU/ML
ANTI JO-1 IGG: 14 U/ML
ANTI RNP AB: 47 U/ML
ANTI SSA: 386 U/ML
ANTI SSB: 16 U/ML
ANTI-CENTROMERE: 21 U/ML
ANTI-NUCLEAR ANTIBODY (ANA): POSITIVE
ANTI-SCLERODERMA: 35 U/ML
ANTI-SMITH: 31 U/ML
BACTERIA: ABNORMAL
BASOPHILS # BLD: 1 % (ref 0–2)
BASOPHILS ABSOLUTE: 0.08 K/UL (ref 0–0.2)
BILIRUBIN URINE: NEGATIVE
BUN BLDV-MCNC: 28 MG/DL (ref 6–20)
BUN/CREAT BLD: ABNORMAL (ref 9–20)
C-REACTIVE PROTEIN: 116 MG/L (ref 0–5)
CALCIUM SERPL-MCNC: 8.3 MG/DL (ref 8.6–10.4)
CASTS UA: ABNORMAL /LPF
CHLORIDE BLD-SCNC: 106 MMOL/L (ref 98–107)
CO2: 24 MMOL/L (ref 20–31)
COLOR: YELLOW
COMMENT UA: ABNORMAL
CREAT SERPL-MCNC: 1.5 MG/DL (ref 0.7–1.2)
CREATININE URINE: 135.8 MG/DL (ref 39–259)
CRYSTALS, UA: ABNORMAL /HPF
DIFFERENTIAL TYPE: ABNORMAL
EKG ATRIAL RATE: 95 BPM
EKG P AXIS: 18 DEGREES
EKG P-R INTERVAL: 150 MS
EKG Q-T INTERVAL: 354 MS
EKG QRS DURATION: 80 MS
EKG QTC CALCULATION (BAZETT): 444 MS
EKG R AXIS: 34 DEGREES
EKG T AXIS: 46 DEGREES
EKG VENTRICULAR RATE: 95 BPM
EOSINOPHILS RELATIVE PERCENT: 2 % (ref 0–4)
EPITHELIAL CELLS UA: ABNORMAL /HPF
GBM ANTIBODY IGG: 42 AU/ML
GFR AFRICAN AMERICAN: 59 ML/MIN
GFR NON-AFRICAN AMERICAN: 49 ML/MIN
GFR SERPL CREATININE-BSD FRML MDRD: ABNORMAL ML/MIN/{1.73_M2}
GFR SERPL CREATININE-BSD FRML MDRD: ABNORMAL ML/MIN/{1.73_M2}
GLUCOSE BLD-MCNC: 107 MG/DL (ref 75–110)
GLUCOSE BLD-MCNC: 115 MG/DL (ref 70–99)
GLUCOSE BLD-MCNC: 119 MG/DL (ref 75–110)
GLUCOSE BLD-MCNC: 125 MG/DL (ref 75–110)
GLUCOSE URINE: NEGATIVE
HCT VFR BLD CALC: 23.5 % (ref 41–53)
HCT VFR BLD CALC: 25.9 % (ref 41–53)
HEMOGLOBIN: 7.1 G/DL (ref 13.5–17.5)
HEMOGLOBIN: 7.7 G/DL (ref 13.5–17.5)
HISTONE ANTIBODY: 64 U/ML
IMMATURE GRANULOCYTES: ABNORMAL %
KETONES, URINE: NEGATIVE
LEUKOCYTE ESTERASE, URINE: ABNORMAL
LYMPHOCYTES # BLD: 9 % (ref 24–44)
MCH RBC QN AUTO: 21 PG (ref 26–34)
MCHC RBC AUTO-ENTMCNC: 30.4 G/DL (ref 31–37)
MCV RBC AUTO: 69 FL (ref 80–100)
MONOCYTES # BLD: 11 % (ref 1–7)
MORPHOLOGY: ABNORMAL
MUCUS: ABNORMAL
NITRITE, URINE: NEGATIVE
NRBC AUTOMATED: ABNORMAL PER 100 WBC
OTHER OBSERVATIONS UA: ABNORMAL
PDW BLD-RTO: 20.8 % (ref 11.5–14.9)
PH UA: 5.5 (ref 5–8)
PLATELET # BLD: 279 K/UL (ref 150–450)
PLATELET ESTIMATE: ABNORMAL
PMV BLD AUTO: 7.3 FL (ref 6–12)
POTASSIUM SERPL-SCNC: 3.6 MMOL/L (ref 3.7–5.3)
PROTEIN UA: ABNORMAL
RBC # BLD: 3.4 M/UL (ref 4.5–5.9)
RBC # BLD: ABNORMAL 10*6/UL
RBC UA: ABNORMAL /HPF
RENAL EPITHELIAL, UA: ABNORMAL /HPF
SEG NEUTROPHILS: 77 % (ref 36–66)
SEGMENTED NEUTROPHILS ABSOLUTE COUNT: 6.08 K/UL (ref 1.3–9.1)
SODIUM BLD-SCNC: 142 MMOL/L (ref 135–144)
SPECIFIC GRAVITY UA: 1.02 (ref 1–1.03)
TOTAL PROTEIN, URINE: 102 MG/DL
TRICHOMONAS: ABNORMAL
TURBIDITY: CLEAR
URINE HGB: ABNORMAL
URINE TOTAL PROTEIN CREATININE RATIO: 0.75 (ref 0–0.2)
UROBILINOGEN, URINE: NORMAL
VANCOMYCIN RANDOM DATE LAST DOSE: NORMAL
VANCOMYCIN RANDOM DOSE AMOUNT: NORMAL
VANCOMYCIN RANDOM TIME LAST DOSE: 2107
VANCOMYCIN RANDOM: 6.1 UG/ML
WBC # BLD: 7.9 K/UL (ref 3.5–11)
WBC # BLD: ABNORMAL 10*3/UL
WBC UA: ABNORMAL /HPF
YEAST: ABNORMAL

## 2019-11-05 PROCEDURE — 81001 URINALYSIS AUTO W/SCOPE: CPT

## 2019-11-05 PROCEDURE — 6360000002 HC RX W HCPCS: Performed by: INTERNAL MEDICINE

## 2019-11-05 PROCEDURE — 85014 HEMATOCRIT: CPT

## 2019-11-05 PROCEDURE — 86850 RBC ANTIBODY SCREEN: CPT

## 2019-11-05 PROCEDURE — 2580000003 HC RX 258: Performed by: FAMILY MEDICINE

## 2019-11-05 PROCEDURE — 86901 BLOOD TYPING SEROLOGIC RH(D): CPT

## 2019-11-05 PROCEDURE — 99232 SBSQ HOSP IP/OBS MODERATE 35: CPT | Performed by: FAMILY MEDICINE

## 2019-11-05 PROCEDURE — 97162 PT EVAL MOD COMPLEX 30 MIN: CPT

## 2019-11-05 PROCEDURE — 2580000003 HC RX 258: Performed by: INTERNAL MEDICINE

## 2019-11-05 PROCEDURE — 2060000000 HC ICU INTERMEDIATE R&B

## 2019-11-05 PROCEDURE — 82947 ASSAY GLUCOSE BLOOD QUANT: CPT

## 2019-11-05 PROCEDURE — 86920 COMPATIBILITY TEST SPIN: CPT

## 2019-11-05 PROCEDURE — 85025 COMPLETE CBC W/AUTO DIFF WBC: CPT

## 2019-11-05 PROCEDURE — 93010 ELECTROCARDIOGRAM REPORT: CPT | Performed by: INTERNAL MEDICINE

## 2019-11-05 PROCEDURE — 6370000000 HC RX 637 (ALT 250 FOR IP): Performed by: FAMILY MEDICINE

## 2019-11-05 PROCEDURE — 80202 ASSAY OF VANCOMYCIN: CPT

## 2019-11-05 PROCEDURE — 86140 C-REACTIVE PROTEIN: CPT

## 2019-11-05 PROCEDURE — 6360000002 HC RX W HCPCS: Performed by: FAMILY MEDICINE

## 2019-11-05 PROCEDURE — 86900 BLOOD TYPING SEROLOGIC ABO: CPT

## 2019-11-05 PROCEDURE — 85018 HEMOGLOBIN: CPT

## 2019-11-05 PROCEDURE — 82570 ASSAY OF URINE CREATININE: CPT

## 2019-11-05 PROCEDURE — 36415 COLL VENOUS BLD VENIPUNCTURE: CPT

## 2019-11-05 PROCEDURE — C9113 INJ PANTOPRAZOLE SODIUM, VIA: HCPCS | Performed by: FAMILY MEDICINE

## 2019-11-05 PROCEDURE — 84156 ASSAY OF PROTEIN URINE: CPT

## 2019-11-05 PROCEDURE — 97530 THERAPEUTIC ACTIVITIES: CPT

## 2019-11-05 PROCEDURE — 80048 BASIC METABOLIC PNL TOTAL CA: CPT

## 2019-11-05 RX ORDER — LOPERAMIDE HYDROCHLORIDE 2 MG/1
2 CAPSULE ORAL 4 TIMES DAILY PRN
Status: DISCONTINUED | OUTPATIENT
Start: 2019-11-05 | End: 2019-11-08

## 2019-11-05 RX ORDER — ONDANSETRON 2 MG/ML
4 INJECTION INTRAMUSCULAR; INTRAVENOUS EVERY 4 HOURS PRN
Status: DISCONTINUED | OUTPATIENT
Start: 2019-11-05 | End: 2019-11-09 | Stop reason: HOSPADM

## 2019-11-05 RX ORDER — CIPROFLOXACIN 2 MG/ML
500 INJECTION, SOLUTION INTRAVENOUS EVERY 24 HOURS
Status: DISCONTINUED | OUTPATIENT
Start: 2019-11-06 | End: 2019-11-06

## 2019-11-05 RX ORDER — HEPARIN SODIUM 5000 [USP'U]/ML
5000 INJECTION, SOLUTION INTRAVENOUS; SUBCUTANEOUS 2 TIMES DAILY
Status: DISCONTINUED | OUTPATIENT
Start: 2019-11-05 | End: 2019-11-09 | Stop reason: HOSPADM

## 2019-11-05 RX ORDER — PANTOPRAZOLE SODIUM 40 MG/1
40 TABLET, DELAYED RELEASE ORAL
Status: DISCONTINUED | OUTPATIENT
Start: 2019-11-06 | End: 2019-11-09 | Stop reason: HOSPADM

## 2019-11-05 RX ADMIN — PAROXETINE HYDROCHLORIDE 20 MG: 20 TABLET, FILM COATED ORAL at 13:27

## 2019-11-05 RX ADMIN — SODIUM CHLORIDE 10 ML: 9 INJECTION INTRAMUSCULAR; INTRAVENOUS; SUBCUTANEOUS at 08:47

## 2019-11-05 RX ADMIN — PAROXETINE HYDROCHLORIDE 20 MG: 20 TABLET, FILM COATED ORAL at 08:46

## 2019-11-05 RX ADMIN — Medication 400 MG: at 08:46

## 2019-11-05 RX ADMIN — Medication 400 MG: at 19:45

## 2019-11-05 RX ADMIN — MUPIROCIN: 20 OINTMENT TOPICAL at 19:47

## 2019-11-05 RX ADMIN — PIPERACILLIN AND TAZOBACTAM 2.25 G: 2; .25 INJECTION, POWDER, FOR SOLUTION INTRAVENOUS at 10:03

## 2019-11-05 RX ADMIN — PIPERACILLIN SODIUM AND TAZOBACTAM SODIUM 3.38 G: 3; .375 INJECTION, POWDER, LYOPHILIZED, FOR SOLUTION INTRAVENOUS at 17:05

## 2019-11-05 RX ADMIN — LOPERAMIDE HYDROCHLORIDE 2 MG: 2 CAPSULE ORAL at 19:47

## 2019-11-05 RX ADMIN — MUPIROCIN: 20 OINTMENT TOPICAL at 10:02

## 2019-11-05 RX ADMIN — Medication 1 CAPSULE: at 16:38

## 2019-11-05 RX ADMIN — FLUOCINONIDE: 0.05 OINTMENT TOPICAL at 19:48

## 2019-11-05 RX ADMIN — LEVOTHYROXINE SODIUM 50 MCG: 50 TABLET ORAL at 08:45

## 2019-11-05 RX ADMIN — PIPERACILLIN SODIUM AND TAZOBACTAM SODIUM 3.38 G: 3; .375 INJECTION, POWDER, LYOPHILIZED, FOR SOLUTION INTRAVENOUS at 21:49

## 2019-11-05 RX ADMIN — SODIUM CHLORIDE: 9 INJECTION, SOLUTION INTRAVENOUS at 01:31

## 2019-11-05 RX ADMIN — OXYCODONE HYDROCHLORIDE 10 MG: 10 TABLET ORAL at 03:54

## 2019-11-05 RX ADMIN — PAROXETINE HYDROCHLORIDE 20 MG: 20 TABLET, FILM COATED ORAL at 19:45

## 2019-11-05 RX ADMIN — Medication 1 CAPSULE: at 08:46

## 2019-11-05 RX ADMIN — MORPHINE SULFATE 60 MG: 30 TABLET, FILM COATED, EXTENDED RELEASE ORAL at 22:56

## 2019-11-05 RX ADMIN — OXYCODONE HYDROCHLORIDE 10 MG: 10 TABLET ORAL at 19:45

## 2019-11-05 RX ADMIN — FLUOCINONIDE: 0.05 OINTMENT TOPICAL at 08:49

## 2019-11-05 RX ADMIN — PANTOPRAZOLE SODIUM 40 MG: 40 INJECTION, POWDER, FOR SOLUTION INTRAVENOUS at 08:47

## 2019-11-05 RX ADMIN — ANTI-FUNGAL POWDER MICONAZOLE NITRATE TALC FREE: 1.42 POWDER TOPICAL at 08:50

## 2019-11-05 RX ADMIN — LOPERAMIDE HYDROCHLORIDE 2 MG: 2 CAPSULE ORAL at 13:32

## 2019-11-05 RX ADMIN — TAMSULOSIN HYDROCHLORIDE 0.4 MG: 0.4 CAPSULE ORAL at 08:49

## 2019-11-05 RX ADMIN — Medication 1500 MG: at 15:36

## 2019-11-05 RX ADMIN — BUPROPION HYDROCHLORIDE 300 MG: 300 TABLET, FILM COATED, EXTENDED RELEASE ORAL at 08:46

## 2019-11-05 RX ADMIN — HEPARIN SODIUM 5000 UNITS: 5000 INJECTION INTRAVENOUS; SUBCUTANEOUS at 13:27

## 2019-11-05 RX ADMIN — CIPROFLOXACIN 400 MG: 2 INJECTION, SOLUTION INTRAVENOUS at 08:48

## 2019-11-05 RX ADMIN — MICONAZOLE NITRATE: 20 CREAM TOPICAL at 08:49

## 2019-11-05 RX ADMIN — HEPARIN SODIUM 5000 UNITS: 5000 INJECTION INTRAVENOUS; SUBCUTANEOUS at 19:49

## 2019-11-05 RX ADMIN — PAROXETINE HYDROCHLORIDE 20 MG: 20 TABLET, FILM COATED ORAL at 16:38

## 2019-11-05 RX ADMIN — PIPERACILLIN AND TAZOBACTAM 2.25 G: 2; .25 INJECTION, POWDER, FOR SOLUTION INTRAVENOUS at 02:11

## 2019-11-05 RX ADMIN — Medication 10 ML: at 08:50

## 2019-11-05 RX ADMIN — OXYCODONE HYDROCHLORIDE 10 MG: 10 TABLET ORAL at 13:38

## 2019-11-05 RX ADMIN — Medication 10 ML: at 19:48

## 2019-11-05 ASSESSMENT — PAIN SCALES - GENERAL
PAINLEVEL_OUTOF10: 9
PAINLEVEL_OUTOF10: 0
PAINLEVEL_OUTOF10: 9
PAINLEVEL_OUTOF10: 0
PAINLEVEL_OUTOF10: 7
PAINLEVEL_OUTOF10: 9
PAINLEVEL_OUTOF10: 0
PAINLEVEL_OUTOF10: 9

## 2019-11-05 ASSESSMENT — PAIN DESCRIPTION - ORIENTATION
ORIENTATION: LEFT
ORIENTATION: LEFT
ORIENTATION: LEFT;RIGHT

## 2019-11-05 ASSESSMENT — PAIN DESCRIPTION - ONSET: ONSET: ON-GOING

## 2019-11-05 ASSESSMENT — PAIN DESCRIPTION - PAIN TYPE
TYPE: CHRONIC PAIN

## 2019-11-05 ASSESSMENT — PAIN DESCRIPTION - LOCATION
LOCATION: FOOT

## 2019-11-05 ASSESSMENT — PAIN DESCRIPTION - DESCRIPTORS: DESCRIPTORS: CONSTANT;BURNING;ACHING;THROBBING

## 2019-11-05 ASSESSMENT — PAIN DESCRIPTION - FREQUENCY: FREQUENCY: CONTINUOUS

## 2019-11-06 LAB
ABSOLUTE RETIC #: 0.05 M/UL (ref 0.02–0.1)
ANION GAP SERPL CALCULATED.3IONS-SCNC: 8 MMOL/L (ref 9–17)
BUN BLDV-MCNC: 16 MG/DL (ref 6–20)
BUN/CREAT BLD: ABNORMAL (ref 9–20)
C DIFF AG + TOXIN: NEGATIVE
CALCIUM SERPL-MCNC: 7.9 MG/DL (ref 8.6–10.4)
CHLORIDE BLD-SCNC: 110 MMOL/L (ref 98–107)
CO2: 25 MMOL/L (ref 20–31)
COMPLEMENT TOTAL (CH50): 94 CAE UNITS (ref 60–144)
CREAT SERPL-MCNC: 1.07 MG/DL (ref 0.7–1.2)
FOLATE: 5.5 NG/ML
GFR AFRICAN AMERICAN: >60 ML/MIN
GFR NON-AFRICAN AMERICAN: >60 ML/MIN
GFR SERPL CREATININE-BSD FRML MDRD: ABNORMAL ML/MIN/{1.73_M2}
GFR SERPL CREATININE-BSD FRML MDRD: ABNORMAL ML/MIN/{1.73_M2}
GLUCOSE BLD-MCNC: 102 MG/DL (ref 70–99)
GLUCOSE BLD-MCNC: 109 MG/DL (ref 75–110)
GLUCOSE BLD-MCNC: 134 MG/DL (ref 75–110)
GLUCOSE BLD-MCNC: 85 MG/DL (ref 75–110)
GLUCOSE BLD-MCNC: 98 MG/DL (ref 75–110)
HCT VFR BLD CALC: 22.8 % (ref 41–53)
HCT VFR BLD CALC: 25.9 % (ref 41–53)
HEMOGLOBIN: 7 G/DL (ref 13.5–17.5)
HEMOGLOBIN: 7.9 G/DL (ref 13.5–17.5)
IMMATURE RETIC FRACT: NORMAL %
MCH RBC QN AUTO: 21.5 PG (ref 26–34)
MCHC RBC AUTO-ENTMCNC: 30.7 G/DL (ref 31–37)
MCV RBC AUTO: 70 FL (ref 80–100)
NRBC AUTOMATED: ABNORMAL PER 100 WBC
PDW BLD-RTO: 20.3 % (ref 11.5–14.9)
PLATELET # BLD: 265 K/UL (ref 150–450)
PMV BLD AUTO: 6.8 FL (ref 6–12)
POTASSIUM SERPL-SCNC: 3.4 MMOL/L (ref 3.7–5.3)
RBC # BLD: 3.26 M/UL (ref 4.5–5.9)
RETIC %: 1.4 % (ref 0.5–2)
RETIC HEMOGLOBIN: NORMAL PG (ref 28.2–35.7)
SODIUM BLD-SCNC: 143 MMOL/L (ref 135–144)
SPECIMEN DESCRIPTION: NORMAL
TOTAL CK: 347 U/L (ref 39–308)
VITAMIN B-12: 613 PG/ML (ref 232–1245)
WBC # BLD: 7.3 K/UL (ref 3.5–11)

## 2019-11-06 PROCEDURE — 99254 IP/OBS CNSLTJ NEW/EST MOD 60: CPT | Performed by: INTERNAL MEDICINE

## 2019-11-06 PROCEDURE — 85027 COMPLETE CBC AUTOMATED: CPT

## 2019-11-06 PROCEDURE — 36415 COLL VENOUS BLD VENIPUNCTURE: CPT

## 2019-11-06 PROCEDURE — 6360000002 HC RX W HCPCS: Performed by: INTERNAL MEDICINE

## 2019-11-06 PROCEDURE — 87324 CLOSTRIDIUM AG IA: CPT

## 2019-11-06 PROCEDURE — 82550 ASSAY OF CK (CPK): CPT

## 2019-11-06 PROCEDURE — 82746 ASSAY OF FOLIC ACID SERUM: CPT

## 2019-11-06 PROCEDURE — 87506 IADNA-DNA/RNA PROBE TQ 6-11: CPT

## 2019-11-06 PROCEDURE — 6370000000 HC RX 637 (ALT 250 FOR IP): Performed by: INTERNAL MEDICINE

## 2019-11-06 PROCEDURE — 6370000000 HC RX 637 (ALT 250 FOR IP): Performed by: FAMILY MEDICINE

## 2019-11-06 PROCEDURE — 82947 ASSAY GLUCOSE BLOOD QUANT: CPT

## 2019-11-06 PROCEDURE — 6360000002 HC RX W HCPCS: Performed by: FAMILY MEDICINE

## 2019-11-06 PROCEDURE — 80048 BASIC METABOLIC PNL TOTAL CA: CPT

## 2019-11-06 PROCEDURE — 2580000003 HC RX 258: Performed by: INTERNAL MEDICINE

## 2019-11-06 PROCEDURE — 99232 SBSQ HOSP IP/OBS MODERATE 35: CPT | Performed by: FAMILY MEDICINE

## 2019-11-06 PROCEDURE — 85018 HEMOGLOBIN: CPT

## 2019-11-06 PROCEDURE — 85014 HEMATOCRIT: CPT

## 2019-11-06 PROCEDURE — 2580000003 HC RX 258: Performed by: FAMILY MEDICINE

## 2019-11-06 PROCEDURE — 87449 NOS EACH ORGANISM AG IA: CPT

## 2019-11-06 PROCEDURE — 85045 AUTOMATED RETICULOCYTE COUNT: CPT

## 2019-11-06 PROCEDURE — 82607 VITAMIN B-12: CPT

## 2019-11-06 PROCEDURE — 2060000000 HC ICU INTERMEDIATE R&B

## 2019-11-06 RX ORDER — POTASSIUM CHLORIDE 20 MEQ/1
40 TABLET, EXTENDED RELEASE ORAL PRN
Status: DISCONTINUED | OUTPATIENT
Start: 2019-11-06 | End: 2019-11-09 | Stop reason: HOSPADM

## 2019-11-06 RX ORDER — CIPROFLOXACIN 2 MG/ML
400 INJECTION, SOLUTION INTRAVENOUS EVERY 24 HOURS
Status: DISCONTINUED | OUTPATIENT
Start: 2019-11-06 | End: 2019-11-07

## 2019-11-06 RX ORDER — POTASSIUM CHLORIDE 7.45 MG/ML
10 INJECTION INTRAVENOUS PRN
Status: DISCONTINUED | OUTPATIENT
Start: 2019-11-06 | End: 2019-11-09 | Stop reason: HOSPADM

## 2019-11-06 RX ADMIN — DIAZEPAM 5 MG: 5 TABLET ORAL at 02:50

## 2019-11-06 RX ADMIN — LEVOTHYROXINE SODIUM 50 MCG: 50 TABLET ORAL at 05:56

## 2019-11-06 RX ADMIN — ONDANSETRON 4 MG: 2 INJECTION INTRAMUSCULAR; INTRAVENOUS at 04:31

## 2019-11-06 RX ADMIN — PAROXETINE HYDROCHLORIDE 20 MG: 20 TABLET, FILM COATED ORAL at 20:56

## 2019-11-06 RX ADMIN — TAMSULOSIN HYDROCHLORIDE 0.4 MG: 0.4 CAPSULE ORAL at 09:09

## 2019-11-06 RX ADMIN — MUPIROCIN: 20 OINTMENT TOPICAL at 20:56

## 2019-11-06 RX ADMIN — MORPHINE SULFATE 60 MG: 30 TABLET, FILM COATED, EXTENDED RELEASE ORAL at 18:02

## 2019-11-06 RX ADMIN — Medication 1 CAPSULE: at 09:09

## 2019-11-06 RX ADMIN — SODIUM CHLORIDE: 9 INJECTION, SOLUTION INTRAVENOUS at 01:30

## 2019-11-06 RX ADMIN — PANTOPRAZOLE SODIUM 40 MG: 40 TABLET, DELAYED RELEASE ORAL at 05:56

## 2019-11-06 RX ADMIN — MICONAZOLE NITRATE: 20 CREAM TOPICAL at 20:55

## 2019-11-06 RX ADMIN — OXYCODONE HYDROCHLORIDE 10 MG: 10 TABLET ORAL at 22:25

## 2019-11-06 RX ADMIN — FLUOCINONIDE: 0.05 OINTMENT TOPICAL at 15:41

## 2019-11-06 RX ADMIN — Medication 10 ML: at 20:57

## 2019-11-06 RX ADMIN — PIPERACILLIN SODIUM AND TAZOBACTAM SODIUM 3.38 G: 3; .375 INJECTION, POWDER, LYOPHILIZED, FOR SOLUTION INTRAVENOUS at 04:29

## 2019-11-06 RX ADMIN — ANTI-FUNGAL POWDER MICONAZOLE NITRATE TALC FREE: 1.42 POWDER TOPICAL at 09:14

## 2019-11-06 RX ADMIN — POTASSIUM CHLORIDE 40 MEQ: 20 TABLET, EXTENDED RELEASE ORAL at 11:46

## 2019-11-06 RX ADMIN — Medication 400 MG: at 09:09

## 2019-11-06 RX ADMIN — Medication 1 CAPSULE: at 17:56

## 2019-11-06 RX ADMIN — Medication 400 MG: at 20:56

## 2019-11-06 RX ADMIN — IRON SUCROSE 300 MG: 20 INJECTION, SOLUTION INTRAVENOUS at 15:41

## 2019-11-06 RX ADMIN — PIPERACILLIN SODIUM AND TAZOBACTAM SODIUM 3.38 G: 3; .375 INJECTION, POWDER, LYOPHILIZED, FOR SOLUTION INTRAVENOUS at 17:56

## 2019-11-06 RX ADMIN — ANTI-FUNGAL POWDER MICONAZOLE NITRATE TALC FREE: 1.42 POWDER TOPICAL at 20:56

## 2019-11-06 RX ADMIN — OXYCODONE HYDROCHLORIDE 10 MG: 10 TABLET ORAL at 09:22

## 2019-11-06 RX ADMIN — PIPERACILLIN SODIUM AND TAZOBACTAM SODIUM 3.38 G: 3; .375 INJECTION, POWDER, LYOPHILIZED, FOR SOLUTION INTRAVENOUS at 22:25

## 2019-11-06 RX ADMIN — PAROXETINE HYDROCHLORIDE 20 MG: 20 TABLET, FILM COATED ORAL at 14:12

## 2019-11-06 RX ADMIN — Medication 1500 MG: at 16:12

## 2019-11-06 RX ADMIN — MICONAZOLE NITRATE: 20 CREAM TOPICAL at 09:14

## 2019-11-06 RX ADMIN — FLUOCINONIDE: 0.05 OINTMENT TOPICAL at 20:56

## 2019-11-06 RX ADMIN — CIPROFLOXACIN 400 MG: 2 INJECTION, SOLUTION INTRAVENOUS at 10:44

## 2019-11-06 RX ADMIN — BUPROPION HYDROCHLORIDE 300 MG: 300 TABLET, FILM COATED, EXTENDED RELEASE ORAL at 09:09

## 2019-11-06 RX ADMIN — PIPERACILLIN SODIUM AND TAZOBACTAM SODIUM 3.38 G: 3; .375 INJECTION, POWDER, LYOPHILIZED, FOR SOLUTION INTRAVENOUS at 09:15

## 2019-11-06 RX ADMIN — MUPIROCIN: 20 OINTMENT TOPICAL at 09:14

## 2019-11-06 RX ADMIN — OXYCODONE HYDROCHLORIDE 10 MG: 10 TABLET ORAL at 02:50

## 2019-11-06 RX ADMIN — PAROXETINE HYDROCHLORIDE 20 MG: 20 TABLET, FILM COATED ORAL at 17:56

## 2019-11-06 RX ADMIN — OXYCODONE HYDROCHLORIDE 10 MG: 10 TABLET ORAL at 15:41

## 2019-11-06 RX ADMIN — LOPERAMIDE HYDROCHLORIDE 2 MG: 2 CAPSULE ORAL at 02:50

## 2019-11-06 RX ADMIN — PAROXETINE HYDROCHLORIDE 20 MG: 20 TABLET, FILM COATED ORAL at 09:09

## 2019-11-06 ASSESSMENT — PAIN SCALES - GENERAL
PAINLEVEL_OUTOF10: 9
PAINLEVEL_OUTOF10: 10
PAINLEVEL_OUTOF10: 8
PAINLEVEL_OUTOF10: 5
PAINLEVEL_OUTOF10: 9
PAINLEVEL_OUTOF10: 0
PAINLEVEL_OUTOF10: 9

## 2019-11-07 ENCOUNTER — APPOINTMENT (OUTPATIENT)
Dept: INTERVENTIONAL RADIOLOGY/VASCULAR | Age: 54
DRG: 720 | End: 2019-11-07
Payer: MEDICARE

## 2019-11-07 PROBLEM — K92.2 GI BLEED: Status: RESOLVED | Noted: 2019-05-17 | Resolved: 2019-11-07

## 2019-11-07 LAB
ANION GAP SERPL CALCULATED.3IONS-SCNC: 10 MMOL/L (ref 9–17)
BUN BLDV-MCNC: 12 MG/DL (ref 6–20)
BUN/CREAT BLD: ABNORMAL (ref 9–20)
CALCIUM SERPL-MCNC: 7.7 MG/DL (ref 8.6–10.4)
CAMPYLOBACTER PCR: NORMAL
CHLORIDE BLD-SCNC: 108 MMOL/L (ref 98–107)
CO2: 24 MMOL/L (ref 20–31)
CREAT SERPL-MCNC: 1.01 MG/DL (ref 0.7–1.2)
E COLI ENTEROTOXIGENIC PCR: NORMAL
GFR AFRICAN AMERICAN: >60 ML/MIN
GFR NON-AFRICAN AMERICAN: >60 ML/MIN
GFR SERPL CREATININE-BSD FRML MDRD: ABNORMAL ML/MIN/{1.73_M2}
GFR SERPL CREATININE-BSD FRML MDRD: ABNORMAL ML/MIN/{1.73_M2}
GLUCOSE BLD-MCNC: 102 MG/DL (ref 75–110)
GLUCOSE BLD-MCNC: 108 MG/DL (ref 75–110)
GLUCOSE BLD-MCNC: 123 MG/DL (ref 70–99)
GLUCOSE BLD-MCNC: 82 MG/DL (ref 75–110)
HCT VFR BLD CALC: 23.3 % (ref 41–53)
HCT VFR BLD CALC: 25.8 % (ref 41–53)
HEMOGLOBIN: 7 G/DL (ref 13.5–17.5)
HEMOGLOBIN: 7.7 G/DL (ref 13.5–17.5)
MCH RBC QN AUTO: 21.6 PG (ref 26–34)
MCHC RBC AUTO-ENTMCNC: 29.8 G/DL (ref 31–37)
MCV RBC AUTO: 72.4 FL (ref 80–100)
NRBC AUTOMATED: ABNORMAL PER 100 WBC
PDW BLD-RTO: 21 % (ref 11.5–14.9)
PLATELET # BLD: 235 K/UL (ref 150–450)
PLESIOMONAS SHIGELLOIDES PCR: NORMAL
PMV BLD AUTO: 6.9 FL (ref 6–12)
POTASSIUM SERPL-SCNC: 3.5 MMOL/L (ref 3.7–5.3)
RBC # BLD: 3.22 M/UL (ref 4.5–5.9)
SALMONELLA PCR: NORMAL
SHIGATOXIN GENE PCR: NORMAL
SHIGELLA SP PCR: NORMAL
SODIUM BLD-SCNC: 142 MMOL/L (ref 135–144)
SPECIMEN DESCRIPTION: NORMAL
VANCOMYCIN TROUGH DATE LAST DOSE: NORMAL
VANCOMYCIN TROUGH DOSE AMOUNT: NORMAL
VANCOMYCIN TROUGH TIME LAST DOSE: 318
VANCOMYCIN TROUGH: 11.9 UG/ML (ref 10–20)
VIBRIO PCR: NORMAL
WBC # BLD: 8 K/UL (ref 3.5–11)
YERSINIA ENTEROCOLITICA PCR: NORMAL

## 2019-11-07 PROCEDURE — 6360000002 HC RX W HCPCS: Performed by: INTERNAL MEDICINE

## 2019-11-07 PROCEDURE — 85018 HEMOGLOBIN: CPT

## 2019-11-07 PROCEDURE — 86900 BLOOD TYPING SEROLOGIC ABO: CPT

## 2019-11-07 PROCEDURE — 6360000002 HC RX W HCPCS: Performed by: FAMILY MEDICINE

## 2019-11-07 PROCEDURE — 99232 SBSQ HOSP IP/OBS MODERATE 35: CPT | Performed by: FAMILY MEDICINE

## 2019-11-07 PROCEDURE — 36430 TRANSFUSION BLD/BLD COMPNT: CPT

## 2019-11-07 PROCEDURE — 85027 COMPLETE CBC AUTOMATED: CPT

## 2019-11-07 PROCEDURE — 99232 SBSQ HOSP IP/OBS MODERATE 35: CPT | Performed by: INTERNAL MEDICINE

## 2019-11-07 PROCEDURE — 82947 ASSAY GLUCOSE BLOOD QUANT: CPT

## 2019-11-07 PROCEDURE — 6370000000 HC RX 637 (ALT 250 FOR IP): Performed by: INTERNAL MEDICINE

## 2019-11-07 PROCEDURE — 6370000000 HC RX 637 (ALT 250 FOR IP): Performed by: FAMILY MEDICINE

## 2019-11-07 PROCEDURE — APPNB30 APP NON BILLABLE TIME 0-30 MINS: Performed by: NURSE PRACTITIONER

## 2019-11-07 PROCEDURE — 36415 COLL VENOUS BLD VENIPUNCTURE: CPT

## 2019-11-07 PROCEDURE — 2709999900 IR FLUORO GUIDED CVA DEVICE PLMT/REPLACE/REMOVAL

## 2019-11-07 PROCEDURE — 85014 HEMATOCRIT: CPT

## 2019-11-07 PROCEDURE — P9016 RBC LEUKOCYTES REDUCED: HCPCS

## 2019-11-07 PROCEDURE — 80048 BASIC METABOLIC PNL TOTAL CA: CPT

## 2019-11-07 PROCEDURE — 2060000000 HC ICU INTERMEDIATE R&B

## 2019-11-07 PROCEDURE — 2580000003 HC RX 258: Performed by: RADIOLOGY

## 2019-11-07 PROCEDURE — 36573 INSJ PICC RS&I 5 YR+: CPT | Performed by: RADIOLOGY

## 2019-11-07 PROCEDURE — 2580000003 HC RX 258: Performed by: FAMILY MEDICINE

## 2019-11-07 PROCEDURE — 02HV33Z INSERTION OF INFUSION DEVICE INTO SUPERIOR VENA CAVA, PERCUTANEOUS APPROACH: ICD-10-PCS | Performed by: EMERGENCY MEDICINE

## 2019-11-07 PROCEDURE — 2580000003 HC RX 258: Performed by: INTERNAL MEDICINE

## 2019-11-07 PROCEDURE — 80202 ASSAY OF VANCOMYCIN: CPT

## 2019-11-07 RX ORDER — CIPROFLOXACIN 2 MG/ML
400 INJECTION, SOLUTION INTRAVENOUS EVERY 12 HOURS
Status: DISCONTINUED | OUTPATIENT
Start: 2019-11-07 | End: 2019-11-09 | Stop reason: HOSPADM

## 2019-11-07 RX ORDER — SODIUM CHLORIDE 0.9 % (FLUSH) 0.9 %
10 SYRINGE (ML) INJECTION PRN
Status: DISCONTINUED | OUTPATIENT
Start: 2019-11-07 | End: 2019-11-09 | Stop reason: HOSPADM

## 2019-11-07 RX ORDER — SODIUM CHLORIDE 0.9 % (FLUSH) 0.9 %
10 SYRINGE (ML) INJECTION EVERY 12 HOURS SCHEDULED
Status: DISCONTINUED | OUTPATIENT
Start: 2019-11-07 | End: 2019-11-09 | Stop reason: HOSPADM

## 2019-11-07 RX ORDER — 0.9 % SODIUM CHLORIDE 0.9 %
250 INTRAVENOUS SOLUTION INTRAVENOUS ONCE
Status: COMPLETED | OUTPATIENT
Start: 2019-11-07 | End: 2019-11-07

## 2019-11-07 RX ADMIN — FLUOCINONIDE: 0.05 OINTMENT TOPICAL at 21:07

## 2019-11-07 RX ADMIN — MICONAZOLE NITRATE: 20 CREAM TOPICAL at 07:49

## 2019-11-07 RX ADMIN — Medication 1500 MG: at 03:18

## 2019-11-07 RX ADMIN — Medication 1500 MG: at 17:06

## 2019-11-07 RX ADMIN — PIPERACILLIN SODIUM AND TAZOBACTAM SODIUM 3.38 G: 3; .375 INJECTION, POWDER, LYOPHILIZED, FOR SOLUTION INTRAVENOUS at 18:18

## 2019-11-07 RX ADMIN — PIPERACILLIN SODIUM AND TAZOBACTAM SODIUM 3.38 G: 3; .375 INJECTION, POWDER, LYOPHILIZED, FOR SOLUTION INTRAVENOUS at 05:17

## 2019-11-07 RX ADMIN — Medication 10 ML: at 22:43

## 2019-11-07 RX ADMIN — Medication 400 MG: at 21:12

## 2019-11-07 RX ADMIN — PAROXETINE HYDROCHLORIDE 20 MG: 20 TABLET, FILM COATED ORAL at 16:39

## 2019-11-07 RX ADMIN — MUPIROCIN: 20 OINTMENT TOPICAL at 07:49

## 2019-11-07 RX ADMIN — MUPIROCIN: 20 OINTMENT TOPICAL at 22:43

## 2019-11-07 RX ADMIN — BUPROPION HYDROCHLORIDE 300 MG: 300 TABLET, FILM COATED, EXTENDED RELEASE ORAL at 07:47

## 2019-11-07 RX ADMIN — POTASSIUM CHLORIDE 40 MEQ: 20 TABLET, EXTENDED RELEASE ORAL at 16:38

## 2019-11-07 RX ADMIN — LEVOTHYROXINE SODIUM 50 MCG: 50 TABLET ORAL at 05:17

## 2019-11-07 RX ADMIN — Medication 400 MG: at 07:47

## 2019-11-07 RX ADMIN — Medication 10 ML: at 22:44

## 2019-11-07 RX ADMIN — TAMSULOSIN HYDROCHLORIDE 0.4 MG: 0.4 CAPSULE ORAL at 07:47

## 2019-11-07 RX ADMIN — Medication 1 CAPSULE: at 16:38

## 2019-11-07 RX ADMIN — PANTOPRAZOLE SODIUM 40 MG: 40 TABLET, DELAYED RELEASE ORAL at 05:17

## 2019-11-07 RX ADMIN — CIPROFLOXACIN 400 MG: 2 INJECTION, SOLUTION INTRAVENOUS at 11:55

## 2019-11-07 RX ADMIN — FLUOCINONIDE: 0.05 OINTMENT TOPICAL at 07:49

## 2019-11-07 RX ADMIN — PAROXETINE HYDROCHLORIDE 20 MG: 20 TABLET, FILM COATED ORAL at 07:47

## 2019-11-07 RX ADMIN — LOPERAMIDE HYDROCHLORIDE 2 MG: 2 CAPSULE ORAL at 16:38

## 2019-11-07 RX ADMIN — OXYCODONE HYDROCHLORIDE 10 MG: 10 TABLET ORAL at 11:36

## 2019-11-07 RX ADMIN — ANTI-FUNGAL POWDER MICONAZOLE NITRATE TALC FREE: 1.42 POWDER TOPICAL at 21:07

## 2019-11-07 RX ADMIN — MORPHINE SULFATE 60 MG: 30 TABLET, FILM COATED, EXTENDED RELEASE ORAL at 08:04

## 2019-11-07 RX ADMIN — MICONAZOLE NITRATE: 20 CREAM TOPICAL at 21:07

## 2019-11-07 RX ADMIN — PAROXETINE HYDROCHLORIDE 20 MG: 20 TABLET, FILM COATED ORAL at 14:22

## 2019-11-07 RX ADMIN — Medication 10 ML: at 07:51

## 2019-11-07 RX ADMIN — MORPHINE SULFATE 60 MG: 30 TABLET, FILM COATED, EXTENDED RELEASE ORAL at 21:12

## 2019-11-07 RX ADMIN — OXYCODONE HYDROCHLORIDE 10 MG: 10 TABLET ORAL at 18:18

## 2019-11-07 RX ADMIN — SODIUM CHLORIDE 250 ML: 0.9 INJECTION, SOLUTION INTRAVENOUS at 15:00

## 2019-11-07 RX ADMIN — PIPERACILLIN SODIUM AND TAZOBACTAM SODIUM 3.38 G: 3; .375 INJECTION, POWDER, LYOPHILIZED, FOR SOLUTION INTRAVENOUS at 11:55

## 2019-11-07 RX ADMIN — ANTI-FUNGAL POWDER MICONAZOLE NITRATE TALC FREE: 1.42 POWDER TOPICAL at 07:49

## 2019-11-07 RX ADMIN — Medication 1 CAPSULE: at 07:47

## 2019-11-07 RX ADMIN — HEPARIN SODIUM 5000 UNITS: 5000 INJECTION INTRAVENOUS; SUBCUTANEOUS at 23:25

## 2019-11-07 RX ADMIN — PAROXETINE HYDROCHLORIDE 20 MG: 20 TABLET, FILM COATED ORAL at 21:07

## 2019-11-07 RX ADMIN — IRON SUCROSE 300 MG: 20 INJECTION, SOLUTION INTRAVENOUS at 14:22

## 2019-11-07 RX ADMIN — CIPROFLOXACIN 400 MG: 2 INJECTION, SOLUTION INTRAVENOUS at 23:29

## 2019-11-07 ASSESSMENT — PAIN SCALES - GENERAL
PAINLEVEL_OUTOF10: 7
PAINLEVEL_OUTOF10: 8
PAINLEVEL_OUTOF10: 9
PAINLEVEL_OUTOF10: 7
PAINLEVEL_OUTOF10: 8
PAINLEVEL_OUTOF10: 6
PAINLEVEL_OUTOF10: 8
PAINLEVEL_OUTOF10: 6

## 2019-11-07 ASSESSMENT — PAIN DESCRIPTION - PAIN TYPE
TYPE: CHRONIC PAIN
TYPE: CHRONIC PAIN

## 2019-11-07 ASSESSMENT — PAIN DESCRIPTION - LOCATION
LOCATION: FOOT
LOCATION: FOOT

## 2019-11-07 ASSESSMENT — PAIN DESCRIPTION - ORIENTATION
ORIENTATION: LEFT
ORIENTATION: LEFT

## 2019-11-08 ENCOUNTER — APPOINTMENT (OUTPATIENT)
Dept: NUCLEAR MEDICINE | Age: 54
DRG: 720 | End: 2019-11-08
Payer: MEDICARE

## 2019-11-08 LAB
ABO/RH: NORMAL
ANION GAP SERPL CALCULATED.3IONS-SCNC: 10 MMOL/L (ref 9–17)
ANTIBODY SCREEN: NEGATIVE
ARM BAND NUMBER: NORMAL
BLD PROD TYP BPU: NORMAL
BUN BLDV-MCNC: 13 MG/DL (ref 6–20)
BUN/CREAT BLD: ABNORMAL (ref 9–20)
CALCIUM SERPL-MCNC: 7.9 MG/DL (ref 8.6–10.4)
CHLORIDE BLD-SCNC: 108 MMOL/L (ref 98–107)
CO2: 26 MMOL/L (ref 20–31)
CREAT SERPL-MCNC: 0.97 MG/DL (ref 0.7–1.2)
CROSSMATCH RESULT: NORMAL
CULTURE: NORMAL
DISPENSE STATUS BLOOD BANK: NORMAL
EXPIRATION DATE: NORMAL
GFR AFRICAN AMERICAN: >60 ML/MIN
GFR NON-AFRICAN AMERICAN: >60 ML/MIN
GFR SERPL CREATININE-BSD FRML MDRD: ABNORMAL ML/MIN/{1.73_M2}
GFR SERPL CREATININE-BSD FRML MDRD: ABNORMAL ML/MIN/{1.73_M2}
GLUCOSE BLD-MCNC: 85 MG/DL (ref 70–99)
GLUCOSE BLD-MCNC: 85 MG/DL (ref 75–110)
GLUCOSE BLD-MCNC: 88 MG/DL (ref 75–110)
GLUCOSE BLD-MCNC: 93 MG/DL (ref 75–110)
HCT VFR BLD CALC: 25.7 % (ref 41–53)
HEMOGLOBIN: 7.7 G/DL (ref 13.5–17.5)
Lab: NORMAL
MCH RBC QN AUTO: 21.8 PG (ref 26–34)
MCHC RBC AUTO-ENTMCNC: 30 G/DL (ref 31–37)
MCV RBC AUTO: 72.7 FL (ref 80–100)
MYELOPEROXIDASE AB: 0 AU/ML (ref 0–19)
NRBC AUTOMATED: ABNORMAL PER 100 WBC
PDW BLD-RTO: 22.5 % (ref 11.5–14.9)
PLATELET # BLD: 265 K/UL (ref 150–450)
PMV BLD AUTO: 7.2 FL (ref 6–12)
POTASSIUM SERPL-SCNC: 3.6 MMOL/L (ref 3.7–5.3)
RBC # BLD: 3.54 M/UL (ref 4.5–5.9)
SODIUM BLD-SCNC: 144 MMOL/L (ref 135–144)
SPECIMEN DESCRIPTION: NORMAL
TRANSFUSION STATUS: NORMAL
UNIT DIVISION: 0
UNIT NUMBER: NORMAL
WBC # BLD: 8.6 K/UL (ref 3.5–11)

## 2019-11-08 PROCEDURE — 80048 BASIC METABOLIC PNL TOTAL CA: CPT

## 2019-11-08 PROCEDURE — 2580000003 HC RX 258: Performed by: INTERNAL MEDICINE

## 2019-11-08 PROCEDURE — 6360000002 HC RX W HCPCS: Performed by: FAMILY MEDICINE

## 2019-11-08 PROCEDURE — 6370000000 HC RX 637 (ALT 250 FOR IP): Performed by: INTERNAL MEDICINE

## 2019-11-08 PROCEDURE — A9503 TC99M MEDRONATE: HCPCS | Performed by: INTERNAL MEDICINE

## 2019-11-08 PROCEDURE — 99232 SBSQ HOSP IP/OBS MODERATE 35: CPT | Performed by: INTERNAL MEDICINE

## 2019-11-08 PROCEDURE — 6360000002 HC RX W HCPCS: Performed by: INTERNAL MEDICINE

## 2019-11-08 PROCEDURE — 6370000000 HC RX 637 (ALT 250 FOR IP): Performed by: NURSE PRACTITIONER

## 2019-11-08 PROCEDURE — 6370000000 HC RX 637 (ALT 250 FOR IP): Performed by: FAMILY MEDICINE

## 2019-11-08 PROCEDURE — 99232 SBSQ HOSP IP/OBS MODERATE 35: CPT | Performed by: FAMILY MEDICINE

## 2019-11-08 PROCEDURE — 82947 ASSAY GLUCOSE BLOOD QUANT: CPT

## 2019-11-08 PROCEDURE — APPNB30 APP NON BILLABLE TIME 0-30 MINS: Performed by: NURSE PRACTITIONER

## 2019-11-08 PROCEDURE — 78315 BONE IMAGING 3 PHASE: CPT

## 2019-11-08 PROCEDURE — 2060000000 HC ICU INTERMEDIATE R&B

## 2019-11-08 PROCEDURE — 85027 COMPLETE CBC AUTOMATED: CPT

## 2019-11-08 PROCEDURE — 2580000003 HC RX 258: Performed by: RADIOLOGY

## 2019-11-08 PROCEDURE — 3430000000 HC RX DIAGNOSTIC RADIOPHARMACEUTICAL: Performed by: INTERNAL MEDICINE

## 2019-11-08 PROCEDURE — 36415 COLL VENOUS BLD VENIPUNCTURE: CPT

## 2019-11-08 PROCEDURE — 2580000003 HC RX 258: Performed by: FAMILY MEDICINE

## 2019-11-08 RX ORDER — LOPERAMIDE HYDROCHLORIDE 2 MG/1
2 CAPSULE ORAL DAILY
Status: DISCONTINUED | OUTPATIENT
Start: 2019-11-08 | End: 2019-11-09 | Stop reason: HOSPADM

## 2019-11-08 RX ORDER — DOXYCYCLINE 100 MG/1
100 CAPSULE ORAL EVERY 12 HOURS SCHEDULED
Status: DISCONTINUED | OUTPATIENT
Start: 2019-11-08 | End: 2019-11-09 | Stop reason: HOSPADM

## 2019-11-08 RX ORDER — SODIUM CHLORIDE 0.9 % (FLUSH) 0.9 %
10 SYRINGE (ML) INJECTION PRN
Status: DISCONTINUED | OUTPATIENT
Start: 2019-11-08 | End: 2019-11-09 | Stop reason: HOSPADM

## 2019-11-08 RX ORDER — TC 99M MEDRONATE 20 MG/10ML
25 INJECTION, POWDER, LYOPHILIZED, FOR SOLUTION INTRAVENOUS
Status: COMPLETED | OUTPATIENT
Start: 2019-11-08 | End: 2019-11-08

## 2019-11-08 RX ORDER — METRONIDAZOLE 500 MG/1
500 TABLET ORAL EVERY 8 HOURS SCHEDULED
Status: DISCONTINUED | OUTPATIENT
Start: 2019-11-08 | End: 2019-11-09 | Stop reason: HOSPADM

## 2019-11-08 RX ADMIN — Medication 10 ML: at 11:00

## 2019-11-08 RX ADMIN — PIPERACILLIN SODIUM AND TAZOBACTAM SODIUM 3.38 G: 3; .375 INJECTION, POWDER, LYOPHILIZED, FOR SOLUTION INTRAVENOUS at 00:38

## 2019-11-08 RX ADMIN — Medication 10 ML: at 09:48

## 2019-11-08 RX ADMIN — METRONIDAZOLE 500 MG: 500 TABLET ORAL at 08:30

## 2019-11-08 RX ADMIN — Medication 10 ML: at 09:50

## 2019-11-08 RX ADMIN — TC 99M MEDRONATE 30 MILLICURIE: 20 INJECTION, POWDER, LYOPHILIZED, FOR SOLUTION INTRAVENOUS at 11:00

## 2019-11-08 RX ADMIN — OXYCODONE HYDROCHLORIDE 10 MG: 10 TABLET ORAL at 16:53

## 2019-11-08 RX ADMIN — ANTI-FUNGAL POWDER MICONAZOLE NITRATE TALC FREE: 1.42 POWDER TOPICAL at 20:40

## 2019-11-08 RX ADMIN — OXYCODONE HYDROCHLORIDE 10 MG: 10 TABLET ORAL at 09:52

## 2019-11-08 RX ADMIN — SODIUM CHLORIDE: 9 INJECTION, SOLUTION INTRAVENOUS at 00:39

## 2019-11-08 RX ADMIN — Medication 400 MG: at 09:47

## 2019-11-08 RX ADMIN — METRONIDAZOLE 500 MG: 500 TABLET ORAL at 21:15

## 2019-11-08 RX ADMIN — PAROXETINE HYDROCHLORIDE 20 MG: 20 TABLET, FILM COATED ORAL at 09:00

## 2019-11-08 RX ADMIN — Medication 10 ML: at 19:43

## 2019-11-08 RX ADMIN — DOXYCYCLINE 100 MG: 100 CAPSULE ORAL at 19:41

## 2019-11-08 RX ADMIN — Medication 1 CAPSULE: at 16:53

## 2019-11-08 RX ADMIN — Medication 1 CAPSULE: at 08:00

## 2019-11-08 RX ADMIN — OXYCODONE HYDROCHLORIDE 10 MG: 10 TABLET ORAL at 23:15

## 2019-11-08 RX ADMIN — MUPIROCIN: 20 OINTMENT TOPICAL at 20:40

## 2019-11-08 RX ADMIN — TAMSULOSIN HYDROCHLORIDE 0.4 MG: 0.4 CAPSULE ORAL at 09:47

## 2019-11-08 RX ADMIN — MICONAZOLE NITRATE: 20 CREAM TOPICAL at 09:49

## 2019-11-08 RX ADMIN — ANTI-FUNGAL POWDER MICONAZOLE NITRATE TALC FREE: 1.42 POWDER TOPICAL at 09:49

## 2019-11-08 RX ADMIN — FLUOCINONIDE: 0.05 OINTMENT TOPICAL at 09:48

## 2019-11-08 RX ADMIN — Medication 1500 MG: at 03:00

## 2019-11-08 RX ADMIN — MORPHINE SULFATE 60 MG: 30 TABLET, FILM COATED, EXTENDED RELEASE ORAL at 05:38

## 2019-11-08 RX ADMIN — FLUOCINONIDE: 0.05 OINTMENT TOPICAL at 19:42

## 2019-11-08 RX ADMIN — PANTOPRAZOLE SODIUM 40 MG: 40 TABLET, DELAYED RELEASE ORAL at 05:42

## 2019-11-08 RX ADMIN — PAROXETINE HYDROCHLORIDE 20 MG: 20 TABLET, FILM COATED ORAL at 12:23

## 2019-11-08 RX ADMIN — MUPIROCIN: 20 OINTMENT TOPICAL at 09:50

## 2019-11-08 RX ADMIN — METRONIDAZOLE 500 MG: 500 TABLET ORAL at 14:00

## 2019-11-08 RX ADMIN — Medication 400 MG: at 19:40

## 2019-11-08 RX ADMIN — PIPERACILLIN SODIUM AND TAZOBACTAM SODIUM 3.38 G: 3; .375 INJECTION, POWDER, LYOPHILIZED, FOR SOLUTION INTRAVENOUS at 05:42

## 2019-11-08 RX ADMIN — OXYCODONE HYDROCHLORIDE 10 MG: 10 TABLET ORAL at 00:11

## 2019-11-08 RX ADMIN — MORPHINE SULFATE 60 MG: 30 TABLET, FILM COATED, EXTENDED RELEASE ORAL at 18:14

## 2019-11-08 RX ADMIN — BUPROPION HYDROCHLORIDE 300 MG: 300 TABLET, FILM COATED, EXTENDED RELEASE ORAL at 09:47

## 2019-11-08 RX ADMIN — PAROXETINE HYDROCHLORIDE 20 MG: 20 TABLET, FILM COATED ORAL at 16:53

## 2019-11-08 RX ADMIN — CIPROFLOXACIN 400 MG: 2 INJECTION, SOLUTION INTRAVENOUS at 12:23

## 2019-11-08 RX ADMIN — Medication 10 ML: at 20:40

## 2019-11-08 RX ADMIN — MICONAZOLE NITRATE: 20 CREAM TOPICAL at 20:39

## 2019-11-08 RX ADMIN — LEVOTHYROXINE SODIUM 50 MCG: 50 TABLET ORAL at 05:42

## 2019-11-08 RX ADMIN — LOPERAMIDE HYDROCHLORIDE 2 MG: 2 CAPSULE ORAL at 12:23

## 2019-11-08 RX ADMIN — PAROXETINE HYDROCHLORIDE 20 MG: 20 TABLET, FILM COATED ORAL at 19:41

## 2019-11-08 RX ADMIN — DOXYCYCLINE 100 MG: 100 CAPSULE ORAL at 09:00

## 2019-11-08 ASSESSMENT — PAIN DESCRIPTION - PAIN TYPE: TYPE: CHRONIC PAIN

## 2019-11-08 ASSESSMENT — PAIN SCALES - GENERAL
PAINLEVEL_OUTOF10: 8
PAINLEVEL_OUTOF10: 4
PAINLEVEL_OUTOF10: 6
PAINLEVEL_OUTOF10: 5
PAINLEVEL_OUTOF10: 9
PAINLEVEL_OUTOF10: 6
PAINLEVEL_OUTOF10: 7
PAINLEVEL_OUTOF10: 4
PAINLEVEL_OUTOF10: 9
PAINLEVEL_OUTOF10: 8
PAINLEVEL_OUTOF10: 9

## 2019-11-08 ASSESSMENT — PAIN DESCRIPTION - ORIENTATION: ORIENTATION: LEFT;RIGHT

## 2019-11-08 ASSESSMENT — PAIN DESCRIPTION - FREQUENCY: FREQUENCY: CONTINUOUS

## 2019-11-08 ASSESSMENT — PAIN DESCRIPTION - LOCATION: LOCATION: FOOT

## 2019-11-08 ASSESSMENT — PAIN DESCRIPTION - ONSET: ONSET: ON-GOING

## 2019-11-09 LAB
ANION GAP SERPL CALCULATED.3IONS-SCNC: 12 MMOL/L (ref 9–17)
BUN BLDV-MCNC: 16 MG/DL (ref 6–20)
BUN/CREAT BLD: ABNORMAL (ref 9–20)
CALCIUM SERPL-MCNC: 8.2 MG/DL (ref 8.6–10.4)
CHLORIDE BLD-SCNC: 102 MMOL/L (ref 98–107)
CO2: 27 MMOL/L (ref 20–31)
CREAT SERPL-MCNC: 1.09 MG/DL (ref 0.7–1.2)
CULTURE: NORMAL
GFR AFRICAN AMERICAN: >60 ML/MIN
GFR NON-AFRICAN AMERICAN: >60 ML/MIN
GFR SERPL CREATININE-BSD FRML MDRD: ABNORMAL ML/MIN/{1.73_M2}
GFR SERPL CREATININE-BSD FRML MDRD: ABNORMAL ML/MIN/{1.73_M2}
GLUCOSE BLD-MCNC: 102 MG/DL (ref 75–110)
GLUCOSE BLD-MCNC: 104 MG/DL (ref 70–99)
GLUCOSE BLD-MCNC: 94 MG/DL (ref 75–110)
HCT VFR BLD CALC: 27 % (ref 41–53)
HEMOGLOBIN: 8.1 G/DL (ref 13.5–17.5)
Lab: NORMAL
MCH RBC QN AUTO: 22 PG (ref 26–34)
MCHC RBC AUTO-ENTMCNC: 30 G/DL (ref 31–37)
MCV RBC AUTO: 73.4 FL (ref 80–100)
NRBC AUTOMATED: ABNORMAL PER 100 WBC
PDW BLD-RTO: 22.2 % (ref 11.5–14.9)
PLATELET # BLD: 279 K/UL (ref 150–450)
PMV BLD AUTO: 7.4 FL (ref 6–12)
POTASSIUM SERPL-SCNC: 3.9 MMOL/L (ref 3.7–5.3)
RBC # BLD: 3.68 M/UL (ref 4.5–5.9)
SODIUM BLD-SCNC: 141 MMOL/L (ref 135–144)
SPECIMEN DESCRIPTION: NORMAL
WBC # BLD: 8.5 K/UL (ref 3.5–11)

## 2019-11-09 PROCEDURE — 80048 BASIC METABOLIC PNL TOTAL CA: CPT

## 2019-11-09 PROCEDURE — 82947 ASSAY GLUCOSE BLOOD QUANT: CPT

## 2019-11-09 PROCEDURE — 2580000003 HC RX 258: Performed by: FAMILY MEDICINE

## 2019-11-09 PROCEDURE — 99232 SBSQ HOSP IP/OBS MODERATE 35: CPT | Performed by: INTERNAL MEDICINE

## 2019-11-09 PROCEDURE — 6360000002 HC RX W HCPCS: Performed by: FAMILY MEDICINE

## 2019-11-09 PROCEDURE — 36415 COLL VENOUS BLD VENIPUNCTURE: CPT

## 2019-11-09 PROCEDURE — 99238 HOSP IP/OBS DSCHRG MGMT 30/<: CPT | Performed by: FAMILY MEDICINE

## 2019-11-09 PROCEDURE — 6370000000 HC RX 637 (ALT 250 FOR IP): Performed by: NURSE PRACTITIONER

## 2019-11-09 PROCEDURE — 6370000000 HC RX 637 (ALT 250 FOR IP): Performed by: FAMILY MEDICINE

## 2019-11-09 PROCEDURE — 6370000000 HC RX 637 (ALT 250 FOR IP): Performed by: INTERNAL MEDICINE

## 2019-11-09 PROCEDURE — 85027 COMPLETE CBC AUTOMATED: CPT

## 2019-11-09 RX ORDER — FUROSEMIDE 10 MG/ML
20 INJECTION INTRAMUSCULAR; INTRAVENOUS ONCE
Status: COMPLETED | OUTPATIENT
Start: 2019-11-09 | End: 2019-11-09

## 2019-11-09 RX ORDER — DOXYCYCLINE 100 MG/1
100 CAPSULE ORAL EVERY 12 HOURS SCHEDULED
Qty: 20 CAPSULE | Refills: 0 | Status: SHIPPED | OUTPATIENT
Start: 2019-11-09 | End: 2019-12-24

## 2019-11-09 RX ORDER — LOPERAMIDE HYDROCHLORIDE 2 MG/1
2 CAPSULE ORAL DAILY
Qty: 10 CAPSULE | Refills: 0 | Status: SHIPPED | OUTPATIENT
Start: 2019-11-10 | End: 2019-11-20

## 2019-11-09 RX ADMIN — MICONAZOLE NITRATE: 20 CREAM TOPICAL at 09:00

## 2019-11-09 RX ADMIN — PANTOPRAZOLE SODIUM 40 MG: 40 TABLET, DELAYED RELEASE ORAL at 06:12

## 2019-11-09 RX ADMIN — Medication 10 ML: at 08:39

## 2019-11-09 RX ADMIN — FUROSEMIDE 20 MG: 10 INJECTION, SOLUTION INTRAMUSCULAR; INTRAVENOUS at 09:31

## 2019-11-09 RX ADMIN — PAROXETINE HYDROCHLORIDE 20 MG: 20 TABLET, FILM COATED ORAL at 08:34

## 2019-11-09 RX ADMIN — CIPROFLOXACIN 400 MG: 2 INJECTION, SOLUTION INTRAVENOUS at 03:14

## 2019-11-09 RX ADMIN — ANTI-FUNGAL POWDER MICONAZOLE NITRATE TALC FREE: 1.42 POWDER TOPICAL at 09:00

## 2019-11-09 RX ADMIN — Medication 400 MG: at 08:34

## 2019-11-09 RX ADMIN — DOXYCYCLINE 100 MG: 100 CAPSULE ORAL at 08:34

## 2019-11-09 RX ADMIN — FLUOCINONIDE: 0.05 OINTMENT TOPICAL at 09:00

## 2019-11-09 RX ADMIN — MORPHINE SULFATE 60 MG: 30 TABLET, FILM COATED, EXTENDED RELEASE ORAL at 08:34

## 2019-11-09 RX ADMIN — TAMSULOSIN HYDROCHLORIDE 0.4 MG: 0.4 CAPSULE ORAL at 08:34

## 2019-11-09 RX ADMIN — METRONIDAZOLE 500 MG: 500 TABLET ORAL at 06:12

## 2019-11-09 RX ADMIN — Medication 1 CAPSULE: at 08:34

## 2019-11-09 RX ADMIN — LOPERAMIDE HYDROCHLORIDE 2 MG: 2 CAPSULE ORAL at 08:34

## 2019-11-09 RX ADMIN — LEVOTHYROXINE SODIUM 50 MCG: 50 TABLET ORAL at 06:12

## 2019-11-09 RX ADMIN — MUPIROCIN: 20 OINTMENT TOPICAL at 09:00

## 2019-11-09 RX ADMIN — BUPROPION HYDROCHLORIDE 300 MG: 300 TABLET, FILM COATED, EXTENDED RELEASE ORAL at 08:34

## 2019-11-09 ASSESSMENT — PAIN SCALES - GENERAL
PAINLEVEL_OUTOF10: 5
PAINLEVEL_OUTOF10: 7
PAINLEVEL_OUTOF10: 8

## 2019-11-11 ENCOUNTER — TELEPHONE (OUTPATIENT)
Dept: ONCOLOGY | Age: 54
End: 2019-11-11

## 2019-11-14 ENCOUNTER — TELEPHONE (OUTPATIENT)
Dept: PRIMARY CARE CLINIC | Age: 54
End: 2019-11-14

## 2019-11-14 DIAGNOSIS — L97.524 ULCERATED, FOOT, LEFT, WITH NECROSIS OF BONE (HCC): ICD-10-CM

## 2019-11-14 DIAGNOSIS — M86.172 OSTEOMYELITIS OF FOOT, LEFT, ACUTE (HCC): ICD-10-CM

## 2019-11-14 LAB
ALBUMIN (CALCULATED): 3.6 G/DL (ref 3.2–5.2)
ALBUMIN PERCENT: 58 % (ref 45–65)
ALPHA 1 PERCENT: 3 % (ref 3–6)
ALPHA 2 PERCENT: 10 % (ref 6–13)
ALPHA-1-GLOBULIN: 0.2 G/DL (ref 0.1–0.4)
ALPHA-2-GLOBULIN: 0.6 G/DL (ref 0.5–0.9)
BETA GLOBULIN: 0.7 G/DL (ref 0.5–1.1)
BETA PERCENT: 11 % (ref 11–19)
GAMMA GLOBULIN %: 17 % (ref 9–20)
GAMMA GLOBULIN: 1.1 G/DL (ref 0.5–1.5)
PATHOLOGIST: ABNORMAL
PROTEIN ELECTROPHORESIS, SERUM: ABNORMAL
TOTAL PROT. SUM,%: 99 % (ref 98–102)
TOTAL PROT. SUM: 6.2 G/DL (ref 6.3–8.2)
TOTAL PROTEIN: 6.2 G/DL (ref 6.4–8.3)

## 2019-11-15 VITALS
HEART RATE: 70 BPM | WEIGHT: 256.84 LBS | RESPIRATION RATE: 16 BRPM | SYSTOLIC BLOOD PRESSURE: 127 MMHG | OXYGEN SATURATION: 98 % | TEMPERATURE: 98.5 F | DIASTOLIC BLOOD PRESSURE: 60 MMHG | BODY MASS INDEX: 32.96 KG/M2 | HEIGHT: 74 IN

## 2019-11-15 RX ORDER — OXYCODONE HYDROCHLORIDE 10 MG/1
10 TABLET ORAL EVERY 6 HOURS PRN
Qty: 120 TABLET | Refills: 0 | Status: SHIPPED | OUTPATIENT
Start: 2019-11-15 | End: 2019-12-20 | Stop reason: SDUPTHER

## 2019-11-15 RX ORDER — MORPHINE SULFATE 60 MG/1
60 TABLET, FILM COATED, EXTENDED RELEASE ORAL 2 TIMES DAILY
Qty: 60 TABLET | Refills: 0 | Status: SHIPPED | OUTPATIENT
Start: 2019-11-15 | End: 2019-12-20 | Stop reason: SDUPTHER

## 2019-11-21 ENCOUNTER — OFFICE VISIT (OUTPATIENT)
Dept: PODIATRY | Age: 54
End: 2019-11-21
Payer: MEDICARE

## 2019-11-21 VITALS — HEIGHT: 74 IN | BODY MASS INDEX: 32.34 KG/M2 | WEIGHT: 252 LBS

## 2019-11-21 DIAGNOSIS — L97.509 TYPE 2 DIABETES MELLITUS WITH FOOT ULCER, WITHOUT LONG-TERM CURRENT USE OF INSULIN (HCC): ICD-10-CM

## 2019-11-21 DIAGNOSIS — E11.621 TYPE 2 DIABETES MELLITUS WITH FOOT ULCER, WITHOUT LONG-TERM CURRENT USE OF INSULIN (HCC): ICD-10-CM

## 2019-11-21 DIAGNOSIS — E11.42 TYPE 2 DIABETES MELLITUS WITH DIABETIC POLYNEUROPATHY, WITHOUT LONG-TERM CURRENT USE OF INSULIN (HCC): ICD-10-CM

## 2019-11-21 DIAGNOSIS — L97.522 ULCER OF LEFT FOOT, WITH FAT LAYER EXPOSED (HCC): Primary | ICD-10-CM

## 2019-11-21 PROCEDURE — 97597 DBRDMT OPN WND 1ST 20 CM/<: CPT | Performed by: PODIATRIST

## 2019-11-21 PROCEDURE — 99999 PR OFFICE/OUTPT VISIT,PROCEDURE ONLY: CPT | Performed by: PODIATRIST

## 2019-11-21 RX ORDER — NALOXONE HYDROCHLORIDE 4 MG/.1ML
4 SPRAY NASAL PRN
COMMUNITY
Start: 2019-10-29

## 2019-11-25 ENCOUNTER — TELEPHONE (OUTPATIENT)
Dept: GASTROENTEROLOGY | Age: 54
End: 2019-11-25

## 2019-12-20 DIAGNOSIS — F34.1 DYSTHYMIA: ICD-10-CM

## 2019-12-20 DIAGNOSIS — M86.172 OSTEOMYELITIS OF FOOT, LEFT, ACUTE (HCC): ICD-10-CM

## 2019-12-20 DIAGNOSIS — L97.524 ULCERATED, FOOT, LEFT, WITH NECROSIS OF BONE (HCC): ICD-10-CM

## 2019-12-20 RX ORDER — MORPHINE SULFATE 60 MG/1
60 TABLET, FILM COATED, EXTENDED RELEASE ORAL 2 TIMES DAILY
Qty: 60 TABLET | Refills: 0 | Status: SHIPPED | OUTPATIENT
Start: 2019-12-20 | End: 2020-01-15 | Stop reason: SDUPTHER

## 2019-12-20 RX ORDER — OXYCODONE HYDROCHLORIDE 10 MG/1
10 TABLET ORAL EVERY 6 HOURS PRN
Qty: 120 TABLET | Refills: 0 | Status: SHIPPED | OUTPATIENT
Start: 2019-12-20 | End: 2020-01-15 | Stop reason: SDUPTHER

## 2019-12-20 RX ORDER — DIAZEPAM 5 MG/1
5 TABLET ORAL EVERY 6 HOURS PRN
Qty: 120 TABLET | Refills: 0 | Status: SHIPPED | OUTPATIENT
Start: 2019-12-20 | End: 2020-01-21

## 2019-12-24 ENCOUNTER — HOSPITAL ENCOUNTER (OUTPATIENT)
Age: 54
Setting detail: SPECIMEN
Discharge: HOME | End: 2019-12-24
Payer: MEDICARE

## 2019-12-24 ENCOUNTER — OFFICE VISIT (OUTPATIENT)
Dept: PRIMARY CARE CLINIC | Age: 54
End: 2019-12-24
Payer: MEDICARE

## 2019-12-24 VITALS
WEIGHT: 216.4 LBS | HEIGHT: 74 IN | OXYGEN SATURATION: 97 % | SYSTOLIC BLOOD PRESSURE: 128 MMHG | BODY MASS INDEX: 27.77 KG/M2 | HEART RATE: 128 BPM | DIASTOLIC BLOOD PRESSURE: 62 MMHG

## 2019-12-24 DIAGNOSIS — K92.0 HEMATEMESIS WITH NAUSEA: ICD-10-CM

## 2019-12-24 DIAGNOSIS — Z23 NEED FOR VIRAL IMMUNIZATION: ICD-10-CM

## 2019-12-24 DIAGNOSIS — M86.172 OSTEOMYELITIS OF FOOT, LEFT, ACUTE (HCC): Primary | ICD-10-CM

## 2019-12-24 DIAGNOSIS — L97.524 ULCERATED, FOOT, LEFT, WITH NECROSIS OF BONE (HCC): ICD-10-CM

## 2019-12-24 DIAGNOSIS — Z79.4 TYPE 2 DIABETES MELLITUS WITH FOOT ULCER, WITH LONG-TERM CURRENT USE OF INSULIN (HCC): ICD-10-CM

## 2019-12-24 DIAGNOSIS — L97.509 TYPE 2 DIABETES MELLITUS WITH FOOT ULCER, WITH LONG-TERM CURRENT USE OF INSULIN (HCC): ICD-10-CM

## 2019-12-24 DIAGNOSIS — D50.0 IRON DEFICIENCY ANEMIA DUE TO CHRONIC BLOOD LOSS: ICD-10-CM

## 2019-12-24 DIAGNOSIS — E11.621 TYPE 2 DIABETES MELLITUS WITH FOOT ULCER, WITH LONG-TERM CURRENT USE OF INSULIN (HCC): ICD-10-CM

## 2019-12-24 DIAGNOSIS — R63.4 ABNORMAL WEIGHT LOSS: ICD-10-CM

## 2019-12-24 PROBLEM — L03.116 CELLULITIS OF LEFT KNEE: Status: RESOLVED | Noted: 2019-05-18 | Resolved: 2019-12-24

## 2019-12-24 PROBLEM — E87.8 ELECTROLYTE DISORDER: Status: RESOLVED | Noted: 2019-05-18 | Resolved: 2019-12-24

## 2019-12-24 PROBLEM — M62.82 NON-TRAUMATIC RHABDOMYOLYSIS: Status: RESOLVED | Noted: 2018-02-11 | Resolved: 2019-12-24

## 2019-12-24 PROBLEM — I95.9 HYPOTENSION: Status: RESOLVED | Noted: 2018-10-20 | Resolved: 2019-12-24

## 2019-12-24 PROBLEM — A41.9 SEPSIS (HCC): Status: RESOLVED | Noted: 2019-11-03 | Resolved: 2019-12-24

## 2019-12-24 PROBLEM — I80.3 THROMBOPHLEBITIS LEG: Status: RESOLVED | Noted: 2018-03-07 | Resolved: 2019-12-24

## 2019-12-24 PROBLEM — R40.0 SOMNOLENCE: Status: RESOLVED | Noted: 2019-11-03 | Resolved: 2019-12-24

## 2019-12-24 LAB
ABSOLUTE EOS #: 0.18 K/UL (ref 0–0.44)
ABSOLUTE IMMATURE GRANULOCYTE: 0.05 K/UL (ref 0–0.3)
ABSOLUTE LYMPH #: 2.34 K/UL (ref 1.1–3.7)
ABSOLUTE MONO #: 0.97 K/UL (ref 0.1–1.2)
ALBUMIN SERPL-MCNC: 4.2 G/DL (ref 3.5–5.2)
ALBUMIN/GLOBULIN RATIO: 0.8 (ref 1–2.5)
ALP BLD-CCNC: 106 U/L (ref 40–129)
ALT SERPL-CCNC: 8 U/L (ref 5–41)
AMYLASE: 76 U/L (ref 28–100)
ANION GAP SERPL CALCULATED.3IONS-SCNC: 14 MMOL/L (ref 9–17)
AST SERPL-CCNC: 14 U/L
BASOPHILS # BLD: 1 % (ref 0–2)
BASOPHILS ABSOLUTE: 0.08 K/UL (ref 0–0.2)
BILIRUB SERPL-MCNC: 0.35 MG/DL (ref 0.3–1.2)
BILIRUBIN DIRECT: 0.08 MG/DL
BILIRUBIN, INDIRECT: 0.27 MG/DL (ref 0–1)
BUN BLDV-MCNC: 20 MG/DL (ref 6–20)
BUN/CREAT BLD: ABNORMAL (ref 9–20)
CALCIUM SERPL-MCNC: 9.9 MG/DL (ref 8.6–10.4)
CHLORIDE BLD-SCNC: 96 MMOL/L (ref 98–107)
CO2: 26 MMOL/L (ref 20–31)
CREAT SERPL-MCNC: 1.08 MG/DL (ref 0.7–1.2)
CREATININE URINE POCT: ABNORMAL
DIFFERENTIAL TYPE: ABNORMAL
EOSINOPHILS RELATIVE PERCENT: 2 % (ref 1–4)
FERRITIN: 59 UG/L (ref 30–400)
GFR AFRICAN AMERICAN: >60 ML/MIN
GFR NON-AFRICAN AMERICAN: >60 ML/MIN
GFR SERPL CREATININE-BSD FRML MDRD: ABNORMAL ML/MIN/{1.73_M2}
GFR SERPL CREATININE-BSD FRML MDRD: ABNORMAL ML/MIN/{1.73_M2}
GLOBULIN: ABNORMAL G/DL (ref 1.5–3.8)
GLUCOSE BLD-MCNC: 120 MG/DL (ref 70–99)
HBA1C MFR BLD: 5.2 %
HCT VFR BLD CALC: 43.6 % (ref 40.7–50.3)
HEMOGLOBIN: 12.5 G/DL (ref 13–17)
IMMATURE GRANULOCYTES: 0 %
IRON SATURATION: 15 % (ref 20–55)
IRON: 59 UG/DL (ref 59–158)
LIPASE: 40 U/L (ref 13–60)
LYMPHOCYTES # BLD: 19 % (ref 24–43)
MCH RBC QN AUTO: 22.9 PG (ref 25.2–33.5)
MCHC RBC AUTO-ENTMCNC: 28.7 G/DL (ref 28.4–34.8)
MCV RBC AUTO: 79.7 FL (ref 82.6–102.9)
MICROALBUMIN/CREAT 24H UR: ABNORMAL MG/G{CREAT}
MICROALBUMIN/CREAT UR-RTO: ABNORMAL
MONOCYTES # BLD: 8 % (ref 3–12)
NRBC AUTOMATED: 0 PER 100 WBC
PDW BLD-RTO: 17.9 % (ref 11.8–14.4)
PLATELET # BLD: 611 K/UL (ref 138–453)
PLATELET ESTIMATE: ABNORMAL
PMV BLD AUTO: 9.1 FL (ref 8.1–13.5)
POTASSIUM SERPL-SCNC: 5.1 MMOL/L (ref 3.7–5.3)
RBC # BLD: 5.47 M/UL (ref 4.21–5.77)
RBC # BLD: ABNORMAL 10*6/UL
SEG NEUTROPHILS: 70 % (ref 36–65)
SEGMENTED NEUTROPHILS ABSOLUTE COUNT: 8.56 K/UL (ref 1.5–8.1)
SODIUM BLD-SCNC: 136 MMOL/L (ref 135–144)
TOTAL IRON BINDING CAPACITY: 403 UG/DL (ref 250–450)
TOTAL PROTEIN: 9.5 G/DL (ref 6.4–8.3)
UNSATURATED IRON BINDING CAPACITY: 344 UG/DL (ref 112–347)
WBC # BLD: 12.2 K/UL (ref 3.5–11.3)
WBC # BLD: ABNORMAL 10*3/UL

## 2019-12-24 PROCEDURE — 3044F HG A1C LEVEL LT 7.0%: CPT | Performed by: FAMILY MEDICINE

## 2019-12-24 PROCEDURE — 1036F TOBACCO NON-USER: CPT | Performed by: FAMILY MEDICINE

## 2019-12-24 PROCEDURE — 3017F COLORECTAL CA SCREEN DOC REV: CPT | Performed by: FAMILY MEDICINE

## 2019-12-24 PROCEDURE — G8427 DOCREV CUR MEDS BY ELIG CLIN: HCPCS | Performed by: FAMILY MEDICINE

## 2019-12-24 PROCEDURE — 1111F DSCHRG MED/CURRENT MED MERGE: CPT | Performed by: FAMILY MEDICINE

## 2019-12-24 PROCEDURE — 2022F DILAT RTA XM EVC RTNOPTHY: CPT | Performed by: FAMILY MEDICINE

## 2019-12-24 PROCEDURE — 90471 IMMUNIZATION ADMIN: CPT | Performed by: FAMILY MEDICINE

## 2019-12-24 PROCEDURE — G8417 CALC BMI ABV UP PARAM F/U: HCPCS | Performed by: FAMILY MEDICINE

## 2019-12-24 PROCEDURE — 82044 UR ALBUMIN SEMIQUANTITATIVE: CPT | Performed by: FAMILY MEDICINE

## 2019-12-24 PROCEDURE — 99214 OFFICE O/P EST MOD 30 MIN: CPT | Performed by: FAMILY MEDICINE

## 2019-12-24 PROCEDURE — 83036 HEMOGLOBIN GLYCOSYLATED A1C: CPT | Performed by: FAMILY MEDICINE

## 2019-12-24 PROCEDURE — G8482 FLU IMMUNIZE ORDER/ADMIN: HCPCS | Performed by: FAMILY MEDICINE

## 2019-12-24 PROCEDURE — 90686 IIV4 VACC NO PRSV 0.5 ML IM: CPT | Performed by: FAMILY MEDICINE

## 2019-12-24 RX ORDER — MEGESTROL ACETATE 40 MG/ML
400 SUSPENSION ORAL DAILY
Qty: 240 ML | Refills: 3 | Status: SHIPPED | OUTPATIENT
Start: 2019-12-24 | End: 2022-02-24

## 2019-12-24 ASSESSMENT — ENCOUNTER SYMPTOMS
EYE DISCHARGE: 0
SHORTNESS OF BREATH: 0
WHEEZING: 0
EYE REDNESS: 0
ABDOMINAL PAIN: 0
SORE THROAT: 0
NAUSEA: 0
DIARRHEA: 0
COUGH: 0
VOMITING: 0
RHINORRHEA: 0

## 2019-12-28 ENCOUNTER — APPOINTMENT (OUTPATIENT)
Dept: GENERAL RADIOLOGY | Age: 54
End: 2019-12-28
Payer: MEDICARE

## 2019-12-28 ENCOUNTER — APPOINTMENT (OUTPATIENT)
Dept: CT IMAGING | Age: 54
End: 2019-12-28
Payer: MEDICARE

## 2019-12-28 ENCOUNTER — APPOINTMENT (OUTPATIENT)
Dept: GENERAL RADIOLOGY | Age: 54
DRG: 710 | End: 2019-12-28
Payer: MEDICARE

## 2019-12-28 ENCOUNTER — HOSPITAL ENCOUNTER (INPATIENT)
Age: 54
LOS: 9 days | Discharge: HOME HEALTH | DRG: 710 | End: 2020-01-06
Attending: EMERGENCY MEDICINE | Admitting: INTERNAL MEDICINE
Payer: MEDICARE

## 2019-12-28 ENCOUNTER — HOSPITAL ENCOUNTER (EMERGENCY)
Age: 54
Discharge: ANOTHER HOSPITAL | End: 2019-12-28
Attending: EMERGENCY MEDICINE
Payer: MEDICARE

## 2019-12-28 VITALS
HEIGHT: 74 IN | BODY MASS INDEX: 27.72 KG/M2 | WEIGHT: 216 LBS | TEMPERATURE: 98.2 F | DIASTOLIC BLOOD PRESSURE: 58 MMHG | HEART RATE: 96 BPM | SYSTOLIC BLOOD PRESSURE: 108 MMHG | RESPIRATION RATE: 6 BRPM | OXYGEN SATURATION: 93 %

## 2019-12-28 DIAGNOSIS — E87.5 HYPERKALEMIA: ICD-10-CM

## 2019-12-28 DIAGNOSIS — N17.9 AKI (ACUTE KIDNEY INJURY) (HCC): ICD-10-CM

## 2019-12-28 DIAGNOSIS — A41.9 SEPTIC SHOCK (HCC): ICD-10-CM

## 2019-12-28 DIAGNOSIS — R41.82 ALTERED MENTAL STATUS, UNSPECIFIED ALTERED MENTAL STATUS TYPE: Primary | ICD-10-CM

## 2019-12-28 DIAGNOSIS — L03.115 CELLULITIS OF RIGHT LEG: ICD-10-CM

## 2019-12-28 DIAGNOSIS — R65.21 SEPTIC SHOCK (HCC): ICD-10-CM

## 2019-12-28 PROBLEM — N19 RENAL FAILURE: Status: ACTIVE | Noted: 2019-12-28

## 2019-12-28 PROBLEM — G93.49 ENCEPHALOPATHY DUE TO INFECTION: Status: ACTIVE | Noted: 2019-12-28

## 2019-12-28 PROBLEM — B99.9 ENCEPHALOPATHY DUE TO INFECTION: Status: ACTIVE | Noted: 2019-12-28

## 2019-12-28 LAB
-: ABNORMAL
-: NORMAL
ABO/RH: NORMAL
ABSOLUTE BANDS #: 0.6 K/UL (ref 0–1)
ABSOLUTE EOS #: 0 K/UL (ref 0–0.4)
ABSOLUTE IMMATURE GRANULOCYTE: ABNORMAL K/UL (ref 0–0.3)
ABSOLUTE LYMPH #: 0.8 K/UL (ref 1–4.8)
ABSOLUTE MONO #: 0.6 K/UL (ref 0.1–1.3)
ACETAMINOPHEN LEVEL: <5 UG/ML (ref 10–30)
ALBUMIN SERPL-MCNC: 3.5 G/DL (ref 3.5–5.2)
ALBUMIN/GLOBULIN RATIO: ABNORMAL (ref 1–2.5)
ALLEN TEST: ABNORMAL
ALP BLD-CCNC: 87 U/L (ref 40–129)
ALT SERPL-CCNC: 12 U/L (ref 5–41)
AMMONIA: 41 UMOL/L (ref 16–60)
AMORPHOUS: ABNORMAL
AMPHETAMINE SCREEN URINE: NEGATIVE
ANION GAP SERPL CALCULATED.3IONS-SCNC: 20 MMOL/L (ref 9–17)
ANION GAP SERPL CALCULATED.3IONS-SCNC: 22 MMOL/L (ref 9–17)
ANTIBODY SCREEN: NEGATIVE
ARM BAND NUMBER: NORMAL
AST SERPL-CCNC: 16 U/L
BACTERIA: ABNORMAL
BANDS: 3 % (ref 0–10)
BARBITURATE SCREEN URINE: NEGATIVE
BASOPHILS # BLD: 0 % (ref 0–2)
BASOPHILS ABSOLUTE: 0 K/UL (ref 0–0.2)
BENZODIAZEPINE SCREEN, URINE: POSITIVE
BILIRUB SERPL-MCNC: 0.26 MG/DL (ref 0.3–1.2)
BILIRUBIN URINE: ABNORMAL
BUN BLDV-MCNC: 102 MG/DL (ref 6–20)
BUN BLDV-MCNC: 110 MG/DL (ref 6–20)
BUN/CREAT BLD: ABNORMAL (ref 9–20)
BUN/CREAT BLD: ABNORMAL (ref 9–20)
BUPRENORPHINE URINE: ABNORMAL
CALCIUM IONIZED: 0.85 MMOL/L (ref 1.13–1.33)
CALCIUM SERPL-MCNC: 8.2 MG/DL (ref 8.6–10.4)
CALCIUM SERPL-MCNC: 8.6 MG/DL (ref 8.6–10.4)
CANNABINOID SCREEN URINE: NEGATIVE
CASTS UA: ABNORMAL /LPF
CHLORIDE BLD-SCNC: 90 MMOL/L (ref 98–107)
CHLORIDE BLD-SCNC: 94 MMOL/L (ref 98–107)
CO2: 18 MMOL/L (ref 20–31)
CO2: 21 MMOL/L (ref 20–31)
COCAINE METABOLITE, URINE: NEGATIVE
COLOR: ABNORMAL
COMMENT UA: ABNORMAL
CREAT SERPL-MCNC: 9.04 MG/DL (ref 0.7–1.2)
CREAT SERPL-MCNC: 9.47 MG/DL (ref 0.7–1.2)
CRYSTALS, UA: ABNORMAL /HPF
DIFFERENTIAL TYPE: ABNORMAL
EOSINOPHILS RELATIVE PERCENT: 0 % (ref 0–4)
EPITHELIAL CELLS UA: ABNORMAL /HPF
ETHANOL PERCENT: <0.01 %
ETHANOL: <10 MG/DL
EXPIRATION DATE: NORMAL
FIO2: 28
GFR AFRICAN AMERICAN: 7 ML/MIN
GFR AFRICAN AMERICAN: 7 ML/MIN
GFR NON-AFRICAN AMERICAN: 6 ML/MIN
GFR NON-AFRICAN AMERICAN: 6 ML/MIN
GFR SERPL CREATININE-BSD FRML MDRD: ABNORMAL ML/MIN/{1.73_M2}
GLUCOSE BLD-MCNC: 115 MG/DL (ref 70–99)
GLUCOSE BLD-MCNC: 118 MG/DL (ref 70–99)
GLUCOSE URINE: ABNORMAL
HCT VFR BLD CALC: 33.1 % (ref 41–53)
HEMOGLOBIN: 10.1 G/DL (ref 13.5–17.5)
IMMATURE GRANULOCYTES: ABNORMAL %
KETONES, URINE: NEGATIVE
LACTIC ACID, WHOLE BLOOD: NORMAL MMOL/L (ref 0.7–2.1)
LACTIC ACID: 1.2 MMOL/L (ref 0.5–2.2)
LEUKOCYTE ESTERASE, URINE: NEGATIVE
LIPASE: 15 U/L (ref 13–60)
LYMPHOCYTES # BLD: 4 % (ref 24–44)
MAGNESIUM: 2.1 MG/DL (ref 1.6–2.6)
MCH RBC QN AUTO: 23.1 PG (ref 26–34)
MCHC RBC AUTO-ENTMCNC: 30.6 G/DL (ref 31–37)
MCV RBC AUTO: 75.4 FL (ref 80–100)
MDMA URINE: ABNORMAL
METHADONE SCREEN, URINE: NEGATIVE
METHAMPHETAMINE, URINE: ABNORMAL
MODE: ABNORMAL
MONOCYTES # BLD: 3 % (ref 1–7)
MORPHOLOGY: ABNORMAL
MRSA, DNA, NASAL: ABNORMAL
MUCUS: ABNORMAL
NEGATIVE BASE EXCESS, ART: 4 (ref 0–2)
NITRITE, URINE: NEGATIVE
NRBC AUTOMATED: ABNORMAL PER 100 WBC
O2 DEVICE/FLOW/%: ABNORMAL
OPIATES, URINE: POSITIVE
OTHER OBSERVATIONS UA: ABNORMAL
OXYCODONE SCREEN URINE: POSITIVE
PATIENT TEMP: ABNORMAL
PDW BLD-RTO: 20.8 % (ref 11.5–14.9)
PH UA: 5 (ref 5–8)
PHENCYCLIDINE, URINE: NEGATIVE
PHOSPHORUS: 7.3 MG/DL (ref 2.5–4.5)
PLATELET # BLD: 450 K/UL (ref 150–450)
PLATELET ESTIMATE: ABNORMAL
PMV BLD AUTO: 7.6 FL (ref 6–12)
POC HCO3: 22 MMOL/L (ref 21–28)
POC LACTIC ACID: 0.55 MMOL/L (ref 0.56–1.39)
POC O2 SATURATION: 96 % (ref 94–98)
POC PCO2 TEMP: ABNORMAL MM HG
POC PCO2: 44 MM HG (ref 35–48)
POC PH TEMP: ABNORMAL
POC PH: 7.31 (ref 7.35–7.45)
POC PO2 TEMP: ABNORMAL MM HG
POC PO2: 87.9 MM HG (ref 83–108)
POSITIVE BASE EXCESS, ART: ABNORMAL (ref 0–3)
POTASSIUM SERPL-SCNC: 5.2 MMOL/L (ref 3.7–5.3)
POTASSIUM SERPL-SCNC: 6.9 MMOL/L (ref 3.7–5.3)
PROPOXYPHENE, URINE: ABNORMAL
PROTEIN UA: ABNORMAL
RBC # BLD: 4.38 M/UL (ref 4.5–5.9)
RBC # BLD: ABNORMAL 10*6/UL
RBC UA: ABNORMAL /HPF
REASON FOR REJECTION: NORMAL
RENAL EPITHELIAL, UA: ABNORMAL /HPF
SALICYLATE LEVEL: <1 MG/DL (ref 3–10)
SAMPLE SITE: ABNORMAL
SEG NEUTROPHILS: 90 % (ref 36–66)
SEGMENTED NEUTROPHILS ABSOLUTE COUNT: 18.1 K/UL (ref 1.3–9.1)
SODIUM BLD-SCNC: 132 MMOL/L (ref 135–144)
SODIUM BLD-SCNC: 133 MMOL/L (ref 135–144)
SPECIFIC GRAVITY UA: 1.02 (ref 1–1.03)
SPECIMEN DESCRIPTION: ABNORMAL
TCO2 (CALC), ART: 23 MMOL/L (ref 22–29)
TEST INFORMATION: ABNORMAL
TOTAL PROTEIN: 8.2 G/DL (ref 6.4–8.3)
TOXIC TRICYCLIC SC,BLOOD: ABNORMAL
TRICHOMONAS: ABNORMAL
TRICYCLIC ANTIDEP,URINE: NEGATIVE
TRICYCLIC ANTIDEPRESSANTS, UR: ABNORMAL
TROPONIN INTERP: ABNORMAL
TROPONIN INTERP: ABNORMAL
TROPONIN T: ABNORMAL NG/ML
TROPONIN T: ABNORMAL NG/ML
TROPONIN, HIGH SENSITIVITY: 124 NG/L (ref 0–22)
TROPONIN, HIGH SENSITIVITY: 141 NG/L (ref 0–22)
TSH SERPL DL<=0.05 MIU/L-ACNC: 1.15 MIU/L (ref 0.3–5)
TURBIDITY: ABNORMAL
URIC ACID: 10.7 MG/DL (ref 3.4–7)
URINE HGB: NEGATIVE
UROBILINOGEN, URINE: NORMAL
WBC # BLD: 20.1 K/UL (ref 3.5–11)
WBC # BLD: ABNORMAL 10*3/UL
WBC UA: ABNORMAL /HPF
YEAST: ABNORMAL
ZZ NTE CLEAN UP: ORDERED TEST: NORMAL
ZZ NTE WITH NAME CLEAN UP: SPECIMEN SOURCE: NORMAL

## 2019-12-28 PROCEDURE — 36556 INSERT NON-TUNNEL CV CATH: CPT

## 2019-12-28 PROCEDURE — 94664 DEMO&/EVAL PT USE INHALER: CPT

## 2019-12-28 PROCEDURE — 51702 INSERT TEMP BLADDER CATH: CPT

## 2019-12-28 PROCEDURE — 2580000003 HC RX 258: Performed by: INTERNAL MEDICINE

## 2019-12-28 PROCEDURE — 87205 SMEAR GRAM STAIN: CPT

## 2019-12-28 PROCEDURE — 96368 THER/DIAG CONCURRENT INF: CPT

## 2019-12-28 PROCEDURE — 94640 AIRWAY INHALATION TREATMENT: CPT

## 2019-12-28 PROCEDURE — 6360000002 HC RX W HCPCS: Performed by: INTERNAL MEDICINE

## 2019-12-28 PROCEDURE — 93005 ELECTROCARDIOGRAM TRACING: CPT | Performed by: EMERGENCY MEDICINE

## 2019-12-28 PROCEDURE — 83605 ASSAY OF LACTIC ACID: CPT

## 2019-12-28 PROCEDURE — 2500000003 HC RX 250 WO HCPCS: Performed by: EMERGENCY MEDICINE

## 2019-12-28 PROCEDURE — 6360000002 HC RX W HCPCS: Performed by: EMERGENCY MEDICINE

## 2019-12-28 PROCEDURE — 84100 ASSAY OF PHOSPHORUS: CPT

## 2019-12-28 PROCEDURE — 2580000003 HC RX 258: Performed by: EMERGENCY MEDICINE

## 2019-12-28 PROCEDURE — 84550 ASSAY OF BLOOD/URIC ACID: CPT

## 2019-12-28 PROCEDURE — 05HM33Z INSERTION OF INFUSION DEVICE INTO RIGHT INTERNAL JUGULAR VEIN, PERCUTANEOUS APPROACH: ICD-10-PCS | Performed by: INTERNAL MEDICINE

## 2019-12-28 PROCEDURE — 6360000002 HC RX W HCPCS: Performed by: STUDENT IN AN ORGANIZED HEALTH CARE EDUCATION/TRAINING PROGRAM

## 2019-12-28 PROCEDURE — 36600 WITHDRAWAL OF ARTERIAL BLOOD: CPT

## 2019-12-28 PROCEDURE — G0480 DRUG TEST DEF 1-7 CLASSES: HCPCS

## 2019-12-28 PROCEDURE — 82803 BLOOD GASES ANY COMBINATION: CPT

## 2019-12-28 PROCEDURE — 86900 BLOOD TYPING SEROLOGIC ABO: CPT

## 2019-12-28 PROCEDURE — 80307 DRUG TEST PRSMV CHEM ANLYZR: CPT

## 2019-12-28 PROCEDURE — B543ZZA ULTRASONOGRAPHY OF RIGHT JUGULAR VEINS, GUIDANCE: ICD-10-PCS | Performed by: INTERNAL MEDICINE

## 2019-12-28 PROCEDURE — 94761 N-INVAS EAR/PLS OXIMETRY MLT: CPT

## 2019-12-28 PROCEDURE — 36620 INSERTION CATHETER ARTERY: CPT

## 2019-12-28 PROCEDURE — 96365 THER/PROPH/DIAG IV INF INIT: CPT

## 2019-12-28 PROCEDURE — 82330 ASSAY OF CALCIUM: CPT

## 2019-12-28 PROCEDURE — 99285 EMERGENCY DEPT VISIT HI MDM: CPT

## 2019-12-28 PROCEDURE — 2580000003 HC RX 258: Performed by: STUDENT IN AN ORGANIZED HEALTH CARE EDUCATION/TRAINING PROGRAM

## 2019-12-28 PROCEDURE — 82140 ASSAY OF AMMONIA: CPT

## 2019-12-28 PROCEDURE — 85025 COMPLETE CBC W/AUTO DIFF WBC: CPT

## 2019-12-28 PROCEDURE — 2500000003 HC RX 250 WO HCPCS: Performed by: STUDENT IN AN ORGANIZED HEALTH CARE EDUCATION/TRAINING PROGRAM

## 2019-12-28 PROCEDURE — 2700000000 HC OXYGEN THERAPY PER DAY

## 2019-12-28 PROCEDURE — 93005 ELECTROCARDIOGRAM TRACING: CPT

## 2019-12-28 PROCEDURE — 86850 RBC ANTIBODY SCREEN: CPT

## 2019-12-28 PROCEDURE — 71045 X-RAY EXAM CHEST 1 VIEW: CPT

## 2019-12-28 PROCEDURE — 36415 COLL VENOUS BLD VENIPUNCTURE: CPT

## 2019-12-28 PROCEDURE — 6370000000 HC RX 637 (ALT 250 FOR IP): Performed by: EMERGENCY MEDICINE

## 2019-12-28 PROCEDURE — 2000000000 HC ICU R&B

## 2019-12-28 PROCEDURE — 83735 ASSAY OF MAGNESIUM: CPT

## 2019-12-28 PROCEDURE — 96366 THER/PROPH/DIAG IV INF ADDON: CPT

## 2019-12-28 PROCEDURE — 99291 CRITICAL CARE FIRST HOUR: CPT

## 2019-12-28 PROCEDURE — 84484 ASSAY OF TROPONIN QUANT: CPT

## 2019-12-28 PROCEDURE — 87150 DNA/RNA AMPLIFIED PROBE: CPT

## 2019-12-28 PROCEDURE — 93005 ELECTROCARDIOGRAM TRACING: CPT | Performed by: STUDENT IN AN ORGANIZED HEALTH CARE EDUCATION/TRAINING PROGRAM

## 2019-12-28 PROCEDURE — 80053 COMPREHEN METABOLIC PANEL: CPT

## 2019-12-28 PROCEDURE — 99291 CRITICAL CARE FIRST HOUR: CPT | Performed by: INTERNAL MEDICINE

## 2019-12-28 PROCEDURE — 86901 BLOOD TYPING SEROLOGIC RH(D): CPT

## 2019-12-28 PROCEDURE — 73590 X-RAY EXAM OF LOWER LEG: CPT

## 2019-12-28 PROCEDURE — 87040 BLOOD CULTURE FOR BACTERIA: CPT

## 2019-12-28 PROCEDURE — 96375 TX/PRO/DX INJ NEW DRUG ADDON: CPT

## 2019-12-28 PROCEDURE — 80048 BASIC METABOLIC PNL TOTAL CA: CPT

## 2019-12-28 PROCEDURE — 70450 CT HEAD/BRAIN W/O DYE: CPT

## 2019-12-28 PROCEDURE — 87641 MR-STAPH DNA AMP PROBE: CPT

## 2019-12-28 PROCEDURE — 73630 X-RAY EXAM OF FOOT: CPT

## 2019-12-28 PROCEDURE — 81001 URINALYSIS AUTO W/SCOPE: CPT

## 2019-12-28 PROCEDURE — 84443 ASSAY THYROID STIM HORMONE: CPT

## 2019-12-28 PROCEDURE — 83690 ASSAY OF LIPASE: CPT

## 2019-12-28 RX ORDER — DEXTROSE MONOHYDRATE 25 G/50ML
25 INJECTION, SOLUTION INTRAVENOUS ONCE
Status: COMPLETED | OUTPATIENT
Start: 2019-12-28 | End: 2019-12-28

## 2019-12-28 RX ORDER — CALCIUM GLUCONATE 94 MG/ML
1 INJECTION, SOLUTION INTRAVENOUS ONCE
Status: COMPLETED | OUTPATIENT
Start: 2019-12-28 | End: 2019-12-28

## 2019-12-28 RX ORDER — 0.9 % SODIUM CHLORIDE 0.9 %
1000 INTRAVENOUS SOLUTION INTRAVENOUS ONCE
Status: COMPLETED | OUTPATIENT
Start: 2019-12-28 | End: 2019-12-28

## 2019-12-28 RX ORDER — SODIUM CHLORIDE 0.9 % (FLUSH) 0.9 %
10 SYRINGE (ML) INJECTION PRN
Status: DISCONTINUED | OUTPATIENT
Start: 2019-12-28 | End: 2020-01-06 | Stop reason: HOSPADM

## 2019-12-28 RX ORDER — DEXTROSE MONOHYDRATE 25 G/50ML
12.5 INJECTION, SOLUTION INTRAVENOUS PRN
Status: DISCONTINUED | OUTPATIENT
Start: 2019-12-28 | End: 2020-01-06 | Stop reason: HOSPADM

## 2019-12-28 RX ORDER — TAMSULOSIN HYDROCHLORIDE 0.4 MG/1
0.4 CAPSULE ORAL DAILY
Status: DISCONTINUED | OUTPATIENT
Start: 2019-12-28 | End: 2020-01-06 | Stop reason: HOSPADM

## 2019-12-28 RX ORDER — SODIUM CHLORIDE 0.9 % (FLUSH) 0.9 %
10 SYRINGE (ML) INJECTION EVERY 12 HOURS SCHEDULED
Status: DISCONTINUED | OUTPATIENT
Start: 2019-12-28 | End: 2020-01-06 | Stop reason: HOSPADM

## 2019-12-28 RX ORDER — HEPARIN SODIUM 1000 [USP'U]/ML
1500 INJECTION, SOLUTION INTRAVENOUS; SUBCUTANEOUS PRN
Status: DISCONTINUED | OUTPATIENT
Start: 2019-12-28 | End: 2020-01-06 | Stop reason: HOSPADM

## 2019-12-28 RX ORDER — PAROXETINE HYDROCHLORIDE 20 MG/1
20 TABLET, FILM COATED ORAL DAILY
Status: DISCONTINUED | OUTPATIENT
Start: 2019-12-28 | End: 2020-01-06 | Stop reason: HOSPADM

## 2019-12-28 RX ORDER — NICOTINE POLACRILEX 4 MG
15 LOZENGE BUCCAL PRN
Status: DISCONTINUED | OUTPATIENT
Start: 2019-12-28 | End: 2020-01-06 | Stop reason: HOSPADM

## 2019-12-28 RX ORDER — DEXTROSE MONOHYDRATE 50 MG/ML
100 INJECTION, SOLUTION INTRAVENOUS PRN
Status: DISCONTINUED | OUTPATIENT
Start: 2019-12-28 | End: 2020-01-06 | Stop reason: HOSPADM

## 2019-12-28 RX ORDER — ONDANSETRON 2 MG/ML
4 INJECTION INTRAMUSCULAR; INTRAVENOUS EVERY 6 HOURS PRN
Status: DISCONTINUED | OUTPATIENT
Start: 2019-12-28 | End: 2020-01-06 | Stop reason: HOSPADM

## 2019-12-28 RX ORDER — BUPROPION HYDROCHLORIDE 300 MG/1
300 TABLET ORAL DAILY
Status: DISCONTINUED | OUTPATIENT
Start: 2019-12-28 | End: 2020-01-06 | Stop reason: HOSPADM

## 2019-12-28 RX ORDER — FENTANYL CITRATE 50 UG/ML
25 INJECTION, SOLUTION INTRAMUSCULAR; INTRAVENOUS EVERY 4 HOURS PRN
Status: DISCONTINUED | OUTPATIENT
Start: 2019-12-28 | End: 2020-01-06 | Stop reason: HOSPADM

## 2019-12-28 RX ORDER — LACTOBACILLUS RHAMNOSUS GG 10B CELL
1 CAPSULE ORAL 2 TIMES DAILY WITH MEALS
Status: DISCONTINUED | OUTPATIENT
Start: 2019-12-28 | End: 2020-01-06 | Stop reason: HOSPADM

## 2019-12-28 RX ORDER — FENTANYL CITRATE 50 UG/ML
50 INJECTION, SOLUTION INTRAMUSCULAR; INTRAVENOUS EVERY 4 HOURS PRN
Status: DISCONTINUED | OUTPATIENT
Start: 2019-12-28 | End: 2020-01-06 | Stop reason: HOSPADM

## 2019-12-28 RX ORDER — HEPARIN SODIUM 1000 [USP'U]/ML
1400 INJECTION, SOLUTION INTRAVENOUS; SUBCUTANEOUS PRN
Status: DISCONTINUED | OUTPATIENT
Start: 2019-12-28 | End: 2020-01-06 | Stop reason: HOSPADM

## 2019-12-28 RX ORDER — LEVOTHYROXINE SODIUM 0.05 MG/1
50 TABLET ORAL DAILY
Status: DISCONTINUED | OUTPATIENT
Start: 2019-12-28 | End: 2020-01-06 | Stop reason: HOSPADM

## 2019-12-28 RX ADMIN — Medication 1500 MG: at 10:47

## 2019-12-28 RX ADMIN — CALCIUM GLUCONATE 2 G: 98 INJECTION, SOLUTION INTRAVENOUS at 20:36

## 2019-12-28 RX ADMIN — BUPROPION HYDROCHLORIDE 300 MG: 300 TABLET, FILM COATED, EXTENDED RELEASE ORAL at 20:38

## 2019-12-28 RX ADMIN — MEROPENEM 500 MG: 500 INJECTION, POWDER, FOR SOLUTION INTRAVENOUS at 20:36

## 2019-12-28 RX ADMIN — NOREPINEPHRINE BITARTRATE 5 MCG/MIN: 1 INJECTION INTRAVENOUS at 12:45

## 2019-12-28 RX ADMIN — PAROXETINE HYDROCHLORIDE HEMIHYDRATE 20 MG: 20 TABLET, FILM COATED ORAL at 20:39

## 2019-12-28 RX ADMIN — NOREPINEPHRINE BITARTRATE 5 MCG/MIN: 1 INJECTION INTRAVENOUS at 12:01

## 2019-12-28 RX ADMIN — TAMSULOSIN HYDROCHLORIDE 0.4 MG: 0.4 CAPSULE ORAL at 20:38

## 2019-12-28 RX ADMIN — SODIUM CHLORIDE 1000 ML: 9 INJECTION, SOLUTION INTRAVENOUS at 11:35

## 2019-12-28 RX ADMIN — MAGNESIUM GLUCONATE 500 MG ORAL TABLET 400 MG: 500 TABLET ORAL at 20:39

## 2019-12-28 RX ADMIN — HEPARIN SODIUM 1500 UNITS: 1000 INJECTION INTRAVENOUS; SUBCUTANEOUS at 17:55

## 2019-12-28 RX ADMIN — Medication 25 G: at 10:47

## 2019-12-28 RX ADMIN — Medication 1 CAPSULE: at 20:38

## 2019-12-28 RX ADMIN — LEVOTHYROXINE SODIUM 50 MCG: 50 TABLET ORAL at 20:39

## 2019-12-28 RX ADMIN — CALCIUM GLUCONATE 1 G: 98 INJECTION, SOLUTION INTRAVENOUS at 11:16

## 2019-12-28 RX ADMIN — Medication 10 ML: at 20:35

## 2019-12-28 RX ADMIN — HEPARIN SODIUM 1400 UNITS: 1000 INJECTION INTRAVENOUS; SUBCUTANEOUS at 17:53

## 2019-12-28 RX ADMIN — SODIUM BICARBONATE: 84 INJECTION, SOLUTION INTRAVENOUS at 11:35

## 2019-12-28 RX ADMIN — INSULIN HUMAN 10 UNITS: 100 INJECTION, SOLUTION PARENTERAL at 10:47

## 2019-12-28 RX ADMIN — Medication: at 20:10

## 2019-12-28 RX ADMIN — SODIUM CHLORIDE 1000 ML: 9 INJECTION, SOLUTION INTRAVENOUS at 08:40

## 2019-12-28 RX ADMIN — ALBUTEROL SULFATE 15 MG: 2.5 SOLUTION RESPIRATORY (INHALATION) at 10:57

## 2019-12-28 RX ADMIN — SODIUM CHLORIDE: 9 INJECTION, SOLUTION INTRAVENOUS at 20:30

## 2019-12-28 RX ADMIN — CEFTRIAXONE SODIUM 1 G: 1 INJECTION, POWDER, FOR SOLUTION INTRAMUSCULAR; INTRAVENOUS at 13:20

## 2019-12-28 RX ADMIN — Medication: at 12:45

## 2019-12-28 RX ADMIN — ENOXAPARIN SODIUM 40 MG: 40 INJECTION SUBCUTANEOUS at 18:31

## 2019-12-28 ASSESSMENT — PAIN DESCRIPTION - LOCATION: LOCATION: FOOT

## 2019-12-28 ASSESSMENT — PAIN DESCRIPTION - ORIENTATION: ORIENTATION: LEFT

## 2019-12-28 ASSESSMENT — PAIN DESCRIPTION - PAIN TYPE: TYPE: CHRONIC PAIN

## 2019-12-28 ASSESSMENT — PAIN DESCRIPTION - DESCRIPTORS: DESCRIPTORS: PATIENT UNABLE TO DESCRIBE

## 2019-12-28 ASSESSMENT — PAIN SCALES - GENERAL: PAINLEVEL_OUTOF10: 8

## 2019-12-28 NOTE — ED PROVIDER NOTES
[address] [facility]     Emergency Department     Faculty Attestation    I performed a history and physical examination of the patient and discussed management with the resident. I have reviewed and agree with the residents findings including all diagnostic interpretations, and treatment plans as written at the time of my review. Any areas of disagreement are noted on the chart. I was personally present for the key portions of any procedures. I have documented in the chart those procedures where I was not present during the key portions. For Physician Assistant/ Nurse Practitioner cases/documentation I have personally evaluated this patient and have completed at least one if not all key elements of the E/M (history, physical exam, and MDM). Additional findings are as noted. Primary Care Physician: Rosemarie Roche MD    History: This is a 53 y.o. male who presents to the Emergency Department with complaint of transfer from [facility]. The patient was seen and evaluated. Patient noted to have an elevated creatinine and potassium. Central line has been placed. Patient was transferred to the emergency department secondary to no beds available in the ICU. Sepsis lab work has been performed at the outlying facility. Physical:   height is 6' 2\" (1.88 m) and weight is 216 lb (98 kg). His oral temperature is 98.4 °F (36.9 °C). His blood pressure is 107/43 (abnormal) and his pulse is 98. His respiration is 20 and oxygen saturation is 90%. Selene Romero is slow to respond. Lungs clear to auscultation bilateral, heart regular rate and rhythm, left lower extremity shows a warm erythematous foot. Impression: Acute renal failure, sepsis    Plan: Repeat BMP, consult critical care and nephrology, additional antibiotics.       CRITICAL CARE: There was a high probability of clinically significant/life threatening deterioration in this patient's condition which required my urgent intervention. Total critical care time was 10 minutes. This excludes any time for separately reportable procedures. EKG Interpretation    Interpreted by me    Rhythm: normal sinus   Rate: normal  Axis: normal  Ectopy: none  Conduction: normal  ST Segments: no acute change  T Waves: no acute change  Q Waves: none    Clinical Impression: no acute changes and normal EKG    (Please note that portions of this note were completed with a voice recognition program.  Efforts were made to edit the dictations but occasionally words are mis-transcribed.)    Reinaldo Hess.  Brendan Johnson MD, [facility]  Attending Emergency Medicine Physician          Kip Sullivan MD  12/28/19 [id number]

## 2019-12-28 NOTE — ED PROVIDER NOTES
(SYNTHROID) 50 MCG tablet Take 1 tablet by mouth daily 4/3/19   Magnus Miller MD   tiZANidine (ZANAFLEX) 4 MG tablet TAKE ONE TABLET BY MOUTH EVERY 6 HOURS AS NEEDED FOR MUSCLE SPASMS 3/21/19   Magnus Miller MD   ONE TOUCH ULTRA TEST strip USE ONE STRIP TO TEST TWO TIMES A DAY AS NEEDED 3/18/19   Magnus Miller MD   [facility] [id number] UNIT/GM powder APPLY TO AFFECTED AREA(S) FOUR TIMES A DAY 2/7/19   Magnus Miller MD   buPROPion (WELLBUTRIN XL) 300 MG extended release tablet TAKE ONE TABLET BY MOUTH DAILY 11/28/18   Magnus Miller MD   Wound Dressings (ADAPTIC NON-ADHERING DRESSING) PADS Apply 1 Units topically 2 times daily 9/28/18   Magnus Miller MD   Gauze Pads & Dressings (COMBINE ABD) 5\"X9\" PADS 1 Units by Does not apply route 2 times daily 9/28/18   Magnus Miller MD   lisinopril (PRINIVIL;ZESTRIL) 10 MG tablet TAKE ONE TABLET BY MOUTH DAILY 7/27/18   Magnus Miller MD   Blood Glucose Monitoring Suppl (ONE TOUCH ULTRA 2) w/Device KIT 1 kit by Other route daily 3/30/18   Magnus Miller MD   ciclopirox (LOPROX) 0.77 % cream APPLY TO AFFECTED AREA(S) AND RUB  IN A THIN FILM TWO TIMES A DAY (AM AND PM) 12/12/17   Magnus Miller MD   Gauze Pads & Dressings [facility] [address] [facility] 1 Units by Does not apply route 2 times daily 6/12/17 11/5/18  Thad Mak MD       REVIEW OFSYSTEMS    (2-9 systems for level 4, 10 or more for level 5)      Review of Systems   Unable to perform ROS: Mental status change       PHYSICAL EXAM   (up to 7 for level 4, 8 or more forlevel 5)      INITIAL VITALS:   ED Triage Vitals   BP Temp Temp Source Pulse Resp SpO2 Height Weight   12/28/19 1247 12/28/19 1251 12/28/19 1251 12/28/19 1247 12/28/19 1247 12/28/19 1247 12/28/19 1247 12/28/19 1247   (!) 107/43 98.4 °F (36.9 °C) Oral 98 20 90 % 6' 2\" (1.88 m) 216 lb (98 kg)       Physical Exam  Constitutional:       General: He is not in acute distress. Appearance: He is well-developed.  He is not diaphoretic. Comments: Opens eyes to voice   HENT:      Head: Normocephalic and atraumatic. Eyes:      Conjunctiva/sclera: Conjunctivae normal.      Pupils: Pupils are equal, round, and reactive to light. Neck:      Musculoskeletal: Neck supple. Cardiovascular:      Rate and Rhythm: Normal rate and regular rhythm. Heart sounds: No murmur. No friction rub. No gallop. Pulmonary:      Effort: Pulmonary effort is normal. No respiratory distress. Breath sounds: Normal breath sounds. No wheezing or rales. Abdominal:      General: There is no distension. Palpations: Abdomen is soft. Tenderness: There is no tenderness. There is no guarding. Musculoskeletal:      Comments: LLE: erythema and edema consistent with cellulitis, ulcer present, multiple toe amputations,    Skin:     General: Skin is warm.          DIFFERENTIAL  DIAGNOSIS     PLAN (LABS / IMAGING / EKG):  Orders Placed This Encounter   Procedures    O&P PANEL (TRAVEL ASSOCIATED) #1    Culture Stool    C DIFF TOXIN/ANTIGEN    MRSA DNA Probe, Nasal    XR CHEST PORTABLE    BASIC METABOLIC PANEL    Blood gas, arterial    Magnesium    Calcium, Ionized    Urine Drug Screen    BASIC METABOLIC PANEL    Calcium, Ionized    CBC auto differential    MAGNESIUM    PHOSPHORUS    PHOSPHORUS    Troponin    Uric Acid    VANCOMYCIN, TROUGH    Diet NPO Effective Now    Vital signs per unit routine    Telemetry monitoring    Daily weights    Intake and output    Place intermittent pneumatic compression device    Up with assistance    Wound care    HYPOGLYCEMIA TREATMENT: blood glucose less than 50 mg/dL and patient  ALERT and TOLERATING PO    HYPOGLYCEMIA TREATMENT: blood glucose less than 70 mg/dL and patient ALERT and TOLERATING PO    HYPOGLYCEMIA TREATMENT: blood glucose less than 70 mg/dL and patient NOT ALERT or NPO    Full Code    Inpatient consult to Critical Care    Inpatient consult to Nephrology    Pharmacy to Dose: Vancomycin    Pharmacy to Dose: meropenem    Inpatient consult to [address] [place] [address] consult to Cardiology    Contact isolation    Initiate Oxygen Therapy Protocol    Respiratory care evaluation only    Nebulizer tx intermittent    Arterial Blood Gas, POC    Lactic Acid, POC    POCT Glucose    EKG 12 Lead    Wound ostomy eval and treat    Insert arterial line    PATIENT STATUS (FROM ED OR OR/PROCEDURAL) Inpatient       MEDICATIONS ORDERED:  Orders Placed This Encounter   Medications    cefTRIAXone (ROCEPHIN) 1 g IVPB in 50 mL D5W minibag    DISCONTD: vancomycin (VANCOCIN) intermittent dosing (placeholder)     Initial dose 1,500 mg given at [facility]     norepinephrine (LEVOPHED) 16 mg in dextrose 5 % 250 mL infusion    sodium bicarbonate 75 mEq in sodium chloride 0.45 % 1,000 mL infusion    sodium chloride flush 0.9 % injection 10 mL    sodium chloride flush 0.9 % injection 10 mL    magnesium hydroxide (MILK OF MAGNESIA) 400 MG/5ML suspension 30 mL    ondansetron (ZOFRAN) injection 4 mg    enoxaparin (LOVENOX) injection 40 mg    DISCONTD: meropenem (MERREM) 500 mg IVPB minibag    heparin (porcine) injection 1,400 Units    heparin (porcine) injection 1,500 Units    vancomycin (VANCOCIN) intermittent dosing (placeholder)    DISCONTD: meropenem (MERREM) 500 mg IVPB minibag    buPROPion (WELLBUTRIN XL) extended release tablet 300 mg    lactobacillus (CULTURELLE) capsule 1 capsule    levothyroxine (SYNTHROID) tablet 50 mcg    magnesium oxide (MAG-OX) tablet 400 mg    PARoxetine (PAXIL) tablet 20 mg    tamsulosin (FLOMAX) capsule 0.4 mg    insulin lispro (HUMALOG) injection vial 0-6 Units    insulin lispro (HUMALOG) injection vial 0-3 Units    glucose (GLUTOSE) 40 % oral gel 15 g    dextrose 50 % IV solution    glucagon (rDNA) injection 1 mg    dextrose 5 % solution    calcium gluconate 2 g in dextrose 5 % 100 mL IVPB    meropenem (MERREM) 500 All other components within normal limits   LACTIC ACID,POINT OF CARE - Abnormal; Notable for the following components:    POC Lactic Acid 0.55 (*)     All other components within normal limits   O&P PANEL (TRAVEL ASSOCIATED) #1   CULTURE STOOL   C DIFF TOXIN/ANTIGEN   BLOOD GAS, ARTERIAL   MAGNESIUM   BASIC METABOLIC PANEL   BASIC METABOLIC PANEL   CALCIUM, IONIZED   CBC WITH AUTO DIFFERENTIAL   MAGNESIUM   PHOSPHORUS   PHOSPHORUS   TROPONIN   TROPONIN   URIC ACID   VANCOMYCIN, TROUGH         RADIOLOGY:  Xr Tibia Fibula Left (2 Views)    Result Date: 12/28/2019  EXAMINATION: TWO XRAY VIEWS OF THE LEFT TIBIA AND FIBULA 12/28/2019 10:44 am COMPARISON: April 16, 2019, January 14, 2018 HISTORY: ORDERING SYSTEM PROVIDED HISTORY: AMS, swelling TECHNOLOGIST PROVIDED HISTORY: AMS, swelling Reason for Exam: Pt's foot and lower leg are red swollen and bloody Acuity: Acute Type of Exam: Initial Additional signs and symptoms: Pt's foot and lower leg are red swollen and bloody FINDINGS: Frontal and lateral view of the left tibia and fibula demonstrate no acute fracture or dislocation. Redemonstration of an osteochondral defect involving the lateral femoral condyle. Degenerative changes of the knee are present. Degenerative changes involving the midfoot are partially visualized. Elsewhere, thickening of the cortex involving the tibia and fibula is seen, slightly worsened since January 2018. Diffuse soft tissue swelling. 1. No acute osseous abnormality of the left lower leg. 2. Diffuse soft tissue swelling. No radiodense foreign body or subcutaneous gas. 3. Diffuse cortical thickening and irregularity involving the tibia and fibula. Findings can be seen in the setting of chronic osteomyelitis.      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 There is new irregularity of the remnant 2nd proximal phalanx, compatible with osteomyelitis. Adjacent soft tissue swelling and soft tissue defect is also seen. Stable irregularity involving the head of the 3rd metatarsal.  No obvious change since November 2019 of the 1st digit. Elsewhere, no acute fracture or dislocation. Bony fragment is seen at the level of the 4th and 5th metatarsal. Diffuse soft tissue swelling. 1. New irregularity involving the remnant 2nd proximal phalanx concerning for osteomyelitis. Adjacent soft tissue swelling and soft tissue defect is present. 2. Diffuse soft tissue swelling. 3. No additional suspicious lesions although evaluation is limited given osteopenia and patient positioning. Ct Head Wo Contrast    Result Date: 12/28/2019  EXAMINATION: CT OF THE HEAD WITHOUT CONTRAST  12/28/2019 10:14 am TECHNIQUE: CT of the head was performed without the administration of intravenous contrast. Dose modulation, iterative reconstruction, and/or weight based adjustment of the mA/kV was utilized to reduce the radiation dose to as low as reasonably achievable. COMPARISON: November 3, 2019 HISTORY: ORDERING SYSTEM PROVIDED HISTORY: AMS TECHNOLOGIST PROVIDED HISTORY: AMS Reason for Exam: AMS/ DIABETIC, OPEN FOOT WOUND Acuity: Unknown Type of Exam: Unknown FINDINGS: BRAIN/VENTRICLES: There is no acute intracranial hemorrhage, mass effect or midline shift. No abnormal extra-axial fluid collection. The brain is unchanged in appearance. ORBITS: The visualized portion of the orbits demonstrate no acute abnormality. SINUSES: The visualized paranasal sinuses and mastoid air cells demonstrate no acute abnormality. SOFT TISSUES/SKULL:  No acute abnormality of the visualized skull or soft tissues. Unchanged appearance of the brain without acute CT abnormality identified. Xr Chest Portable    Result Date: 12/28/2019  EXAMINATION: ONE XRAY VIEW OF THE CHEST 12/28/2019 6:40 pm COMPARISON: 12/28/2019. HISTORY: ORDERING SYSTEM PROVIDED HISTORY: R IJ pelon catheter placement TECHNOLOGIST PROVIDED HISTORY: R IJ pelon catheter placement Reason for Exam: port Upright FINDINGS: Lung volumes are diminished. The cardiac silhouette is prominent and stable. There is suggestion of developing perihilar edema. The peripheral lungs remain clear with no pleural fluid. Right IJ line has been placed with tip in the SVC and no pneumothorax. Central line tip in the SVC with no pneumothorax. Suggestion of mild perihilar edema and CHF, possibly accentuated by low lung volumes. Xr Chest Portable    Result Date: 12/28/2019  EXAMINATION: ONE XRAY VIEW OF THE CHEST 12/28/2019 8:51 am COMPARISON: November 3, 2019 HISTORY: ORDERING SYSTEM PROVIDED HISTORY: AMS TECHNOLOGIST PROVIDED HISTORY: AMS Reason for Exam: Pt's foot and lower leg are red swollen and bloody Acuity: Acute Type of Exam: Initial Additional signs and symptoms: Pt's foot and lower leg are red swollen and bloody FINDINGS: No lines or tubes. Stable cardiomediastinal silhouette. Mild vascular prominence is seen. No dense consolidation or pleural effusion. No pneumothorax. No acute osseous abnormality. 1. Mild vascular prominence without overt failure.          EKG  EKG Interpretation    Interpreted by emergency department physician    Rhythm: normal sinus   Rate: normal  Axis: normal  Ectopy: none  Conduction: normal  ST Segments: no acute change  T Waves: no acute change  Q Waves: none    Clinical Impression: no acute changes    Diana Bates      All EKG's are interpreted by the Emergency Department Physicianwho either signs or Co-signs this chart in the absence

## 2019-12-28 NOTE — PLAN OF CARE
Debra Him, RCPPatient Assessment complete. Encephalopathy due to infection [G93.49, B99.9] . Vitals:    12/28/19 1715   BP:    Pulse:    Resp: 15   Temp:    SpO2: 96%     Assessment   Pt admitted for Acute Renal Failure and Sepsis. Pt does not have a pulmonary history. Pt does not take MDI, Med Nebs, CPAP at home. RR 16  Breath Sounds: clear and diminished. · Bronchodilator assessment at level  1  · [x]    Bronchodilator Assessment  BRONCHODILATOR ASSESSMENT SCORE  Score 0 1 2 3 4 5   Breath Sounds   [x]  Patient Baseline [x]  No Wheeze good aeration []  Faint, scattered wheezing, good aeration []  Expiratory Wheezing and or moderately diminished []  Insp/Exp wheeze and/or very diminished []  Insp/Exp and/ or marked distress   Respiratory Rate   [x]  Patient Baseline [x]  Less than 20 []  Less than 20 []  20-25 []  Greater than 25 []  Greater than 25   Peak flow % of Pred or PB [x]  NA   []  Greater than 90%  []  81-90% []  71-80% []  Less than or equal to 70%  or unable to perform []  Unable due to Respiratory Distress   Dyspnea re [x]  Patient Baseline [x]  No SOB []  No SOB []  SOB on exertion []  SOB min activity []  At rest/acute   e FEV% Predicted       [x]  NA []  Above 69%  []  Unable []  Above 60-69%  []  Unable []  Above 50-59%  []  Unable []  Above 35-49%  []  Unable []  Less than 35%  []  Unable              Treatments ordered 3 mL Douneb Q6 PRN for sob and wheezes.

## 2019-12-28 NOTE — PROGRESS NOTES
Pharmacy Note     Renal Dose Adjustment    David Davis is a 53 y.o. male. Pharmacist consulted for dosing meropenem by Dr. Scanlon Dears. Recent Labs     12/28/19  0956 12/28/19  1307   * 102*       Recent Labs     12/28/19  0956 12/28/19  1307   CREATININE 9.47* 9.04*       Estimated Creatinine Clearance: 12 mL/min (A) (based on SCr of 9.04 mg/dL [facility])). Estimated CrCl using Ideal Body Weight: 10.73 mL/min (based on IBW 82.2 kg)    Height:   Ht Readings from Last 1 Encounters:   12/28/19 6' 2\" (1.88 m)     Weight:  Wt Readings from Last 1 Encounters:   12/28/19 218 lb 11.1 oz (99.2 kg)       The following medication dose has been adjusted Pharmacy Consult request:           Meropenem 500 mg IV q24hr, the daily dose to be given after hemodialysis tx on dialysis days.

## 2019-12-28 NOTE — PLAN OF CARE
Problem: Pain:  Goal: Pain level will decrease  Description  Pain level will decrease  Outcome: Ongoing  Goal: Control of acute pain  Description  Control of acute pain  Outcome: Ongoing  Goal: Control of chronic pain  Description  Control of chronic pain  Outcome: Ongoing     Problem: Fluid Volume - Imbalance:  Goal: Absence of imbalanced fluid volume signs and symptoms  Description  Absence of imbalanced fluid volume signs and symptoms  Outcome: Ongoing     Problem: Mental Status - Impaired:  Goal: Mental status will be restored to baseline  Description  Mental status will be restored to baseline  Outcome: Ongoing     Problem: Nutrition Deficit:  Goal: Ability to achieve adequate nutritional intake will improve  Description  Ability to achieve adequate nutritional intake will improve  Outcome: Ongoing     Problem: Pain:  Goal: Recognizes and communicates pain  Description  Recognizes and communicates pain  Outcome: Ongoing  Goal: Control of acute pain  Description  Control of acute pain  Outcome: Ongoing  Goal: Control of chronic pain  Description  Control of chronic pain  Outcome: Ongoing     Problem: Skin Integrity - Impaired:  Goal: Will show no infection signs and symptoms  Description  Will show no infection signs and symptoms  Outcome: Ongoing  Goal: Absence of new skin breakdown  Description  Absence of new skin breakdown  Outcome: Ongoing

## 2019-12-28 NOTE — PROGRESS NOTES
Spoke to nephrology who stated as long as patient's potassium is well controlled and patient is hemodynamically stable without any cardiac dysrhythmias will hold off on dialysis this evening. However if anything is to change they wish to be contacted again. Patient does have Sunil catheter in place if he needs hemodialysis this evening. I also spoke to cardiology who stated that the troponin elevation is likely secondary to his acute renal failure, however they would like his troponins trended and they will evaluate him in the morning. Page out to podiatry and waiting to hear back from resident in regards to left foot ulcers. Colleen Ramos  Emergency Medicine Resident Physician, PGY-2  12/28/19 5:49 PM

## 2019-12-28 NOTE — PROCEDURES
PROCEDURE NOTE [facility] HD CATHETER LINE PLACEMENT    PATIENT NAME: Edis [address] [place] [place] [address] RECORD NO. [id number]  DATE: 12/28/2019  ATTENDING PHYSICIAN: RAMEZ    PREOPERATIVE DIAGNOSIS:  ARF WITH HYPERKALEMIA  POSTOPERATIVE DIAGNOSIS:  Same  PROCEDURE PERFORMED:  Right Internal Jugular Vein ALKA CATHETER INSERTION  PERFORMING PHYSICIAN: Colleen Aguilar DO  ANESTHESIA:  Local utilizing 1% lidocaine  ESTIMATED BLOOD LOSS:  Less than 25 ml  COMPLICATIONS:  None immediately appreciated. DISCUSSION:  Jaky Mayes is a 53y.o.-year-old male who requires [place] FOR ARF WITH HYPERKALEMIA. The history and physical examination were reviewed and confirmed. CONSENT: The spouse was counseled regarding the procedure, its indications, risks, potential complications and alternatives, and any questions were answered. Consent was obtained to proceed. PROCEDURE:  A timeout was initiated by the bedside nurse and was confirmed by those present. The patient was placed in a supine position. The skin overlying the Right Internal Jugular Vein was prepped with chlorhexadine and draped in sterile fashion. The skin was infiltrated with local anesthetic. The vessel and surrounding anatomy was visualized using ultrasound. Through the anesthetized region, the introducer needle was inserted into the internal jugular vein returning dark red non pulsatile blood. A guidewire was placed through the center of the needle with no resistance. Ultrasound confirmed presence of wire in the vein. A small incision made in the skin with a #11 scalpel blade. The dilator was inserted into the skin and vein over guidewire using Seldinger technique. The dilator was then removed and the alka catheter was placed in the vein over the guidewire using Seldinger technique. The guidewire was then removed and all ports aspirated and flushed appropriately.  The catheter then secured using silk suture and a temporary sterile dressing was applied. No immediate complication was evident. All sponge, instrument and needle counts were correct at the completion of the procedure. Postprocedural chest x-ray ordered and pending. The patient tolerated the procedure well with no immediate complication evident.      Felecia Patel DO  5:35 PM, 12/28/19

## 2019-12-28 NOTE — PROGRESS NOTES
Pharmacy Note  Vancomycin Consult    Sd Winslow is a 53 y.o. male started on Vancomycin for osteomyelitis; consult received from Dr. Philly Kimble to manage therapy. Also receiving the following antibiotics: meropenem. Patient Active Problem List   Diagnosis    Anxiety disorder    Benign prostatic hyperplasia    Depression    Edema    Esophageal reflux    Essential hypertension    Hypothyroidism    Right cervical radiculopathy    Osteomyelitis (HCC)    Osteomyelitis of foot, left, acute (HCC)    Non compliance with medical treatment    Ulcerated, foot, left, with necrosis of bone ([address]    Type 2 diabetes mellitus with foot ulcer, with long-term current use of insulin ([address]    Anemia    Hematemesis with nausea    Erosive gastritis    Ulcerated, foot, left, with fat layer exposed ([address]    Renal failure    Encephalopathy due to infection       Allergies:  Bactrim [sulfamethoxazole-trimethoprim]; Fiorinal [butalbital-aspirin-caffeine]; Peanut-containing drug products; Statins; and Peanut butter flavor [flavoring agent]     Temp max: 98.6    Recent Labs     12/28/19  0956 12/28/19  1307   * 102*       Recent Labs     12/28/19  0956 12/28/19  1307   CREATININE 9.47* 9.04*       Recent Labs     12/28/19  0956   WBC 20.1*         Intake/Output Summary (Last 24 hours) at 12/28/2019 1659  Last data filed at 12/28/2019 1532  Gross per 24 hour   Intake 50 ml   Output 400 ml   Net -350 ml       Culture Date      Source                       Results  pending    Ht Readings from Last 1 Encounters:   12/28/19 6' 2\" (1.88 m)        Wt Readings from Last 1 Encounters:   12/28/19 218 lb 11.1 oz (99.2 kg)         Body mass index is 28.08 kg/m². Estimated Creatinine Clearance: 12 mL/min (A) (based on SCr of 9.04 mg/dL [facility])). Goal Trough Level: 15-20 mcg/mL    Assessment/Plan:  Patient was transferred from [facility]. He received 1 x 1500 mg IV dose around 11 am today.   Due to his renal insufficiency will pend further doses until vancomycin level results tomorrow morning. Thank you for the consult. Will continue to follow. Mague Rogers, Pharm. D.  12/28/2019 5:05 PM

## 2019-12-28 NOTE — H&P
Critical Care - History and Physical Examination    Patient's name:  Andrey Mcelroy  Medical Record Number: [id number]  Patient's account/billing number: [de-identified]  Patient's YOB: 1965  Age: 53 y.o. Date of Admission: 12/28/2019 12:43 PM  Reason of ICU admission:   Date of History and Physical Examination: 12/28/2019      Primary Care Physician: Sarai Marie MD  Attending Physician:    Code Status: Prior    Chief complaint:  AMS    Reason for ICU admission:  AMS, CLAUDIA      History Of Present Illness:   History was obtained from spouse and unobtainable from patient due to mental status. Andrey Mcelroy is a 53 y.o. male who presented as a transfer from [facility]. As per the patient's wife, the patient was doing fine until 1 week back, after which he developed some confusion, it worsened yesterday. Patient did not have any fever at home. He had cough with sputum production, and shortness of breath yesterday. Since the past 1 week he has episodes of diarrhea, 3-4 bowel movements, loose watery. Patient has chronic  left foot ulcer, had developed a new onset on the plantar aspect of left MTP joint. He did not have any urine output in the past 3 days. He also had an episode of fall last Thursday. In [facility] ED the patient was noted to be in CLAUDIA with creatinine 9.47, , hyperkalemic with potassium of 6.9, received 1 dose of insulin, dextrose and albuterol, repeat potassium here was 5.2. Xray left foot -new irregularity involving the remnant second proximal phalanx concerning for osteomyelitis  CXR-mild vascular prominence    His troponins were 141, 124. As per report patient had an episode of V. tach at [facility]. Nephrology was consulted from ED, recommended no dialysis for now, start the patient on1/2 NS with 75 meq of bicarb at 150ml/hr. Patient received 1 dose of vancomycin, ceftriaxone.     On my evaluation the patient was somnolent, would not answer any questions. PMH:  Patient was admitted to [facility] last month due to sepsis, source unknown and CLAUDIA. Patient was on vancomycin, had one episode of HD  History of diabetes mellitus metformin 500 twice daily      Past Medical History:        Diagnosis Date    Anxiety     Arthritis     lower back, knees    BPH (benign prostatic hyperplasia)     Chronic kidney disease     prostate    Depression     Diabetes mellitus ([address]     Diabetic neuropathy ([address]     GERD (gastroesophageal reflux disease)     Hyperglycemia     Hypertension     Hypothyroidism     Impacted tooth     Pneumonia        Past Surgical History:        Procedure Laterality Date    APPENDECTOMY      BACK SURGERY      Lower x 4. Had abscess after appendectomy.  KNEE SURGERY Right     scope    TOE AMPUTATION Bilateral 3/6/2017    TOE AMPUTATION LEFT HALLUX AND 2ND DIGIT AND RIGHT 2ND DIGIT performed by Daisy Hernandez DPM at [address] ENDOSCOPY N/A 5/20/2019    EGD WITH BIOPSY performed by Rickey Uriostegui MD at [address]: Allergies   Allergen Reactions    Bactrim [Sulfamethoxazole-Trimethoprim]      reported renal failure    Fiorinal [Butalbital-Aspirin-Caffeine] Other (See Comments)     Generalized blisters    Peanut-Containing Drug Products      Peanut butter flavor    Statins Other (See Comments)     Muscle aches    Peanut Butter Flavor [Flavoring Agent] Nausea And Vomiting         Home Medications:   Prior to Admission medications    Medication Sig Start Date End Date Taking? Authorizing Provider   megestrol (MEGACE ORAL) 40 MG/ML suspension Take 10 mLs by mouth daily 12/24/19   Rosemarie Roche MD   oxyCODONE HCl (OXY-IR) 10 MG immediate release tablet Take 1 tablet by mouth every 6 hours as needed for Pain for up to 30 days.  Intended supply: 30 days 12/20/19 1/19/20  Rosemarie Roche MD   morphine (MS CONTIN) 60 MG extended release tablet A DAY AS NEEDED 3/18/19   Kimi Faulkner MD   [facility] [place] [id number] UNIT/GM powder APPLY TO AFFECTED AREA(S) FOUR TIMES A DAY 2/7/19   Kimi Faulkner MD   buPROPion (WELLBUTRIN XL) 300 MG extended release tablet TAKE ONE TABLET BY MOUTH DAILY 11/28/18   Kimi Faulkner MD   Wound Dressings (ADAPTIC NON-ADHERING DRESSING) PADS Apply 1 Units topically 2 times daily 9/28/18   Kimi Faulkner MD   Gauze Pads & Dressings (COMBINE ABD) 5\"X9\" PADS 1 Units by Does not apply route 2 times daily 9/28/18   Kimi Faulkner MD   lisinopril (PRINIVIL;ZESTRIL) 10 MG tablet TAKE ONE TABLET BY MOUTH DAILY 7/27/18   Kimi Faulkner MD   Blood Glucose Monitoring Suppl (ONE TOUCH ULTRA 2) w/Device KIT 1 kit by Other route daily 3/30/18   Kimi Faulkner MD   ciclopirox (LOPROX) 0.77 % cream APPLY TO AFFECTED AREA(S) AND RUB  IN A THIN FILM TWO TIMES A DAY (AM AND PM) 12/12/17   iKmi Faulkner MD   Gauze Pads & Dressings [facility] GAUZE [place] LARGE) [address] 1 Units by Does not apply route 2 times daily 6/12/17 11/5/18  Nasir Larsen MD       Social History:   TOBACCO:   reports that he has never smoked. He has never used smokeless tobacco.  ETOH:   reports no history of alcohol use. DRUGS:  reports previous drug use. Frequency: 2.00 times per week. Drug: Marijuana. OCCUPATION:      Family History:       Problem Relation Age of Onset    Diabetes Father     Cancer Maternal Grandfather         Colon Cancer; Prostate Cancer    No Known Problems Mother            REVIEW OF SYSTEMS (ROS):  Unable to obtain due to patient's mental status      Physical Exam:    Vitals: BP (!) 112/46   Pulse 88   Temp 98.4 °F (36.9 °C) (Oral)   Resp 20   Ht 6' 2\" (1.88 m)   Wt 216 lb (98 kg)   SpO2 94%   BMI 27.73 kg/m²     Body weight:   Wt Readings from Last 3 Encounters:   12/28/19 216 lb (98 kg)   12/28/19 216 lb (98 kg)   12/24/19 216 lb 6.4 oz (98.2 kg)       Body Mass Index : Body mass index is 27.73 kg/m².           PHYSICAL cardiology consulted        Issa Rayo MD  Internal Medicine, PGY2  [facility]             12/28/2019, 2:20 PM      Attending Physician Statement  I have discussed the care of Manon Bosworth, including pertinent history and exam findings with the resident. I have reviewed the key elements of all parts of the encounter with the resident. I have seen and examined the patient with the resident. I agree with the assessment and plan and status of the problem list as documented. I have seen the patient in the emergency room, initially have discussed the patient with the emergency room physician and JACQUES LUCIANO and as bed was not available in ICU patient was transferred to [facility] emergency room. On review of the chart he had history of CLAUDIA with sepsis of undetermined source in November when he require 1 hemodialysis also he had altered mental status at that time. This time he has altered mental status for few days also have diarrheal illness, he also have a new ulcer in his left foot, he has history of chronic left foot ulcer with history of diabetes mellitus, according to wife for the last 3 days he had does not have much urine output on presentation to [facility] his BUN was 110 creatinine was 9.47 potassium was 6.9 initially EKG was sinus rhythm and no acute ST-T changes but later before transferred to [facility] ER apparently he had run of V. tach, he had received insulin and dextrose in [facility] emergency room with bicarbonate and was started on bicarbonate drip, on presentation to [facility] his potassium was 5.2 and bicarbonate from 21-18 creatinine is 9.04 and BUN was 102 he had a Barrera's catheter placed and he does have some urine from [facility] catheter. He does have leukocytosis of WBC count of 20.   In the emergency room he was somnolent and very lethargic but arousable on stimulation and calling his name and goes back to sleep, CT scan of the head was done in the emergency room which was negative for any acute changes  He had left foot x-ray done when he had the ulcer which was just above bone irregularities suggestive of osteomyelitis, chest x-ray did not show infiltrate or consolidation although portable x-ray may have slight increased marking. He did have bone scan done in November and that was negative for osteomyelitis at that time. Altered mental status/metabolic encephalopathy secondary to acute renal failure and uremia along with possible sepsis. CLAUDIA. Hyperkalemia. Metabolic acidosis secondary to CLAUDIA. Sepsis likely sources cellulitis of foot with possible osteomyelitis. Leukocytosis. Mild hyponatremia. Elevated troponin which could be secondary to sepsis and acute renal failure although increased risk for coronary artery disease and had episode of V. tach in the emergency room in [facility]. As patient has CLAUDIA altered mental status and hyperkalemia although potassium improved nephrology was consulted from emergency room and they do not think that he will need emergent hemodialysis. Continue with Barrera's catheter as he may have some postobstructive process also. Will need renal ultrasound. Follow-up potassium urine output and renal function. Continue with Levophed and will try to wean Levophed to keep systolic above 247 or map of 65  Continue with bicarbonate drip at 150 an hour with 75 mEq of bicarbonate and half saline  Will start meropenem to cover anaerobic coverage and multiple microbial frankie. Continue with vancomycin we will check vancomycin level tomorrow as he had vancomycin done in the emergency room in [facility] and will adjust vancomycin. Podiatry consultation. Likely will need repeat MRI. Blood and urine culture. Follow-up cultures and adjust antibiotic. We will also need infectious disease consultation tomorrow.   Follow-up mentation and neurological status currently maintaining

## 2019-12-29 ENCOUNTER — APPOINTMENT (OUTPATIENT)
Dept: GENERAL RADIOLOGY | Age: 54
DRG: 710 | End: 2019-12-29
Payer: MEDICARE

## 2019-12-29 ENCOUNTER — APPOINTMENT (OUTPATIENT)
Dept: ULTRASOUND IMAGING | Age: 54
DRG: 710 | End: 2019-12-29
Payer: MEDICARE

## 2019-12-29 PROBLEM — E87.20 METABOLIC ACIDOSIS: Status: ACTIVE | Noted: 2019-12-29

## 2019-12-29 PROBLEM — E87.5 HYPERKALEMIA: Status: ACTIVE | Noted: 2019-12-29

## 2019-12-29 PROBLEM — R41.82 ALTERED MENTAL STATUS: Status: ACTIVE | Noted: 2019-12-29

## 2019-12-29 PROBLEM — F11.90 CHRONIC, CONTINUOUS USE OF OPIOIDS: Status: ACTIVE | Noted: 2019-12-29

## 2019-12-29 PROBLEM — N19 UREMIA: Status: ACTIVE | Noted: 2019-12-29

## 2019-12-29 PROBLEM — E83.51 HYPOCALCEMIA: Status: ACTIVE | Noted: 2019-12-29

## 2019-12-29 PROBLEM — E83.39 HYPERPHOSPHATEMIA: Status: ACTIVE | Noted: 2019-12-29

## 2019-12-29 PROBLEM — L97.529 DIABETIC ULCER OF TOE OF LEFT FOOT ASSOCIATED WITH TYPE 2 DIABETES MELLITUS (HCC): Status: ACTIVE | Noted: 2019-12-29

## 2019-12-29 PROBLEM — E11.621 DIABETIC ULCER OF TOE OF LEFT FOOT ASSOCIATED WITH TYPE 2 DIABETES MELLITUS (HCC): Status: ACTIVE | Noted: 2019-12-29

## 2019-12-29 LAB
ABSOLUTE EOS #: 0.06 K/UL (ref 0–0.44)
ABSOLUTE IMMATURE GRANULOCYTE: 0.03 K/UL (ref 0–0.3)
ABSOLUTE LYMPH #: 1.02 K/UL (ref 1.1–3.7)
ABSOLUTE MONO #: 0.79 K/UL (ref 0.1–1.2)
ADENOVIRUS PCR: NOT DETECTED
ANION GAP SERPL CALCULATED.3IONS-SCNC: 14 MMOL/L (ref 9–17)
ANION GAP SERPL CALCULATED.3IONS-SCNC: 14 MMOL/L (ref 9–17)
ANION GAP SERPL CALCULATED.3IONS-SCNC: 17 MMOL/L (ref 9–17)
ANION GAP SERPL CALCULATED.3IONS-SCNC: 18 MMOL/L (ref 9–17)
BASOPHILS # BLD: 0 % (ref 0–2)
BASOPHILS ABSOLUTE: <0.03 K/UL (ref 0–0.2)
BORDETELLA PARAPERTUSSIS: NOT DETECTED
BORDETELLA PERTUSSIS PCR: NOT DETECTED
BUN BLDV-MCNC: 68 MG/DL (ref 6–20)
BUN BLDV-MCNC: 69 MG/DL (ref 6–20)
BUN BLDV-MCNC: 81 MG/DL (ref 6–20)
BUN BLDV-MCNC: 87 MG/DL (ref 6–20)
BUN/CREAT BLD: ABNORMAL (ref 9–20)
C-REACTIVE PROTEIN: 166.1 MG/L (ref 0–5)
CALCIUM IONIZED: 1.09 MMOL/L (ref 1.13–1.33)
CALCIUM SERPL-MCNC: 8.6 MG/DL (ref 8.6–10.4)
CALCIUM SERPL-MCNC: 8.7 MG/DL (ref 8.6–10.4)
CALCIUM SERPL-MCNC: 8.7 MG/DL (ref 8.6–10.4)
CALCIUM SERPL-MCNC: 8.9 MG/DL (ref 8.6–10.4)
CHLAMYDIA PNEUMONIAE BY PCR: NOT DETECTED
CHLORIDE BLD-SCNC: 100 MMOL/L (ref 98–107)
CHLORIDE BLD-SCNC: 101 MMOL/L (ref 98–107)
CHLORIDE BLD-SCNC: 101 MMOL/L (ref 98–107)
CHLORIDE BLD-SCNC: 99 MMOL/L (ref 98–107)
CO2: 17 MMOL/L (ref 20–31)
CO2: 18 MMOL/L (ref 20–31)
CO2: 18 MMOL/L (ref 20–31)
CO2: 20 MMOL/L (ref 20–31)
CORONAVIRUS 229E PCR: NOT DETECTED
CORONAVIRUS HKU1 PCR: NOT DETECTED
CORONAVIRUS NL63 PCR: NOT DETECTED
CORONAVIRUS OC43 PCR: NOT DETECTED
CREAT SERPL-MCNC: 2.87 MG/DL (ref 0.7–1.2)
CREAT SERPL-MCNC: 3.01 MG/DL (ref 0.7–1.2)
CREAT SERPL-MCNC: 4.27 MG/DL (ref 0.7–1.2)
CREAT SERPL-MCNC: 4.76 MG/DL (ref 0.7–1.2)
CULTURE: ABNORMAL
DIFFERENTIAL TYPE: ABNORMAL
EOSINOPHILS RELATIVE PERCENT: 1 % (ref 1–4)
GFR AFRICAN AMERICAN: 16 ML/MIN
GFR AFRICAN AMERICAN: 18 ML/MIN
GFR AFRICAN AMERICAN: 26 ML/MIN
GFR AFRICAN AMERICAN: 28 ML/MIN
GFR NON-AFRICAN AMERICAN: 13 ML/MIN
GFR NON-AFRICAN AMERICAN: 15 ML/MIN
GFR NON-AFRICAN AMERICAN: 22 ML/MIN
GFR NON-AFRICAN AMERICAN: 23 ML/MIN
GFR SERPL CREATININE-BSD FRML MDRD: ABNORMAL ML/MIN/{1.73_M2}
GLUCOSE BLD-MCNC: 110 MG/DL (ref 70–99)
GLUCOSE BLD-MCNC: 110 MG/DL (ref 75–110)
GLUCOSE BLD-MCNC: 114 MG/DL (ref 70–99)
GLUCOSE BLD-MCNC: 114 MG/DL (ref 75–110)
GLUCOSE BLD-MCNC: 116 MG/DL (ref 75–110)
GLUCOSE BLD-MCNC: 125 MG/DL (ref 70–99)
GLUCOSE BLD-MCNC: 135 MG/DL (ref 75–110)
GLUCOSE BLD-MCNC: 75 MG/DL (ref 70–99)
HCT VFR BLD CALC: 29.6 % (ref 40.7–50.3)
HEMOGLOBIN: 9 G/DL (ref 13–17)
HUMAN METAPNEUMOVIRUS PCR: NOT DETECTED
IMMATURE GRANULOCYTES: 0 %
INFLUENZA A BY PCR: NOT DETECTED
INFLUENZA A H1 (2009) PCR: NORMAL
INFLUENZA A H1 PCR: NORMAL
INFLUENZA A H3 PCR: NORMAL
INFLUENZA B BY PCR: NOT DETECTED
LYMPHOCYTES # BLD: 11 % (ref 24–43)
Lab: ABNORMAL
MAGNESIUM: 1.8 MG/DL (ref 1.6–2.6)
MCH RBC QN AUTO: 23.7 PG (ref 25.2–33.5)
MCHC RBC AUTO-ENTMCNC: 30.4 G/DL (ref 28.4–34.8)
MCV RBC AUTO: 77.9 FL (ref 82.6–102.9)
MONOCYTES # BLD: 8 % (ref 3–12)
MYCOPLASMA PNEUMONIAE PCR: NOT DETECTED
NRBC AUTOMATED: 0 PER 100 WBC
PARAINFLUENZA 1 PCR: NOT DETECTED
PARAINFLUENZA 2 PCR: NOT DETECTED
PARAINFLUENZA 3 PCR: NOT DETECTED
PARAINFLUENZA 4 PCR: NOT DETECTED
PDW BLD-RTO: 18.7 % (ref 11.8–14.4)
PHOSPHORUS: 4.2 MG/DL (ref 2.5–4.5)
PLATELET # BLD: ABNORMAL K/UL (ref 138–453)
PLATELET ESTIMATE: ABNORMAL
PLATELET, FLUORESCENCE: 264 K/UL (ref 138–453)
PLATELET, IMMATURE FRACTION: 1 % (ref 1.1–10.3)
PMV BLD AUTO: ABNORMAL FL (ref 8.1–13.5)
POTASSIUM SERPL-SCNC: 5.2 MMOL/L (ref 3.7–5.3)
POTASSIUM SERPL-SCNC: 5.4 MMOL/L (ref 3.7–5.3)
POTASSIUM SERPL-SCNC: 5.4 MMOL/L (ref 3.7–5.3)
POTASSIUM SERPL-SCNC: 5.6 MMOL/L (ref 3.7–5.3)
RBC # BLD: 3.8 M/UL (ref 4.21–5.77)
RBC # BLD: ABNORMAL 10*6/UL
RESP SYNCYTIAL VIRUS PCR: NOT DETECTED
RHINO/ENTEROVIRUS PCR: NOT DETECTED
SEDIMENTATION RATE, ERYTHROCYTE: 47 MM (ref 0–10)
SEG NEUTROPHILS: 80 % (ref 36–65)
SEGMENTED NEUTROPHILS ABSOLUTE COUNT: 7.55 K/UL (ref 1.5–8.1)
SODIUM BLD-SCNC: 133 MMOL/L (ref 135–144)
SODIUM BLD-SCNC: 133 MMOL/L (ref 135–144)
SODIUM BLD-SCNC: 135 MMOL/L (ref 135–144)
SODIUM BLD-SCNC: 136 MMOL/L (ref 135–144)
SPECIMEN DESCRIPTION: ABNORMAL
SPECIMEN DESCRIPTION: NORMAL
TROPONIN INTERP: ABNORMAL
TROPONIN T: ABNORMAL NG/ML
TROPONIN, HIGH SENSITIVITY: 52 NG/L (ref 0–22)
TROPONIN, HIGH SENSITIVITY: 53 NG/L (ref 0–22)
TROPONIN, HIGH SENSITIVITY: 64 NG/L (ref 0–22)
VANCOMYCIN TROUGH DATE LAST DOSE: NORMAL
VANCOMYCIN TROUGH DOSE AMOUNT: NORMAL
VANCOMYCIN TROUGH TIME LAST DOSE: NORMAL
VANCOMYCIN TROUGH: 10.6 UG/ML (ref 10–20)
WBC # BLD: 9.5 K/UL (ref 3.5–11.3)
WBC # BLD: ABNORMAL 10*3/UL

## 2019-12-29 PROCEDURE — 6360000002 HC RX W HCPCS: Performed by: EMERGENCY MEDICINE

## 2019-12-29 PROCEDURE — 2580000003 HC RX 258: Performed by: STUDENT IN AN ORGANIZED HEALTH CARE EDUCATION/TRAINING PROGRAM

## 2019-12-29 PROCEDURE — 2500000003 HC RX 250 WO HCPCS

## 2019-12-29 PROCEDURE — 82947 ASSAY GLUCOSE BLOOD QUANT: CPT

## 2019-12-29 PROCEDURE — 84100 ASSAY OF PHOSPHORUS: CPT

## 2019-12-29 PROCEDURE — 87040 BLOOD CULTURE FOR BACTERIA: CPT

## 2019-12-29 PROCEDURE — 80048 BASIC METABOLIC PNL TOTAL CA: CPT

## 2019-12-29 PROCEDURE — 87070 CULTURE OTHR SPECIMN AEROBIC: CPT

## 2019-12-29 PROCEDURE — 2500000003 HC RX 250 WO HCPCS: Performed by: STUDENT IN AN ORGANIZED HEALTH CARE EDUCATION/TRAINING PROGRAM

## 2019-12-29 PROCEDURE — 2580000003 HC RX 258: Performed by: INTERNAL MEDICINE

## 2019-12-29 PROCEDURE — 76775 US EXAM ABDO BACK WALL LIM: CPT

## 2019-12-29 PROCEDURE — 02HV33Z INSERTION OF INFUSION DEVICE INTO SUPERIOR VENA CAVA, PERCUTANEOUS APPROACH: ICD-10-PCS | Performed by: STUDENT IN AN ORGANIZED HEALTH CARE EDUCATION/TRAINING PROGRAM

## 2019-12-29 PROCEDURE — 2580000003 HC RX 258: Performed by: EMERGENCY MEDICINE

## 2019-12-29 PROCEDURE — 6370000000 HC RX 637 (ALT 250 FOR IP): Performed by: EMERGENCY MEDICINE

## 2019-12-29 PROCEDURE — 99291 CRITICAL CARE FIRST HOUR: CPT | Performed by: INTERNAL MEDICINE

## 2019-12-29 PROCEDURE — 82330 ASSAY OF CALCIUM: CPT

## 2019-12-29 PROCEDURE — 6370000000 HC RX 637 (ALT 250 FOR IP): Performed by: INTERNAL MEDICINE

## 2019-12-29 PROCEDURE — 86140 C-REACTIVE PROTEIN: CPT

## 2019-12-29 PROCEDURE — 84484 ASSAY OF TROPONIN QUANT: CPT

## 2019-12-29 PROCEDURE — 6360000002 HC RX W HCPCS: Performed by: INTERNAL MEDICINE

## 2019-12-29 PROCEDURE — 80202 ASSAY OF VANCOMYCIN: CPT

## 2019-12-29 PROCEDURE — 87075 CULTR BACTERIA EXCEPT BLOOD: CPT

## 2019-12-29 PROCEDURE — 6370000000 HC RX 637 (ALT 250 FOR IP): Performed by: STUDENT IN AN ORGANIZED HEALTH CARE EDUCATION/TRAINING PROGRAM

## 2019-12-29 PROCEDURE — 0100U HC RESPIRPTHGN MULT REV TRANS & AMP PRB TECH 21 TRGT: CPT

## 2019-12-29 PROCEDURE — 85651 RBC SED RATE NONAUTOMATED: CPT

## 2019-12-29 PROCEDURE — 83735 ASSAY OF MAGNESIUM: CPT

## 2019-12-29 PROCEDURE — 2000000000 HC ICU R&B

## 2019-12-29 PROCEDURE — 85055 RETICULATED PLATELET ASSAY: CPT

## 2019-12-29 PROCEDURE — 36415 COLL VENOUS BLD VENIPUNCTURE: CPT

## 2019-12-29 PROCEDURE — 86403 PARTICLE AGGLUT ANTBDY SCRN: CPT

## 2019-12-29 PROCEDURE — 85025 COMPLETE CBC W/AUTO DIFF WBC: CPT

## 2019-12-29 PROCEDURE — 99254 IP/OBS CNSLTJ NEW/EST MOD 60: CPT | Performed by: INTERNAL MEDICINE

## 2019-12-29 PROCEDURE — 87186 SC STD MICRODIL/AGAR DIL: CPT

## 2019-12-29 PROCEDURE — 6360000002 HC RX W HCPCS: Performed by: STUDENT IN AN ORGANIZED HEALTH CARE EDUCATION/TRAINING PROGRAM

## 2019-12-29 PROCEDURE — 36556 INSERT NON-TUNNEL CV CATH: CPT

## 2019-12-29 PROCEDURE — 87205 SMEAR GRAM STAIN: CPT

## 2019-12-29 RX ORDER — PANTOPRAZOLE SODIUM 40 MG/1
40 TABLET, DELAYED RELEASE ORAL
Status: DISCONTINUED | OUTPATIENT
Start: 2019-12-29 | End: 2020-01-06 | Stop reason: HOSPADM

## 2019-12-29 RX ORDER — DIAZEPAM 5 MG/1
5 TABLET ORAL EVERY 6 HOURS PRN
Status: DISCONTINUED | OUTPATIENT
Start: 2019-12-29 | End: 2019-12-29

## 2019-12-29 RX ORDER — OXYCODONE HYDROCHLORIDE 5 MG/1
10 TABLET ORAL EVERY 4 HOURS PRN
Status: DISCONTINUED | OUTPATIENT
Start: 2019-12-29 | End: 2020-01-06 | Stop reason: HOSPADM

## 2019-12-29 RX ORDER — ACETAMINOPHEN 325 MG/1
650 TABLET ORAL EVERY 4 HOURS PRN
Status: DISCONTINUED | OUTPATIENT
Start: 2019-12-29 | End: 2020-01-06 | Stop reason: HOSPADM

## 2019-12-29 RX ORDER — DEXTROSE MONOHYDRATE 25 G/50ML
12.5 INJECTION, SOLUTION INTRAVENOUS PRN
Status: DISCONTINUED | OUTPATIENT
Start: 2019-12-29 | End: 2020-01-06 | Stop reason: HOSPADM

## 2019-12-29 RX ORDER — DEXTROSE MONOHYDRATE 50 MG/ML
100 INJECTION, SOLUTION INTRAVENOUS PRN
Status: DISCONTINUED | OUTPATIENT
Start: 2019-12-29 | End: 2020-01-06 | Stop reason: HOSPADM

## 2019-12-29 RX ORDER — HEPARIN SODIUM 5000 [USP'U]/ML
5000 INJECTION, SOLUTION INTRAVENOUS; SUBCUTANEOUS EVERY 8 HOURS SCHEDULED
Status: DISCONTINUED | OUTPATIENT
Start: 2019-12-29 | End: 2020-01-06 | Stop reason: HOSPADM

## 2019-12-29 RX ORDER — OXYCODONE HYDROCHLORIDE 5 MG/1
5 TABLET ORAL EVERY 4 HOURS PRN
Status: DISCONTINUED | OUTPATIENT
Start: 2019-12-29 | End: 2020-01-06 | Stop reason: HOSPADM

## 2019-12-29 RX ORDER — TIZANIDINE 4 MG/1
4 TABLET ORAL EVERY 6 HOURS PRN
Status: DISCONTINUED | OUTPATIENT
Start: 2019-12-29 | End: 2020-01-06 | Stop reason: HOSPADM

## 2019-12-29 RX ORDER — DEXTROSE MONOHYDRATE 25 G/50ML
25 INJECTION, SOLUTION INTRAVENOUS ONCE
Status: COMPLETED | OUTPATIENT
Start: 2019-12-29 | End: 2019-12-29

## 2019-12-29 RX ORDER — PANTOPRAZOLE SODIUM 40 MG/1
40 TABLET, DELAYED RELEASE ORAL
Status: DISCONTINUED | OUTPATIENT
Start: 2019-12-29 | End: 2019-12-29

## 2019-12-29 RX ORDER — NICOTINE POLACRILEX 4 MG
15 LOZENGE BUCCAL PRN
Status: DISCONTINUED | OUTPATIENT
Start: 2019-12-29 | End: 2020-01-06 | Stop reason: HOSPADM

## 2019-12-29 RX ORDER — SODIUM CHLORIDE 9 MG/ML
INJECTION, SOLUTION INTRAVENOUS CONTINUOUS
Status: DISCONTINUED | OUTPATIENT
Start: 2019-12-29 | End: 2020-01-06 | Stop reason: HOSPADM

## 2019-12-29 RX ADMIN — Medication: at 17:21

## 2019-12-29 RX ADMIN — Medication 10 ML: at 20:38

## 2019-12-29 RX ADMIN — MAGNESIUM GLUCONATE 500 MG ORAL TABLET 400 MG: 500 TABLET ORAL at 20:39

## 2019-12-29 RX ADMIN — PANTOPRAZOLE SODIUM 40 MG: 40 TABLET, DELAYED RELEASE ORAL at 20:39

## 2019-12-29 RX ADMIN — Medication 9 MCG/MIN: at 17:21

## 2019-12-29 RX ADMIN — DEXTROSE MONOHYDRATE 25 G: 500 INJECTION PARENTERAL at 22:56

## 2019-12-29 RX ADMIN — OXYCODONE HYDROCHLORIDE 10 MG: 5 TABLET ORAL at 20:39

## 2019-12-29 RX ADMIN — HEPARIN SODIUM 5000 UNITS: 5000 INJECTION INTRAVENOUS; SUBCUTANEOUS at 05:44

## 2019-12-29 RX ADMIN — MEROPENEM 500 MG: 500 INJECTION, POWDER, FOR SOLUTION INTRAVENOUS at 10:28

## 2019-12-29 RX ADMIN — OXYCODONE HYDROCHLORIDE 10 MG: 5 TABLET ORAL at 02:16

## 2019-12-29 RX ADMIN — PAROXETINE HYDROCHLORIDE HEMIHYDRATE 20 MG: 20 TABLET, FILM COATED ORAL at 10:00

## 2019-12-29 RX ADMIN — MAGNESIUM GLUCONATE 500 MG ORAL TABLET 400 MG: 500 TABLET ORAL at 10:00

## 2019-12-29 RX ADMIN — HEPARIN SODIUM 5000 UNITS: 5000 INJECTION INTRAVENOUS; SUBCUTANEOUS at 13:57

## 2019-12-29 RX ADMIN — Medication 10 ML: at 08:26

## 2019-12-29 RX ADMIN — LEVOTHYROXINE SODIUM 50 MCG: 50 TABLET ORAL at 10:00

## 2019-12-29 RX ADMIN — Medication 1 CAPSULE: at 10:00

## 2019-12-29 RX ADMIN — CALCIUM GLUCONATE 2 G: 98 INJECTION, SOLUTION INTRAVENOUS at 05:44

## 2019-12-29 RX ADMIN — CEFTAROLINE FOSAMIL 400 MG: 400 POWDER, FOR SOLUTION INTRAVENOUS at 17:29

## 2019-12-29 RX ADMIN — VANCOMYCIN HYDROCHLORIDE 1750 MG: 10 INJECTION, POWDER, LYOPHILIZED, FOR SOLUTION INTRAVENOUS at 11:34

## 2019-12-29 RX ADMIN — FENTANYL CITRATE 50 MCG: 50 INJECTION, SOLUTION INTRAMUSCULAR; INTRAVENOUS at 00:37

## 2019-12-29 RX ADMIN — ACETAMINOPHEN 650 MG: 325 TABLET ORAL at 17:21

## 2019-12-29 RX ADMIN — DIAZEPAM 5 MG: 5 TABLET ORAL at 04:24

## 2019-12-29 RX ADMIN — BUPROPION HYDROCHLORIDE 300 MG: 300 TABLET, FILM COATED, EXTENDED RELEASE ORAL at 10:00

## 2019-12-29 RX ADMIN — SODIUM CHLORIDE: 9 INJECTION, SOLUTION INTRAVENOUS at 17:22

## 2019-12-29 RX ADMIN — Medication: at 02:26

## 2019-12-29 RX ADMIN — INSULIN HUMAN 10 UNITS: 100 INJECTION, SOLUTION PARENTERAL at 22:57

## 2019-12-29 RX ADMIN — OXYCODONE HYDROCHLORIDE 10 MG: 5 TABLET ORAL at 19:01

## 2019-12-29 RX ADMIN — FENTANYL CITRATE 50 MCG: 50 INJECTION, SOLUTION INTRAMUSCULAR; INTRAVENOUS at 04:24

## 2019-12-29 RX ADMIN — Medication 1 CAPSULE: at 17:27

## 2019-12-29 RX ADMIN — TIZANIDINE 4 MG: 4 TABLET ORAL at 02:26

## 2019-12-29 RX ADMIN — TIZANIDINE 4 MG: 4 TABLET ORAL at 20:41

## 2019-12-29 RX ADMIN — HEPARIN SODIUM 5000 UNITS: 5000 INJECTION INTRAVENOUS; SUBCUTANEOUS at 20:39

## 2019-12-29 RX ADMIN — TAMSULOSIN HYDROCHLORIDE 0.4 MG: 0.4 CAPSULE ORAL at 10:00

## 2019-12-29 RX ADMIN — Medication: at 10:33

## 2019-12-29 ASSESSMENT — ENCOUNTER SYMPTOMS
SHORTNESS OF BREATH: 0
DIARRHEA: 1
VOMITING: 0
COLOR CHANGE: 1
ABDOMINAL PAIN: 0
NAUSEA: 0
CONSTIPATION: 0

## 2019-12-29 ASSESSMENT — PAIN SCALES - WONG BAKER
WONGBAKER_NUMERICALRESPONSE: 2

## 2019-12-29 ASSESSMENT — PAIN DESCRIPTION - ORIENTATION
ORIENTATION: LEFT
ORIENTATION: LEFT

## 2019-12-29 ASSESSMENT — PAIN DESCRIPTION - DESCRIPTORS
DESCRIPTORS: PATIENT UNABLE TO DESCRIBE
DESCRIPTORS: PATIENT UNABLE TO DESCRIBE

## 2019-12-29 ASSESSMENT — PAIN SCALES - GENERAL
PAINLEVEL_OUTOF10: 0
PAINLEVEL_OUTOF10: 8
PAINLEVEL_OUTOF10: 0
PAINLEVEL_OUTOF10: 7
PAINLEVEL_OUTOF10: 8
PAINLEVEL_OUTOF10: 8
PAINLEVEL_OUTOF10: 7

## 2019-12-29 ASSESSMENT — PAIN DESCRIPTION - LOCATION
LOCATION: BACK;FOOT
LOCATION: BACK;FOOT

## 2019-12-29 ASSESSMENT — PAIN DESCRIPTION - PAIN TYPE
TYPE: CHRONIC PAIN
TYPE: CHRONIC PAIN

## 2019-12-29 NOTE — PLAN OF CARE
Problem: Pain:  Description  Pain management should include both nonpharmacologic and pharmacologic interventions. Goal: Pain level will decrease  Description  Pain level will decrease  12/29/2019 0013 by Mita Biswas RN  Outcome: Ongoing  12/28/2019 1851 by Aashish Lo RN  Outcome: Ongoing  Goal: Control of acute pain  Description  Control of acute pain  12/29/2019 0013 by Mita Biswas RN  Outcome: Ongoing  12/28/2019 1851 by Aashish Lo RN  Outcome: Ongoing  Goal: Control of chronic pain  Description  Control of chronic pain  12/29/2019 0013 by Mita Biswas RN  Outcome: Ongoing  12/28/2019 1851 by Aashish Lo RN  Outcome: Ongoing     Problem: Fluid Volume - Imbalance:  Goal: Absence of imbalanced fluid volume signs and symptoms  Description  Absence of imbalanced fluid volume signs and symptoms  12/29/2019 0013 by Mita Biswas RN  Outcome: Ongoing  12/28/2019 1851 by Aashish Lo RN  Outcome: Ongoing     Problem: Mental Status - Impaired:  Goal: Mental status will be restored to baseline  Description  Mental status will be restored to baseline  12/29/2019 0013 by Mita Biswas RN  Outcome: Ongoing  12/28/2019 1851 by Aashish Lo RN  Outcome: Ongoing     Problem: Nutrition Deficit:  Goal: Ability to achieve adequate nutritional intake will improve  Description  Ability to achieve adequate nutritional intake will improve  12/29/2019 0013 by Mita Biswas RN  Outcome: Ongoing  12/28/2019 1851 by Aashish Lo RN  Outcome: Ongoing     Problem: Pain:  Description  Pain management should include both nonpharmacologic and pharmacologic interventions.   Goal: Pain level will decrease  Description  Pain level will decrease  12/29/2019 0013 by Mita Biswas RN  Outcome: Ongoing  12/28/2019 1851 by Aashish Lo RN  Outcome: Ongoing  Goal: Recognizes and communicates pain  Description  Recognizes and communicates pain  12/29/2019 0013 by

## 2019-12-29 NOTE — PROGRESS NOTES
Critical Care Team - Daily Progress Note      Date and time: 2019 1:19 PM  Patient's name:  Andrey Mcelroy  Medical Record Number: [id number]  Patient's account/billing number: [de-identified]  Patient's YOB: 1965  Age: 53 y.o. Date of Admission: 2019 12:43 PM  Length of stay during current admission: 1      Primary Care Physician: Sarai Marie MD  ICU Attending Physician: Dr. Yusra Kearns Status: Full Code    Reason for ICU admission: AMS, CLAUDIA      SUBJECTIVE:     OVERNIGHT EVENTS:   Patient seen and examined at bedside. No acute events overnight. Patient was asking for his valium overnight that he takes at home. Patient also takes MS contin and oxycodone at home. Overnight, patient was given valium for one dose 5 mg. His mentation is much more improved today, he is alert and oriented x 2 to person and place and he can hold a conversation but has little bit of confusion. His creatinine has come down to 4.27 today compared to 4.76 yesterday. He remains on bicarb drip per nephro. Potassium same as yesterday- 5.4. Right now requiring 10 of levophed. Renal ultrasound ordered. Cardio consulted for vtach episode at [facility]. Since EF wnl and no CAD, No cardio intervention at this time. Blood culture from [facility] showing gram positive rods. Continue Merropenem and Vanco. Pharmacy to dose. ID consulted.      OBJECTIVE:     VITAL SIGNS:  BP (!) 84/38   Pulse 79   Temp 98.8 °F (37.1 °C) (Oral)   Resp 9   Ht 6' 2\" (1.88 m)   Wt 215 lb 9.8 oz (97.8 kg)   SpO2 96%   BMI 27.68 kg/m²   Tmax over 24 hours:  Temp (24hrs), Av.5 °F (36.9 °C), Min:97.7 °F (36.5 °C), Max:99.1 °F (37.3 °C)      Patient Vitals for the past 6 hrs:   BP Temp Temp src Pulse Resp SpO2   19 1132 (!) 84/38 -- -- 79 9 96 %   19 1130 (!) 69/34 98.8 °F (37.1 °C) Oral 80 9 95 %   19 1115 (!) 71/34 -- -- 79 9 96 %   19 1100 (!) 83/32 -- -- 83 11 95 %   19 1045 (!) 90/38 -- -- 79 14 95 % 12/29/19 1030 (!) 97/43 -- -- 80 13 97 %   12/29/19 1015 (!) 100/48 -- -- 77 11 98 %   12/29/19 1000 (!) 114/54 -- -- 80 17 98 %   12/29/19 0945 (!) 117/45 -- -- 74 18 97 %   12/29/19 0930 (!) 124/56 -- -- 75 13 100 %   12/29/19 0915 (!) 108/59 -- -- 83 18 100 %   12/29/19 0900 (!) 100/48 -- -- 72 16 100 %   12/29/19 0845 (!) 105/46 -- -- 72 19 100 %   12/29/19 0830 (!) 101/45 -- -- 74 12 100 %   12/29/19 0815 (!) 101/51 -- -- 82 19 100 %   12/29/19 0800 (!) 109/47 98.8 °F (37.1 °C) Oral 79 12 99 %   12/29/19 0745 -- -- -- 82 14 100 %   12/29/19 0730 108/65 -- -- 76 14 98 %         Intake/Output Summary (Last 24 hours) at 12/29/2019 1319  Last data filed at 12/29/2019 1000  Gross per 24 hour   Intake 4414.24 ml   Output 6325 ml   Net -1910.76 ml     Wt Readings from Last 2 Encounters:   12/29/19 215 lb 9.8 oz (97.8 kg)   12/28/19 216 lb (98 kg)     Body mass index is 27.68 kg/m². PHYSICAL EXAMINATION:    Constitutional:alert and oriented to person and place, mild confusion   EENT: EOMI, sclera clear, anicteric, oropharynx clear, no lesions, neck supple with midline trachea. Neck: Supple, symmetrical, trachea midline, no adenopathy, thyroid symmetric, no jvd skin normal  Respiratory: clear to auscultation, no wheezes or rales and unlabored breathing.  No intercostal tenderness  Cardiovascular: regular rate and rhythm, normal S1, S2, no murmur noted and 2+ pulses throughout  Abdomen: soft, nontender, nondistended, no masses or organomegaly  Neurological:AAO x 2  Extremities: left foot wrapped, dressing clean, dry and intact     Any additional physical findings:    MEDICATIONS:    Scheduled Meds:   heparin (porcine)  5,000 Units Subcutaneous 3 times per day    vancomycin  1,750 mg Intravenous Once    meropenem  500 mg Intravenous Q12H    sodium chloride flush  10 mL Intravenous 2 times per day    vancomycin (VANCOCIN) intermittent dosing (placeholder)   Other RX Placeholder    buPROPion  300 mg Oral the events since yesterday, I have reviewed the chart, lab seen and medications reviewed overnight events noted. His mentation is much better neurological status improve he is not as lethargic although slightly slow to respond but move all extremity no neurological deficit, he was asking for Valium last night after he woke up, according to medication profile he is on Valium 5 mg every 6 hours as needed and also he is on MS Contin and oxycodone this was likely a combination of narcotics and benzodiazepine along with acute renal failure that he has depressed mentation and poor oral intake may contribute along with medications for acute renal failure, he also have sepsis and infection causing toxic metabolic encephalopathy. His blood culture growing gram-positive rods which could be anaerobic, he is currently on meropenem and vancomycin we will follow-up final culture and adjust antibiotic and will get infectious disease consult. Podiatry consult is still pending and will ask podiatry again. His urine output is good he has more than 5 L since admission his potassium is improving it is 5.4 his bicarbonate is 20 and he is on bicarbonate drip, creatinine is much better 4.27 and BUN is 81 WBC is better 9.5 today as compared to 20,000 yesterday.     Continue with antibiotics. Infectious disease consult. Follow-up urine output creatinine and renal function. He is currently on bicarb drip and will ask nephrology if he need to continue with bicarbonate drip. Follow-up electrolytes sodium bicarbonate and potassium. Will use oxycodone as needed and continue to follow-up mentation.   Avoid benzodiazepine if possible.     Total critical care time caring for this patient with life threatening, unstable organ failure, including direct patient contact, management of life support systems, review of data including imaging and labs, discussions with other team members and physicians at least 27  Min so far today, excluding procedures.       Fransisco Vargas MD  12/29/2019 6:16 PM    Please note that this chart was generated using voice recognition Dragon dictation software. Although every effort was made to ensure the accuracy of this automated transcription, some errors in transcription may have occurred.

## 2019-12-29 NOTE — CONSULTS
Renal Consult Note    Patient :  Robert Cardoso; 53 y.o. MRN# [id number]  Location:  0103/0103-01  Attending:  Ramy Oswald MD  Admit Date:  12/28/2019   Hospital Day: 1    Reason for Consult:     Asked by Dr Ramy Oswald MD to see for CLAUDIA/Elevated Creatinine. History Obtained From:     electronic medical record    History of Present Illness:     Robert Cardoso; 53 y.o. male with past medical history of Diabetes, HTN, diabetic neuropathy, chronic back pain on narcotics, who was recently discharged from the hospital in November after presenting there with altered mental status and sepsis, with acute renal failure. He received 1 session of dialysis at that time on 11/3/19. Etiology of sepsis never determined. At discharge his renal function was reported back to normal.  Previous creatinine was 0.7mgdL in May 2019. December 24,2019 his creatinine was 1.08. Yesterday, he was transferred from [facility] after presenting there with worsening confusion over a 3-5 day period. Wife reported no fevers, + diarrhea, decreased oral intake. He had no urine output for last three days, so she called EMS who transported him to [facility]. At [facility] his potassium was 6.9, creatinine was 9.47, Bicarb 17. He was given 10 units of insulin, dextrose, and calcium. He was started on a Bicarb drip. He was hypotensive and started on levophed, and given vancomycin for suspected sepsis. WBC's 20. There was one episode of reported NSVT at [facility] but no strip is available. No further episodes. Cardiology is consulted. He was transferred to [facility] [place] [facility] [place]. V's for ICU bed. Upon arrival here, a QM was placed for possible need of dialysis. He remains on Levophed drip at 10mcg/min, in addition to Sodium Bicarb 75meq in 0.45 at 150/hr. His SBP ranges 80-90. Temp 99.1. Urine output > 300/hr. Potassium 5.4, creatinine improving and down to 4.27,  WBC's down to 9.5, lactic acid 0.55, and  BiCarb this morning 20. capsule, Take 1 capsule by mouth 2 times daily (with meals)  magnesium oxide (MAG-OX) 400 (241.3 Mg) MG TABS tablet, Take 1 tablet by mouth 2 times daily  tamsulosin (FLOMAX) 0.4 MG capsule, Take 1 capsule by mouth daily  pantoprazole (PROTONIX) 40 MG tablet, Take 1 tablet by mouth every morning (before breakfast)  ONETOUCH DELICA LANCETS 98K MISC, TEST TWO TIMES A DAY  povidone-iodine (BETADINE) 7.5 % external solution, Apply to wound. Apply as a wet to dry dressing  levothyroxine (SYNTHROID) 50 MCG tablet, Take 1 tablet by mouth daily  tiZANidine (ZANAFLEX) 4 MG tablet, TAKE ONE TABLET BY MOUTH EVERY 6 HOURS AS NEEDED FOR MUSCLE SPASMS  ONE TOUCH ULTRA TEST strip, USE ONE STRIP TO TEST TWO TIMES A DAY AS NEEDED  NYAMYC 296340 UNIT/GM powder, APPLY TO AFFECTED AREA(S) FOUR TIMES A DAY  buPROPion (WELLBUTRIN XL) 300 MG extended release tablet, TAKE ONE TABLET BY MOUTH DAILY  Wound Dressings (ADAPTIC NON-ADHERING DRESSING) PADS, Apply 1 Units topically 2 times daily  Gauze Pads & Dressings (COMBINE ABD) 5\"X9\" PADS, 1 Units by Does not apply route 2 times daily  lisinopril (PRINIVIL;ZESTRIL) 10 MG tablet, TAKE ONE TABLET BY MOUTH DAILY  Blood Glucose Monitoring Suppl (ONE TOUCH ULTRA 2) w/Device KIT, 1 kit by Other route daily  ciclopirox (LOPROX) 0.77 % cream, APPLY TO AFFECTED AREA(S) AND RUB  IN A THIN FILM TWO TIMES A DAY (AM AND PM)  Gauze Pads & Dressings (KERLIX GAUZE ROLL LARGE) MISC, 1 Units by Does not apply route 2 times daily    Current Medications:     Scheduled Meds:    heparin (porcine)  5,000 Units Subcutaneous 3 times per day    calcium gluconate IVPB  2 g Intravenous Once    sodium chloride flush  10 mL Intravenous 2 times per day    vancomycin (VANCOCIN) intermittent dosing (placeholder)   Other RX Placeholder    buPROPion  300 mg Oral Daily    lactobacillus  1 capsule Oral BID WC    levothyroxine  50 mcg Oral Daily    magnesium oxide  400 mg Oral BID    PARoxetine  20 mg Oral Daily    tamsulosin  0.4 mg Oral Daily    insulin lispro  0-6 Units Subcutaneous TID WC    insulin lispro  0-3 Units Subcutaneous Nightly    meropenem  500 mg Intravenous Q24H     Continuous Infusions:    sodium chloride 10 mL/hr at 19 2030    norepinephrine 7 mcg/min (19 0615)    sodium bicarbonate infusion 150 mL/hr at 19 0226    dextrose       PRN Meds:  tiZANidine, oxyCODONE **OR** oxyCODONE, diazepam, sodium chloride flush, magnesium hydroxide, ondansetron, heparin (porcine), heparin (porcine), glucose, dextrose, glucagon (rDNA), dextrose, fentanNYL **OR** fentanNYL    Review of Systems:     Patient encephalopathic and does not recall why he is here in hospital.  Positive report of diarrhea, decreased urine output, and confusion. Input/Output:       I/O last 3 completed shifts: In: 1026 [P.O.:140; I.V.:836; IV Piggyback:50]  Out: 2625 [Urine:2625]    Vital Signs:   Temperature:  Temp: 98.6 °F (37 °C)  TMax:   Temp (24hrs), Av.3 °F (36.8 °C), Min:97.7 °F (36.5 °C), Max:99.1 °F (37.3 °C)    Respirations:  Resp: 11  Pulse:   Pulse: 87  BP:    BP: (!) 75/27  BP Range: Systolic (46WIN), CCU:690 , Min:75 , MMV:779       Diastolic (80JJJ), DQM:56, Min:23, Max:108      Physical Examination:     General:  Alert to name and place but does not recall events  HEENT: Atraumatic, normocephalic, no throat congestion, moist mucosa. Eyes:   Pupils equal, round and reactive to light, EOMI. Neck:   No JVD, no thyromegaly, no lymphadenopathy. Chest:   Bilateral vesicular breath sounds, no rales or wheezes. Cardiac:  S1 S2 RR, no murmurs, gallops or rubs, JVP not raised. Abdomen: Soft, non-tender, non distended, BS audible. :   No suprapubic or flank tenderness. Neuro: Moves all extremities  SKIN:  No rashes, good skin turgor.   Extremities:  No edema, No clubbing, No cyanosis    Labs:       Recent Labs     19  0956 19  0443   WBC 20.1* 9.5   RBC 4.38* 3.80*   HGB 10.1* 9.0*   HCT 33.1* unavailability of reagent. GLUCOSEU TRACE 12/28/2019    KETUA NEGATIVE 12/28/2019    AMORPHOUS 1+ 12/28/2019     Urine Sodium:     Lab Results   Component Value Date    DAYAN 35 11/03/2019     Urine Potassium:    Lab Results   Component Value Date    KUR 52.1 11/03/2019     Urine Creatinine:     Lab Results   Component Value Date    LABCREA 135.8 11/05/2019       Radiology:     CXR: 12/28/19  FINDINGS:   Lung volumes are diminished.  The cardiac silhouette is prominent and stable. There is suggestion of developing perihilar edema.  The peripheral lungs   remain clear with no pleural fluid.  Right IJ line has been placed with tip   in the SVC and no pneumothorax.           Impression   Central line tip in the SVC with no pneumothorax.       Suggestion of mild perihilar edema and CHF, possibly accentuated by low lung   volumes. Renal US 11/2019  FINDINGS:   The right kidney measures 13.2 cm in length and the left kidney measures 12.3   cm in length.       Kidneys demonstrate normal cortical echogenicity.  No hydronephrosis, focal   lesions, or intrarenal stones.           Impression   Unremarkable renal ultrasound. Assessment:     1. Acute Kidney Injury: PreRenal azotemia secondary to volume depletion from diarrhea and poor oral intake, in addition to Obstructive Uropathy. Urine output excellent since montemayor placement. 2. Hyperkalemia secondary to CLAUDIA  3. Hypotension  4. Metabolic Encephalopathy secondary to sepsis, CLAUDIA, and narcotic use  5. Septic Shock secondary to possible osteomyelitis  6. Diabetes  7. Hx of HTN  8. Hx of chronic pain and narcotic use  9. Metabolic Acidosis  10. NSVT: No strips available  11. Urinary retention    Plan:   1. Given excellent urine output, hold off on dialysis  2. Consider Lokelma  For hyperkalemia. Will d/w Dr. Jef Ang  3. Consider discontinuing Bicarb drip and use 0.45 alone.   4. Hold Nephrotoxic medications    Nutrition   Please ensure that patient is on a renal

## 2019-12-29 NOTE — CONSULTS
day    vancomycin (VANCOCIN) intermittent dosing (placeholder)   Other RX Placeholder    buPROPion  300 mg Oral Daily    lactobacillus  1 capsule Oral BID WC    levothyroxine  50 mcg Oral Daily    magnesium oxide  400 mg Oral BID    PARoxetine  20 mg Oral Daily    tamsulosin  0.4 mg Oral Daily    insulin lispro  0-6 Units Subcutaneous TID WC    insulin lispro  0-3 Units Subcutaneous Nightly       Social History:     Social History     Socioeconomic History    Marital status:      Spouse name: Not on file    Number of children: Not on file    Years of education: Not on file    Highest education level: Not on file   Occupational History    Not on file   Social Needs    Financial resource strain: Not on file    Food insecurity:     Worry: Not on file     Inability: Not on file    Transportation needs:     Medical: Not on file     Non-medical: Not on file   Tobacco Use    Smoking status: Never Smoker    Smokeless tobacco: Never Used   Substance and Sexual Activity    Alcohol use: No    Drug use: Not Currently     Frequency: 2.0 times per week     Types: Marijuana    Sexual activity: Not on file   Lifestyle    Physical activity:     Days per week: Not on file     Minutes per session: Not on file    Stress: Not on file   Relationships    Social connections:     Talks on phone: Not on file     Gets together: Not on file     Attends [employer] service: Not on file     Active member of club or organization: Not on file     Attends meetings of clubs or organizations: Not on file     Relationship status: Not on file    Intimate partner violence:     Fear of current or ex partner: Not on file     Emotionally abused: Not on file     Physically abused: Not on file     Forced sexual activity: Not on file   Other Topics Concern    Not on file   Social History Narrative    Not on file       Family History:     Family History   Problem Relation Age of Onset    Diabetes Father     Cancer Maternal Grandfather         Colon Cancer; Prostate Cancer    No Known Problems Mother         Allergies:   Bactrim [sulfamethoxazole-trimethoprim]; Fiorinal [butalbital-aspirin-caffeine]; Peanut-containing drug products; Statins; and Peanut butter flavor [flavoring agent]     Review of Systems:     Unable to provide. Sedated on ventilator. 12/29/2019      Constitutional: No fevers or chills. No systemic complaints  Head: No headaches  Eyes: No double vision or blurry vision. No conjunctival inflammation. ENT: No sore throat or runny nose. . No hearing loss, tinnitus or vertigo. Cardiovascular: No chest pain or palpitations. No shortness of breath. No MONTES  Lung: No shortness of breath or cough. No sputum production  Abdomen: No nausea, vomiting, diarrhea, or abdominal pain. Caridad Marten No cramps. Genitourinary: No increased urinary frequency, or dysuria. No hematuria. No suprapubic or CVA pain  Musculoskeletal: No muscle aches or pains. No joint effusions, swelling or deformities  Hematologic: No bleeding or bruising. Neurologic: No headache, weakness, numbness, or tingling. Integument: No rash, no ulcers. Psychiatric: No depression. Endocrine: No polyuria, no polydipsia, no polyphagia.     Physical Examination :     Patient Vitals for the past 8 hrs:   BP Temp Temp src Pulse Resp SpO2   12/29/19 1630 (!) 122/44 -- -- 87 15 99 %   12/29/19 1615 118/60 -- -- 84 12 96 %   12/29/19 1600 (!) 123/56 -- -- 86 16 99 %   12/29/19 1545 (!) 119/55 -- -- 80 17 100 %   12/29/19 1530 (!) 108/55 -- -- 77 12 98 %   12/29/19 1415 (!) 122/49 -- -- 78 13 100 %   12/29/19 1400 (!) 107/50 -- -- 80 14 100 %   12/29/19 1345 (!) 99/45 -- -- 76 16 99 %   12/29/19 1330 (!) 109/43 -- -- 87 (!) 7 98 %   12/29/19 1315 (!) 96/44 -- -- 76 -- 99 %   12/29/19 1300 (!) 92/44 -- -- 78 -- 100 %   12/29/19 1245 (!) 93/40 -- -- 79 14 96 %   12/29/19 1230 (!) 78/46 -- -- 75 17 96 %   12/29/19 1215 (!) 89/40 -- -- 83 18 97 %   12/29/19 1200 (!) 98/41 98.8 °F (37.1 °C) Oral 88 18 --   12/29/19 1145 (!) 100/55 -- -- 90 17 --   12/29/19 1132 (!) 84/38 -- -- 79 9 96 %   12/29/19 1130 (!) 69/34 98.8 °F (37.1 °C) Oral 80 9 95 %   12/29/19 1115 (!) 71/34 -- -- 79 9 96 %   12/29/19 1100 (!) 83/32 -- -- 83 11 95 %   12/29/19 1045 (!) 90/38 -- -- 79 14 95 %   12/29/19 1030 (!) 97/43 -- -- 80 13 97 %   12/29/19 1015 (!) 100/48 -- -- 77 11 98 %   12/29/19 1000 (!) 114/54 -- -- 80 17 98 %   12/29/19 0945 (!) 117/45 -- -- 74 18 97 %   12/29/19 0930 (!) 124/56 -- -- 75 13 100 %   12/29/19 0915 (!) 108/59 -- -- 83 18 100 %     General Appearance: Awake, alert, and in no apparent distress  Head:  Normocephalic, no trauma  Eyes: Pupils equal, round, reactive to light and accommodation; extraocular movements intact; sclera anicteric; conjunctivae pink. No embolic phenomena. ENT: Oropharynx clear, without erythema, exudate, or thrush. No tenderness of sinuses. Mouth/throat: mucosa pink and moist. No lesions. Dentition in good repair. Neck:Supple, without lymphadenopathy. Thyroid normal, No bruits. Pulmonary/Chest: Clear to auscultation, without wheezes, rales, or rhonchi. No dullness to percussion. Cardiovascular: Regular rate and rhythm without murmurs, rubs, or gallops. Abdomen: Soft, non tender. Bowel sounds normal. No organomegaly  All four Extremities: No cyanosis, clubbing, edema, or effusions. Neurologic: No gross motor deficits. Mentation clearer. Peripheral neuropathy  Skin: Warm and dry with good turgor. Signs of peripheral arterial insufficiency. Lt foot with dark skin discoloration. New plantar ulcer with drainage.     Medical Decision Making -Laboratory:   I have independently reviewed/ordered the following labs:    CBC with Differential:   Recent Labs     12/28/19  0956 12/29/19  0443   WBC 20.1* 9.5   HGB 10.1* 9.0*   HCT 33.1* 29.6*    See Reflexed IPF Result   LYMPHOPCT 4* 11*   MONOPCT 3 8     BMP:   Recent Labs     12/28/19  1307  12/29/19  0443 12/29/19  1138   NA

## 2019-12-29 NOTE — CONSULTS
Consultation Note  Podiatric Medicine and Surgery     Subjective     Chief Complaint: left foot wound    HPI:  Philippe Michael is a 53 y.o. male with a past medical history of DM2 was seen at [facility]. [place] for left foot wound. Patient is well known to Dr. Ramy Sevilla and follow up with him in wound care. Patient is admitted to the ICU for sputum production, altered mental status, CLAUDIA, and abnormal labs. History was obtained from his wife. Patients wife states that the wound has not been improving much over the past few weeks. Has been receiving care at wound care where the area was told to be offloaded. Patient is confused with altered mental status. PCP is Magnus Miller MD    ROS:   Review of Systems   Constitutional: Negative for chills and fever. Respiratory: Negative for shortness of breath. Cardiovascular: Negative for chest pain and leg swelling. Gastrointestinal: Positive for diarrhea. Negative for abdominal pain, constipation, nausea and vomiting. Musculoskeletal: Positive for myalgias. Negative for arthralgias, gait problem and joint swelling. Skin: Positive for color change and wound. Neurological: Positive for weakness and numbness. Psychiatric/Behavioral: Positive for confusion. Altered mental status       Past Medical History   has a past medical history of Anxiety, Arthritis, BPH (benign prostatic hyperplasia), Chronic kidney disease, Depression, Diabetes mellitus ([address] Diabetic neuropathy ([address] GERD (gastroesophageal reflux disease), Hyperglycemia, Hypertension, Hypothyroidism, Impacted tooth, and Pneumonia. Past Surgical History   has a past surgical history that includes Appendectomy; knee surgery (Right); Tonsillectomy; back surgery; Toe amputation (Bilateral, 3/6/2017); and Upper gastrointestinal endoscopy (N/A, 5/20/2019). Medications  Prior to Admission medications    Medication Sig Start Date End Date Taking?  Authorizing Provider   megestrol ulcer #1 located sub 2nd MT head and measures approximately 2cm x 2cm x 0.6 cm. Base is fibrogranular. Periwound skin is slightly macerated. Purulent drainage noted with associated mal odor. Erythema present with mild associated increase in warmth. Does probe to bone, but no sinus track or undermining. No fluctuance, crepitus, or induration. Dry necrotic skin noted superficially on the 1st MT dorsally and plantarly    Skin on the right foot is warm and dry with no open lesions noted. Clinical Images:          Imaging:   XR CHEST PORTABLE   Final Result   Central line tip in the SVC with no pneumothorax. Suggestion of mild perihilar edema and CHF, possibly accentuated by low lung   volumes. US RETROPERITONEAL LIMITED    (Results Pending)   MRI FOOT LEFT WO CONTRAST    (Results Pending)   VL LOWER EXTREMITY ARTERIAL SEGMENTAL PRESSURES W PPG BILATERAL    (Results Pending)       Cultures:   Wound cx 12/29: obtained    Aleks Celeste is a 53 y.o. male with   1. Diabetic wound down to the level of the bone, sub 2nd MT head left foot  2. Cellulitis  3. Sepsis  4. S/p digit 1-3 amputation, left foot  5. S/p 2nd ray amputation, right foot  6. DM2  7. AMS  8. Acute renal failure    Active Problems:    Acute renal failure (ARF) ([facility])    Encephalopathy due to infection    Septic shock ([facility])    Uremia    Hyperkalemia    Hypocalcemia    Metabolic acidosis    Altered mental status    Diabetic ulcer of toe of left foot associated with type 2 diabetes mellitus (HCC)    Hyperphosphatemia    Chronic, continuous use of opioids  Resolved Problems:    * No resolved hospital problems.  *        Plan     · Patient examined and evaluated at bedside   · Treatment options discussed in detail with the patient/family  · Medical management per Critical Care  · ID following - IV Ceftaroline  · Aerobic/anaerobic wound cx obtained  · L Foot XR reviewed - osteo 2nd proximal phalax  · Dressing applied to L

## 2019-12-29 NOTE — FLOWSHEET NOTE
12/29/19 0600   Vitals   Pulse 87   Resp 11   BP (!) 75/27   MAP (mmHg) (!) 37   Oxygen Therapy   SpO2 97 %   Dr. Bishnu Cardona aware, restarted Levophed drip at 5mcg/min. Will check BP every 15min.  Per protocal.

## 2019-12-29 NOTE — PROGRESS NOTES
findings:    Complete Blood Count:   Recent Labs     12/28/19  0956 12/29/19 0443 12/30/19  0415   WBC 20.1* 9.5 8.5   HGB 10.1* 9.0* 8.5*   HCT 33.1* 29.6* 28.3*    See Reflexed IPF Result 304        Last 3 Blood Glucose:   Recent Labs     12/29/19  1805 12/29/19  2330 12/30/19  0415   GLUCOSE 75 162* 129*        PT/INR:    Lab Results   Component Value Date    PROTIME 14.8 05/17/2019    INR 1.2 05/17/2019     PTT:    Lab Results   Component Value Date    APTT 32.4 05/17/2019       Comprehensive Metabolic Profile:   Recent Labs     12/28/19  0956  12/29/19 1805 12/29/19 2330 12/30/19 0415   *   < > 135 133* 135   K 6.9*   < > 5.6* 4.3 5.4*   CL 90*   < > 100 98 100   CO2 21   < > 18* 25 24   *   < > 69* 46* 41*   CREATININE 9.47*   < > 3.01* 1.73* 1.62*   GLUCOSE 115*   < > 75 162* 129*   CALCIUM 8.6   < > 8.7 8.0* 8.6   PROT 8.2  --   --   --   --    LABALBU 3.5  --   --   --   --    BILITOT 0.26*  --   --   --   --    ALKPHOS 87  --   --   --   --    AST 16  --   --   --   --    ALT 12  --   --   --   --     < > = values in this interval not displayed. Magnesium:   Lab Results   Component Value Date    MG 1.5 12/30/2019     Phosphorus:   Lab Results   Component Value Date    PHOS 3.1 12/30/2019     Ionized Calcium:   Lab Results   Component Value Date    CAION 1.10 12/30/2019        Urinalysis:     Troponin: No results for input(s): TROPONINI in the last 72 hours. Xr Tibia Fibula Left (2 Views)    Result Date: 12/28/2019  1. No acute osseous abnormality of the left lower leg. 2. Diffuse soft tissue swelling. No radiodense foreign body or subcutaneous gas. 3. Diffuse cortical thickening and irregularity involving the tibia and fibula. Findings can be seen in the setting of chronic osteomyelitis. Xr Foot Left (min 3 Views)    Result Date: 12/28/2019  1. New irregularity involving the remnant 2nd proximal phalanx concerning for osteomyelitis.   Adjacent soft tissue swelling and soft tissue defect is present. 2. Diffuse soft tissue swelling. 3. No additional suspicious lesions although evaluation is limited given osteopenia and patient positioning. Ct Head Wo Contrast    Result Date: 12/28/2019  Unchanged appearance of the brain without acute CT abnormality identified. Xr Chest Portable    Result Date: 12/28/2019  Central line tip in the SVC with no pneumothorax. Suggestion of mild perihilar edema and CHF, possibly accentuated by low lung volumes. Xr Chest Portable    Result Date: 12/28/2019  1. Mild vascular prominence without overt failure. ASSESSMENT:     Active Problems:    Acute renal failure (ARF) (HCC)    Encephalopathy due to infection    Septic shock (HCC)    Uremia    Hyperkalemia    Hypocalcemia    Metabolic acidosis    Altered mental status    Diabetic ulcer of toe of left foot associated with type 2 diabetes mellitus (HCC)    Hyperphosphatemia    Chronic, continuous use of opioids  Resolved Problems:    * No resolved hospital problems. *        PLAN:       ICU PROPHYLAXIS:  Stress ulcer:  Not indicated  VTE:   [] Enoxaparin  [x] Unfract.  Heparin Subcut  [] EPC Cuffs    NUTRITION:  [] NPO [] Tube Feeding (Specify: ) [] TPN  [x] PO (Diet: DIET RENAL;)    HOME MEDICATIONS RECONCILED: [x] No  [] Yes    INSULIN DRIP:   [x] No   [] Yes    CONSULTATION NEEDED:  [] No   [x] Yes    FAMILY UPDATED:    [x] No   [] Yes    TRANSFER OUT OF ICU:   [x] No   [] Yes    ADDITIONAL PLAN:    Toxic metabolic encephalopathy     Requiring 10 of levo, wean off as tolerated  Xray of foot showing osteo  Podiatry consulted, will likely need MRI  One blood culture from [facility] showing gram pos rods  Ordered repeat blood cultures      ID consulted: Discontinue Vanco meropenem   Start ceftaroline 400 mg in dextrose 5% for 10 doses  Aerobic anaerobic correct culture showed gram positive cocci in cluster     Nephrology following,    CLAUDIA improving  Creatinine improving: Cr: 1.73<----2.87 <---4.27<---4.76  Potassium 5.2  continue bicarb drip: 24 hrs   Patient's wife stated he had diarrhea   C. Diff pending  Patient had episode of vtach at [facility] consulted  No acute intervention at this time  Echo done on 11/2- EF > 55%, mild LVH, right ventricular dilation, normal RVSP  DVT ppx with heparin        Kenneth Pierce M.D. Critical care resident,  Department of Internal Medicine/ Critical care  [facility])             12/30/2019, 7:52 AM        Attending Physician Statement  I have discussed the care of Sd Winslow, including pertinent history and exam findings with the resident. I have reviewed the key elements of all parts of the encounter with the resident. I have seen and examined the patient with the resident. I agree with the assessment and plan and status of the problem list as documented. I have seen the patient during my round today, I have reviewed the chart, lab seen and medications reviewed. Apparently he got confused last night and pulled central line from his right femoral which was replaced as he is still on Levophed. He did not require hemodialysis creatinine is improved to 1.62 potassium is 5.4 and his urine output is a little over 4 L with good urine output although potassium remains 5.4. He is not agitated restless he is alert and follows command, he does ask for oxycodone he is not on MS Contin at this time and he did not receive Valium last night. His antibiotic was changed to ceftaroline per infectious disease, blood culture is positive for gram-positive rods with bacillus species which could be contaminant. Culture from wound growing gram-positive cocci in clusters. Podiatry was consulted and they were planning to get MRI of the foot and will evaluate for debridement. Continue with Levophed and will try to wean Levophed to off.     Total critical care time caring for this patient with life threatening,

## 2019-12-29 NOTE — CONSULTS
and Upper gastrointestinal endoscopy (N/A, 5/20/2019). Home Medications:    Prior to Admission medications    Medication Sig Start Date End Date Taking? Authorizing Provider   megestrol (MEGACE ORAL) 40 MG/ML suspension Take 10 mLs by mouth daily 12/24/19   Romana Herald, MD   oxyCODONE HCl (OXY-IR) 10 MG immediate release tablet Take 1 tablet by mouth every 6 hours as needed for Pain for up to 30 days. Intended supply: 30 days 12/20/19 1/19/20  Romana Herald, MD   morphine (MS CONTIN) 60 MG extended release tablet Take 1 tablet by mouth 2 times daily for 30 days. 12/20/19 1/19/20  Romana Herald, MD   diazepam (VALIUM) 5 MG tablet Take 1 tablet by mouth every 6 hours as needed for Anxiety for up to 30 days. 12/20/19 1/19/20  Romana Herald, MD   NARCAN 4 MG/0.1ML LIQD nasal spray 4 mg 10/29/19   Historical Provider, MD   PARoxetine (PAXIL) 20 MG tablet TAKE ONE TABLET BY MOUTH FOUR TIMES A DAY 10/29/19   Val Patton MD   atorvastatin (LIPITOR) 20 MG tablet Take 1 tablet by mouth daily 10/10/19   Romana Herald, MD   sodium hypochlorite (DAKINS) 0.125 % SOLN external solution Apply topically every 12 hours Apply as a wet to dry dressing twice a day. Can also use to cleanse wound. 9/20/19   Yakelin Jj DPM   fluocinonide (LIDEX) 0.05 % ointment Apply topically 2 times daily.  6/20/19   Valerie Florez DPM   metFORMIN (GLUCOPHAGE) 500 MG tablet Take 1 tablet by mouth 2 times daily (with meals) 6/14/19   Romana Herald, MD   lactobacillus (CULTURELLE) capsule Take 1 capsule by mouth 2 times daily (with meals) 5/21/19   Romana Herald, MD   magnesium oxide (MAG-OX) 400 (241.3 Mg) MG TABS tablet Take 1 tablet by mouth 2 times daily 5/21/19   Romana Herald, MD   tamsulosin [facility]) 0.4 MG capsule Take 1 capsule by mouth daily 5/21/19   Romana Herald, MD   pantoprazole (PROTONIX) 40 MG tablet Take 1 tablet by mouth every morning (before breakfast) 5/22/19   Nick Hu Crissy Chadiwck MD   [facility] LANC[facility]L MISC TEST TWO TIMES A DAY 5/7/19   Maryse Garcia MD   povidone-iodine (BETADINE) 7.5 % external solution Apply to wound. Apply as a wet to dry dressing 4/16/19   Balbina Angle, DPM   levothyroxine (SYNTHROID) 50 MCG tablet Take 1 tablet by mouth daily 4/3/19   Maryse Garcia MD   tiZANidine (ZANAFLEX) 4 MG tablet TAKE ONE TABLET BY MOUTH EVERY 6 HOURS AS NEEDED FOR MUSCLE SPASMS 3/21/19   Maryse Garcia MD   ONE TOUCH ULTRA TEST strip USE ONE STRIP TO TEST TWO TIMES A DAY AS NEEDED 3/18/19   Maryse Garcia MD   [facility] [address] UNIT/GM powder APPLY TO AFFECTED AREA(S) FOUR TIMES A DAY 2/7/19   Maryse Garcia MD   buPROPion (WELLBUTRIN XL) 300 MG extended release tablet TAKE ONE TABLET BY MOUTH DAILY 11/28/18   Maryse Garcia MD   Wound Dressings (ADAPTIC NON-ADHERING DRESSING) PADS Apply 1 Units topically 2 times daily 9/28/18   Maryse Garcia MD   Gauze Pads & Dressings (COMBINE ABD) 5\"X9\" PADS 1 Units by Does not apply route 2 times daily 9/28/18   Maryse Garcia MD   lisinopril (PRINIVIL;ZESTRIL) 10 MG tablet TAKE ONE TABLET BY MOUTH DAILY 7/27/18   Maryse Garcia MD   Blood Glucose Monitoring Suppl (ONE TOUCH ULTRA 2) w/Device KIT 1 kit by Other route daily 3/30/18   Maryse Garcia MD   ciclopirox (LOPROX) 0.77 % cream APPLY TO AFFECTED AREA(S) AND RUB  IN A THIN FILM TWO TIMES A DAY (AM AND PM) 12/12/17   Maryse Garcia MD   Gauze Pads & Dressings [facility] GAUZE ROLL LARGE) MISC 1 Units by Does not apply route 2 times daily 6/12/17 11/5/18  Natalie Delgado MD     heparin (porcine), 5,000 Units, Subcutaneous, 3 times per day    sodium chloride flush, 10 mL, Intravenous, 2 times per day    vancomycin (VANCOCIN) intermittent dosing (placeholder), , Other, RX Placeholder    buPROPion, 300 mg, Oral, Daily    lactobacillus, 1 capsule, Oral, BID WC    levothyroxine, 50 mcg, Oral, Daily    magnesium oxide, 400 mg, Oral, BID    PARoxetine, 20 mg, Oral, Daily    tamsulosin, 0.4 mg, Oral, Daily    insulin lispro, 0-6 Units, Subcutaneous, TID WC    insulin lispro, 0-3 Units, Subcutaneous, Nightly    meropenem, 500 mg, Intravenous, Q24H      Allergies:  Bactrim [sulfamethoxazole-trimethoprim]; Fiorinal [butalbital-aspirin-caffeine]; Peanut-containing drug products; Statins; and Peanut butter flavor [flavoring agent]    Social History:   reports that he has never smoked. He has never used smokeless tobacco. He reports previous drug use. Frequency: 2.00 times per week. Drug: Marijuana. He reports that he does not drink alcohol. Family History: family history includes Cancer in his maternal grandfather; Diabetes in his father; No Known Problems in his mother. No h/o sudden cardiac death. REVIEW OF SYSTEMS:    · Constitutional: Positive for decrease in activity, decrease in energy levels  · Eyes: No visual changes or diplopia. No scleral icterus. · ENT: No Headaches, hearing loss or vertigo. No mouth sores or sore throat. · Cardiovascular: As mentioned in HPI  · Respiratory: No previous pulmonary problems  · Gastrointestinal: No abdominal pain, appetite loss, blood in stools. No change in bowel or bladder habits. · Genitourinary: Positive for decreased urine output in the last 3 days  · Musculoskeletal: Positive for gait disturbance  · Integumentary: No rash or pruritis. · Neurological: No headache, diplopia, change in muscle strength, .  · Psychiatric: No anxiety, or depression. · Endocrine: No temperature intolerance. No excessive thirst, fluid intake, or urination. No tremor. · Hematologic/Lymphatic: No abnormal bruising or bleeding, blood clots or swollen lymph nodes. · Allergic/Immunologic: No nasal congestion or hives.       PHYSICAL EXAM:      BP (!) 101/45   Pulse 74   Temp 98.8 °F (37.1 °C) (Oral)   Resp 12   Ht 6' 2\" (1.88 m)   Wt 215 lb 9.8 oz (97.8 kg)   SpO2 100%   BMI 27.68 kg/m² Constitutional and General Appearance: alert, cooperative,   HEENT: PERRL, no cervical lymphadenopathy. No masses palpable. Normal oral mucosa  · Respiratory:  · Diminished bibasilar air entry  Cardiovascular:  · The apical impulse is not displaced  · Heart tones are crisp and normal.   Abdomen:   · No masses or tenderness  · Bowel sounds present  Extremities:  ·  No Cyanosis or Clubbing    Neurological:  · Alert and oriented. Labs:     CBC:   Recent Labs     12/28/19  0956 12/29/19  0443   WBC 20.1* 9.5   HGB 10.1* 9.0*   HCT 33.1* 29.6*    See Reflexed IPF Result     BMP:   Recent Labs     12/29/19  0123 12/29/19  0443    133*   K 5.4* 5.4*   CO2 17* 20   BUN 87* 81*   CREATININE 4.76* 4.27*   LABGLOM 13* 15*   GLUCOSE 114* 110*     BNP: No results for input(s): BNP in the last 72 hours. PT/INR: No results for input(s): PROTIME, INR in the last 72 hours. APTT:No results for input(s): APTT in the last 72 hours. CARDIAC ENZYMES:No results for input(s): CKTOTAL, CKMB, CKMBINDEX, TROPONINI in the last 72 hours.   FASTING LIPID PANEL:  Lab Results   Component Value Date    HDL 33 03/29/2019    LDLCALC 82 03/29/2019     LIVER PROFILE:  Recent Labs     12/28/19  0956   AST 16   ALT 12   LABALBU 3.5     Troponins: Invalid input(s): TROPONIN     Other Current Problems  Patient Active Problem List   Diagnosis    Anxiety disorder    Benign prostatic hyperplasia    Depression    Edema    Esophageal reflux    Essential hypertension    Hypothyroidism    Right cervical radiculopathy    Osteomyelitis (HCC)    Osteomyelitis of foot, left, acute (HCC)    Non compliance with medical treatment    Acute renal failure (ARF) (HCC)    Ulcerated, foot, left, with necrosis of bone ([address]    Type 2 diabetes mellitus with foot ulcer, with long-term current use of insulin (HCC)    Anemia    Hematemesis with nausea    Erosive gastritis    Ulcerated, foot, left, with fat layer exposed ([address]    Renal

## 2019-12-29 NOTE — PLAN OF CARE
Problem: Pain:  Goal: Pain level will decrease  Description  Pain level will decrease  Outcome: Ongoing  Goal: Control of acute pain  Description  Control of acute pain  Outcome: Ongoing  Goal: Control of chronic pain  Description  Control of chronic pain  Outcome: Ongoing     Problem: Fluid Volume - Imbalance:  Goal: Absence of imbalanced fluid volume signs and symptoms  Description  Absence of imbalanced fluid volume signs and symptoms  Outcome: Ongoing     Problem: Mental Status - Impaired:  Goal: Mental status will be restored to baseline  Description  Mental status will be restored to baseline  Outcome: Ongoing     Problem: Nutrition Deficit:  Goal: Ability to achieve adequate nutritional intake will improve  Description  Ability to achieve adequate nutritional intake will improve  Outcome: Ongoing     Problem: Pain:  Goal: Pain level will decrease  Description  Pain level will decrease  Outcome: Ongoing  Goal: Recognizes and communicates pain  Description  Recognizes and communicates pain  Outcome: Ongoing  Goal: Control of acute pain  Description  Control of acute pain  Outcome: Ongoing  Goal: Control of chronic pain  Description  Control of chronic pain  Outcome: Ongoing     Problem: Skin Integrity - Impaired:  Goal: Will show no infection signs and symptoms  Description  Will show no infection signs and symptoms  Outcome: Ongoing  Goal: Absence of new skin breakdown  Description  Absence of new skin breakdown  Outcome: Ongoing     Problem: Confusion - Acute:  Goal: Mental status will be restored to baseline  Description  Mental status will be restored to baseline  Outcome: Ongoing  Goal: Absence of continued neurological deterioration signs and symptoms  Description  Absence of continued neurological deterioration signs and symptoms  Outcome: Ongoing     Problem: Discharge Planning:  Goal: Ability to perform activities of daily living will improve  Description  Ability to perform activities of daily living will improve  Outcome: Ongoing  Goal: Participates in care planning  Description  Participates in care planning  Outcome: Ongoing  Goal: Discharged to appropriate level of care  Description  Discharged to appropriate level of care  Outcome: Ongoing     Problem: Injury - Risk of, Physical Injury:  Goal: Absence of physical injury  Description  Absence of physical injury  Outcome: Ongoing  Goal: Will remain free from falls  Description  Will remain free from falls  Outcome: Ongoing     Problem: Mood - Altered:  Goal: Mood stable  Description  Mood stable  Outcome: Ongoing  Goal: Absence of abusive behavior  Description  Absence of abusive behavior  Outcome: Ongoing  Goal: Verbalizations of feeling emotionally comfortable while being cared for will increase  Description  Verbalizations of feeling emotionally comfortable while being cared for will increase  Outcome: Ongoing     Problem: Psychomotor Activity - Altered:  Goal: Absence of psychomotor disturbance signs and symptoms  Description  Absence of psychomotor disturbance signs and symptoms  Outcome: Ongoing     Problem: Sensory Perception - Impaired:  Goal: Demonstrations of improved sensory functioning will increase  Description  Demonstrations of improved sensory functioning will increase  Outcome: Ongoing  Goal: Decrease in sensory misperception frequency  Description  Decrease in sensory misperception frequency  Outcome: Ongoing  Goal: Able to refrain from responding to false sensory perceptions  Description  Able to refrain from responding to false sensory perceptions  Outcome: Ongoing  Goal: Demonstrates accurate environmental perceptions  Description  Demonstrates accurate environmental perceptions  Outcome: Ongoing  Goal: Able to distinguish between reality-based and nonreality-based thinking  Description  Able to distinguish between reality-based and nonreality-based thinking  Outcome: Ongoing  Goal: Able to interrupt nonreality-based thinking  Description  Able to interrupt nonreality-based thinking  Outcome: Ongoing     Problem: Sleep Pattern Disturbance:  Goal: Appears well-rested  Description  Appears well-rested  Outcome: Ongoing     Problem: Airway Clearance - Ineffective:  Goal: Ability to maintain a clear airway will improve  Description  Ability to maintain a clear airway will improve  Outcome: Ongoing     Problem: Anxiety/Stress:  Goal: Level of anxiety will decrease  Description  Level of anxiety will decrease  Outcome: Ongoing     Problem: Aspiration:  Goal: Absence of aspiration  Description  Absence of aspiration  Outcome: Ongoing     Problem:  Bowel Function - Altered:  Goal: Bowel elimination is within specified parameters  Description  Bowel elimination is within specified parameters  Outcome: Ongoing     Problem: Cardiac Output - Decreased:  Goal: Hemodynamic stability will improve  Description  Hemodynamic stability will improve  Outcome: Ongoing     Problem: Gas Exchange - Impaired:  Goal: Levels of oxygenation will improve  Description  Levels of oxygenation will improve  Outcome: Ongoing     Problem: Serum Glucose Level - Abnormal:  Goal: Ability to maintain appropriate glucose levels will improve to within specified parameters  Description  Ability to maintain appropriate glucose levels will improve to within specified parameters  Outcome: Ongoing     Problem: Tissue Perfusion, Altered:  Goal: Circulatory function within specified parameters  Description  Circulatory function within specified parameters  Outcome: Ongoing     Problem: Tissue Perfusion - Cardiopulmonary, Altered:  Goal: Absence of angina  Description  Absence of angina  Outcome: Ongoing  Goal: Hemodynamic stability will improve  Description  Hemodynamic stability will improve  Outcome: Ongoing     Problem: Falls - Risk of:  Goal: Absence of physical injury  Description  Absence of physical injury  Outcome: Ongoing  Goal: Will remain free from falls  Description  Will remain free from falls  Outcome: Ongoing     Problem: Risk for Impaired Skin Integrity  Goal: Tissue integrity - skin and mucous membranes  Description  Structural intactness and normal physiological function of skin and  mucous membranes.   Outcome: Ongoing

## 2019-12-30 LAB
ABSOLUTE EOS #: 0.07 K/UL (ref 0–0.44)
ABSOLUTE IMMATURE GRANULOCYTE: <0.03 K/UL (ref 0–0.3)
ABSOLUTE LYMPH #: 0.99 K/UL (ref 1.1–3.7)
ABSOLUTE MONO #: 0.76 K/UL (ref 0.1–1.2)
ANION GAP SERPL CALCULATED.3IONS-SCNC: 10 MMOL/L (ref 9–17)
ANION GAP SERPL CALCULATED.3IONS-SCNC: 11 MMOL/L (ref 9–17)
BASOPHILS # BLD: 0 % (ref 0–2)
BASOPHILS ABSOLUTE: 0.03 K/UL (ref 0–0.2)
BUN BLDV-MCNC: 41 MG/DL (ref 6–20)
BUN BLDV-MCNC: 46 MG/DL (ref 6–20)
BUN/CREAT BLD: ABNORMAL (ref 9–20)
BUN/CREAT BLD: ABNORMAL (ref 9–20)
C-REACTIVE PROTEIN: 112.3 MG/L (ref 0–5)
CALCIUM IONIZED: 1.1 MMOL/L (ref 1.13–1.33)
CALCIUM SERPL-MCNC: 8 MG/DL (ref 8.6–10.4)
CALCIUM SERPL-MCNC: 8.6 MG/DL (ref 8.6–10.4)
CHLORIDE BLD-SCNC: 100 MMOL/L (ref 98–107)
CHLORIDE BLD-SCNC: 98 MMOL/L (ref 98–107)
CO2: 24 MMOL/L (ref 20–31)
CO2: 25 MMOL/L (ref 20–31)
CREAT SERPL-MCNC: 1.62 MG/DL (ref 0.7–1.2)
CREAT SERPL-MCNC: 1.73 MG/DL (ref 0.7–1.2)
CULTURE: NORMAL
DIFFERENTIAL TYPE: ABNORMAL
DIRECT EXAM: NORMAL
EKG ATRIAL RATE: 85 BPM
EKG ATRIAL RATE: 93 BPM
EKG P AXIS: 45 DEGREES
EKG P AXIS: 48 DEGREES
EKG P-R INTERVAL: 146 MS
EKG P-R INTERVAL: 156 MS
EKG Q-T INTERVAL: 338 MS
EKG Q-T INTERVAL: 378 MS
EKG QRS DURATION: 78 MS
EKG QRS DURATION: 78 MS
EKG QTC CALCULATION (BAZETT): 420 MS
EKG QTC CALCULATION (BAZETT): 449 MS
EKG R AXIS: 35 DEGREES
EKG R AXIS: 75 DEGREES
EKG T AXIS: 54 DEGREES
EKG T AXIS: 54 DEGREES
EKG VENTRICULAR RATE: 85 BPM
EKG VENTRICULAR RATE: 93 BPM
EOSINOPHILS RELATIVE PERCENT: 1 % (ref 1–4)
GFR AFRICAN AMERICAN: 50 ML/MIN
GFR AFRICAN AMERICAN: 54 ML/MIN
GFR NON-AFRICAN AMERICAN: 41 ML/MIN
GFR NON-AFRICAN AMERICAN: 44 ML/MIN
GFR SERPL CREATININE-BSD FRML MDRD: ABNORMAL ML/MIN/{1.73_M2}
GLUCOSE BLD-MCNC: 103 MG/DL (ref 75–110)
GLUCOSE BLD-MCNC: 111 MG/DL (ref 75–110)
GLUCOSE BLD-MCNC: 120 MG/DL (ref 75–110)
GLUCOSE BLD-MCNC: 129 MG/DL (ref 70–99)
GLUCOSE BLD-MCNC: 157 MG/DL (ref 75–110)
GLUCOSE BLD-MCNC: 162 MG/DL (ref 70–99)
GLUCOSE BLD-MCNC: 95 MG/DL (ref 75–110)
HCT VFR BLD CALC: 28.3 % (ref 40.7–50.3)
HEMOGLOBIN: 8.5 G/DL (ref 13–17)
IMMATURE GRANULOCYTES: 0 %
LYMPHOCYTES # BLD: 12 % (ref 24–43)
Lab: NORMAL
MAGNESIUM: 1.5 MG/DL (ref 1.6–2.6)
MCH RBC QN AUTO: 24.1 PG (ref 25.2–33.5)
MCHC RBC AUTO-ENTMCNC: 30 G/DL (ref 28.4–34.8)
MCV RBC AUTO: 80.4 FL (ref 82.6–102.9)
MONOCYTES # BLD: 9 % (ref 3–12)
NRBC AUTOMATED: 0 PER 100 WBC
PDW BLD-RTO: 18.7 % (ref 11.8–14.4)
PHOSPHORUS: 3.1 MG/DL (ref 2.5–4.5)
PLATELET # BLD: 304 K/UL (ref 138–453)
PLATELET ESTIMATE: ABNORMAL
PMV BLD AUTO: 9.6 FL (ref 8.1–13.5)
POTASSIUM SERPL-SCNC: 4.3 MMOL/L (ref 3.7–5.3)
POTASSIUM SERPL-SCNC: 4.3 MMOL/L (ref 3.7–5.3)
POTASSIUM SERPL-SCNC: 4.4 MMOL/L (ref 3.7–5.3)
POTASSIUM SERPL-SCNC: 5.4 MMOL/L (ref 3.7–5.3)
RBC # BLD: 3.52 M/UL (ref 4.21–5.77)
RBC # BLD: ABNORMAL 10*6/UL
SEG NEUTROPHILS: 78 % (ref 36–65)
SEGMENTED NEUTROPHILS ABSOLUTE COUNT: 6.6 K/UL (ref 1.5–8.1)
SODIUM BLD-SCNC: 133 MMOL/L (ref 135–144)
SODIUM BLD-SCNC: 135 MMOL/L (ref 135–144)
SPECIMEN DESCRIPTION: NORMAL
TROPONIN INTERP: ABNORMAL
TROPONIN T: ABNORMAL NG/ML
TROPONIN, HIGH SENSITIVITY: 45 NG/L (ref 0–22)
WBC # BLD: 8.5 K/UL (ref 3.5–11.3)
WBC # BLD: ABNORMAL 10*3/UL

## 2019-12-30 PROCEDURE — 99233 SBSQ HOSP IP/OBS HIGH 50: CPT | Performed by: INTERNAL MEDICINE

## 2019-12-30 PROCEDURE — 84100 ASSAY OF PHOSPHORUS: CPT

## 2019-12-30 PROCEDURE — 99291 CRITICAL CARE FIRST HOUR: CPT | Performed by: INTERNAL MEDICINE

## 2019-12-30 PROCEDURE — 6360000002 HC RX W HCPCS: Performed by: EMERGENCY MEDICINE

## 2019-12-30 PROCEDURE — 2580000003 HC RX 258: Performed by: STUDENT IN AN ORGANIZED HEALTH CARE EDUCATION/TRAINING PROGRAM

## 2019-12-30 PROCEDURE — 82947 ASSAY GLUCOSE BLOOD QUANT: CPT

## 2019-12-30 PROCEDURE — 86140 C-REACTIVE PROTEIN: CPT

## 2019-12-30 PROCEDURE — 6360000002 HC RX W HCPCS: Performed by: STUDENT IN AN ORGANIZED HEALTH CARE EDUCATION/TRAINING PROGRAM

## 2019-12-30 PROCEDURE — 2580000003 HC RX 258: Performed by: INTERNAL MEDICINE

## 2019-12-30 PROCEDURE — 6370000000 HC RX 637 (ALT 250 FOR IP): Performed by: EMERGENCY MEDICINE

## 2019-12-30 PROCEDURE — 80048 BASIC METABOLIC PNL TOTAL CA: CPT

## 2019-12-30 PROCEDURE — 6370000000 HC RX 637 (ALT 250 FOR IP): Performed by: STUDENT IN AN ORGANIZED HEALTH CARE EDUCATION/TRAINING PROGRAM

## 2019-12-30 PROCEDURE — 36415 COLL VENOUS BLD VENIPUNCTURE: CPT

## 2019-12-30 PROCEDURE — 93010 ELECTROCARDIOGRAM REPORT: CPT | Performed by: INTERNAL MEDICINE

## 2019-12-30 PROCEDURE — 85025 COMPLETE CBC W/AUTO DIFF WBC: CPT

## 2019-12-30 PROCEDURE — 2500000003 HC RX 250 WO HCPCS: Performed by: STUDENT IN AN ORGANIZED HEALTH CARE EDUCATION/TRAINING PROGRAM

## 2019-12-30 PROCEDURE — 2580000003 HC RX 258: Performed by: EMERGENCY MEDICINE

## 2019-12-30 PROCEDURE — 2000000000 HC ICU R&B

## 2019-12-30 PROCEDURE — 84484 ASSAY OF TROPONIN QUANT: CPT

## 2019-12-30 PROCEDURE — 84132 ASSAY OF SERUM POTASSIUM: CPT

## 2019-12-30 PROCEDURE — 6360000002 HC RX W HCPCS: Performed by: INTERNAL MEDICINE

## 2019-12-30 PROCEDURE — 93923 UPR/LXTR ART STDY 3+ LVLS: CPT

## 2019-12-30 PROCEDURE — 6370000000 HC RX 637 (ALT 250 FOR IP): Performed by: INTERNAL MEDICINE

## 2019-12-30 PROCEDURE — 82330 ASSAY OF CALCIUM: CPT

## 2019-12-30 PROCEDURE — 83735 ASSAY OF MAGNESIUM: CPT

## 2019-12-30 RX ORDER — MAGNESIUM SULFATE 1 G/100ML
1 INJECTION INTRAVENOUS ONCE
Status: COMPLETED | OUTPATIENT
Start: 2019-12-30 | End: 2019-12-30

## 2019-12-30 RX ORDER — FLUDROCORTISONE ACETATE 0.1 MG/1
0.1 TABLET ORAL DAILY
Status: DISCONTINUED | OUTPATIENT
Start: 2019-12-30 | End: 2019-12-30

## 2019-12-30 RX ORDER — ACETAMINOPHEN 160 MG
TABLET,DISINTEGRATING ORAL
Status: DISPENSED
Start: 2019-12-30 | End: 2019-12-30

## 2019-12-30 RX ADMIN — HEPARIN SODIUM 5000 UNITS: 5000 INJECTION INTRAVENOUS; SUBCUTANEOUS at 05:49

## 2019-12-30 RX ADMIN — OXYCODONE HYDROCHLORIDE 10 MG: 5 TABLET ORAL at 09:12

## 2019-12-30 RX ADMIN — TIZANIDINE 4 MG: 4 TABLET ORAL at 15:40

## 2019-12-30 RX ADMIN — OXYCODONE HYDROCHLORIDE 10 MG: 5 TABLET ORAL at 03:14

## 2019-12-30 RX ADMIN — MAGNESIUM SULFATE HEPTAHYDRATE 1 G: 1 INJECTION, SOLUTION INTRAVENOUS at 06:29

## 2019-12-30 RX ADMIN — CEFTAROLINE FOSAMIL 400 MG: 400 POWDER, FOR SOLUTION INTRAVENOUS at 05:49

## 2019-12-30 RX ADMIN — TIZANIDINE 4 MG: 4 TABLET ORAL at 03:14

## 2019-12-30 RX ADMIN — CEFTAROLINE FOSAMIL 400 MG: 400 POWDER, FOR SOLUTION INTRAVENOUS at 16:44

## 2019-12-30 RX ADMIN — Medication: at 08:05

## 2019-12-30 RX ADMIN — MAGNESIUM GLUCONATE 500 MG ORAL TABLET 400 MG: 500 TABLET ORAL at 20:09

## 2019-12-30 RX ADMIN — SODIUM CHLORIDE: 9 INJECTION, SOLUTION INTRAVENOUS at 16:44

## 2019-12-30 RX ADMIN — PANTOPRAZOLE SODIUM 40 MG: 40 TABLET, DELAYED RELEASE ORAL at 05:49

## 2019-12-30 RX ADMIN — Medication 1 CAPSULE: at 16:41

## 2019-12-30 RX ADMIN — SODIUM CHLORIDE: 9 INJECTION, SOLUTION INTRAVENOUS at 08:05

## 2019-12-30 RX ADMIN — PAROXETINE HYDROCHLORIDE HEMIHYDRATE 20 MG: 20 TABLET, FILM COATED ORAL at 09:09

## 2019-12-30 RX ADMIN — FENTANYL CITRATE 25 MCG: 50 INJECTION, SOLUTION INTRAMUSCULAR; INTRAVENOUS at 20:27

## 2019-12-30 RX ADMIN — OXYCODONE HYDROCHLORIDE 10 MG: 5 TABLET ORAL at 14:39

## 2019-12-30 RX ADMIN — HEPARIN SODIUM 5000 UNITS: 5000 INJECTION INTRAVENOUS; SUBCUTANEOUS at 13:44

## 2019-12-30 RX ADMIN — OXYCODONE HYDROCHLORIDE 10 MG: 5 TABLET ORAL at 23:22

## 2019-12-30 RX ADMIN — TAMSULOSIN HYDROCHLORIDE 0.4 MG: 0.4 CAPSULE ORAL at 09:08

## 2019-12-30 RX ADMIN — Medication 1 CAPSULE: at 09:09

## 2019-12-30 RX ADMIN — Medication 10 ML: at 20:10

## 2019-12-30 RX ADMIN — INSULIN LISPRO 1 UNITS: 100 INJECTION, SOLUTION INTRAVENOUS; SUBCUTANEOUS at 20:49

## 2019-12-30 RX ADMIN — HEPARIN SODIUM 5000 UNITS: 5000 INJECTION INTRAVENOUS; SUBCUTANEOUS at 23:17

## 2019-12-30 RX ADMIN — FLUDROCORTISONE ACETATE 0.1 MG: 0.1 TABLET ORAL at 12:12

## 2019-12-30 RX ADMIN — BUPROPION HYDROCHLORIDE 300 MG: 300 TABLET, FILM COATED, EXTENDED RELEASE ORAL at 09:08

## 2019-12-30 RX ADMIN — MAGNESIUM GLUCONATE 500 MG ORAL TABLET 400 MG: 500 TABLET ORAL at 09:09

## 2019-12-30 RX ADMIN — LEVOTHYROXINE SODIUM 50 MCG: 50 TABLET ORAL at 09:09

## 2019-12-30 ASSESSMENT — PAIN DESCRIPTION - PROGRESSION
CLINICAL_PROGRESSION: GRADUALLY IMPROVING
CLINICAL_PROGRESSION: NOT CHANGED
CLINICAL_PROGRESSION: NOT CHANGED

## 2019-12-30 ASSESSMENT — PAIN DESCRIPTION - PAIN TYPE
TYPE: CHRONIC PAIN

## 2019-12-30 ASSESSMENT — PAIN SCALES - GENERAL
PAINLEVEL_OUTOF10: 7
PAINLEVEL_OUTOF10: 7
PAINLEVEL_OUTOF10: 8
PAINLEVEL_OUTOF10: 7
PAINLEVEL_OUTOF10: 8
PAINLEVEL_OUTOF10: 9
PAINLEVEL_OUTOF10: 7
PAINLEVEL_OUTOF10: 9

## 2019-12-30 ASSESSMENT — PAIN DESCRIPTION - DESCRIPTORS: DESCRIPTORS: PATIENT UNABLE TO DESCRIBE

## 2019-12-30 ASSESSMENT — PAIN DESCRIPTION - LOCATION
LOCATION: FOOT
LOCATION: BACK;FOOT
LOCATION: FOOT

## 2019-12-30 ASSESSMENT — PAIN DESCRIPTION - ORIENTATION
ORIENTATION: LEFT
ORIENTATION: LEFT

## 2019-12-30 NOTE — PROGRESS NOTES
Renal Progress Note    Patient :  Georgianne Goodpasture; 53 y.o. MRN# [id number]  Location:  0103/0103-01  Attending:  Karmen Skinner MD  Admit Date:  12/28/2019   Hospital Day: 2      Subjective:     Patient was seen and examined. No new issues overnight. He is currently still requiring pressor support x1. He made about 4 L of urine last 24 hours. Serum creatinine today improved to 1.62 mg/dl, K 5.4, Bicarb 24  Positive ALEXIS with positive SSA +ve, similar  labs were also positive in 2/2018. Other serology negative. Bacteremia with gram-positive rods 12/28/2019, could be a contaminant. ID eval pending. Outpatient Medications:     Medications Prior to Admission: megestrol (MEGACE ORAL) 40 MG/ML suspension, Take 10 mLs by mouth daily  oxyCODONE HCl (OXY-IR) 10 MG immediate release tablet, Take 1 tablet by mouth every 6 hours as needed for Pain for up to 30 days. Intended supply: 30 days  morphine (MS CONTIN) 60 MG extended release tablet, Take 1 tablet by mouth 2 times daily for 30 days. diazepam (VALIUM) 5 MG tablet, Take 1 tablet by mouth every 6 hours as needed for Anxiety for up to 30 days. NARCAN 4 MG/0.1ML LIQD nasal spray, 4 mg  PARoxetine (PAXIL) 20 MG tablet, TAKE ONE TABLET BY MOUTH FOUR TIMES A DAY  atorvastatin (LIPITOR) 20 MG tablet, Take 1 tablet by mouth daily  sodium hypochlorite (DAKINS) 0.125 % SOLN external solution, Apply topically every 12 hours Apply as a wet to dry dressing twice a day. Can also use to cleanse wound. fluocinonide (LIDEX) 0.05 % ointment, Apply topically 2 times daily.   metFORMIN (GLUCOPHAGE) 500 MG tablet, Take 1 tablet by mouth 2 times daily (with meals)  lactobacillus (CULTURELLE) capsule, Take 1 capsule by mouth 2 times daily (with meals)  magnesium oxide (MAG-OX) 400 (241.3 Mg) MG TABS tablet, Take 1 tablet by mouth 2 times daily  tamsulosin (FLOMAX) 0.4 MG capsule, Take 1 capsule by mouth daily  pantoprazole (PROTONIX) 40 MG tablet, Take 1 tablet by mouth every morning (before breakfast)  ONETOUCH DELICA LANCETS 51D MISC, TEST TWO TIMES A DAY  povidone-iodine (BETADINE) 7.5 % external solution, Apply to wound. Apply as a wet to dry dressing  levothyroxine (SYNTHROID) 50 MCG tablet, Take 1 tablet by mouth daily  tiZANidine (ZANAFLEX) 4 MG tablet, TAKE ONE TABLET BY MOUTH EVERY 6 HOURS AS NEEDED FOR MUSCLE SPASMS  ONE TOUCH ULTRA TEST strip, USE ONE STRIP TO TEST TWO TIMES A DAY AS NEEDED  [facility] 770493 UNIT/GM powder, APPLY TO AFFECTED AREA(S) FOUR TIMES A DAY  buPROPion (WELLBUTRIN XL) 300 MG extended release tablet, TAKE ONE TABLET BY MOUTH DAILY  Wound Dressings (ADAPTIC NON-ADHERING DRESSING) PADS, Apply 1 Units topically 2 times daily  Gauze Pads & Dressings (COMBINE ABD) 5\"X9\" PADS, 1 Units by Does not apply route 2 times daily  lisinopril (PRINIVIL;ZESTRIL) 10 MG tablet, TAKE ONE TABLET BY MOUTH DAILY  Blood Glucose Monitoring Suppl (ONE TOUCH ULTRA 2) w/Device KIT, 1 kit by Other route daily  ciclopirox (LOPROX) 0.77 % cream, APPLY TO AFFECTED AREA(S) AND RUB  IN A THIN FILM TWO TIMES A DAY (AM AND PM)  Gauze Pads & Dressings (KERLIX GAUZE ROLL LARGE) MISC, 1 Units by Does not apply route 2 times daily    Current Medications:     Scheduled Meds:    hydrogen peroxide        heparin (porcine)  5,000 Units Subcutaneous 3 times per day    ceftaroline fosamil (TEFLARO) IVPB  400 mg Intravenous Q12H    pantoprazole  40 mg Oral QAM AC    sodium chloride flush  10 mL Intravenous 2 times per day    buPROPion  300 mg Oral Daily    lactobacillus  1 capsule Oral BID WC    levothyroxine  50 mcg Oral Daily    magnesium oxide  400 mg Oral BID    PARoxetine  20 mg Oral Daily    tamsulosin  0.4 mg Oral Daily    insulin lispro  0-6 Units Subcutaneous TID WC    insulin lispro  0-3 Units Subcutaneous Nightly     Continuous Infusions:    sodium chloride 10 mL/hr at 12/30/19 0805    dextrose      norepinephrine 8 mcg/min (12/30/19 0833)    sodium bicarbonate infusion 150 Labs     12/29/19  1805 12/29/19  2330 12/30/19  0415    133* 135   K 5.6* 4.3 5.4*    98 100   CO2 18* 25 24   BUN 69* 46* 41*   CREATININE 3.01* 1.73* 1.62*   GLUCOSE 75 162* 129*   CALCIUM 8.7 8.0* 8.6      Phosphorus:     Recent Labs     12/28/19  2235 12/29/19  0443 12/30/19  0415   PHOS 7.3* 4.2 3.1     Magnesium:    Recent Labs     12/28/19  1307 12/29/19  0443 12/30/19  0415   MG 2.1 1.8 1.5*     Albumin:    Recent Labs     12/28/19  0956   LABALBU 3.5     ALEXIS:      Lab Results   Component Value Date    ALEXIS POSITIVE 11/04/2019     SPEP:  Lab Results   Component Value Date    PROT 8.2 12/28/2019    ALBCAL 3.6 11/04/2019    ALBPCT 58 11/04/2019    LABALPH 0.2 11/04/2019    LABALPH 0.6 11/04/2019    A1PCT 3 11/04/2019    A2PCT 10 11/04/2019    LABBETA 0.7 11/04/2019    BETAPCT 11 11/04/2019    GAMGLOB 1.1 11/04/2019    GGPCT 17 11/04/2019    PATH NOT REPORTED 11/04/2019     C3:     Lab Results   Component Value Date    C3 101 11/04/2019     C4:     Lab Results   Component Value Date    C4 20 11/04/2019     Anti-GBM:     Lab Results   Component Value Date    GBMABIGG 42 11/04/2019     Hep BsAg:         Lab Results   Component Value Date    HEPBSAG NONREACTIVE 11/03/2019     Hep C AB:          Lab Results   Component Value Date    HEPCAB NONREACTIVE 11/03/2019       Urinalysis/Chemistries:      Lab Results   Component Value Date    NITRU NEGATIVE 12/28/2019    COLORU DARK YELLOW 12/28/2019    PHUR 5.0 12/28/2019    WBCUA 2 TO 5 12/28/2019    RBCUA 0 TO 2 12/28/2019    MUCUS NOT REPORTED 12/28/2019    TRICHOMONAS NOT REPORTED 12/28/2019    YEAST NOT REPORTED 12/28/2019    BACTERIA NOT REPORTED 12/28/2019    SPECGRAV 1.020 12/28/2019    LEUKOCYTESUR NEGATIVE 12/28/2019    UROBILINOGEN Normal 12/28/2019    BILIRUBINUR  12/28/2019     Presumptive positive. Unable to confirm due to unavailability of reagent.     GLUCOSEU TRACE 12/28/2019    KETUA NEGATIVE 12/28/2019    AMORPHOUS 1+ 12/28/2019     Urine Sodium:     Lab Results   Component Value Date    DAYAN 35 11/03/2019     Urine Potassium:    Lab Results   Component Value Date    KUR 52.1 11/03/2019     Urine Creatinine:     Lab Results   Component Value Date    LABCREA 135.8 11/05/2019     Radiology:     Reviewed. Assessment:     1. Acute Kidney Injury: Pre-renal azotemia secondary to volume depletion from diarrhea and poor oral intake, in addition to Obstructive Uropathy and also likely some underlying ischemic ATN due to hypotension requiring pressor support. Urine output excellent since montemayor placement. Serum creatinine improving. 2. Hyperkalemia secondary to CALUDIA  3. Hypotension improved  4. Metabolic Encephalopathy secondary to sepsis, CLAUDIA, and narcotic use  5. Septic Shock secondary to possible osteomyelitis  6. Diabetes  7. Hx of HTN  8. Hx of chronic pain and narcotic use  9. Metabolic Acidosis  10. NSVT: No strips available  11. Urinary retention  12. ALEXIS positive with SSA positive. 13.  Bacteremia with gram-positive rods 12/28/2019. Plan:   1. Stop bicarb drip. Change IV fluids to 75 cc an hour normal saline. 2.  Give 1 dose Florinef 0.1 mg x 1.  3.  Recheck potassium today afternoon. 4.  BMP in a.m.  5.  Monitor strict I's and O's and renal function. 6.  Wean off pressors. 7.  Antibiotics as per ID.  8.  Can follow-up with rheumatology as outpatient due to SSA positive, primary to follow-up. 9.  Patient states he will follow-up with Dr. Mesha Agustin as outpatient. 10.  Will follow. Patient's nurse was updated. Nutrition   Please ensure that patient is on a renal diet/TF. Avoid nephrotoxic drugs/contrast exposure. We will continue to follow along with you. Neil Talavera MD  [facility]     This note is created with the assistance of a speech-recognition program. While intending to generate a document that actually reflects the content of the visit, no guarantees can be provided that every mistake has

## 2019-12-30 NOTE — CONSULTS
Last Rate: 7.5 mL/hr at 12/29/19 1835, 8 mcg/min at 12/29/19 1835    sodium bicarbonate 75 mEq in sodium chloride 0.45 % 1,000 mL infusion, , Intravenous, Continuous, Anton Singh MD, Last Rate: 150 mL/hr at 12/29/19 1721    sodium chloride flush 0.9 % injection 10 mL, 10 mL, Intravenous, 2 times per day, Anton Singh MD, 10 mL at 12/29/19 2038    sodium chloride flush 0.9 % injection 10 mL, 10 mL, Intravenous, PRN, Anton Singh MD    magnesium hydroxide (MILK OF MAGNESIA) 400 MG/5ML suspension 30 mL, 30 mL, Oral, Daily PRN, Anton Singh MD    ondansetron (ZOFRAN) injection 4 mg, 4 mg, Intravenous, Q6H PRN, Anton Singh MD    heparin (porcine) injection 1,400 Units, 1,400 Units, Intracatheter, PRN, Colleen M Modesti, DO, 1,400 Units at 12/28/19 1753    heparin (porcine) injection 1,500 Units, 1,500 Units, Intracatheter, PRN, Colleen M Modesti, DO, 1,500 Units at 12/28/19 1755    buPROPion (WELLBUTRIN XL) extended release tablet 300 mg, 300 mg, Oral, Daily, Colleen M Modesti, DO, 300 mg at 12/29/19 1000    lactobacillus (CULTURELLE) capsule 1 capsule, 1 capsule, Oral, BID WC, Colleen M Modesti, DO, 1 capsule at 12/29/19 1727    levothyroxine (SYNTHROID) tablet 50 mcg, 50 mcg, Oral, Daily, Colleen M Modesti, DO, 50 mcg at 12/29/19 1000    magnesium oxide (MAG-OX) tablet 400 mg, 400 mg, Oral, BID, Colleen M Modesti, DO, 400 mg at 12/29/19 2039    PARoxetine (PAXIL) tablet 20 mg, 20 mg, Oral, Daily, Colleen M Modesti, DO, 20 mg at 12/29/19 1000    tamsulosin (FLOMAX) capsule 0.4 mg, 0.4 mg, Oral, Daily, Colleen M Modesti, DO, 0.4 mg at 12/29/19 1000    insulin lispro (HUMALOG) injection vial 0-6 Units, 0-6 Units, Subcutaneous, TID WC, Colleen Aguilar, DO    insulin lispro (HUMALOG) injection vial 0-3 Units, 0-3 Units, Subcutaneous, Nightly, Colleen Aguilar, DO    glucose (GLUTOSE) 40 % oral gel 15 g, 15 g, Oral, PRN, Colleen Aguilar, DO    dextrose 50 % IV solution, 12.5 g, Intravenous, PRN, Colleen GAUDENCIO Modesti, DO    glucagon (rDNA) injection 1 mg, 1 mg, Intramuscular, PRN, Colleen M Modesti, DO    dextrose 5 % solution, 100 mL/hr, Intravenous, PRN, Colleen M Modesti, DO    fentaNYL (SUBLIMAZE) injection 25 mcg, 25 mcg, Intravenous, Q4H PRN **OR** fentaNYL (SUBLIMAZE) injection 50 mcg, 50 mcg, Intravenous, Q4H PRN, Colleen M Modesti, DO, 50 mcg at 12/29/19 0424    REVIEW OF SYSTEMS:    Unable to obtain, AMS    PHYSICAL EXAM:    VITALS:  BP (!) 110/50   Pulse 87   Temp 100 °F (37.8 °C) (Oral)   Resp 17   Ht 6' 2\" (1.88 m)   Wt 215 lb 9.8 oz (97.8 kg)   SpO2 95%   BMI 27.68 kg/m²   INTAKE/OUTPUT:     Intake/Output Summary (Last 24 hours) at 12/29/2019 2256  Last data filed at 12/29/2019 1900  Gross per 24 hour   Intake 5470.24 ml   Output 5300 ml   Net 170.24 ml         CONSTITUTIONAL:  Alert, not oriented  HEAD: normocephalic, atraumatic  EYES: Pupils equal and reactive to light, Extraocular muscles intact  ENT: Mucus membranes moist  NECK:  supple, symmetrical, trachea midline   LUNGS: unlabored respirations, no wheezes or rhonchi  CARDIOVASCULAR: Regular rate and rhythm  ABDOMEN: soft, non tender, non distended, no rebound or guarding  MUSCULOSKELETAL:  Equal strength bilaterally, normal muscle tone  Ext: Palpable signals on Right PT/DP, Palpable Left PT and faint DP, Dopplerable signals on BL DP/PT; pictures below of left foot  NEUROLOGIC:  confused    Media Information      Document Information     Wound      12/29/2019 17:01   Attached To: Hospital Encounter on 12/28/19   [facility]     Media Information      Document Information     Wound      12/29/2019 17:00   Attached To:    Hospital Encounter on 12/28/19   Source Information     Shyla Lilly MD  [facility] [address]         Labs:   Lab Results   Component Value Date    WBC 9.5 12/29/2019    HGB 9.0 12/29/2019    HCT 29.6 12/29/2019    MCV 77.9 12/29/2019    PLT See Reflexed IPF Result 12/29/2019     Lab Results   Component Value Date     12/29/2019    K 5.6 12/29/2019     12/29/2019    CO2 18 12/29/2019    BUN 69 12/29/2019    CREATININE 3.01 12/29/2019    GLUCOSE 75 12/29/2019    CALCIUM 8.7 12/29/2019     Lab Results   Component Value Date    INR 1.2 05/17/2019       Imaging:  Xr Tibia Fibula Left (2 Views)    Result Date: 12/28/2019  EXAMINATION: TWO XRAY VIEWS OF THE LEFT TIBIA AND FIBULA 12/28/2019 10:44 am COMPARISON: April 16, 2019, January 14, 2018 HISTORY: ORDERING SYSTEM PROVIDED HISTORY: AMS, swelling TECHNOLOGIST PROVIDED HISTORY: AMS, swelling Reason for Exam: Pt's foot and lower leg are red swollen and bloody Acuity: Acute Type of Exam: Initial Additional signs and symptoms: Pt's foot and lower leg are red swollen and bloody FINDINGS: Frontal and lateral view of the left tibia and fibula demonstrate no acute fracture or dislocation. Redemonstration of an osteochondral defect involving the lateral femoral condyle. Degenerative changes of the knee are present. Degenerative changes involving the midfoot are partially visualized. Elsewhere, thickening of the cortex involving the tibia and fibula is seen, slightly worsened since January 2018. Diffuse soft tissue swelling. 1. No acute osseous abnormality of the left lower leg. 2. Diffuse soft tissue swelling. No radiodense foreign body or subcutaneous gas. 3. Diffuse cortical thickening and irregularity involving the tibia and fibula. Findings can be seen in the setting of chronic osteomyelitis.      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 There is new irregularity of the remnant 2nd proximal phalanx, compatible with osteomyelitis. Adjacent soft tissue swelling and soft tissue defect is also seen. Stable irregularity involving the head of the 3rd metatarsal.  No obvious change since November 2019 of the 1st digit. Elsewhere, no acute fracture or dislocation. Bony fragment is seen at the level of the 4th and 5th metatarsal. Diffuse soft tissue swelling. 1. New irregularity involving the remnant 2nd proximal phalanx concerning for osteomyelitis. Adjacent soft tissue swelling and soft tissue defect is present. 2. Diffuse soft tissue swelling. 3. No additional suspicious lesions although evaluation is limited given osteopenia and patient positioning. Ct Head Wo Contrast    Result Date: 12/28/2019  EXAMINATION: CT OF THE HEAD WITHOUT CONTRAST  12/28/2019 10:14 am TECHNIQUE: CT of the head was performed without the administration of intravenous contrast. Dose modulation, iterative reconstruction, and/or weight based adjustment of the mA/kV was utilized to reduce the radiation dose to as low as reasonably achievable. COMPARISON: November 3, 2019 HISTORY: ORDERING SYSTEM PROVIDED HISTORY: AMS TECHNOLOGIST PROVIDED HISTORY: AMS Reason for Exam: AMS/ DIABETIC, OPEN FOOT WOUND Acuity: Unknown Type of Exam: Unknown FINDINGS: BRAIN/VENTRICLES: There is no acute intracranial hemorrhage, mass effect or midline shift. No abnormal extra-axial fluid collection. The brain is unchanged in appearance. ORBITS: The visualized portion of the orbits demonstrate no acute abnormality. SINUSES: The visualized paranasal sinuses and mastoid air cells demonstrate no acute abnormality. SOFT TISSUES/SKULL:  No acute abnormality of the visualized skull or soft tissues.      Unchanged appearance of the brain without acute CT abnormality

## 2019-12-30 NOTE — PROGRESS NOTES
Vascular Surgery Progress Note            PATIENT NAME: Callie Bedolla     TODAY'S DATE: 2019, 6:58 AM    CC: AMS    SUBJECTIVE:    Pt seen and examined. No acute events. Continued palpable pulses per RN. Pt awake all night. WBC resolved. OBJECTIVE:   VITALS:  BP (!) 132/58   Pulse 54   Temp 98.5 °F (36.9 °C) (Oral)   Resp 12   Ht 6' 2\" (1.88 m)   Wt 213 lb 10 oz (96.9 kg)   SpO2 97%   BMI 27.43 kg/m²  I Temperature Max - Temp  Av °F (37.2 °C)  Min: 98.3 °F (36.8 °C)  Max: 100 °F (37.8 °C)      LABS:  Lab Results   Component Value Date    WBC 8.5 2019    HGB 8.5 2019    HCT 28.3 2019     2019       Lab Results   Component Value Date     2019    K 5.4 2019     2019    CO2 24 2019    BUN 41 2019    CREATININE 1.62 2019    GLUCOSE 129 2019       Lab Results   Component Value Date    ALKPHOS 87 2019    ALT 12 2019    AST 16 2019    PROT 8.2 2019    BILITOT 0.26 2019    BILIDIR 0.08 2019    IBILI 0.27 2019    LABALBU 3.5 2019       Lab Results   Component Value Date    PROTIME 14.8 2019    INR 1.2 2019       Calcium:    Lab Results   Component Value Date    CALCIUM 8.6 2019     Ionized Calcium:  No results found for: IONCA  Magnesium:    Lab Results   Component Value Date    MG 1.5 2019     Phosphorus:    Lab Results   Component Value Date    PHOS 3.1 2019     Albumin:    Lab Results   Component Value Date    LABALBU 3.5 2019         General: sleeping, wakens to voice, nad  Heart: s1+s2  Lungs: equal and symmetric chest rise/fall, non-labored  Abdomen: soft, nd, nt   Extremity: palpable b/l dp/pt. Dressing to L foot dry and intact.        ASSESSMENT   Active Problems:    Acute renal failure (ARF) (HCC)    Encephalopathy due to infection    Septic shock (HCC)    Uremia    Hyperkalemia    Hypocalcemia    Metabolic acidosis

## 2019-12-30 NOTE — PLAN OF CARE
nonpharmacologic and pharmacologic interventions.   Goal: Pain level will decrease  Description  Pain level will decrease  12/30/2019 0004 by Yuri Valle RN  Outcome: Ongoing  12/29/2019 2358 by Yuri Valle RN  Outcome: Ongoing  12/29/2019 2526 by Kaleb Jolly RN  Outcome: Ongoing  Goal: Recognizes and communicates pain  Description  Recognizes and communicates pain  12/30/2019 0004 by Yuri Valle RN  Outcome: Ongoing  12/29/2019 2358 by Yuri Valle RN  Outcome: Ongoing  12/29/2019 0778 by Kaleb Jolly RN  Outcome: Ongoing  Goal: Control of acute pain  Description  Control of acute pain  12/30/2019 0004 by Yuri Valle RN  Outcome: Ongoing  12/29/2019 2358 by Yuri Valle RN  Outcome: Ongoing  12/29/2019 2615 by Kaleb Jolly RN  Outcome: Ongoing  Goal: Control of chronic pain  Description  Control of chronic pain  12/30/2019 0004 by Yuri Valle RN  Outcome: Ongoing  12/29/2019 2358 by Yuri Valle RN  Outcome: Ongoing  12/29/2019 7966 by Kaleb Jolly RN  Outcome: Ongoing     Problem: Skin Integrity - Impaired:  Goal: Will show no infection signs and symptoms  Description  Will show no infection signs and symptoms  12/30/2019 0004 by Yuri Valle RN  Outcome: Ongoing  12/29/2019 2358 by Yuri Valle RN  Outcome: Ongoing  12/29/2019 7800 by Kaleb Jolly RN  Outcome: Ongoing  Goal: Absence of new skin breakdown  Description  Absence of new skin breakdown  12/30/2019 0004 by Yuri Valle RN  Outcome: Ongoing  12/29/2019 2358 by Yuri Valle RN  Outcome: Ongoing  12/29/2019 0808 by Kaleb Jolly RN  Outcome: Ongoing     Problem: Confusion - Acute:  Goal: Mental status will be restored to baseline  Description  Mental status will be restored to baseline  12/30/2019 0004 by Yuri Valle RN  Outcome: Ongoing  12/29/2019 2358 by Yuri Valle RN  Outcome: Ongoing  12/29/2019 8593 by Kaleb Jolly RN  Outcome: Ongoing  Goal: Absence of continued neurological deterioration signs and symptoms  Description  Absence of continued neurological deterioration signs and symptoms  12/30/2019 0004 by Aniyah Alonzo RN  Outcome: Ongoing  12/29/2019 2358 by Aniyah Alonzo RN  Outcome: Ongoing  12/29/2019 6631 by Andrew Balderrama RN  Outcome: Ongoing     Problem: Discharge Planning:  Goal: Ability to perform activities of daily living will improve  Description  Ability to perform activities of daily living will improve  12/30/2019 0004 by Aniyah Alonzo RN  Outcome: Ongoing  12/29/2019 2358 by Aniyah Alonzo RN  Outcome: Ongoing  12/29/2019 5530 by Andrew Balderrama RN  Outcome: Ongoing  Goal: Participates in care planning  Description  Participates in care planning  12/30/2019 0004 by Aniyah Alonzo RN  Outcome: Ongoing  12/29/2019 2358 by Aniyah Alonzo RN  Outcome: Ongoing  12/29/2019 1173 by Andrew Balderrama RN  Outcome: Ongoing  Goal: Discharged to appropriate level of care  Description  Discharged to appropriate level of care  12/30/2019 0004 by Aniyah Alonzo RN  Outcome: Ongoing  12/29/2019 2358 by Aniyah Alonzo RN  Outcome: Ongoing  12/29/2019 8094 by Andrew Balderrama RN  Outcome: Ongoing     Problem: Injury - Risk of, Physical Injury:  Goal: Absence of physical injury  Description  Absence of physical injury  12/30/2019 0004 by Aniyah Alonzo RN  Outcome: Ongoing  12/29/2019 2358 by Aniyah Alonzo RN  Outcome: Ongoing  12/29/2019 7434 by Andrew Balderrama RN  Outcome: Ongoing  Goal: Will remain free from falls  Description  Will remain free from falls  12/30/2019 0004 by Aniyah Alonzo RN  Outcome: Ongoing  12/29/2019 2358 by Aniyah Alonzo RN  Outcome: Ongoing  12/29/2019 1711 by Andrew Balderrama RN  Outcome: Ongoing     Problem: Mood - Altered:  Goal: Mood stable  Description  Mood stable  12/30/2019 0004 by Aniyah Alonzo RN  Outcome: Ongoing  12/29/2019 2358 by Aniyah Alonzo RN  Outcome: Ongoing  12/29/2019 8481 by Deepali Sena RN  Outcome: Ongoing  Goal: Absence of abusive behavior  Description  Absence of abusive behavior  12/30/2019 0004 by Suzan Arevalo RN  Outcome: Ongoing  12/29/2019 2358 by Suzan Arevalo RN  Outcome: Ongoing  12/29/2019 0467 by Deepali Sena RN  Outcome: Ongoing  Goal: Verbalizations of feeling emotionally comfortable while being cared for will increase  Description  Verbalizations of feeling emotionally comfortable while being cared for will increase  12/30/2019 0004 by Suzan Arevalo RN  Outcome: Ongoing  12/29/2019 2358 by Suzan Arevalo RN  Outcome: Ongoing  12/29/2019 5813 by Deepali Sena RN  Outcome: Ongoing     Problem: Psychomotor Activity - Altered:  Goal: Absence of psychomotor disturbance signs and symptoms  Description  Absence of psychomotor disturbance signs and symptoms  12/30/2019 0004 by Suzan Arevalo RN  Outcome: Ongoing  12/29/2019 2358 by Suzan Arevalo RN  Outcome: Ongoing  12/29/2019 8199 by Deepali Sena RN  Outcome: Ongoing     Problem: Sensory Perception - Impaired:  Goal: Demonstrations of improved sensory functioning will increase  Description  Demonstrations of improved sensory functioning will increase  12/30/2019 0004 by Suzan Arevalo RN  Outcome: Ongoing  12/29/2019 2358 by Suzan Arevalo RN  Outcome: Ongoing  12/29/2019 9705 by Deepali Snea RN  Outcome: Ongoing  Goal: Decrease in sensory misperception frequency  Description  Decrease in sensory misperception frequency  12/30/2019 0004 by Suzan Arevalo RN  Outcome: Ongoing  12/29/2019 2358 by Suzan Arevalo RN  Outcome: Ongoing  12/29/2019 4424 by Deepali Sena RN  Outcome: Ongoing  Goal: Able to refrain from responding to false sensory perceptions  Description  Able to refrain from responding to false sensory perceptions  12/30/2019 0004 by Suzan Arevalo RN  Outcome: Ongoing  12/29/2019 2358 by Suzan Arevalo RN  Outcome: Shift

## 2019-12-30 NOTE — PROGRESS NOTES
Progress Note  Podiatric Medicine and Surgery     Subjective     CC: left foot wound    Patient seen and examined at bedside. Afebrile, vital signs stable   More alert and awake today  Denies any N/V/F/C/CP/SOB    HPI :  Su Henderson is a 53 y.o. male with a past medical history of DM2 [facility] Patient is well known to Dr. Yao Keenan and follow up with him in wound care. Patient is admitted to the ICU for sputum production, altered mental status, CLAUDIA, and abnormal labs. History was obtained from his wife. Patients wife states that the wound has not been improving much over the past few weeks. Has been receiving care at wound care where the area was told to be offloaded. Patient is confused with altered mental status.      PCP is Kevin Carrizales MD    ROS: Denies N/V/F/C/SOB/CP. Otherwise negative except at stated in the HPI.      Medications:  Scheduled Meds:   hydrogen peroxide        heparin (porcine)  5,000 Units Subcutaneous 3 times per day    ceftaroline fosamil (TEFLARO) IVPB  400 mg Intravenous Q12H    pantoprazole  40 mg Oral QAM AC    sodium chloride flush  10 mL Intravenous 2 times per day    buPROPion  300 mg Oral Daily    lactobacillus  1 capsule Oral BID WC    levothyroxine  50 mcg Oral Daily    magnesium oxide  400 mg Oral BID    PARoxetine  20 mg Oral Daily    tamsulosin  0.4 mg Oral Daily    insulin lispro  0-6 Units Subcutaneous TID WC    insulin lispro  0-3 Units Subcutaneous Nightly       Continuous Infusions:   sodium chloride 75 mL/hr at 12/30/19 1019    dextrose      norepinephrine 4 mcg/min (12/30/19 1308)    dextrose         PRN Meds:tiZANidine, oxyCODONE **OR** oxyCODONE, acetaminophen, glucose, dextrose, glucagon (rDNA), dextrose, sodium chloride flush, magnesium hydroxide, ondansetron, heparin (porcine), heparin (porcine), glucose, dextrose, glucagon (rDNA), dextrose, fentanNYL **OR** fentanNYL    Objective Vitals:  Patient Vitals for the past 8 hrs:   BP Temp Temp src Pulse Resp SpO2   19 1300 130/65 -- -- 90 12 98 %   19 1245 131/72 -- -- 82 11 98 %   19 1230 137/65 -- -- 80 13 99 %   19 1215 (!) 131/58 -- -- 83 17 98 %   19 1200 132/64 -- -- 79 12 100 %   19 1156 -- 98.3 °F (36.8 °C) Oral -- -- --   19 1145 (!) 141/61 -- -- 76 12 99 %   19 1130 131/66 -- -- 77 15 99 %   19 1115 (!) 123/57 -- -- 78 17 99 %   19 1100 (!) 128/58 -- -- 74 16 99 %   19 1045 135/60 -- -- 73 11 99 %   19 1030 (!) 130/58 -- -- 71 12 100 %   19 1015 134/60 -- -- 79 21 100 %   19 1004 -- 98.2 °F (36.8 °C) Oral -- -- --   19 1000 (!) 148/63 -- -- 78 12 99 %   19 0945 (!) 141/70 -- -- 79 12 100 %   19 0930 131/65 -- -- 85 13 99 %   19 0915 -- -- -- 89 16 100 %   19 0900 (!) 116/47 -- -- 68 21 97 %   19 0845 (!) 117/48 -- -- 66 24 98 %   19 0830 112/60 -- -- 65 13 98 %   19 0815 (!) 115/55 -- -- 65 11 99 %   19 0800 (!) 123/52 -- -- 66 10 100 %   19 0745 125/65 98.6 °F (37 °C) Oral 63 14 99 %   19 0730 124/63 -- -- 60 10 98 %   19 0715 (!) 113/47 -- -- 64 15 98 %   19 0700 138/61 -- -- 64 13 99 %   19 0645 (!) 132/57 -- -- 57 13 98 %   19 0630 (!) 132/58 -- -- 54 12 97 %   19 0615 (!) 143/60 -- -- 53 13 98 %   19 0600 109/60 -- -- 57 16 98 %   19 0545 (!) 95/33 -- -- 66 17 97 %   19 0530 (!) 108/45 -- -- 65 17 97 %     Average, Min, and Max for last 24 hours Vitals:  TEMPERATURE:  Temp  Av.8 °F (37.1 °C)  Min: 98.2 °F (36.8 °C)  Max: 100 °F (37.8 °C)    RESPIRATIONS RANGE: Resp  Av.8  Min: 7  Max: 24    PULSE RANGE: Pulse  Av.5  Min: 53  Max: 123    BLOOD PRESSURE RANGE:  Systolic (04NQR), KARTIK:208 , Min:59 , IVL:783   ; Diastolic (26YOU), AIV:59, Min:28, Max:114      PULSE OXIMETRY RANGE: SpO2  Av.5 %  Min: 90 %  Max: 100 Suggestion of mild perihilar edema and CHF, possibly accentuated by low lung   volumes. MRI FOOT LEFT WO CONTRAST    (Results Pending)   VL LOWER EXTREMITY ARTERIAL SEGMENTAL PRESSURES W PPG BILATERAL    (Results Pending)       Cultures:   Wound cx 12/29: GPC     Assessment   Loraine Sinha is a 53 y.o. male with   1. Diabetic wound down to the level of the bone, sub 2nd MT head left foot  2. Cellulitis  3. Sepsis  4. S/p digit 1-3 amputation, left foot  5. S/p 2nd ray amputation, right foot  6. DM2  7. AMS  8. Acute renal failure    Active Problems:    Acute renal failure (ARF) ([facility])    Encephalopathy due to infection    Septic shock ([facility])    Uremia    Hyperkalemia    Hypocalcemia    Metabolic acidosis    Altered mental status    Diabetic ulcer of toe of left foot associated with type 2 diabetes mellitus (HCC)    Hyperphosphatemia    Chronic, continuous use of opioids  Resolved Problems:    * No resolved hospital problems. *       Plan     · Patient examined and evaluated at bedside   · Treatment options discussed in detail with the patient/family  · Medical management per Critical Care  · ID following - IV Ceftaroline  · Vascular consulted: recs wound care since audible pulses  · Aerobic/anaerobic wound cx obtained  · L Foot XR reviewed - osteo 2nd proximal phalax  · Dressing applied to L foot: DSD  · NWB to LLE  · Plan: F/u MRI w/o contrast to assess extent of osteo/abscess, will likely need further amputation vs I&D. F/u PVRs  ? Will need medical clearance per primary/Nephro  · Will discuss with Dr. Anatoly Ashraf, DPM PGY1  Podiatric Medicine & Surgery   12/30/2019 at 1:16 PM    I performed a history and physical examination of the patient and discussed management with the resident. I reviewed the residents note and agree with the documented findings and plan of care. Any areas of disagreement are noted on the chart.  I was personally present for the key portions of any procedures. I have documented in the chart those procedures where I was not present during the key portions. I have reviewed the Podiatry Resident progress note. I agree with the chief complaint, past medical history, past surgical history, allergies, medications, social and family history as documented unless otherwise noted below. Documentation of the HPI, Physical Exam and Medical Decision Making performed by medical students or scribes is based on my personal performance of the HPI, PE and MDM. I have personally evaluated this patient and have completed at least one if not all key elements of the E/M (history, physical exam, and MDM). Additional findings are as noted.      Molina Velez DPM on 12/31/2019 at 3:51 AM  Board Certified, American Board of Podiatric Surgery  Fellow, [employer] and [employer] [facility]

## 2019-12-30 NOTE — PLAN OF CARE
Patient is alert and oriented to surroundings. Pleasant and cooperative. Continues with small appetite, encouraged to drink and eat  better if he can. Skin assessment competed qshift and prn. Repositions self and back rub given Q2hrs and as needed. Patient continues with levophed weaned as tolerated. Resting quietly.

## 2019-12-30 NOTE — PROGRESS NOTES
[facility] - Progress Note    Today's Date and Time: 12/30/2019, 1:19 PM    Impression :   · Altered mental status  · DM   · Diabetic neuropathy   · Left foot diabetic plantar ulcer and gangrene   · Prior toe amputations 2017:   · 1. Amputation distal phalanx, left second toe. 2. Amputation distal phalanx, right second toe. 3. Amputation distal phalanx of the left hallux. 4. I and D left fifth digit. 5. I and D right fifth digit. Culture grew MRSA   · Sepsis with CLAUDIA  · Hx chronic back pain on narcotics  · Single blood culture with bacillus spp on 12-28-19 = contaminant  · Allergy to sulfa    Recommendations:   · Ceftaroline 400 mg IV q 12 hr  · D/C other antibiotics  · Wound care  · Surgical debridement at the discretion of Podiatry    Medical Decision Making/Summary/Discussion:12/30/2019     ·   Infection Control Recommendations   · [place] Precautions  · Contact Isolation MRSA    Antimicrobial Stewardship Recommendations     · Discontinuation of therapy  Coordination of Outpatient Care:   · Estimated Length of IV antimicrobials: TBD  · Patient will need Midline Catheter Insertion: No  · Patient will need PICC line Insertion:No  · Patient will need: Home IV , [place],  SNF,  LTAC:TBD  · Patient will need outpatient wound care:Yes    Chief complaint/reason for consultation:   · Concern for sepsis      History of Present Illness:   Loraine Sinha is a 53y.o.-year-old  male who was initially admitted on 12/28/2019. Patient seen at the request of .    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 Presented to [facility] with worsening confusion over about 5 days, associated decrease in urine output. Found to be anuric with Cr 9.47, K 6.9.  Also was hypotensive and received fluids plus levophed before transfer to [facility]. Blood cultures were obtained at [facility]. One of 2 yielded Bacillus spp.on 12-28-19. patient received vancomycin. Blood cultures 12-29-19: No growth. 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 Family indicates deterioration on left foot with dark discoloration of dorsum and new plantar ulcer with drainage. CURRENT EVALUATION : 12/30/2019     Patient evaluated and examined in the ICU. Afebrile  VS stable, BP supported with levophed    Patient remains confused  Pulled out his femoral line    Foot unchanged  Culture with Gram positive cocci in clusters  Lt foot X ray with new changes of second proximal phalanx remnant concerning for osteomyelitis   Podiatry requested MRI to assess for possible abscess and decide on surgical intervention. Labs, X rays reviewed: 12/30/2019    BUN: 81  Cr:4.76--4.27    WBC:28-->9.5  Hb:9.0  Plat: 264    ESR 47  .1    Cultures:  Urine:  ·   Blood:  · 12-28-19: 1/2 bacillus spp  · 12-29-19 x 2: No growth   Sputum :  ·   Wound:  ·     CXR 12-28-19: Vascular congestion. No pneumonia    Discussed with patient, RN, family. 12-29-19:                      I have personally reviewed the past medical history, past surgical history, medications, social history, and family history, and I have updated the database accordingly. Past Medical History:     Past Medical History:   Diagnosis Date    Anxiety     Arthritis     lower back, knees    BPH (benign prostatic hyperplasia)     Chronic kidney disease     prostate    Depression     Diabetes mellitus ([facility] [address]     Diabetic neuropathy (HCC)     GERD (gastroesophageal reflux disease)     Hyperglycemia     Hypertension     Hypothyroidism     Impacted tooth     Pneumonia        Past Surgical  History:     Past Surgical History:   Procedure Laterality Date    APPENDECTOMY      BACK SURGERY      Lower x 4. Had abscess after appendectomy.      KNEE SURGERY Right     scope    TOE AMPUTATION Bilateral 3/6/2017    TOE AMPUTATION LEFT HALLUX AND 2ND DIGIT AND RIGHT 2ND DIGIT performed by Daquan Rivera DPM at [address] ENDOSCOPY N/A 5/20/2019    EGD WITH BIOPSY performed by Hipolito Maher MD at [facility] OR       Medications:      hydrogen peroxide        heparin (porcine)  5,000 Units Subcutaneous 3 times per day    ceftaroline fosamil (TEFLARO) IVPB  400 mg Intravenous Q12H    pantoprazole  40 mg Oral QAM AC    sodium chloride flush  10 mL Intravenous 2 times per day    buPROPion  300 mg Oral Daily    lactobacillus  1 capsule Oral BID WC    levothyroxine  50 mcg Oral Daily    magnesium oxide  400 mg Oral BID    PARoxetine  20 mg Oral Daily    tamsulosin  0.4 mg Oral Daily    insulin lispro  0-6 Units Subcutaneous TID WC    insulin lispro  0-3 Units Subcutaneous Nightly       Social History:     Social History     Socioeconomic History    Marital status:      Spouse name: Not on file    Number of children: Not on file    Years of education: Not on file    Highest education level: Not on file   Occupational History    Not on file   Social Needs    Financial resource strain: Not on file    Food insecurity:     Worry: Not on file     Inability: Not on file    Transportation needs:     Medical: Not on file     Non-medical: Not on file   Tobacco Use    Smoking status: Never Smoker    Smokeless tobacco: Never Used   Substance and Sexual Activity    Alcohol use: No    Drug use: Not Currently     Frequency: 2.0 times per week     Types: Marijuana    Sexual activity: Not on file   Lifestyle    Physical activity:     Days per week: Not on file     Minutes per session: Not on file    Stress: Not on file   Relationships    Social connections:     Talks on phone: Not on file     Gets together: Not on file     Attends Yarsanism service: Not on file     Active member of club or organization: Not on file     Attends meetings of clubs or organizations: Not on file     Relationship status: Not on file    Intimate partner violence:     Fear of current or ex partner: Not on file     Emotionally abused: Not on file     Physically abused: Not on file     Forced sexual activity: Not on file   Other Topics Concern    Not on file   Social History Narrative    Not on file       Family History:     Family History   Problem Relation Age of Onset    Diabetes Father     Cancer Maternal Grandfather         Colon Cancer; Prostate Cancer    No Known Problems Mother         Allergies:   Bactrim [sulfamethoxazole-trimethoprim]; Fiorinal [butalbital-aspirin-caffeine]; Peanut-containing drug products; Statins; and Peanut butter flavor [flavoring agent]     Review of Systems:     Unable to provide. Sedated on ventilator. 12/30/2019      Constitutional: No fevers or chills. No systemic complaints  Head: No headaches  Eyes: No double vision or blurry vision. No conjunctival inflammation. ENT: No sore throat or runny nose. . No hearing loss, tinnitus or vertigo. Cardiovascular: No chest pain or palpitations. No shortness of breath. No MONTES  Lung: No shortness of breath or cough. No sputum production  Abdomen: No nausea, vomiting, diarrhea, or abdominal pain. Ericka Mulligan No cramps. Genitourinary: No increased urinary frequency, or dysuria. No hematuria. No suprapubic or CVA pain  Musculoskeletal: No muscle aches or pains. No joint effusions, swelling or deformities  Hematologic: No bleeding or bruising. Neurologic: No headache, weakness, numbness, or tingling. Integument: No rash, no ulcers. Psychiatric: No depression. Endocrine: No polyuria, no polydipsia, no polyphagia.     Physical Examination :     Patient Vitals for the past 8 hrs:   BP Temp Temp src Pulse Resp SpO2   12/30/19 1300 130/65 -- -- 90 12 98 %   12/30/19 1245 131/72 -- -- 82 11 98 %   12/30/19 1230 137/65 -- -- 80 13 99 %   12/30/19 1215 (!) 131/58 -- -- 83 17 98 %   12/30/19 1200 132/64 -- -- 79 12 100 % 12/30/19 1156 -- 98.3 °F (36.8 °C) Oral -- -- --   12/30/19 1145 (!) 141/61 -- -- 76 12 99 %   12/30/19 1130 131/66 -- -- 77 15 99 %   12/30/19 1115 (!) 123/57 -- -- 78 17 99 %   12/30/19 1100 (!) 128/58 -- -- 74 16 99 %   12/30/19 1045 135/60 -- -- 73 11 99 %   12/30/19 1030 (!) 130/58 -- -- 71 12 100 %   12/30/19 1015 134/60 -- -- 79 21 100 %   12/30/19 1004 -- 98.2 °F (36.8 °C) Oral -- -- --   12/30/19 1000 (!) 148/63 -- -- 78 12 99 %   12/30/19 0945 (!) 141/70 -- -- 79 12 100 %   12/30/19 0930 131/65 -- -- 85 13 99 %   12/30/19 0915 -- -- -- 89 16 100 %   12/30/19 0900 (!) 116/47 -- -- 68 21 97 %   12/30/19 0845 (!) 117/48 -- -- 66 24 98 %   12/30/19 0830 112/60 -- -- 65 13 98 %   12/30/19 0815 (!) 115/55 -- -- 65 11 99 %   12/30/19 0800 (!) 123/52 -- -- 66 10 100 %   12/30/19 0745 125/65 98.6 °F (37 °C) Oral 63 14 99 %   12/30/19 0730 124/63 -- -- 60 10 98 %   12/30/19 0715 (!) 113/47 -- -- 64 15 98 %   12/30/19 0700 138/61 -- -- 64 13 99 %   12/30/19 0645 (!) 132/57 -- -- 57 13 98 %   12/30/19 0630 (!) 132/58 -- -- 54 12 97 %   12/30/19 0615 (!) 143/60 -- -- 53 13 98 %   12/30/19 0600 109/60 -- -- 57 16 98 %   12/30/19 0545 (!) 95/33 -- -- 66 17 97 %   12/30/19 0530 (!) 108/45 -- -- 65 17 97 %     General Appearance: Awake, alert, and in no apparent distress  Head:  Normocephalic, no trauma  Eyes: Pupils equal, round, reactive to light and accommodation; extraocular movements intact; sclera anicteric; conjunctivae pink. No embolic phenomena. ENT: Oropharynx clear, without erythema, exudate, or thrush. No tenderness of sinuses. Mouth/throat: mucosa pink and moist. No lesions. Dentition in good repair. Neck:Supple, without lymphadenopathy. Thyroid normal, No bruits. Pulmonary/Chest: Clear to auscultation, without wheezes, rales, or rhonchi. No dullness to percussion. Cardiovascular: Regular rate and rhythm without murmurs, rubs, or gallops. Abdomen: Soft, non tender.  Bowel sounds normal. No

## 2019-12-31 ENCOUNTER — APPOINTMENT (OUTPATIENT)
Dept: MRI IMAGING | Age: 54
DRG: 710 | End: 2019-12-31
Payer: MEDICARE

## 2019-12-31 LAB
ABSOLUTE EOS #: 0.19 K/UL (ref 0–0.44)
ABSOLUTE IMMATURE GRANULOCYTE: <0.03 K/UL (ref 0–0.3)
ABSOLUTE LYMPH #: 1.24 K/UL (ref 1.1–3.7)
ABSOLUTE MONO #: 0.58 K/UL (ref 0.1–1.2)
ANION GAP SERPL CALCULATED.3IONS-SCNC: 18 MMOL/L (ref 9–17)
BASOPHILS # BLD: 1 % (ref 0–2)
BASOPHILS ABSOLUTE: 0.03 K/UL (ref 0–0.2)
BUN BLDV-MCNC: 20 MG/DL (ref 6–20)
BUN/CREAT BLD: ABNORMAL (ref 9–20)
C-REACTIVE PROTEIN: 55.1 MG/L (ref 0–5)
CALCIUM IONIZED: 1.1 MMOL/L (ref 1.13–1.33)
CALCIUM SERPL-MCNC: 8.2 MG/DL (ref 8.6–10.4)
CHLORIDE BLD-SCNC: 104 MMOL/L (ref 98–107)
CO2: 20 MMOL/L (ref 20–31)
CREAT SERPL-MCNC: 0.97 MG/DL (ref 0.7–1.2)
CULTURE: ABNORMAL
CULTURE: ABNORMAL
DIFFERENTIAL TYPE: ABNORMAL
DIRECT EXAM: ABNORMAL
DIRECT EXAM: ABNORMAL
EOSINOPHILS RELATIVE PERCENT: 4 % (ref 1–4)
GFR AFRICAN AMERICAN: >60 ML/MIN
GFR NON-AFRICAN AMERICAN: >60 ML/MIN
GFR SERPL CREATININE-BSD FRML MDRD: ABNORMAL ML/MIN/{1.73_M2}
GFR SERPL CREATININE-BSD FRML MDRD: ABNORMAL ML/MIN/{1.73_M2}
GLUCOSE BLD-MCNC: 109 MG/DL (ref 75–110)
GLUCOSE BLD-MCNC: 121 MG/DL (ref 75–110)
GLUCOSE BLD-MCNC: 81 MG/DL (ref 75–110)
GLUCOSE BLD-MCNC: 85 MG/DL (ref 75–110)
GLUCOSE BLD-MCNC: 97 MG/DL (ref 70–99)
HCT VFR BLD CALC: 26.8 % (ref 40.7–50.3)
HEMOGLOBIN: 8.1 G/DL (ref 13–17)
IMMATURE GRANULOCYTES: 0 %
INTERVENTION: NORMAL
INTERVENTION: NORMAL
LYMPHOCYTES # BLD: 23 % (ref 24–43)
Lab: ABNORMAL
MAGNESIUM: 1.6 MG/DL (ref 1.6–2.6)
MCH RBC QN AUTO: 23.4 PG (ref 25.2–33.5)
MCHC RBC AUTO-ENTMCNC: 30.2 G/DL (ref 28.4–34.8)
MCV RBC AUTO: 77.5 FL (ref 82.6–102.9)
MONOCYTES # BLD: 11 % (ref 3–12)
NRBC AUTOMATED: 0 PER 100 WBC
PDW BLD-RTO: 18.6 % (ref 11.8–14.4)
PHOSPHORUS: 3.3 MG/DL (ref 2.5–4.5)
PLATELET # BLD: 223 K/UL (ref 138–453)
PLATELET ESTIMATE: ABNORMAL
PMV BLD AUTO: 9.7 FL (ref 8.1–13.5)
POTASSIUM SERPL-SCNC: 3.9 MMOL/L (ref 3.7–5.3)
POTASSIUM SERPL-SCNC: 4.2 MMOL/L (ref 3.7–5.3)
POTASSIUM SERPL-SCNC: 5.3 MMOL/L (ref 3.7–5.3)
RBC # BLD: 3.46 M/UL (ref 4.21–5.77)
RBC # BLD: ABNORMAL 10*6/UL
SEG NEUTROPHILS: 63 % (ref 36–65)
SEGMENTED NEUTROPHILS ABSOLUTE COUNT: 3.42 K/UL (ref 1.5–8.1)
SODIUM BLD-SCNC: 142 MMOL/L (ref 135–144)
SPECIMEN DESCRIPTION: ABNORMAL
WBC # BLD: 5.5 K/UL (ref 3.5–11.3)
WBC # BLD: ABNORMAL 10*3/UL

## 2019-12-31 PROCEDURE — 2580000003 HC RX 258: Performed by: INTERNAL MEDICINE

## 2019-12-31 PROCEDURE — 82330 ASSAY OF CALCIUM: CPT

## 2019-12-31 PROCEDURE — 86140 C-REACTIVE PROTEIN: CPT

## 2019-12-31 PROCEDURE — 6360000002 HC RX W HCPCS: Performed by: EMERGENCY MEDICINE

## 2019-12-31 PROCEDURE — 80048 BASIC METABOLIC PNL TOTAL CA: CPT

## 2019-12-31 PROCEDURE — 83735 ASSAY OF MAGNESIUM: CPT

## 2019-12-31 PROCEDURE — 73718 MRI LOWER EXTREMITY W/O DYE: CPT

## 2019-12-31 PROCEDURE — 6370000000 HC RX 637 (ALT 250 FOR IP): Performed by: STUDENT IN AN ORGANIZED HEALTH CARE EDUCATION/TRAINING PROGRAM

## 2019-12-31 PROCEDURE — 1200000000 HC SEMI PRIVATE

## 2019-12-31 PROCEDURE — 99233 SBSQ HOSP IP/OBS HIGH 50: CPT | Performed by: INTERNAL MEDICINE

## 2019-12-31 PROCEDURE — 6370000000 HC RX 637 (ALT 250 FOR IP): Performed by: EMERGENCY MEDICINE

## 2019-12-31 PROCEDURE — 84100 ASSAY OF PHOSPHORUS: CPT

## 2019-12-31 PROCEDURE — 84132 ASSAY OF SERUM POTASSIUM: CPT

## 2019-12-31 PROCEDURE — 82947 ASSAY GLUCOSE BLOOD QUANT: CPT

## 2019-12-31 PROCEDURE — 6360000002 HC RX W HCPCS: Performed by: INTERNAL MEDICINE

## 2019-12-31 PROCEDURE — 99291 CRITICAL CARE FIRST HOUR: CPT | Performed by: INTERNAL MEDICINE

## 2019-12-31 PROCEDURE — 85025 COMPLETE CBC W/AUTO DIFF WBC: CPT

## 2019-12-31 PROCEDURE — 36415 COLL VENOUS BLD VENIPUNCTURE: CPT

## 2019-12-31 PROCEDURE — 2580000003 HC RX 258: Performed by: STUDENT IN AN ORGANIZED HEALTH CARE EDUCATION/TRAINING PROGRAM

## 2019-12-31 RX ADMIN — Medication 1 CAPSULE: at 08:36

## 2019-12-31 RX ADMIN — SODIUM CHLORIDE: 9 INJECTION, SOLUTION INTRAVENOUS at 10:06

## 2019-12-31 RX ADMIN — PAROXETINE HYDROCHLORIDE HEMIHYDRATE 20 MG: 20 TABLET, FILM COATED ORAL at 08:36

## 2019-12-31 RX ADMIN — CEFTAROLINE FOSAMIL 600 MG: 600 POWDER, FOR SOLUTION INTRAVENOUS at 17:35

## 2019-12-31 RX ADMIN — FENTANYL CITRATE 50 MCG: 50 INJECTION, SOLUTION INTRAMUSCULAR; INTRAVENOUS at 06:28

## 2019-12-31 RX ADMIN — OXYCODONE HYDROCHLORIDE 10 MG: 5 TABLET ORAL at 18:31

## 2019-12-31 RX ADMIN — BUPROPION HYDROCHLORIDE 300 MG: 300 TABLET, FILM COATED, EXTENDED RELEASE ORAL at 08:36

## 2019-12-31 RX ADMIN — Medication 10 ML: at 20:11

## 2019-12-31 RX ADMIN — FENTANYL CITRATE 50 MCG: 50 INJECTION, SOLUTION INTRAMUSCULAR; INTRAVENOUS at 16:06

## 2019-12-31 RX ADMIN — OXYCODONE HYDROCHLORIDE 10 MG: 5 TABLET ORAL at 08:36

## 2019-12-31 RX ADMIN — HEPARIN SODIUM 5000 UNITS: 5000 INJECTION INTRAVENOUS; SUBCUTANEOUS at 15:50

## 2019-12-31 RX ADMIN — OXYCODONE HYDROCHLORIDE 10 MG: 5 TABLET ORAL at 12:41

## 2019-12-31 RX ADMIN — MAGNESIUM GLUCONATE 500 MG ORAL TABLET 400 MG: 500 TABLET ORAL at 08:36

## 2019-12-31 RX ADMIN — LEVOTHYROXINE SODIUM 50 MCG: 50 TABLET ORAL at 08:36

## 2019-12-31 RX ADMIN — OXYCODONE HYDROCHLORIDE 10 MG: 5 TABLET ORAL at 03:30

## 2019-12-31 RX ADMIN — Medication 1 CAPSULE: at 17:35

## 2019-12-31 RX ADMIN — Medication 10 ML: at 08:36

## 2019-12-31 RX ADMIN — HEPARIN SODIUM 5000 UNITS: 5000 INJECTION INTRAVENOUS; SUBCUTANEOUS at 06:00

## 2019-12-31 RX ADMIN — PANTOPRAZOLE SODIUM 40 MG: 40 TABLET, DELAYED RELEASE ORAL at 08:36

## 2019-12-31 RX ADMIN — HEPARIN SODIUM 5000 UNITS: 5000 INJECTION INTRAVENOUS; SUBCUTANEOUS at 22:05

## 2019-12-31 RX ADMIN — TAMSULOSIN HYDROCHLORIDE 0.4 MG: 0.4 CAPSULE ORAL at 08:36

## 2019-12-31 RX ADMIN — MAGNESIUM GLUCONATE 500 MG ORAL TABLET 400 MG: 500 TABLET ORAL at 22:05

## 2019-12-31 RX ADMIN — OXYCODONE HYDROCHLORIDE 10 MG: 5 TABLET ORAL at 22:30

## 2019-12-31 RX ADMIN — FENTANYL CITRATE 50 MCG: 50 INJECTION, SOLUTION INTRAMUSCULAR; INTRAVENOUS at 20:10

## 2019-12-31 RX ADMIN — FENTANYL CITRATE 25 MCG: 50 INJECTION, SOLUTION INTRAMUSCULAR; INTRAVENOUS at 02:34

## 2019-12-31 RX ADMIN — CEFTAROLINE FOSAMIL 400 MG: 400 POWDER, FOR SOLUTION INTRAVENOUS at 06:00

## 2019-12-31 ASSESSMENT — PAIN SCALES - GENERAL
PAINLEVEL_OUTOF10: 8
PAINLEVEL_OUTOF10: 9
PAINLEVEL_OUTOF10: 8
PAINLEVEL_OUTOF10: 9
PAINLEVEL_OUTOF10: 6
PAINLEVEL_OUTOF10: 9
PAINLEVEL_OUTOF10: 9

## 2019-12-31 ASSESSMENT — PAIN DESCRIPTION - PAIN TYPE: TYPE: CHRONIC PAIN

## 2019-12-31 ASSESSMENT — PAIN DESCRIPTION - ORIENTATION: ORIENTATION: LEFT

## 2019-12-31 ASSESSMENT — PAIN DESCRIPTION - LOCATION: LOCATION: FOOT

## 2019-12-31 NOTE — PLAN OF CARE
of improved sensory functioning will increase  Description  Demonstrations of improved sensory functioning will increase  12/31/2019 0750 by Esther Wall RN  Outcome: Ongoing  12/31/2019 0746 by Esther Wall RN  Outcome: Ongoing  Goal: Decrease in sensory misperception frequency  Description  Decrease in sensory misperception frequency  12/31/2019 0750 by Esther Wall RN  Outcome: Ongoing  12/31/2019 0746 by Esther Wall RN  Outcome: Ongoing  Goal: Able to refrain from responding to false sensory perceptions  Description  Able to refrain from responding to false sensory perceptions  12/31/2019 0750 by Esther Wall RN  Outcome: Ongoing  12/31/2019 0746 by Esther Wall RN  Outcome: Ongoing  Goal: Demonstrates accurate environmental perceptions  Description  Demonstrates accurate environmental perceptions  12/31/2019 0750 by Esther Wall RN  Outcome: Ongoing  12/31/2019 0746 by Esther Wall RN  Outcome: Ongoing  Goal: Able to distinguish between reality-based and nonreality-based thinking  Description  Able to distinguish between reality-based and nonreality-based thinking  12/31/2019 0750 by Esther Wall RN  Outcome: Ongoing  12/31/2019 0746 by Esther Wall RN  Outcome: Ongoing  Goal: Able to interrupt nonreality-based thinking  Description  Able to interrupt nonreality-based thinking  12/31/2019 0750 by Esther Wall RN  Outcome: Ongoing  12/31/2019 0746 by Esther Wall RN  Outcome: Ongoing     Problem: Sleep Pattern Disturbance:  Goal: Appears well-rested  Description  Appears well-rested  12/31/2019 1537 by Jackie Salas RN  Outcome: Ongoing  12/31/2019 0750 by Esther Wall RN  Outcome: Ongoing  12/31/2019 0746 by Esther Wall RN  Outcome: Ongoing     Problem: Airway Clearance - Ineffective:  Goal: Ability to maintain a clear airway will improve  Description  Ability to maintain a clear airway will improve  12/31/2019 1537 by Jackie Salas RN  Outcome: Ongoing  12/31/2019 0750 by Roxanne Fitzgerald RN  Outcome: Ongoing  12/31/2019 0746 by Roxanne Fitzgerald RN  Outcome: Ongoing     Problem: Anxiety/Stress:  Goal: Level of anxiety will decrease  Description  Level of anxiety will decrease  12/31/2019 1537 by Concepcion Mccloud RN  Outcome: Ongoing  12/31/2019 0750 by Roxanne Fitzgerald RN  Outcome: Ongoing  12/31/2019 0746 by Roxanne Fitzgerald RN  Outcome: Ongoing     Problem: Aspiration:  Goal: Absence of aspiration  Description  Absence of aspiration  12/31/2019 1537 by Concepcion Mccloud RN  Outcome: Ongoing  12/31/2019 0750 by Roxanne Fitzgerald RN  Outcome: Ongoing  12/31/2019 0746 by Roxanne Fitzgerald RN  Outcome: Ongoing     Problem:  Bowel Function - Altered:  Goal: Bowel elimination is within specified parameters  Description  Bowel elimination is within specified parameters  12/31/2019 0750 by Roxanne Fitzgerald RN  Outcome: Ongoing  12/31/2019 0746 by Roxanne Fitzgerald RN  Outcome: Ongoing     Problem: Cardiac Output - Decreased:  Goal: Hemodynamic stability will improve  Description  Hemodynamic stability will improve  12/31/2019 1537 by Concepcion Mccloud RN  Outcome: Ongoing  12/31/2019 0750 by Roxanne Fitzgerald RN  Outcome: Ongoing  12/31/2019 0746 by Roxanne Fitzgerald RN  Outcome: Ongoing     Problem: Gas Exchange - Impaired:  Goal: Levels of oxygenation will improve  Description  Levels of oxygenation will improve  12/31/2019 1537 by Concepcion Mccloud RN  Outcome: Ongoing  12/31/2019 0750 by Roxanne Fitzgerald RN  Outcome: Ongoing  12/31/2019 0746 by Roxanne Fitzgerald RN  Outcome: Ongoing     Problem: Serum Glucose Level - Abnormal:  Goal: Ability to maintain appropriate glucose levels will improve to within specified parameters  Description  Ability to maintain appropriate glucose levels will improve to within specified parameters  12/31/2019 0750 by Roxanne Fitzgerald RN  Outcome: Ongoing  12/31/2019 0746 by Roxanne Fitzgerald RN  Outcome: Ongoing     Problem: Tissue Perfusion, Altered:  Goal: Circulatory function within specified parameters  Description  Circulatory function within specified parameters  12/31/2019 0750 by Ronel Fernandes RN  Outcome: Ongoing  12/31/2019 0746 by Ronel Fernandes RN  Outcome: Ongoing     Problem: Tissue Perfusion - Cardiopulmonary, Altered:  Goal: Absence of angina  Description  Absence of angina  12/31/2019 0750 by Ronel Fernandes RN  Outcome: Ongoing  12/31/2019 0746 by Ronel Fernandes RN  Outcome: Ongoing  Goal: Hemodynamic stability will improve  Description  Hemodynamic stability will improve  12/31/2019 0750 by Ronel Fernandes RN  Outcome: Ongoing  12/31/2019 0746 by Ronel Fernandes RN  Outcome: Ongoing     Problem: Falls - Risk of:  Goal: Absence of physical injury  Description  Absence of physical injury  12/31/2019 1537 by Kassie Quigley RN  Outcome: Ongoing  12/31/2019 0750 by Ronel Fernandes RN  Outcome: Ongoing  12/31/2019 0746 by Ronel Fernandes RN  Outcome: Ongoing  Goal: Will remain free from falls  Description  Will remain free from falls  12/31/2019 0750 by Ronel Fernandes RN  Outcome: Ongoing  12/31/2019 0746 by Ronel Fernandes RN  Outcome: Ongoing     Problem: Risk for Impaired Skin Integrity  Goal: Tissue integrity - skin and mucous membranes  Description  Structural intactness and normal physiological function of skin and  mucous membranes.   12/31/2019 0750 by Ronel Fernandes RN  Outcome: Ongoing  12/31/2019 0746 by Ronel Fernandes RN  Outcome: Ongoing

## 2019-12-31 NOTE — PROGRESS NOTES
[facility] - Progress Note    Today's Date and Time: 12/31/2019, 10:25 AM    Impression :   · Altered mental status  · DM   · Diabetic neuropathy   · Left foot diabetic plantar ulcer and gangrene . · S aureus Lt foot infection  · Prior toe amputations 2017:   · 1. Amputation distal phalanx, left second toe. 2. Amputation distal phalanx, right second toe. 3. Amputation distal phalanx of the left hallux. 4. I and D left fifth digit. 5. I and D right fifth digit. Culture grew MRSA   · Sepsis with CLAUDIA  · Hx chronic back pain on narcotics  · Single blood culture with bacillus spp on 12-28-19 = contaminant  · Allergy to sulfa    Recommendations:   · Ceftaroline 600 mg IV q 12 hr  · D/C other antibiotics  · Wound care  · Surgical debridement at the discretion of Podiatry    Medical Decision Making/Summary/Discussion:12/31/2019     ·   Infection Control Recommendations   · [place] Precautions  · Contact Isolation MRSA    Antimicrobial Stewardship Recommendations     · Discontinuation of therapy  Coordination of Outpatient Care:   · Estimated Length of IV antimicrobials: TBD  · Patient will need Midline Catheter Insertion: No  · Patient will need PICC line Insertion:No  · Patient will need: Home IV , [place],  SNF,  LTAC:TBD  · Patient will need outpatient wound care:Yes    Chief complaint/reason for consultation:   · Concern for sepsis      History of Present Illness:   Philippe Michael is a 53y.o.-year-old  male who was initially admitted on 12/28/2019. Patient seen at the request of .    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 Presented to [facility] with worsening confusion over about 5 days, associated decrease in urine output. Found to be anuric with Cr 9.47, K 6.9.  Also was hypotensive and received fluids plus levophed before transfer to [place] [address]. Blood cultures were obtained at [facility]. One of 2 yielded Bacillus spp.on 12-28-19. patient received vancomycin. Blood cultures 12-29-19: No growth. 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 Family indicates deterioration on left foot with dark discoloration of dorsum and new plantar ulcer with drainage. CURRENT EVALUATION : 12/31/2019     Patient evaluated and examined in the ICU. Afebrile  VS stable. Off levophed    Patient remains confused  Pulled out his femoral line yesterday    Foot unchanged  Culture with heavy growth S aureus  Lt foot X ray with new changes of second proximal phalanx remnant concerning for osteomyelitis   MRI with increased uptake on 2nd metatarsal    Renal function improved. Ceftaroline increased to 600 mg q 12 hr    Labs, X rays reviewed: 12/31/2019    BUN: 81-->41  Cr:4.76--4.27-->1.62    WBC:28-->9.5-->5.5  Hb:9.0-->8.1  Plat: 264-->223    ESR 47  .1    Cultures:  Urine:  ·   Blood:  · 12-28-19: 1/2 bacillus spp  · 12-29-19 x 2: No growth   Sputum :  ·   Wound:  ·     CXR 12-28-19: Vascular congestion. No pneumonia    Discussed with patient, RN, family. 12-29-19:                      I have personally reviewed the past medical history, past surgical history, medications, social history, and family history, and I have updated the database accordingly. Past Medical History:     Past Medical History:   Diagnosis Date    Anxiety     Arthritis     lower back, knees    BPH (benign prostatic hyperplasia)     Chronic kidney disease     prostate    Depression     Diabetes mellitus ([address]     Diabetic neuropathy (HCC)     GERD (gastroesophageal reflux disease)     Hyperglycemia     Hypertension     Hypothyroidism     Impacted tooth     Pneumonia        Past Surgical  History:     Past Surgical History:   Procedure Laterality Date    APPENDECTOMY      BACK SURGERY      Lower x 4.  Had abscess after appendectomy.      KNEE SURGERY Right     scope    TOE AMPUTATION Bilateral 3/6/2017    TOE AMPUTATION LEFT HALLUX AND 2ND DIGIT AND RIGHT 2ND DIGIT performed by Shaniqua Adamson DPM at [address] ENDOSCOPY N/A 5/20/2019    EGD WITH BIOPSY performed by Tessie Chawla MD at [address] OR       Medications:      heparin (porcine)  5,000 Units Subcutaneous 3 times per day    ceftaroline fosamil (TEFLARO) IVPB  400 mg Intravenous Q12H    pantoprazole  40 mg Oral QAM AC    sodium chloride flush  10 mL Intravenous 2 times per day    buPROPion  300 mg Oral Daily    lactobacillus  1 capsule Oral BID WC    levothyroxine  50 mcg Oral Daily    magnesium oxide  400 mg Oral BID    PARoxetine  20 mg Oral Daily    tamsulosin  0.4 mg Oral Daily    insulin lispro  0-6 Units Subcutaneous TID WC    insulin lispro  0-3 Units Subcutaneous Nightly       Social History:     Social History     Socioeconomic History    Marital status:      Spouse name: Not on file    Number of children: Not on file    Years of education: Not on file    Highest education level: Not on file   Occupational History    Not on file   Social Needs    Financial resource strain: Not on file    Food insecurity:     Worry: Not on file     Inability: Not on file    Transportation needs:     Medical: Not on file     Non-medical: Not on file   Tobacco Use    Smoking status: Never Smoker    Smokeless tobacco: Never Used   Substance and Sexual Activity    Alcohol use: No    Drug use: Not Currently     Frequency: 2.0 times per week     Types: Marijuana    Sexual activity: Not on file   Lifestyle    Physical activity:     Days per week: Not on file     Minutes per session: Not on file    Stress: Not on file   Relationships    Social connections:     Talks on phone: Not on file     Gets together: Not on file     Attends Hinduism service: Not on file     Active member of club or organization: Not on file     Attends meetings of clubs or organizations: Not on file     Relationship status: Not on file    Intimate partner violence:     Fear of current or ex partner: Not on file     Emotionally abused: Not on file     Physically abused: Not on file     Forced sexual activity: Not on file   Other Topics Concern    Not on file   Social History Narrative    Not on file       Family History:     Family History   Problem Relation Age of Onset    Diabetes Father     Cancer Maternal Grandfather         Colon Cancer; Prostate Cancer    No Known Problems Mother         Allergies:   Bactrim [sulfamethoxazole-trimethoprim]; Fiorinal [butalbital-aspirin-caffeine]; Peanut-containing drug products; Statins; and Peanut butter flavor [flavoring agent]     Review of Systems:     Unable to provide. Sedated on ventilator. 12/31/2019      Constitutional: No fevers or chills. No systemic complaints  Head: No headaches  Eyes: No double vision or blurry vision. No conjunctival inflammation. ENT: No sore throat or runny nose. . No hearing loss, tinnitus or vertigo. Cardiovascular: No chest pain or palpitations. No shortness of breath. No MONTES  Lung: No shortness of breath or cough. No sputum production  Abdomen: No nausea, vomiting, diarrhea, or abdominal pain. Roz Bookman No cramps. Genitourinary: No increased urinary frequency, or dysuria. No hematuria. No suprapubic or CVA pain  Musculoskeletal: No muscle aches or pains. No joint effusions, swelling or deformities  Hematologic: No bleeding or bruising. Neurologic: No headache, weakness, numbness, or tingling. Integument: No rash, no ulcers. Psychiatric: No depression. Endocrine: No polyuria, no polydipsia, no polyphagia.     Physical Examination :     Patient Vitals for the past 8 hrs:   BP Temp Temp src Pulse Resp SpO2   12/31/19 1000 (!) 144/70 -- -- 74 14 100 %   12/31/19 0800 132/69 99.2 °F (37.3 °C) Oral 71 13 100 %   12/31/19 0700 131/60 -- -- 71 25 98 %   12/31/19 0600 139/63 --  Stable irregularity involving the head of the 3rd metatarsal.  No   obvious change since November 2019 of the 1st digit.       Elsewhere, no acute fracture or dislocation.  Bony fragment is seen at the   level of the 4th and 5th metatarsal.       Diffuse soft tissue swelling.           Impression   1. New irregularity involving the remnant 2nd proximal phalanx concerning for   osteomyelitis.  Adjacent soft tissue swelling and soft tissue defect is   present. 2. Diffuse soft tissue swelling. 3. No additional suspicious lesions although evaluation is limited given   osteopenia and patient positioning.             Medical Decision [id number]:       Medical Decision Making-Other:     Note:  · Labs, medications, radiologic studies were reviewed with personal review of films  · Large amounts of data were reviewed  · Discussed with nursing Staff, Discharge planner  · Infection Control and Prevention measures reviewed  · All prior entries were reviewed  · Administer medications as ordered  · Prognosis: Guarded  · Discharge planning reviewed  · Follow up as outpatient. Thank you for allowing us to participate in the care of this patient. Please call with questions.     Kayla Vivar MD  Pager: [phone] - Office: [phone]

## 2019-12-31 NOTE — PROGRESS NOTES
INTENSIVE CARE UNIT  Resident Physician Progress Note    Patient - Raj Dickson  Date of Admission -  2019 12:43 PM  Date of Evaluation -  2019  Room and Bed Number -  0103/0103-01   Hospital Day - 3      SUBJECTIVE:     OVERNIGHT EVENTS:    No acute events reported    TODAY: He is alert and oriented.   Denies any shortness of breath, chest pain    AWAKE & FOLLOWING COMMANDS:  [] No   [x] Yes    SECRETIONS Amount:  [] Small [] Moderate  [] Large  [x] None  Color:     [] White [] Colored  [] Bloody    SEDATION:  RAAS Score:  [] Propofol gtt  [] Versed gtt  [] Ativan gtt   [x] No Sedation    PARALYZED:  [x] No    [] Yes    VASOPRESSORS:  [x] No    [] Yes  [] Levophed [] Dopamine [] Vasopressin  [] Dobutamine [] Phenylephrine [] Epinephrine      OBJECTIVE:     VITAL SIGNS:  /67   Pulse 74   Temp 98.2 °F (36.8 °C) (Oral)   Resp 10   Ht 6' 2\" (1.88 m)   Wt 213 lb 10 oz (96.9 kg)   SpO2 99%   BMI 27.43 kg/m²   Tmax over 24 hours:  Temp (24hrs), Av.3 °F (36.8 °C), Min:98.2 °F (36.8 °C), Max:98.6 °F (37 °C)      Patient Vitals for the past 8 hrs:   BP Temp Temp src Pulse Resp SpO2   19 0500 132/67 -- -- 74 10 99 %   19 0400 (!) 124/56 98.2 °F (36.8 °C) Oral 63 23 98 %   19 0300 (!) 115/52 -- -- 66 25 97 %   19 0200 (!) 127/59 -- -- 75 18 98 %   19 0100 (!) 98/45 -- -- 81 17 98 %   19 0000 (!) 109/47 98.2 °F (36.8 °C) Oral 74 18 97 %         Intake/Output Summary (Last 24 hours) at 2019 0559  Last data filed at 2019 0400  Gross per 24 hour   Intake 2655.69 ml   Output 2805 ml   Net -149.31 ml     Date 19 0000 - 19 2359   Shift 9160-3244 5602-0768 6881-8174 24 Hour Total   INTAKE   I.V.(mL/kg) 554(5.7)   554(5.7)   Shift Total(mL/kg) 554(5.7)   554(5.7)   OUTPUT   Urine(mL/kg/hr) 430   430   Shift Total(mL/kg) 430(4.4)   430(4.4)   Weight (kg) 96.9 96.9 96.9 96.9     Wt Readings from Last 3 Encounters:   19 213 lb 10 oz (96.9 kg)   12/28/19 216 lb (98 kg)   12/24/19 216 lb 6.4 oz (98.2 kg)     Body mass index is 27.43 kg/m². PHYSICAL EXAM:  GEN:  awake, alert, cooperative, no apparent distress, and appears stated age  EYES:  Lids and lashes normal, pupils equal, round and reactive to light, extra ocular muscles intact, sclera clear, conjunctiva normal  HEENT:  Normocephalic, without obvious abnormality, atramatic, sinuses nontender on palpation, external ears without lesions, oral pharynx with moist mucus membranes, tonsils without erythema or exudates, gums normal and good dentition. LUNGS:  No increased work of breathing, good air exchange, clear to auscultation bilaterally, no crackles or wheezing  CV:    Normal apical impulse, regular rate and rhythm, normal S1 and S2, no S3 or S4, and no murmur noted  ABDOMEN:   Femoral CVC in left groin, normal bowel sounds, soft, non-distended, non-tender and no masses palpated  :    Barrera catheter in situ  NEURO[de-identified]   Awake, alert, oriented to name, place and time. Cranial nerves II-XII are grossly intact. Motor is 5 out of 5 bilaterally. Cerebellar finger to nose, heel to shin intact. Sensory is intact.   Babinski down going, Romberg negative, and gait is normal.  SKIN:   No bruising or bleeding  Normal skin color, texture, turgor  EXTREMITIES: Left lower extremity dressing with minimal soilage of dressing, no pedal or leg edema, no calf tenderness/swelling, no erythema, distal pulses intact       MEDICATIONS:  Scheduled Meds:   heparin (porcine)  5,000 Units Subcutaneous 3 times per day    ceftaroline fosamil (TEFLARO) IVPB  400 mg Intravenous Q12H    pantoprazole  40 mg Oral QAM AC    sodium chloride flush  10 mL Intravenous 2 times per day    buPROPion  300 mg Oral Daily    lactobacillus  1 capsule Oral BID WC    levothyroxine  50 mcg Oral Daily    magnesium oxide  400 mg Oral BID    PARoxetine  20 mg Oral Daily    tamsulosin  0.4 mg Oral Daily    insulin lispro 0-6 Units Subcutaneous TID WC    insulin lispro  0-3 Units Subcutaneous Nightly     Continuous Infusions:   sodium chloride 75 mL/hr at 12/30/19 1644    dextrose      norepinephrine Stopped (12/30/19 1535)    dextrose       PRN Meds:   tiZANidine, 4 mg, Q6H PRN  oxyCODONE, 5 mg, Q4H PRN    Or  oxyCODONE, 10 mg, Q4H PRN  acetaminophen, 650 mg, Q4H PRN  glucose, 15 g, PRN  dextrose, 12.5 g, PRN  glucagon (rDNA), 1 mg, PRN  dextrose, 100 mL/hr, PRN  sodium chloride flush, 10 mL, PRN  magnesium hydroxide, 30 mL, Daily PRN  ondansetron, 4 mg, Q6H PRN  heparin (porcine), 1,400 Units, PRN  heparin (porcine), 1,500 Units, PRN  glucose, 15 g, PRN  dextrose, 12.5 g, PRN  glucagon (rDNA), 1 mg, PRN  dextrose, 100 mL/hr, PRN  fentanNYL, 25 mcg, Q4H PRN    Or  fentanNYL, 50 mcg, Q4H PRN        SUPPORT DEVICES: [] Ventilator [] BIPAP  [] Nasal Cannula [] Room Air    VENT SETTINGS (Comprehensive) (if applicable):    Vent Information  Skin Assessment: Clean, dry, & intact  FiO2 : 28 %  Additional Respiratory  Assessments  Pulse: 74  Resp: 10  SpO2: 99 %  Oral Care: Lip moisturizer applied, Mouth swabbed    ABGs:     Lab Results   Component Value Date    TGR0LLE 23 12/28/2019    FIO2 28.0 12/28/2019         DATA:  Complete Blood Count:   Recent Labs     12/28/19  0956 12/29/19  0443 12/30/19  0415   WBC 20.1* 9.5 8.5   RBC 4.38* 3.80* 3.52*   HGB 10.1* 9.0* 8.5*   HCT 33.1* 29.6* 28.3*   MCV 75.4* 77.9* 80.4*   MCH 23.1* 23.7* 24.1*   MCHC 30.6* 30.4 30.0   RDW 20.8* 18.7* 18.7*    See Reflexed IPF Result 304   MPV 7.6 NOT REPORTED 9.6        Last 3 Blood Glucose:   Recent Labs     12/28/19  0956 12/28/19  1307 12/29/19  0123 12/29/19  0443 12/29/19  1138 12/29/19  1805 12/29/19  2330 12/30/19  0415   GLUCOSE 115* 118* 114* 110* 125* 75 162* 129*        PT/INR:    Lab Results   Component Value Date    PROTIME 14.8 05/17/2019    INR 1.2 05/17/2019     PTT:    Lab Results   Component Value Date    APTT 32.4 05/17/2019 Comprehensive Metabolic Profile:   Recent Labs     12/28/19  0956  12/29/19  1805 12/29/19  2330 12/30/19  0415 12/30/19  1448 12/30/19 2032   *   < > 135 133* 135  --   --    K 6.9*   < > 5.6* 4.3 5.4* 4.3 4.4   CL 90*   < > 100 98 100  --   --    CO2 21   < > 18* 25 24  --   --    *   < > 69* 46* 41*  --   --    CREATININE 9.47*   < > 3.01* 1.73* 1.62*  --   --    GLUCOSE 115*   < > 75 162* 129*  --   --    CALCIUM 8.6   < > 8.7 8.0* 8.6  --   --    PROT 8.2  --   --   --   --   --   --    LABALBU 3.5  --   --   --   --   --   --    BILITOT 0.26*  --   --   --   --   --   --    ALKPHOS 87  --   --   --   --   --   --    AST 16  --   --   --   --   --   --    ALT 12  --   --   --   --   --   --     < > = values in this interval not displayed. Magnesium:   Lab Results   Component Value Date    MG 1.5 12/30/2019    MG 1.8 12/29/2019    MG 2.1 12/28/2019     Phosphorus:   Lab Results   Component Value Date    PHOS 3.1 12/30/2019    PHOS 4.2 12/29/2019    PHOS 7.3 12/28/2019     Ionized Calcium:   Lab Results   Component Value Date    CAION 1.10 12/30/2019    CAION 1.09 12/29/2019    CAION 0.85 12/28/2019        Urinalysis:   Lab Results   Component Value Date    NITRU NEGATIVE 12/28/2019    COLORU DARK YELLOW 12/28/2019    PHUR 5.0 12/28/2019    WBCUA 2 TO 5 12/28/2019    RBCUA 0 TO 2 12/28/2019    MUCUS NOT REPORTED 12/28/2019    TRICHOMONAS NOT REPORTED 12/28/2019    YEAST NOT REPORTED 12/28/2019    BACTERIA NOT REPORTED 12/28/2019    SPECGRAV 1.020 12/28/2019    LEUKOCYTESUR NEGATIVE 12/28/2019    UROBILINOGEN Normal 12/28/2019    BILIRUBINUR  12/28/2019     Presumptive positive. Unable to confirm due to unavailability of reagent.     GLUCOSEU TRACE 12/28/2019    KETUA NEGATIVE 12/28/2019    AMORPHOUS 1+ 12/28/2019       HgBA1c:    Lab Results   Component Value Date    LABA1C 5.2 12/24/2019     TSH:    Lab Results   Component Value Date    TSH 1.15 12/28/2019     Lactic Acid:   Lab Results line.  4. Hypothyroidism. Continue Synthroid. 5. Type 2 diabetes. Sugars controlled  6. Okay to transfer out of ICU to [place] [id number] monitored bed. Tapan Ji MD  PGY-2 Resident  Internal Medicine  [facility]  12/31/2019 5:59 AM       Attending Physician Statement  I have discussed the care of Cristina English, including pertinent history and exam findings with the resident. I have reviewed the key elements of all parts of the encounter with the resident. I have seen and examined the patient with the resident. I agree with the assessment and plan and status of the problem list as documented. I have seen the patient during my rounds this morning, overnight events noted, lab seen and medications reviewed. He did not have fever overnight, his WBC count is improved urine output is better, last 24 hours urine output is 2.4 L, creatinine is normalized, potassium was 5.3 CRP improved from 166 2 days ago to 55 today and WBC count is normalized. He was seen by podiatry MRI was done consistent with osteomyelitis, seen by infectious disease will need to plan by infectious disease and also podiatry about debridement versus amputation of toes. Continue on ceftaroline. Continue monitor intake and output. Continue with DVT prophylaxis. Decrease IV fluid to 50 mL an hour. We will transfer patient to medicine floor with medicine team and critical care team will sign off once on the floor. Total critical care time caring for this patient with life threatening, unstable organ failure, including direct patient contact, management of life support systems, review of data including imaging and labs, discussions with other team members and physicians at least 27  Min so far today, excluding procedures. Please note that this chart was generated using voice recognition Dragon dictation software.  Although every effort was made to ensure the accuracy of this automated transcription, some errors in transcription may have occurred.       Cari Do MD  12/31/2019 7:01 PM

## 2019-12-31 NOTE — PLAN OF CARE
Problem: Pain:  Goal: Pain level will decrease  Description  Pain level will decrease  Outcome: Ongoing  Goal: Control of acute pain  Description  Control of acute pain  Outcome: Ongoing  Goal: Control of chronic pain  Description  Control of chronic pain  Outcome: Ongoing     Problem: Fluid Volume - Imbalance:  Goal: Absence of imbalanced fluid volume signs and symptoms  Description  Absence of imbalanced fluid volume signs and symptoms  Outcome: Ongoing     Problem: Mental Status - Impaired:  Goal: Mental status will be restored to baseline  Description  Mental status will be restored to baseline  Outcome: Ongoing     Problem: Nutrition Deficit:  Goal: Ability to achieve adequate nutritional intake will improve  Description  Ability to achieve adequate nutritional intake will improve  Outcome: Ongoing     Problem: Pain:  Goal: Pain level will decrease  Description  Pain level will decrease  Outcome: Ongoing  Goal: Recognizes and communicates pain  Description  Recognizes and communicates pain  Outcome: Ongoing  Goal: Control of acute pain  Description  Control of acute pain  Outcome: Ongoing  Goal: Control of chronic pain  Description  Control of chronic pain  Outcome: Ongoing     Problem: Skin Integrity - Impaired:  Goal: Will show no infection signs and symptoms  Description  Will show no infection signs and symptoms  Outcome: Ongoing  Goal: Absence of new skin breakdown  Description  Absence of new skin breakdown  Outcome: Ongoing     Problem: Confusion - Acute:  Goal: Mental status will be restored to baseline  Description  Mental status will be restored to baseline  Outcome: Ongoing  Goal: Absence of continued neurological deterioration signs and symptoms  Description  Absence of continued neurological deterioration signs and symptoms  Outcome: Ongoing     Problem: Discharge Planning:  Goal: Ability to perform activities of daily living will improve  Description  Ability to perform activities of daily thinking  Description  Able to interrupt nonreality-based thinking  Outcome: Ongoing     Problem: Sleep Pattern Disturbance:  Goal: Appears well-rested  Description  Appears well-rested  Outcome: Ongoing     Problem: Airway Clearance - Ineffective:  Goal: Ability to maintain a clear airway will improve  Description  Ability to maintain a clear airway will improve  Outcome: Ongoing     Problem: Anxiety/Stress:  Goal: Level of anxiety will decrease  Description  Level of anxiety will decrease  Outcome: Ongoing     Problem: Aspiration:  Goal: Absence of aspiration  Description  Absence of aspiration  Outcome: Ongoing     Problem:  Bowel Function - Altered:  Goal: Bowel elimination is within specified parameters  Description  Bowel elimination is within specified parameters  Outcome: Ongoing     Problem: Cardiac Output - Decreased:  Goal: Hemodynamic stability will improve  Description  Hemodynamic stability will improve  Outcome: Ongoing     Problem: Gas Exchange - Impaired:  Goal: Levels of oxygenation will improve  Description  Levels of oxygenation will improve  Outcome: Ongoing     Problem: Serum Glucose Level - Abnormal:  Goal: Ability to maintain appropriate glucose levels will improve to within specified parameters  Description  Ability to maintain appropriate glucose levels will improve to within specified parameters  Outcome: Ongoing     Problem: Tissue Perfusion, Altered:  Goal: Circulatory function within specified parameters  Description  Circulatory function within specified parameters  Outcome: Ongoing     Problem: Tissue Perfusion - Cardiopulmonary, Altered:  Goal: Absence of angina  Description  Absence of angina  Outcome: Ongoing  Goal: Hemodynamic stability will improve  Description  Hemodynamic stability will improve  Outcome: Ongoing     Problem: Falls - Risk of:  Goal: Absence of physical injury  Description  Absence of physical injury  Outcome: Ongoing  Goal: Will remain free from falls  Description  Will remain free from falls  Outcome: Ongoing     Problem: Risk for Impaired Skin Integrity  Goal: Tissue integrity - skin and mucous membranes  Description  Structural intactness and normal physiological function of skin and  mucous membranes.   Outcome: Ongoing

## 2019-12-31 NOTE — PROGRESS NOTES
BP Pulse Resp SpO2   19 1300 131/67 82 13 100 %     Average, Min, and Max for last 24 hours Vitals:  TEMPERATURE:  Temp  Av.7 °F (37.1 °C)  Min: 98.2 °F (36.8 °C)  Max: 99.2 °F (37.3 °C)    RESPIRATIONS RANGE: Resp  Av.8  Min: 10  Max: 25    PULSE RANGE: Pulse  Av.5  Min: 63  Max: 83    BLOOD PRESSURE RANGE:  Systolic (83MAJ), UGB:605 , Min:98 , TZY:364   ; Diastolic (82YIP), ZTB:75, Min:45, Max:70      PULSE OXIMETRY RANGE: SpO2  Av.5 %  Min: 97 %  Max: 100 %    I/O last 3 completed shifts: In: 1654.1 [I.V.:1654.1]  Out: 1220 [Urine:1220]    CBC:  Recent Labs     19  0443 19  1138 19  0415 19  0553   WBC 9.5  --  8.5 5.5   HGB 9.0*  --  8.5* 8.1*   HCT 29.6*  --  28.3* 26.8*   PLT See Reflexed IPF Result  --  304 223   CRP  --  166.1* 112.3* 55.1*        BMP:  Recent Labs     19  2330 19  0415  19  2032 19  0553 19  0756   * 135  --   --  142  --    K 4.3 5.4*   < > 4.4 5.3 4.2   CL 98 100  --   --  104  --    CO2 25 24  --   --  20  --    BUN 46* 41*  --   --  20  --    CREATININE 1.73* 1.62*  --   --  0.97  --    GLUCOSE 162* 129*  --   --  97  --    CALCIUM 8.0* 8.6  --   --  8.2*  --     < > = values in this interval not displayed. Coags:  No results for input(s): APTT, PROT, INR in the last 72 hours. Lab Results   Component Value Date    SEDRATE 47 (H) 2019     Recent Labs     19  1138 19  0415 19  0553   .1* 112.3* 55.1*       Lower Extremity Physical Exam:  Vascular: DP and PT pulses are non palpable. CFT >3 seconds to all digits. Hair growth is absent to the level of the digits. Nonpitting edema to the left LE     Neuro: Saph/sural/SP/DP/plantar sensation diminished to light touch.     Musculoskeletal: Muscle strength deferred to all lower extremity muscle groups.  Gross deformity is amputation of right 2nd ray, digits 1-3 amputation on the left.     Dermatologic: Full thickness ulcer #1 located sub 2nd MT head and measures approximately 2cm x 2cm x 0.6 cm. Base is fibrogranular. Periwound skin is slightly macerated. Purulent drainage noted with associated mal odor. Erythema present with mild associated increase in warmth. Does probe to bone, but no sinus track or undermining. No fluctuance, crepitus, or induration. Dry necrotic skin noted superficially on the 1st MT dorsally and plantarly     Skin on the right foot is warm and dry with no open lesions noted. Clinical Images:                      Imaging:   MRI FOOT LEFT WO CONTRAST   Final Result   Osteomyelitis involving the 2nd metatarsal head, 3rd metatarsal head, and 4th   toe. Edema within the 1st metatarsal bone and base of the 2nd metatarsal bone is   suspected to be reactive, however early osteomyelitis is not excluded. VL LOWER EXTREMITY ARTERIAL SEGMENTAL PRESSURES W PPG BILATERAL   Final Result      US RETROPERITONEAL LIMITED   Final Result   No hydronephrosis. XR CHEST PORTABLE   Final Result   Central line tip in the SVC with no pneumothorax. Suggestion of mild perihilar edema and CHF, possibly accentuated by low lung   volumes. Cultures:   Wound cx 12/29: GPC, MRSA    Assessment   Cristina English is a 53 y.o. male with   1. Osteomyelitis of metatarsals 2-4, left foot  2. Diabetic wound down to the level of the bone, sub 2nd MT head left foot  3. Cellulitis  4. Sepsis  5. S/p digit 1-3 amputation, left foot  6. S/p 2nd ray amputation, right foot  7. DM2  8. AMS  9.  Acute renal failure    Active Problems:    Acute renal failure (ARF) ([facility])    Encephalopathy due to infection    Septic shock ([facility])    Uremia    Hyperkalemia    Hypocalcemia    Metabolic acidosis    Altered mental status    Diabetic ulcer of toe of left foot associated with type 2 diabetes mellitus (HCC)    Hyperphosphatemia    Chronic, continuous use of opioids    Acute metabolic encephalopathy    CLAUDIA (acute kidney injury) [facility])  Resolved Problems:    * No resolved hospital problems. *       Plan     · Patient examined and evaluated at bedside   · Treatment options discussed in detail with the patient/family  · Medical management per Critical Care  · ID following - IV Ceftaroline  ? Cx: MRSA, GPC  · Vascular consulted: recs wound care since audible pulses  · L Foot XR reviewed - osteo 2nd proximal phalax  · Dressing applied to L foot: 1/2 in plain packing, DSD  · NWB to LLE  · NIVS: JOBY:Right: 1.09. Left: 1.02.  · MRI: +OM at 2nd MT, 3rd MT head, 4th MT head, and some changes surrounding 1st MT head. · Discussed with patient and family at length that due to the extensiveness of the infection a more proximal amputation would be recommended or at minimum a formal debridement and irrigation. Patient refuses any surgical intervention at this time. Pt states he would like to have some time to think through the decision and then follow up outpatient when ready. Pt is requesting to go home. Explained to patient that would be against our medical advise since there is an active infection in his left foot causing systemic symptoms. Pt confirmed understanding of the extent of his infection and the risks of not being treated at the moment. Pt would like to think it over for a day and discuss more in detail with Dr. Ce Dai. · Discussed with Dr. Shannon Virk, DPM PGY1  Podiatric Medicine & Surgery   12/31/2019 at 8:16 PM    I performed a history and physical examination of the patient and discussed management with the resident. I reviewed the residents note and agree with the documented findings and plan of care. Any areas of disagreement are noted on the chart. I was personally present for the key portions of any procedures. I have documented in the chart those procedures where I was not present during the key portions. I have reviewed the Podiatry Resident progress note.  I agree with the chief complaint, past medical history, past surgical history, allergies, medications, social and family history as documented unless otherwise noted below. Documentation of the HPI, Physical Exam and Medical Decision Making performed by medical students or scribes is based on my personal performance of the HPI, PE and MDM. I have personally evaluated this patient and have completed at least one if not all key elements of the E/M (history, physical exam, and MDM). Additional findings are as noted.      Tod Dawkins DPM on 1/1/2020 at 6:83 AM  Board Certified, American Board of Podiatric Surgery  Fellow, [employer] and [employer] [facility]

## 2019-12-31 NOTE — FLOWSHEET NOTE
Assessment:  Patient seems to be coping. Patient has alexandra that gets him through his difficulties but it has been hard having to lay in bed for the past two years due to the infection in his feet. 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 Had to quit due to his father being ill and then got ill himself and could never finish. Patient seems to be having a difficult time with his condition because he cannot get up and do things. States he likes playing video games.  prayed with patient. Patient and family were thankful for the visit. Intervention:   provided space for patient to express feelings, thoughts, and concerns. Determined that patient maintains alexandra. Taisha by playing video games. Prayed with patient and family. Outcome:  Patient and family were receptive to spiritual care and trying to cope and remain hopeful. 12/30/19 2122   Encounter Summary   Services provided to: Patient and family together   Referral/Consult From: [facility]   [facility] of [facility]   ([phone])   Complexity of Encounter Moderate   Length of Encounter 30 minutes   Spiritual Assessment Completed Yes   Routine   Type Initial   Assessment Calm; Approachable;Coping   Intervention Active listening;Explored feelings, thoughts, concerns;Explored coping resources; Discussed illness/injury and it's impact; Discussed belief system/[employer] practices/alexandra;Prayer   Outcome Expressed gratitude;Engaged in conversation;Expressed feelings/needs/concerns     Electronically signed by Davida Francois on 12/30/2019 at 9:27 PM

## 2019-12-31 NOTE — PLAN OF CARE
[id number] by Yeimy Cannon RN  Outcome: Ongoing  Goal: Control of chronic pain  Description  Control of chronic pain  12/31/2019 0750 by Yeimy Cannon RN  Outcome: Ongoing  12/31/2019 0746 by Yeimy Cannon RN  Outcome: Ongoing     Problem: Skin Integrity - Impaired:  Goal: Will show no infection signs and symptoms  Description  Will show no infection signs and symptoms  12/31/2019 0750 by Yeimy Cannon RN  Outcome: Ongoing  12/31/2019 0746 by Yeimy Cannon RN  Outcome: Ongoing  Goal: Absence of new skin breakdown  Description  Absence of new skin breakdown  12/31/2019 0750 by Yeimy Cannon RN  Outcome: Ongoing  12/31/2019 0746 by Yeimy Cannon RN  Outcome: Ongoing     Problem: Confusion - Acute:  Goal: Mental status will be restored to baseline  Description  Mental status will be restored to baseline  12/31/2019 0750 by Yeimy Cannon RN  Outcome: Ongoing  12/31/2019 0746 by Yeimy Cannon RN  Outcome: Ongoing  Goal: Absence of continued neurological deterioration signs and symptoms  Description  Absence of continued neurological deterioration signs and symptoms  12/31/2019 0750 by Yeimy Cannon RN  Outcome: Ongoing  12/31/2019 0746 by Yeimy Cannon RN  Outcome: Ongoing     Problem: Discharge Planning:  Goal: Ability to perform activities of daily living will improve  Description  Ability to perform activities of daily living will improve  12/31/2019 0750 by Yeimy Cannon RN  Outcome: Ongoing  12/31/2019 0746 by Yeimy Cannon RN  Outcome: Ongoing  Goal: Participates in care planning  Description  Participates in care planning  12/31/2019 0750 by Yeimy Cannon RN  Outcome: Ongoing  12/31/2019 0746 by Yeimy Cannon RN  Outcome: Ongoing  Goal: Discharged to appropriate level of care  Description  Discharged to appropriate level of care  12/31/2019 0750 by Yeimy Cannon RN  Outcome: Ongoing  12/31/2019 0746 by Yeimy Cannon RN  Outcome: Ongoing     Problem: Injury - Risk of, Physical Injury:  Goal: Absence of physical injury  Description  Absence of physical injury  12/31/2019 0750 by Everlene Cranker, RN  Outcome: Ongoing  12/31/2019 0746 by Everlene Cranker, RN  Outcome: Ongoing  Goal: Will remain free from falls  Description  Will remain free from falls  12/31/2019 0750 by Everlene Cranker, RN  Outcome: Ongoing  12/31/2019 0746 by Everlene Cranker, RN  Outcome: Ongoing     Problem: Mood - Altered:  Goal: Mood stable  Description  Mood stable  12/31/2019 0750 by Everlene Cranker, RN  Outcome: Ongoing  12/31/2019 0746 by Everlene Cranker, RN  Outcome: Ongoing  Goal: Absence of abusive behavior  Description  Absence of abusive behavior  12/31/2019 0750 by Everlene Cranker, RN  Outcome: Ongoing  12/31/2019 0746 by Everlene Cranker, RN  Outcome: Ongoing  Goal: Verbalizations of feeling emotionally comfortable while being cared for will increase  Description  Verbalizations of feeling emotionally comfortable while being cared for will increase  12/31/2019 0750 by Everlene Cranker, RN  Outcome: Ongoing  12/31/2019 0746 by Everlene Cranker, RN  Outcome: Ongoing     Problem: Psychomotor Activity - Altered:  Goal: Absence of psychomotor disturbance signs and symptoms  Description  Absence of psychomotor disturbance signs and symptoms  12/31/2019 0750 by Everlene Cranker, RN  Outcome: Ongoing  12/31/2019 0746 by Everlene Cranker, RN  Outcome: Ongoing     Problem: Sensory Perception - Impaired:  Goal: Demonstrations of improved sensory functioning will increase  Description  Demonstrations of improved sensory functioning will increase  12/31/2019 0750 by Everlene Cranker, RN  Outcome: Ongoing  12/31/2019 0746 by Everlene Cranker, RN  Outcome: Ongoing  Goal: Decrease in sensory misperception frequency  Description  Decrease in sensory misperception frequency  12/31/2019 0750 by Everlene Cranker, RN  Outcome: Ongoing  12/31/2019 0746 by Everlene Cranker, RN  Outcome: Ongoing  Goal: Able to refrain from responding to false sensory perceptions  Description  Able to refrain from responding to false sensory perceptions  12/31/2019 0750 by Shiraz Pop RN  Outcome: Ongoing  12/31/2019 0746 by Shiraz Pop RN  Outcome: Ongoing  Goal: Demonstrates accurate environmental perceptions  Description  Demonstrates accurate environmental perceptions  12/31/2019 0750 by Shiraz Pop RN  Outcome: Ongoing  12/31/2019 0746 by Shiraz Pop RN  Outcome: Ongoing  Goal: Able to distinguish between reality-based and nonreality-based thinking  Description  Able to distinguish between reality-based and nonreality-based thinking  12/31/2019 0750 by Shiraz Pop RN  Outcome: Ongoing  12/31/2019 0746 by Shiraz Pop RN  Outcome: Ongoing  Goal: Able to interrupt nonreality-based thinking  Description  Able to interrupt nonreality-based thinking  12/31/2019 0750 by Shiraz Pop RN  Outcome: Ongoing  12/31/2019 0746 by Shiraz Pop RN  Outcome: Ongoing     Problem: Sleep Pattern Disturbance:  Goal: Appears well-rested  Description  Appears well-rested  12/31/2019 0750 by Shiraz Pop RN  Outcome: Ongoing  12/31/2019 0746 by Shiraz Pop RN  Outcome: Ongoing     Problem: Airway Clearance - Ineffective:  Goal: Ability to maintain a clear airway will improve  Description  Ability to maintain a clear airway will improve  12/31/2019 0750 by Shiraz Pop RN  Outcome: Ongoing  12/31/2019 0746 by Shiraz Pop RN  Outcome: Ongoing     Problem: Anxiety/Stress:  Goal: Level of anxiety will decrease  Description  Level of anxiety will decrease  12/31/2019 0750 by Shiraz Pop RN  Outcome: Ongoing  12/31/2019 0746 by Shiraz Pop RN  Outcome: Ongoing     Problem: Aspiration:  Goal: Absence of aspiration  Description  Absence of aspiration  12/31/2019 0750 by Shiraz Pop RN  Outcome: Ongoing  12/31/2019 0746 by Shiraz Pop RN  Outcome: Ongoing     Problem:  Bowel Function - Altered:  Goal: Bowel

## 2019-12-31 NOTE — PROGRESS NOTES
Result 304 223   MPV NOT REPORTED 9.6 9.7      BMP:   Recent Labs     12/29/19  2330 12/30/19 0415  12/30/19  2032 12/31/19  0553 12/31/19  0756   * 135  --   --  142  --    K 4.3 5.4*   < > 4.4 5.3 4.2   CL 98 100  --   --  104  --    CO2 25 24  --   --  20  --    BUN 46* 41*  --   --  20  --    CREATININE 1.73* 1.62*  --   --  0.97  --    GLUCOSE 162* 129*  --   --  97  --    CALCIUM 8.0* 8.6  --   --  8.2*  --     < > = values in this interval not displayed.       Phosphorus:     Recent Labs     12/29/19 0443 12/30/19 0415 12/31/19  0553   PHOS 4.2 3.1 3.3     Magnesium:    Recent Labs     12/29/19 0443 12/30/19 0415 12/31/19  0553   MG 1.8 1.5* 1.6     ALEXIS:      Lab Results   Component Value Date    ALEXIS POSITIVE 11/04/2019     SPEP:  Lab Results   Component Value Date    PROT 8.2 12/28/2019    ALBCAL 3.6 11/04/2019    ALBPCT 58 11/04/2019    LABALPH 0.2 11/04/2019    LABALPH 0.6 11/04/2019    A1PCT 3 11/04/2019    A2PCT 10 11/04/2019    LABBETA 0.7 11/04/2019    BETAPCT 11 11/04/2019    GAMGLOB 1.1 11/04/2019    GGPCT 17 11/04/2019    PATH NOT REPORTED 11/04/2019     C3:     Lab Results   Component Value Date    C3 101 11/04/2019     C4:     Lab Results   Component Value Date    C4 20 11/04/2019     Anti-GBM:     Lab Results   Component Value Date    GBMABIGG 42 11/04/2019     Hep BsAg:         Lab Results   Component Value Date    HEPBSAG NONREACTIVE 11/03/2019     Hep C AB:          Lab Results   Component Value Date    HEPCAB NONREACTIVE 11/03/2019       Urinalysis/Chemistries:      Lab Results   Component Value Date    NITRU NEGATIVE 12/28/2019    COLORU DARK YELLOW 12/28/2019    PHUR 5.0 12/28/2019    WBCUA 2 TO 5 12/28/2019    RBCUA 0 TO 2 12/28/2019    MUCUS NOT REPORTED 12/28/2019    TRICHOMONAS NOT REPORTED 12/28/2019    YEAST NOT REPORTED 12/28/2019    BACTERIA NOT REPORTED 12/28/2019    SPECGRAV 1.020 12/28/2019    LEUKOCYTESUR NEGATIVE 12/28/2019    UROBILINOGEN Normal 12/28/2019 BILIRUBINUR  12/28/2019     Presumptive positive. Unable to confirm due to unavailability of reagent. GLUCOSEU TRACE 12/28/2019    KETUA NEGATIVE 12/28/2019    AMORPHOUS 1+ 12/28/2019     Urine Sodium:     Lab Results   Component Value Date    DAYAN 35 11/03/2019     Urine Potassium:    Lab Results   Component Value Date    KUR 52.1 11/03/2019     Urine Creatinine:     Lab Results   Component Value Date    LABCREA 135.8 11/05/2019     Radiology:     Reviewed. Assessment:     1. Acute Kidney Injury: Pre-renal azotemia secondary to volume depletion from diarrhea and poor oral intake, in addition to Obstructive Uropathy and also likely some underlying ischemic ATN due to hypotension requiring pressor support. Urine output excellent since montemayor placement. Serum creatinine improving. 2. Hyperkalemia secondary to CLAUDIA  3. Hypotension improved  4. Metabolic Encephalopathy secondary to sepsis, CLAUDIA, and narcotic use  5. Septic Shock secondary to possible osteomyelitis  6. Diabetes  7. Hx of HTN  8. Hx of chronic pain and narcotic use  9. Metabolic Acidosis  10. NSVT: No strips available  11. Urinary retention  12. ALEXIS positive with SSA positive. 13.  Bacteremia with gram-positive rods 12/28/2019. Plan:   1. Decrease IV fluids to 50 cc an hour normal saline and can DC as oral intake improves further. 2.  Continue to monitor strict I's and O's and renal function. 3.  Okay to [address] catheter. 4.  Recommend to follow-up with rheumatology as outpatient due to SSA positive, primary to follow-up. 5.  Since renal function is stable and urine output good, nephrology will sign off. Please call with any other questions. 6.  Patient states he will follow-up with Dr. Adriana Cadena as outpatient. 7.  BMP in 1 week post discharge and fax results to Dr. Adriana Cadena office. 8.  Follow up with Dr. Adriana Cadena in 3-4 weeks post d/c. Patient's nurse was updated.     Nutrition   Please ensure that patient is on a renal diet/TF. Avoid nephrotoxic drugs/contrast exposure. We will continue to follow along with you. Neil MANNING [facility], MD  [facility]     This note is created with the assistance of a speech-recognition program. While intending to generate a document that actually reflects the content of the visit, no guarantees can be provided that every mistake has been identified and corrected by editing.

## 2020-01-01 PROBLEM — R41.82 ALTERED MENTAL STATUS: Status: RESOLVED | Noted: 2019-12-29 | Resolved: 2020-01-01

## 2020-01-01 PROBLEM — B99.9 ENCEPHALOPATHY DUE TO INFECTION: Status: RESOLVED | Noted: 2019-12-28 | Resolved: 2020-01-01

## 2020-01-01 PROBLEM — G93.49 ENCEPHALOPATHY DUE TO INFECTION: Status: RESOLVED | Noted: 2019-12-28 | Resolved: 2020-01-01

## 2020-01-01 PROBLEM — N19 UREMIA: Status: RESOLVED | Noted: 2019-12-29 | Resolved: 2020-01-01

## 2020-01-01 LAB
ABSOLUTE EOS #: 0.25 K/UL (ref 0–0.44)
ABSOLUTE IMMATURE GRANULOCYTE: <0.03 K/UL (ref 0–0.3)
ABSOLUTE LYMPH #: 1 K/UL (ref 1.1–3.7)
ABSOLUTE MONO #: 0.55 K/UL (ref 0.1–1.2)
BASOPHILS # BLD: 1 % (ref 0–2)
BASOPHILS ABSOLUTE: 0.03 K/UL (ref 0–0.2)
C-REACTIVE PROTEIN: 31.9 MG/L (ref 0–5)
CALCIUM IONIZED: 1.24 MMOL/L (ref 1.13–1.33)
DIFFERENTIAL TYPE: ABNORMAL
EOSINOPHILS RELATIVE PERCENT: 5 % (ref 1–4)
GLUCOSE BLD-MCNC: 115 MG/DL (ref 75–110)
GLUCOSE BLD-MCNC: 88 MG/DL (ref 75–110)
GLUCOSE BLD-MCNC: 92 MG/DL (ref 75–110)
HCT VFR BLD CALC: 29.1 % (ref 40.7–50.3)
HEMOGLOBIN: 8.5 G/DL (ref 13–17)
IMMATURE GRANULOCYTES: 0 %
LYMPHOCYTES # BLD: 18 % (ref 24–43)
MAGNESIUM: 1.5 MG/DL (ref 1.6–2.6)
MCH RBC QN AUTO: 23.5 PG (ref 25.2–33.5)
MCHC RBC AUTO-ENTMCNC: 29.2 G/DL (ref 28.4–34.8)
MCV RBC AUTO: 80.6 FL (ref 82.6–102.9)
MONOCYTES # BLD: 10 % (ref 3–12)
NRBC AUTOMATED: 0 PER 100 WBC
PDW BLD-RTO: 18.2 % (ref 11.8–14.4)
PHOSPHORUS: 3.7 MG/DL (ref 2.5–4.5)
PLATELET # BLD: 260 K/UL (ref 138–453)
PLATELET ESTIMATE: ABNORMAL
PMV BLD AUTO: 9.2 FL (ref 8.1–13.5)
POTASSIUM SERPL-SCNC: 3.9 MMOL/L (ref 3.7–5.3)
POTASSIUM SERPL-SCNC: 4.2 MMOL/L (ref 3.7–5.3)
RBC # BLD: 3.61 M/UL (ref 4.21–5.77)
RBC # BLD: ABNORMAL 10*6/UL
SEG NEUTROPHILS: 66 % (ref 36–65)
SEGMENTED NEUTROPHILS ABSOLUTE COUNT: 3.65 K/UL (ref 1.5–8.1)
WBC # BLD: 5.5 K/UL (ref 3.5–11.3)
WBC # BLD: ABNORMAL 10*3/UL

## 2020-01-01 PROCEDURE — 2580000003 HC RX 258: Performed by: INTERNAL MEDICINE

## 2020-01-01 PROCEDURE — 6360000002 HC RX W HCPCS: Performed by: EMERGENCY MEDICINE

## 2020-01-01 PROCEDURE — 2580000003 HC RX 258: Performed by: STUDENT IN AN ORGANIZED HEALTH CARE EDUCATION/TRAINING PROGRAM

## 2020-01-01 PROCEDURE — 6370000000 HC RX 637 (ALT 250 FOR IP): Performed by: EMERGENCY MEDICINE

## 2020-01-01 PROCEDURE — 6370000000 HC RX 637 (ALT 250 FOR IP): Performed by: STUDENT IN AN ORGANIZED HEALTH CARE EDUCATION/TRAINING PROGRAM

## 2020-01-01 PROCEDURE — 85025 COMPLETE CBC W/AUTO DIFF WBC: CPT

## 2020-01-01 PROCEDURE — 83735 ASSAY OF MAGNESIUM: CPT

## 2020-01-01 PROCEDURE — 6360000002 HC RX W HCPCS: Performed by: INTERNAL MEDICINE

## 2020-01-01 PROCEDURE — 86140 C-REACTIVE PROTEIN: CPT

## 2020-01-01 PROCEDURE — 36415 COLL VENOUS BLD VENIPUNCTURE: CPT

## 2020-01-01 PROCEDURE — 99233 SBSQ HOSP IP/OBS HIGH 50: CPT | Performed by: PODIATRIST

## 2020-01-01 PROCEDURE — 6360000002 HC RX W HCPCS: Performed by: STUDENT IN AN ORGANIZED HEALTH CARE EDUCATION/TRAINING PROGRAM

## 2020-01-01 PROCEDURE — 82947 ASSAY GLUCOSE BLOOD QUANT: CPT

## 2020-01-01 PROCEDURE — 1200000000 HC SEMI PRIVATE

## 2020-01-01 PROCEDURE — 84132 ASSAY OF SERUM POTASSIUM: CPT

## 2020-01-01 PROCEDURE — 99233 SBSQ HOSP IP/OBS HIGH 50: CPT | Performed by: INTERNAL MEDICINE

## 2020-01-01 PROCEDURE — 99223 1ST HOSP IP/OBS HIGH 75: CPT | Performed by: INTERNAL MEDICINE

## 2020-01-01 PROCEDURE — 82330 ASSAY OF CALCIUM: CPT

## 2020-01-01 PROCEDURE — 84100 ASSAY OF PHOSPHORUS: CPT

## 2020-01-01 RX ORDER — MAGNESIUM SULFATE 1 G/100ML
1 INJECTION INTRAVENOUS
Status: COMPLETED | OUTPATIENT
Start: 2020-01-01 | End: 2020-01-01

## 2020-01-01 RX ORDER — SENNA PLUS 8.6 MG/1
2 TABLET ORAL 2 TIMES DAILY
Status: DISCONTINUED | OUTPATIENT
Start: 2020-01-01 | End: 2020-01-06 | Stop reason: HOSPADM

## 2020-01-01 RX ADMIN — MAGNESIUM SULFATE HEPTAHYDRATE 1 G: 1 INJECTION, SOLUTION INTRAVENOUS at 09:32

## 2020-01-01 RX ADMIN — OXYCODONE HYDROCHLORIDE 10 MG: 5 TABLET ORAL at 04:37

## 2020-01-01 RX ADMIN — OXYCODONE HYDROCHLORIDE 10 MG: 5 TABLET ORAL at 17:53

## 2020-01-01 RX ADMIN — OXYCODONE HYDROCHLORIDE 10 MG: 5 TABLET ORAL at 12:42

## 2020-01-01 RX ADMIN — BUPROPION HYDROCHLORIDE 300 MG: 300 TABLET, FILM COATED, EXTENDED RELEASE ORAL at 08:42

## 2020-01-01 RX ADMIN — HEPARIN SODIUM 5000 UNITS: 5000 INJECTION INTRAVENOUS; SUBCUTANEOUS at 06:50

## 2020-01-01 RX ADMIN — FENTANYL CITRATE 50 MCG: 50 INJECTION, SOLUTION INTRAMUSCULAR; INTRAVENOUS at 02:27

## 2020-01-01 RX ADMIN — FENTANYL CITRATE 50 MCG: 50 INJECTION, SOLUTION INTRAMUSCULAR; INTRAVENOUS at 20:59

## 2020-01-01 RX ADMIN — SODIUM CHLORIDE: 9 INJECTION, SOLUTION INTRAVENOUS at 06:22

## 2020-01-01 RX ADMIN — Medication 1 CAPSULE: at 16:24

## 2020-01-01 RX ADMIN — CEFTAROLINE FOSAMIL 600 MG: 600 POWDER, FOR SOLUTION INTRAVENOUS at 16:24

## 2020-01-01 RX ADMIN — Medication 10 ML: at 08:46

## 2020-01-01 RX ADMIN — Medication 1 CAPSULE: at 08:42

## 2020-01-01 RX ADMIN — LEVOTHYROXINE SODIUM 50 MCG: 50 TABLET ORAL at 08:42

## 2020-01-01 RX ADMIN — FENTANYL CITRATE 50 MCG: 50 INJECTION, SOLUTION INTRAMUSCULAR; INTRAVENOUS at 07:00

## 2020-01-01 RX ADMIN — HEPARIN SODIUM 5000 UNITS: 5000 INJECTION INTRAVENOUS; SUBCUTANEOUS at 14:46

## 2020-01-01 RX ADMIN — MAGNESIUM GLUCONATE 500 MG ORAL TABLET 400 MG: 500 TABLET ORAL at 08:42

## 2020-01-01 RX ADMIN — MAGNESIUM GLUCONATE 500 MG ORAL TABLET 400 MG: 500 TABLET ORAL at 21:18

## 2020-01-01 RX ADMIN — MAGNESIUM HYDROXIDE 30 ML: 400 SUSPENSION ORAL at 22:08

## 2020-01-01 RX ADMIN — TIZANIDINE 4 MG: 4 TABLET ORAL at 08:42

## 2020-01-01 RX ADMIN — FENTANYL CITRATE 50 MCG: 50 INJECTION, SOLUTION INTRAMUSCULAR; INTRAVENOUS at 11:18

## 2020-01-01 RX ADMIN — MAGNESIUM SULFATE HEPTAHYDRATE 1 G: 1 INJECTION, SOLUTION INTRAVENOUS at 08:15

## 2020-01-01 RX ADMIN — Medication 10 ML: at 21:03

## 2020-01-01 RX ADMIN — HEPARIN SODIUM 5000 UNITS: 5000 INJECTION INTRAVENOUS; SUBCUTANEOUS at 21:18

## 2020-01-01 RX ADMIN — PAROXETINE HYDROCHLORIDE HEMIHYDRATE 20 MG: 20 TABLET, FILM COATED ORAL at 08:42

## 2020-01-01 RX ADMIN — PANTOPRAZOLE SODIUM 40 MG: 40 TABLET, DELAYED RELEASE ORAL at 08:42

## 2020-01-01 RX ADMIN — TAMSULOSIN HYDROCHLORIDE 0.4 MG: 0.4 CAPSULE ORAL at 08:42

## 2020-01-01 RX ADMIN — CEFTAROLINE FOSAMIL 600 MG: 600 POWDER, FOR SOLUTION INTRAVENOUS at 06:00

## 2020-01-01 RX ADMIN — FENTANYL CITRATE 50 MCG: 50 INJECTION, SOLUTION INTRAMUSCULAR; INTRAVENOUS at 15:36

## 2020-01-01 RX ADMIN — OXYCODONE HYDROCHLORIDE 10 MG: 5 TABLET ORAL at 08:46

## 2020-01-01 ASSESSMENT — PAIN DESCRIPTION - PAIN TYPE: TYPE: CHRONIC PAIN

## 2020-01-01 ASSESSMENT — PAIN SCALES - GENERAL
PAINLEVEL_OUTOF10: 9
PAINLEVEL_OUTOF10: 7
PAINLEVEL_OUTOF10: 8
PAINLEVEL_OUTOF10: 9
PAINLEVEL_OUTOF10: 8

## 2020-01-01 ASSESSMENT — PAIN DESCRIPTION - LOCATION: LOCATION: FOOT

## 2020-01-01 ASSESSMENT — PAIN DESCRIPTION - ORIENTATION: ORIENTATION: LEFT

## 2020-01-01 NOTE — FLOWSHEET NOTE
Reported called to Car 2 nurse and all questions answered. Pt was transported to 2017 without any issues. Car 2 ILIR Cadena was at bedside to receive pt.

## 2020-01-01 NOTE — PROGRESS NOTES
Critical care team - Resident sign-out to medicine service        Date and time: 1/1/2020 2:56 PM  Patient's name:  Nazia Quarles  Medical Record Number: [id number]  Patient's account/billing number: [de-identified]  Patient's YOB: 1965  Age: 53 y.o. Date of Admission: 12/28/2019 12:43 PM  Length of stay during current admission: 4     Primary Care Physician: Mona Wright MD     Code Status: Full Code     Mode of physician to physician communication:         [x]? Via telephone                              []? In person      Date and time of sign-out: 1/1/2020 2:56 PM     Accepting  Medicine resident:      Accepting Medicine team: INTERMED     Accepting team's attending: Dr. Jacinto Angelo     Patient's current ICU Bed: 103     Patient's assigned bed on floor: 2017        [x]? Med-Surg Monitored       []? Step-down       []? Psychiatry ICU       []? Psych floor     Reason for ICU admission:      Acute metabolic encephalopathy, CLAUDIA     ICU course summary:   50-year-old male with a past medical history of type 2 diabetes, diabetic foot who presented with altered mental status and was found to have acute metabolic encephalopathy secondary to acute renal failure and sepsis. Patient was hypotensive and altered at presentation.     Initial labs showed elevated creatinine, hyperkalemia, metabolic acidosis, leukocytosis and elevated troponin. Nephrology consulted and patient started on bicarb drip and half-normal saline, as patient was not deemed emergent for hemodialysis. He also had a Barrera placed. Potassium was corrected. He received IV fluid and started on Levophed for hypotension. He was eventually weaned off pressors. His CLAUDIA resolved and patient did not receive dialysis. He was started on IV meropenem and vancomycin. ID consulted as well as podiatry. Antibiotics later changed to ceftaroline.   Noncontrast MRI of the left foot ordered by podiatry showed osteomyelitis of 2nd-3th metatarsal

## 2020-01-01 NOTE — PLAN OF CARE
RN  Outcome: Ongoing  Goal: Absence of new skin breakdown  Description  Absence of new skin breakdown  Outcome: Ongoing     Problem: Confusion - Acute:  Goal: Mental status will be restored to baseline  Description  Mental status will be restored to baseline  1/1/2020 0323 by Daniele Hermosillo RN  Outcome: Ongoing  12/31/2019 1537 by Angelita Subramanian RN  Outcome: Ongoing  Goal: Absence of continued neurological deterioration signs and symptoms  Description  Absence of continued neurological deterioration signs and symptoms  Outcome: Ongoing     Problem: Discharge Planning:  Goal: Ability to perform activities of daily living will improve  Description  Ability to perform activities of daily living will improve  Outcome: Ongoing  Goal: Participates in care planning  Description  Participates in care planning  Outcome: Ongoing  Goal: Discharged to appropriate level of care  Description  Discharged to appropriate level of care  Outcome: Ongoing     Problem: Injury - Risk of, Physical Injury:  Goal: Absence of physical injury  Description  Absence of physical injury  1/1/2020 0323 by Daniele Hermosillo RN  Outcome: Ongoing  12/31/2019 1537 by Angelita Subramanian RN  Outcome: Ongoing  Goal: Will remain free from falls  Description  Will remain free from falls  Outcome: Ongoing     Problem: Mood - Altered:  Goal: Mood stable  Description  Mood stable  Outcome: Ongoing  Goal: Absence of abusive behavior  Description  Absence of abusive behavior  Outcome: Ongoing  Goal: Verbalizations of feeling emotionally comfortable while being cared for will increase  Description  Verbalizations of feeling emotionally comfortable while being cared for will increase  Outcome: Ongoing     Problem: Psychomotor Activity - Altered:  Goal: Absence of psychomotor disturbance signs and symptoms  Description  Absence of psychomotor disturbance signs and symptoms  Outcome: Ongoing     Problem: Sensory Perception - Impaired:  Goal: Demonstrations

## 2020-01-01 NOTE — PROGRESS NOTES
Progress Note  Podiatric Medicine and Surgery     Subjective     CC: left foot wound    Patient seen and examined at bedside. Vital signs stable, no leukocytosis  Patient alert and oriented today  Patient denies nausea, vomiting, fever, chills, chest pain, shortness of breath  Pain controlled    HPI :  Sd Winslow is a 53 y.o. male with a past medical history of DM2 was seen at [address] [place] [address] for left foot wound. Patient is well known to Dr. Brittani Santos and follow up with him in wound care. Patient is admitted to the ICU for sputum production, altered mental status, CLAUDIA, and abnormal labs. History was obtained from his wife. Patients wife states that the wound has not been improving much over the past few weeks. Has been receiving care at wound care where the area was told to be offloaded. Patient is confused with altered mental status.      PCP is Indira Cooper MD    ROS: Denies N/V/F/C/SOB/CP. Otherwise negative except at stated in the HPI.      Medications:  Scheduled Meds:   ceftaroline fosamil (TEFLARO) IVPB  600 mg Intravenous Q12H    heparin (porcine)  5,000 Units Subcutaneous 3 times per day    pantoprazole  40 mg Oral QAM AC    sodium chloride flush  10 mL Intravenous 2 times per day    buPROPion  300 mg Oral Daily    lactobacillus  1 capsule Oral BID WC    levothyroxine  50 mcg Oral Daily    magnesium oxide  400 mg Oral BID    PARoxetine  20 mg Oral Daily    tamsulosin  0.4 mg Oral Daily    insulin lispro  0-6 Units Subcutaneous TID WC    insulin lispro  0-3 Units Subcutaneous Nightly       Continuous Infusions:   sodium chloride 50 mL/hr at 01/01/20 0622    dextrose      dextrose         PRN Meds:tiZANidine, oxyCODONE **OR** oxyCODONE, acetaminophen, glucose, dextrose, glucagon (rDNA), dextrose, sodium chloride flush, magnesium hydroxide, ondansetron, heparin (porcine), heparin (porcine), glucose, dextrose, glucagon (rDNA), dextrose, fentanNYL **OR** fentanNYL    Objective Vitals:  Patient Vitals for the past 8 hrs:   Pulse Resp SpO2   20 0300 66 13 --   20 0200 77 18 --   20 0100 72 13 --   20 0000 83 19 --   19 2300 87 16 100 %     Average, Min, and Max for last 24 hours Vitals:  TEMPERATURE:  Temp  Av.9 °F (37.2 °C)  Min: 98.4 °F (36.9 °C)  Max: 99.2 °F (37.3 °C)    RESPIRATIONS RANGE: Resp  Avg: 15.7  Min: 12  Max: 25    PULSE RANGE: Pulse  Av.3  Min: 64  Max: 87    BLOOD PRESSURE RANGE:  Systolic (88BEW), VWS:830 , Min:121 , URU:438   ; Diastolic (73KTN), EMC:46, Min:56, Max:70      PULSE OXIMETRY RANGE: SpO2  Av.4 %  Min: 96 %  Max: 100 %    I/O last 3 completed shifts: In: 6414 [I.V.:1035]  Out: 1295 [Urine:1295]    CBC:  Recent Labs     19  0415 19  0553 20  0514   WBC 8.5 5.5 5.5   HGB 8.5* 8.1* 8.5*   HCT 28.3* 26.8* 29.1*    223 260   .3* 55.1* 31.9*        BMP:  Recent Labs     19  2330 19  0415  19  0553 19  0756 19  2148   * 135  --  142  --   --    K 4.3 5.4*   < > 5.3 4.2 3.9   CL 98 100  --  104  --   --    CO2 25 24  --  20  --   --    BUN 46* 41*  --  20  --   --    CREATININE 1.73* 1.62*  --  0.97  --   --    GLUCOSE 162* 129*  --  97  --   --    CALCIUM 8.0* 8.6  --  8.2*  --   --     < > = values in this interval not displayed. Coags:  No results for input(s): APTT, PROT, INR in the last 72 hours. Lab Results   Component Value Date    SEDRATE 47 (H) 2019     Recent Labs     19  0415 19  0553 20  0514   .3* 55.1* 31.9*       Lower Extremity Physical Exam:  Vascular: DP and PT pulses are non palpable. CFT >3 seconds to all digits. Hair growth is absent to the level of the digits. Nonpitting edema to the left LE     Neuro: Saph/sural/SP/DP/plantar sensation diminished to light touch.     Musculoskeletal: Muscle strength deferred to all lower extremity muscle groups.  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 I have reviewed the Podiatry Resident progress note. I agree with the chief complaint, past medical history, past surgical history, allergies, medications, social and family history as documented unless otherwise noted below. Documentation of the HPI, Physical Exam and Medical Decision Making performed by medical students or scribes is based on my personal performance of the HPI, PE and MDM. I have personally evaluated this patient and have completed at least one if not all key elements of the E/M (history, physical exam, and MDM). Additional findings are as noted.      Ned Tobar DPM on 1/2/2020 at 5:89 AM  Board Certified, American Board of Podiatric Surgery  Fellow, [employer] [facility] and [facility]

## 2020-01-01 NOTE — DISCHARGE INSTR - COC
Continuity of Care Form    Patient Name: Georgianne Goodpasture   :  1965  MRN:  [id number]    Admit date:  2019  Discharge date:  ***    Code Status Order: Full Code   Advance Directives:     Admitting Physician:  Karmen Skinner MD  PCP: Marylou Avila MD    Discharging Nurse: [facility] Unit/Room#: 0103/0103-01  Discharging Unit Phone Number: ***    Emergency Contact:   Extended Emergency Contact Information  Primary Emergency Contact: [facility] *hipaa,Gris  Address: [address] [place] [address] Phone: [phone]  Relation: Spouse  Hearing or visual needs: None  Other needs: None  Preferred language: [place]   needed? No  Secondary Emergency Contact: Emeka Cameron  [place] Phone: [phone]  Mobile Phone: [phone]  Relation: Parent    Past Surgical History:  Past Surgical History:   Procedure Laterality Date    APPENDECTOMY      BACK SURGERY      Lower x 4. Had abscess after appendectomy.      KNEE SURGERY Right     scope    TOE AMPUTATION Bilateral 3/6/2017    TOE AMPUTATION LEFT HALLUX AND 2ND DIGIT AND RIGHT 2ND DIGIT performed by Julien Brody DPM at [place] [address] ENDOSCOPY N/A 2019    EGD WITH BIOPSY performed by Violetta Pereira MD at [address] [place] [address]       Immunization History:   Immunization History   Administered Date(s) Administered    Influenza, Quadv, IM, PF (6 mo and older Fluzone, Flulaval, Fluarix, and 3 yrs and older Afluria) 10/04/2016, 2017, 2018, 2019    Pneumococcal Conjugate 13-valent ([id number]) 10/04/2016    Pneumococcal Polysaccharide ([id number]) 2017    Tdap (Boostrix, Adacel) 2018       Active Problems:  Patient Active Problem List   Diagnosis Code    Anxiety disorder F41.9    Benign prostatic hyperplasia N40.0    Depression F32.9    Edema R60.9    Esophageal reflux K21.9    Essential hypertension I10    Hypothyroidism E03.9    Right cervical radiculopathy M54.12    Osteomyelitis ([facility]) M86.9    Osteomyelitis of foot, left, acute ([facility]) M86.172    Non compliance with medical treatment Z91.19    Acute renal failure (ARF) ([facility]) N17.9    Ulcerated, foot, left, with necrosis of bone ([address] L97.524    Type 2 diabetes mellitus with foot ulcer, with long-term current use of insulin ([facility]) E11.621, L97.509, Z79.4    Anemia D64.9    Hematemesis with nausea K92.0    Erosive gastritis K29.60    Ulcerated, foot, left, with fat layer exposed ([address] L97.522    Renal failure N19    Encephalopathy due to infection G93.49, B99.9    Septic shock ([facility]) A41.9, R65.21    Uremia N19    Hyperkalemia E87.5    Hypocalcemia N55.46    Metabolic acidosis C31.1    Altered mental status R41.82    Diabetic ulcer of toe of left foot associated with type 2 diabetes mellitus ([facility]) E11.621, L97.529    Hyperphosphatemia E83.39    Chronic, continuous use of opioids F11.90    Acute metabolic encephalopathy D34.62    CLAUDIA (acute kidney injury) ([address] N17.9       Isolation/Infection:   Isolation          Contact        Patient Infection Status     Infection Onset Added Last Indicated Last Indicated By Review Planned Expiration Resolved Resolved By    MRSA  03/10/17 12/29/19 ANAEROBIC AND AEROBIC CULTURE        2/2018 foot    Resolved    C-diff Rule Out  12/28/19 12/28/19 C DIFF TOXIN/ANTIGEN (Ordered)   12/30/19 Cory Gowers, RN          Nurse Assessment:  Last Vital Signs: BP (!) 121/59   Pulse 66   Temp 98.4 °F (36.9 °C) (Oral)   Resp 13   Ht 6' 2\" (1.88 m)   Wt 213 lb 10 oz (96.9 kg)   SpO2 100%   BMI 27.43 kg/m²     Last documented pain score (0-10 scale): Pain Level: 9  Last Weight:   Wt Readings from Last 1 Encounters:   12/29/19 213 lb 10 oz (96.9 kg)     Mental Status:  oriented and alert    IV Access:  - None    Nursing Mobility/ADLs:  Walking   Assisted  Transfer  Assisted  Bathing  Assisted  Dressing Assisted  Toileting  Assisted  Feeding  Independent  Med Admin  Independent  Med Delivery   whole    Wound Care Documentation and Therapy:  Wound 07/20/17 wound # 7  left foot amp site 7 months (Active)   Number of days: 676       Wound 11/05/19 Anterior; Left see images podiatry noted (Active)   Wound Non-Healing Surgical 12/31/2019  8:00 PM   Dressing Status Clean;Dry; Intact; New drainage 12/31/2019  8:00 PM   Dressing Changed Changed/New 12/31/2019  4:00 PM   Dressing/Treatment Dry dressing;Non adherent; Ace wrap 12/31/2019  8:00 PM   Wound Cleansed Rinsed/Irrigated with saline 12/31/2019  4:00 PM   Dressing Change Due 01/01/20 12/31/2019  4:00 PM   Wound Assessment Other (Comment) 12/31/2019  8:00 PM   Drainage Amount Small 12/31/2019  8:00 PM   Drainage Description Serosanguinous 12/31/2019  8:00 PM   Odor None 12/31/2019  8:00 PM   Exposed structure Muscle 12/30/2019  4:00 AM   Mickie-wound Assessment Other (Comment) 12/31/2019  8:00 PM   Culture Taken Yes 12/29/2019  4:00 PM   Number of days: 56        Elimination:  Continence:   · Bowel: Yes  · Bladder: Yes  Urinary Catheter: None   Colostomy/Ileostomy/Ileal Conduit: No       Date of Last BM: ***    Intake/Output Summary (Last 24 hours) at 1/1/2020 0634  Last data filed at 1/1/2020 0130  Gross per 24 hour   Intake 481 ml   Output 1065 ml   Net -584 ml     I/O last 3 completed shifts: In: 6228 [I.V.:1035]  Out: [address] [Urine:1295]    Safety Concerns: At Risk for Falls    Impairments/Disabilities:      Amputation - Left transmetatarsal    Nutrition Therapy:  Current Nutrition Therapy:   - Oral Diet:  Carb Control 3 carbs/meal (1500kcals/day)    Routes of Feeding: Oral  Liquids: No Restrictions  Daily Fluid Restriction: no  Last Modified Barium Swallow with Video (Video Swallowing Test): not done    Treatments at the Time of Hospital Discharge:   Respiratory Treatments:     Oxygen Therapy:  is not on home oxygen therapy.   Ventilator:    - No ventilator support    Rehab Therapies: none  Weight Bearing Status/Restrictions: Non-weight bearing on left leg  Other Medical Equipment (for information only, NOT a DME order):  walker  Other Treatments: ***    Patient's personal belongings (please select all that are sent with patient):  None    RN SIGNATURE:  Electronically signed by Rosalina Alexandre RN on 1/6/20 at 5:34 PM    CASE MANAGEMENT/SOCIAL WORK SECTION    Inpatient Status Date: ***    Readmission Risk Assessment Score:  Readmission Risk              Risk of Unplanned Readmission:        40           Discharging to Facility/ Agency   · Name: [facility]  · Address:  · Phone:  · Fax:    Dialysis Facility (if applicable)   · Name:  · Address:  · Dialysis Schedule:  · Phone:  · Fax:    / signature: Electronically signed by Miguel Carrillo RN on 1/6/20 at 3:23 PM    PHYSICIAN SECTION    Prognosis: Good    Condition at Discharge: Stable    Rehab Potential (if transferring to Rehab): Good    Recommended Labs or Other Treatments After Discharge: Please perform daily dressing change to left lower extremity of adaptic over sutures covered by 4x4 gauze, Kerlix roll, and ACE wrap to left foot. Physician Certification: I certify the above information and transfer of Karina Daniels  is necessary for the continuing treatment of the diagnosis listed and that he requires [address] for greater 30 days.      Update Admission H&P: No change in H&P    PHYSICIAN SIGNATURE:  Electronically signed by Michele Chen MD on 1/6/20 at 11:51 AM

## 2020-01-01 NOTE — PROGRESS NOTES
[facility] - Progress Note    Today's Date and Time: 1/1/2020, 2:30 PM    Impression :   · Altered mental status.-Improved  · DM   · Diabetic neuropathy   · Left foot diabetic plantar ulcer and gangrene . · S aureus Lt foot infection MRSA +  · Prior toe amputations 2017:   · 1. Amputation distal phalanx, left second toe. 2. Amputation distal phalanx, right second toe. 3. Amputation distal phalanx of the left hallux. 4. I and D left fifth digit. 5. I and D right fifth digit. Culture grew MRSA   · Sepsis with CLAUDIA  · Hx chronic back pain on narcotics  · Single blood culture with bacillus spp on 12-28-19 = contaminant  · Allergy to sulfa    Recommendations:   · Ceftaroline 600 mg IV q 12 hr  · Wound care  · Surgical debridement at the discretion of Podiatry and patient. Medical Decision Making/Summary/Discussion:1/1/2020     ·   Infection Control Recommendations   · [place] Precautions  · Contact Isolation MRSA    Antimicrobial Stewardship Recommendations     · Discontinuation of therapy  Coordination of Outpatient Care:   · Estimated Length of IV antimicrobials: TBD  · Patient will need Midline Catheter Insertion: No  · Patient will need PICC line Insertion:No  · Patient will need: Home IV , [place],  SNF,  LTAC:TBD  · Patient will need outpatient wound care:Yes    Chief complaint/reason for consultation:   · Concern for sepsis      History of Present Illness:   Nazia Quarles is a 53y.o.-year-old  male who was initially admitted on 12/28/2019. Patient seen at the request of .    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 Presented to [place] [address] with worsening confusion over about 5 days, associated decrease in urine output. Found to be anuric with Cr 9.47, K 6.9.  Also was activity:     Days per week: Not on file     Minutes per session: Not on file    Stress: Not on file   Relationships    Social connections:     Talks on phone: Not on file     Gets together: Not on file     Attends Yazidi service: Not on file     Active member of club or organization: Not on file     Attends meetings of clubs or organizations: Not on file     Relationship status: Not on file    Intimate partner violence:     Fear of current or ex partner: Not on file     Emotionally abused: Not on file     Physically abused: Not on file     Forced sexual activity: Not on file   Other Topics Concern    Not on file   Social History Narrative    Not on file       Family History:     Family History   Problem Relation Age of Onset    Diabetes Father     Cancer Maternal Grandfather         Colon Cancer; Prostate Cancer    No Known Problems Mother         Allergies:   Bactrim [sulfamethoxazole-trimethoprim]; Fiorinal [butalbital-aspirin-caffeine]; Peanut-containing drug products; Statins; and Peanut butter flavor [flavoring agent]     Review of Systems:     Unable to provide. Sedated on ventilator. 1/1/2020      Constitutional: No fevers or chills. No systemic complaints  Head: No headaches  Eyes: No double vision or blurry vision. No conjunctival inflammation. ENT: No sore throat or runny nose. . No hearing loss, tinnitus or vertigo. Cardiovascular: No chest pain or palpitations. No shortness of breath. No MONTES  Lung: No shortness of breath or cough. No sputum production  Abdomen: No nausea, vomiting, diarrhea, or abdominal pain. Luis A Founds No cramps. Genitourinary: No increased urinary frequency, or dysuria. No hematuria. No suprapubic or CVA pain  Musculoskeletal: No muscle aches or pains. No joint effusions, swelling or deformities  Hematologic: No bleeding or bruising. Neurologic: No headache, weakness, numbness, or tingling. Integument: No rash, no ulcers. Psychiatric: No depression.    Endocrine: No polyuria, no --   --    CO2 24  --  20  --   --   --   --    BUN 41*  --  20  --   --   --   --    CREATININE 1.62*  --  0.97  --   --   --   --    MG 1.5*  --  1.6  --   --  1.5*  --     < > = values in this interval not displayed. Hepatic Function Panel:   No results for input(s): PROT, LABALBU, BILIDIR, IBILI, BILITOT, ALKPHOS, ALT, AST in the last 72 hours. No results for input(s): RPR in the last 72 hours. No results for input(s): HIV in the last 72 hours. No results for input(s): BC in the last 72 hours. Lab Results   Component Value Date    MUCUS NOT REPORTED 12/28/2019    RBC 3.61 01/01/2020    TRICHOMONAS NOT REPORTED 12/28/2019    WBC 5.5 01/01/2020    YEAST NOT REPORTED 12/28/2019    TURBIDITY CLOUDY 12/28/2019     Lab Results   Component Value Date    CREATININE 0.97 12/31/2019    GLUCOSE 97 12/31/2019       Medical Decision Making-Imaging:     EXAMINATION:   ONE XRAY VIEW OF THE CHEST       12/28/2019 6:40 pm       COMPARISON:   12/28/2019.       HISTORY:   ORDERING SYSTEM PROVIDED HISTORY: R IJ pelon catheter placement   TECHNOLOGIST PROVIDED HISTORY:   R IJ pelon catheter placement   Reason for Exam: port Upright       FINDINGS:   Lung volumes are diminished.  The cardiac silhouette is prominent and stable. There is suggestion of developing perihilar edema.  The peripheral lungs   remain clear with no pleural fluid.  Right IJ line has been placed with tip   in the SVC and no pneumothorax.           Impression   Central line tip in the SVC with no pneumothorax.       Suggestion of mild perihilar edema and CHF, possibly accentuated by low lung   volumes.      EXAMINATION:   THREE XRAY VIEWS OF THE LEFT FOOT       12/28/2019 8:51 am       COMPARISON:   November 2, 2019       HISTORY:   ORDERING SYSTEM PROVIDED HISTORY: AMS, swelling   TECHNOLOGIST PROVIDED HISTORY:   AMS, swelling   Reason for Exam: Pt's foot and lower leg are red swollen and bloody   Acuity: Acute   Type of Exam: Initial   Additional signs and symptoms: Pt's foot and lower leg are red swollen and   bloody       FINDINGS:   Status post amputation of the 1st, 2nd, and 3rd digits.  There is new   irregularity of the remnant 2nd proximal phalanx, compatible with   osteomyelitis.  Adjacent soft tissue swelling and soft tissue defect is also   seen.  Stable irregularity involving the head of the 3rd metatarsal.  No   obvious change since November 2019 of the 1st digit.       Elsewhere, no acute fracture or dislocation.  Bony fragment is seen at the   level of the 4th and 5th metatarsal.       Diffuse soft tissue swelling.           Impression   1. New irregularity involving the remnant 2nd proximal phalanx concerning for   osteomyelitis.  Adjacent soft tissue swelling and soft tissue defect is   present. 2. Diffuse soft tissue swelling. 3. No additional suspicious lesions although evaluation is limited given   osteopenia and patient positioning.             Medical Decision Wluqyx-[id number]-[id number]:       Medical Decision Making-Other:     Note:  · Labs, medications, radiologic studies were reviewed with personal review of films  · Large amounts of data were reviewed  · Discussed with nursing Staff, Discharge planner  · Infection Control and Prevention measures reviewed  · All prior entries were reviewed  · Administer medications as ordered  · Prognosis: Guarded  · Discharge planning reviewed  · Follow up as outpatient. Thank you for allowing us to participate in the care of this patient. Please call with questions.     Edna Ha MD  Pager: [phone] - Office: [phone]

## 2020-01-01 NOTE — PROGRESS NOTES
INTENSIVE CARE UNIT  Resident Physician Progress Note    Patient - Elke Ireland  Date of Admission -  2019 12:43 PM  Date of Evaluation -  2020  Room and Bed Number -  0103/0103-01   Hospital Day - 4      SUBJECTIVE:     OVERNIGHT EVENTS:    Patient was seen by podiatry and is currently holding off on getting surgery done on his left foot. TODAY: He is alert and oriented. Denies any shortness of breath, chest pain. He wants to go home for at least a day before considering surgery despite being advised against this due to risks involved.     AWAKE & FOLLOWING COMMANDS:  [] No   [x] Yes    SECRETIONS Amount:  [] Small [] Moderate  [] Large  [x] None  Color:     [] White [] Colored  [] Bloody    SEDATION:  RAAS Score:  [] Propofol gtt  [] Versed gtt  [] Ativan gtt   [x] No Sedation    PARALYZED:  [x] No    [] Yes    VASOPRESSORS:  [x] No    [] Yes  [] Levophed [] Dopamine [] Vasopressin  [] Dobutamine [] Phenylephrine [] Epinephrine      OBJECTIVE:     VITAL SIGNS:  BP (!) 121/59   Pulse 66   Temp 98.4 °F (36.9 °C) (Oral)   Resp 13   Ht 6' 2\" (1.88 m)   Wt 213 lb 10 oz (96.9 kg)   SpO2 100%   BMI 27.43 kg/m²   Tmax over 24 hours:  Temp (24hrs), Av.8 °F (37.1 °C), Min:98.4 °F (36.9 °C), Max:99.1 °F (37.3 °C)      Patient Vitals for the past 8 hrs:   Pulse Resp   20 0300 66 13   20 0200 77 18   20 0100 72 13         Intake/Output Summary (Last 24 hours) at 2020 0809  Last data filed at 2020 0500  Gross per 24 hour   Intake 206 ml   Output 1115 ml   Net -909 ml     Date 20 0000 - 20 2359   Shift 0877-7783 3356-9868 5494-7731 24 Hour Total   INTAKE   Shift Total(mL/kg)       OUTPUT   Urine(mL/kg/hr) 350(0.5)   350   Shift Total(mL/kg) 350(3.6)   350(3.6)   Weight (kg) 96.9 96.9 96.9 96.9     Wt Readings from Last 3 Encounters:   19 213 lb 10 oz (96.9 kg)   19 216 lb (98 kg)   19 216 lb 6.4 oz (98.2 kg)     Body mass index is 27.43 kg/m².        PHYSICAL EXAM:  GEN:  awake, alert, cooperative, no apparent distress, and appears stated age  EYES:  Lids and lashes normal, pupils equal, round and reactive to light, extra ocular muscles intact, sclera clear, conjunctiva normal  HEENT:  Normocephalic, without obvious abnormality, atramatic, sinuses nontender on palpation, external ears without lesions, oral pharynx with moist mucus membranes, tonsils without erythema or exudates, gums normal and good dentition. LUNGS:  No increased work of breathing, good air exchange, clear to auscultation bilaterally, no crackles or wheezing  CV:    Normal apical impulse, regular rate and rhythm, normal S1 and S2, no S3 or S4, and no murmur noted  ABDOMEN:   Femoral CVC in left groin, normal bowel sounds, soft, non-distended, non-tender and no masses palpated  :    Barrera catheter in situ  NEURO[de-identified]   Awake, alert, oriented to name, place and time. Cranial nerves II-XII are grossly intact. Motor is 5 out of 5 bilaterally. Cerebellar finger to nose, heel to shin intact. Sensory is intact.   Babinski down going, Romberg negative, and gait is normal.  SKIN:   No bruising or bleeding  Normal skin color, texture, turgor  EXTREMITIES: Left lower extremity dressing with minimal soilage of dressing, no pedal or leg edema, no calf tenderness/swelling, no erythema, distal pulses intact       MEDICATIONS:  Scheduled Meds:   magnesium sulfate  2 g Intravenous Once    ceftaroline fosamil (TEFLARO) IVPB  600 mg Intravenous Q12H    heparin (porcine)  5,000 Units Subcutaneous 3 times per day    pantoprazole  40 mg Oral QAM AC    sodium chloride flush  10 mL Intravenous 2 times per day    buPROPion  300 mg Oral Daily    lactobacillus  1 capsule Oral BID WC    levothyroxine  50 mcg Oral Daily    magnesium oxide  400 mg Oral BID    PARoxetine  20 mg Oral Daily    tamsulosin  0.4 mg Oral Daily    insulin lispro  0-6 Units Subcutaneous TID WC    insulin lispro  0-3 Units Subcutaneous Nightly     Continuous Infusions:   sodium chloride 50 mL/hr at 01/01/20 0622    dextrose      dextrose       PRN Meds:   tiZANidine, 4 mg, Q6H PRN  oxyCODONE, 5 mg, Q4H PRN    Or  oxyCODONE, 10 mg, Q4H PRN  acetaminophen, 650 mg, Q4H PRN  glucose, 15 g, PRN  dextrose, 12.5 g, PRN  glucagon (rDNA), 1 mg, PRN  dextrose, 100 mL/hr, PRN  sodium chloride flush, 10 mL, PRN  magnesium hydroxide, 30 mL, Daily PRN  ondansetron, 4 mg, Q6H PRN  heparin (porcine), 1,400 Units, PRN  heparin (porcine), 1,500 Units, PRN  glucose, 15 g, PRN  dextrose, 12.5 g, PRN  glucagon (rDNA), 1 mg, PRN  dextrose, 100 mL/hr, PRN  fentanNYL, 25 mcg, Q4H PRN    Or  fentanNYL, 50 mcg, Q4H PRN        SUPPORT DEVICES: [] Ventilator [] BIPAP  [] Nasal Cannula [x] Room Air    VENT SETTINGS (Comprehensive) (if applicable):       Additional Respiratory  Assessments  Pulse: 66  Resp: 13  SpO2: 100 %  Oral Care: Lip moisturizer applied, Mouth swabbed    ABGs:     Lab Results   Component Value Date    MOE1GQS 23 12/28/2019    FIO2 28.0 12/28/2019         DATA:  Complete Blood Count:   Recent Labs     12/30/19  0415 12/31/19  0553 01/01/20  0514   WBC 8.5 5.5 5.5   RBC 3.52* 3.46* 3.61*   HGB 8.5* 8.1* 8.5*   HCT 28.3* 26.8* 29.1*   MCV 80.4* 77.5* 80.6*   MCH 24.1* 23.4* 23.5*   MCHC 30.0 30.2 29.2   RDW 18.7* 18.6* 18.2*    223 260   MPV 9.6 9.7 9.2        Last 3 Blood Glucose:   Recent Labs     12/29/19  1138 12/29/19  1805 12/29/19  2330 12/30/19  0415 12/31/19  0553   GLUCOSE 125* 75 162* 129* 97        PT/INR:    Lab Results   Component Value Date    PROTIME 14.8 05/17/2019    INR 1.2 05/17/2019     PTT:    Lab Results   Component Value Date    APTT 32.4 05/17/2019       Comprehensive Metabolic Profile:   Recent Labs     12/29/19  2330 12/30/19  0415  12/31/19  0553 12/31/19  0756 12/31/19  2148   * 135  --  142  --   --    K 4.3 5.4*   < > 5.3 4.2 3.9   CL 98 100  --  104  --   --    CO2 25 24  --  20  --   -- BUN 46* 41*  --  20  --   --    CREATININE 1.73* 1.62*  --  0.97  --   --    GLUCOSE 162* 129*  --  97  --   --    CALCIUM 8.0* 8.6  --  8.2*  --   --     < > = values in this interval not displayed. Magnesium:   Lab Results   Component Value Date    MG 1.5 01/01/2020    MG 1.6 12/31/2019    MG 1.5 12/30/2019     Phosphorus:   Lab Results   Component Value Date    PHOS 3.7 01/01/2020    PHOS 3.3 12/31/2019    PHOS 3.1 12/30/2019     Ionized Calcium:   Lab Results   Component Value Date    CAION 1.24 01/01/2020    CAION 1.10 12/31/2019    CAION 1.10 12/30/2019        Urinalysis:   Lab Results   Component Value Date    NITRU NEGATIVE 12/28/2019    COLORU DARK YELLOW 12/28/2019    PHUR 5.0 12/28/2019    WBCUA 2 TO 5 12/28/2019    RBCUA 0 TO 2 12/28/2019    MUCUS NOT REPORTED 12/28/2019    TRICHOMONAS NOT REPORTED 12/28/2019    YEAST NOT REPORTED 12/28/2019    BACTERIA NOT REPORTED 12/28/2019    SPECGRAV 1.020 12/28/2019    LEUKOCYTESUR NEGATIVE 12/28/2019    UROBILINOGEN Normal 12/28/2019    BILIRUBINUR  12/28/2019     Presumptive positive. Unable to confirm due to unavailability of reagent. GLUCOSEU TRACE 12/28/2019    KETUA NEGATIVE 12/28/2019    AMORPHOUS 1+ 12/28/2019       HgBA1c:    Lab Results   Component Value Date    LABA1C 5.2 12/24/2019     TSH:    Lab Results   Component Value Date    TSH 1.15 12/28/2019     Lactic Acid:   Lab Results   Component Value Date    LACTA 1.2 12/28/2019    LACTA 2.3 10/20/2018    LACTA 1.3 01/14/2018      Troponin: No results for input(s): TROPONINI in the last 72 hours.     Microbiology:  Aerobic and anaerobic wound culture: Heavy growth of staph aureus      Other Labs:  CBC with Differential:    Lab Results   Component Value Date    WBC 5.5 01/01/2020    RBC 3.61 01/01/2020    HGB 8.5 01/01/2020    HCT 29.1 01/01/2020     01/01/2020    MCV 80.6 01/01/2020    MCH 23.5 01/01/2020    MCHC 29.2 01/01/2020    RDW 18.2 01/01/2020    LYMPHOPCT 18 01/01/2020    MONOPCT

## 2020-01-01 NOTE — PLAN OF CARE
Ongoing  1/1/2020 0323 by Esther Wall RN  Outcome: Ongoing  Goal: Control of acute pain  Description  Control of acute pain  1/1/2020 1150 by Janeen Johnson RN  Outcome: Ongoing  1/1/2020 0323 by Esther Wall RN  Outcome: Ongoing  Goal: Control of chronic pain  Description  Control of chronic pain  1/1/2020 1150 by Janeen Johnson RN  Outcome: Ongoing  1/1/2020 0323 by Esther Wall RN  Outcome: Ongoing     Problem: Skin Integrity - Impaired:  Goal: Will show no infection signs and symptoms  Description  Will show no infection signs and symptoms  1/1/2020 1150 by Janeen Johnson RN  Outcome: Ongoing  1/1/2020 0323 by Esther Wall RN  Outcome: Ongoing  Goal: Absence of new skin breakdown  Description  Absence of new skin breakdown  1/1/2020 1150 by Janeen Johnson RN  Outcome: Ongoing  1/1/2020 0323 by Esther Wall RN  Outcome: Ongoing     Problem: Confusion - Acute:  Goal: Mental status will be restored to baseline  Description  Mental status will be restored to baseline  1/1/2020 1150 by Janeen Johnson RN  Outcome: Ongoing  1/1/2020 0323 by Esther Wall RN  Outcome: Ongoing  Goal: Absence of continued neurological deterioration signs and symptoms  Description  Absence of continued neurological deterioration signs and symptoms  1/1/2020 1150 by Janeen Johnson RN  Outcome: Ongoing  1/1/2020 0323 by Esther Wall RN  Outcome: Ongoing     Problem: Discharge Planning:  Goal: Ability to perform activities of daily living will improve  Description  Ability to perform activities of daily living will improve  1/1/2020 1150 by Janeen Johnson RN  Outcome: Ongoing  1/1/2020 0323 by Esther Wall RN  Outcome: Ongoing  Goal: Participates in care planning  Description  Participates in care planning  1/1/2020 1150 by Janeen Johnson RN  Outcome: Ongoing  1/1/2020 0323 by Esther Wall RN  Outcome: Ongoing  Goal: Discharged to appropriate level of care  Description  Discharged to appropriate level of care  1/1/2020 1150 by Hansel Echavarria RN  Outcome: Ongoing  1/1/2020 0323 by Cristiana Perez RN  Outcome: Ongoing     Problem: Injury - Risk of, Physical Injury:  Goal: Absence of physical injury  Description  Absence of physical injury  1/1/2020 1150 by Hansel Echavarria RN  Outcome: Ongoing  1/1/2020 0323 by Cristiana Perez RN  Outcome: Ongoing  Goal: Will remain free from falls  Description  Will remain free from falls  1/1/2020 1150 by Hansel Echavarria RN  Outcome: Ongoing  1/1/2020 0323 by Cristiana Perez RN  Outcome: Ongoing     Problem: Mood - Altered:  Goal: Mood stable  Description  Mood stable  1/1/2020 1150 by Hansel Echavarria RN  Outcome: Ongoing  1/1/2020 0323 by Cristiana Perez RN  Outcome: Ongoing  Goal: Absence of abusive behavior  Description  Absence of abusive behavior  1/1/2020 1150 by Hansel Echavarria RN  Outcome: Ongoing  1/1/2020 0323 by Cristiana Perez RN  Outcome: Ongoing  Goal: Verbalizations of feeling emotionally comfortable while being cared for will increase  Description  Verbalizations of feeling emotionally comfortable while being cared for will increase  1/1/2020 1150 by Hansel Echavarria RN  Outcome: Ongoing  1/1/2020 0323 by Cristiana Perez RN  Outcome: Ongoing     Problem: Psychomotor Activity - Altered:  Goal: Absence of psychomotor disturbance signs and symptoms  Description  Absence of psychomotor disturbance signs and symptoms  1/1/2020 1150 by Hansel Echavarria RN  Outcome: Ongoing  1/1/2020 0323 by Cristiana Perez RN  Outcome: Ongoing     Problem: Sensory Perception - Impaired:  Goal: Demonstrations of improved sensory functioning will increase  Description  Demonstrations of improved sensory functioning will increase  1/1/2020 1150 by Hansel Echavarria RN  Outcome: Ongoing  1/1/2020 0323 by Cristiana Perez RN  Outcome: Ongoing  Goal: Decrease in sensory misperception frequency  Description  Decrease aspiration  1/1/2020 1150 by Ann Reed RN  Outcome: Ongoing  1/1/2020 0323 by Manuel Tejada RN  Outcome: Ongoing     Problem:  Bowel Function - Altered:  Goal: Bowel elimination is within specified parameters  Description  Bowel elimination is within specified parameters  1/1/2020 1150 by Ann Reed RN  Outcome: Ongoing  1/1/2020 0323 by Manuel Tejada RN  Outcome: Ongoing     Problem: Cardiac Output - Decreased:  Goal: Hemodynamic stability will improve  Description  Hemodynamic stability will improve  1/1/2020 1150 by Ann Reed RN  Outcome: Ongoing  1/1/2020 0323 by Manuel Tejada RN  Outcome: Ongoing     Problem: Gas Exchange - Impaired:  Goal: Levels of oxygenation will improve  Description  Levels of oxygenation will improve  1/1/2020 1150 by Ann Reed RN  Outcome: Ongoing  1/1/2020 0323 by Manuel Tejada RN  Outcome: Ongoing     Problem: Serum Glucose Level - Abnormal:  Goal: Ability to maintain appropriate glucose levels will improve to within specified parameters  Description  Ability to maintain appropriate glucose levels will improve to within specified parameters  1/1/2020 1150 by Ann Reed RN  Outcome: Ongoing  1/1/2020 0323 by Manuel Tejada RN  Outcome: Ongoing     Problem: Tissue Perfusion, Altered:  Goal: Circulatory function within specified parameters  Description  Circulatory function within specified parameters  1/1/2020 1150 by Ann Reed RN  Outcome: Ongoing  1/1/2020 0323 by Manuel Tejada RN  Outcome: Ongoing     Problem: Tissue Perfusion - Cardiopulmonary, Altered:  Goal: Absence of angina  Description  Absence of angina  1/1/2020 1150 by Ann Reed RN  Outcome: Ongoing  1/1/2020 0323 by Manuel Tejada RN  Outcome: Ongoing  Goal: Hemodynamic stability will improve  Description  Hemodynamic stability will improve  1/1/2020 1150 by Ann Reed RN  Outcome: Ongoing  1/1/2020 0323 by Manuel Tejada RN  Outcome: Ongoing     Problem: Falls - Risk of:  Goal: Absence of physical injury  Description  Absence of physical injury  1/1/2020 1150 by Derek Ariza RN  Outcome: Ongoing  1/1/2020 0323 by Rivera Lester RN  Outcome: Ongoing  Goal: Will remain free from falls  Description  Will remain free from falls  1/1/2020 1150 by Derek Ariza RN  Outcome: Ongoing  1/1/2020 0323 by Rivera Lester RN  Outcome: Ongoing     Problem: Risk for Impaired Skin Integrity  Goal: Tissue integrity - skin and mucous membranes  Description  Structural intactness and normal physiological function of skin and  mucous membranes.   1/1/2020 1150 by Derek Ariza RN  Outcome: Ongoing  1/1/2020 0323 by Rivera Lester RN  Outcome: Ongoing

## 2020-01-01 NOTE — CONSULTS
prostatic hyperplasia)     Chronic kidney disease     prostate    Depression     Diabetes mellitus ([facility] [address]     Diabetic neuropathy (HCC)     GERD (gastroesophageal reflux disease)     Hyperglycemia     Hypertension     Hypothyroidism     Impacted tooth     Pneumonia         Past Surgical History:     Past Surgical History:   Procedure Laterality Date    APPENDECTOMY      BACK SURGERY      Lower x 4. Had abscess after appendectomy.  KNEE SURGERY Right     scope    TOE AMPUTATION Bilateral 3/6/2017    TOE AMPUTATION LEFT HALLUX AND 2ND DIGIT AND RIGHT 2ND DIGIT performed by Emily Luque DPM at [address] ENDOSCOPY N/A 5/20/2019    EGD WITH BIOPSY performed by Fide Mcconnell MD at [address]        Medications Prior to Admission:     Prior to Admission medications    Medication Sig Start Date End Date Taking? Authorizing Provider   megestrol (MEGACE ORAL) 40 MG/ML suspension Take 10 mLs by mouth daily 12/24/19   Yamilet Salcido MD   oxyCODONE HCl (OXY-IR) 10 MG immediate release tablet Take 1 tablet by mouth every 6 hours as needed for Pain for up to 30 days. Intended supply: 30 days 12/20/19 1/19/20  Yamilet Salcido MD   morphine (MS CONTIN) 60 MG extended release tablet Take 1 tablet by mouth 2 times daily for 30 days. 12/20/19 1/19/20  Yamilet Salcido MD   diazepam (VALIUM) 5 MG tablet Take 1 tablet by mouth every 6 hours as needed for Anxiety for up to 30 days. 12/20/19 1/19/20  Yamilet Salcido MD   NARCAN 4 MG/0.1ML LIQD nasal spray 4 mg 10/29/19   Historical Provider, MD   PARoxetine (PAXIL) 20 MG tablet TAKE ONE TABLET BY MOUTH FOUR TIMES A DAY 10/29/19   Delfino Ramey MD   atorvastatin (LIPITOR) 20 MG tablet Take 1 tablet by mouth daily 10/10/19   Yamilet Salcido MD   sodium hypochlorite (DAKINS) 0.125 % SOLN external solution Apply topically every 12 hours Apply as a wet to dry dressing twice a day.  Can also use to cleanse wound. 9/20/19   Tushar Krause DPM   fluocinonide (LIDEX) 0.05 % ointment Apply topically 2 times daily. 6/20/19   Tushar Krause DPM   metFORMIN (GLUCOPHAGE) 500 MG tablet Take 1 tablet by mouth 2 times daily (with meals) 6/14/19   Kimi Faulkner MD   lactobacillus (CULTURELLE) capsule Take 1 capsule by mouth 2 times daily (with meals) 5/21/19   Kimi Faulkner MD   magnesium oxide (MAG-OX) 400 (241.3 Mg) MG TABS tablet Take 1 tablet by mouth 2 times daily 5/21/19   Kimi Faulkner MD   tamsulosin [facility]) 0.4 MG capsule Take 1 capsule by mouth daily 5/21/19   Kimi Faulkner MD   pantoprazole (PROTONIX) 40 MG tablet Take 1 tablet by mouth every morning (before breakfast) 5/22/19   Kimi Faulkner MD   Mitch Sella LANCETS 29W MISC TEST TWO TIMES A DAY 5/7/19   Kimi Faulkner MD   povidone-iodine (BETADINE) 7.5 % external solution Apply to wound. Apply as a wet to dry dressing 4/16/19   Tushar Krause DPM   levothyroxine (SYNTHROID) 50 MCG tablet Take 1 tablet by mouth daily 4/3/19   Kimi Faulkner MD   tiZANidine (ZANAFLEX) 4 MG tablet TAKE ONE TABLET BY MOUTH EVERY 6 HOURS AS NEEDED FOR MUSCLE SPASMS 3/21/19   Kimi Faulkner MD   ONE TOUCH ULTRA TEST strip USE ONE STRIP TO TEST TWO TIMES A DAY AS NEEDED 3/18/19   Kimi Faulkner MD   [facility] [id number] UNIT/GM powder APPLY TO AFFECTED AREA(S) FOUR TIMES A DAY 2/7/19   Kimi Faulkner MD   buPROPion (WELLBUTRIN XL) 300 MG extended release tablet TAKE ONE TABLET BY MOUTH DAILY 11/28/18   Kimi Faulkner MD   Wound Dressings (ADAPTIC NON-ADHERING DRESSING) PADS Apply 1 Units topically 2 times daily 9/28/18   Kimi Faulkner MD   Gauze Pads & Dressings (COMBINE ABD) 5\"X9\" PADS 1 Units by Does not apply route 2 times daily 9/28/18   Kimi Faulkner MD   lisinopril (PRINIVIL;ZESTRIL) 10 MG tablet TAKE ONE TABLET BY MOUTH DAILY 7/27/18   Kimi Faulkner MD   Blood Glucose Monitoring [facility] left foot  NEUROLOGICAL:  negative for headaches, dizziness, lightheadedness, numbness, pain, tingling extremities  BEHAVIOR/PSYCH:  negative for depression, anxiety    Physical Exam:     BP (!) 141/67   Pulse 74   Temp 99 °F (37.2 °C) (Oral)   Resp 14   Ht 6' 2\" (1.88 m)   Wt 218 lb 11.1 oz (99.2 kg)   SpO2 98%   BMI 28.08 kg/m²   Temp (24hrs), Av.8 °F (37.1 °C), Min:98.4 °F (36.9 °C), Max:99 °F (37.2 °C)    Recent Labs     19  1738 19  2203 20  0812 20  1654   POCGLU 109 81 88 92       Intake/Output Summary (Last 24 hours) at 2020 1804  Last data filed at 2020 1235  Gross per 24 hour   Intake 1157 ml   Output 1050 ml   Net 107 ml       General Appearance:  alert, well appearing, and in no acute distress  Mental status: oriented to person, place, and time with normal affect  Head:  normocephalic, atraumatic. Eye: no icterus, redness, pupils equal and reactive, extraocular eye movements intact, conjunctiva clear  Ear: normal external ear, no discharge, hearing intact  Nose:  no drainage noted  Mouth: mucous membranes moist  Neck: supple, no carotid bruits, thyroid not palpable  Lungs: Bilateral equal air entry, clear to ausculation, no wheezing, rales or rhonchi, normal effort  Cardiovascular: normal rate, regular rhythm, no murmur, gallop, rub.   Abdomen: Soft, nontender, nondistended, normal bowel sounds, no hepatomegaly or splenomegaly  Neurologic: There are no new focal motor or sensory deficits, normal muscle tone and bulk, no abnormal sensation, normal speech, cranial nerves II through XII grossly intact  Skin: No gross lesions, rashes, bruising or bleeding on exposed skin area, aside the left foot  Extremities: Bilateral lower extremity swelling with ulceration of the left foot that has been dressed and wrapped  Psych: normal affect    Investigations:      Laboratory Testing:  Recent Results (from the past 24 hour(s))   POTASSIUM    Collection Time: 19  9:48 PM 01/01/20  4:54 PM   Result Value Ref Range    POC Glucose 92 75 - 110 mg/dL       Imaging/Diagonstics:  Xr Tibia Fibula Left (2 Views)    Result Date: 12/28/2019  1. No acute osseous abnormality of the left lower leg. 2. Diffuse soft tissue swelling. No radiodense foreign body or subcutaneous gas. 3. Diffuse cortical thickening and irregularity involving the tibia and fibula. Findings can be seen in the setting of chronic osteomyelitis. Xr Foot Left (min 3 Views)    Result Date: 12/28/2019  1. New irregularity involving the remnant 2nd proximal phalanx concerning for osteomyelitis. Adjacent soft tissue swelling and soft tissue defect is present. 2. Diffuse soft tissue swelling. 3. No additional suspicious lesions although evaluation is limited given osteopenia and patient positioning. Ct Head Wo Contrast    Result Date: 12/28/2019  Unchanged appearance of the brain without acute CT abnormality identified. Xr Chest Portable    Result Date: 12/28/2019  Central line tip in the SVC with no pneumothorax. Suggestion of mild perihilar edema and CHF, possibly accentuated by low lung volumes. Xr Chest Portable    Result Date: 12/28/2019  1. Mild vascular prominence without overt failure. Mri Foot Left Wo Contrast    Result Date: 12/31/2019  Osteomyelitis involving the 2nd metatarsal head, 3rd metatarsal head, and 4th toe. Edema within the 1st metatarsal bone and base of the 2nd metatarsal bone is suspected to be reactive, however early osteomyelitis is not excluded. Us Retroperitoneal Limited    Result Date: 12/30/2019  No hydronephrosis.        Assessment :      Hospital Problems           Last Modified POA    * (Principal) Osteomyelitis of foot, left, acute ([address] 1/2/2020 Yes    Acute renal failure (ARF) ([address] 12/29/2019 Yes    Hyperkalemia 12/29/2019 Yes    Hypocalcemia 00/86/2887 Yes    Metabolic acidosis 39/57/8306 Yes    Diabetic ulcer of toe of left foot associated with type 2 diabetes mellitus ([facility] 12/29/2019 Yes    Hyperphosphatemia 12/29/2019 Yes    Chronic, continuous use of opioids 12/29/2019 Yes    Acute metabolic encephalopathy 95/87/4425 Yes    Diabetic foot infection ([facility] 1/1/2020 Yes          Plan:     1. Patient remains on ceftaroline and being followed by ID consult for further input  2. Arrangement is being made for amputation likely transmetatarsal amputation by podiatry  3. Patient presents low cardiopulmonary risk for the procedure and therefore surgery may proceed as planned  4. We will maintain patient on current regimen for his diabetes, holding of metformin until patient's renal function recovers fully and is being discharged  5. Optimize pain control  6. IV fluid hydration  7. Continue with GI and DVT prophylaxis  8. A.m. labs  9. Details of the plan was discussed with patient with his mother and wife at the bedside and all questions and comments were addressed.     Consultations:   IP CONSULT TO CRITICAL CARE  IP CONSULT TO NEPHROLOGY  PHARMACY TO DOSE MEDICATION  IP CONSULT TO PODIATRY  IP CONSULT TO CARDIOLOGY  PHARMACY TO DOSE VANCOMYCIN  IP CONSULT TO INFECTIOUS DISEASES  IP CONSULT TO KRISHNA Husain MD  1/1/2020  6:04 PM    Copy sent to Dr. Mona Wright MD

## 2020-01-02 LAB
ABSOLUTE EOS #: 0.22 K/UL (ref 0–0.44)
ABSOLUTE IMMATURE GRANULOCYTE: 0 K/UL (ref 0–0.3)
ABSOLUTE LYMPH #: 0.94 K/UL (ref 1.1–3.7)
ABSOLUTE MONO #: 0.44 K/UL (ref 0.1–1.2)
BASOPHILS # BLD: 1 % (ref 0–2)
BASOPHILS ABSOLUTE: 0.06 K/UL (ref 0–0.2)
C-REACTIVE PROTEIN: 24.6 MG/L (ref 0–5)
CALCIUM IONIZED: 1.26 MMOL/L (ref 1.13–1.33)
DIFFERENTIAL TYPE: ABNORMAL
EOSINOPHILS RELATIVE PERCENT: 4 % (ref 1–4)
GLUCOSE BLD-MCNC: 108 MG/DL (ref 75–110)
GLUCOSE BLD-MCNC: 109 MG/DL (ref 75–110)
GLUCOSE BLD-MCNC: 124 MG/DL (ref 75–110)
GLUCOSE BLD-MCNC: 83 MG/DL (ref 75–110)
GLUCOSE BLD-MCNC: 86 MG/DL (ref 75–110)
HCT VFR BLD CALC: 30.7 % (ref 40.7–50.3)
HEMOGLOBIN: 8.7 G/DL (ref 13–17)
IMMATURE GRANULOCYTES: 0 %
LYMPHOCYTES # BLD: 17 % (ref 24–43)
MAGNESIUM: 2 MG/DL (ref 1.6–2.6)
MCH RBC QN AUTO: 23.8 PG (ref 25.2–33.5)
MCHC RBC AUTO-ENTMCNC: 28.3 G/DL (ref 28.4–34.8)
MCV RBC AUTO: 84.1 FL (ref 82.6–102.9)
MONOCYTES # BLD: 8 % (ref 3–12)
MORPHOLOGY: ABNORMAL
MORPHOLOGY: ABNORMAL
NRBC AUTOMATED: 0 PER 100 WBC
PDW BLD-RTO: 18.5 % (ref 11.8–14.4)
PHOSPHORUS: 3.7 MG/DL (ref 2.5–4.5)
PLATELET # BLD: 280 K/UL (ref 138–453)
PLATELET ESTIMATE: ABNORMAL
PMV BLD AUTO: 9.5 FL (ref 8.1–13.5)
POTASSIUM SERPL-SCNC: 3.7 MMOL/L (ref 3.7–5.3)
POTASSIUM SERPL-SCNC: 4.5 MMOL/L (ref 3.7–5.3)
RBC # BLD: 3.65 M/UL (ref 4.21–5.77)
RBC # BLD: ABNORMAL 10*6/UL
SEG NEUTROPHILS: 70 % (ref 36–65)
SEGMENTED NEUTROPHILS ABSOLUTE COUNT: 3.84 K/UL (ref 1.5–8.1)
WBC # BLD: 5.5 K/UL (ref 3.5–11.3)
WBC # BLD: ABNORMAL 10*3/UL

## 2020-01-02 PROCEDURE — 36415 COLL VENOUS BLD VENIPUNCTURE: CPT

## 2020-01-02 PROCEDURE — 6370000000 HC RX 637 (ALT 250 FOR IP): Performed by: STUDENT IN AN ORGANIZED HEALTH CARE EDUCATION/TRAINING PROGRAM

## 2020-01-02 PROCEDURE — 99232 SBSQ HOSP IP/OBS MODERATE 35: CPT | Performed by: INTERNAL MEDICINE

## 2020-01-02 PROCEDURE — 2580000003 HC RX 258: Performed by: INTERNAL MEDICINE

## 2020-01-02 PROCEDURE — 82947 ASSAY GLUCOSE BLOOD QUANT: CPT

## 2020-01-02 PROCEDURE — 82330 ASSAY OF CALCIUM: CPT

## 2020-01-02 PROCEDURE — 84132 ASSAY OF SERUM POTASSIUM: CPT

## 2020-01-02 PROCEDURE — 2580000003 HC RX 258: Performed by: STUDENT IN AN ORGANIZED HEALTH CARE EDUCATION/TRAINING PROGRAM

## 2020-01-02 PROCEDURE — 6370000000 HC RX 637 (ALT 250 FOR IP): Performed by: EMERGENCY MEDICINE

## 2020-01-02 PROCEDURE — 1200000000 HC SEMI PRIVATE

## 2020-01-02 PROCEDURE — 84100 ASSAY OF PHOSPHORUS: CPT

## 2020-01-02 PROCEDURE — 83735 ASSAY OF MAGNESIUM: CPT

## 2020-01-02 PROCEDURE — 6370000000 HC RX 637 (ALT 250 FOR IP): Performed by: NURSE PRACTITIONER

## 2020-01-02 PROCEDURE — 86140 C-REACTIVE PROTEIN: CPT

## 2020-01-02 PROCEDURE — 99233 SBSQ HOSP IP/OBS HIGH 50: CPT | Performed by: INTERNAL MEDICINE

## 2020-01-02 PROCEDURE — 6360000002 HC RX W HCPCS: Performed by: EMERGENCY MEDICINE

## 2020-01-02 PROCEDURE — 6360000002 HC RX W HCPCS: Performed by: INTERNAL MEDICINE

## 2020-01-02 PROCEDURE — 85025 COMPLETE CBC W/AUTO DIFF WBC: CPT

## 2020-01-02 RX ADMIN — FENTANYL CITRATE 50 MCG: 50 INJECTION, SOLUTION INTRAMUSCULAR; INTRAVENOUS at 10:22

## 2020-01-02 RX ADMIN — TAMSULOSIN HYDROCHLORIDE 0.4 MG: 0.4 CAPSULE ORAL at 09:00

## 2020-01-02 RX ADMIN — PANTOPRAZOLE SODIUM 40 MG: 40 TABLET, DELAYED RELEASE ORAL at 09:00

## 2020-01-02 RX ADMIN — OXYCODONE HYDROCHLORIDE 10 MG: 5 TABLET ORAL at 16:12

## 2020-01-02 RX ADMIN — BUPROPION HYDROCHLORIDE 300 MG: 300 TABLET, FILM COATED, EXTENDED RELEASE ORAL at 08:59

## 2020-01-02 RX ADMIN — MAGNESIUM GLUCONATE 500 MG ORAL TABLET 400 MG: 500 TABLET ORAL at 20:32

## 2020-01-02 RX ADMIN — LEVOTHYROXINE SODIUM 50 MCG: 50 TABLET ORAL at 09:00

## 2020-01-02 RX ADMIN — HEPARIN SODIUM 5000 UNITS: 5000 INJECTION INTRAVENOUS; SUBCUTANEOUS at 13:44

## 2020-01-02 RX ADMIN — OXYCODONE HYDROCHLORIDE 10 MG: 5 TABLET ORAL at 20:32

## 2020-01-02 RX ADMIN — MAGNESIUM GLUCONATE 500 MG ORAL TABLET 400 MG: 500 TABLET ORAL at 09:00

## 2020-01-02 RX ADMIN — FENTANYL CITRATE 50 MCG: 50 INJECTION, SOLUTION INTRAMUSCULAR; INTRAVENOUS at 23:19

## 2020-01-02 RX ADMIN — FENTANYL CITRATE 50 MCG: 50 INJECTION, SOLUTION INTRAMUSCULAR; INTRAVENOUS at 18:37

## 2020-01-02 RX ADMIN — CEFTAROLINE FOSAMIL 600 MG: 600 POWDER, FOR SOLUTION INTRAVENOUS at 05:26

## 2020-01-02 RX ADMIN — FENTANYL CITRATE 50 MCG: 50 INJECTION, SOLUTION INTRAMUSCULAR; INTRAVENOUS at 01:02

## 2020-01-02 RX ADMIN — PAROXETINE HYDROCHLORIDE HEMIHYDRATE 20 MG: 20 TABLET, FILM COATED ORAL at 09:00

## 2020-01-02 RX ADMIN — OXYCODONE HYDROCHLORIDE 10 MG: 5 TABLET ORAL at 08:59

## 2020-01-02 RX ADMIN — Medication 1 CAPSULE: at 09:00

## 2020-01-02 RX ADMIN — HEPARIN SODIUM 5000 UNITS: 5000 INJECTION INTRAVENOUS; SUBCUTANEOUS at 20:32

## 2020-01-02 RX ADMIN — CEFTAROLINE FOSAMIL 600 MG: 600 POWDER, FOR SOLUTION INTRAVENOUS at 16:24

## 2020-01-02 RX ADMIN — SENNOSIDES 17.2 MG: 8.6 TABLET, FILM COATED ORAL at 08:59

## 2020-01-02 RX ADMIN — HEPARIN SODIUM 5000 UNITS: 5000 INJECTION INTRAVENOUS; SUBCUTANEOUS at 05:51

## 2020-01-02 RX ADMIN — FENTANYL CITRATE 50 MCG: 50 INJECTION, SOLUTION INTRAMUSCULAR; INTRAVENOUS at 05:39

## 2020-01-02 RX ADMIN — FENTANYL CITRATE 50 MCG: 50 INJECTION, SOLUTION INTRAMUSCULAR; INTRAVENOUS at 14:40

## 2020-01-02 RX ADMIN — Medication 1 CAPSULE: at 16:13

## 2020-01-02 RX ADMIN — SODIUM CHLORIDE: 9 INJECTION, SOLUTION INTRAVENOUS at 22:36

## 2020-01-02 ASSESSMENT — ENCOUNTER SYMPTOMS
VOMITING: 0
BLOOD IN STOOL: 0
SHORTNESS OF BREATH: 0
BACK PAIN: 0
EYE DISCHARGE: 0
ABDOMINAL PAIN: 0
COLOR CHANGE: 0
NAUSEA: 0
WHEEZING: 0

## 2020-01-02 ASSESSMENT — PAIN SCALES - GENERAL
PAINLEVEL_OUTOF10: 9
PAINLEVEL_OUTOF10: 8
PAINLEVEL_OUTOF10: 9

## 2020-01-02 ASSESSMENT — PAIN DESCRIPTION - PAIN TYPE
TYPE: CHRONIC PAIN
TYPE: CHRONIC PAIN

## 2020-01-02 ASSESSMENT — PAIN SCALES - WONG BAKER
WONGBAKER_NUMERICALRESPONSE: 8
WONGBAKER_NUMERICALRESPONSE: 8

## 2020-01-02 ASSESSMENT — PAIN DESCRIPTION - LOCATION
LOCATION: FOOT
LOCATION: FOOT

## 2020-01-02 NOTE — PROGRESS NOTES
[facility] - Progress Note    Today's Date and Time: 1/2/2020, 2:37 PM    Impression :   · Altered mental status.-Improved  · DM   · Diabetic neuropathy   · Left foot diabetic plantar ulcer and gangrene . · S aureus Lt foot infection MRSA +  · Prior toe amputations 2017:   · 1. Amputation distal phalanx, left second toe. 2. Amputation distal phalanx, right second toe. 3. Amputation distal phalanx of the left hallux. 4. I and D left fifth digit. 5. I and D right fifth digit. Culture grew MRSA   · Sepsis with CLAUDIA  · Hx chronic back pain on narcotics  · Single blood culture with bacillus spp on 12-28-19 = contaminant  · Allergy to sulfa    Recommendations:   · Ceftaroline 600 mg IV q 12 hr until time of discharge  · Transition to Doxycycline 100 mg po BID upon discharge - indefinite stop date. · He will require prophylactic treatment with Ceftaroline or Vancomycin prior to any scheduled surgical procedure  · Wound care  · Surgical debridement at the discretion of Podiatry and patient. · Pt will need to follow up in my office in 2-3 weeks. Medical Decision Making/Summary/Discussion:1/2/2020     ·   Infection Control Recommendations   · [place] Precautions  · Contact Isolation MRSA    Antimicrobial Stewardship Recommendations     · Discontinuation of therapy  Coordination of Outpatient Care:   · Estimated Length of IV antimicrobials: To time of discharge  · Patient will need Midline Catheter Insertion: No  · Patient will need PICC line Insertion:No  · Patient will need: Home IV , [place],  SNF,  LTAC:TBD  · Patient will need outpatient wound care:Yes    Chief complaint/reason for consultation:   · Concern for sepsis      History of Present Illness:   Radha Cruz is a 53y.o.-year-old  male who was initially admitted on 12/28/2019.  Patient seen at the request of .    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 Presented to [facility] with worsening confusion over about 5 days, associated decrease in urine output. Found to be anuric with Cr 9.47, K 6.9. Also was hypotensive and received fluids plus levophed before transfer to [facility]. Blood cultures were obtained at [facility]. One of 2 yielded Bacillus spp.on 12-28-19. patient received vancomycin. Blood cultures 12-29-19: No growth. 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 Family indicates deterioration on left foot with dark discoloration of dorsum and new plantar ulcer with drainage. CURRENT EVALUATION : 1/2/2020     Patient evaluated and examined  He is AA, not very cooperative with the exam  He reports that he is not being discharged but is staying here until Dr Ramy Sevilla does another surgery. Podiatry notes state that he wants to go home then return to [facility] for further treatment. Pt also states that he will not take any oral antibiotics and needs to stay on the IV medication. Most questions are answered with the response \"look in my chart\"    Per the RN he is currently refusing discharge and has stated that he only wants to talk to Dr Ramy Sevilla about his plan of care    Afebrile  VS stable. The patient seen and evaluated at bedside. Patient does not have any fevers, chills, cough, shortness of breath, chest pains or palpitations. Foot unchanged  Culture with heavy growth S aureus  Lt foot X ray with new changes of second proximal phalanx remnant concerning for osteomyelitis   MRI with increased uptake on 2nd metatarsal    Renal function improved. Ceftaroline increased to 600 mg q 12 hr  Patient has been advised surgical debridement by Podiatry.   OK to transition to Doxycycline at time of discharge home  Will need prophylactic treatment with Ceftaroline or Vancomycin prior to any further surgical intervention    Labs, X rays reviewed: 1/2/2020    BUN: 81-->41->20  Cr:4.76--4.27-->1.62->0.97    WBC:28-->9.5-->5.5  Hb:9.0-->8.1->8.7  Plat: 264-->223->280    ESR 47  .1->24.6    Cultures:  Urine:  ·   Blood:  · 12-28-19: 1/2 bacillus spp - likely a contaminate  · 12-29-19 x 2: No growth   Sputum :  ·   Wound:  · 12-29 MRSA heavy growth - tetracycline sensitivie    CXR 12-28-19: Vascular congestion. No pneumonia    Discussed with patient, RN, family. 12-29-19:                      I have personally reviewed the past medical history, past surgical history, medications, social history, and family history, and I have updated the database accordingly. Past Medical History:     Past Medical History:   Diagnosis Date    Anxiety     Arthritis     lower back, knees    BPH (benign prostatic hyperplasia)     Chronic kidney disease     prostate    Depression     Diabetes mellitus ([address]     Diabetic neuropathy (HCC)     GERD (gastroesophageal reflux disease)     Hyperglycemia     Hypertension     Hypothyroidism     Impacted tooth     Pneumonia        Past Surgical  History:     Past Surgical History:   Procedure Laterality Date    APPENDECTOMY      BACK SURGERY      Lower x 4. Had abscess after appendectomy.      KNEE SURGERY Right     scope    TOE AMPUTATION Bilateral 3/6/2017    TOE AMPUTATION LEFT HALLUX AND 2ND DIGIT AND RIGHT 2ND DIGIT performed by Ce Ordaz DPM at [address] ENDOSCOPY N/A 5/20/2019    EGD WITH BIOPSY performed by Adriel Tomlin MD at [facility] OR       Medications:      senna  2 tablet Oral BID    ceftaroline fosamil (TEFLARO) IVPB  600 mg Intravenous Q12H    heparin (porcine)  5,000 Units Subcutaneous 3 times per day    pantoprazole  40 mg Oral QAM AC    sodium chloride flush  10 mL Intravenous 2 times per day    buPROPion  300 mg Oral Daily    lactobacillus  1 capsule Oral BID WC    levothyroxine  50 mcg Oral Daily  magnesium oxide  400 mg Oral BID    PARoxetine  20 mg Oral Daily    tamsulosin  0.4 mg Oral Daily    insulin lispro  0-6 Units Subcutaneous TID WC    insulin lispro  0-3 Units Subcutaneous Nightly       Social History:     Social History     Socioeconomic History    Marital status:      Spouse name: Not on file    Number of children: Not on file    Years of education: Not on file    Highest education level: Not on file   Occupational History    Not on file   Social Needs    Financial resource strain: Not on file    Food insecurity:     Worry: Not on file     Inability: Not on file    Transportation needs:     Medical: Not on file     Non-medical: Not on file   Tobacco Use    Smoking status: Never Smoker    Smokeless tobacco: Never Used   Substance and Sexual Activity    Alcohol use: No    Drug use: Not Currently     Frequency: 2.0 times per week     Types: Marijuana    Sexual activity: Not on file   Lifestyle    Physical activity:     Days per week: Not on file     Minutes per session: Not on file    Stress: Not on file   Relationships    Social connections:     Talks on phone: Not on file     Gets together: Not on file     Attends Sabianist service: Not on file     Active member of club or organization: Not on file     Attends meetings of clubs or organizations: Not on file     Relationship status: Not on file    Intimate partner violence:     Fear of current or ex partner: Not on file     Emotionally abused: Not on file     Physically abused: Not on file     Forced sexual activity: Not on file   Other Topics Concern    Not on file   Social History Narrative    Not on file       Family History:     Family History   Problem Relation Age of Onset    Diabetes Father     Cancer Maternal Grandfather         Colon Cancer; Prostate Cancer    No Known Problems Mother         Allergies:   Bactrim [sulfamethoxazole-trimethoprim]; Fiorinal [butalbital-aspirin-caffeine];  Peanut-containing drug products; Statins; and Peanut butter flavor [flavoring agent]     Review of Systems:     Constitutional: No fevers or chills. No systemic complaints  Head: No headaches  Eyes: No double vision or blurry vision. No conjunctival inflammation. ENT: No sore throat or runny nose. . No hearing loss, tinnitus or vertigo. Cardiovascular: No chest pain or palpitations. No shortness of breath. No MONTES  Lung: No shortness of breath or cough. No sputum production  Abdomen: No nausea, vomiting, diarrhea, or abdominal pain. Sharla Maudlin No cramps. Genitourinary: No increased urinary frequency, or dysuria. No hematuria. No suprapubic or CVA pain  Musculoskeletal: No muscle aches or pains. No joint effusions, swelling or deformities  Hematologic: No bleeding or bruising. Neurologic: No headache, weakness, numbness, or tingling. Integument: No rash, no ulcers. Psychiatric: No depression. Endocrine: No polyuria, no polydipsia, no polyphagia. Physical Examination :     Patient Vitals for the past 8 hrs:   BP Temp Temp src Pulse Resp SpO2 Height Weight   01/02/20 0800 -- -- -- 71 -- -- -- --   01/02/20 0724 (!) 141/76 98.6 °F (37 °C) Oral 67 16 100 % -- --   01/02/20 0647 -- -- -- -- -- -- 6' 2\" (1.88 m) 216 lb 14.9 oz (98.4 kg)     General Appearance: Awake, alert, and in no apparent distress  Head:  Normocephalic, no trauma  Eyes: Pupils equal, round, reactive to light and accommodation; extraocular movements intact; sclera anicteric; conjunctivae pink. No embolic phenomena. ENT: Oropharynx clear, without erythema, exudate, or thrush. No tenderness of sinuses. Mouth/throat: mucosa pink and moist. No lesions. Dentition in good repair. Neck:Supple, without lymphadenopathy. Thyroid normal, No bruits. Pulmonary/Chest: Clear to auscultation, without wheezes, rales, or rhonchi. No dullness to percussion. Cardiovascular: Regular rate and rhythm without murmurs, rubs, or gallops. Abdomen: Soft, non tender.  Bowel sounds normal. No organomegaly  All four Extremities: No cyanosis, clubbing, edema, or effusions. Neurologic: No gross motor deficits. Mentation clearer. Peripheral neuropathy  Skin: Warm and dry with good turgor. Signs of peripheral arterial insufficiency. Lt foot with dressing in place, bloody drainage noted    Medical Decision Making -Laboratory:   I have independently reviewed/ordered the following labs:    CBC with Differential:   Recent Labs     01/01/20  0514 01/02/20  0525   WBC 5.5 5.5   HGB 8.5* 8.7*   HCT 29.1* 30.7*    280   LYMPHOPCT 18* 17*   MONOPCT 10 8     BMP:   Recent Labs     12/31/19  0553  01/01/20  0514  01/01/20 2043 01/02/20  0525     --   --   --   --   --    K 5.3   < >  --    < > 3.9 4.5     --   --   --   --   --    CO2 20  --   --   --   --   --    BUN 20  --   --   --   --   --    CREATININE 0.97  --   --   --   --   --    MG 1.6  --  1.5*  --   --  2.0    < > = values in this interval not displayed. Hepatic Function Panel:   No results for input(s): PROT, LABALBU, BILIDIR, IBILI, BILITOT, ALKPHOS, ALT, AST in the last 72 hours. No results for input(s): RPR in the last 72 hours. No results for input(s): HIV in the last 72 hours. No results for input(s): BC in the last 72 hours.   Lab Results   Component Value Date    MUCUS NOT REPORTED 12/28/2019    RBC 3.65 01/02/2020    TRICHOMONAS NOT REPORTED 12/28/2019    WBC 5.5 01/02/2020    YEAST NOT REPORTED 12/28/2019    TURBIDITY CLOUDY 12/28/2019     Lab Results   Component Value Date    CREATININE 0.97 12/31/2019    GLUCOSE 97 12/31/2019       Medical Decision Making-Imaging:     EXAMINATION:   ONE XRAY VIEW OF THE CHEST       12/28/2019 6:40 pm       COMPARISON:   12/28/2019.       HISTORY:   ORDERING SYSTEM PROVIDED HISTORY: KRISHNA VILLEGAS pelon catheter placement   TECHNOLOGIST PROVIDED HISTORY:   R BAKARI pelon catheter placement   Reason for Exam: port Upright       FINDINGS:   Lung volumes are diminished.  The cardiac silhouette is prominent and stable. There is suggestion of developing perihilar edema.  The peripheral lungs   remain clear with no pleural fluid.  Right IJ line has been placed with tip   in the SVC and no pneumothorax.           Impression   Central line tip in the SVC with no pneumothorax.       Suggestion of mild perihilar edema and CHF, possibly accentuated by low lung   volumes. EXAMINATION:   THREE XRAY VIEWS OF THE LEFT FOOT       12/28/2019 8:51 am       COMPARISON:   November 2, 2019       HISTORY:   ORDERING SYSTEM PROVIDED HISTORY: AMS, swelling   TECHNOLOGIST PROVIDED HISTORY:   AMS, swelling   Reason for Exam: Pt's foot and lower leg are red swollen and bloody   Acuity: Acute   Type of Exam: Initial   Additional signs and symptoms: Pt's foot and lower leg are red swollen and   bloody       FINDINGS:   Status post amputation of the 1st, 2nd, and 3rd digits.  There is new   irregularity of the remnant 2nd proximal phalanx, compatible with   osteomyelitis.  Adjacent soft tissue swelling and soft tissue defect is also   seen.  Stable irregularity involving the head of the 3rd metatarsal.  No   obvious change since November 2019 of the 1st digit.       Elsewhere, no acute fracture or dislocation.  Bony fragment is seen at the   level of the 4th and 5th metatarsal.       Diffuse soft tissue swelling.           Impression   1. New irregularity involving the remnant 2nd proximal phalanx concerning for   osteomyelitis.  Adjacent soft tissue swelling and soft tissue defect is   present. 2. Diffuse soft tissue swelling. 3. No additional suspicious lesions although evaluation is limited given   osteopenia and patient positioning.             Medical Decision [facility]:     1/1/2020  4:32 PM - Elton, Laurent Incoming Lab Results From [facility]     Specimen Information: Foot        Component Collected Lab   Specimen Description 12/29/2019  3:[address]  [address]   . FOOT LEFT    Special Requests 12/29/2019  3:[address]  [address]   NOT REPORTED    Direct Exam Abnormal  12/29/2019  3:15 PM [address]    Direct Exam Abnormal  12/29/2019  3:[address]  [address]   MANY GRAM POSITIVE COCCI IN CLUSTERS    Culture Abnormal  12/29/2019  3:[address]  [address]   METHICILLIN RESISTANT STAPHYLOCOCCUS AUREUS HEAVY GROWTH    Culture Abnormal  12/29/2019  3:[address]  [address]   NO ANAEROBIC ORGANISMS ISOLATED AT 3 DAYS    Testing Performed By     Lab - [address] Name Director Address Valid Date Range   [address] [facility] [place] [address] [place] [facility] [address] 08/30/17 0801-Present   Susceptibility     Methicillin resistant staphylococcus aureus (1)     Antibiotic Interpretation FERNANDO Status    penicillin Resistant  Preliminary     >=0.5  RESISTANT   cefoxitin screen  NOT REPORTED Preliminary    ciprofloxacin   Preliminary     NOT REPORTED   clindamycin Sensitive  Preliminary     <=0.25  SUSCEPTIBLE   erythromycin Resistant  Preliminary     >=8  RESISTANT   gentamicin Sensitive  Preliminary     <=0.5  SUSCEPTIBLE   gentamicin Sensitive Gentamicin is used only in combination with other active agents that test susceptible.  Preliminary    Induced Clind Resist Negative NEGATIVE Preliminary    levofloxacin Intermediate  Preliminary     4  INTERMEDIATE   linezolid   Preliminary     NOT REPORTED   moxifloxacin   Preliminary     NOT REPORTED   nitrofurantoin   Preliminary     NOT REPORTED   oxacillin Resistant  Preliminary     >=4  RESISTANT   Synercid   Preliminary     NOT REPORTED   rifampin   Preliminary     NOT REPORTED   tetracycline Sensitive  Preliminary     <=1  SUSCEPTIBLE   tigecycline   Preliminary     NOT REPORTED   trimethoprim-sulfamethoxazole Sensitive  Preliminary     <=10  SUSCEPTIBLE   vancomycin Sensitive  Preliminary     1  SUSCEPTIBLE       Medical Decision Making-Other: Note:  · Labs, medications, radiologic studies were reviewed with personal review of films  · Large amounts of data were reviewed  · Discussed with nursing Staff, Discharge planner  · Infection Control and Prevention measures reviewed  · All prior entries were reviewed  · Administer medications as ordered  · Prognosis: Guarded  · Discharge planning reviewed  · Follow up as outpatient. Thank you for allowing us to participate in the care of this patient. Please call with questions.     Joselyn Osborn MD  Pager: [phone] - Office: [phone]

## 2020-01-02 NOTE — CARE COORDINATION
Transitional planning-talked with patient about the need for possible IV antibiotics at home. States he will think about home care-if he wants it. Explained to him that if he needs IV antibiotics at home he would need home care. Called Corinna Coreas for possible IV antibiotics.

## 2020-01-02 NOTE — PROGRESS NOTES
[address]    Progress Note    2020    10:32 AM    Name:   Russel Apgar  MRN:     [id number]     [place]:      [de-identified]   Room:     IP Day:  5  Admit Date:  2019 12:43 PM    PCP:   Charanjit Ordaz MD  Code Status:  Full Code    Subjective:     C/C:   Chief Complaint   Patient presents with    Altered Mental Status     x3 days, transfer from [facility]. Interval History Status: improved. No fever overnight  No new issues overnight  Patient is not forthcoming and refused to discuss with me what he wait. Per pod note, he refused surgery now and will like to follow up with pod for amputation option. Brief History:   Tom Wagner a 53 y. o. male with a past medical history of DM2 complicated with left foot wound and well known to Dr. Becky Rivera. Patient was admitted to the ICU altered mental status and found to have sepsis due to let foot infection complicated with CLAUDIA. He was treated with IV antibiotics and IVF. CLAUDIA resolved as well as confusion. He was recommended amputation of the left foot but he wishes to go home on antibiotic and follow up with Dr. Becky Rivera for amputation on a later date    Review of Systems:     Review of Systems   Constitutional: Negative for appetite change and fever. HENT: Negative for congestion and ear discharge. Eyes: Negative for discharge and visual disturbance. Respiratory: Negative for shortness of breath and wheezing. Cardiovascular: Negative for chest pain, palpitations and leg swelling. Gastrointestinal: Negative for abdominal pain, blood in stool, nausea and vomiting. Endocrine: Negative for cold intolerance and heat intolerance. Genitourinary: Negative for dysuria and frequency. Musculoskeletal: Negative for arthralgias, back pain and joint swelling. Skin: Negative for color change and rash.    Neurological: Negative for dizziness, Exam  Vitals signs reviewed. Constitutional:       General: He is not in acute distress. Appearance: Normal appearance. He is not ill-appearing or diaphoretic. HENT:      Head: Normocephalic and atraumatic. Nose: No congestion or rhinorrhea. Eyes:      Extraocular Movements: Extraocular movements intact. Conjunctiva/sclera: Conjunctivae normal.      Pupils: Pupils are equal, round, and reactive to light. Neck:      Musculoskeletal: Normal range of motion and neck supple. Cardiovascular:      Rate and Rhythm: Normal rate and regular rhythm. Pulses: Normal pulses. Heart sounds: Normal heart sounds. No murmur. Pulmonary:      Effort: Pulmonary effort is normal.      Breath sounds: Normal breath sounds. No wheezing or rales. Abdominal:      General: Bowel sounds are normal. There is no distension. Palpations: Abdomen is soft. Tenderness: There is no tenderness. Musculoskeletal: Normal range of motion. General: No swelling or tenderness. Skin:     General: Skin is warm and dry. Findings: No rash. Neurological:      General: No focal deficit present. Mental Status: He is alert and oriented to person, place, and time. Psychiatric:         Mood and Affect: Mood normal.         Thought Content: Thought content normal.         Judgment: Judgment normal.             Assessment:        Hospital Problems           Last Modified POA    Acute renal failure (ARF) ([facility] [address] 12/29/2019 Yes    Hyperkalemia 12/29/2019 Yes    Hypocalcemia 70/21/2981 Yes    Metabolic acidosis 03/35/1422 Yes    Diabetic ulcer of toe of left foot associated with type 2 diabetes mellitus ([address] 12/29/2019 Yes    Hyperphosphatemia 12/29/2019 Yes    Chronic, continuous use of opioids 12/29/2019 Yes    Acute metabolic encephalopathy 70/21/7650 Yes    Diabetic foot infection ([address] 1/1/2020 Yes          Plan:        1. Sepsis due to #2. Resolved. 2. Acute Metabolic Encephalopathy. Resolved  3. Acute Renal failure. Resolved  4. Left foot osteomyelitis associated with type 2 diabetes. Continue IV Ceftaroline. Patient want to follow up with Dr. Bandar Hernandez for amputation option. 5. DM-2. Controlled. With Metformin. Will restart on discharge  6. Discharge planning - home with IV antibiotic. Awaiting ID recommendation for duration.         Marco A Fernandez MD  1/2/2020  10:32 AM

## 2020-01-02 NOTE — PLAN OF CARE
Ongoing  Goal: Control of chronic pain  Description  Control of chronic pain  1/2/2020 0051 by Shaheed Landeros RN  Outcome: Ongoing  1/1/2020 1150 by Wilfrido Camacho RN  Outcome: Ongoing     Problem: Pain:  Goal: Pain level will decrease  Description  Pain level will decrease  1/2/2020 0051 by Shaheed Landeros RN  Outcome: Ongoing  1/1/2020 1150 by Wilfrido Camacho RN  Outcome: Ongoing  Goal: Recognizes and communicates pain  Description  Recognizes and communicates pain  1/2/2020 0051 by Shaheed Landeros RN  Outcome: Ongoing  1/1/2020 1150 by Wilfrido Camacho RN  Outcome: Ongoing  Goal: Control of acute pain  Description  Control of acute pain  1/2/2020 0051 by Shaheed Landeros RN  Outcome: Ongoing  1/1/2020 1150 by Wilfrido Camacho RN  Outcome: Ongoing  Goal: Control of chronic pain  Description  Control of chronic pain  1/2/2020 0051 by Shaheed Landeros RN  Outcome: Ongoing  1/1/2020 1150 by Wilfrido Camacho RN  Outcome: Ongoing     Problem: Skin Integrity - Impaired:  Goal: Will show no infection signs and symptoms  Description  Will show no infection signs and symptoms  1/2/2020 0051 by Shaheed Landeros RN  Outcome: Ongoing  1/1/2020 1150 by Wilfrido Camacho RN  Outcome: Ongoing  Goal: Absence of new skin breakdown  Description  Absence of new skin breakdown  1/2/2020 0051 by Shaheed Landeros RN  Outcome: Ongoing  1/1/2020 1150 by Wilfrido Camacho RN  Outcome: Ongoing     Problem: Discharge Planning:  Goal: Ability to perform activities of daily living will improve  Description  Ability to perform activities of daily living will improve  1/2/2020 0051 by Shaheed Landeros RN  Outcome: Ongoing  1/1/2020 1150 by Wilfrido Camacho RN  Outcome: Ongoing  Goal: Participates in care planning  Description  Participates in care planning  1/2/2020 0051 by Shaheed Landeros RN  Outcome: Ongoing  1/1/2020 1150 by Wilfrido Camacho RN  Outcome: Ongoing  Goal: Discharged to appropriate level of care  Description  Discharged to appropriate level of care  1/2/2020 0051 by Anel Levy RN  Outcome: Ongoing  1/1/2020 1150 by Fatoumata Patton RN  Outcome: Ongoing     Problem: Sleep Pattern Disturbance:  Goal: Appears well-rested  Description  Appears well-rested  1/2/2020 0051 by Anel Levy RN  Outcome: Ongoing  1/1/2020 1150 by Fatoumata Patton RN  Outcome: Ongoing     Problem: Airway Clearance - Ineffective:  Goal: Ability to maintain a clear airway will improve  Description  Ability to maintain a clear airway will improve  1/2/2020 0051 by Anel Levy RN  Outcome: Ongoing  1/1/2020 1150 by Fatoumata Patton RN  Outcome: Ongoing     Problem: Anxiety/Stress:  Goal: Level of anxiety will decrease  Description  Level of anxiety will decrease  1/2/2020 0051 by Anel Levy RN  Outcome: Ongoing  1/1/2020 1150 by Fatoumata Patton RN  Outcome: Ongoing     Problem: Aspiration:  Goal: Absence of aspiration  Description  Absence of aspiration  1/2/2020 0051 by Anel Levy RN  Outcome: Ongoing  1/1/2020 1150 by Fatoumata Patton RN  Outcome: Ongoing     Problem:  Bowel Function - Altered:  Goal: Bowel elimination is within specified parameters  Description  Bowel elimination is within specified parameters  1/2/2020 0051 by Anel Levy RN  Outcome: Ongoing  1/1/2020 1150 by Fatoumata Patton RN  Outcome: Ongoing     Problem: Cardiac Output - Decreased:  Goal: Hemodynamic stability will improve  Description  Hemodynamic stability will improve  1/2/2020 0051 by Anel Levy RN  Outcome: Ongoing  1/1/2020 1150 by Fatoumata Patton RN  Outcome: Ongoing     Problem: Gas Exchange - Impaired:  Goal: Levels of oxygenation will improve  Description  Levels of oxygenation will improve  1/2/2020 0051 by Anel Levy RN  Outcome: Ongoing  1/1/2020 1150 by Fatoumata Patton RN  Outcome: Ongoing     Problem: Serum Glucose Level - Abnormal:  Goal: Ability to maintain appropriate glucose levels will improve to within specified parameters  Description  Ability to maintain appropriate glucose levels will improve to within specified parameters  1/2/2020 0051 by Adriano Ureña RN  Outcome: Ongoing  1/1/2020 1150 by Lara Oswald RN  Outcome: Ongoing     Problem: Tissue Perfusion, Altered:  Goal: Circulatory function within specified parameters  Description  Circulatory function within specified parameters  1/2/2020 0051 by Adriano Ureña RN  Outcome: Ongoing  1/1/2020 1150 by Lara Oswald RN  Outcome: Ongoing     Problem: Tissue Perfusion - Cardiopulmonary, Altered:  Goal: Absence of angina  Description  Absence of angina  1/2/2020 0051 by Adriano Ureña RN  Outcome: Ongoing  1/1/2020 1150 by Lara Oswald RN  Outcome: Ongoing  Goal: Hemodynamic stability will improve  Description  Hemodynamic stability will improve  1/2/2020 0051 by Adriano Ureña RN  Outcome: Ongoing  1/1/2020 1150 by Lara Oswald RN  Outcome: Ongoing     Problem: Confusion - Acute:  Goal: Mental status will be restored to baseline  Description  Mental status will be restored to baseline  1/2/2020 0051 by Adriano Ureña RN  Outcome: Ongoing  1/1/2020 1150 by Lara Oswald RN  Outcome: Ongoing  Goal: Absence of continued neurological deterioration signs and symptoms  Description  Absence of continued neurological deterioration signs and symptoms  1/2/2020 0051 by Adriano Ureña RN  Outcome: Ongoing  1/1/2020 1150 by Lara Oswald RN  Outcome: Ongoing     Problem: Injury - Risk of, Physical Injury:  Goal: Absence of physical injury  Description  Absence of physical injury  1/2/2020 0051 by Adriano Ureña RN  Outcome: Ongoing  1/1/2020 1150 by Lara Oswald RN  Outcome: Ongoing  Goal: Will remain free from falls  Description  Will remain free from falls  1/2/2020 0051 by Adriano Ureña RN  Outcome: Ongoing  1/1/2020 1150 by Lara Oswald RN  Outcome: Ongoing     Problem: Mood - Altered:  Goal: Mood stable  Description  Mood stable  1/2/2020

## 2020-01-02 NOTE — PROGRESS NOTES
(porcine), glucose, dextrose, glucagon (rDNA), dextrose, fentanNYL **OR** fentanNYL    Objective     Vitals:  Patient Vitals for the past 8 hrs:   BP Temp Temp src Pulse Resp SpO2 Height Weight   20 0800 -- -- -- 71 -- -- -- --   20 0724 (!) 141/76 98.6 °F (37 °C) Oral 67 16 100 % -- --   20 0647 -- -- -- -- -- -- 6' 2\" (1.88 m) 216 lb 14.9 oz (98.4 kg)     Average, Min, and Max for last 24 hours Vitals:  TEMPERATURE:  Temp  Av.6 °F (37 °C)  Min: 98.3 °F (36.8 °C)  Max: 99 °F (37.2 °C)    RESPIRATIONS RANGE: Resp  Av.7  Min: 8  Max: 30    PULSE RANGE: Pulse  Av.5  Min: 66  Max: 101    BLOOD PRESSURE RANGE:  Systolic (39GLQ), KKN:391 , Min:122 , CRC:173   ; Diastolic (99ACU), VZX:79, Min:57, Max:84      PULSE OXIMETRY RANGE: SpO2  Av.9 %  Min: 98 %  Max: 100 %    I/O last 3 completed shifts: In: 1904.9 [P.O.:480; I.V.:1424.9]  Out: 1250 [Urine:1250]    CBC:  Recent Labs     19  0553 20  0514 20  0525   WBC 5.5 5.5 5.5   HGB 8.1* 8.5* 8.7*   HCT 26.8* 29.1* 30.7*    260 280   CRP 55.1* 31.9* 24.6*        BMP:  Recent Labs     19  0553  198 20  0837 20     --   --   --   --    K 5.3   < > 3.9 4.2 3.9     --   --   --   --    CO2 20  --   --   --   --    BUN 20  --   --   --   --    CREATININE 0.97  --   --   --   --    GLUCOSE 97  --   --   --   --    CALCIUM 8.2*  --   --   --   --     < > = values in this interval not displayed. Coags:  No results for input(s): APTT, PROT, INR in the last 72 hours. Lab Results   Component Value Date    SEDRATE 47 (H) 2019     Recent Labs     19  0553 20  0514 20  0525   CRP 55.1* 31.9* 24.6*       Lower Extremity Physical Exam:  Vascular: DP and PT pulses are non palpable. CFT >3 seconds to all digits. Hair growth is absent to the level of the digits.   Nonpitting edema to the left LE     Neuro: Saph/sural/SP/DP/plantar sensation diminished to light touch.     Musculoskeletal: Muscle strength deferred to all lower extremity muscle groups. Gross deformity is amputation of right 2nd ray, digits 1-3 amputation on the left.     Dermatologic: Full thickness ulcer #1 located sub 2nd MT head and measures approximately 2.1 cm x 2.1 cm x 0.5 cm. Base is fibrogranular. Periwound skin is macerated. Scant serous drainage noted without associated mal odor. Erythema present with mild associated increase in warmth. Does probe to bone, but no sinus track or undermining. No fluctuance, crepitus, or induration. Dry necrotic skin noted superficially on the 1st MT dorsally and plantarly     Skin on the right foot is warm and dry with no open lesions noted. Clinical Images:              Imaging:   MRI FOOT LEFT WO CONTRAST   Final Result   Osteomyelitis involving the 2nd metatarsal head, 3rd metatarsal head, and 4th   toe. Edema within the 1st metatarsal bone and base of the 2nd metatarsal bone is   suspected to be reactive, however early osteomyelitis is not excluded. VL LOWER EXTREMITY ARTERIAL SEGMENTAL PRESSURES W PPG BILATERAL   Final Result      US RETROPERITONEAL LIMITED   Final Result   No hydronephrosis. XR CHEST PORTABLE   Final Result   Central line tip in the SVC with no pneumothorax. Suggestion of mild perihilar edema and CHF, possibly accentuated by low lung   volumes. Cultures:   Wound cx 12/29: GPC, MRSA    Assessment   David Davis is a 53 y.o. male with   1. Osteomyelitis of metatarsals 2-4, left foot  2. Diabetic wound down to the level of the bone, sub 2nd MT head left foot  3. Cellulitis  4. Sepsis  5. S/p digit 1-3 amputation, left foot  6. S/p 2nd ray amputation, right foot  7. DM2  8. AMS  9.  Acute renal failure    Active Problems:    Acute renal failure (ARF) (HCC)    Hyperkalemia    Hypocalcemia    Metabolic acidosis    Diabetic ulcer of toe of left foot associated with type 2 diabetes mellitus

## 2020-01-02 NOTE — PROGRESS NOTES
Occupational Therapy Not Seen Note    DATE: 2020  Name: Satinder Milligan  : 1965  MRN: [id number]    Patient not available for Occupational Therapy due to:    Patient Declined all EOB/ OOB activity. Pt educated in importance of activity, continued to decline d/t fatigue, pain and just returning to bed prior to attempt.     Next Scheduled Treatment: Re-check 1/3/2020    Electronically signed by JAMIL Soler on 2020 at 2:15 PM

## 2020-01-02 NOTE — FLOWSHEET NOTE
Patient verbalized not being able to have a bowel movement since 12/25/19. PRN medication provided with orders sought and received for additional PRN medication by GROVER Becerra NP. Reviewed plan with patient to utilize walker to stand on patient's good leg (right leg) and to put no weight on left leg. Patient is agreeable to this plan but would like to ambulated to the bathroom (10 feet) because patient feels he would have better results. Patient ambulated to bathroom without difficulty, keeping affected leg (left leg) from touching floor. Patient returned to bed and verbalized feeling weak and tired. Patient allowed to rest with vital signs indicating no concern. Plan modified for patient to utilize walker to stand and pivot, taking minimal steps to use bedside commode or to transfer to a high backed chair at bed side. Patient is agreeable to this plan.

## 2020-01-03 ENCOUNTER — TELEPHONE (OUTPATIENT)
Dept: PRIMARY CARE CLINIC | Age: 55
End: 2020-01-03

## 2020-01-03 ENCOUNTER — ANESTHESIA EVENT (OUTPATIENT)
Dept: OPERATING ROOM | Age: 55
DRG: 710 | End: 2020-01-03
Payer: MEDICARE

## 2020-01-03 LAB
-: NORMAL
ABSOLUTE EOS #: 0.19 K/UL (ref 0–0.44)
ABSOLUTE IMMATURE GRANULOCYTE: <0.03 K/UL (ref 0–0.3)
ABSOLUTE LYMPH #: 1.21 K/UL (ref 1.1–3.7)
ABSOLUTE MONO #: 0.49 K/UL (ref 0.1–1.2)
BASOPHILS # BLD: 1 % (ref 0–2)
BASOPHILS ABSOLUTE: 0.04 K/UL (ref 0–0.2)
C-REACTIVE PROTEIN: 11.7 MG/L (ref 0–5)
CALCIUM IONIZED: 1.14 MMOL/L (ref 1.13–1.33)
CULTURE: NORMAL
DIFFERENTIAL TYPE: ABNORMAL
EOSINOPHILS RELATIVE PERCENT: 3 % (ref 1–4)
GLUCOSE BLD-MCNC: 101 MG/DL (ref 75–110)
GLUCOSE BLD-MCNC: 88 MG/DL (ref 75–110)
GLUCOSE BLD-MCNC: 92 MG/DL (ref 75–110)
GLUCOSE BLD-MCNC: 96 MG/DL (ref 75–110)
HCT VFR BLD CALC: 30.7 % (ref 40.7–50.3)
HEMOGLOBIN: 8.9 G/DL (ref 13–17)
IMMATURE GRANULOCYTES: 0 %
LYMPHOCYTES # BLD: 22 % (ref 24–43)
Lab: NORMAL
MAGNESIUM: 1.9 MG/DL (ref 1.6–2.6)
MCH RBC QN AUTO: 24.1 PG (ref 25.2–33.5)
MCHC RBC AUTO-ENTMCNC: 29 G/DL (ref 28.4–34.8)
MCV RBC AUTO: 83 FL (ref 82.6–102.9)
MONOCYTES # BLD: 9 % (ref 3–12)
NRBC AUTOMATED: 0 PER 100 WBC
PDW BLD-RTO: 18.6 % (ref 11.8–14.4)
PHOSPHORUS: 3.9 MG/DL (ref 2.5–4.5)
PLATELET # BLD: 316 K/UL (ref 138–453)
PLATELET ESTIMATE: ABNORMAL
PMV BLD AUTO: 9.6 FL (ref 8.1–13.5)
POTASSIUM SERPL-SCNC: 4.2 MMOL/L (ref 3.7–5.3)
POTASSIUM SERPL-SCNC: 4.4 MMOL/L (ref 3.7–5.3)
RBC # BLD: 3.7 M/UL (ref 4.21–5.77)
RBC # BLD: ABNORMAL 10*6/UL
REASON FOR REJECTION: NORMAL
SEG NEUTROPHILS: 65 % (ref 36–65)
SEGMENTED NEUTROPHILS ABSOLUTE COUNT: 3.67 K/UL (ref 1.5–8.1)
SPECIMEN DESCRIPTION: NORMAL
WBC # BLD: 5.6 K/UL (ref 3.5–11.3)
WBC # BLD: ABNORMAL 10*3/UL
ZZ NTE CLEAN UP: ORDERED TEST: NORMAL
ZZ NTE WITH NAME CLEAN UP: SPECIMEN SOURCE: NORMAL

## 2020-01-03 PROCEDURE — 6370000000 HC RX 637 (ALT 250 FOR IP): Performed by: EMERGENCY MEDICINE

## 2020-01-03 PROCEDURE — 83735 ASSAY OF MAGNESIUM: CPT

## 2020-01-03 PROCEDURE — 6360000002 HC RX W HCPCS: Performed by: INTERNAL MEDICINE

## 2020-01-03 PROCEDURE — 84132 ASSAY OF SERUM POTASSIUM: CPT

## 2020-01-03 PROCEDURE — 6370000000 HC RX 637 (ALT 250 FOR IP): Performed by: STUDENT IN AN ORGANIZED HEALTH CARE EDUCATION/TRAINING PROGRAM

## 2020-01-03 PROCEDURE — 6370000000 HC RX 637 (ALT 250 FOR IP): Performed by: NURSE PRACTITIONER

## 2020-01-03 PROCEDURE — 2580000003 HC RX 258: Performed by: INTERNAL MEDICINE

## 2020-01-03 PROCEDURE — 82330 ASSAY OF CALCIUM: CPT

## 2020-01-03 PROCEDURE — 99233 SBSQ HOSP IP/OBS HIGH 50: CPT | Performed by: INTERNAL MEDICINE

## 2020-01-03 PROCEDURE — 6360000002 HC RX W HCPCS: Performed by: EMERGENCY MEDICINE

## 2020-01-03 PROCEDURE — 82947 ASSAY GLUCOSE BLOOD QUANT: CPT

## 2020-01-03 PROCEDURE — 2580000003 HC RX 258: Performed by: STUDENT IN AN ORGANIZED HEALTH CARE EDUCATION/TRAINING PROGRAM

## 2020-01-03 PROCEDURE — 2580000003 HC RX 258: Performed by: NURSE PRACTITIONER

## 2020-01-03 PROCEDURE — 6360000002 HC RX W HCPCS: Performed by: NURSE PRACTITIONER

## 2020-01-03 PROCEDURE — 84100 ASSAY OF PHOSPHORUS: CPT

## 2020-01-03 PROCEDURE — 1200000000 HC SEMI PRIVATE

## 2020-01-03 PROCEDURE — 36415 COLL VENOUS BLD VENIPUNCTURE: CPT

## 2020-01-03 PROCEDURE — 85025 COMPLETE CBC W/AUTO DIFF WBC: CPT

## 2020-01-03 PROCEDURE — 99232 SBSQ HOSP IP/OBS MODERATE 35: CPT | Performed by: INTERNAL MEDICINE

## 2020-01-03 PROCEDURE — 86140 C-REACTIVE PROTEIN: CPT

## 2020-01-03 RX ORDER — DIAZEPAM 5 MG/1
5 TABLET ORAL EVERY 12 HOURS PRN
Status: DISCONTINUED | OUTPATIENT
Start: 2020-01-03 | End: 2020-01-06 | Stop reason: HOSPADM

## 2020-01-03 RX ADMIN — HEPARIN SODIUM 5000 UNITS: 5000 INJECTION INTRAVENOUS; SUBCUTANEOUS at 06:03

## 2020-01-03 RX ADMIN — OXYCODONE HYDROCHLORIDE 10 MG: 5 TABLET ORAL at 00:42

## 2020-01-03 RX ADMIN — Medication 10 ML: at 20:33

## 2020-01-03 RX ADMIN — SENNOSIDES 17.2 MG: 8.6 TABLET, FILM COATED ORAL at 07:51

## 2020-01-03 RX ADMIN — FENTANYL CITRATE 50 MCG: 50 INJECTION, SOLUTION INTRAMUSCULAR; INTRAVENOUS at 03:17

## 2020-01-03 RX ADMIN — OXYCODONE HYDROCHLORIDE 10 MG: 5 TABLET ORAL at 14:55

## 2020-01-03 RX ADMIN — BUPROPION HYDROCHLORIDE 300 MG: 300 TABLET, FILM COATED, EXTENDED RELEASE ORAL at 07:51

## 2020-01-03 RX ADMIN — PANTOPRAZOLE SODIUM 40 MG: 40 TABLET, DELAYED RELEASE ORAL at 07:52

## 2020-01-03 RX ADMIN — OXYCODONE HYDROCHLORIDE 10 MG: 5 TABLET ORAL at 06:01

## 2020-01-03 RX ADMIN — CEFTAROLINE FOSAMIL 600 MG: 600 POWDER, FOR SOLUTION INTRAVENOUS at 05:08

## 2020-01-03 RX ADMIN — OXYCODONE HYDROCHLORIDE 10 MG: 5 TABLET ORAL at 19:21

## 2020-01-03 RX ADMIN — MAGNESIUM GLUCONATE 500 MG ORAL TABLET 400 MG: 500 TABLET ORAL at 07:51

## 2020-01-03 RX ADMIN — PAROXETINE HYDROCHLORIDE HEMIHYDRATE 20 MG: 20 TABLET, FILM COATED ORAL at 07:51

## 2020-01-03 RX ADMIN — LEVOTHYROXINE SODIUM 50 MCG: 50 TABLET ORAL at 07:51

## 2020-01-03 RX ADMIN — Medication 1 CAPSULE: at 16:19

## 2020-01-03 RX ADMIN — TAMSULOSIN HYDROCHLORIDE 0.4 MG: 0.4 CAPSULE ORAL at 07:52

## 2020-01-03 RX ADMIN — Medication 1 CAPSULE: at 07:52

## 2020-01-03 RX ADMIN — TIZANIDINE 4 MG: 4 TABLET ORAL at 00:43

## 2020-01-03 RX ADMIN — CEFTAROLINE FOSAMIL 600 MG: 600 POWDER, FOR SOLUTION INTRAVENOUS at 18:39

## 2020-01-03 RX ADMIN — FENTANYL CITRATE 50 MCG: 50 INJECTION, SOLUTION INTRAMUSCULAR; INTRAVENOUS at 16:18

## 2020-01-03 RX ADMIN — SENNOSIDES 17.2 MG: 8.6 TABLET, FILM COATED ORAL at 20:32

## 2020-01-03 RX ADMIN — FENTANYL CITRATE 50 MCG: 50 INJECTION, SOLUTION INTRAMUSCULAR; INTRAVENOUS at 20:32

## 2020-01-03 RX ADMIN — OXYCODONE HYDROCHLORIDE 10 MG: 5 TABLET ORAL at 10:49

## 2020-01-03 RX ADMIN — FENTANYL CITRATE 50 MCG: 50 INJECTION, SOLUTION INTRAMUSCULAR; INTRAVENOUS at 12:12

## 2020-01-03 RX ADMIN — FENTANYL CITRATE 50 MCG: 50 INJECTION, SOLUTION INTRAMUSCULAR; INTRAVENOUS at 07:52

## 2020-01-03 RX ADMIN — MAGNESIUM GLUCONATE 500 MG ORAL TABLET 400 MG: 500 TABLET ORAL at 20:32

## 2020-01-03 ASSESSMENT — ENCOUNTER SYMPTOMS
BLOOD IN STOOL: 0
EYE DISCHARGE: 0
SHORTNESS OF BREATH: 0
NAUSEA: 0
WHEEZING: 0
VOMITING: 0
BACK PAIN: 0
COLOR CHANGE: 0
ABDOMINAL PAIN: 0

## 2020-01-03 ASSESSMENT — PAIN SCALES - WONG BAKER
WONGBAKER_NUMERICALRESPONSE: 8
WONGBAKER_NUMERICALRESPONSE: 6
WONGBAKER_NUMERICALRESPONSE: 8

## 2020-01-03 ASSESSMENT — PAIN SCALES - GENERAL
PAINLEVEL_OUTOF10: 9
PAINLEVEL_OUTOF10: 8
PAINLEVEL_OUTOF10: 9
PAINLEVEL_OUTOF10: 7
PAINLEVEL_OUTOF10: 9

## 2020-01-03 ASSESSMENT — PAIN DESCRIPTION - LOCATION
LOCATION: FOOT

## 2020-01-03 ASSESSMENT — PAIN DESCRIPTION - PAIN TYPE
TYPE: CHRONIC PAIN

## 2020-01-03 NOTE — FLOWSHEET NOTE
Patient: Eve Lo  Room:  2017  Date:  01/03/2020  : SCARLET Locke    Assessment: Patient presented as approachable and ready to visit. As  said he was present due to surgery for next, patient began to exhibit some anxiety. Patient reported the surgery had been scheduled for next week; then this morning doctor came in said with a cancellation surgery would be moved to next day.  reflected that must have been a shock. Patient then began to remove sock covering bandaged foot. Patient reported that while the original intent was to take just past the toes, doctor said it might require taking the foot back to the heel. Then doctor also warned if something went poorly (infection found further down?) amputation might go 6 inches above foot. Patient was showing definite signs of anxiety now. Patient related story of not becoming a Ashlyn Boom due to family health issues. Patient also related a rather sacramental view of the purpose of his likely suffering.  affirmed patient expressions and noted a passage which would support this view.  then asked about prayer and patient accepted. Patient and wife both expressed strong gratitude for prayer. 01/03/20 1636   Encounter Summary   Services provided to: Patient; Family   Referral/Consult From:   (Pre surgical visit)   Support System Spouse; Family members   Place of [employer]   (Jain)   Continue Visiting   (01/03/2020)   Complexity of Encounter Moderate   Length of Encounter 30 minutes   Spiritual Assessment Completed Yes   Routine   Type Pre-procedure   Assessment Approachable;Grieving; Anxious; Fearful;Spiritual struggle;Coping   Intervention Active listening;Explored feelings, thoughts, concerns;Explored coping resources;Nurtured hope;Prayer;[place];Scripture;Sustaining presence/ Ministry of presence   Outcome Acceptance;Expressed gratitude;Expressed feelings of michael, peace, and/or

## 2020-01-03 NOTE — PROGRESS NOTES
Nutrition Assessment    Type and Reason for Visit: Initial(LOS day 6)    Nutrition Recommendations:   -Continue NPO status  -Restart diet as able   -Recommend glucerna supplements BID as able   -Will monitor nutrition progression     Nutrition Assessment:  Pt admitted d/t AMS. Pt currently w/ RN during time of visit. Pt is NPO for Lt foot wound amputation. Pt w/ non-healing surgical wounds. Pt w/ 11.2% wt loss x 11 mo per EMR, not considered significant. When PO diet was active pt was consuming 25-50% and % of his meals. Will monitor for restart of diet and add supplements as able to compliment po intake and aide in wound healing progression. Malnutrition Assessment:  · Malnutrition Status: Insufficient data  · Context: Acute illness or injury  · Findings of the 6 clinical characteristics of malnutrition (Minimum of 2 out of 6 clinical characteristics is required to make the diagnosis of moderate or severe Protein Calorie Malnutrition based on AND/ASPEN Guidelines):  1. Energy Intake-Unable to assess,      2. Weight Loss-10% loss or greater, (x 11 mo)  3. Fat Loss-Unable to assess,    4. Muscle Loss-Unable to assess,    5. Fluid Accumulation-Mild fluid accumulation, Extremities  6.  Strength-Not measured    Nutrition Risk Level:  Moderate    Nutrient Needs:  · Estimated Daily Total Kcal: 1.2-1.3 ~>5871-8490 kcals/d   · Estimated Daily Protein (g): 1.2-1.5 gm/kg ~>103-129 gms/d     Nutrition Diagnosis:   · Problem: Inadequate oral intake  · Etiology: related to (procedure today )     Signs and symptoms:  as evidenced by NPO status due to medical condition    Objective Information:  · Wound Type: (non-healing surgical wounds )  · Current Nutrition Therapies:  · Oral Diet Orders: NPO   · Anthropometric Measures:  · Ht: 6' 2\" (188 cm)   · Current Body Wt: 222 lb (100.7 kg)  · Admission Body Wt: 213 lb (96.6 kg)  · % Weight Change:  ,  11.2% wt loss x 11 mo per EMR   · Ideal Body Wt: 190 lb (86.2 kg), % Ideal Body 112% adm/ideal  · BMI Classification: BMI 25.0 - 29.9 Overweight    Nutrition Interventions:   Continue NPO(Restart diet as able and recommend adding glucerna supplements BID as able )  Continued Inpatient Monitoring, Education Not Indicated    Nutrition Evaluation:   · Evaluation: Goals set   · Goals: Restart diet within 24-72 hrs     · Monitoring: Nutrition Progression, I&O, Skin Integrity, Wound Healing, Weight, Pertinent Labs, Monitor Bowel Function      Electronically signed by Miryam Larios RD, TRAM on 1/3/20 at 12:39 PM    Contact Number: [phone]

## 2020-01-03 NOTE — PLAN OF CARE
Problem: Pain:  Goal: Pain level will decrease  Description  Pain level will decrease  Outcome: Ongoing  Goal: Control of acute pain  Description  Control of acute pain  Outcome: Ongoing  Goal: Control of chronic pain  Description  Control of chronic pain  Outcome: Ongoing     Problem: Fluid Volume - Imbalance:  Goal: Absence of imbalanced fluid volume signs and symptoms  Description  Absence of imbalanced fluid volume signs and symptoms  Outcome: Ongoing     Problem: Mental Status - Impaired:  Goal: Mental status will be restored to baseline  Description  Mental status will be restored to baseline  Outcome: Ongoing     Problem: Nutrition Deficit:  Goal: Ability to achieve adequate nutritional intake will improve  Description  Ability to achieve adequate nutritional intake will improve  Outcome: Ongoing     Problem: Pain:  Goal: Pain level will decrease  Description  Pain level will decrease  Outcome: Ongoing  Goal: Recognizes and communicates pain  Description  Recognizes and communicates pain  Outcome: Ongoing  Goal: Control of acute pain  Description  Control of acute pain  Outcome: Ongoing  Goal: Control of chronic pain  Description  Control of chronic pain  Outcome: Ongoing     Problem: Skin Integrity - Impaired:  Goal: Will show no infection signs and symptoms  Description  Will show no infection signs and symptoms  Outcome: Ongoing  Goal: Absence of new skin breakdown  Description  Absence of new skin breakdown  Outcome: Ongoing     Problem: Confusion - Acute:  Goal: Mental status will be restored to baseline  Description  Mental status will be restored to baseline  Outcome: Ongoing  Goal: Absence of continued neurological deterioration signs and symptoms  Description  Absence of continued neurological deterioration signs and symptoms  Outcome: Ongoing     Problem: Discharge Planning:  Goal: Ability to perform activities of daily living will improve  Description  Ability to perform activities of daily living will improve  Outcome: Ongoing  Goal: Participates in care planning  Description  Participates in care planning  Outcome: Ongoing  Goal: Discharged to appropriate level of care  Description  Discharged to appropriate level of care  Outcome: Ongoing     Problem: Injury - Risk of, Physical Injury:  Goal: Absence of physical injury  Description  Absence of physical injury  Outcome: Ongoing  Goal: Will remain free from falls  Description  Will remain free from falls  Outcome: Ongoing     Problem: Mood - Altered:  Goal: Mood stable  Description  Mood stable  Outcome: Ongoing  Goal: Absence of abusive behavior  Description  Absence of abusive behavior  Outcome: Ongoing  Goal: Verbalizations of feeling emotionally comfortable while being cared for will increase  Description  Verbalizations of feeling emotionally comfortable while being cared for will increase  Outcome: Ongoing     Problem: Psychomotor Activity - Altered:  Goal: Absence of psychomotor disturbance signs and symptoms  Description  Absence of psychomotor disturbance signs and symptoms  Outcome: Ongoing     Problem: Sensory Perception - Impaired:  Goal: Demonstrations of improved sensory functioning will increase  Description  Demonstrations of improved sensory functioning will increase  Outcome: Ongoing  Goal: Decrease in sensory misperception frequency  Description  Decrease in sensory misperception frequency  Outcome: Ongoing  Goal: Able to refrain from responding to false sensory perceptions  Description  Able to refrain from responding to false sensory perceptions  Outcome: Ongoing  Goal: Demonstrates accurate environmental perceptions  Description  Demonstrates accurate environmental perceptions  Outcome: Ongoing  Goal: Able to distinguish between reality-based and nonreality-based thinking  Description  Able to distinguish between reality-based and nonreality-based thinking  Outcome: Ongoing  Goal: Able to interrupt nonreality-based falls  Description  Will remain free from falls  Outcome: Ongoing     Problem: Risk for Impaired Skin Integrity  Goal: Tissue integrity - skin and mucous membranes  Description  Structural intactness and normal physiological function of skin and  mucous membranes.   Outcome: Ongoing

## 2020-01-03 NOTE — ANESTHESIA PRE PROCEDURE
times daily 5/21/19   Concetta Keen MD   tamsulosin [facility]) 0.4 MG capsule Take 1 capsule by mouth daily 5/21/19   Concetta Keen MD   pantoprazole (PROTONIX) 40 MG tablet Take 1 tablet by mouth every morning (before breakfast) 5/22/19   MD Alina Benites Cools LANCETS 09Q MISC TEST TWO TIMES A DAY 5/7/19   Concetta Keen MD   povidone-iodine (BETADINE) 7.5 % external solution Apply to wound. Apply as a wet to dry dressing 4/16/19   Megan Valera DPM   levothyroxine (SYNTHROID) 50 MCG tablet Take 1 tablet by mouth daily 4/3/19   Concetta Keen MD   tiZANidine (ZANAFLEX) 4 MG tablet TAKE ONE TABLET BY MOUTH EVERY 6 HOURS AS NEEDED FOR MUSCLE SPASMS 3/21/19   Concetta Keen MD   ONE TOUCH ULTRA TEST strip USE ONE STRIP TO TEST TWO TIMES A DAY AS NEEDED 3/18/19   Concetta Keen MD   [facility] [id number] UNIT/GM powder APPLY TO AFFECTED AREA(S) FOUR TIMES A DAY 2/7/19   Concetta Keen MD   buPROPion (WELLBUTRIN XL) 300 MG extended release tablet TAKE ONE TABLET BY MOUTH DAILY 11/28/18   Concetta Keen MD   Wound Dressings (ADAPTIC NON-ADHERING DRESSING) PADS Apply 1 Units topically 2 times daily 9/28/18   Concetta Keen MD   Gauze Pads & Dressings (COMBINE ABD) 5\"X9\" PADS 1 Units by Does not apply route 2 times daily 9/28/18   Concetta Keen MD   lisinopril (PRINIVIL;ZESTRIL) 10 MG tablet TAKE ONE TABLET BY MOUTH DAILY 7/27/18   Concetta Keen MD   Blood Glucose Monitoring Suppl (ONE TOUCH ULTRA 2) w/Device KIT 1 kit by Other route daily 3/30/18   Concetta Keen MD   ciclopirox (LOPROX) 0.77 % cream APPLY TO AFFECTED AREA(S) AND RUB  IN A THIN FILM TWO TIMES A DAY (AM AND PM) 12/12/17   Concetta Keen MD   Gauze Pads & Dressings [facility] GAUZE ROLL LARGE) MISC 1 Units by Does not apply route 2 times daily 6/12/17 11/5/18  Jovan Smith MD       Current medications:    No current facility-administered medications for this visit.       No current AMPUTATION Bilateral 3/6/2017    TOE AMPUTATION LEFT HALLUX AND 2ND DIGIT AND RIGHT 2ND DIGIT performed by Daquan Rivera DPM at [address] ENDOSCOPY N/A 5/20/2019    EGD WITH BIOPSY performed by Hipolito Maher MD at [facility] History:    Social History     Tobacco Use    Smoking status: Never Smoker    Smokeless tobacco: Never Used   Substance Use Topics    Alcohol use: No                                Counseling given: Not Answered      Vital Signs (Current): There were no vitals filed for this visit.                                            BP Readings from Last 3 Encounters:   01/03/20 136/81   12/28/19 (!) 108/58   12/24/19 128/62       NPO Status:                                                                                 BMI:   Wt Readings from Last 3 Encounters:   01/03/20 222 lb 7.1 oz (100.9 kg)   12/28/19 216 lb (98 kg)   12/24/19 216 lb 6.4 oz (98.2 kg)     There is no height or weight on file to calculate BMI.    CBC:   Lab Results   Component Value Date    WBC 5.6 01/03/2020    RBC 3.70 01/03/2020    HGB 8.9 01/03/2020    HCT 30.7 01/03/2020    MCV 83.0 01/03/2020    RDW 18.6 01/03/2020     01/03/2020       CMP:   Lab Results   Component Value Date     12/31/2019    K 4.4 01/03/2020     12/31/2019    CO2 20 12/31/2019    BUN 20 12/31/2019    CREATININE 0.97 12/31/2019    GFRAA >60 12/31/2019    LABGLOM >60 12/31/2019    GLUCOSE 97 12/31/2019    PROT 8.2 12/28/2019    CALCIUM 8.2 12/31/2019    BILITOT 0.26 12/28/2019    ALKPHOS 87 12/28/2019    AST 16 12/28/2019    ALT 12 12/28/2019       POC Tests:   Recent Labs     01/03/20  1148   POCGLU 92       Coags:   Lab Results   Component Value Date    PROTIME 14.8 05/17/2019    INR 1.2 05/17/2019    APTT 32.4 05/17/2019       HCG (If Applicable): No results found for: PREGTESTUR, PREGSERUM, HCG, HCGQUANT     ABGs: No results found for: PHART, PO2ART, MIA0TTU, QPK6UOT,

## 2020-01-03 NOTE — PROGRESS NOTES
**OR** [place]    Objective     Vitals:  Patient Vitals for the past 8 hrs:   Height Weight   20 0614 6' 2\" (1.88 m) 222 lb 7.1 oz (100.9 kg)     Average, Min, and Max for last 24 hours Vitals:  TEMPERATURE:  Temp  Av.5 °F (36.9 °C)  Min: 98.3 °F (36.8 °C)  Max: 98.6 °F (37 °C)    RESPIRATIONS RANGE: Resp  Avg: 15  Min: 14  Max: 16    PULSE RANGE: Pulse  Av  Min: 67  Max: 84    BLOOD PRESSURE RANGE:  Systolic (68DOP), CSV:359 , Min:129 , XAW:950   ; Diastolic (66BSO), PFU:43, Min:75, Max:76      PULSE OXIMETRY RANGE: SpO2  Av.5 %  Min: 97 %  Max: 100 %    I/O last 3 completed shifts: In: 1598.9 [P.O.:600; I.V.:998.9]  Out: 1250 [Urine:1250]    CBC:  Recent Labs     20  0514 20  0525 20  0458   WBC 5.5 5.5 5.6   HGB 8.5* 8.7* 8.9*   HCT 29.1* 30.7* 30.7*    280 316   CRP 31.9* 24.6* 11.7*        BMP:  Recent Labs     20  2043 20  0525 20  1958   K 3.9 4.5 3.7        Coags:  No results for input(s): APTT, PROT, INR in the last 72 hours. Lab Results   Component Value Date    SEDRATE 47 (H) 2019     Recent Labs     20  0514 20  0525 20  0458   CRP 31.9* 24.6* 11.7*       Lower Extremity Physical Exam:  Vascular: DP and PT pulses are non palpable. CFT >3 seconds to all digits. Hair growth is absent to the level of the digits. Nonpitting edema to the left LE     Neuro: Saph/sural/SP/DP/plantar sensation diminished to light touch.     Musculoskeletal: Muscle strength deferred to all lower extremity muscle groups. Gross deformity is amputation of right 2nd ray, digits 1-3 amputation on the left.     Dermatologic: Full thickness ulcer #1 located sub 2nd MT head and measures approximately 2.1 cm x 2.1 cm x 0.5 cm. Base is fibrogranular. Periwound skin is macerated. Scant serosanguinous drainage noted without associated mal odor. Erythema present without associated increase in warmth.  Does probe to bone, but no sinus track or

## 2020-01-03 NOTE — PLAN OF CARE
Problem: Pain:  Description  Pain management should include both nonpharmacologic and pharmacologic interventions.   Goal: Pain level will decrease  Description  Pain level will decrease  1/3/2020 0712 by Dewey Segura RN  Outcome: Ongoing  1/2/2020 2313 by Tommy Hawkins RN  Outcome: Ongoing  Goal: Control of acute pain  Description  Control of acute pain  1/3/2020 0712 by Dewey Segura RN  Outcome: Ongoing  1/2/2020 2313 by Tommy Hawkins RN  Outcome: Ongoing  Goal: Control of chronic pain  Description  Control of chronic pain  1/3/2020 0712 by Dewey Segura RN  Outcome: Ongoing  1/2/2020 2313 by Tommy Hawkins RN  Outcome: Ongoing

## 2020-01-03 NOTE — FLOWSHEET NOTE
Patient's right arm appears to be twice in size when compared to patient's left arm. IV discontinued from patient's right upper arm where IV catheter was only half inserted into site. New IV inserted to patient's left arm with 20 gauge angio with positive blood return. Patient denies any discomfort to Left arm with no redness, warmth or streaks noted. Patient advised to elevate right arm and make Nurse aware if any concerns became present.

## 2020-01-03 NOTE — PROGRESS NOTES
[facility] - Progress Note    Today's Date and Time: 1/3/2020, 5:07 PM    Impression :   · Altered mental status.-Improved  · DM   · Diabetic neuropathy   · Left foot diabetic plantar ulcer and gangrene . · S aureus Lt foot infection MRSA +  · Prior toe amputations 2017:   · 1. Amputation distal phalanx, left second toe. 2. Amputation distal phalanx, right second toe. 3. Amputation distal phalanx of the left hallux. 4. I and D left fifth digit. 5. I and D right fifth digit. Culture grew MRSA   · Sepsis with CLAUDIA  · Hx chronic back pain on narcotics  · Single blood culture with bacillus spp on 12-28-19 = contaminant  · Allergy to sulfa    Recommendations:   · Continue Ceftaroline 600 mg IV q 12 hr   · Wound care  · Plan for transmetatarsal amputation of Lt foot 1-4-20  · Monitor clinical progress     Medical Decision Making/Summary/Discussion:1/3/2020     ·   Infection Control Recommendations   · [place] Precautions  · Contact Isolation MRSA    Antimicrobial Stewardship Recommendations     · Discontinuation of therapy  Coordination of Outpatient Care:   · Estimated Length of IV antimicrobials: TTBD  · Patient will need Midline Catheter Insertion: No  · Patient will need PICC line Insertion:No  · Patient will need: Home IV , [place],  SNF,  LTAC:TBD  · Patient will need outpatient wound care:Yes    Chief complaint/reason for consultation:   · Concern for sepsis      History of Present Illness:   David Davis is a 53y.o.-year-old  male who was initially admitted on 12/28/2019. Patient seen at the request of .    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 Presented to [facility] with worsening confusion over about 5 days, associated decrease in urine output.  Found to be anuric with Cr 9.47, K 6.9. Also was hypotensive and received fluids plus levophed before transfer to [place] [id number]. Blood cultures were obtained at [facility]. One of 2 yielded Bacillus spp.on 12-28-19. patient received vancomycin. Blood cultures 12-29-19: No growth. 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 Family indicates deterioration on left foot with dark discoloration of dorsum and new plantar ulcer with drainage. Pt has decided to stay at [facility] and undergo transmetatarsal amputation of Lt foot with Dr Matthew Aragon. It is scheduled for 1-4-20    CURRENT EVALUATION : 1/3/2020     Patient evaluated and examined  He is AA, denies chills or fevers, no SOB or malaise  He has decided to stay at [facility] and undergo transmetatarsal amputation of the Lt foot tomorrow AM (1-4-20)    Pt much more pleasant today, apologizing for his behavior from yesterday  Wife is at the bedside    Afebrile  VS stable. Foot unchanged  Culture with heavy growth S aureus  Lt foot X ray with new changes of second proximal phalanx remnant concerning for osteomyelitis   MRI with increased uptake on 2nd metatarsal    Renal function improved. Ceftaroline increased to 600 mg q 12 hr  Patient has been advised surgical debridement by Podiatry. Plan to continue Ceftaroline at this time. Transmetatarsal amputation scheduled for 1-4-20    Labs, X rays reviewed: 1/3/2020    BUN: 81-->41->20  Cr:4.76--4.27-->1.62->0.97    WBC:28-->9.5-->5.5->5.6  Hb:9.0-->8.1->8.7->8.9  Plat: 264-->223->280->316    ESR 47  .1->24.6->11.7    Cultures:  Urine:  ·   Blood:  · 12-28-19: 1/2 bacillus spp - likely a contaminate  · 12-29-19 x 2: No growth   Sputum :  ·   Wound:  · 12-29 MRSA heavy growth - tetracycline sensitivie    CXR 12-28-19: Vascular congestion. No pneumonia    Discussed with patient, RN, family.   1-3-20:            12-29-19:          I have personally reviewed the past medical history, past surgical history, medications, social history, and family history, and I effusions, swelling or deformities  Hematologic: No bleeding or bruising. Neurologic: No headache, weakness, numbness, or tingling. Integument: No rash, no ulcers. Psychiatric: No depression. Endocrine: No polyuria, no polydipsia, no polyphagia. Physical Examination :     No data found. General Appearance: Awake, alert, and in no apparent distress  Head:  Normocephalic, no trauma  Eyes: Pupils equal, round, reactive to light and accommodation; extraocular movements intact; sclera anicteric; conjunctivae pink. No embolic phenomena. ENT: Oropharynx clear, without erythema, exudate, or thrush. No tenderness of sinuses. Mouth/throat: mucosa pink and moist. No lesions. Dentition in good repair. Neck:Supple, without lymphadenopathy. Thyroid normal, No bruits. Pulmonary/Chest: Clear to auscultation, without wheezes, rales, or rhonchi. No dullness to percussion. Cardiovascular: Regular rate and rhythm without murmurs, rubs, or gallops. Abdomen: Soft, non tender. Bowel sounds normal. No organomegaly  All four Extremities: No cyanosis, clubbing, edema, or effusions. Neurologic: No gross motor deficits. Mentation clearer. Peripheral neuropathy  Skin: Warm and dry with good turgor. Signs of peripheral arterial insufficiency. Lt foot with dressing in place    Medical Decision Making -Laboratory:   I have independently reviewed/ordered the following labs:    CBC with Differential:   Recent Labs     01/02/20 0525 01/03/20  0458   WBC 5.5 5.6   HGB 8.7* 8.9*   HCT 30.7* 30.7*    316   LYMPHOPCT 17* 22*   MONOPCT 8 9     BMP:   Recent Labs     01/02/20  0525 01/02/20 1958 01/03/20  0458   K 4.5 3.7 4.4   MG 2.0  --  1.9     Hepatic Function Panel:   No results for input(s): PROT, LABALBU, BILIDIR, IBILI, BILITOT, ALKPHOS, ALT, AST in the last 72 hours. No results for input(s): RPR in the last 72 hours. No results for input(s): HIV in the last 72 hours.   No results for input(s): BC in the last 72 agents that test susceptible. Preliminary    Induced Clind Resist Negative NEGATIVE Preliminary    levofloxacin Intermediate  Preliminary     4  INTERMEDIATE   linezolid   Preliminary     NOT REPORTED   moxifloxacin   Preliminary     NOT REPORTED   nitrofurantoin   Preliminary     NOT REPORTED   oxacillin Resistant  Preliminary     >=4  RESISTANT   Synercid   Preliminary     NOT REPORTED   rifampin   Preliminary     NOT REPORTED   tetracycline Sensitive  Preliminary     <=1  SUSCEPTIBLE   tigecycline   Preliminary     NOT REPORTED   trimethoprim-sulfamethoxazole Sensitive  Preliminary     <=10  SUSCEPTIBLE   vancomycin Sensitive  Preliminary     1  SUSCEPTIBLE       Medical Decision Making-Other:     Note:  · Labs, medications, radiologic studies were reviewed with personal review of films  · Large amounts of data were reviewed  · Discussed with nursing Staff, Discharge planner  · Infection Control and Prevention measures reviewed  · All prior entries were reviewed  · Administer medications as ordered  · Prognosis: Guarded  · Discharge planning reviewed  · Follow up as outpatient. Thank you for allowing us to participate in the care of this patient. Please call with questions.     Kimberly Salcido MD  Pager: [phone] - Office: [phone]

## 2020-01-03 NOTE — PROGRESS NOTES
Restart Valium 5 mg BID  8.  Discharge planning - ongoing     Mbonu Tressa Barger MD  1/3/2020  8:46 AM

## 2020-01-04 ENCOUNTER — ANESTHESIA (OUTPATIENT)
Dept: OPERATING ROOM | Age: 55
DRG: 710 | End: 2020-01-04
Payer: MEDICARE

## 2020-01-04 VITALS — OXYGEN SATURATION: 100 % | SYSTOLIC BLOOD PRESSURE: 109 MMHG | DIASTOLIC BLOOD PRESSURE: 66 MMHG | TEMPERATURE: 98.4 F

## 2020-01-04 LAB
ABSOLUTE EOS #: 0.09 K/UL (ref 0–0.44)
ABSOLUTE IMMATURE GRANULOCYTE: 0.03 K/UL (ref 0–0.3)
ABSOLUTE LYMPH #: 0.9 K/UL (ref 1.1–3.7)
ABSOLUTE MONO #: 0.28 K/UL (ref 0.1–1.2)
BASOPHILS # BLD: 1 % (ref 0–2)
BASOPHILS ABSOLUTE: 0.04 K/UL (ref 0–0.2)
C-REACTIVE PROTEIN: 7.6 MG/L (ref 0–5)
CALCIUM IONIZED: 1.23 MMOL/L (ref 1.13–1.33)
CULTURE: NORMAL
CULTURE: NORMAL
DIFFERENTIAL TYPE: ABNORMAL
EOSINOPHILS RELATIVE PERCENT: 2 % (ref 1–4)
GLUCOSE BLD-MCNC: 83 MG/DL (ref 75–110)
GLUCOSE BLD-MCNC: 95 MG/DL (ref 75–110)
GLUCOSE BLD-MCNC: 96 MG/DL (ref 75–110)
HCT VFR BLD CALC: 34.6 % (ref 40.7–50.3)
HEMOGLOBIN: 9.9 G/DL (ref 13–17)
IMMATURE GRANULOCYTES: 1 %
LYMPHOCYTES # BLD: 16 % (ref 24–43)
Lab: NORMAL
Lab: NORMAL
MAGNESIUM: 1.8 MG/DL (ref 1.6–2.6)
MCH RBC QN AUTO: 23.5 PG (ref 25.2–33.5)
MCHC RBC AUTO-ENTMCNC: 28.6 G/DL (ref 28.4–34.8)
MCV RBC AUTO: 82.2 FL (ref 82.6–102.9)
MONOCYTES # BLD: 5 % (ref 3–12)
NRBC AUTOMATED: 0 PER 100 WBC
PDW BLD-RTO: 18.6 % (ref 11.8–14.4)
PHOSPHORUS: 3.8 MG/DL (ref 2.5–4.5)
PLATELET # BLD: 352 K/UL (ref 138–453)
PLATELET ESTIMATE: ABNORMAL
PMV BLD AUTO: 9 FL (ref 8.1–13.5)
POTASSIUM SERPL-SCNC: 3.9 MMOL/L (ref 3.7–5.3)
POTASSIUM SERPL-SCNC: 4.2 MMOL/L (ref 3.7–5.3)
RBC # BLD: 4.21 M/UL (ref 4.21–5.77)
RBC # BLD: ABNORMAL 10*6/UL
SEG NEUTROPHILS: 75 % (ref 36–65)
SEGMENTED NEUTROPHILS ABSOLUTE COUNT: 4.39 K/UL (ref 1.5–8.1)
SPECIMEN DESCRIPTION: NORMAL
SPECIMEN DESCRIPTION: NORMAL
WBC # BLD: 5.7 K/UL (ref 3.5–11.3)
WBC # BLD: ABNORMAL 10*3/UL

## 2020-01-04 PROCEDURE — 0Y6N0ZF DETACHMENT AT LEFT FOOT, PARTIAL 5TH RAY, OPEN APPROACH: ICD-10-PCS | Performed by: PODIATRIST

## 2020-01-04 PROCEDURE — 6370000000 HC RX 637 (ALT 250 FOR IP): Performed by: STUDENT IN AN ORGANIZED HEALTH CARE EDUCATION/TRAINING PROGRAM

## 2020-01-04 PROCEDURE — 0Y6N0ZC DETACHMENT AT LEFT FOOT, PARTIAL 3RD RAY, OPEN APPROACH: ICD-10-PCS | Performed by: PODIATRIST

## 2020-01-04 PROCEDURE — 6360000002 HC RX W HCPCS: Performed by: EMERGENCY MEDICINE

## 2020-01-04 PROCEDURE — 6360000002 HC RX W HCPCS: Performed by: ANESTHESIOLOGY

## 2020-01-04 PROCEDURE — 0Y6N0Z9 DETACHMENT AT LEFT FOOT, PARTIAL 1ST RAY, OPEN APPROACH: ICD-10-PCS | Performed by: PODIATRIST

## 2020-01-04 PROCEDURE — 2580000003 HC RX 258: Performed by: STUDENT IN AN ORGANIZED HEALTH CARE EDUCATION/TRAINING PROGRAM

## 2020-01-04 PROCEDURE — 0Y6N0ZD DETACHMENT AT LEFT FOOT, PARTIAL 4TH RAY, OPEN APPROACH: ICD-10-PCS | Performed by: PODIATRIST

## 2020-01-04 PROCEDURE — 84132 ASSAY OF SERUM POTASSIUM: CPT

## 2020-01-04 PROCEDURE — 2709999900 HC NON-CHARGEABLE SUPPLY: Performed by: PODIATRIST

## 2020-01-04 PROCEDURE — 7100000001 HC PACU RECOVERY - ADDTL 15 MIN: Performed by: PODIATRIST

## 2020-01-04 PROCEDURE — 2580000003 HC RX 258: Performed by: NURSE PRACTITIONER

## 2020-01-04 PROCEDURE — 88307 TISSUE EXAM BY PATHOLOGIST: CPT

## 2020-01-04 PROCEDURE — 88311 DECALCIFY TISSUE: CPT

## 2020-01-04 PROCEDURE — 3600000003 HC SURGERY LEVEL 3 BASE: Performed by: PODIATRIST

## 2020-01-04 PROCEDURE — 6370000000 HC RX 637 (ALT 250 FOR IP): Performed by: PODIATRIST

## 2020-01-04 PROCEDURE — 6360000002 HC RX W HCPCS: Performed by: STUDENT IN AN ORGANIZED HEALTH CARE EDUCATION/TRAINING PROGRAM

## 2020-01-04 PROCEDURE — 28805 AMPUTATION THRU METATARSAL: CPT | Performed by: PODIATRIST

## 2020-01-04 PROCEDURE — 86140 C-REACTIVE PROTEIN: CPT

## 2020-01-04 PROCEDURE — 99232 SBSQ HOSP IP/OBS MODERATE 35: CPT | Performed by: INTERNAL MEDICINE

## 2020-01-04 PROCEDURE — 3700000000 HC ANESTHESIA ATTENDED CARE: Performed by: PODIATRIST

## 2020-01-04 PROCEDURE — 36415 COLL VENOUS BLD VENIPUNCTURE: CPT

## 2020-01-04 PROCEDURE — 6370000000 HC RX 637 (ALT 250 FOR IP): Performed by: EMERGENCY MEDICINE

## 2020-01-04 PROCEDURE — 3700000001 HC ADD 15 MINUTES (ANESTHESIA): Performed by: PODIATRIST

## 2020-01-04 PROCEDURE — 82947 ASSAY GLUCOSE BLOOD QUANT: CPT

## 2020-01-04 PROCEDURE — 6360000002 HC RX W HCPCS: Performed by: NURSE PRACTITIONER

## 2020-01-04 PROCEDURE — 1200000000 HC SEMI PRIVATE

## 2020-01-04 PROCEDURE — 85025 COMPLETE CBC W/AUTO DIFF WBC: CPT

## 2020-01-04 PROCEDURE — 0Y6N0ZB DETACHMENT AT LEFT FOOT, PARTIAL 2ND RAY, OPEN APPROACH: ICD-10-PCS | Performed by: PODIATRIST

## 2020-01-04 PROCEDURE — 82330 ASSAY OF CALCIUM: CPT

## 2020-01-04 PROCEDURE — 2500000003 HC RX 250 WO HCPCS: Performed by: NURSE ANESTHETIST, CERTIFIED REGISTERED

## 2020-01-04 PROCEDURE — 7100000000 HC PACU RECOVERY - FIRST 15 MIN: Performed by: PODIATRIST

## 2020-01-04 PROCEDURE — 3600000013 HC SURGERY LEVEL 3 ADDTL 15MIN: Performed by: PODIATRIST

## 2020-01-04 PROCEDURE — 6360000002 HC RX W HCPCS: Performed by: NURSE ANESTHETIST, CERTIFIED REGISTERED

## 2020-01-04 PROCEDURE — 2500000003 HC RX 250 WO HCPCS: Performed by: PODIATRIST

## 2020-01-04 PROCEDURE — 84100 ASSAY OF PHOSPHORUS: CPT

## 2020-01-04 PROCEDURE — 6370000000 HC RX 637 (ALT 250 FOR IP): Performed by: INTERNAL MEDICINE

## 2020-01-04 PROCEDURE — 99233 SBSQ HOSP IP/OBS HIGH 50: CPT | Performed by: INTERNAL MEDICINE

## 2020-01-04 PROCEDURE — 2580000003 HC RX 258: Performed by: PODIATRIST

## 2020-01-04 PROCEDURE — 83735 ASSAY OF MAGNESIUM: CPT

## 2020-01-04 RX ORDER — FENTANYL CITRATE 50 UG/ML
INJECTION, SOLUTION INTRAMUSCULAR; INTRAVENOUS PRN
Status: DISCONTINUED | OUTPATIENT
Start: 2020-01-04 | End: 2020-01-04 | Stop reason: SDUPTHER

## 2020-01-04 RX ORDER — FENTANYL CITRATE 50 UG/ML
25 INJECTION, SOLUTION INTRAMUSCULAR; INTRAVENOUS EVERY 5 MIN PRN
Status: DISCONTINUED | OUTPATIENT
Start: 2020-01-04 | End: 2020-01-04 | Stop reason: HOSPADM

## 2020-01-04 RX ORDER — PROPOFOL 10 MG/ML
INJECTION, EMULSION INTRAVENOUS PRN
Status: DISCONTINUED | OUTPATIENT
Start: 2020-01-04 | End: 2020-01-04 | Stop reason: SDUPTHER

## 2020-01-04 RX ORDER — ONDANSETRON 2 MG/ML
INJECTION INTRAMUSCULAR; INTRAVENOUS PRN
Status: DISCONTINUED | OUTPATIENT
Start: 2020-01-04 | End: 2020-01-04 | Stop reason: SDUPTHER

## 2020-01-04 RX ORDER — LIDOCAINE HYDROCHLORIDE 10 MG/ML
INJECTION, SOLUTION EPIDURAL; INFILTRATION; INTRACAUDAL; PERINEURAL PRN
Status: DISCONTINUED | OUTPATIENT
Start: 2020-01-04 | End: 2020-01-04 | Stop reason: SDUPTHER

## 2020-01-04 RX ORDER — MAGNESIUM HYDROXIDE 1200 MG/15ML
LIQUID ORAL CONTINUOUS PRN
Status: COMPLETED | OUTPATIENT
Start: 2020-01-04 | End: 2020-01-04

## 2020-01-04 RX ORDER — NEOSTIGMINE METHYLSULFATE 5 MG/5 ML
SYRINGE (ML) INTRAVENOUS PRN
Status: DISCONTINUED | OUTPATIENT
Start: 2020-01-04 | End: 2020-01-04 | Stop reason: SDUPTHER

## 2020-01-04 RX ORDER — CEFAZOLIN SODIUM 1 G/3ML
INJECTION, POWDER, FOR SOLUTION INTRAMUSCULAR; INTRAVENOUS PRN
Status: DISCONTINUED | OUTPATIENT
Start: 2020-01-04 | End: 2020-01-04 | Stop reason: SDUPTHER

## 2020-01-04 RX ORDER — MIDAZOLAM HYDROCHLORIDE 1 MG/ML
INJECTION INTRAMUSCULAR; INTRAVENOUS PRN
Status: DISCONTINUED | OUTPATIENT
Start: 2020-01-04 | End: 2020-01-04 | Stop reason: SDUPTHER

## 2020-01-04 RX ORDER — MEPERIDINE HYDROCHLORIDE 50 MG/ML
12.5 INJECTION INTRAMUSCULAR; INTRAVENOUS; SUBCUTANEOUS EVERY 5 MIN PRN
Status: DISCONTINUED | OUTPATIENT
Start: 2020-01-04 | End: 2020-01-04 | Stop reason: HOSPADM

## 2020-01-04 RX ORDER — SODIUM CHLORIDE, SODIUM LACTATE, POTASSIUM CHLORIDE, CALCIUM CHLORIDE 600; 310; 30; 20 MG/100ML; MG/100ML; MG/100ML; MG/100ML
INJECTION, SOLUTION INTRAVENOUS CONTINUOUS PRN
Status: DISCONTINUED | OUTPATIENT
Start: 2020-01-04 | End: 2020-01-04

## 2020-01-04 RX ORDER — FENTANYL CITRATE 50 UG/ML
50 INJECTION, SOLUTION INTRAMUSCULAR; INTRAVENOUS EVERY 5 MIN PRN
Status: DISCONTINUED | OUTPATIENT
Start: 2020-01-04 | End: 2020-01-04 | Stop reason: HOSPADM

## 2020-01-04 RX ORDER — LIDOCAINE HYDROCHLORIDE 10 MG/ML
INJECTION, SOLUTION EPIDURAL; INFILTRATION; INTRACAUDAL; PERINEURAL PRN
Status: DISCONTINUED | OUTPATIENT
Start: 2020-01-04 | End: 2020-01-04 | Stop reason: ALTCHOICE

## 2020-01-04 RX ORDER — BUPIVACAINE HYDROCHLORIDE 5 MG/ML
INJECTION, SOLUTION EPIDURAL; INTRACAUDAL PRN
Status: DISCONTINUED | OUTPATIENT
Start: 2020-01-04 | End: 2020-01-04 | Stop reason: ALTCHOICE

## 2020-01-04 RX ORDER — ROCURONIUM BROMIDE 10 MG/ML
INJECTION, SOLUTION INTRAVENOUS PRN
Status: DISCONTINUED | OUTPATIENT
Start: 2020-01-04 | End: 2020-01-04 | Stop reason: SDUPTHER

## 2020-01-04 RX ORDER — ONDANSETRON 2 MG/ML
4 INJECTION INTRAMUSCULAR; INTRAVENOUS
Status: DISCONTINUED | OUTPATIENT
Start: 2020-01-04 | End: 2020-01-04 | Stop reason: HOSPADM

## 2020-01-04 RX ORDER — GLYCOPYRROLATE 1 MG/5 ML
SYRINGE (ML) INTRAVENOUS PRN
Status: DISCONTINUED | OUTPATIENT
Start: 2020-01-04 | End: 2020-01-04 | Stop reason: SDUPTHER

## 2020-01-04 RX ADMIN — CEFTAROLINE FOSAMIL 600 MG: 600 POWDER, FOR SOLUTION INTRAVENOUS at 05:21

## 2020-01-04 RX ADMIN — PROPOFOL 150 MG: 10 INJECTION, EMULSION INTRAVENOUS at 07:33

## 2020-01-04 RX ADMIN — Medication 0.7 MG: at 08:40

## 2020-01-04 RX ADMIN — LEVOTHYROXINE SODIUM 50 MCG: 50 TABLET ORAL at 11:00

## 2020-01-04 RX ADMIN — OXYCODONE HYDROCHLORIDE 10 MG: 5 TABLET ORAL at 16:32

## 2020-01-04 RX ADMIN — CEFTAROLINE FOSAMIL 600 MG: 600 POWDER, FOR SOLUTION INTRAVENOUS at 18:13

## 2020-01-04 RX ADMIN — Medication 1 CAPSULE: at 18:12

## 2020-01-04 RX ADMIN — FENTANYL CITRATE 50 MCG: 50 INJECTION, SOLUTION INTRAMUSCULAR; INTRAVENOUS at 18:59

## 2020-01-04 RX ADMIN — DIAZEPAM 5 MG: 5 TABLET ORAL at 11:15

## 2020-01-04 RX ADMIN — MAGNESIUM GLUCONATE 500 MG ORAL TABLET 400 MG: 500 TABLET ORAL at 10:59

## 2020-01-04 RX ADMIN — OXYCODONE HYDROCHLORIDE 10 MG: 5 TABLET ORAL at 20:42

## 2020-01-04 RX ADMIN — PAROXETINE HYDROCHLORIDE HEMIHYDRATE 20 MG: 20 TABLET, FILM COATED ORAL at 11:00

## 2020-01-04 RX ADMIN — MIDAZOLAM HYDROCHLORIDE 2 MG: 1 INJECTION, SOLUTION INTRAMUSCULAR; INTRAVENOUS at 07:31

## 2020-01-04 RX ADMIN — FENTANYL CITRATE 50 MCG: 50 INJECTION, SOLUTION INTRAMUSCULAR; INTRAVENOUS at 05:21

## 2020-01-04 RX ADMIN — FENTANYL CITRATE 25 MCG: 50 INJECTION INTRAMUSCULAR; INTRAVENOUS at 07:41

## 2020-01-04 RX ADMIN — MAGNESIUM GLUCONATE 500 MG ORAL TABLET 400 MG: 500 TABLET ORAL at 20:44

## 2020-01-04 RX ADMIN — SENNOSIDES 17.2 MG: 8.6 TABLET, FILM COATED ORAL at 10:59

## 2020-01-04 RX ADMIN — Medication 1 CAPSULE: at 10:53

## 2020-01-04 RX ADMIN — FENTANYL CITRATE 50 MCG: 50 INJECTION, SOLUTION INTRAMUSCULAR; INTRAVENOUS at 09:35

## 2020-01-04 RX ADMIN — OXYCODONE HYDROCHLORIDE 10 MG: 5 TABLET ORAL at 12:22

## 2020-01-04 RX ADMIN — LIDOCAINE HYDROCHLORIDE 50 MG: 10 INJECTION, SOLUTION EPIDURAL; INFILTRATION; INTRACAUDAL; PERINEURAL at 07:33

## 2020-01-04 RX ADMIN — FENTANYL CITRATE 50 MCG: 50 INJECTION, SOLUTION INTRAMUSCULAR; INTRAVENOUS at 00:37

## 2020-01-04 RX ADMIN — Medication 10 ML: at 00:37

## 2020-01-04 RX ADMIN — Medication 10 ML: at 20:43

## 2020-01-04 RX ADMIN — FENTANYL CITRATE 50 MCG: 50 INJECTION, SOLUTION INTRAMUSCULAR; INTRAVENOUS at 23:26

## 2020-01-04 RX ADMIN — Medication 4 MG: at 08:40

## 2020-01-04 RX ADMIN — FENTANYL CITRATE 25 MCG: 50 INJECTION INTRAMUSCULAR; INTRAVENOUS at 07:53

## 2020-01-04 RX ADMIN — HEPARIN SODIUM 5000 UNITS: 5000 INJECTION INTRAVENOUS; SUBCUTANEOUS at 21:47

## 2020-01-04 RX ADMIN — ROCURONIUM BROMIDE 50 MG: 10 INJECTION INTRAVENOUS at 07:33

## 2020-01-04 RX ADMIN — FENTANYL CITRATE 50 MCG: 50 INJECTION, SOLUTION INTRAMUSCULAR; INTRAVENOUS at 10:51

## 2020-01-04 RX ADMIN — DIAZEPAM 5 MG: 5 TABLET ORAL at 02:56

## 2020-01-04 RX ADMIN — TAMSULOSIN HYDROCHLORIDE 0.4 MG: 0.4 CAPSULE ORAL at 11:01

## 2020-01-04 RX ADMIN — HEPARIN SODIUM 5000 UNITS: 5000 INJECTION INTRAVENOUS; SUBCUTANEOUS at 14:53

## 2020-01-04 RX ADMIN — FENTANYL CITRATE 50 MCG: 50 INJECTION, SOLUTION INTRAMUSCULAR; INTRAVENOUS at 09:30

## 2020-01-04 RX ADMIN — BUPROPION HYDROCHLORIDE 300 MG: 300 TABLET, FILM COATED, EXTENDED RELEASE ORAL at 10:53

## 2020-01-04 RX ADMIN — PANTOPRAZOLE SODIUM 40 MG: 40 TABLET, DELAYED RELEASE ORAL at 10:59

## 2020-01-04 RX ADMIN — ONDANSETRON 4 MG: 2 INJECTION, SOLUTION INTRAMUSCULAR; INTRAVENOUS at 07:48

## 2020-01-04 RX ADMIN — SENNOSIDES 17.2 MG: 8.6 TABLET, FILM COATED ORAL at 20:45

## 2020-01-04 RX ADMIN — OXYCODONE HYDROCHLORIDE 10 MG: 5 TABLET ORAL at 02:56

## 2020-01-04 RX ADMIN — CEFTAROLINE FOSAMIL 600 MG: 600 POWDER, FOR SOLUTION INTRAVENOUS at 11:01

## 2020-01-04 RX ADMIN — CEFAZOLIN 2000 MG: 1 INJECTION, POWDER, FOR SOLUTION INTRAMUSCULAR; INTRAVENOUS at 07:44

## 2020-01-04 RX ADMIN — FENTANYL CITRATE 50 MCG: 50 INJECTION INTRAMUSCULAR; INTRAVENOUS at 07:33

## 2020-01-04 RX ADMIN — Medication 10 ML: at 05:21

## 2020-01-04 RX ADMIN — FENTANYL CITRATE 50 MCG: 50 INJECTION, SOLUTION INTRAMUSCULAR; INTRAVENOUS at 14:52

## 2020-01-04 ASSESSMENT — PULMONARY FUNCTION TESTS
PIF_VALUE: 18
PIF_VALUE: 19
PIF_VALUE: 15
PIF_VALUE: 1
PIF_VALUE: 19
PIF_VALUE: 15
PIF_VALUE: 20
PIF_VALUE: 16
PIF_VALUE: 0
PIF_VALUE: 15
PIF_VALUE: 15
PIF_VALUE: 16
PIF_VALUE: 14
PIF_VALUE: 11
PIF_VALUE: 19
PIF_VALUE: 18
PIF_VALUE: 13
PIF_VALUE: 16
PIF_VALUE: 16
PIF_VALUE: 15
PIF_VALUE: 14
PIF_VALUE: 16
PIF_VALUE: 13
PIF_VALUE: 20
PIF_VALUE: 5
PIF_VALUE: 19
PIF_VALUE: 19
PIF_VALUE: 16
PIF_VALUE: 6
PIF_VALUE: 16
PIF_VALUE: 16
PIF_VALUE: 17
PIF_VALUE: 15
PIF_VALUE: 16
PIF_VALUE: 32
PIF_VALUE: 17
PIF_VALUE: 19
PIF_VALUE: 16
PIF_VALUE: 15
PIF_VALUE: 15
PIF_VALUE: 18
PIF_VALUE: 16
PIF_VALUE: 16
PIF_VALUE: 17
PIF_VALUE: 19
PIF_VALUE: 16
PIF_VALUE: 15
PIF_VALUE: 15
PIF_VALUE: 18
PIF_VALUE: 16
PIF_VALUE: 19
PIF_VALUE: 1
PIF_VALUE: 19
PIF_VALUE: 13
PIF_VALUE: 16
PIF_VALUE: 16
PIF_VALUE: 18
PIF_VALUE: 16
PIF_VALUE: 16
PIF_VALUE: 17
PIF_VALUE: 16
PIF_VALUE: 18
PIF_VALUE: 19
PIF_VALUE: 18
PIF_VALUE: 17
PIF_VALUE: 15
PIF_VALUE: 15
PIF_VALUE: 19
PIF_VALUE: 4
PIF_VALUE: 19
PIF_VALUE: 16
PIF_VALUE: 19
PIF_VALUE: 18
PIF_VALUE: 6
PIF_VALUE: 19
PIF_VALUE: 19
PIF_VALUE: 13
PIF_VALUE: 19
PIF_VALUE: 16
PIF_VALUE: 19
PIF_VALUE: 19
PIF_VALUE: 17
PIF_VALUE: 19
PIF_VALUE: 15
PIF_VALUE: 17
PIF_VALUE: 15
PIF_VALUE: 19
PIF_VALUE: 19
PIF_VALUE: 0
PIF_VALUE: 19
PIF_VALUE: 16
PIF_VALUE: 19
PIF_VALUE: 5
PIF_VALUE: 13
PIF_VALUE: 0
PIF_VALUE: 14
PIF_VALUE: 21

## 2020-01-04 ASSESSMENT — PAIN SCALES - GENERAL
PAINLEVEL_OUTOF10: 5
PAINLEVEL_OUTOF10: 8
PAINLEVEL_OUTOF10: 4
PAINLEVEL_OUTOF10: 8
PAINLEVEL_OUTOF10: 9
PAINLEVEL_OUTOF10: 9
PAINLEVEL_OUTOF10: 8
PAINLEVEL_OUTOF10: 9
PAINLEVEL_OUTOF10: 5
PAINLEVEL_OUTOF10: 8
PAINLEVEL_OUTOF10: 10
PAINLEVEL_OUTOF10: 8
PAINLEVEL_OUTOF10: 9

## 2020-01-04 ASSESSMENT — ENCOUNTER SYMPTOMS
APNEA: 0
ABDOMINAL DISTENTION: 0
BACK PAIN: 0
EYE ITCHING: 0
NAUSEA: 0
SHORTNESS OF BREATH: 0
EYE DISCHARGE: 0
COLOR CHANGE: 0
WHEEZING: 0
ABDOMINAL PAIN: 0
VOMITING: 0
BLOOD IN STOOL: 0

## 2020-01-04 ASSESSMENT — PAIN DESCRIPTION - FREQUENCY: FREQUENCY: CONTINUOUS

## 2020-01-04 ASSESSMENT — PAIN DESCRIPTION - PAIN TYPE
TYPE: SURGICAL PAIN
TYPE: SURGICAL PAIN

## 2020-01-04 ASSESSMENT — PAIN DESCRIPTION - LOCATION
LOCATION: LEG
LOCATION: LEG

## 2020-01-04 ASSESSMENT — PAIN - FUNCTIONAL ASSESSMENT: PAIN_FUNCTIONAL_ASSESSMENT: 0-10

## 2020-01-04 ASSESSMENT — PAIN SCALES - WONG BAKER
WONGBAKER_NUMERICALRESPONSE: 0

## 2020-01-04 ASSESSMENT — PAIN DESCRIPTION - ONSET: ONSET: ON-GOING

## 2020-01-04 ASSESSMENT — PAIN DESCRIPTION - ORIENTATION
ORIENTATION: LEFT
ORIENTATION: LEFT

## 2020-01-04 NOTE — PLAN OF CARE
Problem: Pain:  Goal: Pain level will decrease  Description  Pain level will decrease  Outcome: Ongoing  Goal: Control of acute pain  Description  Control of acute pain  Outcome: Ongoing  Goal: Control of chronic pain  Description  Control of chronic pain  Outcome: Ongoing     Problem: Fluid Volume - Imbalance:  Goal: Absence of imbalanced fluid volume signs and symptoms  Description  Absence of imbalanced fluid volume signs and symptoms  Outcome: Ongoing     Problem: Mental Status - Impaired:  Goal: Mental status will be restored to baseline  Description  Mental status will be restored to baseline  Outcome: Ongoing     Problem: Nutrition Deficit:  Goal: Ability to achieve adequate nutritional intake will improve  Description  Ability to achieve adequate nutritional intake will improve  Outcome: Ongoing     Problem: Pain:  Goal: Pain level will decrease  Description  Pain level will decrease  Outcome: Ongoing  Goal: Recognizes and communicates pain  Description  Recognizes and communicates pain  Outcome: Ongoing  Goal: Control of acute pain  Description  Control of acute pain  Outcome: Ongoing  Goal: Control of chronic pain  Description  Control of chronic pain  Outcome: Ongoing     Problem: Skin Integrity - Impaired:  Goal: Will show no infection signs and symptoms  Description  Will show no infection signs and symptoms  Outcome: Ongoing  Goal: Absence of new skin breakdown  Description  Absence of new skin breakdown  Outcome: Ongoing     Problem: Confusion - Acute:  Goal: Mental status will be restored to baseline  Description  Mental status will be restored to baseline  Outcome: Ongoing  Goal: Absence of continued neurological deterioration signs and symptoms  Description  Absence of continued neurological deterioration signs and symptoms  Outcome: Ongoing     Problem: Discharge Planning:  Goal: Ability to perform activities of daily living will improve  Description  Ability to perform activities of daily thinking  Description  Able to interrupt nonreality-based thinking  Outcome: Ongoing     Problem: Sleep Pattern Disturbance:  Goal: Appears well-rested  Description  Appears well-rested  Outcome: Ongoing     Problem: Airway Clearance - Ineffective:  Goal: Ability to maintain a clear airway will improve  Description  Ability to maintain a clear airway will improve  Outcome: Ongoing     Problem: Anxiety/Stress:  Goal: Level of anxiety will decrease  Description  Level of anxiety will decrease  Outcome: Ongoing     Problem: Aspiration:  Goal: Absence of aspiration  Description  Absence of aspiration  Outcome: Ongoing     Problem:  Bowel Function - Altered:  Goal: Bowel elimination is within specified parameters  Description  Bowel elimination is within specified parameters  Outcome: Ongoing     Problem: Cardiac Output - Decreased:  Goal: Hemodynamic stability will improve  Description  Hemodynamic stability will improve  Outcome: Ongoing     Problem: Gas Exchange - Impaired:  Goal: Levels of oxygenation will improve  Description  Levels of oxygenation will improve  Outcome: Ongoing     Problem: Serum Glucose Level - Abnormal:  Goal: Ability to maintain appropriate glucose levels will improve to within specified parameters  Description  Ability to maintain appropriate glucose levels will improve to within specified parameters  Outcome: Ongoing     Problem: Tissue Perfusion, Altered:  Goal: Circulatory function within specified parameters  Description  Circulatory function within specified parameters  Outcome: Ongoing     Problem: Tissue Perfusion - Cardiopulmonary, Altered:  Goal: Absence of angina  Description  Absence of angina  Outcome: Ongoing  Goal: Hemodynamic stability will improve  Description  Hemodynamic stability will improve  Outcome: Ongoing     Problem: Falls - Risk of:  Goal: Absence of physical injury  Description  Absence of physical injury  Outcome: Ongoing  Goal: Will remain free from falls  Description  Will remain free from falls  Outcome: Ongoing     Problem: Risk for Impaired Skin Integrity  Goal: Tissue integrity - skin and mucous membranes  Description  Structural intactness and normal physiological function of skin and  mucous membranes.   Outcome: Ongoing     Problem: Nutrition  Goal: Optimal nutrition therapy  Outcome: Ongoing

## 2020-01-04 NOTE — PROGRESS NOTES
Vitals:  No data found. Average, Min, and Max for last 24 hours Vitals:  TEMPERATURE:  Temp  Av.6 °F (37 °C)  Min: 98.4 °F (36.9 °C)  Max: 98.8 °F (37.1 °C)    RESPIRATIONS RANGE: Resp  Av.5  Min: 12  Max: 15    PULSE RANGE: Pulse  Av.5  Min: 66  Max: 69    BLOOD PRESSURE RANGE:  Systolic (78SQU), JVL:421 , Min:132 , VNM:014   ; Diastolic (66HHW), WSM:99, Min:75, Max:81      PULSE OXIMETRY RANGE: SpO2  Av %  Min: 100 %  Max: 100 %    I/O last 3 completed shifts: In: 1530.8 [P.O.:1040; I.V.:490.8]  Out: 1675 [Urine:1675]    CBC:  Recent Labs     20  0525 20  0458   WBC 5.5 5.6   HGB 8.7* 8.9*   HCT 30.7* 30.7*    316   CRP 24.6* 11.7*        BMP:  Recent Labs     20  1958 20  0458 20   K 3.7 4.4 4.2        Coags:  No results for input(s): APTT, PROT, INR in the last 72 hours. Lab Results   Component Value Date    SEDRATE 47 (H) 2019     Recent Labs     20  0525 20  0458   CRP 24.6* 11.7*       Lower Extremity Physical Exam: Physical exam findings are from 1/3/20 where patient is undergoing left TMA today, 20    Vascular: DP and PT pulses are non palpable. CFT >3 seconds to all digits. Hair growth is absent to the level of the digits. Nonpitting edema to the left LE     Neuro: Saph/sural/SP/DP/plantar sensation diminished to light touch.     Musculoskeletal: Muscle strength deferred to all lower extremity muscle groups. Gross deformity is amputation of right 2nd ray, digits 1-3 amputation on the left.     Dermatologic: Full thickness ulcer #1 located sub 2nd MT head and measures approximately 2.1 cm x 2.1 cm x 0.5 cm. Base is fibrogranular. Periwound skin is macerated. Scant serosanguinous drainage noted without associated mal odor. Erythema present without associated increase in warmth. Does probe to bone, but no sinus track or undermining. No fluctuance, crepitus, or induration.  Dry necrotic skin noted superficially on

## 2020-01-04 NOTE — ANESTHESIA PRE PROCEDURE
4. Had abscess after appendectomy.  KNEE SURGERY Right     scope    TOE AMPUTATION Bilateral 3/6/2017    TOE AMPUTATION LEFT HALLUX AND 2ND DIGIT AND RIGHT 2ND DIGIT performed by Kylee Guy DPM at [address] ENDOSCOPY N/A 5/20/2019    EGD WITH BIOPSY performed by Lebron Hess MD at [address] [place] History:    Social History     Tobacco Use    Smoking status: Never Smoker    Smokeless tobacco: Never Used   Substance Use Topics    Alcohol use: No                                Counseling given: Not Answered      Vital Signs (Current):   Vitals:    01/02/20 1900 01/03/20 0614 01/03/20 0800 01/03/20 1920   BP: 129/75  136/81 132/75   Pulse: 84  66 69   Resp: 14  12 15   Temp: 98.3 °F (36.8 °C)  98.4 °F (36.9 °C) 98.8 °F (37.1 °C)   TempSrc: Oral  Oral Oral   SpO2: 97%   100%   Weight:  222 lb 7.1 oz (100.9 kg)     Height:  6' 2\" (1.88 m)                                                BP Readings from Last 3 Encounters:   01/03/20 132/75   12/28/19 (!) 108/58   12/24/19 128/62       NPO Status:                                                                                 BMI:   Wt Readings from Last 3 Encounters:   01/03/20 222 lb 7.1 oz (100.9 kg)   12/28/19 216 lb (98 kg)   12/24/19 216 lb 6.4 oz (98.2 kg)     Body mass index is 28.56 kg/m².     CBC:   Lab Results   Component Value Date    WBC 5.6 01/03/2020    RBC 3.70 01/03/2020    HGB 8.9 01/03/2020    HCT 30.7 01/03/2020    MCV 83.0 01/03/2020    RDW 18.6 01/03/2020     01/03/2020       CMP:   Lab Results   Component Value Date     12/31/2019    K 4.2 01/03/2020     12/31/2019    CO2 20 12/31/2019    BUN 20 12/31/2019    CREATININE 0.97 12/31/2019    GFRAA >60 12/31/2019    LABGLOM >60 12/31/2019    GLUCOSE 97 12/31/2019    PROT 8.2 12/28/2019    CALCIUM 8.2 12/31/2019    BILITOT 0.26 12/28/2019    ALKPHOS 87 12/28/2019    AST 16 12/28/2019    ALT 12 12/28/2019       POC Tests:

## 2020-01-04 NOTE — BRIEF OP NOTE
PODIATRY BRIEF OP NOTE    PATIENT NAME: Jai Danielson DATE: 1965  -  53 y.o. male  MRN: [id number]  DATE: 1/4/2020  BILLING #: [id number]    Surgeon(s):  Tushar Krause DPM     ASSISTANTS: Min Manning DPM  PGY1    PRE-OP DIAGNOSIS:   1. Osteomyelitis, first and second, third metatarsal, left foot  2. Diabetic foot ulcer, necrosis of bone, second metatarsal  3. Status post, left hallux amputation    POST-OP DIAGNOSIS: Same as above. PROCEDURE:   1. Transmetatarsal amputation, left foot  2. Removal of foreign body, left foot    ANESTHESIA: General    HEMOSTASIS: Pneumatic thigh tourniquet @300 mmHg for 31 minutes. ESTIMATED BLOOD LOSS: Less than 20 cc. MATERIALS: 2-0 Vicryl, 2-0 nylon, 3-0 nylon  * No implants in log *    INJECTABLES: 10 cc of 1:1 mix of 0.5% marcaine plain and 1% lidocaine plain     SPECIMEN:   ID Type Source Tests Collected by Time Destination   A : LEFT FOREFOOT  Tissue Foot SURGICAL PATHOLOGY Tushar Krause DPM 1/4/2020 9856        COMPLICATIONS: None    FINDINGS: Soft, nonviable bone and nonviable tissue found to the level of the mid metatarsal shafts 1 through 5. Adequate plantar soft tissue and skin for closure. During dissection, small piece of glass found measuring approximately 1.2 x 1.2 x 0.8 cm, which was subsequently removed.     Min Manning DPM   Podiatric Medicine & Surgery   1/4/2020 at 9:05 AM

## 2020-01-04 NOTE — PROGRESS NOTES
[address]    Progress Note    1/4/2020    11:17 AM    Name:   Ericka Flores  MRN:     [id number]     [place]:      [de-identified]   Room:   2017/2017-01  IP Day:  7  Admit Date:  12/28/2019 12:43 PM    PCP:   Jerad Baker MD  Code Status:  Full Code    Subjective:     C/C:   Chief Complaint   Patient presents with    Altered Mental Status     x3 days, transfer from [facility]. Interval History Status: improved. Just returned from transmetatarsal amputation, left foot. Pain controlled. No new issues    Brief History:   Ashutosh Peralta a 53 y. o. male with a past medical history of DM2 complicated with left foot wound and well known to Dr. Celia Newby. Patient was admitted to the ICU altered mental status and found to have sepsis due to let foot infection complicated with CLAUDIA. He was treated with IV antibiotics and IVF.      CLAUDIA resolved as well as confusion. 1/4/20 - transmetatarsal amputation, left foot done     Review of Systems:     Review of Systems   Constitutional: Negative for appetite change and fever. HENT: Negative for congestion and ear discharge. Eyes: Negative for discharge and visual disturbance. Respiratory: Negative for shortness of breath and wheezing. Cardiovascular: Negative for chest pain, palpitations and leg swelling. Gastrointestinal: Negative for abdominal pain, blood in stool, nausea and vomiting. Endocrine: Negative for cold intolerance and heat intolerance. Genitourinary: Negative for dysuria and frequency. Musculoskeletal: Negative for arthralgias, back pain and joint swelling. Skin: Negative for color change and rash. Neurological: Negative for dizziness, tremors, seizures and light-headedness. Psychiatric/Behavioral: Negative for agitation and confusion. Medications: Allergies:     Allergies   Allergen Reactions    Bactrim [Sulfamethoxazole-Trimethoprim] Relation Age of Onset    Diabetes Father     Cancer Maternal Grandfather         Colon Cancer; Prostate Cancer    No Known Problems Mother        Vitals:  /73   Pulse 75   Temp 97.3 °F (36.3 °C)   Resp 13   Ht 6' 2\" (1.88 m)   Wt 223 lb (101.2 kg)   SpO2 100%   BMI 28.63 kg/m²   Temp (24hrs), Av.4 °F (36.9 °C), Min:97.3 °F (36.3 °C), Max:99.1 °F (37.3 °C)    Recent Labs     20  0723 20  0920   POCGLU 101 96 95 83       I/O (24Hr): Intake/Output Summary (Last 24 hours) at 2020 1117  Last data filed at 2020 1005  Gross per 24 hour   Intake 2466 ml   Output 1635 ml   Net 831 ml       Labs:  Hematology:  Recent Labs     20  0520  0627   WBC 5.5 5.6 5.7   RBC 3.65* 3.70* 4.21   HGB 8.7* 8.9* 9.9*   HCT 30.7* 30.7* 34.6*   MCV 84.1 83.0 82.2*   MCH 23.8* 24.1* 23.5*   MCHC 28.3* 29.0 28.6   RDW 18.5* 18.6* 18.6*    316 352   MPV 9.5 9.6 9.0   CRP 24.6* 11.7* 7.6*     Chemistry:  Recent Labs     20  0520  0627   K 4.5   < > 4.4 4.2 4.2   MG 2.0  --  1.9  --  1.8   CAION 1.26  --  1.14  --  1.23   PHOS 3.7  --  3.9  --  3.8    < > = values in this interval not displayed. Recent Labs     20  0804 20  1148 20  17020  0723 20  0920   POCGLU 88 92 101 96 95 83       Physical Examination:        Physical Exam  Vitals signs reviewed. Constitutional:       General: He is not in acute distress. Appearance: Normal appearance. He is not ill-appearing or diaphoretic. HENT:      Head: Normocephalic and atraumatic. Nose: No congestion or rhinorrhea. Eyes:      Extraocular Movements: Extraocular movements intact. Conjunctiva/sclera: Conjunctivae normal.      Pupils: Pupils are equal, round, and reactive to light. Neck:      Musculoskeletal: Normal range of motion and neck supple.    Cardiovascular:      Rate and Rhythm: Normal rate and regular rhythm. Pulses: Normal pulses. Heart sounds: Normal heart sounds. No murmur. Pulmonary:      Effort: Pulmonary effort is normal.      Breath sounds: Normal breath sounds. No wheezing or rales. Abdominal:      General: Bowel sounds are normal. There is no distension. Palpations: Abdomen is soft. Tenderness: There is no tenderness. Musculoskeletal: Normal range of motion. General: No swelling or tenderness. Skin:     General: Skin is warm and dry. Findings: No rash. Comments: Left food with dressing and small wound vac   Neurological:      General: No focal deficit present. Mental Status: He is alert and oriented to person, place, and time. Psychiatric:         Mood and Affect: Mood normal.         Thought Content: Thought content normal.         Judgment: Judgment normal.         Assessment:        Hospital Problems           Last Modified POA    * (Principal) Osteomyelitis of foot, left, acute ([address] 1/2/2020 Yes    Acute renal failure (ARF) ([address] 12/29/2019 Yes    Hyperkalemia 12/29/2019 Yes    Hypocalcemia 50/76/6613 Yes    Metabolic acidosis 17/78/4218 Yes    Diabetic ulcer of toe of left foot associated with type 2 diabetes mellitus ([address] 12/29/2019 Yes    Hyperphosphatemia 12/29/2019 Yes    Chronic, continuous use of opioids 12/29/2019 Yes    Acute metabolic encephalopathy 14/28/6961 Yes    Diabetic foot infection ([address] 1/1/2020 Yes          Plan:      1. Left foot osteomyelitis associated with type 2 diabetes. S/p transmetatarsal amputation, left foot 1/4/20. Continue IV Ceftaroline 600 mg IV q 12hr and wound care  2. Acute Metabolic Encephalopathy. Resolved  3. Sepsis due to #2. Resolved. 4. Acute Renal failure. Resolved  5. DM-2. Controlled. With Metformin. Will restart on discharge  6. Anxiety. Restart Valium 5 mg BID  7.  Discharge planning - ongoing     Mbonu Tressa Barger MD  1/4/2020  11:17 AM

## 2020-01-04 NOTE — PROGRESS NOTES
[facility] - Progress Note    Today's Date and Time: 1/4/2020, 3:13 PM    Impression :   · Altered mental status.-Improved  · DM  - w Diabetic neuropathy   · Left foot diabetic plantar ulcer and gangrene . · Lt foot  MRSA soft tissue infection  · Elevated CRP and ESR  · Sepsis with CLAUDIA  · Bacillus bacteremia:   · Single blood culture with bacillus spp on 12-28-19 = contaminant  · Hx Allergy to sulfa    Other:  · Prior toe amputations 2017:   · 1. Amputation distal phalanx, left second toe. 2. Amputation distal phalanx, right second toe. 3. Amputation distal phalanx of the left hallux. 4. I and D left fifth digit. 5. I and D right fifth digit. Culture grew MRSA  · Hx chronic back pain on narcotics  Recommendations:   · Continues on ceftaroline 600 every 12 hours  · Taken today for TMA left foot  · We will follow response to surgery and the surgical wound once the dressing is open  · We will follow with you    Medical Decision Making/Summary/Discussion:1/4/2020     ·   Infection Control Recommendations   · [place] Precautions  · Contact Isolation MRSA    Antimicrobial Stewardship Recommendations     · Discontinuation of therapy  Coordination of Outpatient Care:   · Estimated Length of IV antimicrobials: TTBD  · Patient will need Midline Catheter Insertion: No  · Patient will need PICC line Insertion:No  · Patient will need: Home IV , [place],  SNF,  LTAC:TBD  · Patient will need outpatient wound care:Yes    Chief complaint/reason for consultation:   · Concern for sepsis      History of Present Illness:   David Davis is a 53y.o.-year-old  male who was initially admitted on 12/28/2019. Patient seen at the request of .    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     Presented to [facility] -- -- 81 14 --   01/04/20 1300 135/71 -- -- 84 14 --   01/04/20 1245 (!) 142/70 -- -- 90 15 --   01/04/20 1230 96/68 -- -- 84 17 --   01/04/20 1215 134/73 -- -- 79 15 --   01/04/20 1200 109/72 -- -- 75 14 --   01/04/20 1145 131/69 -- -- 70 12 --   01/04/20 1130 137/76 -- -- 70 16 --   01/04/20 1115 -- -- -- 71 14 --   01/04/20 1100 139/80 -- -- 75 17 --   01/04/20 1000 134/73 97.3 °F (36.3 °C) -- 75 13 100 %   01/04/20 0945 (!) 144/79 -- -- 78 13 100 %   01/04/20 0935 -- -- -- -- -- 100 %   01/04/20 0930 138/74 -- -- 73 13 100 %   01/04/20 0915 137/74 -- -- 74 12 100 %   01/04/20 0910 137/74 97.3 °F (36.3 °C) Temporal 76 10 100 %     Physical Exam  Constitutional:       Appearance: Normal appearance. HENT:      Head: Normocephalic and atraumatic. Nose: Nose normal. No congestion. Mouth/Throat:      Mouth: Mucous membranes are moist.      Pharynx: Oropharynx is clear. Eyes:      Conjunctiva/sclera: Conjunctivae normal.      Pupils: Pupils are equal, round, and reactive to light. Neck:      Musculoskeletal: Neck supple. No neck rigidity. Cardiovascular:      Rate and Rhythm: Normal rate and regular rhythm. Heart sounds: Normal heart sounds. No murmur. Pulmonary:      Effort: No respiratory distress. Breath sounds: Normal breath sounds. Abdominal:      General: There is no distension. Palpations: There is no mass. Genitourinary:     Comments: No Barrera, urine is clear  Musculoskeletal:         General: No swelling or tenderness. Skin:     General: Skin is dry. Coloration: Skin is not jaundiced. Neurological:      General: No focal deficit present. Mental Status: He is alert and oriented to person, place, and time. Psychiatric:         Mood and Affect: Mood normal.         Thought Content:  Thought content normal.          Medical Decision Making -Laboratory:   I have independently reviewed/ordered the following labs:    CBC with Differential:   Recent Labs Additional signs and symptoms: Pt's foot and lower leg are red swollen and   bloody       FINDINGS:   Status post amputation of the 1st, 2nd, and 3rd digits.  There is new   irregularity of the remnant 2nd proximal phalanx, compatible with   osteomyelitis.  Adjacent soft tissue swelling and soft tissue defect is also   seen.  Stable irregularity involving the head of the 3rd metatarsal.  No   obvious change since November 2019 of the 1st digit.       Elsewhere, no acute fracture or dislocation.  Bony fragment is seen at the   level of the 4th and 5th metatarsal.       Diffuse soft tissue swelling.           Impression   1. New irregularity involving the remnant 2nd proximal phalanx concerning for   osteomyelitis.  Adjacent soft tissue swelling and soft tissue defect is   present. 2. Diffuse soft tissue swelling. 3. No additional suspicious lesions although evaluation is limited given   osteopenia and patient positioning.             Medical Decision [id number]:     1/1/2020  4:32 PM - Laurent Conde Incoming Lab Results From [employer]     Specimen Information: Foot        Component Collected Lab   Specimen Description 12/29/2019  3:15  [address]   . FOOT LEFT    Special Requests 12/29/2019  3:15  [address]   NOT REPORTED    Direct Exam Abnormal  12/29/2019  3:15 PM [facility]    Direct Exam Abnormal  12/29/2019  3:15 PM [address] [facility] [place]    Culture Abnormal  12/29/2019  3:15  [address]   METHICILLIN RESISTANT STAPHYLOCOCCUS AUREUS HEAVY GROWTH    Culture Abnormal  12/29/2019  3:15  [address]   NO ANAEROBIC ORGANISMS ISOLATED AT 3 DAYS    Testing Performed By     Lab - Abbreviation Name Director Address Valid Date Range   [facility] [address] [facility] [address] 08/30/17 0801-Present

## 2020-01-05 LAB
ABSOLUTE EOS #: 0.09 K/UL (ref 0–0.44)
ABSOLUTE IMMATURE GRANULOCYTE: <0.03 K/UL (ref 0–0.3)
ABSOLUTE LYMPH #: 0.96 K/UL (ref 1.1–3.7)
ABSOLUTE MONO #: 0.51 K/UL (ref 0.1–1.2)
BASOPHILS # BLD: 1 % (ref 0–2)
BASOPHILS ABSOLUTE: 0.06 K/UL (ref 0–0.2)
C-REACTIVE PROTEIN: 6.1 MG/L (ref 0–5)
CALCIUM IONIZED: 1.09 MMOL/L (ref 1.13–1.33)
DIFFERENTIAL TYPE: ABNORMAL
EOSINOPHILS RELATIVE PERCENT: 1 % (ref 1–4)
GLUCOSE BLD-MCNC: 86 MG/DL (ref 75–110)
GLUCOSE BLD-MCNC: 92 MG/DL (ref 75–110)
GLUCOSE BLD-MCNC: 95 MG/DL (ref 75–110)
GLUCOSE BLD-MCNC: 96 MG/DL (ref 75–110)
HCT VFR BLD CALC: 29.2 % (ref 40.7–50.3)
HEMOGLOBIN: 8.4 G/DL (ref 13–17)
IMMATURE GRANULOCYTES: 0 %
LYMPHOCYTES # BLD: 14 % (ref 24–43)
MAGNESIUM: 1.7 MG/DL (ref 1.6–2.6)
MCH RBC QN AUTO: 24.1 PG (ref 25.2–33.5)
MCHC RBC AUTO-ENTMCNC: 28.8 G/DL (ref 28.4–34.8)
MCV RBC AUTO: 83.9 FL (ref 82.6–102.9)
MONOCYTES # BLD: 7 % (ref 3–12)
NRBC AUTOMATED: 0 PER 100 WBC
PDW BLD-RTO: 18.7 % (ref 11.8–14.4)
PHOSPHORUS: 3.1 MG/DL (ref 2.5–4.5)
PLATELET # BLD: 333 K/UL (ref 138–453)
PLATELET ESTIMATE: ABNORMAL
PMV BLD AUTO: 9.4 FL (ref 8.1–13.5)
POTASSIUM SERPL-SCNC: 3.8 MMOL/L (ref 3.7–5.3)
POTASSIUM SERPL-SCNC: 3.9 MMOL/L (ref 3.7–5.3)
RBC # BLD: 3.48 M/UL (ref 4.21–5.77)
RBC # BLD: ABNORMAL 10*6/UL
SEG NEUTROPHILS: 77 % (ref 36–65)
SEGMENTED NEUTROPHILS ABSOLUTE COUNT: 5.28 K/UL (ref 1.5–8.1)
WBC # BLD: 6.9 K/UL (ref 3.5–11.3)
WBC # BLD: ABNORMAL 10*3/UL

## 2020-01-05 PROCEDURE — 6370000000 HC RX 637 (ALT 250 FOR IP): Performed by: STUDENT IN AN ORGANIZED HEALTH CARE EDUCATION/TRAINING PROGRAM

## 2020-01-05 PROCEDURE — 84100 ASSAY OF PHOSPHORUS: CPT

## 2020-01-05 PROCEDURE — 1200000000 HC SEMI PRIVATE

## 2020-01-05 PROCEDURE — 99233 SBSQ HOSP IP/OBS HIGH 50: CPT | Performed by: INTERNAL MEDICINE

## 2020-01-05 PROCEDURE — 6360000002 HC RX W HCPCS: Performed by: STUDENT IN AN ORGANIZED HEALTH CARE EDUCATION/TRAINING PROGRAM

## 2020-01-05 PROCEDURE — 86140 C-REACTIVE PROTEIN: CPT

## 2020-01-05 PROCEDURE — 2580000003 HC RX 258: Performed by: STUDENT IN AN ORGANIZED HEALTH CARE EDUCATION/TRAINING PROGRAM

## 2020-01-05 PROCEDURE — 84132 ASSAY OF SERUM POTASSIUM: CPT

## 2020-01-05 PROCEDURE — 83735 ASSAY OF MAGNESIUM: CPT

## 2020-01-05 PROCEDURE — 85025 COMPLETE CBC W/AUTO DIFF WBC: CPT

## 2020-01-05 PROCEDURE — 82947 ASSAY GLUCOSE BLOOD QUANT: CPT

## 2020-01-05 PROCEDURE — 82330 ASSAY OF CALCIUM: CPT

## 2020-01-05 PROCEDURE — 36415 COLL VENOUS BLD VENIPUNCTURE: CPT

## 2020-01-05 RX ADMIN — PANTOPRAZOLE SODIUM 40 MG: 40 TABLET, DELAYED RELEASE ORAL at 07:41

## 2020-01-05 RX ADMIN — OXYCODONE HYDROCHLORIDE 10 MG: 5 TABLET ORAL at 00:52

## 2020-01-05 RX ADMIN — OXYCODONE HYDROCHLORIDE 10 MG: 5 TABLET ORAL at 13:21

## 2020-01-05 RX ADMIN — BUPROPION HYDROCHLORIDE 300 MG: 300 TABLET, FILM COATED, EXTENDED RELEASE ORAL at 07:41

## 2020-01-05 RX ADMIN — CEFTAROLINE FOSAMIL 600 MG: 600 POWDER, FOR SOLUTION INTRAVENOUS at 05:11

## 2020-01-05 RX ADMIN — FENTANYL CITRATE 50 MCG: 50 INJECTION, SOLUTION INTRAMUSCULAR; INTRAVENOUS at 20:42

## 2020-01-05 RX ADMIN — MAGNESIUM GLUCONATE 500 MG ORAL TABLET 400 MG: 500 TABLET ORAL at 20:42

## 2020-01-05 RX ADMIN — FENTANYL CITRATE 50 MCG: 50 INJECTION, SOLUTION INTRAMUSCULAR; INTRAVENOUS at 03:33

## 2020-01-05 RX ADMIN — FENTANYL CITRATE 50 MCG: 50 INJECTION, SOLUTION INTRAMUSCULAR; INTRAVENOUS at 07:38

## 2020-01-05 RX ADMIN — Medication 1 CAPSULE: at 07:40

## 2020-01-05 RX ADMIN — FENTANYL CITRATE 50 MCG: 50 INJECTION, SOLUTION INTRAMUSCULAR; INTRAVENOUS at 12:17

## 2020-01-05 RX ADMIN — OXYCODONE HYDROCHLORIDE 10 MG: 5 TABLET ORAL at 05:11

## 2020-01-05 RX ADMIN — OXYCODONE HYDROCHLORIDE 10 MG: 5 TABLET ORAL at 09:11

## 2020-01-05 RX ADMIN — LEVOTHYROXINE SODIUM 50 MCG: 50 TABLET ORAL at 07:40

## 2020-01-05 RX ADMIN — PAROXETINE HYDROCHLORIDE HEMIHYDRATE 20 MG: 20 TABLET, FILM COATED ORAL at 07:40

## 2020-01-05 RX ADMIN — CEFTAROLINE FOSAMIL 600 MG: 600 POWDER, FOR SOLUTION INTRAVENOUS at 16:41

## 2020-01-05 RX ADMIN — Medication 1 CAPSULE: at 16:44

## 2020-01-05 RX ADMIN — HEPARIN SODIUM 5000 UNITS: 5000 INJECTION INTRAVENOUS; SUBCUTANEOUS at 05:11

## 2020-01-05 RX ADMIN — DIAZEPAM 5 MG: 5 TABLET ORAL at 18:22

## 2020-01-05 RX ADMIN — MAGNESIUM GLUCONATE 500 MG ORAL TABLET 400 MG: 500 TABLET ORAL at 07:41

## 2020-01-05 RX ADMIN — Medication 10 ML: at 20:41

## 2020-01-05 RX ADMIN — SODIUM CHLORIDE: 9 INJECTION, SOLUTION INTRAVENOUS at 13:21

## 2020-01-05 RX ADMIN — OXYCODONE HYDROCHLORIDE 10 MG: 5 TABLET ORAL at 18:05

## 2020-01-05 RX ADMIN — FENTANYL CITRATE 50 MCG: 50 INJECTION, SOLUTION INTRAMUSCULAR; INTRAVENOUS at 16:39

## 2020-01-05 RX ADMIN — TAMSULOSIN HYDROCHLORIDE 0.4 MG: 0.4 CAPSULE ORAL at 07:41

## 2020-01-05 RX ADMIN — OXYCODONE HYDROCHLORIDE 10 MG: 5 TABLET ORAL at 22:06

## 2020-01-05 RX ADMIN — HEPARIN SODIUM 5000 UNITS: 5000 INJECTION INTRAVENOUS; SUBCUTANEOUS at 22:04

## 2020-01-05 RX ADMIN — HEPARIN SODIUM 5000 UNITS: 5000 INJECTION INTRAVENOUS; SUBCUTANEOUS at 14:28

## 2020-01-05 ASSESSMENT — PAIN SCALES - GENERAL
PAINLEVEL_OUTOF10: 8
PAINLEVEL_OUTOF10: 4
PAINLEVEL_OUTOF10: 5
PAINLEVEL_OUTOF10: 4
PAINLEVEL_OUTOF10: 8
PAINLEVEL_OUTOF10: 8
PAINLEVEL_OUTOF10: 7
PAINLEVEL_OUTOF10: 8
PAINLEVEL_OUTOF10: 9
PAINLEVEL_OUTOF10: 8
PAINLEVEL_OUTOF10: 7

## 2020-01-05 ASSESSMENT — ENCOUNTER SYMPTOMS
VOMITING: 0
ABDOMINAL PAIN: 0
EYE DISCHARGE: 0
BLOOD IN STOOL: 0
NAUSEA: 0
COLOR CHANGE: 0
BACK PAIN: 0
SHORTNESS OF BREATH: 0
WHEEZING: 0

## 2020-01-05 ASSESSMENT — PAIN DESCRIPTION - PROGRESSION: CLINICAL_PROGRESSION: NOT CHANGED

## 2020-01-05 ASSESSMENT — PAIN DESCRIPTION - ORIENTATION: ORIENTATION: LEFT

## 2020-01-05 ASSESSMENT — PAIN DESCRIPTION - LOCATION: LOCATION: LEG;FOOT

## 2020-01-05 ASSESSMENT — PAIN DESCRIPTION - ONSET: ONSET: ON-GOING

## 2020-01-05 ASSESSMENT — PAIN DESCRIPTION - FREQUENCY: FREQUENCY: CONTINUOUS

## 2020-01-05 ASSESSMENT — PAIN DESCRIPTION - DESCRIPTORS: DESCRIPTORS: CONSTANT;DISCOMFORT;THROBBING

## 2020-01-05 ASSESSMENT — PAIN DESCRIPTION - PAIN TYPE: TYPE: SURGICAL PAIN

## 2020-01-05 NOTE — PROGRESS NOTES
Progress Note  Podiatric Medicine and Surgery     Subjective     CC: left foot wound    POD #1 Left TMA (DOS 1/4/20)  Patient seen and examined at bedside  Vital signs stable  Patient denies nausea, vomiting, fever, chills  Patient reports pain well controlled to left foot      HPI :  Andrey Mcelroy is a 53 y.o. male with a past medical history of DM2 was seen at [facility] for left foot wound. Patient is well known to Dr. Bandar Hernandez and follow up with him in wound care. Patient is admitted to the ICU for sputum production, altered mental status, CLAUDIA, and abnormal labs. History was obtained from his wife. Patients wife states that the wound has not been improving much over the past few weeks. Has been receiving care at wound care where the area was told to be offloaded. Patient is confused with altered mental status.      PCP is Sarai Marie MD    ROS: Denies N/V/F/C/SOB/CP. Otherwise negative except at stated in the HPI.      Medications:  Scheduled Meds:   ceftaroline fosamil (TEFLARO) IVPB  600 mg Intravenous Q12H    senna  2 tablet Oral BID    heparin (porcine)  5,000 Units Subcutaneous 3 times per day    pantoprazole  40 mg Oral QAM AC    sodium chloride flush  10 mL Intravenous 2 times per day    buPROPion  300 mg Oral Daily    lactobacillus  1 capsule Oral BID WC    levothyroxine  50 mcg Oral Daily    magnesium oxide  400 mg Oral BID    PARoxetine  20 mg Oral Daily    tamsulosin  0.4 mg Oral Daily    insulin lispro  0-6 Units Subcutaneous TID WC    insulin lispro  0-3 Units Subcutaneous Nightly       Continuous Infusions:   sodium chloride 50 mL/hr at 01/02/20 2236    dextrose      dextrose         PRN Meds:diazepam, tiZANidine, oxyCODONE **OR** oxyCODONE, acetaminophen, glucose, dextrose, glucagon (rDNA), dextrose, sodium chloride flush, magnesium hydroxide, ondansetron, heparin (porcine), heparin (porcine), glucose, dextrose, glucagon (rDNA), dextrose, fentanNYL **OR** diminished to light touch.     Musculoskeletal: Muscle strength deferred to all lower extremity muscle groups. Gross deformity is amputation of right 2nd ray, digits 1-3 amputation on the left.     Dermatologic: Full thickness ulcer #1 located sub 2nd MT head and measures approximately 2.1 cm x 2.1 cm x 0.5 cm. Base is fibrogranular. Periwound skin is macerated. Scant serosanguinous drainage noted without associated mal odor. Erythema present without associated increase in warmth. Does probe to bone, but no sinus track or undermining. No fluctuance, crepitus, or induration. Dry necrotic skin noted superficially on the 1st MT dorsally and plantarly     Skin on the right foot is warm and dry with no open lesions noted. Clinical Images:                      Imaging:   MRI FOOT LEFT WO CONTRAST   Final Result   Osteomyelitis involving the 2nd metatarsal head, 3rd metatarsal head, and 4th   toe. Edema within the 1st metatarsal bone and base of the 2nd metatarsal bone is   suspected to be reactive, however early osteomyelitis is not excluded. VL LOWER EXTREMITY ARTERIAL SEGMENTAL PRESSURES W PPG BILATERAL   Final Result      US RETROPERITONEAL LIMITED   Final Result   No hydronephrosis. XR CHEST PORTABLE   Final Result   Central line tip in the SVC with no pneumothorax. Suggestion of mild perihilar edema and CHF, possibly accentuated by low lung   volumes. Cultures:   Wound cx 12/29: GPC, MRSA    Assessment   Radha Cruz is a 53 y.o. male with   1. S/p left TMA (DOS 1/4/20)  2. Osteomyelitis of metatarsals 2-4, left foot  3. Diabetic wound down to the level of the bone, sub 2nd MT head left foot  4. Cellulitis  5. Sepsis  6. S/p digit 1-3 amputation, left foot  7. S/p 2nd ray amputation, right foot  8. DM2  9. AMS  10.  Acute renal failure    Principal Problem:    Osteomyelitis of foot, left, acute ([address]  Active Problems:    Acute renal failure (ARF) ([address] of [facility]

## 2020-01-05 NOTE — OP NOTE
Anesthesia, an ankle block of 10cc of a 1:1 mixture of 1% lidocaine plain and 0.5% marcaine plain was injected. The foot was then prepped and draped in the usual aseptic fashion. An eschmark bandage was used to exsanguinate the foot and leg. The thigh tourniquet was inflated to 300mmHg. Attention was directed to the left foot. A #15 blade was used to create two converging semieliptical incisions around forefoot. Each metatarsal was dissected out and the shafts were exposed. Using a sagittal saw, each metatarsal was cut. Care was taken to maintain a parabola. A thick plantar flap was then dissected and the distal forefoot was removed and passed from the table and sent to pathology. The extensor and flexor tendons were resected as proximal as possible. The remaining soft tissues appear viable with bleeding noted. 3L of sterile saline was then used with pulse lavage to irrigate the surgical site. The ankle tourniquet was released and bleeding was controlled. The plantar flap was directed over the metatarsal shafts and sutured dorsally. Subcutaneous closure was performed with 2-0 vicryl and and skin with 2-0 and 3-0 nylon. At this time it was noted that the foot was in proper position and Achilles tendon lengthening was not performed. Dressings consisted of adaptic, 4 x 4s, Kerlix and an ACE bandage were applied. The patient tolerated the above procedure and anesthesia well without complications. The patient was transported from the operating room to the PACU with vital signs stable and vascular status intact.

## 2020-01-05 NOTE — PLAN OF CARE
Problem: Pain:  Description  Pain management should include both nonpharmacologic and pharmacologic interventions.   Goal: Pain level will decrease  Description  Pain level will decrease  1/5/2020 0826 by Apollo Rothman RN  Outcome: Ongoing  1/5/2020 0435 by Fouzia Finley RN  Outcome: Ongoing  1/4/2020 1943 by Apollo Rothman RN  Outcome: Ongoing  1/4/2020 1942 by Apollo Rothman RN  Outcome: Ongoing  Goal: Control of acute pain  Description  Control of acute pain  1/5/2020 0826 by Apollo Rothman RN  Outcome: Ongoing  1/5/2020 0435 by Fouzia Finley RN  Outcome: Ongoing  1/4/2020 1943 by Apollo Rothman RN  Outcome: Ongoing  1/4/2020 1942 by Apollo Rothman RN  Outcome: Ongoing  Goal: Control of chronic pain  Description  Control of chronic pain  1/5/2020 0826 by Apollo Rothman RN  Outcome: Ongoing  1/5/2020 0435 by Fouzia Finley RN  Outcome: Ongoing  1/4/2020 1943 by Apollo Rothman RN  Outcome: Ongoing  1/4/2020 1942 by Apollo Rothman RN  Outcome: Ongoing

## 2020-01-05 NOTE — PROGRESS NOTES
reported renal failure    Fiorinal [Butalbital-Aspirin-Caffeine] Other (See Comments)     Generalized blisters    Peanut-Containing Drug Products      Peanut butter flavor    Statins Other (See Comments)     Muscle aches    Tylenol [Acetaminophen]      Pt states it shuts down his kidneys    Peanut Butter Flavor [Flavoring Agent] Nausea And Vomiting       Current Meds:   Scheduled Meds:    ceftaroline fosamil (TEFLARO) IVPB  600 mg Intravenous Q12H    senna  2 tablet Oral BID    heparin (porcine)  5,000 Units Subcutaneous 3 times per day    pantoprazole  40 mg Oral QAM AC    sodium chloride flush  10 mL Intravenous 2 times per day    buPROPion  300 mg Oral Daily    lactobacillus  1 capsule Oral BID WC    levothyroxine  50 mcg Oral Daily    magnesium oxide  400 mg Oral BID    PARoxetine  20 mg Oral Daily    tamsulosin  0.4 mg Oral Daily    insulin lispro  0-6 Units Subcutaneous TID WC    insulin lispro  0-3 Units Subcutaneous Nightly     Continuous Infusions:    sodium chloride 50 mL/hr at 01/02/20 2236    dextrose      dextrose       PRN Meds: diazepam, tiZANidine, oxyCODONE **OR** oxyCODONE, acetaminophen, glucose, dextrose, glucagon (rDNA), dextrose, sodium chloride flush, magnesium hydroxide, ondansetron, heparin (porcine), heparin (porcine), glucose, dextrose, glucagon (rDNA), dextrose, fentanNYL **OR** fentanNYL    Data:     Past Medical History:   has a past medical history of Anxiety, Arthritis, BPH (benign prostatic hyperplasia), Chronic kidney disease, Depression, Diabetes mellitus ([facility] Diabetic neuropathy ([facility] GERD (gastroesophageal reflux disease), Hyperglycemia, Hypertension, Hypothyroidism, Impacted tooth, and Pneumonia. Social History:   reports that he has never smoked. He has never used smokeless tobacco. He reports previous drug use. Frequency: 2.00 times per week. Drug: Marijuana. He reports that he does not drink alcohol.      Family History:   Family History Heart sounds: Normal heart sounds. No murmur. Pulmonary:      Effort: Pulmonary effort is normal.      Breath sounds: Normal breath sounds. No wheezing or rales. Abdominal:      General: Bowel sounds are normal. There is no distension. Palpations: Abdomen is soft. Tenderness: There is no tenderness. Musculoskeletal: Normal range of motion. General: No swelling or tenderness. Skin:     General: Skin is warm and dry. Findings: No rash. Neurological:      General: No focal deficit present. Mental Status: He is alert and oriented to person, place, and time. Psychiatric:         Mood and Affect: Mood normal.         Thought Content: Thought content normal.         Judgment: Judgment normal.             Assessment:        Hospital Problems           Last Modified POA    * (Principal) Osteomyelitis of foot, left, acute ([address] 1/2/2020 Yes    Acute renal failure (ARF) ([address] 12/29/2019 Yes    Hyperkalemia 12/29/2019 Yes    Hypocalcemia 52/04/4694 Yes    Metabolic acidosis 96/81/1352 Yes    Diabetic ulcer of toe of left foot associated with type 2 diabetes mellitus ([address] 12/29/2019 Yes    Hyperphosphatemia 12/29/2019 Yes    Chronic, continuous use of opioids 12/29/2019 Yes    Acute metabolic encephalopathy 30/05/8674 Yes    Diabetic foot infection ([address] 1/1/2020 Yes          Plan:      1. Left foot osteomyelitis associated with type 2 diabetes. S/p transmetatarsal amputation, left foot 1/4/20. Continue IV Ceftaroline 600 mg IV q 12hr and wound care  2. Acute Metabolic Encephalopathy. Resolved  3. Sepsis due to #2. Resolved. 4. Acute Renal failure. Resolved  5. DM-2. Controlled. With Metformin. Will restart on discharge  6. Anxiety. Restart Valium 5 mg BID  7. Discharge planning - Patient want to go home. Will obtain home health and clarify antibiotic treatment. Home tomorrow if no objection from pod and ID.      Marco A Sevilla MD  1/5/2020  11:46 AM

## 2020-01-06 VITALS
BODY MASS INDEX: 28.11 KG/M2 | TEMPERATURE: 98.4 F | DIASTOLIC BLOOD PRESSURE: 79 MMHG | WEIGHT: 219 LBS | RESPIRATION RATE: 13 BRPM | HEART RATE: 80 BPM | OXYGEN SATURATION: 100 % | SYSTOLIC BLOOD PRESSURE: 143 MMHG | HEIGHT: 74 IN

## 2020-01-06 LAB
ABSOLUTE EOS #: 0.15 K/UL (ref 0–0.44)
ABSOLUTE IMMATURE GRANULOCYTE: <0.03 K/UL (ref 0–0.3)
ABSOLUTE LYMPH #: 1.03 K/UL (ref 1.1–3.7)
ABSOLUTE MONO #: 0.39 K/UL (ref 0.1–1.2)
BASOPHILS # BLD: 1 % (ref 0–2)
BASOPHILS ABSOLUTE: 0.05 K/UL (ref 0–0.2)
C-REACTIVE PROTEIN: 9.4 MG/L (ref 0–5)
CALCIUM IONIZED: 1.2 MMOL/L (ref 1.13–1.33)
DIFFERENTIAL TYPE: ABNORMAL
EOSINOPHILS RELATIVE PERCENT: 3 % (ref 1–4)
GLUCOSE BLD-MCNC: 87 MG/DL (ref 75–110)
GLUCOSE BLD-MCNC: 89 MG/DL (ref 75–110)
GLUCOSE BLD-MCNC: 94 MG/DL (ref 75–110)
HCT VFR BLD CALC: 29 % (ref 40.7–50.3)
HEMOGLOBIN: 8.4 G/DL (ref 13–17)
IMMATURE GRANULOCYTES: 0 %
LYMPHOCYTES # BLD: 18 % (ref 24–43)
MAGNESIUM: 1.8 MG/DL (ref 1.6–2.6)
MCH RBC QN AUTO: 23.7 PG (ref 25.2–33.5)
MCHC RBC AUTO-ENTMCNC: 29 G/DL (ref 28.4–34.8)
MCV RBC AUTO: 81.7 FL (ref 82.6–102.9)
MONOCYTES # BLD: 7 % (ref 3–12)
NRBC AUTOMATED: 0 PER 100 WBC
PDW BLD-RTO: 18.5 % (ref 11.8–14.4)
PHOSPHORUS: 3.9 MG/DL (ref 2.5–4.5)
PLATELET # BLD: 352 K/UL (ref 138–453)
PLATELET ESTIMATE: ABNORMAL
PMV BLD AUTO: 9.1 FL (ref 8.1–13.5)
POTASSIUM SERPL-SCNC: 4.1 MMOL/L (ref 3.7–5.3)
RBC # BLD: 3.55 M/UL (ref 4.21–5.77)
RBC # BLD: ABNORMAL 10*6/UL
SEG NEUTROPHILS: 71 % (ref 36–65)
SEGMENTED NEUTROPHILS ABSOLUTE COUNT: 4.26 K/UL (ref 1.5–8.1)
WBC # BLD: 5.9 K/UL (ref 3.5–11.3)
WBC # BLD: ABNORMAL 10*3/UL

## 2020-01-06 PROCEDURE — 6370000000 HC RX 637 (ALT 250 FOR IP): Performed by: STUDENT IN AN ORGANIZED HEALTH CARE EDUCATION/TRAINING PROGRAM

## 2020-01-06 PROCEDURE — 82330 ASSAY OF CALCIUM: CPT

## 2020-01-06 PROCEDURE — 82947 ASSAY GLUCOSE BLOOD QUANT: CPT

## 2020-01-06 PROCEDURE — 97162 PT EVAL MOD COMPLEX 30 MIN: CPT

## 2020-01-06 PROCEDURE — 84132 ASSAY OF SERUM POTASSIUM: CPT

## 2020-01-06 PROCEDURE — 99239 HOSP IP/OBS DSCHRG MGMT >30: CPT | Performed by: INTERNAL MEDICINE

## 2020-01-06 PROCEDURE — 6360000002 HC RX W HCPCS: Performed by: STUDENT IN AN ORGANIZED HEALTH CARE EDUCATION/TRAINING PROGRAM

## 2020-01-06 PROCEDURE — 99232 SBSQ HOSP IP/OBS MODERATE 35: CPT | Performed by: INTERNAL MEDICINE

## 2020-01-06 PROCEDURE — 84100 ASSAY OF PHOSPHORUS: CPT

## 2020-01-06 PROCEDURE — 85025 COMPLETE CBC W/AUTO DIFF WBC: CPT

## 2020-01-06 PROCEDURE — 86140 C-REACTIVE PROTEIN: CPT

## 2020-01-06 PROCEDURE — 36415 COLL VENOUS BLD VENIPUNCTURE: CPT

## 2020-01-06 PROCEDURE — 2580000003 HC RX 258: Performed by: STUDENT IN AN ORGANIZED HEALTH CARE EDUCATION/TRAINING PROGRAM

## 2020-01-06 PROCEDURE — 97530 THERAPEUTIC ACTIVITIES: CPT

## 2020-01-06 PROCEDURE — 83735 ASSAY OF MAGNESIUM: CPT

## 2020-01-06 RX ORDER — DOXYCYCLINE HYCLATE 100 MG
100 TABLET ORAL EVERY 12 HOURS SCHEDULED
Status: DISCONTINUED | OUTPATIENT
Start: 2020-01-06 | End: 2020-01-06 | Stop reason: HOSPADM

## 2020-01-06 RX ORDER — DOXYCYCLINE HYCLATE 100 MG
100 TABLET ORAL EVERY 12 HOURS SCHEDULED
Qty: 20 TABLET | Refills: 0 | Status: SHIPPED | OUTPATIENT
Start: 2020-01-06 | End: 2020-01-16

## 2020-01-06 RX ADMIN — CEFTAROLINE FOSAMIL 600 MG: 600 POWDER, FOR SOLUTION INTRAVENOUS at 05:06

## 2020-01-06 RX ADMIN — PANTOPRAZOLE SODIUM 40 MG: 40 TABLET, DELAYED RELEASE ORAL at 09:21

## 2020-01-06 RX ADMIN — FENTANYL CITRATE 50 MCG: 50 INJECTION, SOLUTION INTRAMUSCULAR; INTRAVENOUS at 17:52

## 2020-01-06 RX ADMIN — FENTANYL CITRATE 50 MCG: 50 INJECTION, SOLUTION INTRAMUSCULAR; INTRAVENOUS at 00:40

## 2020-01-06 RX ADMIN — FENTANYL CITRATE 50 MCG: 50 INJECTION, SOLUTION INTRAMUSCULAR; INTRAVENOUS at 09:22

## 2020-01-06 RX ADMIN — FENTANYL CITRATE 50 MCG: 50 INJECTION, SOLUTION INTRAMUSCULAR; INTRAVENOUS at 05:06

## 2020-01-06 RX ADMIN — TAMSULOSIN HYDROCHLORIDE 0.4 MG: 0.4 CAPSULE ORAL at 09:21

## 2020-01-06 RX ADMIN — OXYCODONE HYDROCHLORIDE 10 MG: 5 TABLET ORAL at 11:49

## 2020-01-06 RX ADMIN — PAROXETINE HYDROCHLORIDE HEMIHYDRATE 20 MG: 20 TABLET, FILM COATED ORAL at 09:22

## 2020-01-06 RX ADMIN — OXYCODONE HYDROCHLORIDE 10 MG: 5 TABLET ORAL at 02:32

## 2020-01-06 RX ADMIN — HEPARIN SODIUM 5000 UNITS: 5000 INJECTION INTRAVENOUS; SUBCUTANEOUS at 15:52

## 2020-01-06 RX ADMIN — LEVOTHYROXINE SODIUM 50 MCG: 50 TABLET ORAL at 09:22

## 2020-01-06 RX ADMIN — Medication 1 CAPSULE: at 09:21

## 2020-01-06 RX ADMIN — BUPROPION HYDROCHLORIDE 300 MG: 300 TABLET, FILM COATED, EXTENDED RELEASE ORAL at 09:21

## 2020-01-06 RX ADMIN — MAGNESIUM GLUCONATE 500 MG ORAL TABLET 400 MG: 500 TABLET ORAL at 09:22

## 2020-01-06 RX ADMIN — HEPARIN SODIUM 5000 UNITS: 5000 INJECTION INTRAVENOUS; SUBCUTANEOUS at 05:06

## 2020-01-06 RX ADMIN — FENTANYL CITRATE 50 MCG: 50 INJECTION, SOLUTION INTRAMUSCULAR; INTRAVENOUS at 13:23

## 2020-01-06 RX ADMIN — OXYCODONE HYDROCHLORIDE 10 MG: 5 TABLET ORAL at 06:42

## 2020-01-06 RX ADMIN — OXYCODONE HYDROCHLORIDE 10 MG: 5 TABLET ORAL at 15:52

## 2020-01-06 ASSESSMENT — PAIN - FUNCTIONAL ASSESSMENT: PAIN_FUNCTIONAL_ASSESSMENT: ACTIVITIES ARE NOT PREVENTED

## 2020-01-06 ASSESSMENT — ENCOUNTER SYMPTOMS
BACK PAIN: 0
WHEEZING: 0
EYE DISCHARGE: 0
COLOR CHANGE: 0
BLOOD IN STOOL: 0
NAUSEA: 0
SHORTNESS OF BREATH: 0
ABDOMINAL PAIN: 0
VOMITING: 0

## 2020-01-06 ASSESSMENT — PAIN SCALES - GENERAL
PAINLEVEL_OUTOF10: 10
PAINLEVEL_OUTOF10: 8
PAINLEVEL_OUTOF10: 9
PAINLEVEL_OUTOF10: 8
PAINLEVEL_OUTOF10: 6
PAINLEVEL_OUTOF10: 5
PAINLEVEL_OUTOF10: 7
PAINLEVEL_OUTOF10: 8
PAINLEVEL_OUTOF10: 10
PAINLEVEL_OUTOF10: 8

## 2020-01-06 ASSESSMENT — PAIN DESCRIPTION - ONSET: ONSET: ON-GOING

## 2020-01-06 ASSESSMENT — PAIN DESCRIPTION - PROGRESSION: CLINICAL_PROGRESSION: GRADUALLY WORSENING

## 2020-01-06 ASSESSMENT — PAIN DESCRIPTION - ORIENTATION: ORIENTATION: LEFT

## 2020-01-06 ASSESSMENT — PAIN DESCRIPTION - LOCATION: LOCATION: FOOT

## 2020-01-06 ASSESSMENT — PAIN DESCRIPTION - DESCRIPTORS: DESCRIPTORS: ACHING;SHARP;THROBBING

## 2020-01-06 ASSESSMENT — PAIN DESCRIPTION - FREQUENCY: FREQUENCY: CONTINUOUS

## 2020-01-06 ASSESSMENT — PAIN DESCRIPTION - PAIN TYPE: TYPE: SURGICAL PAIN

## 2020-01-06 NOTE — CARE COORDINATION
Orders for [facility], [place], and shower chair faxed to [employer]. Face to face also faxed. [phone] Transitional planning-talked with patient about the possible need for home IV antibiotics-may need home care. Went over list-chose [facility]. Referral faxed and called to [facility]. [place] if IV antibiotics needed. [phone] faxed AVS and home care order to [facility]. [facility]

## 2020-01-06 NOTE — DISCHARGE SUMMARY
Tonja Mascorro [address]    Discharge Summary     Patient ID: Sedrick Seat  :  1965   MRN: [id number]     ACCOUNT:  [de-identified]   Patient's PCP: Rosalio Callahan MD  Admit Date: 2019   Discharge Date: 2020     Length of Stay: 9  Code Status:  Full Code  Admitting Physician: Gianna Baltazar MD  Discharge Physician: Gianna Baltazar MD     Active Discharge Diagnoses:     Hospital Problem Lists:  Principal Problem:    Osteomyelitis of foot, left, acute ([facility] [address]  Active Problems:    Acute renal failure (ARF) ([address]    Hyperkalemia    Hypocalcemia    Metabolic acidosis    Diabetic ulcer of toe of left foot associated with type 2 diabetes mellitus ([facility] [address]    Hyperphosphatemia    Chronic, continuous use of opioids    Acute metabolic encephalopathy    Diabetic foot infection ([facility] [address]  Resolved Problems:    Encephalopathy due to infection    Septic shock ([facility] [address]    Uremia    Altered mental status    CLAUDIA (acute kidney injury) [facility])      Admission Condition:  fair     Discharged Condition: good    Hospital Stay:     Hospital Course:    Nicolasa Cochran a 53 y. o. male with a past medical history of DM2 complicated with left foot wound and well known to Dr. Rose Cole. Patient was admitted to the ICU altered mental status and found to have sepsis due to let foot infection complicated with CLAUDIA. He was treated with IV antibiotics and IVF.      CLAUDIA resolved as well as confusion.      20 - transmetatarsal amputation, left foot       Significant therapeutic interventions:   1. Left foot osteomyelitis associated with type 2 diabetes. S/p transmetatarsal amputation, left foot 20. IV Ceftaroline was stopped and PO doxy 100 BID with stop date on 19  2. Acute Metabolic Encephalopathy. Resolved  3. Sepsis due to #2. Resolved. 4. Acute Renal failure. Resolved  5. DM-2. Controlled. With Metformin. Will restart on discharge  6. Anxiety.  Restart visit  for in 2-3 weeks       Requiring Further Evaluation/Follow Up POST HOSPITALIZATION/Incidental Findings:     Diet: regular diet    Activity: As tolerated    Instructions to Patient:     Discharge Medications:      Medication List      CONTINUE taking these medications    ADAPTIC NON-ADHERING DRESSING Pads  Apply 1 Units topically 2 times daily     atorvastatin 20 MG tablet  Commonly known as:  LIPITOR  Take 1 tablet by mouth daily     buPROPion 300 MG extended release tablet  Commonly known as:  WELLBUTRIN XL  TAKE ONE TABLET BY MOUTH DAILY     ciclopirox 0.77 % cream  Commonly known as:  LOPROX  APPLY TO AFFECTED AREA(S) AND RUB  IN A THIN FILM TWO TIMES A DAY (AM AND PM)     diazepam 5 MG tablet  Commonly known as:  VALIUM  Take 1 tablet by mouth every 6 hours as needed for Anxiety for up to 30 days. fluocinonide 0.05 % ointment  Commonly known as:  LIDEX  Apply topically 2 times daily. * KERLIX GAUZE ROLL LARGE Misc  1 Units by Does not apply route 2 times daily     * COMBINE ABD 5\"X9\" Pads  1 Units by Does not apply route 2 times daily     lactobacillus capsule  Take 1 capsule by mouth 2 times daily (with meals)     levothyroxine 50 MCG tablet  Commonly known as:  SYNTHROID  Take 1 tablet by mouth daily     lisinopril 10 MG tablet  Commonly known as:  PRINIVIL;ZESTRIL  TAKE ONE TABLET BY MOUTH DAILY     magnesium oxide 400 (241.3 Mg) MG Tabs tablet  Commonly known as:  MAG-OX  Take 1 tablet by mouth 2 times daily     megestrol 40 MG/ML suspension  Commonly known as:  MEGACE ORAL  Take 10 mLs by mouth daily     metFORMIN 500 MG tablet  Commonly known as:  GLUCOPHAGE  Take 1 tablet by mouth 2 times daily (with meals)     morphine 60 MG extended release tablet  Commonly known as:  MS CONTIN  Take 1 tablet by mouth 2 times daily for 30 days.      NARCAN 4 MG/0.1ML Liqd nasal spray  Generic drug:  naloxone     NYAMYC 747589 UNIT/GM powder  Generic drug:  nystatin  APPLY TO AFFECTED AREA(S) FOUR TIMES A DAY     ONE TOUCH ULTRA 2 w/Device Kit  1 kit by Other route daily     ONE TOUCH ULTRA TEST strip  Generic drug:  blood glucose test strips  USE ONE STRIP TO TEST TWO TIMES A DAY AS NEEDED     ONETOMISTY DELICA LANCETS 68E Misc  TEST TWO TIMES A DAY     oxyCODONE HCl 10 MG immediate release tablet  Commonly known as:  OXY-IR  Take 1 tablet by mouth every 6 hours as needed for Pain for up to 30 days. Intended supply: 30 days     pantoprazole 40 MG tablet  Commonly known as:  PROTONIX  Take 1 tablet by mouth every morning (before breakfast)     PARoxetine 20 MG tablet  Commonly known as:  PAXIL  TAKE ONE TABLET BY MOUTH FOUR TIMES A DAY     povidone-iodine 7.5 % external solution  Commonly known as:  BETADINE  Apply to wound. Apply as a wet to dry dressing     sodium hypochlorite 0.125 % Soln external solution  Commonly known as:  DAKINS  Apply topically every 12 hours Apply as a wet to dry dressing twice a day. Can also use to cleanse wound. tamsulosin 0.4 MG capsule  Commonly known as:  FLOMAX  Take 1 capsule by mouth daily     tiZANidine 4 MG tablet  Commonly known as:  ZANAFLEX  TAKE ONE TABLET BY MOUTH EVERY 6 HOURS AS NEEDED FOR MUSCLE SPASMS         * This list has 2 medication(s) that are the same as other medications prescribed for you. Read the directions carefully, and ask your doctor or other care provider to review them with you. Time Spent on discharge is  35 mins in patient examination, evaluation, counseling as well as medication reconciliation, prescriptions for required medications, discharge plan and follow up. Electronically signed by   Tere Fischer MD  1/6/2020  8:55 AM      Thank you Dr. Hamida Hicks MD for the opportunity to be involved in this patient's care.

## 2020-01-06 NOTE — PROGRESS NOTES
Tonja Mascorro [address]    Progress Note    1/6/2020    8:35 AM    Name:   Jennifer Santos  MRN:     [id number]     [place]:      [de-identified]   Room:   2017/2017-01  IP Day:  9  Admit Date:  12/28/2019 12:43 PM    PCP:   Atif Grijalva MD  Code Status:  Full Code    Subjective:     C/C:   Chief Complaint   Patient presents with    Altered Mental Status     x3 days, transfer from [facility]. Interval History Status: improved. Evaluated patient with Pod at bedside during wound change  No issues overnight  Pain continue to be controlled  No fever  Want to go home    Brief History:   Don Never a 53 y. o. male with a past medical history of DM2 complicated with left foot wound and well known to Dr. Matthew Aragon. Patient was admitted to the ICU altered mental status and found to have sepsis due to let foot infection complicated with CLAUDIA. He was treated with IV antibiotics and IVF.      CLAUDIA resolved as well as confusion.      1/4/20 - transmetatarsal amputation, left foot     Review of Systems:     Review of Systems   Constitutional: Negative for appetite change and fever. HENT: Negative for congestion and ear discharge. Eyes: Negative for discharge and visual disturbance. Respiratory: Negative for shortness of breath and wheezing. Cardiovascular: Negative for chest pain, palpitations and leg swelling. Gastrointestinal: Negative for abdominal pain, blood in stool, nausea and vomiting. Endocrine: Negative for cold intolerance and heat intolerance. Genitourinary: Negative for dysuria and frequency. Musculoskeletal: Negative for arthralgias, back pain and joint swelling. Skin: Negative for color change and rash. Neurological: Negative for dizziness, tremors, seizures and light-headedness. Psychiatric/Behavioral: Negative for agitation and confusion. Medications: Allergies:     Allergies   Allergen Reactions    Family History:   Family History   Problem Relation Age of Onset    Diabetes Father     Cancer Maternal Grandfather         Colon Cancer; Prostate Cancer    No Known Problems Mother        Vitals:  /69   Pulse 67   Temp 98.4 °F (36.9 °C) (Oral)   Resp 12   Ht 6' 2\" (1.88 m)   Wt 219 lb (99.3 kg)   SpO2 100%   BMI 28.12 kg/m²   Temp (24hrs), Av.5 °F (36.9 °C), Min:98.4 °F (36.9 °C), Max:98.6 °F (37 °C)    Recent Labs     20  0734 20  1212 20  1510 20   POCGLU 96 95 92 86       I/O (24Hr): Intake/Output Summary (Last 24 hours) at 2020 0835  Last data filed at 2020 0615  Gross per 24 hour   Intake 2250 ml   Output 2925 ml   Net -675 ml       Labs:  Hematology:  Recent Labs     20  0620  0548   WBC 5.7 6.9 5.9   RBC 4.21 3.48* 3.55*   HGB 9.9* 8.4* 8.4*   HCT 34.6* 29.2* 29.0*   MCV 82.2* 83.9 81.7*   MCH 23.5* 24.1* 23.7*   MCHC 28.6 28.8 29.0   RDW 18.6* 18.7* 18.5*    333 352   MPV 9.0 9.4 9.1   CRP 7.6* 6.1* 9.4*     Chemistry:  Recent Labs     20  0620  04220  1055 20  0548   K 4.2 3.9  --  3.9 3.8  --    MG 1.8  --  1.7  --   --  1.8   CAION 1.23  --  1.09*  --   --  1.20   PHOS 3.8  --  3.1  --   --  3.9       Physical Examination:        Physical Exam  Vitals signs reviewed. Constitutional:       General: He is not in acute distress. Appearance: Normal appearance. He is not ill-appearing or diaphoretic. HENT:      Head: Normocephalic and atraumatic. Nose: No congestion or rhinorrhea. Eyes:      Extraocular Movements: Extraocular movements intact. Conjunctiva/sclera: Conjunctivae normal.      Pupils: Pupils are equal, round, and reactive to light. Neck:      Musculoskeletal: Normal range of motion and neck supple. Cardiovascular:      Rate and Rhythm: Normal rate and regular rhythm. Pulses: Normal pulses.       Heart sounds:

## 2020-01-06 NOTE — PROGRESS NOTES
Celia Iha with worsening confusion over about 5 days, associated decrease in urine output. Found to be anuric with Cr 9.47, K 6.9. Also was hypotensive and received fluids plus levophed before transfer to [facility]. Blood cultures were obtained at [facility]. One of 2 yielded Bacillus spp.on 12-28-19. patient received vancomycin. Blood cultures 12-29-19: No growth. 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 Family indicates deterioration on left foot with dark discoloration of dorsum and new plantar ulcer with drainage. Pt has decided to stay at [facility] and undergo transmetatarsal amputation of Lt foot with Dr Claudene Aw. It is scheduled for 1-4-20    CURRENT EVALUATION : 1/6/2020     Patient evaluated and examined  He is AA, denies chills or fevers, no SOB or malaise  He underwent transmetatarsal amputation of the Lt foot on 1-4-20    Afebrile  VS stable. Culture with heavy growth S aureus MRSA  Renal function improved. Ceftaroline increased to 600 mg q 12 hr    Plan to discontinue Ceftaroline at this time. Will treat with doxycycline 100 mg po BID x 1 month. Labs, X rays reviewed: 1/6/2020    BUN: 81-->41->20  Cr:4.76--4.27-->1.62->0.97    WBC:28-->9.5-->5.5->5.6  Hb:9.0-->8.1->8.7->8.9  Plat: 264-->223->280->316    ESR 47  .1->24.6->11.7    Cultures:  Urine:  ·   Blood:  · 12-28-19: 1/2 bacillus spp - likely a contaminate  · 12-29-19 x 2: No growth   Sputum :  ·   Wound:  · 12-29 MRSA heavy growth - tetracycline sensitivie    CXR 12-28-19: Vascular congestion. No pneumonia    Discussed with patient, RN, family. 1-6-20:                    1-3-20:            12-29-19:          I have personally reviewed the past medical history, past surgical history, medications, social history, and family history, and I have updated the database accordingly.   Past Medical History:     Past Medical History:   Diagnosis Date    Anxiety     Arthritis     lower back, knees    BPH (benign prostatic hyperplasia)     Chronic kidney disease     prostate    Depression     Diabetes mellitus ([facility] [address]     Diabetic neuropathy (HCC)     GERD (gastroesophageal reflux disease)     Hyperglycemia     Hypertension     Hypothyroidism     Impacted tooth     Pneumonia        Past Surgical  History:     Past Surgical History:   Procedure Laterality Date    APPENDECTOMY      BACK SURGERY      Lower x 4. Had abscess after appendectomy.      FOOT AMPUTATION Left 1/4/2020    TRANSMETATARSAL FOOT AMPUTATION, REMOVAL OF FOREIGN BODY performed by Yung Rae DPM at [facility] Left 01/04/2020    TRANSMETATARSAL FOOT AMPUTATION, REMOVAL OF FOREIGN BODY    KNEE SURGERY Right     scope    TOE AMPUTATION Bilateral 3/6/2017    TOE AMPUTATION LEFT HALLUX AND 2ND DIGIT AND RIGHT 2ND DIGIT performed by Valeri Arvizu DPM at [place] [address] ENDOSCOPY N/A 5/20/2019    EGD WITH BIOPSY performed by Alpa Garcia MD at [address] [place] [address] OR       Medications:      ceftaroline fosamil (TEFLARO) IVPB  600 mg Intravenous Q12H    senna  2 tablet Oral BID    heparin (porcine)  5,000 Units Subcutaneous 3 times per day    pantoprazole  40 mg Oral QAM AC    sodium chloride flush  10 mL Intravenous 2 times per day    buPROPion  300 mg Oral Daily    lactobacillus  1 capsule Oral BID WC    levothyroxine  50 mcg Oral Daily    magnesium oxide  400 mg Oral BID    PARoxetine  20 mg Oral Daily    tamsulosin  0.4 mg Oral Daily    insulin lispro  0-6 Units Subcutaneous TID WC    insulin lispro  0-3 Units Subcutaneous Nightly       Social History:     Social History     Socioeconomic History    Marital status:      Spouse name: Not on file    Number of children: Not on file    Years of education: Not on file    Highest education level: Not on file   Occupational History    Not on file   Social Needs    Financial resource strain: Not on file    Food insecurity:     Worry: Not on file     Inability: Not on file   [place] needs:     Medical: Not on file     Non-medical: Not on file   Tobacco Use    Smoking status: Never Smoker    Smokeless tobacco: Never Used   Substance and Sexual Activity    Alcohol use: No    Drug use: Not Currently     Frequency: 2.0 times per week     Types: Marijuana    Sexual activity: Not on file   Lifestyle    Physical activity:     Days per week: Not on file     Minutes per session: Not on file    Stress: Not on file   Relationships    Social connections:     Talks on phone: Not on file     Gets together: Not on file     Attends Faith service: Not on file     Active member of club or organization: Not on file     Attends meetings of clubs or organizations: Not on file     Relationship status: Not on file    Intimate partner violence:     Fear of current or ex partner: Not on file     Emotionally abused: Not on file     Physically abused: Not on file     Forced sexual activity: Not on file   Other Topics Concern    Not on file   Social History Narrative    Not on file       Family History:     Family History   Problem Relation Age of Onset    Diabetes Father     Cancer Maternal Grandfather         Colon Cancer; Prostate Cancer    No Known Problems Mother         Allergies:   Bactrim [sulfamethoxazole-trimethoprim]; Fiorinal [butalbital-aspirin-caffeine]; Peanut-containing drug products; Statins; Tylenol [acetaminophen]; and Peanut butter flavor [flavoring agent]     Review of Systems:     Constitutional: No fevers or chills. No systemic complaints  Head: No headaches  Eyes: No double vision or blurry vision. No conjunctival inflammation. ENT: No sore throat or runny nose. . No hearing loss, tinnitus or vertigo. Cardiovascular: No chest pain or palpitations. No shortness of breath. No MONTES  Lung: No shortness of breath or cough. No sputum production  Abdomen: No nausea, vomiting, diarrhea, or abdominal pain. Enrico Brooking No cramps.   Genitourinary: No increased urinary frequency, osteomyelitis.  Adjacent soft tissue swelling and soft tissue defect is also   seen.  Stable irregularity involving the head of the 3rd metatarsal.  No   obvious change since November 2019 of the 1st digit.       Elsewhere, no acute fracture or dislocation.  Bony fragment is seen at the   level of the 4th and 5th metatarsal.       Diffuse soft tissue swelling.           Impression   1. New irregularity involving the remnant 2nd proximal phalanx concerning for   osteomyelitis.  Adjacent soft tissue swelling and soft tissue defect is   present. 2. Diffuse soft tissue swelling. 3. No additional suspicious lesions although evaluation is limited given   osteopenia and patient positioning.             Medical Decision [id number]-[id number]-Bcsjp:     1/1/2020  4:32 PM - [place], [facility] Incoming Lab Results From [facility]     Specimen Information: Foot        Component Collected Lab   Specimen Description 12/29/2019  3:15  [address]   . FOOT LEFT    Special Requests 12/29/2019  3:15  [address]   NOT REPORTED    Direct Exam Abnormal  12/29/2019  3:[address]  [address]    Direct Exam Abnormal  12/29/2019  3:15 PM [address] [facility] [place]    Culture Abnormal  12/29/2019  3:15  [address]   METHICILLIN RESISTANT STAPHYLOCOCCUS AUREUS HEAVY GROWTH    Culture Abnormal  12/29/2019  3:15  [address]   NO ANAEROBIC ORGANISMS ISOLATED AT 3 DAYS    Testing Performed By     Lab - Abbreviation Name Director Address Valid Date Range   [facility] [place] [address] [facility] [address] 08/30/17 0801-Present   Susceptibility     Methicillin resistant staphylococcus aureus (1)     Antibiotic Interpretation FERNANDO Status    penicillin Resistant  Preliminary     >=0.5  RESISTANT   cefoxitin screen  NOT REPORTED Preliminary    ciprofloxacin Preliminary     NOT REPORTED   clindamycin Sensitive  Preliminary     <=0.25  SUSCEPTIBLE   erythromycin Resistant  Preliminary     >=8  RESISTANT   gentamicin Sensitive  Preliminary     <=0.5  SUSCEPTIBLE   gentamicin Sensitive Gentamicin is used only in combination with other active agents that test susceptible. Preliminary    Induced Clind Resist Negative NEGATIVE Preliminary    levofloxacin Intermediate  Preliminary     4  INTERMEDIATE   linezolid   Preliminary     NOT REPORTED   moxifloxacin   Preliminary     NOT REPORTED   nitrofurantoin   Preliminary     NOT REPORTED   oxacillin Resistant  Preliminary     >=4  RESISTANT   Synercid   Preliminary     NOT REPORTED   rifampin   Preliminary     NOT REPORTED   tetracycline Sensitive  Preliminary     <=1  SUSCEPTIBLE   tigecycline   Preliminary     NOT REPORTED   trimethoprim-sulfamethoxazole Sensitive  Preliminary     <=10  SUSCEPTIBLE   vancomycin Sensitive  Preliminary     1  SUSCEPTIBLE       Medical Decision Making-Other:     Note:  · Labs, medications, radiologic studies were reviewed with personal review of films  · Large amounts of data were reviewed  · Discussed with nursing Staff, Discharge planner  · Infection Control and Prevention measures reviewed  · All prior entries were reviewed  · Administer medications as ordered  · Prognosis: Fair  · Discharge planning reviewed  · Follow up as outpatient. Thank you for allowing us to participate in the care of this patient. Please call with questions.     Kimberly Salcido MD  Pager: [phone] - Office: [phone]

## 2020-01-06 NOTE — PROGRESS NOTES
Physical Therapy    Facility/Department: [facility] CAR 2  Initial Assessment    NAME: Su Henderson  : 1965  MRN: [id number]    Date of Service: 2020  Su Henderson is a 53 y.o. male who presented as a transfer from [facility]. As per the patient's wife, the patient was doing fine until 1 week back, after which he developed some confusion, it worsened yesterday. Patient did not have any fever at home. He had cough with sputum production, and shortness of breath yesterday. Since the past 1 week he has episodes of diarrhea, 3-4 bowel movements, loose watery. Patient has chronic  left foot ulcer, had developed a new onset on the plantar aspect of left MTP joint. He did not have any urine output in the past 3 days. He also had an episode of fall last Thursday.     In [facility] ED the patient was noted to be in CLAUDIA with creatinine 9.47, , hyperkalemic with potassium of 6.9, received 1 dose of insulin, dextrose and albuterol, repeat potassium here was 5.2. Xray left foot -new irregularity involving the remnant second proximal phalanx concerning for osteomyelitis  CXR-mild vascular prominence  Pt underwent L TMA 1/3/20. Pt is to be NWB LLE     Discharge Recommendations:  Further therapy recommended at discharge. PT Equipment Recommendations  Equipment Needed: Yes  Mobility Devices: Awais Phillips: Rolling    Assessment    Pt and his wife seemed very unconcerned about safety issues with pt's new NWB status LLE. He was unsteady with the standard walker requiring min A to prevent falling. He did better with rw. He plans to stay on the first floor for \"a while\" but has 3-5 steps to get into the house. I had quite a lengthy session with him to make sure both he and his wife feel safe with him going home.   The pt was very fatigued and his foot was increasingly painful by the end of the PT session, so he was unable to actually negotiate the stairs himself, but he and his wife did watch me demonstrate stair ambulation both with a crutch and a handrail, and retro with a walker. I spent a lot of time problem solving the safest way to get into and out of the house, and also the stairs inside if necessary. Body structures, Functions, Activity limitations: Decreased functional mobility ; Decreased endurance;Decreased balance; Increased pain  Prognosis: Good  Decision Making: Medium Complexity  PT Education: Goals;PT Role;Plan of Care;Precautions;Transfer Training;Energy Conservation;General Safety; Family Education;Equipment;Gait Training;Functional Mobility Training  Barriers to Learning: none  REQUIRES PT FOLLOW UP: Yes  Activity Tolerance  Activity Tolerance: Patient limited by fatigue;Patient limited by endurance; Patient limited by pain       Patient Diagnosis(es): The primary encounter diagnosis was Acute renal failure, unspecified acute renal failure type ([facility] [address] A diagnosis of Cellulitis, unspecified cellulitis site was also pertinent to this visit. has a past medical history of Anxiety, Arthritis, BPH (benign prostatic hyperplasia), Chronic kidney disease, Depression, Diabetes mellitus ([address] Diabetic neuropathy ([facility] [address] GERD (gastroesophageal reflux disease), Hyperglycemia, Hypertension, Hypothyroidism, Impacted tooth, and Pneumonia. has a past surgical history that includes Appendectomy; knee surgery (Right); Tonsillectomy; back surgery; Toe amputation (Bilateral, 3/6/2017); Upper gastrointestinal endoscopy (N/A, 5/20/2019); Foot surgery (Left, 01/04/2020); and Foot Amputation (Left, 1/4/2020).     Restrictions  Restrictions/Precautions  Restrictions/Precautions: Weight Bearing, Up as Tolerated  Required Braces or Orthoses?: No  Lower Extremity Weight Bearing Restrictions  Left Lower Extremity Weight Bearing: Non Weight Bearing  Vision/Hearing  Vision: Within Functional Limits  Hearing: Within functional limits     Subjective  General  Patient assessed for rehabilitation services?: Yes  Response To Previous Treatment: Not applicable  Family / Caregiver Present: Yes(spouse)  Follows Commands: Within Functional Limits  Pain Screening  Patient Currently in Pain: Yes  Pain Assessment  Pain Assessment: 0-10  Pain Level: 7  Patient's Stated Pain Goal: No pain  Pain Type: Surgical pain  Pain Location: Foot  Pain Orientation: Left  Pain Descriptors: Aching; Kip Amado; Throbbing  Pain Frequency: Continuous  Pain Onset: On-going  Clinical Progression: Gradually worsening  Functional Pain Assessment: Activities are not prevented  Vital Signs  Patient Currently in Pain: Yes  Pre Treatment Pain Screening  Intervention List: Patient able to continue with treatment    Orientation  Orientation  Overall Orientation Status: Within Functional Limits(flat affect, slow to respond)  Social/Functional History  Social/Functional History  Lives With: Family(spouse and pt's mom)  Type of Home: House  Home Layout: Two level, Bed/Bath upstairs, Able to Live on Main level with bedroom/bathroom(pt normally sleeps in recliner on 2nd floor; plans to sleep in recliner on the first floor for a while)  Home Access: Stairs to enter with rails  Entrance Stairs - Number of Steps: 2-3   Entrance Stairs - Rails: Right  Home Equipment: Hoang Rodney, Standard walker, Crutches  Receives Help From: Family  ADL Assistance: Independent  Ambulation Assistance: Independent  Transfer Assistance: Independent  Mode of Transportation: Car, Family  Type of occupation: pt states \"I haven't worked since 1989\"; he's attempting to get on disability    Objective     Observation/Palpation  Posture: Fair    AROM RLE (degrees)  RLE AROM: WFL  AROM LLE (degrees)  LLE AROM : Exceptions--ankle to neutral dorsiflexion, ~15 degrees plantarflexion  AROM RUE (degrees)  RUE AROM : WFL  AROM LUE (degrees)  LUE AROM : WFL  Strength RLE  Strength RLE: WFL  Strength LLE  Strength LLE: Exception--ankle 3/5--no resistance offered to ankle  Strength RUE  Strength RUE: WNL  Strength LUE  Strength LUE: WNL  Tone RLE  RLE Tone: Normotonic  Tone LLE  LLE Tone: Normotonic  Motor Control  Gross Motor?: WFL  Sensation  Overall Sensation Status: WFL  Bed mobility  Rolling to Left: Independent  Rolling to Right: Independent  Supine to Sit: Independent  Sit to Supine: Independent  Scooting: Independent  Transfers  Sit to Stand: Minimal Assistance;Stand by assistance(min A from low surface; SBA from higher surface)  Stand to sit: Stand by assistance(verbal cues for safe positioning and to reach back with B hands for controlled descent)  Bed to Chair: Stand by assistance  Stand Pivot Transfers: Stand by assistance  Worked on chair to floor transfers with supervision; pt \"crawled\" (on pillows) from chair to couch in the room with SBA+1, and then got from the floor to the couch with CG+1  Wife present for floor transfers in case pt needs to \"crawl\" if he decides to go upstairs at home.     Ambulation  Ambulation?: Yes  More Ambulation?: Yes  Ambulation 1  Surface: level tile  Device: Rolling Walker  Assistance: Contact guard assistance  Quality of Gait: several episodes LOB to the R requiring min A to correct  Gait Deviations: Slow Sommer  Distance: ~10'x1   Ambulation 2  Surface - 2: level tile  Device 2: [address] [facility] 2: Supervision  Quality of Gait 2: slow pace, initially unsteady, but improved the longer he was up  Gait Deviations: Slow Sommer  Distance: 12'x3   Stairs/Curb  Stairs?: Yes  Stairs  Comment: pt was too fatigued to practice the stairs himself, but was agreeable to watch PT demonstration; wife present(demonstration of stair ambulation with 1 crutch and 1 hr;  and using a walker to go up backwards x 1-2 steps)     Balance  Posture: Good  Sitting - Static: Good  Sitting - Dynamic: Good  Standing - Static: Fair  Standing - Dynamic: Fair;-        Plan   Plan  Times per week: 5 visits weekly  Times per day: Daily  Current Treatment Recommendations: Strengthening, ROM,

## 2020-01-06 NOTE — PROGRESS NOTES
(rDNA), dextrose, fentanNYL **OR** fentanNYL    Objective     Vitals:  Patient Vitals for the past 8 hrs:   Pulse Resp SpO2 Weight   20 0700 67 12 100 % --   20 0600 59 13 97 % 219 lb (99.3 kg)   20 0500 60 18 96 % --   20 0400 69 19 97 % --   20 0300 63 24 97 % --   20 0200 59 18 97 % --     Average, Min, and Max for last 24 hours Vitals:  TEMPERATURE:  Temp  Av.5 °F (36.9 °C)  Min: 98.4 °F (36.9 °C)  Max: 98.6 °F (37 °C)    RESPIRATIONS RANGE: Resp  Av.6  Min: 12  Max: 24    PULSE RANGE: Pulse  Av.1  Min: 59  Max: 84    BLOOD PRESSURE RANGE:  Systolic (38SEZ), FSS:714 , Min:108 , SWY:427   ; Diastolic (73TUE), MKV:71, Min:53, Max:69      PULSE OXIMETRY RANGE: SpO2  Av.6 %  Min: 96 %  Max: 100 %    I/O last 3 completed shifts: In: 2250 [P.O.:1060; I.V.:1190]  Out: 2925 [Urine:2925]    CBC:  Recent Labs     20  0426 20  0548   WBC 5.7 6.9 5.9   HGB 9.9* 8.4* 8.4*   HCT 34.6* 29.2* 29.0*    333 352   CRP 7.6* 6.1* 9.4*        BMP:  Recent Labs     20  1055 20   K 3.9 3.9 3.8        Coags:  No results for input(s): APTT, PROT, INR in the last 72 hours. Lab Results   Component Value Date    SEDRATE 47 (H) 2019     Recent Labs     20  0627 20  0426 20  0548   CRP 7.6* 6.1* 9.4*       Lower Extremity Physical Exam:     Vascular: DP and PT pulses are non palpable. CFT >3 seconds to all digits. Hair growth is absent to the level of the digits. Nonpitting edema to the left LE     Neuro: Saph/sural/SP/DP/plantar sensation diminished to light touch.     Musculoskeletal: Muscle strength deferred to all lower extremity muscle groups. Gross deformity is present, amputation of right 2nd ray, s/p left TMA on 20.      Dermatologic:  Status post left TMA (D OS 2020)-surgical incision well coapted with suture.   Approximately 8 cc of sanguinous drainage noted in drain

## 2020-01-06 NOTE — PLAN OF CARE
Skin Integrity - Impaired:  Goal: Will show no infection signs and symptoms  Description  Will show no infection signs and symptoms  1/6/2020 1734 by Yareli Coronado RN  Outcome: Completed  1/6/2020 0344 by Arnaldo Romero RN  Outcome: Ongoing  Goal: Absence of new skin breakdown  Description  Absence of new skin breakdown  1/6/2020 1734 by Yareli Coronado RN  Outcome: Completed  1/6/2020 0344 by Arnaldo Romero RN  Outcome: Ongoing     Problem: Discharge Planning:  Goal: Ability to perform activities of daily living will improve  Description  Ability to perform activities of daily living will improve  1/6/2020 1734 by Yareli Coronado RN  Outcome: Completed  1/6/2020 0344 by Arnaldo Romero RN  Outcome: Ongoing  Goal: Participates in care planning  Description  Participates in care planning  1/6/2020 1734 by Yareli Coronado RN  Outcome: Completed  1/6/2020 0344 by Arnaldo Romero RN  Outcome: Ongoing  Goal: Discharged to appropriate level of care  Description  Discharged to appropriate level of care  1/6/2020 1734 by Yareli Coronado RN  Outcome: Completed  1/6/2020 0344 by Arnaldo Romero RN  Outcome: Ongoing     Problem: Injury - Risk of, Physical Injury:  Goal: Absence of physical injury  Description  Absence of physical injury  1/6/2020 1734 by Yareli Coronado RN  Outcome: Completed  1/6/2020 0344 by Arnaldo Romero RN  Outcome: Ongoing  Goal: Will remain free from falls  Description  Will remain free from falls  1/6/2020 1734 by Yareli Coronado RN  Outcome: Completed  1/6/2020 0344 by Arnaldo Romero RN  Outcome: Ongoing     Problem: Mood - Altered:  Goal: Mood stable  Description  Mood stable  1/6/2020 1734 by Yareli Coronado RN  Outcome: Completed  1/6/2020 0344 by Arnaldo Romero RN  Outcome: Ongoing  Goal: Absence of abusive behavior  Description  Absence of abusive behavior  1/6/2020 1734 by Yareli Coronado RN  Outcome: Completed  1/6/2020 0344 by Mirna Lara Lauren Vance RN  Outcome: Ongoing  Goal: Verbalizations of feeling emotionally comfortable while being cared for will increase  Description  Verbalizations of feeling emotionally comfortable while being cared for will increase  1/6/2020 1734 by Candice Willis RN  Outcome: Completed  1/6/2020 0344 by Leidy Coats RN  Outcome: Ongoing     Problem: Psychomotor Activity - Altered:  Goal: Absence of psychomotor disturbance signs and symptoms  Description  Absence of psychomotor disturbance signs and symptoms  1/6/2020 1734 by Candice Willis RN  Outcome: Completed  1/6/2020 0344 by Leidy Coats RN  Outcome: Ongoing     Problem: Sensory Perception - Impaired:  Goal: Demonstrations of improved sensory functioning will increase  Description  Demonstrations of improved sensory functioning will increase  1/6/2020 1734 by Candice Willis RN  Outcome: Completed  1/6/2020 0344 by Leidy Coats RN  Outcome: Ongoing  Goal: Decrease in sensory misperception frequency  Description  Decrease in sensory misperception frequency  1/6/2020 1734 by Candice Willis RN  Outcome: Completed  1/6/2020 0344 by Leidy Coats RN  Outcome: Ongoing  Goal: Able to refrain from responding to false sensory perceptions  Description  Able to refrain from responding to false sensory perceptions  1/6/2020 1734 by Candice Willis RN  Outcome: Completed  1/6/2020 0344 by Leidy Coats RN  Outcome: Ongoing  Goal: Demonstrates accurate environmental perceptions  Description  Demonstrates accurate environmental perceptions  1/6/2020 1734 by Candice Willis RN  Outcome: Completed  1/6/2020 0344 by Leidy Coats RN  Outcome: Ongoing  Goal: Able to distinguish between reality-based and nonreality-based thinking  Description  Able to distinguish between reality-based and nonreality-based thinking  1/6/2020 1734 by Candice Willis RN  Outcome: Completed  1/6/2020 0344 by Leidy Coats RN  Outcome:

## 2020-01-07 ENCOUNTER — TELEPHONE (OUTPATIENT)
Dept: PRIMARY CARE CLINIC | Age: 55
End: 2020-01-07

## 2020-01-07 LAB
INTERVENTION: NORMAL
SURGICAL PATHOLOGY REPORT: NORMAL

## 2020-01-08 ENCOUNTER — TELEPHONE (OUTPATIENT)
Dept: PODIATRY | Age: 55
End: 2020-01-08

## 2020-01-08 ENCOUNTER — TELEPHONE (OUTPATIENT)
Dept: PRIMARY CARE CLINIC | Age: 55
End: 2020-01-08

## 2020-01-08 NOTE — TELEPHONE ENCOUNTER
Pt was d/c from [facility] 1/7/2020 and needs to schedule f/u appt. Pt only wants to see Dr. Duane Hogan but there are no open time slots. Pt is also concerned about running out of meds. He will be out of them by the 24th. Please call the pt @ [phone] to schedule that appt.

## 2020-01-08 NOTE — TELEPHONE ENCOUNTER
Per Dr. Ce Dai the patient is not to change the dressing at all until seen in the office. Patient is okay to wait till the 16th to come in as long as he continues to be NWB. I called the patient's cell and home phone number and left a message on both answering machines.

## 2020-01-08 NOTE — TELEPHONE ENCOUNTER
Spouse called in asking if the appointment patient has on 1/16/20 with Dr. Lovelace Nurse is okay to keep for his post-op or if patient needs to be seen sooner as he was discharged Monday 1/6/20 after his foot surgery. Spouse is also asking if she should start changing the dressing or if she is to wait until patient is seen. Please call back to advise. Thank You.

## 2020-01-08 NOTE — TELEPHONE ENCOUNTER
[facility] Transitions Initial Follow Up Call    Outreach made within 2 business days of discharge: Yes    Patient: Russel Apgar Patient : 1965   MRN: [id number]  Reason for Admission: Osteomyelitis of foot left, acute  Discharge Date: 20       Spoke with: Sunday Lewis    Discharge department/facility: [facility]    TCM Interactive Patient Contact:  Was patient able to fill all prescriptions: Yes  Was patient instructed to bring all medications to the follow-up visit: Yes  Is patient taking all medications as directed in the discharge summary?  Yes  Does patient understand their discharge instructions: Yes  Does patient have questions or concerns that need addressed prior to 7-14 day follow up office visit: no    Scheduled appointment with PCP within 7-14 days    Follow Up  Future Appointments   Date Time Provider Tasha Prado   1[facility]15[facility]2020  4:30 PM [facility]   2020  1:00 PM Tod Dawkins DPM [address]   2020  3:20 PM MD DIEGO Mercedes [facility]   2020  3:00 PM Sukhdev Guillen MD [place] [id number]

## 2020-01-15 ENCOUNTER — OFFICE VISIT (OUTPATIENT)
Dept: PRIMARY CARE CLINIC | Age: 55
End: 2020-01-15
Payer: MEDICARE

## 2020-01-15 VITALS — SYSTOLIC BLOOD PRESSURE: 136 MMHG | OXYGEN SATURATION: 96 % | DIASTOLIC BLOOD PRESSURE: 70 MMHG | HEART RATE: 74 BPM

## 2020-01-15 PROCEDURE — 99214 OFFICE O/P EST MOD 30 MIN: CPT | Performed by: FAMILY MEDICINE

## 2020-01-15 PROCEDURE — 1111F DSCHRG MED/CURRENT MED MERGE: CPT | Performed by: FAMILY MEDICINE

## 2020-01-15 RX ORDER — MORPHINE SULFATE 60 MG/1
60 TABLET, FILM COATED, EXTENDED RELEASE ORAL 2 TIMES DAILY
Qty: 60 TABLET | Refills: 0 | Status: SHIPPED | OUTPATIENT
Start: 2020-01-15 | End: 2020-02-12 | Stop reason: SDUPTHER

## 2020-01-15 RX ORDER — OXYCODONE HYDROCHLORIDE 10 MG/1
10 TABLET ORAL EVERY 6 HOURS PRN
Qty: 120 TABLET | Refills: 0 | Status: SHIPPED | OUTPATIENT
Start: 2020-01-15 | End: 2020-02-12 | Stop reason: SDUPTHER

## 2020-01-15 ASSESSMENT — ENCOUNTER SYMPTOMS
NAUSEA: 0
EYE REDNESS: 0
ABDOMINAL PAIN: 0
SORE THROAT: 0
VOMITING: 0
EYE DISCHARGE: 0
WHEEZING: 0
RHINORRHEA: 0
COUGH: 0
DIARRHEA: 0
SHORTNESS OF BREATH: 0

## 2020-01-15 NOTE — PROGRESS NOTES
Post-Discharge Transitional Care Management Services or Hospital Follow Up      Karina Daniels   YOB: 1965    Date of Office Visit:  1/15/2020  Date of Hospital Admission: 12/28/19  Date of Hospital Discharge: 1/6/20  Readmission Risk Score(high >=14%.  Medium >=10%):Readmission Risk Score: 43      Care management risk score Rising risk (score 2-5) and Complex Care (Scores >=6): 9     Non face to face  following discharge, date last encounter closed (first attempt may have been earlier): 1/8/2020  4:33 PM 1/8/2020  4:33 PM    Call initiated 2 business days of discharge: Yes     Patient Active Problem List   Diagnosis    Anxiety disorder    Benign prostatic hyperplasia    Depression    Edema    Esophageal reflux    Essential hypertension    Hypothyroidism    Right cervical radiculopathy    Osteomyelitis ([address]    Osteomyelitis of foot, left, acute ([address]    Non compliance with medical treatment    Acute renal failure (ARF) ([address]    Ulcerated, foot, left, with necrosis of bone ([address]    Type 2 diabetes mellitus with foot ulcer, with long-term current use of insulin ([address]    Anemia    Hematemesis with nausea    Erosive gastritis    Ulcerated, foot, left, with fat layer exposed ([address]    Renal failure    Hyperkalemia    Hypocalcemia    Metabolic acidosis    Diabetic ulcer of toe of left foot associated with type 2 diabetes mellitus ([address]    Hyperphosphatemia    Chronic, continuous use of opioids    Acute metabolic encephalopathy    Diabetic foot infection (HCC)       Allergies   Allergen Reactions    Bactrim [Sulfamethoxazole-Trimethoprim]      reported renal failure    Fiorinal [Butalbital-Aspirin-Caffeine] Other (See Comments)     Generalized blisters    Peanut-Containing Drug Products      Peanut butter flavor    Statins Other (See Comments)     Muscle aches    Tylenol [Acetaminophen]      Pt states it shuts down his kidneys    Peanut Butter Flavor [Flavoring Agent] Nausea And Vomiting       Medications listed as ordered at the time of discharge from [facility] Medication Instructions TAYLER:    Printed on:01/15/20 6186   Medication Information                      atorvastatin (LIPITOR) 20 MG tablet  Take 1 tablet by mouth daily             Blood Glucose Monitoring Suppl (ONE TOUCH ULTRA 2) w/Device KIT  1 kit by Other route daily             buPROPion (WELLBUTRIN XL) 300 MG extended release tablet  TAKE ONE TABLET BY MOUTH DAILY             ciclopirox (LOPROX) 0.77 % cream  APPLY TO AFFECTED AREA(S) AND RUB  IN A THIN FILM TWO TIMES A DAY (AM AND PM)             diazepam (VALIUM) 5 MG tablet  Take 1 tablet by mouth every 6 hours as needed for Anxiety for up to 30 days. doxycycline hyclate (VIBRA-TABS) 100 MG tablet  Take 1 tablet by mouth every 12 hours for 10 days             fluocinonide (LIDEX) 0.05 % ointment  Apply topically 2 times daily. Gauze Pads & Dressings (COMBINE ABD) 5\"X9\" PADS  1 Units by Does not apply route 2 times daily             Gauze Pads & Dressings (KERLIX GAUZE ROLL LARGE) MISC  1 Units by Does not apply route 2 times daily             lactobacillus (CULTURELLE) capsule  Take 1 capsule by mouth 2 times daily (with meals)             levothyroxine (SYNTHROID) 50 MCG tablet  Take 1 tablet by mouth daily             lisinopril (PRINIVIL;ZESTRIL) 10 MG tablet  TAKE ONE TABLET BY MOUTH DAILY             magnesium oxide (MAG-OX) 400 (241.3 Mg) MG TABS tablet  Take 1 tablet by mouth 2 times daily             megestrol (MEGACE ORAL) 40 MG/ML suspension  Take 10 mLs by mouth daily             metFORMIN (GLUCOPHAGE) 500 MG tablet  Take 1 tablet by mouth 2 times daily (with meals)             morphine (MS CONTIN) 60 MG extended release tablet  Take 1 tablet by mouth 2 times daily for 30 days.              NARCAN 4 MG/0.1ML LIQD nasal spray  4 mg             NYAMYC 870843 UNIT/GM powder  APPLY TO AFFECTED AREA(S) FOUR TIMES A DAY             ONE TOUCH ULTRA TEST strip  USE ONE STRIP TO TEST TWO TIMES A DAY AS NEEDED             ONETOUCH DELICA LANCETS 36H MISC  TEST TWO TIMES A DAY             oxyCODONE HCl (OXY-IR) 10 MG immediate release tablet  Take 1 tablet by mouth every 6 hours as needed for Pain for up to 30 days. Intended supply: 30 days             pantoprazole (PROTONIX) 40 MG tablet  Take 1 tablet by mouth every morning (before breakfast)             PARoxetine (PAXIL) 20 MG tablet  TAKE ONE TABLET BY MOUTH FOUR TIMES A DAY             povidone-iodine (BETADINE) 7.5 % external solution  Apply to wound. Apply as a wet to dry dressing             sodium hypochlorite (DAKINS) 0.125 % SOLN external solution  Apply topically every 12 hours Apply as a wet to dry dressing twice a day. Can also use to cleanse wound. tamsulosin (FLOMAX) 0.4 MG capsule  Take 1 capsule by mouth daily             tiZANidine (ZANAFLEX) 4 MG tablet  TAKE ONE TABLET BY MOUTH EVERY 6 HOURS AS NEEDED FOR MUSCLE SPASMS             Wound Dressings (ADAPTIC NON-ADHERING DRESSING) PADS  Apply 1 Units topically 2 times daily                   Medications marked \"taking\" at this time  Outpatient Medications Marked as Taking for the 1/15/20 encounter (Office Visit) with Concetta Keen MD   Medication Sig Dispense Refill    oxyCODONE HCl (OXY-IR) 10 MG immediate release tablet Take 1 tablet by mouth every 6 hours as needed for Pain for up to 30 days. Intended supply: 30 days 120 tablet 0    morphine (MS CONTIN) 60 MG extended release tablet Take 1 tablet by mouth 2 times daily for 30 days. 60 tablet 0    doxycycline hyclate (VIBRA-TABS) 100 MG tablet Take 1 tablet by mouth every 12 hours for 10 days 20 tablet 0    megestrol (MEGACE ORAL) 40 MG/ML suspension Take 10 mLs by mouth daily 240 mL 3    diazepam (VALIUM) 5 MG tablet Take 1 tablet by mouth every 6 hours as needed for Anxiety for up to 30 days.  120 tablet 0    NARCAN 4 Blood Glucose Monitoring Suppl (ONE TOUCH ULTRA 2) w/Device KIT 1 kit by Other route daily 1 kit 5    ciclopirox (LOPROX) 0.77 % cream APPLY TO AFFECTED AREA(S) AND RUB  IN A THIN FILM TWO TIMES A DAY (AM AND PM) 90 g 2        Medications patient taking as of now reconciled against medications ordered at time of hospital discharge: Yes    Chief Complaint   Patient presents with   [address] from Hospital       HPI    Inpatient course: Discharge summary reviewed- see chart. Interval history/Current status: Patient was admitted at [facility] for sepsis with altered mental status. Patient was also noted to have acute kidney injury. Patient states had seen GI and felt that EGD was not necessary for his iron deficiency anemia. Patient underwent left foot partial amputation. Patient had prolonged stay in the hospital.  States mental status feels like it is back to normal.  Denies any chest pain or shortness of breath. States pain medication was increased temporarily while he was in the hospital.  Wishing to keep his oxycodone at 10 mg 4 times a day for now and then resume reining course in the future. Patient states has never really had seizures. Has not seen a neurologist.  Just gets some shaking of his hands which resolved spontaneously    Review of Systems   Constitutional: Negative for chills and fever. HENT: Negative for rhinorrhea and sore throat. Eyes: Negative for discharge and redness. Respiratory: Negative for cough, shortness of breath and wheezing. Cardiovascular: Negative for chest pain and palpitations. Gastrointestinal: Negative for abdominal pain, diarrhea, nausea and vomiting. Genitourinary: Negative for dysuria and frequency. Musculoskeletal: Negative for arthralgias and myalgias. Neurological: Negative for dizziness, light-headedness and headaches. Psychiatric/Behavioral: Negative for sleep disturbance.        Vitals:    01/15/20 1639   BP: 136/70   Pulse: 74 SpO2: 96%     There is no height or weight on file to calculate BMI. Wt Readings from Last 3 Encounters:   01/06/20 219 lb (99.3 kg)   12/28/19 216 lb (98 kg)   12/24/19 216 lb 6.4 oz (98.2 kg)     BP Readings from Last 3 Encounters:   01/15/20 136/70   01/06/20 (!) 143/79   01/04/20 109/66       Physical Exam  Vitals signs and nursing note reviewed. Constitutional:       General: He is not in acute distress. Appearance: He is well-developed. HENT:      Head: Normocephalic and atraumatic. Eyes:      General: No scleral icterus. Right eye: No discharge. Left eye: No discharge. Conjunctiva/sclera: Conjunctivae normal.      Pupils: Pupils are equal, round, and reactive to light. Neck:      Thyroid: No thyromegaly. Trachea: No tracheal deviation. Cardiovascular:      Rate and Rhythm: Normal rate and regular rhythm. Heart sounds: Normal heart sounds. Comments: No carotid bruits  Pulmonary:      Effort: Pulmonary effort is normal. No respiratory distress. Breath sounds: Normal breath sounds. No wheezing. Musculoskeletal:      Comments: Dressings noted on both feet   Lymphadenopathy:      Cervical: No cervical adenopathy. Skin:     General: Skin is warm. Findings: No rash. Neurological:      Mental Status: He is alert and oriented to person, place, and time. Psychiatric:         Behavior: Behavior normal.         Thought Content: Thought content normal.             Assessment/Plan:  1. Osteomyelitis of foot, left, acute (HCC)  Stable. Status post partial amputation  - NE DISCHARGE MEDS RECONCILED W/ CURRENT OUTPATIENT MED LIST    2. Acute renal failure, unspecified acute renal failure type ([address]  Stable. Repeat blood work  - NE DISCHARGE MEDS RECONCILED W/ CURRENT OUTPATIENT MED LIST    3. Acute metabolic encephalopathy  Resolved.   - NE DISCHARGE MEDS RECONCILED W/ CURRENT OUTPATIENT MED LIST        Medical Decision Making: high complexity

## 2020-01-16 ENCOUNTER — OFFICE VISIT (OUTPATIENT)
Dept: PODIATRY | Age: 55
End: 2020-01-16

## 2020-01-16 VITALS — HEIGHT: 74 IN | WEIGHT: 215 LBS | BODY MASS INDEX: 27.59 KG/M2

## 2020-01-16 PROCEDURE — 99024 POSTOP FOLLOW-UP VISIT: CPT | Performed by: PODIATRIST

## 2020-01-21 RX ORDER — BUPROPION HYDROCHLORIDE 300 MG/1
TABLET ORAL
Qty: 30 TABLET | Refills: 9 | Status: SHIPPED | OUTPATIENT
Start: 2020-01-21 | End: 2021-09-07

## 2020-01-21 RX ORDER — DIAZEPAM 5 MG/1
TABLET ORAL
Qty: 120 TABLET | Refills: 0 | Status: SHIPPED | OUTPATIENT
Start: 2020-01-21 | End: 2020-02-27

## 2020-01-29 ENCOUNTER — OFFICE VISIT (OUTPATIENT)
Dept: PODIATRY | Age: 55
End: 2020-01-29

## 2020-01-29 VITALS — HEIGHT: 74 IN | WEIGHT: 215 LBS | BODY MASS INDEX: 27.59 KG/M2

## 2020-01-29 PROCEDURE — 99024 POSTOP FOLLOW-UP VISIT: CPT | Performed by: PODIATRIST

## 2020-01-29 NOTE — PROGRESS NOTES
[address]  Post Op note  Chief Complaint   Patient presents with    Wound Check     wound care left foot     Diabetes     last blood sugar 171        Robert Cardoso is a 53y.o. year old male who is about 4 week day(s) post op from foot surgery. Type: TMA left. Vital signs are stable. Pain level is 0 Patient denies N/V/F/C. Patient has been compliant with postoperative instructions. He was in the hospital, I saw him last on 1/5, surgery on 1/4. Review of Systems    Vascular: DP and PT pulses palpable 2/4, Right Foot and 2/4 on the Left Foot. Edema is absent,  Right Foot and presenton the Left Foot. Neurological:   Sensation absent  to light touch to level of digits, both feet. Musculoskeletal:   Muscle strength is 5/5 on the Right Foot and 5/5 on the Left Foot. Structural deformities are present on the Right Foot and present on the Left Foot. Integument:  Warm, dry, supple both feet. Incisions are healing well. Wound dehiscence is absent. Infection is absent. Assesment : Post operative progress gradually improving. Diagnosis Orders   1. Status post transmetatarsal amputation of foot, left ([address]     2. Type 2 diabetes mellitus with foot ulcer, without long-term current use of insulin ([address]     3. Type 2 diabetes mellitus with diabetic polyneuropathy, without long-term current use of insulin ([address]           Plan: half of Sutures removed. Dry sterile dressing applied. Keep surgical site dry. Ice and elevation as directed. Continue no weight  Compression dressing applied  Elevate    No orders of the defined types were placed in this encounter. Follow up 1week(s). to removed remaining sutures.

## 2020-01-30 RX ORDER — PAROXETINE HYDROCHLORIDE 20 MG/1
TABLET, FILM COATED ORAL
Qty: 120 TABLET | Refills: 3 | Status: SHIPPED | OUTPATIENT
Start: 2020-01-30 | End: 2020-04-29

## 2020-02-03 ENCOUNTER — TELEPHONE (OUTPATIENT)
Dept: PRIMARY CARE CLINIC | Age: 55
End: 2020-02-03

## 2020-02-03 NOTE — TELEPHONE ENCOUNTER
Pt's wife called in. Pt is requesting a higher dosage of pain medication. He has tried to decrease the medication after amputation but is still in pain.

## 2020-02-04 ENCOUNTER — TELEPHONE (OUTPATIENT)
Dept: PRIMARY CARE CLINIC | Age: 55
End: 2020-02-04

## 2020-02-04 NOTE — TELEPHONE ENCOUNTER
Patients wife calling in , she was made aware of message documented. She will call back if he decides to go with pain management.

## 2020-02-05 ENCOUNTER — OFFICE VISIT (OUTPATIENT)
Dept: PODIATRY | Age: 55
End: 2020-02-05

## 2020-02-05 VITALS — HEIGHT: 74 IN | WEIGHT: 215 LBS | BODY MASS INDEX: 27.59 KG/M2

## 2020-02-05 PROCEDURE — 99024 POSTOP FOLLOW-UP VISIT: CPT | Performed by: PODIATRIST

## 2020-02-11 ENCOUNTER — TELEPHONE (OUTPATIENT)
Dept: PRIMARY CARE CLINIC | Age: 55
End: 2020-02-11

## 2020-02-11 NOTE — TELEPHONE ENCOUNTER
PT WIFE CALLED AND STATED THAT THE PAIN MEDICATION THAT THE PT IS TAKING IS NOT STRONG ENOUGH FOR THE PAIN THAT THE PT IS IN. ALSO STATED THAT PATIENT IS HAVING WITHDRAWALS FORM THE HIGH DOSE OF THE MEDS THEY GAVE PT IN HOSPITAL.   PT CANCELLED APPOINTMENT THAT WAS SCHED FOR 02/12/20 BECAUSE HE IS TO SICK AND IN PAIN  BUT STATED IF THERE IS ANY CANCELLATIONS BEFORE HIS APPOINTMENT 03/03/20 PLEASE CALL    PLEASE CALL PT AND ADVISE

## 2020-02-12 RX ORDER — OXYCODONE HYDROCHLORIDE 10 MG/1
10 TABLET ORAL EVERY 6 HOURS PRN
Qty: 120 TABLET | Refills: 0 | Status: SHIPPED | OUTPATIENT
Start: 2020-02-12 | End: 2020-03-16 | Stop reason: SDUPTHER

## 2020-02-12 RX ORDER — MORPHINE SULFATE 60 MG/1
60 TABLET, FILM COATED, EXTENDED RELEASE ORAL 2 TIMES DAILY
Qty: 60 TABLET | Refills: 0 | Status: SHIPPED | OUTPATIENT
Start: 2020-02-12 | End: 2020-03-16 | Stop reason: SDUPTHER

## 2020-02-13 ENCOUNTER — TELEPHONE (OUTPATIENT)
Dept: PRIMARY CARE CLINIC | Age: 55
End: 2020-02-13

## 2020-02-24 ENCOUNTER — OFFICE VISIT (OUTPATIENT)
Dept: PODIATRY | Age: 55
End: 2020-02-24

## 2020-02-24 VITALS — BODY MASS INDEX: 27.46 KG/M2 | WEIGHT: 214 LBS | HEIGHT: 74 IN

## 2020-02-24 PROCEDURE — 99999 PR OFFICE/OUTPT VISIT,PROCEDURE ONLY: CPT | Performed by: PODIATRIST

## 2020-02-24 NOTE — PROGRESS NOTES
[address]  Post Op note  Chief Complaint   Patient presents with    Wound Check     left foot        Philippe Michael is a 53y.o. year old male who is about 8 week day(s) post op from foot surgery. Type: TMA left. Vital signs are stable. Pain level is 0 Patient denies N/V/F/C. Patient has been compliant with postoperative instructions. He was in the hospital, I saw him last on 1/5, surgery on 1/4. He is not acting like himself today. He is lethargic, does not answer questions appropriately. We check his BG and it was 130. Review of Systems    Vascular: DP and PT pulses palpable 2/4, Right Foot and 2/4 on the Left Foot. Edema is absent,  Right Foot and minimal on the Left Foot. Neurological:   Sensation absent  to light touch to level of digits, both feet. Musculoskeletal:   Muscle strength is 5/5 on the Right Foot and 5/5 on the Left Foot. Structural deformities are present on the Right Foot and present on the Left Foot. Integument:  Warm, dry, supple both feet. Incisions are healing well. Wound dehiscence is absent. Infection is absent. Incision line healed today. Area along the incision, superficial, not through dermis, that appear to be from picking. No erythema. Assesment : Post operative progress gradually improving. Diagnosis Orders   1. Status post transmetatarsal amputation of foot, left ([address]     2. Type 2 diabetes mellitus with foot ulcer, without long-term current use of insulin ([address]     3. Type 2 diabetes mellitus with diabetic polyneuropathy, without long-term current use of insulin ([address]     4. Ulcer of left foot, with fat layer exposed ([address]           Plan: Pt was evaluated and examined. Patient was given personalized discharge instructions. Pt will follow up in 2 weeks or sooner if any problems arise. Diagnosis was discussed with the pt and all of their questions were answered in detail.  Proper foot hygiene and care was

## 2020-02-27 RX ORDER — DIAZEPAM 5 MG/1
TABLET ORAL
Qty: 120 TABLET | Refills: 0 | Status: SHIPPED | OUTPATIENT
Start: 2020-02-27 | End: 2020-03-30

## 2020-03-03 ENCOUNTER — OFFICE VISIT (OUTPATIENT)
Dept: PRIMARY CARE CLINIC | Age: 55
End: 2020-03-03
Payer: MEDICARE

## 2020-03-03 VITALS
HEIGHT: 74 IN | HEART RATE: 74 BPM | OXYGEN SATURATION: 95 % | DIASTOLIC BLOOD PRESSURE: 74 MMHG | SYSTOLIC BLOOD PRESSURE: 128 MMHG | BODY MASS INDEX: 29.75 KG/M2 | WEIGHT: 231.8 LBS

## 2020-03-03 PROBLEM — Z89.421 H/O AMPUTATION OF LESSER TOE, RIGHT (HCC): Status: ACTIVE | Noted: 2020-03-03

## 2020-03-03 PROBLEM — R56.9 SEIZURE-LIKE ACTIVITY (HCC): Status: ACTIVE | Noted: 2020-03-03

## 2020-03-03 LAB
BILIRUBIN, POC: NEGATIVE
BLOOD URINE, POC: NEGATIVE
CLARITY, POC: CLEAR
COLOR, POC: YELLOW
GLUCOSE URINE, POC: NEGATIVE
KETONES, POC: NEGATIVE
LEUKOCYTE EST, POC: NEGATIVE
NITRITE, POC: NEGATIVE
PH, POC: 6.5
PROTEIN, POC: NEGATIVE
SPECIFIC GRAVITY, POC: 1.02
UROBILINOGEN, POC: NORMAL

## 2020-03-03 PROCEDURE — G8427 DOCREV CUR MEDS BY ELIG CLIN: HCPCS | Performed by: FAMILY MEDICINE

## 2020-03-03 PROCEDURE — 81003 URINALYSIS AUTO W/O SCOPE: CPT | Performed by: FAMILY MEDICINE

## 2020-03-03 PROCEDURE — 2022F DILAT RTA XM EVC RTNOPTHY: CPT | Performed by: FAMILY MEDICINE

## 2020-03-03 PROCEDURE — 3017F COLORECTAL CA SCREEN DOC REV: CPT | Performed by: FAMILY MEDICINE

## 2020-03-03 PROCEDURE — G8417 CALC BMI ABV UP PARAM F/U: HCPCS | Performed by: FAMILY MEDICINE

## 2020-03-03 PROCEDURE — 3046F HEMOGLOBIN A1C LEVEL >9.0%: CPT | Performed by: FAMILY MEDICINE

## 2020-03-03 PROCEDURE — 1036F TOBACCO NON-USER: CPT | Performed by: FAMILY MEDICINE

## 2020-03-03 PROCEDURE — 99214 OFFICE O/P EST MOD 30 MIN: CPT | Performed by: FAMILY MEDICINE

## 2020-03-03 PROCEDURE — G8482 FLU IMMUNIZE ORDER/ADMIN: HCPCS | Performed by: FAMILY MEDICINE

## 2020-03-03 ASSESSMENT — ENCOUNTER SYMPTOMS
COUGH: 0
EYE REDNESS: 0
RHINORRHEA: 0
NAUSEA: 0
EYE DISCHARGE: 0
VOMITING: 0
DIARRHEA: 0
WHEEZING: 0
SHORTNESS OF BREATH: 0
SORE THROAT: 0
ABDOMINAL PAIN: 0

## 2020-03-03 NOTE — PROGRESS NOTES
[address] [facility] PRIMARY CARE  [address] [facility] [address]  Dept: [address] Carmen Cook is a 53 y.o. male who presents today for his medical conditions/complaintsas noted below. Chief Complaint   Patient presents with    1 Month Follow-Up       HPI:     HPI  Pt is s/p left mid foot amputation. On no abx currently. No fever or chills. Mental status improved. States still having pain in the left foot. Patient had partial foot amputation on the left. States the area is beginning to open up on it. Patient also status post toe amputation on the right. Patient was very confused and encephalopathic in the hospital with sepsis from the foot infection. Now appears back to his baseline. Patient does complain of urinary frequency. States when he has to go he has to go. Denies any blood. Patient's weight noted to be up significantly with the Megace. States his appetite is back to normal.  Patient not checking his blood sugars on a regular basis. Patient wondering if there arterial blood flow is doing okay in the feet now that he has had his amputations. Denies any thoughts of hurting self or others. No other associated complaints. Denies any seizure activity. States his been feeling back to baseline.     LDL Calculated (mg/dL)   Date Value   03/29/2019 82       (goal LDL is <100)   AST (U/L)   Date Value   12/28/2019 16     ALT (U/L)   Date Value   12/28/2019 12     BUN (mg/dL)   Date Value   12/31/2019 20     BP Readings from Last 3 Encounters:   03/03/20 128/74   01/15/20 136/70   01/06/20 (!) 143/79          (goal 120/80)    Past Medical History:   Diagnosis Date    Acute metabolic encephalopathy     [facility]) [facility]     Anemia     Anxiety     ARF (acute renal failure) (HCC)     Arthritis     lower back, knees    BPH (benign prostatic hyperplasia)     Chronic kidney disease     prostate    Depression     Diabetes mellitus [facility]     Diabetic neuropathy (HCC)     GERD (gastroesophageal reflux disease)     Hyperglycemia     Hyperkalemia     Hypertension     Hypothyroidism     Impacted tooth     Necrosis of bone of foot ([facility]     Osteomyelitis of left foot ([facility]     PAD (peripheral artery disease) ([facility]     Pneumonia     Septic shock (HCC)     Type 2 diabetes mellitus ([facility]       Past Surgical History:   Procedure Laterality Date    APPENDECTOMY      BACK SURGERY      Lower x 4. Had abscess after appendectomy.  FOOT AMPUTATION Left 1/4/2020    TRANSMETATARSAL FOOT AMPUTATION, REMOVAL OF FOREIGN BODY performed by Dottie Anderson DPM at [address] Left 01/04/2020    TRANSMETATARSAL FOOT AMPUTATION, REMOVAL OF FOREIGN BODY    KNEE SURGERY Right     scope    TOE AMPUTATION Bilateral 3/6/2017    TOE AMPUTATION LEFT HALLUX AND 2ND DIGIT AND RIGHT 2ND DIGIT performed by Nay Segura DPM at [address] ENDOSCOPY N/A 5/20/2019    EGD WITH BIOPSY performed by Varghese Brock MD at [address] [facility] OR       Family History   Problem Relation Age of Onset    Diabetes Father     Cancer Maternal Grandfather         Colon Cancer; Prostate Cancer    No Known Problems Mother        Social History     Tobacco Use    Smoking status: Never Smoker    Smokeless tobacco: Never Used   Substance Use Topics    Alcohol use: No      Current Outpatient Medications   Medication Sig Dispense Refill    diazePAM (VALIUM) 5 MG tablet TAKE ONE TABLET BY MOUTH EVERY 6 HOURS AS NEEDED FOR ANXIETY FOR UP TO 30 DAYS 120 tablet 0    oxyCODONE HCl (OXY-IR) 10 MG immediate release tablet Take 1 tablet by mouth every 6 hours as needed for Pain for up to 30 days. Intended supply: 30 days 120 tablet 0    morphine (MS CONTIN) 60 MG extended release tablet Take 1 tablet by mouth 2 times daily for 30 days.  60 tablet 0    PARoxetine (PAXIL) 20 MG tablet Take one tablet by mouth four times a day tablet 10    Blood Glucose Monitoring Suppl (ONE TOUCH ULTRA 2) w/Device KIT 1 kit by Other route daily 1 kit 5    ciclopirox (LOPROX) 0.77 % cream APPLY TO AFFECTED AREA(S) AND RUB  IN A THIN FILM TWO TIMES A DAY (AM AND PM) 90 g 2    Gauze Pads & Dressings (KERLIX GAUZE ROLL LARGE) MISC 1 Units by Does not apply route 2 times daily 60 each 5     No current facility-administered medications for this visit. Allergies   Allergen Reactions    Bactrim [Sulfamethoxazole-Trimethoprim]      reported renal failure    Fiorinal [Butalbital-Aspirin-Caffeine] Other (See Comments)     Generalized blisters    Peanut-Containing Drug Products      Peanut butter flavor    Statins Other (See Comments)     Muscle aches    Tylenol [Acetaminophen]      Pt states it shuts down his kidneys    Peanut Butter Flavor [Flavoring Agent] Nausea And Vomiting       Health Maintenance   Topic Date Due    Diabetic retinal exam  06/04/1974    HIV screen  06/04/1979    Hepatitis B vaccine (1 of 3 - Risk 3-dose series) 06/04/1983    Shingles Vaccine (1 of 2) 06/04/2014    Lipid screen  03/29/2020    Colon cancer screen colonoscopy  12/16/2020    A1C test (Diabetic or Prediabetic)  12/24/2020    Diabetic microalbuminuria test  12/24/2020    TSH testing  12/28/2020    Creatinine monitoring  12/31/2020    Potassium monitoring  01/06/2021    DTaP/Tdap/Td vaccine (2 - Td) 04/24/2028    Flu vaccine  Completed    Pneumococcal 0-64 years Vaccine  Completed    Hepatitis C screen  Completed    Hepatitis A vaccine  Aged Out    Hib vaccine  Aged Out    Meningococcal (ACWY) vaccine  Aged Out       Subjective:      Review of Systems   Constitutional: Negative for chills and fever. HENT: Negative for rhinorrhea and sore throat. Eyes: Negative for discharge and redness. Respiratory: Negative for cough, shortness of breath and wheezing. Cardiovascular: Negative for chest pain and palpitations.    Gastrointestinal: Negative for H/O amputation of lesser toe, right (HCC)  VL DUP LOWER EXTREMITY ARTERIES BILATERAL   3. Seizure-like activity ([address]     4. Encounter for lipid screening for cardiovascular disease  Lipid, Fasting   5. Annual physical exam     6. Diabetic ulcer of toe of left foot associated with type 2 diabetes mellitus, with necrosis of bone (HCC)  C-Reactive Protein    VL DUP LOWER EXTREMITY ARTERIES BILATERAL   7. Urinary frequency  POCT Urinalysis No Micro (Auto)        Plan:    Blood work ordered. Continue pain medication at present dose. Patient advised again cannot increase and if anything at some point need to try to decrease. Check arterial Doppler studies of both legs. Continue present medications as is. Return in about 3 months (around 6/3/2020). Orders Placed This Encounter   Procedures    VL DUP LOWER EXTREMITY ARTERIES BILATERAL     Standing Status:   Future     Standing Expiration Date:   3/3/2021     Order Specific Question:   Reason for exam:     Answer:   bialteral foot amputations    Lipid, Fasting     Standing Status:   Future     Standing Expiration Date:   3/3/2021    C-Reactive Protein     Standing Status:   Future     Standing Expiration Date:   3/3/2021    Basic Metabolic Panel, Fasting     Standing Status:   Future     Standing Expiration Date:   3/3/2021    POCT Urinalysis No Micro (Auto)     No orders of the defined types were placed in this encounter. Patient given educationalmaterials - see patient instructions. Discussed use, benefit, and side effectsof prescribed medications. All patient questions answered. Pt voiced understanding. Reviewed health maintenance. Instructed to continue current medications, diet andexercise. Patient agreed with treatment plan. Follow up as directed.      Electronicallysigned by Elana Mendez MD on 3/3/2020 at 2:52 PM

## 2020-03-08 LAB
BUN BLDV-MCNC: NORMAL MG/DL
CALCIUM SERPL-MCNC: NORMAL MG/DL
CHLORIDE BLD-SCNC: NORMAL MMOL/L
CHOLESTEROL, FASTING: NORMAL
CO2: NORMAL
CREAT SERPL-MCNC: NORMAL MG/DL
GFR CALCULATED: NORMAL
GLUCOSE BLD-MCNC: 96 MG/DL
HDLC SERPL-MCNC: 38 MG/DL (ref 35–70)
LDL CHOLESTEROL CALCULATED: 74 MG/DL (ref 0–160)
POTASSIUM SERPL-SCNC: NORMAL MMOL/L
SODIUM BLD-SCNC: NORMAL MMOL/L
TRIGLYCERIDE, FASTING: NORMAL

## 2020-03-09 ENCOUNTER — OFFICE VISIT (OUTPATIENT)
Dept: PODIATRY | Age: 55
End: 2020-03-09
Payer: MEDICARE

## 2020-03-09 VITALS — HEIGHT: 74 IN | BODY MASS INDEX: 27.46 KG/M2 | WEIGHT: 214 LBS

## 2020-03-09 PROCEDURE — 11042 DBRDMT SUBQ TIS 1ST 20SQCM/<: CPT | Performed by: PODIATRIST

## 2020-03-10 ASSESSMENT — ENCOUNTER SYMPTOMS
CONSTIPATION: 0
DIARRHEA: 0
COLOR CHANGE: 0
VOMITING: 0
NAUSEA: 0

## 2020-03-10 NOTE — PROGRESS NOTES
right  Pre Debridement Measurements:  Are located in the Wound/Ulcer Documentation Flow Sheet    Wound/Ulcer #: 1 and 2    Procedure in Detail: Lidocaine gel soaked gauze was applied at beginning of wound evaluation. The wound(s) was excisionally debrided sharply of fibrotic, necrotic, and hyperkeratotic tissue, including a layer of surrounding healthy tissue using curette. The wound was debrided down through and including subcutaneous. Percent of Wound Debrided: 100%    Total Surface Area Debrided:   sq cm     Bleeding: Minimal    Post Debridement Measurements:  Wound/Ulcer Descriptions are Pre Debridement except measurements:    Wound 07/20/17 wound # 7  left foot amp site 7 months (Active)   Number of days: 964       Wound 11/05/19 Anterior; Left see images podiatry noted (Active)   Number of days: 126         Dress per physician order  Treatment: silvercel daily, cover with ABD, kerlix roll, secure with fabric tape. Dressing Documentation    This patient needs a dressings to aid in healing of the ulceration. I am prescribing Silvercel, as the primary dressing  Sequence of dressing: Silvercel, ABD, kerlix roll, fix with fabric tape  Frequency of change: daily  Duration of supplies: 30 days    1) The ulceration has been surgically debrided on 03/10/20   2) The ulceration is located on the left foot and right hallux  3) The ulceration was assessed on 03/10/20   4) Type of ulceration:  diabetic  5) See above for size of ulceration(s)  6) Amount of drainage: moderate       1. Discussed appropriate home care of this wound. 2. Dressings:   Orders Placed This Encounter   Procedures    FL DEBRIDEMENT, SKIN, SUB-Q TISSUE,=<20 SQ CM      3. Follow up: 1 week. 4. Detailed home instructions and education material given to patient prior to discharge.      Weight bearing as tolerated  Compression dressing applied  Elevate    Ordered him to stop picking at dead skin on his feet, or he may develop a new ulceration. Stop using tea tree oil. Will order shoes next visit if appropriate. They were not appropriate today. No orders of the defined types were placed in this encounter. Follow up 1week(s).

## 2020-03-16 RX ORDER — OXYCODONE HYDROCHLORIDE 10 MG/1
10 TABLET ORAL EVERY 6 HOURS PRN
Qty: 120 TABLET | Refills: 0 | Status: SHIPPED | OUTPATIENT
Start: 2020-03-16 | End: 2020-04-09 | Stop reason: SDUPTHER

## 2020-03-16 RX ORDER — MORPHINE SULFATE 60 MG/1
60 TABLET, FILM COATED, EXTENDED RELEASE ORAL 2 TIMES DAILY
Qty: 60 TABLET | Refills: 0 | Status: SHIPPED | OUTPATIENT
Start: 2020-03-16 | End: 2020-04-09 | Stop reason: SDUPTHER

## 2020-03-30 RX ORDER — DIAZEPAM 5 MG/1
TABLET ORAL
Qty: 120 TABLET | Refills: 2 | Status: ON HOLD | OUTPATIENT
Start: 2020-03-30 | End: 2020-04-21 | Stop reason: SDUPTHER

## 2020-04-09 RX ORDER — MORPHINE SULFATE 60 MG/1
60 TABLET, FILM COATED, EXTENDED RELEASE ORAL 2 TIMES DAILY
Qty: 60 TABLET | Refills: 0 | Status: SHIPPED | OUTPATIENT
Start: 2020-04-09 | End: 2020-05-07 | Stop reason: SDUPTHER

## 2020-04-09 RX ORDER — OXYCODONE HYDROCHLORIDE 10 MG/1
10 TABLET ORAL EVERY 6 HOURS PRN
Qty: 120 TABLET | Refills: 0 | Status: SHIPPED | OUTPATIENT
Start: 2020-04-09 | End: 2020-04-29

## 2020-04-20 ENCOUNTER — APPOINTMENT (OUTPATIENT)
Dept: CT IMAGING | Age: 55
End: 2020-04-20
Payer: MEDICARE

## 2020-04-20 ENCOUNTER — HOSPITAL ENCOUNTER (OUTPATIENT)
Age: 55
Setting detail: OBSERVATION
Discharge: HOME | End: 2020-04-21
Attending: EMERGENCY MEDICINE | Admitting: FAMILY MEDICINE
Payer: MEDICARE

## 2020-04-20 ENCOUNTER — APPOINTMENT (OUTPATIENT)
Dept: GENERAL RADIOLOGY | Age: 55
End: 2020-04-20
Payer: MEDICARE

## 2020-04-20 PROBLEM — T40.601A OPIATE OVERDOSE (HCC): Status: ACTIVE | Noted: 2020-04-20

## 2020-04-20 LAB
-: NORMAL
ABSOLUTE EOS #: 0.1 K/UL (ref 0–0.4)
ABSOLUTE IMMATURE GRANULOCYTE: ABNORMAL K/UL (ref 0–0.3)
ABSOLUTE LYMPH #: 0.9 K/UL (ref 1–4.8)
ABSOLUTE MONO #: 0.8 K/UL (ref 0.1–1.3)
ACETAMINOPHEN LEVEL: <5 UG/ML (ref 10–30)
ALBUMIN SERPL-MCNC: 4 G/DL (ref 3.5–5.2)
ALBUMIN/GLOBULIN RATIO: ABNORMAL (ref 1–2.5)
ALP BLD-CCNC: 89 U/L (ref 40–129)
ALT SERPL-CCNC: 12 U/L (ref 5–41)
AMMONIA: 50 UMOL/L (ref 16–60)
AMORPHOUS: NORMAL
AMPHETAMINE SCREEN URINE: NEGATIVE
ANION GAP SERPL CALCULATED.3IONS-SCNC: 13 MMOL/L (ref 9–17)
AST SERPL-CCNC: 16 U/L
BACTERIA: NORMAL
BARBITURATE SCREEN URINE: NEGATIVE
BASOPHILS # BLD: 1 % (ref 0–2)
BASOPHILS ABSOLUTE: 0 K/UL (ref 0–0.2)
BENZODIAZEPINE SCREEN, URINE: POSITIVE
BILIRUB SERPL-MCNC: 0.35 MG/DL (ref 0.3–1.2)
BILIRUBIN URINE: NEGATIVE
BNP INTERPRETATION: NORMAL
BUN BLDV-MCNC: 68 MG/DL (ref 6–20)
BUN/CREAT BLD: ABNORMAL (ref 9–20)
BUPRENORPHINE URINE: ABNORMAL
CALCIUM SERPL-MCNC: 9.1 MG/DL (ref 8.6–10.4)
CANNABINOID SCREEN URINE: NEGATIVE
CASTS UA: NORMAL /LPF
CHLORIDE BLD-SCNC: 104 MMOL/L (ref 98–107)
CO2: 21 MMOL/L (ref 20–31)
COCAINE METABOLITE, URINE: NEGATIVE
COLOR: YELLOW
COMMENT UA: ABNORMAL
CREAT SERPL-MCNC: 1.54 MG/DL (ref 0.7–1.2)
CRYSTALS, UA: NORMAL /HPF
DIFFERENTIAL TYPE: ABNORMAL
EOSINOPHILS RELATIVE PERCENT: 1 % (ref 0–4)
EPITHELIAL CELLS UA: NORMAL /HPF
ETHANOL PERCENT: <0.01 %
ETHANOL: <10 MG/DL
GFR AFRICAN AMERICAN: 57 ML/MIN
GFR NON-AFRICAN AMERICAN: 47 ML/MIN
GFR SERPL CREATININE-BSD FRML MDRD: ABNORMAL ML/MIN/{1.73_M2}
GFR SERPL CREATININE-BSD FRML MDRD: ABNORMAL ML/MIN/{1.73_M2}
GLUCOSE BLD-MCNC: 131 MG/DL (ref 70–99)
GLUCOSE URINE: NEGATIVE
HCT VFR BLD CALC: 33.4 % (ref 41–53)
HEMOGLOBIN: 10.1 G/DL (ref 13.5–17.5)
IMMATURE GRANULOCYTES: ABNORMAL %
INR BLD: 1.1
KETONES, URINE: NEGATIVE
LACTIC ACID, WHOLE BLOOD: NORMAL MMOL/L (ref 0.7–2.1)
LACTIC ACID: 0.9 MMOL/L (ref 0.5–2.2)
LEUKOCYTE ESTERASE, URINE: NEGATIVE
LYMPHOCYTES # BLD: 12 % (ref 24–44)
MAGNESIUM: 2.2 MG/DL (ref 1.6–2.6)
MCH RBC QN AUTO: 22.3 PG (ref 26–34)
MCHC RBC AUTO-ENTMCNC: 30.3 G/DL (ref 31–37)
MCV RBC AUTO: 73.5 FL (ref 80–100)
MDMA URINE: ABNORMAL
METHADONE SCREEN, URINE: NEGATIVE
METHAMPHETAMINE, URINE: ABNORMAL
MONOCYTES # BLD: 12 % (ref 1–7)
MUCUS: NORMAL
NITRITE, URINE: NEGATIVE
NRBC AUTOMATED: ABNORMAL PER 100 WBC
OPIATES, URINE: POSITIVE
OTHER OBSERVATIONS UA: NORMAL
OXYCODONE SCREEN URINE: POSITIVE
PARTIAL THROMBOPLASTIN TIME: 33.9 SEC (ref 24–36)
PDW BLD-RTO: 18.5 % (ref 11.5–14.9)
PH UA: 5.5 (ref 5–8)
PHENCYCLIDINE, URINE: NEGATIVE
PLATELET # BLD: 480 K/UL (ref 150–450)
PLATELET ESTIMATE: ABNORMAL
PMV BLD AUTO: 7.3 FL (ref 6–12)
POTASSIUM SERPL-SCNC: 4.5 MMOL/L (ref 3.7–5.3)
PRO-BNP: 218 PG/ML
PROPOXYPHENE, URINE: ABNORMAL
PROTEIN UA: ABNORMAL
PROTHROMBIN TIME: 14 SEC (ref 11.8–14.6)
RBC # BLD: 4.54 M/UL (ref 4.5–5.9)
RBC # BLD: ABNORMAL 10*6/UL
RBC UA: NORMAL /HPF
REASON FOR REJECTION: NORMAL
REASON FOR REJECTION: NORMAL
RENAL EPITHELIAL, UA: NORMAL /HPF
SALICYLATE LEVEL: <1 MG/DL (ref 3–10)
SEG NEUTROPHILS: 74 % (ref 36–66)
SEGMENTED NEUTROPHILS ABSOLUTE COUNT: 5.3 K/UL (ref 1.3–9.1)
SODIUM BLD-SCNC: 138 MMOL/L (ref 135–144)
SPECIFIC GRAVITY UA: 1.01 (ref 1–1.03)
TEST INFORMATION: ABNORMAL
TOTAL PROTEIN: 8.5 G/DL (ref 6.4–8.3)
TOXIC TRICYCLIC SC,BLOOD: ABNORMAL
TRICHOMONAS: NORMAL
TRICYCLIC ANTIDEP,URINE: NEGATIVE
TRICYCLIC ANTIDEPRESSANTS, UR: ABNORMAL
TROPONIN INTERP: ABNORMAL
TROPONIN INTERP: ABNORMAL
TROPONIN T: ABNORMAL NG/ML
TROPONIN T: ABNORMAL NG/ML
TROPONIN, HIGH SENSITIVITY: 45 NG/L (ref 0–22)
TROPONIN, HIGH SENSITIVITY: 55 NG/L (ref 0–22)
TSH SERPL DL<=0.05 MIU/L-ACNC: 0.62 MIU/L (ref 0.3–5)
TURBIDITY: CLEAR
URINE HGB: ABNORMAL
UROBILINOGEN, URINE: NORMAL
WBC # BLD: 7.2 K/UL (ref 3.5–11)
WBC # BLD: ABNORMAL 10*3/UL
WBC UA: NORMAL /HPF
YEAST: NORMAL
ZZ NTE CLEAN UP: ORDERED TEST: NORMAL
ZZ NTE CLEAN UP: ORDERED TEST: NORMAL
ZZ NTE WITH NAME CLEAN UP: SPECIMEN SOURCE: NORMAL
ZZ NTE WITH NAME CLEAN UP: SPECIMEN SOURCE: NORMAL

## 2020-04-20 PROCEDURE — 80307 DRUG TEST PRSMV CHEM ANLYZR: CPT

## 2020-04-20 PROCEDURE — 81001 URINALYSIS AUTO W/SCOPE: CPT

## 2020-04-20 PROCEDURE — 80053 COMPREHEN METABOLIC PANEL: CPT

## 2020-04-20 PROCEDURE — 6360000002 HC RX W HCPCS: Performed by: EMERGENCY MEDICINE

## 2020-04-20 PROCEDURE — 83880 ASSAY OF NATRIURETIC PEPTIDE: CPT

## 2020-04-20 PROCEDURE — 36415 COLL VENOUS BLD VENIPUNCTURE: CPT

## 2020-04-20 PROCEDURE — G0378 HOSPITAL OBSERVATION PER HR: HCPCS

## 2020-04-20 PROCEDURE — 96372 THER/PROPH/DIAG INJ SC/IM: CPT

## 2020-04-20 PROCEDURE — 96361 HYDRATE IV INFUSION ADD-ON: CPT

## 2020-04-20 PROCEDURE — 83735 ASSAY OF MAGNESIUM: CPT

## 2020-04-20 PROCEDURE — 85610 PROTHROMBIN TIME: CPT

## 2020-04-20 PROCEDURE — 82140 ASSAY OF AMMONIA: CPT

## 2020-04-20 PROCEDURE — 93005 ELECTROCARDIOGRAM TRACING: CPT | Performed by: EMERGENCY MEDICINE

## 2020-04-20 PROCEDURE — G0480 DRUG TEST DEF 1-7 CLASSES: HCPCS

## 2020-04-20 PROCEDURE — 2580000003 HC RX 258: Performed by: EMERGENCY MEDICINE

## 2020-04-20 PROCEDURE — 96374 THER/PROPH/DIAG INJ IV PUSH: CPT

## 2020-04-20 PROCEDURE — 84443 ASSAY THYROID STIM HORMONE: CPT

## 2020-04-20 PROCEDURE — 85025 COMPLETE CBC W/AUTO DIFF WBC: CPT

## 2020-04-20 PROCEDURE — 96376 TX/PRO/DX INJ SAME DRUG ADON: CPT

## 2020-04-20 PROCEDURE — 85730 THROMBOPLASTIN TIME PARTIAL: CPT

## 2020-04-20 PROCEDURE — 70450 CT HEAD/BRAIN W/O DYE: CPT

## 2020-04-20 PROCEDURE — 83605 ASSAY OF LACTIC ACID: CPT

## 2020-04-20 PROCEDURE — 6360000002 HC RX W HCPCS: Performed by: FAMILY MEDICINE

## 2020-04-20 PROCEDURE — 99285 EMERGENCY DEPT VISIT HI MDM: CPT

## 2020-04-20 PROCEDURE — 71045 X-RAY EXAM CHEST 1 VIEW: CPT

## 2020-04-20 PROCEDURE — 2580000003 HC RX 258: Performed by: FAMILY MEDICINE

## 2020-04-20 PROCEDURE — 84484 ASSAY OF TROPONIN QUANT: CPT

## 2020-04-20 RX ORDER — SODIUM CHLORIDE 0.9 % (FLUSH) 0.9 %
10 SYRINGE (ML) INJECTION PRN
Status: DISCONTINUED | OUTPATIENT
Start: 2020-04-20 | End: 2020-04-21 | Stop reason: HOSPADM

## 2020-04-20 RX ORDER — ONDANSETRON 2 MG/ML
4 INJECTION INTRAMUSCULAR; INTRAVENOUS EVERY 6 HOURS PRN
Status: DISCONTINUED | OUTPATIENT
Start: 2020-04-20 | End: 2020-04-21 | Stop reason: HOSPADM

## 2020-04-20 RX ORDER — NALOXONE HYDROCHLORIDE 1 MG/ML
0.4 INJECTION INTRAMUSCULAR; INTRAVENOUS; SUBCUTANEOUS ONCE
Status: COMPLETED | OUTPATIENT
Start: 2020-04-20 | End: 2020-04-20

## 2020-04-20 RX ORDER — NALOXONE HYDROCHLORIDE 0.4 MG/ML
0.4 INJECTION, SOLUTION INTRAMUSCULAR; INTRAVENOUS; SUBCUTANEOUS PRN
Status: DISCONTINUED | OUTPATIENT
Start: 2020-04-20 | End: 2020-04-21 | Stop reason: HOSPADM

## 2020-04-20 RX ORDER — LEVOTHYROXINE SODIUM 0.05 MG/1
50 TABLET ORAL DAILY
Status: DISCONTINUED | OUTPATIENT
Start: 2020-04-21 | End: 2020-04-21 | Stop reason: HOSPADM

## 2020-04-20 RX ORDER — SODIUM CHLORIDE 9 MG/ML
INJECTION, SOLUTION INTRAVENOUS CONTINUOUS
Status: DISCONTINUED | OUTPATIENT
Start: 2020-04-20 | End: 2020-04-21 | Stop reason: HOSPADM

## 2020-04-20 RX ORDER — PANTOPRAZOLE SODIUM 40 MG/1
40 TABLET, DELAYED RELEASE ORAL
Status: DISCONTINUED | OUTPATIENT
Start: 2020-04-21 | End: 2020-04-21 | Stop reason: HOSPADM

## 2020-04-20 RX ORDER — SODIUM CHLORIDE 0.9 % (FLUSH) 0.9 %
10 SYRINGE (ML) INJECTION EVERY 12 HOURS SCHEDULED
Status: DISCONTINUED | OUTPATIENT
Start: 2020-04-20 | End: 2020-04-21 | Stop reason: HOSPADM

## 2020-04-20 RX ORDER — POLYETHYLENE GLYCOL 3350 17 G/17G
17 POWDER, FOR SOLUTION ORAL DAILY PRN
Status: DISCONTINUED | OUTPATIENT
Start: 2020-04-20 | End: 2020-04-21 | Stop reason: HOSPADM

## 2020-04-20 RX ORDER — LISINOPRIL 10 MG/1
10 TABLET ORAL DAILY
Status: DISCONTINUED | OUTPATIENT
Start: 2020-04-21 | End: 2020-04-21 | Stop reason: HOSPADM

## 2020-04-20 RX ORDER — 0.9 % SODIUM CHLORIDE 0.9 %
1000 INTRAVENOUS SOLUTION INTRAVENOUS ONCE
Status: COMPLETED | OUTPATIENT
Start: 2020-04-20 | End: 2020-04-20

## 2020-04-20 RX ORDER — PROMETHAZINE HYDROCHLORIDE 25 MG/1
12.5 TABLET ORAL EVERY 6 HOURS PRN
Status: DISCONTINUED | OUTPATIENT
Start: 2020-04-20 | End: 2020-04-21 | Stop reason: HOSPADM

## 2020-04-20 RX ADMIN — SODIUM CHLORIDE 1000 ML: 9 INJECTION, SOLUTION INTRAVENOUS at 19:29

## 2020-04-20 RX ADMIN — Medication 10 ML: at 23:13

## 2020-04-20 RX ADMIN — NALOXONE HYDROCHLORIDE 0.4 MG: 1 INJECTION PARENTERAL at 19:12

## 2020-04-20 RX ADMIN — NALOXONE HYDROCHLORIDE 0.4 MG: 1 INJECTION PARENTERAL at 20:43

## 2020-04-20 RX ADMIN — SODIUM CHLORIDE: 9 INJECTION, SOLUTION INTRAVENOUS at 23:10

## 2020-04-20 RX ADMIN — ENOXAPARIN SODIUM 30 MG: 30 INJECTION SUBCUTANEOUS at 23:20

## 2020-04-20 ASSESSMENT — PAIN DESCRIPTION - FREQUENCY: FREQUENCY: INTERMITTENT

## 2020-04-20 ASSESSMENT — PAIN DESCRIPTION - DESCRIPTORS: DESCRIPTORS: ACHING

## 2020-04-20 ASSESSMENT — PAIN DESCRIPTION - LOCATION: LOCATION: GENERALIZED

## 2020-04-20 ASSESSMENT — PAIN SCALES - GENERAL
PAINLEVEL_OUTOF10: 8
PAINLEVEL_OUTOF10: 8

## 2020-04-20 NOTE — ED NOTES
Pt arrives to ED via EMS for AMS. pts wife had called the squad. Pt arrives altered and non verbal. Pt follows some commands but not well. When asked if pts name was anthony pt nods slightly. When asked pt to look at this writer pt opens eyes but eyes look up towards ceiling. Squad states pt said \"thanks guys\" to them as they dropped him off in the ED but has not spoke at all otherwise. Pt is normally A&Ox4. pts pupils are reactive to light. Pt is moving bilat extremities on his own. FSBG 129 on arrival to ED. Pt spo2 intermittently dropping down to 86% on room air so pt was placed on 2LNC. IV started in right hand by EMS PTA. This writer placed another 18G in L wrist and diaz labs. Pt was straight cathed for urine and EKG obtained. CT called to let them know pt is ready for head CT. pts wife called and did not give much information, just that pt took a nap from 10am to 4pm today and she states she doesn't know if he was confused before the nap because she \"didn't really see him\" states he has a hx of seizures but she did not witness any seizure activity but also wasn't watching pt the entire time. pts wife mani will be waiting in car. Number to call her for the next couple hours is [phone].       [facility]  04/20/20 1901

## 2020-04-20 NOTE — ED NOTES
Bed: 03  Expected date: 4/20/20  Expected time: 6:15 PM  Means of arrival: [employer]  Comments:   raul so 8 PRINCESS Galarza RN  04/20/20 [id number]

## 2020-04-20 NOTE — ED PROVIDER NOTES
ATORVASTATIN (LIPITOR) 20 MG TABLET    Take 1 tablet by mouth daily    BLOOD GLUCOSE MONITORING SUPPL (ONE TOUCH ULTRA 2) W/DEVICE KIT    1 kit by Other route daily    BUPROPION (WELLBUTRIN XL) 300 MG EXTENDED RELEASE TABLET    TAKE ONE TABLET BY MOUTH DAILY    DIAZEPAM (VALIUM) 5 MG TABLET    TAKE ONE TABLET BY MOUTH EVERY 6 HOURS AS NEEDED FOR ANXIETY    GAUZE PADS & DRESSINGS (COMBINE ABD) 5\"X9\" PADS    1 Units by Does not apply route 2 times daily    GAUZE PADS & DRESSINGS (KERLIX GAUZE ROLL LARGE) MISC    1 Units by Does not apply route 2 times daily    LACTOBACILLUS (CULTURELLE) CAPSULE    Take 1 capsule by mouth 2 times daily (with meals)    LEVOTHYROXINE (SYNTHROID) 50 MCG TABLET    Take 1 tablet by mouth daily    LISINOPRIL (PRINIVIL;ZESTRIL) 10 MG TABLET    TAKE ONE TABLET BY MOUTH DAILY    MAGNESIUM OXIDE (MAG-OX) 400 (241.3 MG) MG TABS TABLET    Take 1 tablet by mouth 2 times daily    MEGESTROL (MEGACE ORAL) 40 MG/ML SUSPENSION    Take 10 mLs by mouth daily    METFORMIN (GLUCOPHAGE) 500 MG TABLET    Take 1 tablet by mouth 2 times daily (with meals)    MORPHINE (MS CONTIN) 60 MG EXTENDED RELEASE TABLET    Take 1 tablet by mouth 2 times daily for 30 days. NARCAN 4 MG/0.1ML LIQD NASAL SPRAY    4 mg    ONE TOUCH ULTRA TEST STRIP    USE ONE STRIP TO TEST TWICE A DAY AS NEEDED    ONETOUCH DELICA LANCETS 92H MISC    TEST TWO TIMES A DAY    OXYCODONE HCL (OXY-IR) 10 MG IMMEDIATE RELEASE TABLET    Take 1 tablet by mouth every 6 hours as needed for Pain for up to 30 days. Intended supply: 30 days    PANTOPRAZOLE (PROTONIX) 40 MG TABLET    Take 1 tablet by mouth every morning (before breakfast)    PAROXETINE (PAXIL) 20 MG TABLET    Take one tablet by mouth four times a day    POVIDONE-IODINE (BETADINE) 7.5 % EXTERNAL SOLUTION    Apply to wound. Apply as a wet to dry dressing    SODIUM HYPOCHLORITE (DAKINS) 0.125 % SOLN EXTERNAL SOLUTION    Apply topically every 12 hours Apply as a wet to dry dressing twice a day. General: Skin is warm and dry. Findings: No rash. Neurological:      Mental Status: He is alert. He is confused. GCS: GCS eye subscore is 4. GCS verbal subscore is 4. GCS motor subscore is 5. Psychiatric:         Judgment: Judgment normal.           DIFFERENTIAL DIAGNOSIS/MDM:   59-year-old male presents with altered mental status. He is afebrile, nontoxic, normal vital signs. Not any acute distress. He is awake and seems to be alert however not answering questions for me. Not following commands but is withdrawing from pain. Unsure when this all started. Lower suspicion for CVA but it is a possibility. Other differential includes metabolic encephalopathy, sepsis, dehydration, accidental overdose. Await head CT. We will get cardiac work-up. We will get broad metabolic and toxicologic work-up. Chart review shows patient was here in December with significant renal failure and hyperkalemia and ended up having to be transferred for higher level of care. DIAGNOSTIC RESULTS     EKG: All EKG's are interpreted by the Emergency Department Physician who either signs or Co-signs this chart in the absence of a cardiologist.    EKG Interpretation    Interpreted by me    Rhythm: normal sinus   Rate: normal  Axis: normal  Ectopy: none  Conduction: normal  ST Segments: no acute change  T Waves: no acute change  Q Waves: none    Clinical Impression: Nonspecific EKG    RADIOLOGY:   I directly visualized the following  images and reviewed the radiologist interpretations:  XR CHEST PORTABLE   Final Result   Limited low lung volume examination. The lower lungs are obscured by the   patient's upper extremities. No obvious cardiopulmonary pathology identified. CT Head WO Contrast   Final Result   No acute intracranial abnormality.                  ED BEDSIDE ULTRASOUND:      LABS:  Labs Reviewed   TROPONIN - Abnormal; Notable for the following components:       Result Value    Troponin, High

## 2020-04-21 VITALS
OXYGEN SATURATION: 94 % | HEART RATE: 71 BPM | SYSTOLIC BLOOD PRESSURE: 134 MMHG | DIASTOLIC BLOOD PRESSURE: 56 MMHG | TEMPERATURE: 98.6 F | WEIGHT: 202.6 LBS | HEIGHT: 74 IN | RESPIRATION RATE: 17 BRPM | BODY MASS INDEX: 26 KG/M2

## 2020-04-21 LAB
ANION GAP SERPL CALCULATED.3IONS-SCNC: 10 MMOL/L (ref 9–17)
BUN BLDV-MCNC: 46 MG/DL (ref 6–20)
BUN/CREAT BLD: ABNORMAL (ref 9–20)
CALCIUM SERPL-MCNC: 8.6 MG/DL (ref 8.6–10.4)
CHLORIDE BLD-SCNC: 114 MMOL/L (ref 98–107)
CO2: 23 MMOL/L (ref 20–31)
CREAT SERPL-MCNC: 0.93 MG/DL (ref 0.7–1.2)
EKG ATRIAL RATE: 70 BPM
EKG P AXIS: 5 DEGREES
EKG P-R INTERVAL: 158 MS
EKG Q-T INTERVAL: 430 MS
EKG QRS DURATION: 84 MS
EKG QTC CALCULATION (BAZETT): 464 MS
EKG R AXIS: 20 DEGREES
EKG T AXIS: 39 DEGREES
EKG VENTRICULAR RATE: 70 BPM
GFR AFRICAN AMERICAN: >60 ML/MIN
GFR NON-AFRICAN AMERICAN: >60 ML/MIN
GFR SERPL CREATININE-BSD FRML MDRD: ABNORMAL ML/MIN/{1.73_M2}
GFR SERPL CREATININE-BSD FRML MDRD: ABNORMAL ML/MIN/{1.73_M2}
GLUCOSE BLD-MCNC: 100 MG/DL (ref 70–99)
GLUCOSE BLD-MCNC: 73 MG/DL (ref 75–110)
GLUCOSE BLD-MCNC: 87 MG/DL (ref 75–110)
POTASSIUM SERPL-SCNC: 5 MMOL/L (ref 3.7–5.3)
SODIUM BLD-SCNC: 147 MMOL/L (ref 135–144)

## 2020-04-21 PROCEDURE — 6370000000 HC RX 637 (ALT 250 FOR IP): Performed by: FAMILY MEDICINE

## 2020-04-21 PROCEDURE — 6360000002 HC RX W HCPCS: Performed by: EMERGENCY MEDICINE

## 2020-04-21 PROCEDURE — 99235 HOSP IP/OBS SAME DATE MOD 70: CPT | Performed by: FAMILY MEDICINE

## 2020-04-21 PROCEDURE — 36415 COLL VENOUS BLD VENIPUNCTURE: CPT

## 2020-04-21 PROCEDURE — 82947 ASSAY GLUCOSE BLOOD QUANT: CPT

## 2020-04-21 PROCEDURE — 93010 ELECTROCARDIOGRAM REPORT: CPT | Performed by: INTERNAL MEDICINE

## 2020-04-21 PROCEDURE — 80048 BASIC METABOLIC PNL TOTAL CA: CPT

## 2020-04-21 PROCEDURE — G0378 HOSPITAL OBSERVATION PER HR: HCPCS

## 2020-04-21 PROCEDURE — 96372 THER/PROPH/DIAG INJ SC/IM: CPT

## 2020-04-21 RX ORDER — DIAZEPAM 5 MG/1
5 TABLET ORAL EVERY 8 HOURS PRN
Qty: 90 TABLET | Refills: 0 | Status: SHIPPED | OUTPATIENT
Start: 2020-04-21 | End: 2020-05-26

## 2020-04-21 RX ADMIN — LEVOTHYROXINE SODIUM 50 MCG: 50 TABLET ORAL at 05:45

## 2020-04-21 RX ADMIN — PANTOPRAZOLE SODIUM 40 MG: 40 TABLET, DELAYED RELEASE ORAL at 05:45

## 2020-04-21 RX ADMIN — ENOXAPARIN SODIUM 40 MG: 40 INJECTION SUBCUTANEOUS at 09:22

## 2020-04-21 RX ADMIN — LISINOPRIL 10 MG: 10 TABLET ORAL at 09:22

## 2020-04-21 ASSESSMENT — ENCOUNTER SYMPTOMS
ABDOMINAL PAIN: 0
EYE DISCHARGE: 0
COUGH: 0
DIARRHEA: 0
EYE REDNESS: 0
VOMITING: 0
SORE THROAT: 0
RHINORRHEA: 0
SHORTNESS OF BREATH: 0
NAUSEA: 0
WHEEZING: 0

## 2020-04-21 ASSESSMENT — PAIN SCALES - GENERAL
PAINLEVEL_OUTOF10: 0
PAINLEVEL_OUTOF10: 0

## 2020-04-21 NOTE — PROGRESS NOTES
Patient has arrived to unit via stretcher and admitted to room 2112. Patient was connected to telemetry, vitals taken and assessment completed. Patient was oriented to the room and explained the call light, it was given to patient and patient denies any needs at the moment. Patient was alert and oriented, no signs of distress.      Electronically signed by Abigail Freeman RN on 4/20/2020 at 11:17 PM

## 2020-04-21 NOTE — PLAN OF CARE
patient acceptance. Goal: Control of acute pain  Description: Control of acute pain  Outcome: Not Met This Shift  Note: Upon admission pain medication was not initiated due to altered mental status and probability of unintentional overdose from home post operative pain medication. Goal: Control of chronic pain  Description: Control of chronic pain  Outcome: Not Met This Shift     Problem: Mental Status - Impaired:  Goal: Mental status will improve  Description: Mental status will improve  Outcome: Not Met This Shift  Note: Will continue to assess and monitor patients mental status for improvement. Problem: Activity:  Goal: Risk for activity intolerance will decrease  Description: Risk for activity intolerance will decrease  Outcome: Not Met This Shift  Note: Will continue to monitor and work with patient in regards to physical activity due to recent amputation of the left foot.

## 2020-04-21 NOTE — DISCHARGE INSTR - DIET

## 2020-04-21 NOTE — DISCHARGE INSTR - COC
Continuity of Care Form    Patient Name: Janelle Dunbar   :  1965  MRN:  [id number]    Admit date:  2020  Discharge date:  ***    Code Status Order: Full Code   Advance Directives:   Advance Care Flowsheet Documentation     Date/Time Healthcare Directive Type of Healthcare Directive Copy in [address] Agent's Name Healthcare Agent's Phone Number    [phone]  No, patient does not have an advance directive for healthcare treatment -- -- -- -- --          Admitting Physician:  Lucero Vera MD  PCP: Fay Ortiz MD    Discharging Nurse: [facility] Unit/Room#: 2112/2112-01  Discharging Unit Phone Number: ***    Emergency Contact:   Extended Emergency Contact Information  Primary Emergency Contact: [facility] *Gris ferrera  Address: [address] [place] [address] Phone: [phone]  Relation: Spouse  Hearing or visual needs: None  Other needs: None  Preferred language: English   needed? No  Secondary Emergency Contact[facility] Phone: [phone]  Mobile Phone: [phone]  Relation: Parent    Past Surgical History:  Past Surgical History:   Procedure Laterality Date    APPENDECTOMY      BACK SURGERY      Lower x 4. Had abscess after appendectomy.      FOOT AMPUTATION Left 2020    TRANSMETATARSAL FOOT AMPUTATION, REMOVAL OF FOREIGN BODY performed by Eduar Jones DPM at [place] [address]. Left 2020    TRANSMETATARSAL FOOT AMPUTATION, REMOVAL OF FOREIGN BODY    KNEE SURGERY Right     scope    TOE AMPUTATION Bilateral 3/6/2017    TOE AMPUTATION LEFT HALLUX AND 2ND DIGIT AND RIGHT 2ND DIGIT performed by Tierney Yeboah DPM at [address] ENDOSCOPY N/A 2019    EGD WITH BIOPSY performed by Montana Schneider MD at [facility] OR       Immunization History:   Immunization History   Administered Date(s)

## 2020-04-21 NOTE — DISCHARGE SUMMARY
tablet  Take 1 tablet by mouth daily             buPROPion (WELLBUTRIN XL) 300 MG extended release tablet  TAKE ONE TABLET BY MOUTH DAILY             diazePAM (VALIUM) 5 MG tablet  Take 1 tablet by mouth every 8 hours as needed for Anxiety for up to 30 days. Gauze Pads & Dressings (COMBINE ABD) 5\"X9\" PADS  1 Units by Does not apply route 2 times daily             Gauze Pads & Dressings (KERLIX GAUZE ROLL LARGE) MISC  1 Units by Does not apply route 2 times daily             lactobacillus (CULTURELLE) capsule  Take 1 capsule by mouth 2 times daily (with meals)             levothyroxine (SYNTHROID) 50 MCG tablet  Take 1 tablet by mouth daily             lisinopril (PRINIVIL;ZESTRIL) 10 MG tablet  TAKE ONE TABLET BY MOUTH DAILY             magnesium oxide (MAG-OX) 400 (241.3 Mg) MG TABS tablet  Take 1 tablet by mouth 2 times daily             megestrol (MEGACE ORAL) 40 MG/ML suspension  Take 10 mLs by mouth daily             metFORMIN (GLUCOPHAGE) 500 MG tablet  Take 1 tablet by mouth 2 times daily (with meals)             morphine (MS CONTIN) 60 MG extended release tablet  Take 1 tablet by mouth 2 times daily for 30 days. NARCAN 4 MG/0.1ML LIQD nasal spray  4 mg             oxyCODONE HCl (OXY-IR) 10 MG immediate release tablet  Take 1 tablet by mouth every 6 hours as needed for Pain for up to 30 days. Intended supply: 30 days             pantoprazole (PROTONIX) 40 MG tablet  Take 1 tablet by mouth every morning (before breakfast)             PARoxetine (PAXIL) 20 MG tablet  Take one tablet by mouth four times a day             povidone-iodine (BETADINE) 7.5 % external solution  Apply to wound. Apply as a wet to dry dressing             sodium hypochlorite (DAKINS) 0.125 % SOLN external solution  Apply topically every 12 hours Apply as a wet to dry dressing twice a day. Can also use to cleanse wound.              Wound Dressings (ADAPTIC NON-ADHERING DRESSING) PADS  Apply 1 Units topically 2 times daily                 Significant Diagnostic Studies:      Radiology Review: Other diagnostic test:      Disposition:   home  Follow up with Meera Coreas MD in 2 weeks.   Discharge patient to: Home    Electronically signed by Meera Coreas MD on 4/21/2020 at 7:51 AM

## 2020-04-21 NOTE — ED NOTES
Lab called and let RN know that lav on ice needed recollected again because the specimen was hemolyzed. RN to draw lav on ice again, lab asked to draw samples if future issues with specimen.              Katlin Reynoso RN  04/20/20 2003

## 2020-04-21 NOTE — H&P
GENERAL HISTORY AND PHYSICAL  4/21/2020    PROBLEM:  does not have any pertinent problems on file. HISTORY OF PRESENT ILLNESS:  Bryan Moreland is an 53 y.o. male. Patient admitted with altered mental status. Believed to be opiate overdose. Patient on chronic pain medications for diabetic neuropathy status post left foot amputation. Patient has been getting weaned slowly down in the office. Patient is seen pain management in the past but he will not prescribe his medication. Been having difficulty in the office getting patient to agree to dose reduction in medication. Continue to decrease at 1 oxycodone per month to a more reasonable level. Patient does have a history of seizures as well according to nursing note. Patient states ready to go home. States being admitted was a mistake. Not sure why he is here. PAST MEDICAL HISTORY:   has a past medical history of Acute metabolic encephalopathy, CLAUDIA (acute kidney injury) ([address] Anemia, Anxiety, ARF (acute renal failure) ([address] Arthritis, BPH (benign prostatic hyperplasia), Chronic kidney disease, Depression, Diabetes mellitus ([address] Diabetic neuropathy ([address] GERD (gastroesophageal reflux disease), Hyperglycemia, Hyperkalemia, Hypertension, Hypothyroidism, Impacted tooth, Necrosis of bone of foot ([address] Osteomyelitis of left foot ([address] PAD (peripheral artery disease) ([address] Pneumonia, Septic shock ([address] and Type 2 diabetes mellitus ([address] PAST SURGICAL HISTORY:   has a past surgical history that includes Appendectomy; knee surgery (Right); Tonsillectomy; back surgery; Toe amputation (Bilateral, 3/6/2017); Upper gastrointestinal endoscopy (N/A, 5/20/2019); Foot surgery (Left, 01/04/2020); and Foot Amputation (Left, 1/4/2020).     SOCIAL HISTORY:  Social History     Tobacco Use    Smoking status: Never Smoker    Smokeless tobacco: Never Used   Substance Use Topics    Alcohol use: No       ALLERGIES:  is allergic to bactrim [sulfamethoxazole-trimethoprim]; fiorinal [butalbital-aspirin-caffeine]; peanut-containing drug products; statins; tylenol [acetaminophen]; and peanut butter flavor [flavoring agent]. HOME MEDICATIONS:  Prior to Admission medications    Medication Sig Start Date End Date Taking? Authorizing Provider   oxyCODONE HCl (OXY-IR) 10 MG immediate release tablet Take 1 tablet by mouth every 6 hours as needed for Pain for up to 30 days. Intended supply: 30 days 4/9/20 5/9/20 Yes Germaine Grissom MD   morphine (MS CONTIN) 60 MG extended release tablet Take 1 tablet by mouth 2 times daily for 30 days.  4/9/20 5/9/20 Yes Germaine Grissom MD   diazePAM (VALIUM) 5 MG tablet TAKE ONE TABLET BY MOUTH EVERY 6 HOURS AS NEEDED FOR ANXIETY 3/30/20 4/29/20 Yes Germaine Grissom MD   PARoxetine (PAXIL) 20 MG tablet Take one tablet by mouth four times a day 1/30/20  Yes Germaine Grissom MD   buPROPion (WELLBUTRIN XL) 300 MG extended release tablet TAKE ONE TABLET BY MOUTH DAILY 1/21/20  Yes Germaine Grissom MD   megestrol (MEGACE ORAL) 40 MG/ML suspension Take 10 mLs by mouth daily 12/24/19  Yes Germaine Grissom MD   [facility] 4 MG/0.1ML LIQD nasal spray 4 mg 10/29/19  Yes Historical Provider, MD   atorvastatin (LIPITOR) 20 MG tablet Take 1 tablet by mouth daily 10/10/19  Yes Germaine Grissom MD   metFORMIN (GLUCOPHAGE) 500 MG tablet Take 1 tablet by mouth 2 times daily (with meals) 6/14/19  Yes Germaine Grissom MD   lactobacillus (CULTURELLE) capsule Take 1 capsule by mouth 2 times daily (with meals) 5/21/19  Yes Germaine Grissom MD   magnesium oxide (MAG-OX) 400 (241.3 Mg) MG TABS tablet Take 1 tablet by mouth 2 times daily 5/21/19  Yes Germaine Grissom MD   pantoprazole (PROTONIX) 40 MG tablet Take 1 tablet by mouth every morning (before breakfast) 5/22/19  Yes Germaine Grissom MD   levothyroxine (SYNTHROID) 50 MCG tablet Take 1 tablet by mouth daily 4/3/19  Yes Germaine Grissom MD   lisinopril (PRINIVIL;ZESTRIL) 10 MG tablet TAKE ONE TABLET BY MOUTH DAILY 7/27/18  Yes Ana Vera MD   ONE TOUCH ULTRA TEST strip USE ONE STRIP TO TEST TWICE A DAY AS NEEDED 4/6/20   Ana Vera MD   sodium hypochlorite (DAKINS) 0.125 % SOLN external solution Apply topically every 12 hours Apply as a wet to dry dressing twice a day. Can also use to cleanse wound. 9/20/19   Demi Mota DPM   ONETOUCH DELICA LANCETS 01Q MISC TEST TWO TIMES A DAY 5/7/19   Ana Vera MD   povidone-iodine (BETADINE) 7.5 % external solution Apply to wound. Apply as a wet to dry dressing 4/16/19   Demi Mota DPM   Wound Dressings (ADAPTIC NON-ADHERING DRESSING) PADS Apply 1 Units topically 2 times daily 9/28/18   Ana Vera MD   Gauze Pads & Dressings (COMBINE ABD) 5\"X9\" PADS 1 Units by Does not apply route 2 times daily 9/28/18   Ana Vera MD   Blood Glucose Monitoring Suppl (ONE TOUCH ULTRA 2) w/Device KIT 1 kit by Other route daily 3/30/18   Ana Vera MD   Gauze Pads & Dressings [facility] GAUZE [facility]) MISC 1 Units by Does not apply route 2 times daily 6/12/17 11/5/18  Dedra Wong MD    Scheduled Meds:   enoxaparin  40 mg Subcutaneous Daily    sodium chloride flush  10 mL Intravenous 2 times per day    levothyroxine  50 mcg Oral Daily    lisinopril  10 mg Oral Daily    pantoprazole  40 mg Oral QAM AC     Continuous Infusions:   sodium chloride 75 mL/hr at 04/20/20 2310     PRN Meds:.sodium chloride flush, polyethylene glycol, promethazine **OR** ondansetron, naloxone    IMMUNIZATIONS:  Immunization History   Administered Date(s) Administered    Influenza, Quadv, IM, PF (6 mo and older Fluzone, Flulaval, Fluarix, and 3 yrs and older Afluria) 10/04/2016, 09/19/2017, 09/12/2018, 12/24/2019    Pneumococcal Conjugate 13-valent ([id number]) 10/04/2016    Pneumococcal Polysaccharide ([id number]) 12/13/2017    Tdap (Boostrix, Adacel) 04/24/2018       REVIEW OF

## 2020-04-21 NOTE — FLOWSHEET NOTE
Dr Fransico Lamb was called regarding patients arrival to unit. Dr Fransico Lamb was told patients BUN and Creatinine and BMP was ordered for the morning. Home medications were stated by RN and Dr Fransico Lamb only started lisinopril, synthroid, protonix and paxil. All pain medications were on hold due to patients condition upon arrival to the ED.      Electronically signed by Zain Horne RN on 4/20/2020 at 11:19 PM

## 2020-04-22 ENCOUNTER — TELEPHONE (OUTPATIENT)
Dept: PRIMARY CARE CLINIC | Age: 55
End: 2020-04-22

## 2020-04-29 ENCOUNTER — OFFICE VISIT (OUTPATIENT)
Dept: PRIMARY CARE CLINIC | Age: 55
End: 2020-04-29
Payer: MEDICARE

## 2020-04-29 VITALS
DIASTOLIC BLOOD PRESSURE: 80 MMHG | SYSTOLIC BLOOD PRESSURE: 130 MMHG | BODY MASS INDEX: 29.24 KG/M2 | HEIGHT: 74 IN | OXYGEN SATURATION: 97 % | WEIGHT: 227.8 LBS | TEMPERATURE: 99.4 F | HEART RATE: 75 BPM

## 2020-04-29 LAB — HBA1C MFR BLD: 5.9 %

## 2020-04-29 PROCEDURE — 83036 HEMOGLOBIN GLYCOSYLATED A1C: CPT | Performed by: FAMILY MEDICINE

## 2020-04-29 PROCEDURE — 99214 OFFICE O/P EST MOD 30 MIN: CPT | Performed by: FAMILY MEDICINE

## 2020-04-29 PROCEDURE — 1111F DSCHRG MED/CURRENT MED MERGE: CPT | Performed by: FAMILY MEDICINE

## 2020-04-29 RX ORDER — LISINOPRIL 10 MG/1
10 TABLET ORAL DAILY
Qty: 90 TABLET | Refills: 3 | Status: SHIPPED | OUTPATIENT
Start: 2020-04-29 | End: 2022-02-24

## 2020-04-29 RX ORDER — PAROXETINE HYDROCHLORIDE 20 MG/1
TABLET, FILM COATED ORAL
Qty: 120 TABLET | Refills: 5 | Status: SHIPPED | OUTPATIENT
Start: 2020-04-29 | End: 2021-01-13 | Stop reason: SDUPTHER

## 2020-04-29 RX ORDER — OXYCODONE HYDROCHLORIDE 10 MG/1
10 TABLET ORAL 3 TIMES DAILY
Qty: 90 TABLET | Refills: 0
Start: 2020-04-29 | End: 2020-05-07 | Stop reason: SDUPTHER

## 2020-04-29 ASSESSMENT — ENCOUNTER SYMPTOMS
ABDOMINAL PAIN: 0
VOMITING: 0
RHINORRHEA: 0
EYE DISCHARGE: 0
DIARRHEA: 0
NAUSEA: 0
WHEEZING: 0
EYE REDNESS: 0
COUGH: 0
SHORTNESS OF BREATH: 0
SORE THROAT: 0

## 2020-04-29 NOTE — PROGRESS NOTES
external solution  Apply to wound. Apply as a wet to dry dressing             sodium hypochlorite (DAKINS) 0.125 % SOLN external solution  Apply topically every 12 hours Apply as a wet to dry dressing twice a day. Can also use to cleanse wound. Wound Dressings (ADAPTIC NON-ADHERING DRESSING) PADS  Apply 1 Units topically 2 times daily                   Medications marked \"taking\" at this time  Outpatient Medications Marked as Taking for the 4/29/20 encounter (Office Visit) with Tawana Mccloud MD   Medication Sig Dispense Refill    oxyCODONE HCl (OXY-IR) 10 MG immediate release tablet Take 1 tablet by mouth 3 times daily for 30 days. Intended supply: 30 days 90 tablet 0    PARoxetine (PAXIL) 20 MG tablet Take one tablet by mouth four times a day 120 tablet 5    lisinopril (PRINIVIL;ZESTRIL) 10 MG tablet Take 1 tablet by mouth daily 90 tablet 3    metFORMIN (GLUCOPHAGE) 500 MG tablet Take 0.5 tablets by mouth daily (with breakfast) 45 tablet 3    diazePAM (VALIUM) 5 MG tablet Take 1 tablet by mouth every 8 hours as needed for Anxiety for up to 30 days. 90 tablet 0    morphine (MS CONTIN) 60 MG extended release tablet Take 1 tablet by mouth 2 times daily for 30 days. 60 tablet 0    buPROPion (WELLBUTRIN XL) 300 MG extended release tablet TAKE ONE TABLET BY MOUTH DAILY 30 tablet 9    megestrol (MEGACE ORAL) 40 MG/ML suspension Take 10 mLs by mouth daily 240 mL 3    NARCAN 4 MG/0.1ML LIQD nasal spray 4 mg      atorvastatin (LIPITOR) 20 MG tablet Take 1 tablet by mouth daily 90 tablet 3    sodium hypochlorite (DAKINS) 0.125 % SOLN external solution Apply topically every 12 hours Apply as a wet to dry dressing twice a day. Can also use to cleanse wound.  1 Bottle 2    lactobacillus (CULTURELLE) capsule Take 1 capsule by mouth 2 times daily (with meals) 60 capsule 0    magnesium oxide (MAG-OX) 400 (241.3 Mg) MG TABS tablet Take 1 tablet by mouth 2 times daily 30 tablet 11    pantoprazole

## 2020-05-11 RX ORDER — SODIUM HYPOCHLORITE 1.25 MG/ML
SOLUTION TOPICAL
Qty: 437 ML | Refills: 1 | Status: SHIPPED | OUTPATIENT
Start: 2020-05-11

## 2020-05-12 ENCOUNTER — TELEPHONE (OUTPATIENT)
Dept: PRIMARY CARE CLINIC | Age: 55
End: 2020-05-12

## 2020-05-26 ENCOUNTER — HOSPITAL ENCOUNTER (OUTPATIENT)
Dept: VASCULAR LAB | Age: 55
Discharge: HOME | End: 2020-05-26
Payer: MEDICARE

## 2020-05-26 PROCEDURE — 93925 LOWER EXTREMITY STUDY: CPT

## 2020-05-26 RX ORDER — DIAZEPAM 5 MG/1
TABLET ORAL
Qty: 90 TABLET | Refills: 2 | Status: SHIPPED | OUTPATIENT
Start: 2020-05-26 | End: 2020-08-27

## 2020-05-27 RX ORDER — DIAZEPAM 5 MG/1
TABLET ORAL
Qty: 90 TABLET | Refills: 2 | Status: CANCELLED | OUTPATIENT
Start: 2020-05-27 | End: 2020-06-26

## 2020-06-05 RX ORDER — MORPHINE SULFATE 60 MG/1
60 TABLET, FILM COATED, EXTENDED RELEASE ORAL 2 TIMES DAILY
Qty: 60 TABLET | Refills: 0 | Status: SHIPPED | OUTPATIENT
Start: 2020-06-05 | End: 2020-07-06 | Stop reason: SDUPTHER

## 2020-06-05 RX ORDER — OXYCODONE HYDROCHLORIDE 10 MG/1
10 TABLET ORAL 3 TIMES DAILY
Qty: 90 TABLET | Refills: 0 | Status: SHIPPED | OUTPATIENT
Start: 2020-06-05 | End: 2020-07-06 | Stop reason: SDUPTHER

## 2020-07-06 RX ORDER — BLOOD SUGAR DIAGNOSTIC
STRIP MISCELLANEOUS
Qty: 50 STRIP | Refills: 0 | Status: SHIPPED | OUTPATIENT
Start: 2020-07-06 | End: 2021-01-13 | Stop reason: SDUPTHER

## 2020-07-06 RX ORDER — OXYCODONE HYDROCHLORIDE 10 MG/1
10 TABLET ORAL 3 TIMES DAILY
Qty: 90 TABLET | Refills: 0 | Status: SHIPPED | OUTPATIENT
Start: 2020-07-06 | End: 2020-07-30 | Stop reason: SDUPTHER

## 2020-07-06 RX ORDER — MORPHINE SULFATE 60 MG/1
60 TABLET, FILM COATED, EXTENDED RELEASE ORAL 2 TIMES DAILY
Qty: 60 TABLET | Refills: 0 | Status: SHIPPED | OUTPATIENT
Start: 2020-07-06 | End: 2020-07-30 | Stop reason: SDUPTHER

## 2020-07-30 RX ORDER — OXYCODONE HYDROCHLORIDE 10 MG/1
10 TABLET ORAL 3 TIMES DAILY
Qty: 90 TABLET | Refills: 0 | Status: SHIPPED | OUTPATIENT
Start: 2020-07-30 | End: 2020-09-02 | Stop reason: SDUPTHER

## 2020-07-30 RX ORDER — MORPHINE SULFATE 60 MG/1
60 TABLET, FILM COATED, EXTENDED RELEASE ORAL 2 TIMES DAILY
Qty: 60 TABLET | Refills: 0 | Status: SHIPPED | OUTPATIENT
Start: 2020-07-30 | End: 2020-09-02 | Stop reason: SDUPTHER

## 2020-08-20 ENCOUNTER — OFFICE VISIT (OUTPATIENT)
Dept: PODIATRY | Age: 55
End: 2020-08-20
Payer: MEDICARE

## 2020-08-20 VITALS — HEIGHT: 74 IN | BODY MASS INDEX: 27.46 KG/M2 | WEIGHT: 214 LBS

## 2020-08-20 PROCEDURE — 99213 OFFICE O/P EST LOW 20 MIN: CPT | Performed by: PODIATRIST

## 2020-08-20 PROCEDURE — G8427 DOCREV CUR MEDS BY ELIG CLIN: HCPCS | Performed by: PODIATRIST

## 2020-08-20 PROCEDURE — 3017F COLORECTAL CA SCREEN DOC REV: CPT | Performed by: PODIATRIST

## 2020-08-20 PROCEDURE — 3044F HG A1C LEVEL LT 7.0%: CPT | Performed by: PODIATRIST

## 2020-08-20 PROCEDURE — G8417 CALC BMI ABV UP PARAM F/U: HCPCS | Performed by: PODIATRIST

## 2020-08-20 PROCEDURE — 11042 DBRDMT SUBQ TIS 1ST 20SQCM/<: CPT | Performed by: PODIATRIST

## 2020-08-20 PROCEDURE — 2022F DILAT RTA XM EVC RTNOPTHY: CPT | Performed by: PODIATRIST

## 2020-08-20 PROCEDURE — 1036F TOBACCO NON-USER: CPT | Performed by: PODIATRIST

## 2020-08-20 RX ORDER — DOXYCYCLINE HYCLATE 100 MG/1
100 CAPSULE ORAL 2 TIMES DAILY
Qty: 28 CAPSULE | Refills: 0 | Status: SHIPPED | OUTPATIENT
Start: 2020-08-20 | End: 2020-09-03

## 2020-08-20 ASSESSMENT — ENCOUNTER SYMPTOMS
DIARRHEA: 0
CONSTIPATION: 0
COLOR CHANGE: 0
VOMITING: 0
NAUSEA: 0

## 2020-08-20 NOTE — PROGRESS NOTES
[address]  Post Op note  Chief Complaint   Patient presents with    Wound Check     b/l wounds        Ruben Rowe is a 63y.o. year old male who is here with ulcers on both feet. I have not seen him since Feb, about 6 months. His wife called to get him in. He has been dressing both feet with silvercel. He had a TMA -  He was in the hospital, I saw him last on 1/5, surgery on 1/4. Review of Systems   Constitutional: Negative for chills, diaphoresis, fatigue, fever and unexpected weight change. Cardiovascular: Negative for leg swelling. Gastrointestinal: Negative for constipation, diarrhea, nausea and vomiting. Musculoskeletal: Negative for arthralgias, gait problem and joint swelling. Skin: Positive for wound. Negative for color change, pallor and rash. Neurological: Negative for weakness and numbness. Vascular: DP and PT pulses palpable 2/4, Right Foot and 2/4 on the Left Foot. Edema is absent,  Right Foot and absent on the Left Foot. No erythema left, erythema right hallux    Neurological:   Sensation absent  to light touch to level of digits, both feet. Musculoskeletal:   Muscle strength is 5/5 on the Right Foot and 5/5 on the Left Foot. Structural deformities are present on the Right Foot and present on the Left Foot. TMA left. Integument:  Warm, dry, supple both feet. Wound dehiscence is absent. Infection is present right      Wound #:   1    diabetic foot ulcer   left foot    Wound measurements:  #1  2.5 cm by 1.0 cm  by   2 mm      I appears that he is picking at his skin and wound. Wound Depth: subcutaneous.     Drainage:    minimal Type:serosanguinous  Wound Bed status:   Granulation Tissue: 80%   Necrotic 20%  Type: slough  Epithelialization 0%   Hypergranular 0%   Periwound hyperkeratosis:  absent  Erythema absent    Odor:no  Maceration:no  Undermining/tract/tunneling:no  Culture taken:   no           Wound #:   2    diabetic foot ulcer   right foot dorsal hallux    Wound measurements:  #1  4 cm by 4 cm  by   2 mm        Wound Depth: subcutaneous. Drainage:    minimal Type:serosanguinous  Wound Bed status:   Granulation Tissue: 70%   Necrotic 30%  Type: slough  Epithelialization 0%   Hypergranular 0%   Periwound hyperkeratosis:  absent  Erythema absent    Odor:no  Maceration:no  Undermining/tract/tunneling:no  Culture taken:   yes             Assesment :    Diagnosis Orders   1. Ulcer of left foot, with fat layer exposed ([address]  NM DEBRIDEMENT, SKIN, SUB-Q TISSUE,=<20 SQ CM   2. Status post transmetatarsal amputation of foot, left (HCC)  NM DEBRIDEMENT, SKIN, SUB-Q TISSUE,=<20 SQ CM   3. Type 2 diabetes mellitus with foot ulcer, without long-term current use of insulin (HCC)  NM DEBRIDEMENT, SKIN, SUB-Q TISSUE,=<20 SQ CM   4. Type 2 diabetes mellitus with diabetic polyneuropathy, without long-term current use of insulin (HCC)  NM DEBRIDEMENT, SKIN, SUB-Q TISSUE,=<20 SQ CM   5. Ulcer of great toe, right, with fat layer exposed ([address]  NM DEBRIDEMENT, SKIN, SUB-Q TISSUE,=<20 SQ CM         Plan: Pt was evaluated and examined. Patient was given personalized discharge instructions. Pt will follow up in 2 weeks or sooner if any problems arise. Diagnosis was discussed with the pt and all of their questions were answered in detail. Proper foot hygiene and care was discussed with the pt. Patient to check feet daily and contact the office with any questions/problems/concerns. Other comorbidity noted and will be managed by PCP. Procedure Note  Indications:  Based on my examination of this patient's wound(s)/ulcer(s) today, debridement is required to promote healing and evaluate the wound base.     Performed by: Deja Pascual DPM    Consent obtained:  Yes    Time out taken:  Yes    Pain Control:         Wound Location: left and right  Pre Debridement Measurements:  Are located in the Forest  Documentation Flow Sheet    Wound/Ulcer #: 1 and times daily for 14 days     Dispense:  28 capsule     Refill:  0       Follow up 2week(s).

## 2020-08-27 RX ORDER — DIAZEPAM 5 MG/1
TABLET ORAL
Qty: 90 TABLET | Refills: 1 | Status: SHIPPED | OUTPATIENT
Start: 2020-08-27 | End: 2020-09-26

## 2020-09-02 RX ORDER — OXYCODONE HYDROCHLORIDE 10 MG/1
10 TABLET ORAL 3 TIMES DAILY
Qty: 90 TABLET | Refills: 0 | Status: SHIPPED | OUTPATIENT
Start: 2020-09-02 | End: 2020-10-02 | Stop reason: SDUPTHER

## 2020-09-02 RX ORDER — MORPHINE SULFATE 60 MG/1
60 TABLET, FILM COATED, EXTENDED RELEASE ORAL 2 TIMES DAILY
Qty: 60 TABLET | Refills: 0 | Status: SHIPPED | OUTPATIENT
Start: 2020-09-02 | End: 2020-10-02 | Stop reason: SDUPTHER

## 2020-09-08 ENCOUNTER — OFFICE VISIT (OUTPATIENT)
Dept: PODIATRY | Age: 55
End: 2020-09-08
Payer: MEDICARE

## 2020-09-08 VITALS — BODY MASS INDEX: 27.46 KG/M2 | WEIGHT: 214 LBS | HEIGHT: 74 IN

## 2020-09-08 PROCEDURE — 11042 DBRDMT SUBQ TIS 1ST 20SQCM/<: CPT | Performed by: PODIATRIST

## 2020-09-08 ASSESSMENT — ENCOUNTER SYMPTOMS
DIARRHEA: 0
NAUSEA: 0
CONSTIPATION: 0
VOMITING: 0
COLOR CHANGE: 0

## 2020-09-08 NOTE — PROGRESS NOTES
[address]  Post Op note  Chief Complaint   Patient presents with    Wound Check     b/l feet     Other     last blood sugar 110       Alexa Mike is a 63y.o. year old male who is here with ulcers on both feet. He has been dressing both feet with silvercel. He had a TMA -  He was in the hospital, I saw him last on 1/5, surgery on 1/4. Review of Systems   Constitutional: Negative for chills, diaphoresis, fatigue, fever and unexpected weight change. Cardiovascular: Negative for leg swelling. Gastrointestinal: Negative for constipation, diarrhea, nausea and vomiting. Musculoskeletal: Negative for arthralgias, gait problem and joint swelling. Skin: Positive for wound. Negative for color change, pallor and rash. Neurological: Negative for weakness and numbness. Vascular: DP and PT pulses palpable 2/4, Right Foot and 2/4 on the Left Foot. Edema is absent,  Right Foot and absent on the Left Foot. No erythema left, erythema right hallux    Neurological:   Sensation absent  to light touch to level of digits, both feet. Musculoskeletal:   Muscle strength is 5/5 on the Right Foot and 5/5 on the Left Foot. Structural deformities are present on the Right Foot and present on the Left Foot. TMA left. Integument:  Warm, dry, supple both feet. Wound dehiscence is absent. Infection is present right      Wound #:   1    diabetic foot ulcer   left foot    Wound measurements:  #1  Healed  Wound #:   2    diabetic foot ulcer   right foot dorsal hallux    Wound measurements:  #1  1.6 cm by 41cm  by   2 mm        Wound Depth: subcutaneous.     Drainage:    minimal Type:serosanguinous  Wound Bed status:   Granulation Tissue: 70%   Necrotic 30%  Type: slough  Epithelialization 0%   Hypergranular 0%   Periwound hyperkeratosis:  absent  Erythema absent    Odor:no  Maceration:no  Undermining/tract/tunneling:no  Culture taken:   yes             Assesment :    Diagnosis Orders   1. Status post transmetatarsal amputation of foot, left ([address]     2. Type 2 diabetes mellitus with foot ulcer, without long-term current use of insulin (HCC)  MD DEBRIDEMENT, SKIN, SUB-Q TISSUE,=<20 SQ CM   3. Ulcer of great toe, right, with fat layer exposed (HCC)  MD DEBRIDEMENT, SKIN, SUB-Q TISSUE,=<20 SQ CM   4. Type 2 diabetes mellitus with diabetic polyneuropathy, without long-term current use of insulin (HCC)  MD DEBRIDEMENT, SKIN, SUB-Q TISSUE,=<20 SQ CM         Plan: Pt was evaluated and examined. Patient was given personalized discharge instructions. Pt will follow up in 2 weeks or sooner if any problems arise. Diagnosis was discussed with the pt and all of their questions were answered in detail. Proper foot hygiene and care was discussed with the pt. Patient to check feet daily and contact the office with any questions/problems/concerns. Other comorbidity noted and will be managed by PCP. Procedure Note  Indications:  Based on my examination of this patient's wound(s)/ulcer(s) today, debridement is required to promote healing and evaluate the wound base. Performed by: Lucio Santos DPM    Consent obtained:  Yes    Time out taken:  Yes    Pain Control:         Wound Location: left and right  Pre Debridement Measurements:  Are located in the Wound/Ulcer Documentation Flow Sheet    Wound/Ulcer #: 2 right hallux    Procedure in Detail: Lidocaine gel soaked gauze was applied at beginning of wound evaluation. The wound(s) was excisionally debrided sharply of fibrotic, necrotic, and hyperkeratotic tissue, including a layer of surrounding healthy tissue using curette. The wound was debrided down through and including subcutaneous.     Percent of Wound Debrided: 100%    Total Surface Area Debrided:   sq cm     Bleeding: Minimal    Post Debridement Measurements:  Wound/Ulcer Descriptions are Pre Debridement except measurements:    Wound 07/20/17 wound # 7  left foot amp site 7 months (Active)   Number of days: 964       Wound 11/05/19 Anterior; Left see images podiatry noted (Active)   Number of days: 126         Dress per physician order  Treatment: silvercel daily, cover with ABD, kerlix roll, secure with fabric tape. Dressing Documentation    This patient needs a dressings to aid in healing of the ulceration. I am prescribing Silvercel, as the primary dressing  Sequence of dressing: Silvercel, ABD, kerlix roll, fix with fabric tape  Frequency of change: daily  Duration of supplies: 30 days    1) The ulceration has been surgically debrided on 09/08/20   2) The ulceration is located on the left foot and right hallux  3) The ulceration was assessed on 09/08/20   4) Type of ulceration:  diabetic  5) See above for size of ulceration(s)  6) Amount of drainage: moderate       1. Discussed appropriate home care of this wound. 2. Dressings:   Orders Placed This Encounter   Procedures    [place] DEBRIDEMENT, SKIN, SUB-Q TISSUE,=<20 SQ CM      3. Follow up: 2 week. 4. Detailed home instructions and education material given to patient prior to discharge. RX doxy for 2 weeks  Ordered him to stop picking at dead skin on his feet, or he may develop a new ulceration. Stop using tea tree oil. No orders of the defined types were placed in this encounter. Follow up 2week(s).

## 2020-09-30 ENCOUNTER — TELEPHONE (OUTPATIENT)
Dept: INTERNAL MEDICINE CLINIC | Age: 55
End: 2020-09-30

## 2020-10-02 RX ORDER — MORPHINE SULFATE 60 MG/1
60 TABLET, FILM COATED, EXTENDED RELEASE ORAL 2 TIMES DAILY
Qty: 60 TABLET | Refills: 0 | Status: SHIPPED | OUTPATIENT
Start: 2020-10-02 | End: 2020-10-28 | Stop reason: SDUPTHER

## 2020-10-02 RX ORDER — OXYCODONE HYDROCHLORIDE 10 MG/1
10 TABLET ORAL 2 TIMES DAILY
Qty: 60 TABLET | Refills: 0 | Status: SHIPPED | OUTPATIENT
Start: 2020-10-02 | End: 2020-10-28 | Stop reason: SDUPTHER

## 2020-10-02 NOTE — TELEPHONE ENCOUNTER
LOV 04/20/20-  Last rx 09/02/20- for both RX's   [facility] in [place]     Pt would like to have the oxyCODONE HCl (OXY-IR) 10 MG immediate release tablet changed to BID and the morphine (MS CONTIN) 60 MG extended release tablet changed to BID.      If approved please change the RX before sending to the pharmacy
Not our patient
Patient requesting a medication refill.yes  Medication: oxycodone 10 mg HE WANTS TO KNOW IF IT COULD BECHANGED TO 2x a day instead of 3x AND morphine 60 mg twice a day. [facility] [id number] [facility] office visit: 4/20  Next office visit: na    Needs Refilled tomorrow they are due.
Pt notified.
Sent in
22-Apr-2019 18:21

## 2020-10-08 ENCOUNTER — OFFICE VISIT (OUTPATIENT)
Dept: PODIATRY | Age: 55
End: 2020-10-08
Payer: MEDICARE

## 2020-10-08 VITALS — BODY MASS INDEX: 27.46 KG/M2 | WEIGHT: 214 LBS | HEIGHT: 74 IN

## 2020-10-08 PROCEDURE — 3017F COLORECTAL CA SCREEN DOC REV: CPT | Performed by: PODIATRIST

## 2020-10-08 PROCEDURE — G8417 CALC BMI ABV UP PARAM F/U: HCPCS | Performed by: PODIATRIST

## 2020-10-08 PROCEDURE — 2022F DILAT RTA XM EVC RTNOPTHY: CPT | Performed by: PODIATRIST

## 2020-10-08 PROCEDURE — G8484 FLU IMMUNIZE NO ADMIN: HCPCS | Performed by: PODIATRIST

## 2020-10-08 PROCEDURE — 3044F HG A1C LEVEL LT 7.0%: CPT | Performed by: PODIATRIST

## 2020-10-08 PROCEDURE — 99213 OFFICE O/P EST LOW 20 MIN: CPT | Performed by: PODIATRIST

## 2020-10-08 PROCEDURE — G8427 DOCREV CUR MEDS BY ELIG CLIN: HCPCS | Performed by: PODIATRIST

## 2020-10-08 PROCEDURE — 1036F TOBACCO NON-USER: CPT | Performed by: PODIATRIST

## 2020-10-08 RX ORDER — DIAZEPAM 5 MG/1
TABLET ORAL
COMMUNITY
Start: 2020-09-27 | End: 2020-10-28 | Stop reason: SDUPTHER

## 2020-10-08 ASSESSMENT — ENCOUNTER SYMPTOMS
COLOR CHANGE: 0
NAUSEA: 0
DIARRHEA: 0
VOMITING: 0
CONSTIPATION: 0

## 2020-10-08 NOTE — PROGRESS NOTES
[address]  Return Patient  Chief Complaint   Patient presents with    Wound Check     b/l feet     Diabetes     blood sugar 105       Maryam Campbell is a 63y.o. year old male who is here with ulcers on both feet. He has been dressing both feet with silvercel. He had a TMA -   surgery on 1/4.         Review of Systems   Constitutional: Negative for chills, diaphoresis, fatigue, fever and unexpected weight change. Cardiovascular: Negative for leg swelling. Gastrointestinal: Negative for constipation, diarrhea, nausea and vomiting. Musculoskeletal: Negative for arthralgias, gait problem and joint swelling. Skin: Positive for wound. Negative for color change, pallor and rash. Neurological: Negative for weakness and numbness. Vascular: DP and PT pulses palpable 2/4, Right Foot and 2/4 on the Left Foot. Edema is absent,  Right Foot and absent on the Left Foot. No erythema left, erythema right hallux    Neurological:   Sensation absent  to light touch to level of digits, both feet. Musculoskeletal:   Muscle strength is 5/5 on the Right Foot and 5/5 on the Left Foot. Structural deformities are present on the Right Foot and present on the Left Foot. TMA left. Integument:  Warm, dry, supple both feet. Wound dehiscence is absent. Infection is present right      Wound #:   1    diabetic foot ulcer   left foot    Wound measurements:  #1  Healed  Wound #:   2    diabetic foot ulcer   right foot dorsal hallux    Wound measurements:  #1  healed    Assesment :    Diagnosis Orders   1. Ulcer of great toe, right, with fat layer exposed ([address]     2. Ulcer of left foot, with fat layer exposed ([address]     3. Type 2 diabetes mellitus with diabetic polyneuropathy, without long-term current use of insulin ([address]     4.  Type 2 diabetes mellitus with foot ulcer, without long-term current use of insulin ([facility])     5. Status post transmetatarsal amputation of foot, left ([address] Plan: Pt was evaluated and examined. Patient was given personalized discharge instructions. Pt will follow up in 4 weeks or sooner if any problems arise. Diagnosis was discussed with the pt and all of their questions were answered in detail. Proper foot hygiene and care was discussed with the pt. Patient to check feet daily and contact the office with any questions/problems/concerns. Other comorbidity noted and will be managed by PCP. Procedure Note  Monitor feet daily  Moisturizing cream daily  Clean sock  Good shoes  Ordered him to stop picking at dead skin on his feet, or he may develop a new ulceration. Stop using tea tree oil. No orders of the defined types were placed in this encounter. Follow up 4week(s).

## 2020-10-28 RX ORDER — DIAZEPAM 5 MG/1
5 TABLET ORAL EVERY 8 HOURS PRN
Qty: 90 TABLET | Refills: 0 | Status: SHIPPED | OUTPATIENT
Start: 2020-10-28 | End: 2020-12-10

## 2020-10-28 RX ORDER — MORPHINE SULFATE 60 MG/1
60 TABLET, FILM COATED, EXTENDED RELEASE ORAL 2 TIMES DAILY
Qty: 60 TABLET | Refills: 0 | Status: SHIPPED | OUTPATIENT
Start: 2020-10-28 | End: 2020-11-25 | Stop reason: SDUPTHER

## 2020-10-28 RX ORDER — OXYCODONE HYDROCHLORIDE 10 MG/1
10 TABLET ORAL 2 TIMES DAILY
Qty: 60 TABLET | Refills: 0 | Status: SHIPPED | OUTPATIENT
Start: 2020-10-28 | End: 2020-11-25 | Stop reason: SDUPTHER

## 2020-10-28 NOTE — TELEPHONE ENCOUNTER
LOV: 04/29/2020  NOV: -------    Pt will be out of medications 10/30      Mary Pérez [address] [facility], [address] [place] [address]

## 2020-11-25 RX ORDER — MORPHINE SULFATE 60 MG/1
60 TABLET, FILM COATED, EXTENDED RELEASE ORAL 2 TIMES DAILY
Qty: 60 TABLET | Refills: 0 | Status: SHIPPED | OUTPATIENT
Start: 2020-11-25 | End: 2020-12-23 | Stop reason: SDUPTHER

## 2020-11-25 RX ORDER — OXYCODONE HYDROCHLORIDE 10 MG/1
10 TABLET ORAL 2 TIMES DAILY
Qty: 60 TABLET | Refills: 0 | Status: SHIPPED | OUTPATIENT
Start: 2020-11-25 | End: 2020-12-23 | Stop reason: SDUPTHER

## 2020-11-25 NOTE — TELEPHONE ENCOUNTER
Pt would like a medication refill on the pending prescription.      Last OV: 4/29    Pt contact: [phone]

## 2020-12-09 ENCOUNTER — TELEPHONE (OUTPATIENT)
Dept: PRIMARY CARE CLINIC | Age: 55
End: 2020-12-09

## 2020-12-09 NOTE — TELEPHONE ENCOUNTER
Patient was scheduled with Iván Mojica PA-C on 12/10/2020 for a telephone visitsfor his controlled substances medication check. Per Carol Ann Ruvalcaba- the patient needs to come in to see Dr. Krys Mayen since he has not been seen since April and he is a diabetic, on controlled substances and also takes medication for HTN. I called the patient to advise him that I was going to cancel his appt tomorrow with Carol Ann Ruvalcaba and that I would make him a in office appt with Dr. Krys Mayen. Pt then stated that he was sick with a Fever, body aches, dry heaves and that himself and his wife were both exposed to Memo. I offered the patient a \"red visit\" multiple times. Pt denied. Patient became upset and asked why he needed to come in just for a medication refill. I advised the patient that he is on controlled substances and is required to follow up with his PCP for those medications. Pt asked that I send a message to see if you will send in his medications until he can schedule a follow up after the first of the year. Please advise.

## 2020-12-10 RX ORDER — DIAZEPAM 5 MG/1
TABLET ORAL
Qty: 90 TABLET | Refills: 0 | Status: SHIPPED | OUTPATIENT
Start: 2020-12-10 | End: 2021-01-13 | Stop reason: SDUPTHER

## 2020-12-11 ENCOUNTER — VIRTUAL VISIT (OUTPATIENT)
Dept: PRIMARY CARE CLINIC | Age: 55
End: 2020-12-11
Payer: MEDICARE

## 2020-12-11 ENCOUNTER — TELEPHONE (OUTPATIENT)
Dept: PRIMARY CARE CLINIC | Age: 55
End: 2020-12-11

## 2020-12-11 PROCEDURE — 3044F HG A1C LEVEL LT 7.0%: CPT | Performed by: NURSE PRACTITIONER

## 2020-12-11 PROCEDURE — 3017F COLORECTAL CA SCREEN DOC REV: CPT | Performed by: NURSE PRACTITIONER

## 2020-12-11 PROCEDURE — 1036F TOBACCO NON-USER: CPT | Performed by: NURSE PRACTITIONER

## 2020-12-11 PROCEDURE — 99213 OFFICE O/P EST LOW 20 MIN: CPT | Performed by: NURSE PRACTITIONER

## 2020-12-11 PROCEDURE — 2022F DILAT RTA XM EVC RTNOPTHY: CPT | Performed by: NURSE PRACTITIONER

## 2020-12-11 PROCEDURE — G8484 FLU IMMUNIZE NO ADMIN: HCPCS | Performed by: NURSE PRACTITIONER

## 2020-12-11 PROCEDURE — G8417 CALC BMI ABV UP PARAM F/U: HCPCS | Performed by: NURSE PRACTITIONER

## 2020-12-11 PROCEDURE — G8427 DOCREV CUR MEDS BY ELIG CLIN: HCPCS | Performed by: NURSE PRACTITIONER

## 2020-12-11 ASSESSMENT — ENCOUNTER SYMPTOMS
DIARRHEA: 0
COUGH: 1
NAUSEA: 1
VOMITING: 1
BACK PAIN: 1
SHORTNESS OF BREATH: 0

## 2020-12-11 NOTE — PROGRESS NOTES
[facility] PRIMARY CARE  [address] [facility] [address]  Dept: Tahira Lema Risereji is a 63 y.o. male who presents today for his medical conditions/complaintsas noted below. Chief Complaint   Patient presents with    Medication Refill     Pt needs a refill for valium, Pt is due for his other medications on 12/24/2020       HPI:     HPI  States he is running a low grade fever  Left foot amputation was healing well and now starting to open up  Right great toe has blister and sometimes pops open  He states he is wrapping foot and they are not getting any better. States he was exposed to Covid virus  Has an upset stomach and dry heaves. He is weaning down from oxy but has stayed steady last couple months  He would like to stay at toe oxycodone but if he has to go down he will  He thinks he needs to keep morphine at current dose due to pain. States the oxycodone is difficult to wean down  Valium does work well - he does take it 3x/day - maybe little less  States diabetes has been well controlled  - today blood glucose was 112. States most readings are near normal ( low 100s)  Only takes metformin if blood sugar is 130 or above    Patient is at home and his wife is present. Provider is in office. Allen Skaggs is a 63 y.o. male being evaluated by a Virtual Visit (video visit) encounter to address concerns as mentioned above. A caregiver was present when appropriate. Due to this being a TeleHealth encounter (During TPGAY-03 public health emergency), evaluation of the following organ systems was limited: Vitals/Constitutional/EENT/Resp/CV/GI//MS/Neuro/Skin/Heme-Lymph-Imm.   Pursuant to the emergency declaration under the [address] [facility], [address] waiver authority and the [employer] General Act, this Virtual Visit was conducted with patient's (and/or legal guardian's) consent, to reduce the patient's risk of exposure to COVID-19 and provide necessary medical care. The patient (and/or legal guardian) has also been advised to contact this office for worsening conditions or problems, and seek emergency medical treatment and/or call 911 if deemed necessary. Patient identification was verified at the start of the visit: Yes    Total time spent for this encounter: Not billed by time    Services were provided through a video synchronous discussion virtually to substitute for in-person clinic visit. Patient and provider were located at their individual homes. --MALISSA Paez - CNP on 12/11/2020 at 11:09 AM    An electronic signature was used to authenticate this note. LDL Calculated (mg/dL)   Date Value   03/08/2020 74   03/29/2019 82       (goal LDL is <100)   AST (U/L)   Date Value   04/20/2020 16     ALT (U/L)   Date Value   04/20/2020 12     BUN (mg/dL)   Date Value   04/21/2020 46 (H)     BP Readings from Last 3 Encounters:   04/29/20 130/80   04/21/20 (!) 134/56   03/03/20 128/74          (goal 120/80)    Past Medical History:   Diagnosis Date    Acute metabolic encephalopathy     CLAUDIA (acute kidney injury) ([address]     Anemia     Anxiety     ARF (acute renal failure) (formerly [facility])     Arthritis     lower back, knees    BPH (benign prostatic hyperplasia)     Chronic kidney disease     prostate    Depression     Diabetes mellitus ([address]     Diabetic neuropathy (HCC)     GERD (gastroesophageal reflux disease)     Hyperglycemia     Hyperkalemia     Hypertension     Hypothyroidism     Impacted tooth     Necrosis of bone of foot ([address]     Osteomyelitis of left foot ([address]     PAD (peripheral artery disease) (formerly [facility])     Pneumonia     Septic shock (HCC)     Type 2 diabetes mellitus ([address]       Past Surgical History:   Procedure Laterality Date    APPENDECTOMY      BACK SURGERY      Lower x 4. Had abscess after appendectomy.      FOOT AMPUTATION Left 1/4/2020    TRANSMETATARSAL FOOT AMPUTATION, REMOVAL OF FOREIGN BODY performed by Sharda Niño DPM at [address] [place] [address] Rd Left 01/04/2020    TRANSMETATARSAL FOOT AMPUTATION, REMOVAL OF FOREIGN BODY    KNEE SURGERY Right     scope    TOE AMPUTATION Bilateral 3/6/2017    TOE AMPUTATION LEFT HALLUX AND 2ND DIGIT AND RIGHT 2ND DIGIT performed by Sim Cat DPM at [address] [place], [place] [address]Th [facility] ENDOSCOPY N/A 5/20/2019    EGD WITH BIOPSY performed by Serge Rodriges MD at [facility] OR       Family History   Problem Relation Age of Onset    Diabetes Father     Cancer Maternal Grandfather         Colon Cancer; Prostate Cancer    No Known Problems Mother        Social History     Tobacco Use    Smoking status: Never Smoker    Smokeless tobacco: Never Used   Substance Use Topics    Alcohol use: No      Current Outpatient Medications   Medication Sig Dispense Refill    diazePAM (VALIUM) 5 MG tablet TAKE ONE TABLET BY MOUTH EVERY 8 HOURS AS NEEDED FOR ANXIETY 90 tablet 0    morphine (MS CONTIN) 60 MG extended release tablet Take 1 tablet by mouth 2 times daily for 30 days. 60 tablet 0    oxyCODONE HCl (OXY-IR) 10 MG immediate release tablet Take 1 tablet by mouth 2 times daily for 30 days.  Intended supply: 30 days 60 tablet 0    ONETOUCH ULTRA strip USE ONE STRIP TO TEST TWICE A DAY AS NEEDED 50 strip 0    PARoxetine (PAXIL) 20 MG tablet Take one tablet by mouth four times a day 120 tablet 5    lisinopril (PRINIVIL;ZESTRIL) 10 MG tablet Take 1 tablet by mouth daily 90 tablet 3    metFORMIN (GLUCOPHAGE) 500 MG tablet Take 0.5 tablets by mouth daily (with breakfast) 45 tablet 3    buPROPion (WELLBUTRIN XL) 300 MG extended release tablet TAKE ONE TABLET BY MOUTH DAILY 30 tablet 9    NARCAN 4 MG/0.1ML LIQD nasal spray 4 mg      pantoprazole (PROTONIX) 40 MG tablet Take 1 tablet by mouth every morning (before breakfast) 30 tablet 11    Gauze Pads & Dressings (COMBINE ABD) 5\"X9\" PADS 1 Units by Does not apply route 2 times daily 60 each 5    Gauze Pads & Dressings (KERLIX GAUZE ROLL LARGE) MISC 1 Units by Does not apply route 2 times daily 60 each 5    sodium hypochlorite (DAKINS) 0.125 % SOLN external solution APPLY TOPICALLY EVERY 12 HOURS. APPLY AS A WET TO DRY DRESSING TWO TIMES A DAY, CAN ALSO USE TO CLEANSE WOUND 437 mL 1    megestrol (MEGACE ORAL) 40 MG/ML suspension Take 10 mLs by mouth daily 240 mL 3    atorvastatin (LIPITOR) 20 MG tablet Take 1 tablet by mouth daily (Patient not taking: Reported on 12/11/2020) 90 tablet 3    lactobacillus (CULTURELLE) capsule Take 1 capsule by mouth 2 times daily (with meals) 60 capsule 0    magnesium oxide (MAG-OX) 400 (241.3 Mg) MG TABS tablet Take 1 tablet by mouth 2 times daily (Patient not taking: Reported on 12/11/2020) 30 tablet 11    povidone-iodine (BETADINE) 7.5 % external solution Apply to wound. Apply as a wet to dry dressing 1 Bottle 1    levothyroxine (SYNTHROID) 50 MCG tablet Take 1 tablet by mouth daily (Patient not taking: Reported on 12/11/2020) 90 tablet 3    Wound Dressings (ADAPTIC NON-ADHERING DRESSING) PADS Apply 1 Units topically 2 times daily 60 each 5     No current facility-administered medications for this visit.       Allergies   Allergen Reactions    Bactrim [Sulfamethoxazole-Trimethoprim]      reported renal failure    Fiorinal [Butalbital-Aspirin-Caffeine] Other (See Comments)     Generalized blisters    Peanut-Containing Drug Products      Peanut butter flavor    Statins Other (See Comments)     Muscle aches    Tylenol [Acetaminophen]      Pt states it shuts down his kidneys    Peanut Butter Flavor [Flavoring Agent] Nausea And Vomiting       Health Maintenance   Topic Date Due    Diabetic retinal exam  06/04/1974    HIV screen  06/04/1979    Hepatitis B vaccine (1 of 3 - Risk 3-dose series) 06/04/1983    Shingles Vaccine (1 of 2) 06/04/2014    Flu vaccine (1) 09/01/2020    Colon cancer screen colonoscopy  12/16/2020    Diabetic microalbuminuria test  12/24/2020    Lipid screen  03/08/2021    TSH testing  04/20/2021    Potassium monitoring  04/21/2021    Creatinine monitoring  04/21/2021    A1C test (Diabetic or Prediabetic)  04/29/2021    DTaP/Tdap/Td vaccine (2 - Td) 04/24/2028    Hepatitis C screen  Completed    Hepatitis A vaccine  Aged Out    Hib vaccine  Aged Out    Meningococcal (ACWY) vaccine  Aged Out    Pneumococcal 0-64 years Vaccine  Aged Out       Subjective:      Review of Systems   Constitutional: Positive for fatigue and fever. Negative for chills. HENT: Positive for congestion. Respiratory: Positive for cough. Negative for shortness of breath. Cardiovascular: Negative for chest pain. Gastrointestinal: Positive for nausea and vomiting. Negative for diarrhea. Genitourinary: Negative for difficulty urinating and dysuria. Musculoskeletal: Positive for back pain and gait problem. Skin: Positive for wound. Psychiatric/Behavioral: Positive for dysphoric mood and sleep disturbance. The patient is nervous/anxious. Objective: There were no vitals taken for this visit. Physical Exam   Phone visit - patient could not contact for visual visit    Controlled substances monitoring: possible medication side effects, risk of tolerance and/or dependence, and alternative treatments discussed and no signs of potential drug abuse or diversion identified. Assessment:       Diagnosis Orders   1. Diabetic ulcer of toe of left foot associated with type 2 diabetes mellitus, with necrosis of bone ([address]     2. Other chronic back pain     3. Anxiety disorder, unspecified type          Plan:    Continue  Current medications  F/u with Dr. Maryam Rossi for feet. Voiced understanding for need for in person visit next month. Return in about 4 weeks (around 1/8/2021) for pain. No orders of the defined types were placed in this encounter.     No orders of the defined types were placed in this encounter. Patient given educationalmaterials - see patient instructions. Discussed use, benefit, and side effectsof prescribed medications. All patient questions answered. Pt voiced understanding. Reviewed health maintenance. Instructed to continue current medications, diet andexercise. Patient agreed with treatment plan. Follow up as directed.      Electronicallysigned by MALISSA Hastings CNP on 12/11/2020 at 11:09 AM

## 2020-12-11 NOTE — TELEPHONE ENCOUNTER
Per Maynor's instruction - I called Sherrie Yepez to schedule his next appointment in January with Dr. Saranya Melton - the pt denies scheduling at this time. He was notified that Dr. Saranya Melton books up quickly and we need a couple of weeks notice before his appointment to ensure he can be seen by Dr. Saranya Melton and not another provider.

## 2020-12-23 RX ORDER — OXYCODONE HYDROCHLORIDE 10 MG/1
10 TABLET ORAL 2 TIMES DAILY
Qty: 60 TABLET | Refills: 0 | Status: SHIPPED | OUTPATIENT
Start: 2020-12-23 | End: 2021-01-23 | Stop reason: SDUPTHER

## 2020-12-23 RX ORDER — MORPHINE SULFATE 60 MG/1
60 TABLET, FILM COATED, EXTENDED RELEASE ORAL 2 TIMES DAILY
Qty: 60 TABLET | Refills: 0 | Status: SHIPPED | OUTPATIENT
Start: 2020-12-23 | End: 2021-01-23 | Stop reason: SDUPTHER

## 2020-12-23 NOTE — TELEPHONE ENCOUNTER
Pt wants to make sure this can be filled tomorrow since the actual fill date falls on Christmas. Pt will pay out of pocket if need be . Spouse did mention this while on the call.

## 2021-01-12 ENCOUNTER — OFFICE VISIT (OUTPATIENT)
Dept: PODIATRY | Age: 56
End: 2021-01-12
Payer: MEDICARE

## 2021-01-12 VITALS — HEIGHT: 74 IN | BODY MASS INDEX: 27.46 KG/M2 | WEIGHT: 214 LBS

## 2021-01-12 DIAGNOSIS — Z89.432 STATUS POST TRANSMETATARSAL AMPUTATION OF FOOT, LEFT (HCC): ICD-10-CM

## 2021-01-12 DIAGNOSIS — L97.512 ULCER OF GREAT TOE, RIGHT, WITH FAT LAYER EXPOSED (HCC): Primary | ICD-10-CM

## 2021-01-12 DIAGNOSIS — E11.621 TYPE 2 DIABETES MELLITUS WITH FOOT ULCER, WITHOUT LONG-TERM CURRENT USE OF INSULIN (HCC): ICD-10-CM

## 2021-01-12 DIAGNOSIS — L97.509 TYPE 2 DIABETES MELLITUS WITH FOOT ULCER, WITHOUT LONG-TERM CURRENT USE OF INSULIN (HCC): ICD-10-CM

## 2021-01-12 DIAGNOSIS — L97.522 ULCER OF LEFT FOOT, WITH FAT LAYER EXPOSED (HCC): ICD-10-CM

## 2021-01-12 DIAGNOSIS — E11.42 TYPE 2 DIABETES MELLITUS WITH DIABETIC POLYNEUROPATHY, WITHOUT LONG-TERM CURRENT USE OF INSULIN (HCC): ICD-10-CM

## 2021-01-12 PROCEDURE — 3017F COLORECTAL CA SCREEN DOC REV: CPT | Performed by: PODIATRIST

## 2021-01-12 PROCEDURE — G8427 DOCREV CUR MEDS BY ELIG CLIN: HCPCS | Performed by: PODIATRIST

## 2021-01-12 PROCEDURE — 3046F HEMOGLOBIN A1C LEVEL >9.0%: CPT | Performed by: PODIATRIST

## 2021-01-12 PROCEDURE — 11042 DBRDMT SUBQ TIS 1ST 20SQCM/<: CPT | Performed by: PODIATRIST

## 2021-01-12 PROCEDURE — 99213 OFFICE O/P EST LOW 20 MIN: CPT | Performed by: PODIATRIST

## 2021-01-12 PROCEDURE — 2022F DILAT RTA XM EVC RTNOPTHY: CPT | Performed by: PODIATRIST

## 2021-01-12 PROCEDURE — 1036F TOBACCO NON-USER: CPT | Performed by: PODIATRIST

## 2021-01-12 PROCEDURE — G8417 CALC BMI ABV UP PARAM F/U: HCPCS | Performed by: PODIATRIST

## 2021-01-12 PROCEDURE — G8484 FLU IMMUNIZE NO ADMIN: HCPCS | Performed by: PODIATRIST

## 2021-01-13 ENCOUNTER — TELEPHONE (OUTPATIENT)
Dept: PRIMARY CARE CLINIC | Age: 56
End: 2021-01-13

## 2021-01-13 DIAGNOSIS — Z79.4 TYPE 2 DIABETES MELLITUS WITH FOOT ULCER, WITH LONG-TERM CURRENT USE OF INSULIN (HCC): ICD-10-CM

## 2021-01-13 DIAGNOSIS — E11.621 TYPE 2 DIABETES MELLITUS WITH FOOT ULCER, WITH LONG-TERM CURRENT USE OF INSULIN (HCC): ICD-10-CM

## 2021-01-13 DIAGNOSIS — L97.509 TYPE 2 DIABETES MELLITUS WITH FOOT ULCER, WITH LONG-TERM CURRENT USE OF INSULIN (HCC): ICD-10-CM

## 2021-01-13 DIAGNOSIS — F34.1 DYSTHYMIA: ICD-10-CM

## 2021-01-13 RX ORDER — DIAZEPAM 5 MG/1
TABLET ORAL
Qty: 90 TABLET | Refills: 0 | Status: SHIPPED | OUTPATIENT
Start: 2021-01-13 | End: 2021-02-15 | Stop reason: SDUPTHER

## 2021-01-13 RX ORDER — PAROXETINE HYDROCHLORIDE 20 MG/1
TABLET, FILM COATED ORAL
Qty: 120 TABLET | Refills: 0 | Status: SHIPPED | OUTPATIENT
Start: 2021-01-13 | End: 2021-02-26

## 2021-01-13 RX ORDER — BLOOD SUGAR DIAGNOSTIC
STRIP MISCELLANEOUS
Qty: 50 STRIP | Refills: 0 | Status: SHIPPED | OUTPATIENT
Start: 2021-01-13 | End: 2021-05-12

## 2021-01-13 RX ORDER — FLUOCINONIDE 0.5 MG/G
OINTMENT TOPICAL
Qty: 60 G | Refills: 1 | Status: SHIPPED | OUTPATIENT
Start: 2021-01-13 | End: 2021-11-17 | Stop reason: SDUPTHER

## 2021-01-13 ASSESSMENT — ENCOUNTER SYMPTOMS
VOMITING: 0
NAUSEA: 0
COLOR CHANGE: 0
DIARRHEA: 0
CONSTIPATION: 0

## 2021-01-13 NOTE — PROGRESS NOTES
[address] [facility]  Return Patient  Chief Complaint   Patient presents with    Wound Check     Bilat feet     Diabetes     LBS [address] is a 63y.o. year old male who is here with ulcers on both feet. He has been dressing both feet with silvercel. He had a TMA -   surgery on 1/4. . I have not seen him since October of last year. Using tea tree oil again. Review of Systems   Constitutional: Negative for chills, diaphoresis, fatigue, fever and unexpected weight change. Cardiovascular: Negative for leg swelling. Gastrointestinal: Negative for constipation, diarrhea, nausea and vomiting. Musculoskeletal: Negative for arthralgias, gait problem and joint swelling. Skin: Positive for wound. Negative for color change, pallor and rash. Neurological: Negative for weakness and numbness. Vascular: DP and PT pulses palpable 2/4, Right Foot and 2/4 on the Left Foot. Edema is absent,  Right Foot and absent on the Left Foot. No erythema left, erythema right hallux    Neurological:   Sensation absent  to light touch to level of digits, both feet. Musculoskeletal:   Muscle strength is 5/5 on the Right Foot and 5/5 on the Left Foot. Structural deformities are present on the Right Foot and present on the Left Foot. TMA left. Integument:  Warm, dry, supple both feet. Wound dehiscence is absent. Infection is absent    Wound #:   1    diabetic foot ulcer   right foot hallux    Wound measurements:  #1  3 cm by 1 cm  by   1 mm        Wound Depth: subcutaneous. It appears that he is picking again.      Drainage:    minimal Type:bloody  Wound Bed status:   Granulation Tissue: 80%   Necrotic 20%  Type: slough  Epithelialization 10%   Hypergranular 0%   Periwound hyperkeratosis:  absent  Erythema absent    Odor:no  Maceration:no  Undermining/tract/tunneling:no  Culture taken:   no           Wound #:   2    diabetic foot ulcer   left foot end of TMA    Wound the Wound/Ulcer Documentation Flow Sheet    Wound/Ulcer #: 1    Procedure in Detail: Lidocaine gel soaked gauze was applied at beginning of wound evaluation. The wound(s) was excisionally debrided sharply of fibrotic, necrotic, and hyperkeratotic tissue, including a layer of surrounding healthy tissue using curette. The wound was debrided down through and including subcutaneous. Percent of Wound Debrided: 100%    Total Surface Area Debrided:   sq cm     Bleeding: Minimal      Plan for wound  Dress per physician order  Treatment: start silvercel again, daily  Monitor feet daily  Moisturizing cream daily  Clean sock  Good shoes  Ordered him to stop picking at dead skin on his feet, or he may develop a new ulceration. Stop using tea tree oil. Orders Placed This Encounter   Medications    fluocinonide (LIDEX) 0.05 % ointment     Sig: Apply 1 gm  topically 2 times daily to legs     Dispense:  60 g     Refill:  1       Follow up 2 week(s).

## 2021-01-21 ENCOUNTER — TELEPHONE (OUTPATIENT)
Dept: PRIMARY CARE CLINIC | Age: 56
End: 2021-01-21

## 2021-01-21 DIAGNOSIS — L97.524 ULCERATED, FOOT, LEFT, WITH NECROSIS OF BONE (HCC): ICD-10-CM

## 2021-01-21 DIAGNOSIS — M86.172 OSTEOMYELITIS OF FOOT, LEFT, ACUTE (HCC): ICD-10-CM

## 2021-01-21 RX ORDER — MORPHINE SULFATE 60 MG/1
60 TABLET, FILM COATED, EXTENDED RELEASE ORAL 2 TIMES DAILY
Qty: 60 TABLET | Refills: 0 | Status: CANCELLED | OUTPATIENT
Start: 2021-01-21 | End: 2021-02-20

## 2021-01-21 RX ORDER — OXYCODONE HYDROCHLORIDE 10 MG/1
10 TABLET ORAL 2 TIMES DAILY
Qty: 60 TABLET | Refills: 0 | Status: CANCELLED | OUTPATIENT
Start: 2021-01-21 | End: 2021-02-20

## 2021-01-22 ENCOUNTER — TELEPHONE (OUTPATIENT)
Dept: PRIMARY CARE CLINIC | Age: 56
End: 2021-01-22

## 2021-01-23 DIAGNOSIS — L97.524 ULCERATED, FOOT, LEFT, WITH NECROSIS OF BONE (HCC): ICD-10-CM

## 2021-01-23 DIAGNOSIS — M86.172 OSTEOMYELITIS OF FOOT, LEFT, ACUTE (HCC): ICD-10-CM

## 2021-01-23 RX ORDER — OXYCODONE HYDROCHLORIDE 10 MG/1
10 TABLET ORAL 2 TIMES DAILY
Qty: 60 TABLET | Refills: 0 | Status: SHIPPED | OUTPATIENT
Start: 2021-01-23 | End: 2021-01-26

## 2021-01-23 RX ORDER — MORPHINE SULFATE 60 MG/1
60 TABLET, FILM COATED, EXTENDED RELEASE ORAL 2 TIMES DAILY
Qty: 60 TABLET | Refills: 0 | Status: SHIPPED | OUTPATIENT
Start: 2021-01-23 | End: 2021-02-19 | Stop reason: SDUPTHER

## 2021-01-26 ENCOUNTER — OFFICE VISIT (OUTPATIENT)
Dept: PRIMARY CARE CLINIC | Age: 56
End: 2021-01-26
Payer: MEDICARE

## 2021-01-26 VITALS
BODY MASS INDEX: 27 KG/M2 | TEMPERATURE: 98 F | OXYGEN SATURATION: 97 % | HEART RATE: 83 BPM | WEIGHT: 210.4 LBS | HEIGHT: 74 IN | SYSTOLIC BLOOD PRESSURE: 118 MMHG | DIASTOLIC BLOOD PRESSURE: 74 MMHG

## 2021-01-26 DIAGNOSIS — L97.509 TYPE 2 DIABETES MELLITUS WITH FOOT ULCER, WITH LONG-TERM CURRENT USE OF INSULIN (HCC): Primary | ICD-10-CM

## 2021-01-26 DIAGNOSIS — Z12.11 ENCOUNTER FOR SCREENING FOR MALIGNANT NEOPLASM OF COLON: ICD-10-CM

## 2021-01-26 DIAGNOSIS — Z23 NEED FOR VIRAL IMMUNIZATION: ICD-10-CM

## 2021-01-26 DIAGNOSIS — R56.9 SEIZURE-LIKE ACTIVITY (HCC): ICD-10-CM

## 2021-01-26 DIAGNOSIS — Z79.4 TYPE 2 DIABETES MELLITUS WITH FOOT ULCER, WITH LONG-TERM CURRENT USE OF INSULIN (HCC): Primary | ICD-10-CM

## 2021-01-26 DIAGNOSIS — E11.621 DIABETIC ULCER OF TOE OF LEFT FOOT ASSOCIATED WITH TYPE 2 DIABETES MELLITUS, WITH NECROSIS OF BONE (HCC): ICD-10-CM

## 2021-01-26 DIAGNOSIS — I73.9 PAD (PERIPHERAL ARTERY DISEASE) (HCC): ICD-10-CM

## 2021-01-26 DIAGNOSIS — L97.524 DIABETIC ULCER OF TOE OF LEFT FOOT ASSOCIATED WITH TYPE 2 DIABETES MELLITUS, WITH NECROSIS OF BONE (HCC): ICD-10-CM

## 2021-01-26 DIAGNOSIS — E11.621 TYPE 2 DIABETES MELLITUS WITH FOOT ULCER, WITH LONG-TERM CURRENT USE OF INSULIN (HCC): Primary | ICD-10-CM

## 2021-01-26 DIAGNOSIS — Z89.421 H/O AMPUTATION OF LESSER TOE, RIGHT (HCC): ICD-10-CM

## 2021-01-26 DIAGNOSIS — L97.524 ULCERATED, FOOT, LEFT, WITH NECROSIS OF BONE (HCC): ICD-10-CM

## 2021-01-26 DIAGNOSIS — M86.172 OSTEOMYELITIS OF FOOT, LEFT, ACUTE (HCC): ICD-10-CM

## 2021-01-26 PROBLEM — T40.601A OPIATE OVERDOSE (HCC): Status: RESOLVED | Noted: 2020-04-20 | Resolved: 2021-01-26

## 2021-01-26 LAB — HBA1C MFR BLD: 5.7 %

## 2021-01-26 PROCEDURE — 90686 IIV4 VACC NO PRSV 0.5 ML IM: CPT | Performed by: FAMILY MEDICINE

## 2021-01-26 PROCEDURE — 99214 OFFICE O/P EST MOD 30 MIN: CPT | Performed by: FAMILY MEDICINE

## 2021-01-26 PROCEDURE — G8417 CALC BMI ABV UP PARAM F/U: HCPCS | Performed by: FAMILY MEDICINE

## 2021-01-26 PROCEDURE — G8482 FLU IMMUNIZE ORDER/ADMIN: HCPCS | Performed by: FAMILY MEDICINE

## 2021-01-26 PROCEDURE — 2022F DILAT RTA XM EVC RTNOPTHY: CPT | Performed by: FAMILY MEDICINE

## 2021-01-26 PROCEDURE — 3044F HG A1C LEVEL LT 7.0%: CPT | Performed by: FAMILY MEDICINE

## 2021-01-26 PROCEDURE — 3017F COLORECTAL CA SCREEN DOC REV: CPT | Performed by: FAMILY MEDICINE

## 2021-01-26 PROCEDURE — 1036F TOBACCO NON-USER: CPT | Performed by: FAMILY MEDICINE

## 2021-01-26 PROCEDURE — 83036 HEMOGLOBIN GLYCOSYLATED A1C: CPT | Performed by: FAMILY MEDICINE

## 2021-01-26 PROCEDURE — G8427 DOCREV CUR MEDS BY ELIG CLIN: HCPCS | Performed by: FAMILY MEDICINE

## 2021-01-26 PROCEDURE — 90471 IMMUNIZATION ADMIN: CPT | Performed by: FAMILY MEDICINE

## 2021-01-26 RX ORDER — OXYCODONE HYDROCHLORIDE 10 MG/1
10 TABLET ORAL DAILY
Qty: 30 TABLET | Refills: 0
Start: 2021-01-26 | End: 2021-02-19 | Stop reason: SDUPTHER

## 2021-01-26 ASSESSMENT — ENCOUNTER SYMPTOMS
NAUSEA: 0
EYE REDNESS: 0
SORE THROAT: 0
DIARRHEA: 0
COUGH: 0
SHORTNESS OF BREATH: 0
ABDOMINAL PAIN: 0
EYE DISCHARGE: 0
WHEEZING: 0
VOMITING: 0
RHINORRHEA: 0

## 2021-01-26 NOTE — PROGRESS NOTES
[address] [facility] PRIMARY CARE  [address] [facility] [address]  Dept: Tahira Mace Dede Munroeman is a 63 y.o. male Established patient, who presents today for his medical conditions/complaintsas noted below. Chief Complaint   Patient presents with    Diabetes       HPI:     HPI  Patient here for diabetic recheck. States has not been having any low blood sugar reactions. No fevers or chills. No lightheadedness or dizziness. Patient has history of amputation of the right toe. Having no issues with this. However states now the right great toe has become infected. Has seen podiatry and had finished a course of doxycycline. Patient with history of peripheral arterial disease. States vascular surgery had recommended no other intervention. Denies any tingling at this time. Patient denies any seizure activity. Taking medication as prescribed. Patient states the ulcer on the left foot was healing up, now having some mild ulceration. Still following with podiatry. Patient's osteomyelitis had resolved. No longer on IV antibiotics. Patient requesting flu shot today. Denies any melena or hematochezia. No street drugs of any kind. Patient has continued to wean down on his oxycodone. States currently down to 1/day.     Reviewed prior notes Podiatry  Reviewed previous Labs    LDL Calculated (mg/dL)   Date Value   03/08/2020 74   03/29/2019 82       (goal LDL is <100)   AST (U/L)   Date Value   04/20/2020 16     ALT (U/L)   Date Value   04/20/2020 12     BUN (mg/dL)   Date Value   04/21/2020 46 (H)     Hemoglobin A1C (%)   Date Value   01/26/2021 5.7     TSH (mIU/L)   Date Value   04/20/2020 0.62     BP Readings from Last 3 Encounters:   01/26/21 118/74   04/29/20 130/80   04/21/20 (!) 134/56          (goal 120/80)    Past Medical History:   Diagnosis Date    Acute metabolic encephalopathy     CLAUDIA (acute kidney injury) (HCC)     Anemia     Anxiety  ARF (acute renal failure) (HCC)     Arthritis     lower back, knees    BPH (benign prostatic hyperplasia)     Chronic kidney disease     prostate    Depression     Diabetes mellitus ([facility]     Diabetic neuropathy (HCC)     GERD (gastroesophageal reflux disease)     Hyperglycemia     Hyperkalemia     Hypertension     Hypothyroidism     Impacted tooth     Necrosis of bone of foot ([facility]     Osteomyelitis of left foot ([facility]     PAD (peripheral artery disease) ([facility]     Pneumonia     Septic shock (HCC)     Type 2 diabetes mellitus ([facility]       Past Surgical History:   Procedure Laterality Date    APPENDECTOMY      BACK SURGERY      Lower x 4. Had abscess after appendectomy.  FOOT AMPUTATION Left 1/4/2020    TRANSMETATARSAL FOOT AMPUTATION, REMOVAL OF FOREIGN BODY performed by Nima Sarah DPM at [address] Left 01/04/2020    TRANSMETATARSAL FOOT AMPUTATION, REMOVAL OF FOREIGN BODY    KNEE SURGERY Right     scope    TOE AMPUTATION Bilateral 3/6/2017    TOE AMPUTATION LEFT HALLUX AND 2ND DIGIT AND RIGHT 2ND DIGIT performed by Travis Enriquez DPM at [address] ENDOSCOPY N/A 5/20/2019    EGD WITH BIOPSY performed by Carlen Bumpers, MD at [facility] AND [facility] OR       Family History   Problem Relation Age of Onset    Diabetes Father     Cancer Maternal Grandfather         Colon Cancer; Prostate Cancer    No Known Problems Mother        Social History     Tobacco Use    Smoking status: Never Smoker    Smokeless tobacco: Never Used   Substance Use Topics    Alcohol use: No      Current Outpatient Medications   Medication Sig Dispense Refill    oxyCODONE HCl (OXY-IR) 10 MG immediate release tablet Take 1 tablet by mouth daily for 30 days. Intended supply: 30 days 30 tablet 0    morphine (MS CONTIN) 60 MG extended release tablet Take 1 tablet by mouth 2 times daily for 30 days.  60 tablet 0    fluocinonide (LIDEX) 0.05 % ointment Apply 1 gm topically 2 times daily to legs 60 g 1    PARoxetine (PAXIL) 20 MG tablet Take one tablet by mouth four times a day 120 tablet 0    blood glucose test strips (ONETOUCH ULTRA) strip USE ONE STRIP TO TEST TWICE A DAY AS NEEDED 50 strip 0    diazePAM (VALIUM) 5 MG tablet TAKE ONE TABLET BY MOUTH EVERY 8 HOURS AS NEEDED FOR ANXIETY 90 tablet 0    sodium hypochlorite (DAKINS) 0.125 % SOLN external solution APPLY TOPICALLY EVERY 12 HOURS. APPLY AS A WET TO DRY DRESSING TWO TIMES A DAY, CAN ALSO USE TO CLEANSE WOUND 437 mL 1    lisinopril (PRINIVIL;ZESTRIL) 10 MG tablet Take 1 tablet by mouth daily 90 tablet 3    metFORMIN (GLUCOPHAGE) 500 MG tablet Take 0.5 tablets by mouth daily (with breakfast) 45 tablet 3    buPROPion (WELLBUTRIN XL) 300 MG extended release tablet TAKE ONE TABLET BY MOUTH DAILY 30 tablet 9    megestrol (MEGACE ORAL) 40 MG/ML suspension Take 10 mLs by mouth daily 240 mL 3    NARCAN 4 MG/0.1ML LIQD nasal spray 4 mg      atorvastatin (LIPITOR) 20 MG tablet Take 1 tablet by mouth daily 90 tablet 3    lactobacillus (CULTURELLE) capsule Take 1 capsule by mouth 2 times daily (with meals) 60 capsule 0    magnesium oxide (MAG-OX) 400 (241.3 Mg) MG TABS tablet Take 1 tablet by mouth 2 times daily 30 tablet 11    pantoprazole (PROTONIX) 40 MG tablet Take 1 tablet by mouth every morning (before breakfast) 30 tablet 11    povidone-iodine (BETADINE) 7.5 % external solution Apply to wound. Apply as a wet to dry dressing 1 Bottle 1    levothyroxine (SYNTHROID) 50 MCG tablet Take 1 tablet by mouth daily 90 tablet 3    Wound Dressings (ADAPTIC NON-ADHERING DRESSING) PADS Apply 1 Units topically 2 times daily 60 each 5    Gauze Pads & Dressings (COMBINE ABD) 5\"X9\" PADS 1 Units by Does not apply route 2 times daily 60 each 5    Gauze Pads & Dressings (KERLIX GAUZE ROLL LARGE) MISC 1 Units by Does not apply route 2 times daily 60 each 5     No current facility-administered medications for this visit. Allergies   Allergen Reactions    Bactrim [Sulfamethoxazole-Trimethoprim]      reported renal failure    Fiorinal [Butalbital-Aspirin-Caffeine] Other (See Comments)     Generalized blisters    Peanut-Containing Drug Products      Peanut butter flavor    Statins Other (See Comments)     Muscle aches    Tylenol [Acetaminophen]      Pt states it shuts down his kidneys    Peanut Butter Flavor [Flavoring Agent] Nausea And Vomiting       Health Maintenance   Topic Date Due    Diabetic retinal exam  06/04/1974    HIV screen  06/04/1979    Hepatitis B vaccine (1 of 3 - Risk 3-dose series) 06/04/1983    Shingles Vaccine (1 of 2) 06/04/2014    Flu vaccine (1) 09/01/2020    Colon Cancer Screen FIT/FOBT  11/04/2020    Diabetic microalbuminuria test  12/24/2020    Lipid screen  03/08/2021    TSH testing  04/20/2021    Potassium monitoring  04/21/2021    Creatinine monitoring  04/21/2021    A1C test (Diabetic or Prediabetic)  01/26/2022    DTaP/Tdap/Td vaccine (2 - Td) 04/24/2028    Pneumococcal 0-64 years Vaccine  Completed    Hepatitis C screen  Completed    Hepatitis A vaccine  Aged Out    Hib vaccine  Aged Out    Meningococcal (ACWY) vaccine  Aged Out       Subjective:      Review of Systems   Constitutional: Negative for chills and fever. HENT: Negative for rhinorrhea and sore throat. Eyes: Negative for discharge and redness. Respiratory: Negative for cough, shortness of breath and wheezing. Cardiovascular: Negative for chest pain and palpitations. Gastrointestinal: Negative for abdominal pain, diarrhea, nausea and vomiting. Genitourinary: Negative for dysuria and frequency. Musculoskeletal: Positive for arthralgias. Negative for myalgias. Neurological: Negative for dizziness, light-headedness and headaches. Psychiatric/Behavioral: Negative for sleep disturbance.        Objective:     /74   Pulse 83   Temp 98 °F (36.7 °C)   Ht 6' 2.04\" (1.881 m)   Wt 210 lb 6.4 oz (95.4 kg)   SpO2 97%   BMI 26.98 kg/m²   Physical Exam  Vitals signs and nursing note reviewed. Constitutional:       General: He is not in acute distress. Appearance: He is well-developed. He is not ill-appearing. HENT:      Head: Normocephalic and atraumatic. Right Ear: External ear normal.      Left Ear: External ear normal.   Eyes:      General: No scleral icterus. Right eye: No discharge. Left eye: No discharge. Conjunctiva/sclera: Conjunctivae normal.      Pupils: Pupils are equal, round, and reactive to light. Neck:      Thyroid: No thyromegaly. Trachea: No tracheal deviation. Cardiovascular:      Rate and Rhythm: Normal rate and regular rhythm. Heart sounds: Normal heart sounds. Pulmonary:      Effort: Pulmonary effort is normal. No respiratory distress. Breath sounds: Normal breath sounds. No wheezing. Lymphadenopathy:      Cervical: No cervical adenopathy. Skin:     General: Skin is warm. Findings: No rash. Neurological:      Mental Status: He is alert and oriented to person, place, and time. Psychiatric:         Mood and Affect: Mood normal.         Behavior: Behavior normal.         Thought Content: Thought content normal.       Controlled Substance Monitoring:    Acute and Chronic Pain Monitoring:   RX Monitoring 12/11/2020   Attestation -   Acute Pain Prescriptions -   Periodic Controlled Substance Monitoring Possible medication side effects, risk of tolerance/dependence & alternative treatments discussed. ;No signs of potential drug abuse or diversion identified. Chronic Pain > 50 MEDD -   Chronic Pain > 80 MEDD -   Chronic Pain > 120 MEDD (historical values) -   Chronic Pain > 120 MEDD -         Assessment:       Diagnosis Orders   1. Type 2 diabetes mellitus with foot ulcer, with long-term current use of insulin ([facility])  DC COLLECTION CAPILLARY BLOOD SPECIMEN    POCT glycosylated hemoglobin (Hb A1C)   2.  H/O amputation of lesser toe, right ([facility]     3. Diabetic ulcer of toe of left foot associated with type 2 diabetes mellitus, with necrosis of bone ([facility] [address]     4. PAD (peripheral artery disease) ([facility] [address]     5. Seizure-like activity ([facility] [address] [facility]     6. Encounter for screening for malignant neoplasm of colon  [facility]   7. Ulcerated, foot, left, with necrosis of bone (HCC)  oxyCODONE HCl (OXY-IR) 10 MG immediate release tablet   8. Osteomyelitis of foot, left, acute (HCC)  oxyCODONE HCl (OXY-IR) 10 MG immediate release tablet   9. Need for viral immunization  INFLUENZA, QUADV, 0.5ML, 6 MO AND OLDER, IM, PF, PREFILL SYR OR SDV (FLUZONE QUADV, PF)        Plan: Will continue to wean down on oxycodone. Down to 1 tablet a day. Continue morphine as prescribed. Next visit we will need blood work. Keep follow-up appointment with podiatry regarding infection to the right great toe. Flu shot today. No follow-ups on file. Orders Placed This Encounter   Procedures    [facility]     This test is performed by an external laboratory and is used for result attachment only. It is required that this order requisition be faxed to: [facility] @@ [phone]. See [email or web address] for further information. Standing Status:   Future     Standing Expiration Date:   3/26/2021    INFLUENZA, QUADV, 0.5ML, 6 MO AND OLDER, IM, PF, PREFILL SYR OR SDV (FLUZONE QUADV, PF)    POCT glycosylated hemoglobin (Hb A1C)    NM COLLECTION CAPILLARY BLOOD SPECIMEN     Orders Placed This Encounter   Medications    oxyCODONE HCl (OXY-IR) 10 MG immediate release tablet     Sig: Take 1 tablet by mouth daily for 30 days. Intended supply: 30 days     Dispense:  30 tablet     Refill:  0     Reduce doses taken as pain becomes manageable       Patient given educationalmaterials - see patient instructions. Discussed use, benefit, and side effectsof prescribed medications. All patient questions answered. Pt voiced understanding. Reviewed health maintenance.   Instructed to continue current medications, diet andexercise. Patient agreed with treatment plan. Follow up as directed.      Electronicallysigned by Fredis Ann MD on 1/26/2021 at 2:25 PM

## 2021-02-15 DIAGNOSIS — F34.1 DYSTHYMIA: ICD-10-CM

## 2021-02-15 RX ORDER — DIAZEPAM 5 MG/1
TABLET ORAL
Qty: 90 TABLET | Refills: 0 | Status: SHIPPED | OUTPATIENT
Start: 2021-02-15 | End: 2021-03-16 | Stop reason: SDUPTHER

## 2021-02-15 NOTE — TELEPHONE ENCOUNTER
Pt's spouse is requesting a refill for patient's anxiety medicaiton.     Nick Reed [address] [facility],  Shad [facility] [address]

## 2021-02-26 RX ORDER — PAROXETINE HYDROCHLORIDE 20 MG/1
TABLET, FILM COATED ORAL
Qty: 120 TABLET | Refills: 0 | Status: SHIPPED | OUTPATIENT
Start: 2021-02-26 | End: 2021-04-06

## 2021-03-08 DIAGNOSIS — L97.509 TYPE 2 DIABETES MELLITUS WITH FOOT ULCER, WITH LONG-TERM CURRENT USE OF INSULIN (HCC): ICD-10-CM

## 2021-03-08 DIAGNOSIS — Z79.4 TYPE 2 DIABETES MELLITUS WITH FOOT ULCER, WITH LONG-TERM CURRENT USE OF INSULIN (HCC): ICD-10-CM

## 2021-03-08 DIAGNOSIS — E11.621 TYPE 2 DIABETES MELLITUS WITH FOOT ULCER, WITH LONG-TERM CURRENT USE OF INSULIN (HCC): ICD-10-CM

## 2021-03-08 RX ORDER — LANCETS 33 GAUGE
EACH MISCELLANEOUS
Qty: 100 EACH | Refills: 5 | Status: SHIPPED | OUTPATIENT
Start: 2021-03-08 | End: 2022-06-23

## 2021-03-16 DIAGNOSIS — M86.172 OSTEOMYELITIS OF FOOT, LEFT, ACUTE (HCC): ICD-10-CM

## 2021-03-16 DIAGNOSIS — F34.1 DYSTHYMIA: ICD-10-CM

## 2021-03-16 DIAGNOSIS — L97.524 ULCERATED, FOOT, LEFT, WITH NECROSIS OF BONE (HCC): ICD-10-CM

## 2021-03-16 RX ORDER — MORPHINE SULFATE 60 MG/1
60 TABLET, FILM COATED, EXTENDED RELEASE ORAL 2 TIMES DAILY
Qty: 60 TABLET | Refills: 0 | Status: SHIPPED | OUTPATIENT
Start: 2021-03-16 | End: 2021-04-15 | Stop reason: SDUPTHER

## 2021-03-16 RX ORDER — OXYCODONE HYDROCHLORIDE 10 MG/1
10 TABLET ORAL 2 TIMES DAILY
Qty: 60 TABLET | Refills: 0 | Status: SHIPPED | OUTPATIENT
Start: 2021-03-16 | End: 2021-04-15 | Stop reason: SDUPTHER

## 2021-03-16 RX ORDER — DIAZEPAM 5 MG/1
TABLET ORAL
Qty: 90 TABLET | Refills: 0 | Status: SHIPPED | OUTPATIENT
Start: 2021-03-16 | End: 2021-04-15 | Stop reason: SDUPTHER

## 2021-04-06 RX ORDER — PAROXETINE HYDROCHLORIDE 20 MG/1
TABLET, FILM COATED ORAL
Qty: 120 TABLET | Refills: 0 | Status: SHIPPED | OUTPATIENT
Start: 2021-04-06 | End: 2021-05-12

## 2021-04-15 DIAGNOSIS — F34.1 DYSTHYMIA: ICD-10-CM

## 2021-04-15 DIAGNOSIS — L97.524 ULCERATED, FOOT, LEFT, WITH NECROSIS OF BONE (HCC): ICD-10-CM

## 2021-04-15 DIAGNOSIS — M86.172 OSTEOMYELITIS OF FOOT, LEFT, ACUTE (HCC): ICD-10-CM

## 2021-04-15 RX ORDER — MORPHINE SULFATE 60 MG/1
60 TABLET, FILM COATED, EXTENDED RELEASE ORAL 2 TIMES DAILY
Qty: 60 TABLET | Refills: 0 | Status: SHIPPED | OUTPATIENT
Start: 2021-04-15 | End: 2021-05-17 | Stop reason: SDUPTHER

## 2021-04-15 RX ORDER — DIAZEPAM 5 MG/1
TABLET ORAL
Qty: 90 TABLET | Refills: 0 | Status: SHIPPED | OUTPATIENT
Start: 2021-04-15 | End: 2021-05-17 | Stop reason: SDUPTHER

## 2021-04-15 RX ORDER — OXYCODONE HYDROCHLORIDE 10 MG/1
10 TABLET ORAL 2 TIMES DAILY
Qty: 60 TABLET | Refills: 0 | Status: SHIPPED | OUTPATIENT
Start: 2021-04-15 | End: 2021-05-15

## 2021-05-10 ENCOUNTER — TELEPHONE (OUTPATIENT)
Dept: PRIMARY CARE CLINIC | Age: 56
End: 2021-05-10

## 2021-05-12 DIAGNOSIS — L97.509 TYPE 2 DIABETES MELLITUS WITH FOOT ULCER, WITH LONG-TERM CURRENT USE OF INSULIN (HCC): ICD-10-CM

## 2021-05-12 DIAGNOSIS — E11.621 TYPE 2 DIABETES MELLITUS WITH FOOT ULCER, WITH LONG-TERM CURRENT USE OF INSULIN (HCC): ICD-10-CM

## 2021-05-12 DIAGNOSIS — Z79.4 TYPE 2 DIABETES MELLITUS WITH FOOT ULCER, WITH LONG-TERM CURRENT USE OF INSULIN (HCC): ICD-10-CM

## 2021-05-12 RX ORDER — PAROXETINE HYDROCHLORIDE 20 MG/1
TABLET, FILM COATED ORAL
Qty: 120 TABLET | Refills: 0 | Status: SHIPPED | OUTPATIENT
Start: 2021-05-12 | End: 2021-06-24

## 2021-05-12 RX ORDER — BLOOD SUGAR DIAGNOSTIC
STRIP MISCELLANEOUS
Qty: 50 STRIP | Refills: 0 | Status: SHIPPED | OUTPATIENT
Start: 2021-05-12 | End: 2021-09-21

## 2021-05-17 DIAGNOSIS — F34.1 DYSTHYMIA: ICD-10-CM

## 2021-05-17 DIAGNOSIS — M86.172 OSTEOMYELITIS OF FOOT, LEFT, ACUTE (HCC): ICD-10-CM

## 2021-05-17 DIAGNOSIS — M54.12 RIGHT CERVICAL RADICULOPATHY: ICD-10-CM

## 2021-05-17 DIAGNOSIS — M86.9 OSTEOMYELITIS, UNSPECIFIED SITE, UNSPECIFIED TYPE (HCC): ICD-10-CM

## 2021-05-17 DIAGNOSIS — L97.524 ULCERATED, FOOT, LEFT, WITH NECROSIS OF BONE (HCC): ICD-10-CM

## 2021-05-17 RX ORDER — OXYCODONE HYDROCHLORIDE 15 MG/1
15 TABLET ORAL EVERY 6 HOURS PRN
Qty: 120 TABLET | Refills: 0 | Status: SHIPPED | OUTPATIENT
Start: 2021-05-17 | End: 2021-06-11 | Stop reason: SDUPTHER

## 2021-05-17 RX ORDER — MORPHINE SULFATE 60 MG/1
60 TABLET, FILM COATED, EXTENDED RELEASE ORAL 2 TIMES DAILY
Qty: 60 TABLET | Refills: 0 | Status: SHIPPED | OUTPATIENT
Start: 2021-05-17 | End: 2021-06-11 | Stop reason: SDUPTHER

## 2021-05-17 RX ORDER — DIAZEPAM 5 MG/1
TABLET ORAL
Qty: 90 TABLET | Refills: 0 | Status: SHIPPED | OUTPATIENT
Start: 2021-05-17 | End: 2021-06-11 | Stop reason: SDUPTHER

## 2021-06-01 ENCOUNTER — OFFICE VISIT (OUTPATIENT)
Dept: PODIATRY | Age: 56
End: 2021-06-01
Payer: MEDICARE

## 2021-06-01 ENCOUNTER — HOSPITAL ENCOUNTER (OUTPATIENT)
Age: 56
Setting detail: SPECIMEN
Discharge: HOME | End: 2021-06-01
Payer: MEDICARE

## 2021-06-01 VITALS — HEIGHT: 74 IN | BODY MASS INDEX: 26.95 KG/M2 | WEIGHT: 210 LBS

## 2021-06-01 DIAGNOSIS — L97.514 ULCER OF GREAT TOE, RIGHT, WITH NECROSIS OF BONE (HCC): ICD-10-CM

## 2021-06-01 DIAGNOSIS — E11.42 TYPE 2 DIABETES MELLITUS WITH DIABETIC POLYNEUROPATHY, WITHOUT LONG-TERM CURRENT USE OF INSULIN (HCC): ICD-10-CM

## 2021-06-01 DIAGNOSIS — M86.9 OSTEOMYELITIS OF GREAT TOE OF RIGHT FOOT (HCC): Primary | ICD-10-CM

## 2021-06-01 DIAGNOSIS — L97.509 TYPE 2 DIABETES MELLITUS WITH FOOT ULCER, WITHOUT LONG-TERM CURRENT USE OF INSULIN (HCC): ICD-10-CM

## 2021-06-01 DIAGNOSIS — Z89.432 STATUS POST TRANSMETATARSAL AMPUTATION OF FOOT, LEFT (HCC): ICD-10-CM

## 2021-06-01 DIAGNOSIS — E11.621 TYPE 2 DIABETES MELLITUS WITH FOOT ULCER, WITHOUT LONG-TERM CURRENT USE OF INSULIN (HCC): ICD-10-CM

## 2021-06-01 PROCEDURE — G8417 CALC BMI ABV UP PARAM F/U: HCPCS | Performed by: PODIATRIST

## 2021-06-01 PROCEDURE — 2022F DILAT RTA XM EVC RTNOPTHY: CPT | Performed by: PODIATRIST

## 2021-06-01 PROCEDURE — 1036F TOBACCO NON-USER: CPT | Performed by: PODIATRIST

## 2021-06-01 PROCEDURE — 11044 DBRDMT BONE 1ST 20 SQ CM/<: CPT | Performed by: PODIATRIST

## 2021-06-01 PROCEDURE — 3044F HG A1C LEVEL LT 7.0%: CPT | Performed by: PODIATRIST

## 2021-06-01 PROCEDURE — 99213 OFFICE O/P EST LOW 20 MIN: CPT | Performed by: PODIATRIST

## 2021-06-01 PROCEDURE — G8427 DOCREV CUR MEDS BY ELIG CLIN: HCPCS | Performed by: PODIATRIST

## 2021-06-01 PROCEDURE — 3017F COLORECTAL CA SCREEN DOC REV: CPT | Performed by: PODIATRIST

## 2021-06-01 ASSESSMENT — ENCOUNTER SYMPTOMS
CONSTIPATION: 0
NAUSEA: 0
VOMITING: 0
COLOR CHANGE: 0
DIARRHEA: 0

## 2021-06-01 NOTE — PROGRESS NOTES
including subcutaneous. Fascia, bone removed today    Percent of Wound Debrided: 100%    Total Surface Area Debrided:   sq cm     Bleeding: Minimal      Plan for wound  Dress per physician order  Treatment: start silvercel again, daily  Monitor feet daily  Moisturizing cream daily  Clean sock  Good shoes  Ordered him to stop picking at dead skin on his feet, or he may develop a new ulceration. Stop using tea tree oil. Culture taken of bone right hallux. No orders of the defined types were placed in this encounter. Follow up 2 week(s).

## 2021-06-03 LAB — SURGICAL PATHOLOGY REPORT: NORMAL

## 2021-06-11 DIAGNOSIS — F34.1 DYSTHYMIA: ICD-10-CM

## 2021-06-11 DIAGNOSIS — M86.9 OSTEOMYELITIS, UNSPECIFIED SITE, UNSPECIFIED TYPE (HCC): ICD-10-CM

## 2021-06-11 DIAGNOSIS — L97.524 ULCERATED, FOOT, LEFT, WITH NECROSIS OF BONE (HCC): ICD-10-CM

## 2021-06-11 DIAGNOSIS — M86.172 OSTEOMYELITIS OF FOOT, LEFT, ACUTE (HCC): ICD-10-CM

## 2021-06-11 DIAGNOSIS — M54.12 RIGHT CERVICAL RADICULOPATHY: ICD-10-CM

## 2021-06-11 RX ORDER — MORPHINE SULFATE 60 MG/1
60 TABLET, FILM COATED, EXTENDED RELEASE ORAL 2 TIMES DAILY
Qty: 60 TABLET | Refills: 0 | Status: SHIPPED | OUTPATIENT
Start: 2021-06-11 | End: 2021-07-13 | Stop reason: SDUPTHER

## 2021-06-11 RX ORDER — DIAZEPAM 5 MG/1
TABLET ORAL
Qty: 90 TABLET | Refills: 0 | Status: SHIPPED | OUTPATIENT
Start: 2021-06-11 | End: 2021-07-13 | Stop reason: SDUPTHER

## 2021-06-11 RX ORDER — OXYCODONE HYDROCHLORIDE 15 MG/1
15 TABLET ORAL EVERY 6 HOURS PRN
Qty: 120 TABLET | Refills: 0 | Status: SHIPPED | OUTPATIENT
Start: 2021-06-11 | End: 2021-07-13 | Stop reason: SDUPTHER

## 2021-06-11 NOTE — TELEPHONE ENCOUNTER
----- Message from Manuel Tony sent at 6/11/2021 10:39 AM EDT -----  Subject: Refill Request    QUESTIONS  Name of Medication? morphine (MS CONTIN) 60 MG extended release tablet  Patient-reported dosage and instructions? Take 1 tablet by mouth 2 times   daily for 30 days. How many days do you have left? 0  Preferred Pharmacy? [facility] [address]  Pharmacy phone number (if available)? [phone]  ---------------------------------------------------------------------------  --------------,  Name of Medication? oxyCODONE (OXY-IR) 15 MG immediate release tablet  Patient-reported dosage and instructions? Take 1 tablet by mouth every 6   hours as needed for Pain for up to 30 days. How many days do you have left? 0  Preferred Pharmacy? [facility] [address]  Pharmacy phone number (if available)? [phone]  ---------------------------------------------------------------------------  --------------,  Name of Medication? diazePAM (VALIUM) 5 MG tablet  Patient-reported dosage and instructions? TAKE ONE TABLET BY MOUTH EVERY 8   HOURS AS NEEDED FOR ANXIETY  How many days do you have left? 0  [facility]  Pharmacy phone number (if available)? [phone]  ---------------------------------------------------------------------------  --------------  CALL BACK INFO  What is the best way for the office to contact you? OK to leave message on   voicemail  Preferred Call Back Phone Number?  [phone]

## 2021-06-24 RX ORDER — PAROXETINE HYDROCHLORIDE 20 MG/1
TABLET, FILM COATED ORAL
Qty: 120 TABLET | Refills: 0 | Status: SHIPPED | OUTPATIENT
Start: 2021-06-24 | End: 2021-08-03 | Stop reason: SDUPTHER

## 2021-07-09 ENCOUNTER — TELEPHONE (OUTPATIENT)
Dept: PODIATRY | Age: 56
End: 2021-07-09

## 2021-07-12 DIAGNOSIS — F34.1 DYSTHYMIA: ICD-10-CM

## 2021-07-12 DIAGNOSIS — M86.172 OSTEOMYELITIS OF FOOT, LEFT, ACUTE (HCC): ICD-10-CM

## 2021-07-12 DIAGNOSIS — M54.12 RIGHT CERVICAL RADICULOPATHY: ICD-10-CM

## 2021-07-12 DIAGNOSIS — M86.9 OSTEOMYELITIS, UNSPECIFIED SITE, UNSPECIFIED TYPE (HCC): ICD-10-CM

## 2021-07-12 DIAGNOSIS — L97.524 ULCERATED, FOOT, LEFT, WITH NECROSIS OF BONE (HCC): ICD-10-CM

## 2021-07-13 RX ORDER — OXYCODONE HYDROCHLORIDE 15 MG/1
15 TABLET ORAL EVERY 6 HOURS PRN
Qty: 120 TABLET | Refills: 0 | Status: SHIPPED | OUTPATIENT
Start: 2021-07-13 | End: 2021-08-06 | Stop reason: SDUPTHER

## 2021-07-13 RX ORDER — MORPHINE SULFATE 60 MG/1
60 TABLET, FILM COATED, EXTENDED RELEASE ORAL 2 TIMES DAILY
Qty: 60 TABLET | Refills: 0 | Status: SHIPPED | OUTPATIENT
Start: 2021-07-13 | End: 2021-08-06 | Stop reason: SDUPTHER

## 2021-07-13 RX ORDER — DIAZEPAM 5 MG/1
TABLET ORAL
Qty: 90 TABLET | Refills: 0 | Status: SHIPPED | OUTPATIENT
Start: 2021-07-13 | End: 2021-08-06 | Stop reason: SDUPTHER

## 2021-07-28 ENCOUNTER — OFFICE VISIT (OUTPATIENT)
Dept: PODIATRY | Age: 56
End: 2021-07-28
Payer: MEDICARE

## 2021-07-28 VITALS — BODY MASS INDEX: 24.38 KG/M2 | WEIGHT: 190 LBS | HEIGHT: 74 IN

## 2021-07-28 DIAGNOSIS — E11.42 TYPE 2 DIABETES MELLITUS WITH DIABETIC POLYNEUROPATHY, WITHOUT LONG-TERM CURRENT USE OF INSULIN (HCC): ICD-10-CM

## 2021-07-28 DIAGNOSIS — L97.514 ULCER OF GREAT TOE, RIGHT, WITH NECROSIS OF BONE (HCC): ICD-10-CM

## 2021-07-28 DIAGNOSIS — Z89.432 STATUS POST TRANSMETATARSAL AMPUTATION OF FOOT, LEFT (HCC): ICD-10-CM

## 2021-07-28 DIAGNOSIS — M86.9 OSTEOMYELITIS OF GREAT TOE OF RIGHT FOOT (HCC): Primary | ICD-10-CM

## 2021-07-28 DIAGNOSIS — L97.509 TYPE 2 DIABETES MELLITUS WITH FOOT ULCER, WITHOUT LONG-TERM CURRENT USE OF INSULIN (HCC): ICD-10-CM

## 2021-07-28 DIAGNOSIS — E11.621 TYPE 2 DIABETES MELLITUS WITH FOOT ULCER, WITHOUT LONG-TERM CURRENT USE OF INSULIN (HCC): ICD-10-CM

## 2021-07-28 PROCEDURE — 11043 DBRDMT MUSC&/FSCA 1ST 20/<: CPT | Performed by: PODIATRIST

## 2021-07-28 ASSESSMENT — ENCOUNTER SYMPTOMS
VOMITING: 0
COLOR CHANGE: 0
NAUSEA: 0
CONSTIPATION: 0
DIARRHEA: 0

## 2021-07-28 NOTE — PROGRESS NOTES
hyperkeratosis:  absent  Erythema absent    Odor:no  Maceration:no  Undermining/tract/tunneling:no  Culture taken:   no               Assesment :    Diagnosis Orders   1. Osteomyelitis of great toe of right foot ([facility] [address]     2. Ulcer of great toe, right, with necrosis of bone (HCC)  MI DEBRIDEMENT, SKIN, SUB-Q TISSUE,MUSCLE,=<20 SQ CM   3. Type 2 diabetes mellitus with diabetic polyneuropathy, without long-term current use of insulin ([facility] [address]     4. Type 2 diabetes mellitus with foot ulcer, without long-term current use of insulin (HCC)  MI DEBRIDEMENT, SKIN, SUB-Q TISSUE,MUSCLE,=<20 SQ CM   5. Status post transmetatarsal amputation of foot, left ([facility] [address]           Plan: Pt was evaluated and examined. Patient was given personalized discharge instructions. Pt will follow up in 4 weeks or sooner if any problems arise. Diagnosis was discussed with the pt and all of their questions were answered in detail. Proper foot hygiene and care was discussed with the pt. Patient to check feet daily and contact the office with any questions/problems/concerns. Other comorbidity noted and will be managed by PCP. Procedure Note  Indications:  Based on my examination of this patient's wound(s)/ulcer(s) today, debridement is required to promote healing and evaluate the wound base. Performed by: Babak Brice DPM    Consent obtained:  Yes    Time out taken:  Yes    Pain Control:         Wound Location: left  Pre Debridement Measurements:  Are located in the Wound/Ulcer Documentation Flow Sheet    Wound/Ulcer #: 1    Procedure in Detail: Lidocaine gel soaked gauze was applied at beginning of wound evaluation. The wound(s) was excisionally debrided sharply of fibrotic, necrotic, and hyperkeratotic tissue, including a layer of surrounding healthy tissue using curette. The wound was debrided down through and including subcutaneous.  Fascia,    Percent of Wound Debrided: 100%    Total Surface Area Debrided:   sq cm     Bleeding:

## 2021-08-03 ENCOUNTER — OFFICE VISIT (OUTPATIENT)
Dept: PRIMARY CARE CLINIC | Age: 56
End: 2021-08-03
Payer: MEDICARE

## 2021-08-03 VITALS
DIASTOLIC BLOOD PRESSURE: 84 MMHG | BODY MASS INDEX: 24.04 KG/M2 | SYSTOLIC BLOOD PRESSURE: 144 MMHG | WEIGHT: 187.2 LBS | OXYGEN SATURATION: 97 % | HEART RATE: 108 BPM

## 2021-08-03 DIAGNOSIS — E11.628 DIABETIC FOOT INFECTION (HCC): ICD-10-CM

## 2021-08-03 DIAGNOSIS — L97.509 TYPE 2 DIABETES MELLITUS WITH FOOT ULCER, WITH LONG-TERM CURRENT USE OF INSULIN (HCC): Primary | ICD-10-CM

## 2021-08-03 DIAGNOSIS — D64.9 ANEMIA, UNSPECIFIED TYPE: ICD-10-CM

## 2021-08-03 DIAGNOSIS — L08.9 DIABETIC FOOT INFECTION (HCC): ICD-10-CM

## 2021-08-03 DIAGNOSIS — E03.9 ACQUIRED HYPOTHYROIDISM: ICD-10-CM

## 2021-08-03 DIAGNOSIS — Z13.220 ENCOUNTER FOR LIPID SCREENING FOR CARDIOVASCULAR DISEASE: ICD-10-CM

## 2021-08-03 DIAGNOSIS — Z00.00 ANNUAL PHYSICAL EXAM: ICD-10-CM

## 2021-08-03 DIAGNOSIS — N17.9 ACUTE RENAL FAILURE, UNSPECIFIED ACUTE RENAL FAILURE TYPE (HCC): ICD-10-CM

## 2021-08-03 DIAGNOSIS — Z79.4 TYPE 2 DIABETES MELLITUS WITH FOOT ULCER, WITH LONG-TERM CURRENT USE OF INSULIN (HCC): Primary | ICD-10-CM

## 2021-08-03 DIAGNOSIS — Z79.899 HIGH RISK MEDICATION USE: ICD-10-CM

## 2021-08-03 DIAGNOSIS — E11.621 TYPE 2 DIABETES MELLITUS WITH FOOT ULCER, WITH LONG-TERM CURRENT USE OF INSULIN (HCC): Primary | ICD-10-CM

## 2021-08-03 DIAGNOSIS — Z13.6 ENCOUNTER FOR LIPID SCREENING FOR CARDIOVASCULAR DISEASE: ICD-10-CM

## 2021-08-03 DIAGNOSIS — Z12.5 SCREENING PSA (PROSTATE SPECIFIC ANTIGEN): ICD-10-CM

## 2021-08-03 LAB — HBA1C MFR BLD: 5.3 %

## 2021-08-03 PROCEDURE — 3017F COLORECTAL CA SCREEN DOC REV: CPT | Performed by: FAMILY MEDICINE

## 2021-08-03 PROCEDURE — 2022F DILAT RTA XM EVC RTNOPTHY: CPT | Performed by: FAMILY MEDICINE

## 2021-08-03 PROCEDURE — 83036 HEMOGLOBIN GLYCOSYLATED A1C: CPT | Performed by: FAMILY MEDICINE

## 2021-08-03 PROCEDURE — 99214 OFFICE O/P EST MOD 30 MIN: CPT | Performed by: FAMILY MEDICINE

## 2021-08-03 PROCEDURE — 1036F TOBACCO NON-USER: CPT | Performed by: FAMILY MEDICINE

## 2021-08-03 PROCEDURE — G8420 CALC BMI NORM PARAMETERS: HCPCS | Performed by: FAMILY MEDICINE

## 2021-08-03 PROCEDURE — G8427 DOCREV CUR MEDS BY ELIG CLIN: HCPCS | Performed by: FAMILY MEDICINE

## 2021-08-03 PROCEDURE — 3044F HG A1C LEVEL LT 7.0%: CPT | Performed by: FAMILY MEDICINE

## 2021-08-03 RX ORDER — PAROXETINE HYDROCHLORIDE 20 MG/1
80 TABLET, FILM COATED ORAL EVERY MORNING
Qty: 120 TABLET | Refills: 5 | Status: SHIPPED | OUTPATIENT
Start: 2021-08-03 | End: 2022-03-07

## 2021-08-03 SDOH — ECONOMIC STABILITY: FOOD INSECURITY: WITHIN THE PAST 12 MONTHS, THE FOOD YOU BOUGHT JUST DIDN'T LAST AND YOU DIDN'T HAVE MONEY TO GET MORE.: NEVER TRUE

## 2021-08-03 SDOH — ECONOMIC STABILITY: FOOD INSECURITY: WITHIN THE PAST 12 MONTHS, YOU WORRIED THAT YOUR FOOD WOULD RUN OUT BEFORE YOU GOT MONEY TO BUY MORE.: NEVER TRUE

## 2021-08-03 ASSESSMENT — ENCOUNTER SYMPTOMS
WHEEZING: 0
VOMITING: 0
SORE THROAT: 0
DIARRHEA: 0
EYE DISCHARGE: 0
COUGH: 0
RHINORRHEA: 0
EYE REDNESS: 0
ABDOMINAL PAIN: 0
NAUSEA: 0
SHORTNESS OF BREATH: 0

## 2021-08-03 ASSESSMENT — SOCIAL DETERMINANTS OF HEALTH (SDOH): HOW HARD IS IT FOR YOU TO PAY FOR THE VERY BASICS LIKE FOOD, HOUSING, MEDICAL CARE, AND HEATING?: NOT HARD AT ALL

## 2021-08-03 NOTE — PROGRESS NOTES
[facility] PRIMARY CARE  [address]  [facility] [address]  Dept: Tahira Mace Claudette Michaels is a 61 y.o. male Established patient, who presents today for his medical conditions/complaints as noted below. Chief Complaint   Patient presents with    Medication Check     DM and med check        HPI:     HPI     H/o right hallux OM: Following with Podiatry. Patient unable to take abx secondary to h/o AKIs. Has a h/o neuropathy. If the skin does not grow back over the bone, surgery is the next step. Currently treating with tea tree oil and triple abx ointment. Denies fever, chills or night sweats. H/o T2DM: A1c today = 5.3%. Blood sugar readings running 100-115's. Today his sugar was 190. Currently on Metformin 250 mg QD. H/o Unintentional Weight Loss: 100 lbs. total in 6 month timeframe. H/o Depression / Anxiety: Stable. On Wellbutrin 300 mg.     H/o Hypothyroidism: Stable. On Synthroid 50 mcg. H/o HTN: Denies checking BP at home. On Lisinopril 10 mg. Would like to hold on screenings until foot heals.            Reviewed prior notes None  Reviewed previous Labs    LDL Calculated (mg/dL)   Date Value   03/08/2020 74   03/29/2019 82       (goal LDL is <100)   AST (U/L)   Date Value   04/20/2020 16     ALT (U/L)   Date Value   04/20/2020 12     BUN (mg/dL)   Date Value   04/21/2020 46 (H)     Hemoglobin A1C (%)   Date Value   08/03/2021 5.3     TSH (mIU/L)   Date Value   04/20/2020 0.62     BP Readings from Last 3 Encounters:   08/03/21 (!) 144/84   01/26/21 118/74   04/29/20 130/80          (goal 120/80)    Past Medical History:   Diagnosis Date    Acute metabolic encephalopathy     CLAUDIA [facility] kidney injury) ([facility] [address] [facility]     Anemia     Anxiety     ARF (acute renal failure) (HCC)     Arthritis     lower back, knees    BPH (benign prostatic hyperplasia)     Chronic kidney disease     prostate    Depression     Diabetes mellitus ([facility] [address]  Diabetic neuropathy (HCC)     GERD (gastroesophageal reflux disease)     Hyperglycemia     Hyperkalemia     Hypertension     Hypothyroidism     Impacted tooth     [facility]rosis of bone of foot [facility]     Osteomyelitis of left foot ([facility] [facility]     PAD (peripheral artery disease) (HCC)     Pneumonia     Septic shock (HCC)     Type 2 diabetes mellitus ([facility] [address]       Past Surgical History:   Procedure Laterality Date    APPENDECTOMY      BACK SURGERY      Lower x 4. Had abscess after appendectomy.  FOOT AMPUTATION Left 1/4/2020    TRANSMETATARSAL FOOT AMPUTATION, REMOVAL OF FOREIGN BODY performed by Yesi Traylor DPM at [address] Left 01/04/2020    TRANSMETATARSAL FOOT AMPUTATION, REMOVAL OF FOREIGN BODY    KNEE SURGERY Right     scope    TOE AMPUTATION Bilateral 3/6/2017    TOE AMPUTATION LEFT HALLUX AND 2ND DIGIT AND RIGHT 2ND DIGIT performed by Nadine Zaragoza DPM at [address] ENDOSCOPY N/A 5/20/2019    EGD WITH BIOPSY performed by Ana Fu MD at [address]       Family History   Problem Relation Age of Onset    Diabetes Father     Cancer Maternal Grandfather         Colon Cancer; Prostate Cancer    No Known Problems Mother        Social History     Tobacco Use    Smoking status: Never Smoker    Smokeless tobacco: Never Used   Substance Use Topics    Alcohol use: No      Current Outpatient Medications   Medication Sig Dispense Refill    PARoxetine (PAXIL) 20 MG tablet Take 4 tablets by mouth every morning 120 tablet 5    diazePAM (VALIUM) 5 MG tablet TAKE ONE TABLET BY MOUTH EVERY 8 HOURS AS NEEDED FOR ANXIETY 90 tablet 0    oxyCODONE (OXY-IR) 15 MG immediate release tablet Take 1 tablet by mouth every 6 hours as needed for Pain for up to 30 days. 120 tablet 0    morphine (MS CONTIN) 60 MG extended release tablet Take 1 tablet by mouth 2 times daily for 30 days.  60 tablet 0    blood glucose test strips (ONETOUCH ULTRA) strip TEST TWO TIMES A DAY AS NEEDED 50 strip 0    Lancets (ONETOUCH DELICA PLUS [id number]) MISC TEST TWICE A  each 5    fluocinonide (LIDEX) 0.05 % ointment Apply 1 gm  topically 2 times daily to legs 60 g 1    sodium hypochlorite (DAKINS) 0.125 % SOLN external solution APPLY TOPICALLY EVERY 12 HOURS. APPLY AS A WET TO DRY DRESSING TWO TIMES A DAY, CAN ALSO USE TO CLEANSE WOUND 437 mL 1    lisinopril (PRINIVIL;ZESTRIL) 10 MG tablet Take 1 tablet by mouth daily 90 tablet 3    metFORMIN (GLUCOPHAGE) 500 MG tablet Take 0.5 tablets by mouth daily (with breakfast) 45 tablet 3    buPROPion (WELLBUTRIN XL) 300 MG extended release tablet TAKE ONE TABLET BY MOUTH DAILY 30 tablet 9    megestrol (MEGACE ORAL) 40 MG/ML suspension Take 10 mLs by mouth daily 240 mL 3    NARCAN 4 MG/0.1ML LIQD nasal spray 4 mg      atorvastatin (LIPITOR) 20 MG tablet Take 1 tablet by mouth daily 90 tablet 3    lactobacillus (CULTURELLE) capsule Take 1 capsule by mouth 2 times daily (with meals) 60 capsule 0    magnesium oxide (MAG-OX) 400 (241.3 Mg) MG TABS tablet Take 1 tablet by mouth 2 times daily 30 tablet 11    pantoprazole (PROTONIX) 40 MG tablet Take 1 tablet by mouth every morning (before breakfast) 30 tablet 11    povidone-iodine (BETADINE) 7.5 % external solution Apply to wound. Apply as a wet to dry dressing 1 Bottle 1    levothyroxine (SYNTHROID) 50 MCG tablet Take 1 tablet by mouth daily 90 tablet 3    Wound Dressings (ADAPTIC NON-ADHERING DRESSING) PADS Apply 1 Units topically 2 times daily 60 each 5    Gauze Pads & Dressings (COMBINE ABD) 5\"X9\" PADS 1 Units by Does not apply route 2 times daily 60 each 5    Gauze Pads & Dressings (KERLIX GAUZE ROLL LARGE) MISC 1 Units by Does not apply route 2 times daily 60 each 5     No current facility-administered medications for this visit.      Allergies   Allergen Reactions    Bactrim [Sulfamethoxazole-Trimethoprim]      reported renal failure    Fiorinal [Butalbital-Aspirin-Caffeine] Other (See Comments)     Generalized blisters    Peanut-Containing Drug Products      Peanut butter flavor    Statins Other (See Comments)     Muscle aches    Tylenol [Acetaminophen]      Pt states it shuts down his kidneys    Peanut Butter Flavor [Flavoring Agent] Nausea And Vomiting       Health Maintenance   Topic Date Due    Diabetic retinal exam  Never done    COVID-19 Vaccine (1) Never done    HIV screen  Never done    Hepatitis B vaccine (1 of 3 - Risk 3-dose series) Never done    Shingles Vaccine (1 of 2) Never done    Colon Cancer Screen FIT/FOBT  11/04/2020    Diabetic microalbuminuria test  12/24/2020    Lipid screen  03/08/2021    TSH testing  04/20/2021    Potassium monitoring  04/21/2021    Creatinine monitoring  04/21/2021    Flu vaccine (1) 09/01/2021    A1C test (Diabetic or Prediabetic)  08/03/2022    DTaP/Tdap/Td vaccine (2 - Td or Tdap) 04/24/2028    Pneumococcal 0-64 years Vaccine (2 of 2 - PPSV23) 06/04/2029    Hepatitis C screen  Completed    Hepatitis A vaccine  Aged Out    Hib vaccine  Aged Out    Meningococcal (ACWY) vaccine  Aged Out       Subjective:      Review of Systems   Constitutional: Negative for chills and fever. HENT: Negative for rhinorrhea and sore throat. Eyes: Negative for discharge and redness. Respiratory: Negative for cough, shortness of breath and wheezing. Cardiovascular: Negative for chest pain and palpitations. Gastrointestinal: Negative for abdominal pain, diarrhea, nausea and vomiting. Genitourinary: Negative for dysuria and frequency. Musculoskeletal: Negative for arthralgias and myalgias. Skin:        Right hallux tender, erythematous with purulent discharge. Neurological: Negative for dizziness, light-headedness and headaches. Psychiatric/Behavioral: Negative for sleep disturbance.        Objective:     BP (!) 144/84   Pulse 108   Wt 187 lb 3.2 oz (84.9 kg)   SpO2 97%   BMI 24.04 kg/m² Physical Exam  Vitals and nursing note reviewed. Constitutional:       General: He is not in acute distress. Appearance: He is well-developed. He is not ill-appearing. HENT:      Head: Normocephalic and atraumatic. Right Ear: External ear normal.      Left Ear: External ear normal.   Eyes:      General: No scleral icterus. Right eye: No discharge. Left eye: No discharge. Conjunctiva/sclera: Conjunctivae normal.      Pupils: Pupils are equal, round, and reactive to light. Neck:      Thyroid: No thyromegaly. Trachea: No tracheal deviation. Cardiovascular:      Rate and Rhythm: Normal rate and regular rhythm. Heart sounds: Normal heart sounds. Pulmonary:      Effort: Pulmonary effort is normal. No respiratory distress. Breath sounds: Normal breath sounds. No wheezing. Musculoskeletal:      Comments: RLE is TTP out of proportion to exam.    Lymphadenopathy:      Cervical: No cervical adenopathy. Skin:     General: Skin is warm. Findings: No rash. Comments: Right Hallux OM; Covered w/ Dressing    Neurological:      Mental Status: He is alert and oriented to person, place, and time. Psychiatric:         Mood and Affect: Mood normal.         Behavior: Behavior normal.         Thought Content: Thought content normal.         Assessment:       Diagnosis Orders   1. Type 2 diabetes mellitus with foot ulcer, with long-term current use of insulin (HCC)  POCT glycosylated hemoglobin (Hb A1C)    POCT microalbumin   2. Screening PSA (prostate specific antigen)  PSA Screening   3. Encounter for lipid screening for cardiovascular disease  Lipid, Fasting   4. Anemia, unspecified type  CBC With Auto Differential   5. Acute renal failure, unspecified acute renal failure type [facility])  Basic Metabolic Panel   6. Acquired hypothyroidism  TSH    T4, Free   7. Diabetic foot infection (HCC)  Sedimentation Rate    C-Reactive Protein   8.  Annual physical exam  Hepatic Function Panel   9. High risk medication use  POCT Rapid Drug Screen        Plan:   Blood Work Ordered  Continue routine f/u with Podiatry   Pt to get cologuard done  Micro albumin   Urine drug screen today. Further plans for infected foot per podiatry. Return in about 3 months (around 11/3/2021). Orders Placed This Encounter   Procedures    CBC With Auto Differential     Standing Status:   Future     Standing Expiration Date:   8/3/2022    Lipid, Fasting     Standing Status:   Future     Standing Expiration Date:   8/3/2022    Basic Metabolic Panel     Standing Status:   Future     Standing Expiration Date:   8/3/2022    TSH     Standing Status:   Future     Standing Expiration Date:   8/3/2022    T4, Free     Standing Status:   Future     Standing Expiration Date:   8/3/2022    Sedimentation Rate     Standing Status:   Future     Standing Expiration Date:   8/3/2022    C-Reactive Protein     Standing Status:   Future     Standing Expiration Date:   8/3/2022    Hepatic Function Panel     Standing Status:   Future     Standing Expiration Date:   8/3/2022    PSA Screening     Standing Status:   Future     Standing Expiration Date:   8/3/2022    POCT glycosylated hemoglobin (Hb A1C)    POCT microalbumin    POCT Rapid Drug Screen     Orders Placed This Encounter   Medications    PARoxetine (PAXIL) 20 MG tablet     Sig: Take 4 tablets by mouth every morning     Dispense:  120 tablet     Refill:  5       Patient given educational materials - see patient instructions. Discussed use, benefit, and side effects of prescribed medications. All patient questions answered. Pt voiced understanding. Reviewed health maintenance. Instructed to continue current medications, diet andexercise. Patient agreed with treatment plan. Follow up as directed.      Electronicallysigned by Lakia Hinson MD on 8/3/2021 at 3:07 PM

## 2021-08-06 DIAGNOSIS — M54.12 RIGHT CERVICAL RADICULOPATHY: ICD-10-CM

## 2021-08-06 DIAGNOSIS — L97.524 ULCERATED, FOOT, LEFT, WITH NECROSIS OF BONE (HCC): ICD-10-CM

## 2021-08-06 DIAGNOSIS — F34.1 DYSTHYMIA: ICD-10-CM

## 2021-08-06 DIAGNOSIS — M86.172 OSTEOMYELITIS OF FOOT, LEFT, ACUTE (HCC): ICD-10-CM

## 2021-08-06 DIAGNOSIS — M86.9 OSTEOMYELITIS, UNSPECIFIED SITE, UNSPECIFIED TYPE (HCC): ICD-10-CM

## 2021-08-06 RX ORDER — DIAZEPAM 5 MG/1
TABLET ORAL
Qty: 90 TABLET | Refills: 0 | Status: SHIPPED | OUTPATIENT
Start: 2021-08-06 | End: 2021-09-03 | Stop reason: SDUPTHER

## 2021-08-06 RX ORDER — OXYCODONE HYDROCHLORIDE 15 MG/1
15 TABLET ORAL EVERY 6 HOURS PRN
Qty: 120 TABLET | Refills: 0 | Status: SHIPPED | OUTPATIENT
Start: 2021-08-06 | End: 2021-09-03 | Stop reason: SDUPTHER

## 2021-08-06 RX ORDER — MORPHINE SULFATE 60 MG/1
60 TABLET, FILM COATED, EXTENDED RELEASE ORAL 2 TIMES DAILY
Qty: 60 TABLET | Refills: 0 | Status: SHIPPED | OUTPATIENT
Start: 2021-08-06 | End: 2021-09-03 | Stop reason: SDUPTHER

## 2021-09-03 DIAGNOSIS — M86.9 OSTEOMYELITIS, UNSPECIFIED SITE, UNSPECIFIED TYPE (HCC): ICD-10-CM

## 2021-09-03 DIAGNOSIS — M86.172 OSTEOMYELITIS OF FOOT, LEFT, ACUTE (HCC): ICD-10-CM

## 2021-09-03 DIAGNOSIS — M54.12 RIGHT CERVICAL RADICULOPATHY: ICD-10-CM

## 2021-09-03 DIAGNOSIS — L97.524 ULCERATED, FOOT, LEFT, WITH NECROSIS OF BONE (HCC): ICD-10-CM

## 2021-09-03 DIAGNOSIS — F34.1 DYSTHYMIA: ICD-10-CM

## 2021-09-03 RX ORDER — DIAZEPAM 5 MG/1
TABLET ORAL
Qty: 90 TABLET | Refills: 0 | Status: SHIPPED | OUTPATIENT
Start: 2021-09-03 | End: 2021-10-01 | Stop reason: SDUPTHER

## 2021-09-03 RX ORDER — OXYCODONE HYDROCHLORIDE 15 MG/1
15 TABLET ORAL EVERY 6 HOURS PRN
Qty: 120 TABLET | Refills: 0 | Status: SHIPPED | OUTPATIENT
Start: 2021-09-03 | End: 2021-10-01 | Stop reason: SDUPTHER

## 2021-09-03 RX ORDER — MORPHINE SULFATE 60 MG/1
60 TABLET, FILM COATED, EXTENDED RELEASE ORAL 2 TIMES DAILY
Qty: 60 TABLET | Refills: 0 | Status: SHIPPED | OUTPATIENT
Start: 2021-09-03 | End: 2021-10-01 | Stop reason: SDUPTHER

## 2021-09-07 RX ORDER — BUPROPION HYDROCHLORIDE 300 MG/1
TABLET ORAL
Qty: 30 TABLET | Refills: 9 | Status: SHIPPED | OUTPATIENT
Start: 2021-09-07 | End: 2022-02-24

## 2021-09-20 DIAGNOSIS — Z79.4 TYPE 2 DIABETES MELLITUS WITH FOOT ULCER, WITH LONG-TERM CURRENT USE OF INSULIN (HCC): ICD-10-CM

## 2021-09-20 DIAGNOSIS — L97.509 TYPE 2 DIABETES MELLITUS WITH FOOT ULCER, WITH LONG-TERM CURRENT USE OF INSULIN (HCC): ICD-10-CM

## 2021-09-20 DIAGNOSIS — E11.621 TYPE 2 DIABETES MELLITUS WITH FOOT ULCER, WITH LONG-TERM CURRENT USE OF INSULIN (HCC): ICD-10-CM

## 2021-09-21 RX ORDER — BLOOD SUGAR DIAGNOSTIC
STRIP MISCELLANEOUS
Qty: 50 STRIP | Refills: 0 | Status: SHIPPED | OUTPATIENT
Start: 2021-09-21 | End: 2022-06-23

## 2021-10-01 DIAGNOSIS — M86.9 OSTEOMYELITIS, UNSPECIFIED SITE, UNSPECIFIED TYPE (HCC): ICD-10-CM

## 2021-10-01 DIAGNOSIS — F34.1 DYSTHYMIA: ICD-10-CM

## 2021-10-01 DIAGNOSIS — M86.172 OSTEOMYELITIS OF FOOT, LEFT, ACUTE (HCC): ICD-10-CM

## 2021-10-01 DIAGNOSIS — L97.524 ULCERATED, FOOT, LEFT, WITH NECROSIS OF BONE (HCC): ICD-10-CM

## 2021-10-01 DIAGNOSIS — M54.12 RIGHT CERVICAL RADICULOPATHY: ICD-10-CM

## 2021-10-01 RX ORDER — MORPHINE SULFATE 60 MG/1
60 TABLET, FILM COATED, EXTENDED RELEASE ORAL 2 TIMES DAILY
Qty: 60 TABLET | Refills: 0 | Status: SHIPPED | OUTPATIENT
Start: 2021-10-01 | End: 2021-10-04 | Stop reason: SDUPTHER

## 2021-10-01 RX ORDER — DIAZEPAM 5 MG/1
TABLET ORAL
Qty: 90 TABLET | Refills: 0 | Status: SHIPPED | OUTPATIENT
Start: 2021-10-01 | End: 2021-11-08

## 2021-10-01 RX ORDER — OXYCODONE HYDROCHLORIDE 15 MG/1
15 TABLET ORAL EVERY 6 HOURS PRN
Qty: 120 TABLET | Refills: 0 | Status: SHIPPED | OUTPATIENT
Start: 2021-10-01 | End: 2021-10-29 | Stop reason: SDUPTHER

## 2021-10-04 DIAGNOSIS — L97.524 ULCERATED, FOOT, LEFT, WITH NECROSIS OF BONE (HCC): ICD-10-CM

## 2021-10-04 DIAGNOSIS — M86.172 OSTEOMYELITIS OF FOOT, LEFT, ACUTE (HCC): ICD-10-CM

## 2021-10-04 RX ORDER — MORPHINE SULFATE 60 MG/1
60 TABLET, FILM COATED, EXTENDED RELEASE ORAL 2 TIMES DAILY
Qty: 60 TABLET | Refills: 0 | Status: SHIPPED | OUTPATIENT
Start: 2021-10-04 | End: 2021-10-29 | Stop reason: SDUPTHER

## 2021-10-13 ENCOUNTER — OFFICE VISIT (OUTPATIENT)
Dept: PODIATRY | Age: 56
End: 2021-10-13
Payer: MEDICARE

## 2021-10-13 VITALS — WEIGHT: 187 LBS | HEIGHT: 74 IN | BODY MASS INDEX: 24 KG/M2

## 2021-10-13 DIAGNOSIS — E11.42 TYPE 2 DIABETES MELLITUS WITH DIABETIC POLYNEUROPATHY, WITHOUT LONG-TERM CURRENT USE OF INSULIN (HCC): ICD-10-CM

## 2021-10-13 DIAGNOSIS — L97.509 TYPE 2 DIABETES MELLITUS WITH FOOT ULCER, WITHOUT LONG-TERM CURRENT USE OF INSULIN (HCC): ICD-10-CM

## 2021-10-13 DIAGNOSIS — E11.621 TYPE 2 DIABETES MELLITUS WITH FOOT ULCER, WITHOUT LONG-TERM CURRENT USE OF INSULIN (HCC): ICD-10-CM

## 2021-10-13 DIAGNOSIS — L97.514 ULCER OF GREAT TOE, RIGHT, WITH NECROSIS OF BONE (HCC): ICD-10-CM

## 2021-10-13 DIAGNOSIS — M86.9 OSTEOMYELITIS OF GREAT TOE OF RIGHT FOOT (HCC): Primary | ICD-10-CM

## 2021-10-13 PROCEDURE — 11044 DBRDMT BONE 1ST 20 SQ CM/<: CPT | Performed by: PODIATRIST

## 2021-10-13 ASSESSMENT — ENCOUNTER SYMPTOMS
NAUSEA: 0
COLOR CHANGE: 0
VOMITING: 0
CONSTIPATION: 0
DIARRHEA: 0

## 2021-10-13 NOTE — PROGRESS NOTES
[address] [facility]  Return Patient  Chief Complaint   Patient presents with    Wound Check     right foot       Ty Butterfield is a 61y.o. year old male who is here with ulcer right great toe. He once again did not follow up when I asked him to. It has been 2 months since I've seen him. . He had a TMA left -   surgery on 1/4. . Review of Systems   Constitutional: Negative for chills, diaphoresis, fatigue, fever and unexpected weight change. Cardiovascular: Negative for leg swelling. Gastrointestinal: Negative for constipation, diarrhea, nausea and vomiting. Musculoskeletal: Negative for arthralgias, gait problem and joint swelling. Skin: Positive for wound. Negative for color change, pallor and rash. Neurological: Negative for weakness and numbness. Vascular: DP and PT pulses palpable 2/4, Right Foot and 2/4 on the Left Foot. Edema is absent,  Right Foot and absent on the Left Foot. No erythema left, erythema right hallux    Neurological:   Sensation absent  to light touch to level of digits, both feet. Musculoskeletal:   Muscle strength is 5/5 on the Right Foot and 5/5 on the Left Foot. Structural deformities are present on the Right Foot and present on the Left Foot. TMA left. Integument:  Warm, dry, supple both feet. Wound dehiscence is absent. Infection is absent    Wound #:   1    diabetic foot ulcer   right foot hallux    Wound measurements:  #1  2.8 cm by 2.5 cm  by   5 mm        Wound Depth: subcutaneous. Fascia, bone fragments in the ulceration    Drainage: Moderate Type:bloody, serous  Wound Bed status:   Granulation Tissue: 90%   Necrotic 10%  Type: slough, fibrin,   Epithelialization 10%   Hypergranular 0%   Periwound hyperkeratosis:  absent  Erythema absent    Odor:no  Maceration:no  Undermining/tract/tunneling:no  Culture taken:   no               Assesment :    Diagnosis Orders   1.  Osteomyelitis of great toe of right foot ([address] MI DEBRIDEMENT, SKIN, SUB-Q TISSUE,MUSCLE,BONE,=<20 SQ CM   2. Ulcer of great toe, right, with necrosis of bone (HCC)  MI DEBRIDEMENT, SKIN, SUB-Q TISSUE,MUSCLE,BONE,=<20 SQ CM   3. Type 2 diabetes mellitus with diabetic polyneuropathy, without long-term current use of insulin (HCC)  MI DEBRIDEMENT, SKIN, SUB-Q TISSUE,MUSCLE,BONE,=<20 SQ CM   4. Type 2 diabetes mellitus with foot ulcer, without long-term current use of insulin (HCC)  MI DEBRIDEMENT, SKIN, SUB-Q TISSUE,MUSCLE,BONE,=<20 SQ CM         Plan: Pt was evaluated and examined. Patient was given personalized discharge instructions. Pt will follow up in 4 weeks or sooner if any problems arise. Diagnosis was discussed with the pt and all of their questions were answered in detail. Proper foot hygiene and care was discussed with the pt. Patient to check feet daily and contact the office with any questions/problems/concerns. Other comorbidity noted and will be managed by PCP. Procedure Note  Indications:  Based on my examination of this patient's wound(s)/ulcer(s) today, debridement is required to promote healing and evaluate the wound base. Performed by: Natali Sim DPM    Consent obtained:  Yes    Time out taken:  Yes    Pain Control:         Wound Location: left  Pre Debridement Measurements:  Are located in the Wound/Ulcer Documentation Flow Sheet    Wound/Ulcer #: 1    Procedure in Detail: Lidocaine gel soaked gauze was applied at beginning of wound evaluation. The wound(s) was excisionally debrided sharply of fibrotic, necrotic, and hyperkeratotic tissue, including a layer of surrounding healthy tissue using curette. The wound was debrided down through and including subcutaneous.  Fascia, bone    Percent of Wound Debrided: 100%    Total Surface Area Debrided:   sq cm     Bleeding: Minimal      Plan for wound  Dress per physician order  Treatment: silvercel again, daily  Monitor feet daily  Moisturizing cream daily  Clean sock  Good shoes  Ordered him to stop picking at dead skin on his feet, or he may develop a new ulceration. Dressing Documentation    This patient needs a dressings to aid in healing of the ulceration. I am prescribing silvercel, as the primary dressing  Sequence of dressing: silvercel, gauze 4x4, kerlix roll, secure with tape  Frequency of change: daily  Duration of supplies: 30 days    1) The ulceration has been surgically debrided on 10/13/21   2) The ulceration is located on the right hallux  3) The ulceration was assessed on 10/13/21   4) Type of ulceration:  diabetic  5) See above for size of ulceration(s)  6) Amount of drainage: moderate            No orders of the defined types were placed in this encounter. Follow up 2 week(s).

## 2021-10-29 DIAGNOSIS — M86.172 OSTEOMYELITIS OF FOOT, LEFT, ACUTE (HCC): ICD-10-CM

## 2021-10-29 DIAGNOSIS — M54.12 RIGHT CERVICAL RADICULOPATHY: ICD-10-CM

## 2021-10-29 DIAGNOSIS — M86.9 OSTEOMYELITIS, UNSPECIFIED SITE, UNSPECIFIED TYPE (HCC): ICD-10-CM

## 2021-10-29 DIAGNOSIS — L97.524 ULCERATED, FOOT, LEFT, WITH NECROSIS OF BONE (HCC): ICD-10-CM

## 2021-10-29 RX ORDER — MORPHINE SULFATE 60 MG/1
60 TABLET, FILM COATED, EXTENDED RELEASE ORAL 2 TIMES DAILY
Qty: 60 TABLET | Refills: 0 | Status: SHIPPED | OUTPATIENT
Start: 2021-10-29 | End: 2021-11-24 | Stop reason: SDUPTHER

## 2021-10-29 RX ORDER — OXYCODONE HYDROCHLORIDE 15 MG/1
15 TABLET ORAL EVERY 6 HOURS PRN
Qty: 120 TABLET | Refills: 0 | Status: SHIPPED | OUTPATIENT
Start: 2021-10-29 | End: 2021-11-30 | Stop reason: SDUPTHER

## 2021-11-07 DIAGNOSIS — F34.1 DYSTHYMIA: ICD-10-CM

## 2021-11-08 RX ORDER — DIAZEPAM 5 MG/1
TABLET ORAL
Qty: 90 TABLET | Refills: 1 | Status: SHIPPED | OUTPATIENT
Start: 2021-11-08 | End: 2021-12-08

## 2021-11-17 ENCOUNTER — OFFICE VISIT (OUTPATIENT)
Dept: PODIATRY | Age: 56
End: 2021-11-17
Payer: MEDICARE

## 2021-11-17 DIAGNOSIS — E11.42 TYPE 2 DIABETES MELLITUS WITH DIABETIC POLYNEUROPATHY, WITHOUT LONG-TERM CURRENT USE OF INSULIN (HCC): ICD-10-CM

## 2021-11-17 DIAGNOSIS — Z89.432 STATUS POST TRANSMETATARSAL AMPUTATION OF FOOT, LEFT (HCC): ICD-10-CM

## 2021-11-17 DIAGNOSIS — L97.514 ULCER OF GREAT TOE, RIGHT, WITH NECROSIS OF BONE (HCC): ICD-10-CM

## 2021-11-17 DIAGNOSIS — M86.9 OSTEOMYELITIS OF GREAT TOE OF RIGHT FOOT (HCC): Primary | ICD-10-CM

## 2021-11-17 DIAGNOSIS — E11.621 TYPE 2 DIABETES MELLITUS WITH FOOT ULCER, WITHOUT LONG-TERM CURRENT USE OF INSULIN (HCC): ICD-10-CM

## 2021-11-17 DIAGNOSIS — L97.509 TYPE 2 DIABETES MELLITUS WITH FOOT ULCER, WITHOUT LONG-TERM CURRENT USE OF INSULIN (HCC): ICD-10-CM

## 2021-11-17 LAB
A/G RATIO: NORMAL
ALBUMIN SERPL-MCNC: NORMAL G/DL
ALP BLD-CCNC: NORMAL U/L
ALT SERPL-CCNC: NORMAL U/L
AST SERPL-CCNC: NORMAL U/L
BILIRUB SERPL-MCNC: NORMAL MG/DL
BILIRUBIN DIRECT: NORMAL
BILIRUBIN, INDIRECT: NORMAL
BUN BLDV-MCNC: NORMAL MG/DL
C-REACTIVE PROTEIN: NORMAL
CALCIUM SERPL-MCNC: NORMAL MG/DL
CHLORIDE BLD-SCNC: NORMAL MMOL/L
CHOLESTEROL, FASTING: NORMAL
CO2: NORMAL
CREAT SERPL-MCNC: NORMAL MG/DL
GFR CALCULATED: NORMAL
GLOBULIN: NORMAL
GLUCOSE BLD-MCNC: NORMAL MG/DL
HDLC SERPL-MCNC: NORMAL MG/DL
LDL CHOLESTEROL CALCULATED: NORMAL
POTASSIUM SERPL-SCNC: NORMAL MMOL/L
PROSTATE SPECIFIC ANTIGEN: NORMAL
PROTEIN TOTAL: NORMAL
SEDIMENTATION RATE, ERYTHROCYTE: NORMAL
SODIUM BLD-SCNC: NORMAL MMOL/L
T4 FREE: NORMAL
TRIGLYCERIDE, FASTING: NORMAL
TSH SERPL DL<=0.05 MIU/L-ACNC: NORMAL M[IU]/L

## 2021-11-17 PROCEDURE — G8420 CALC BMI NORM PARAMETERS: HCPCS | Performed by: PODIATRIST

## 2021-11-17 PROCEDURE — G8484 FLU IMMUNIZE NO ADMIN: HCPCS | Performed by: PODIATRIST

## 2021-11-17 PROCEDURE — 3044F HG A1C LEVEL LT 7.0%: CPT | Performed by: PODIATRIST

## 2021-11-17 PROCEDURE — G8427 DOCREV CUR MEDS BY ELIG CLIN: HCPCS | Performed by: PODIATRIST

## 2021-11-17 PROCEDURE — 2022F DILAT RTA XM EVC RTNOPTHY: CPT | Performed by: PODIATRIST

## 2021-11-17 PROCEDURE — 99212 OFFICE O/P EST SF 10 MIN: CPT | Performed by: PODIATRIST

## 2021-11-17 PROCEDURE — 11043 DBRDMT MUSC&/FSCA 1ST 20/<: CPT | Performed by: PODIATRIST

## 2021-11-17 PROCEDURE — 1036F TOBACCO NON-USER: CPT | Performed by: PODIATRIST

## 2021-11-17 PROCEDURE — 3017F COLORECTAL CA SCREEN DOC REV: CPT | Performed by: PODIATRIST

## 2021-11-17 RX ORDER — DOXYCYCLINE HYCLATE 100 MG
100 TABLET ORAL 2 TIMES DAILY
Qty: 60 TABLET | Refills: 0 | Status: SHIPPED | OUTPATIENT
Start: 2021-11-17 | End: 2021-12-17

## 2021-11-17 RX ORDER — FLUOCINONIDE 0.5 MG/G
OINTMENT TOPICAL
Qty: 60 G | Refills: 5 | Status: SHIPPED | OUTPATIENT
Start: 2021-11-17

## 2021-11-17 ASSESSMENT — ENCOUNTER SYMPTOMS
DIARRHEA: 0
COLOR CHANGE: 0
VOMITING: 0
CONSTIPATION: 0
NAUSEA: 0

## 2021-11-17 NOTE — PROGRESS NOTES
[address] [facility] [address]  Return Patient  Chief Complaint   Patient presents with    Wound Check     rt great toe       Hansel Paz is a 61y.o. year old male who is here with ulcer right great toe. He had worsening of the ulcer on the right and called Dr. Kelsey Cantu for an antibiotic. He is on Doxy for a week. Using silvercel . He had a TMA left -   surgery on 1/4. . Review of Systems   Constitutional: Negative for chills, diaphoresis, fatigue, fever and unexpected weight change. Cardiovascular: Negative for leg swelling. Gastrointestinal: Negative for constipation, diarrhea, nausea and vomiting. Musculoskeletal: Negative for arthralgias, gait problem and joint swelling. Skin: Positive for wound. Negative for color change, pallor and rash. Neurological: Negative for weakness and numbness. Vascular: DP and PT pulses palpable 2/4, Right Foot and 2/4 on the Left Foot. Edema is absent,  Right Foot and absent on the Left Foot. No erythema left, erythema right hallux    Neurological:   Sensation absent  to light touch to level of digits, both feet. Musculoskeletal:   Muscle strength is 5/5 on the Right Foot and 5/5 on the Left Foot. Structural deformities are present on the Right Foot and present on the Left Foot. TMA left. Integument:  Warm, dry, supple both feet. Wound dehiscence is absent. Infection is absent    Wound #:   1    diabetic foot ulcer   right foot hallux  It appears that he has been picking at the skin around the ulceration. Wound measurements:  #1  2.8 cm by 2.5 cm  by   5 mm        Wound Depth: subcutaneous. Fascia, bone     Drainage:    Moderate Type:bloody, serous  Wound Bed status:   Granulation Tissue: 70%   Necrotic 30%  Type: slough, fibrin,   Epithelialization 10%   Hypergranular 0%   Periwound hyperkeratosis:  absent  Erythema absent    Odor:no  Maceration:no  Undermining/tract/tunneling:no  Culture taken:   no               Assesment : Diagnosis Orders   1. Osteomyelitis of great toe of right foot (HCC)  OH DEBRIDEMENT, SKIN, SUB-Q TISSUE,MUSCLE,=<20 SQ CM   2. Ulcer of great toe, right, with necrosis of bone (HCC)  OH DEBRIDEMENT, SKIN, SUB-Q TISSUE,MUSCLE,=<20 SQ CM   3. Type 2 diabetes mellitus with diabetic polyneuropathy, without long-term current use of insulin (HCC)  OH DEBRIDEMENT, SKIN, SUB-Q TISSUE,MUSCLE,=<20 SQ CM   4. Type 2 diabetes mellitus with foot ulcer, without long-term current use of insulin (HCC)  OH DEBRIDEMENT, SKIN, SUB-Q TISSUE,MUSCLE,=<20 SQ CM   5. Status post transmetatarsal amputation of foot, left (HCC)  OH DEBRIDEMENT, SKIN, SUB-Q TISSUE,MUSCLE,=<20 SQ CM         Plan: Pt was evaluated and examined. Patient was given personalized discharge instructions. Pt will follow up in 4 weeks or sooner if any problems arise. Diagnosis was discussed with the pt and all of their questions were answered in detail. Proper foot hygiene and care was discussed with the pt. Patient to check feet daily and contact the office with any questions/problems/concerns. Other comorbidity noted and will be managed by PCP. Procedure Note  Indications:  Based on my examination of this patient's wound(s)/ulcer(s) today, debridement is required to promote healing and evaluate the wound base. Performed by: Michael Lorenzo DPM    Consent obtained:  Yes    Time out taken:  Yes    Pain Control:         Wound Location: left  Pre Debridement Measurements:  Are located in the Wound/Ulcer Documentation Flow Sheet    Wound/Ulcer #: 1    Procedure in Detail: Lidocaine gel soaked gauze was applied at beginning of wound evaluation. The wound(s) was excisionally debrided sharply of fibrotic, necrotic, and hyperkeratotic tissue, including a layer of surrounding healthy tissue using curette. The wound was debrided down through and including subcutaneous.  Fascia,    Percent of Wound Debrided: 100%    Total Surface Area Debrided:   sq cm Bleeding: Minimal      Plan for wound  Dress per physician order  Treatment: silvercel again, daily  Monitor feet daily  Moisturizing cream daily  Clean sock  Good shoes  Ordered him to stop picking at dead skin on his feet, or he may develop a new ulceration. Rx Doxy for 2 month  Discussed possibility of a partial hallux amputation. Dressing Documentation    This patient needs a dressings to aid in healing of the ulceration. I am prescribing silvercel, as the primary dressing  Sequence of dressing: silvercel, gauze 4x4, kerlix roll, secure with tape  Frequency of change: daily  Duration of supplies: 30 days    1) The ulceration has been surgically debrided on 11/17/21   2) The ulceration is located on the right hallux  3) The ulceration was assessed on 11/17/21   4) Type of ulceration:  diabetic  5) See above for size of ulceration(s)  6) Amount of drainage: moderate            Orders Placed This Encounter   Medications    fluocinonide (LIDEX) 0.05 % ointment     Sig: Apply 1 gm  topically 2 times daily to legs     Dispense:  60 g     Refill:  5    doxycycline hyclate (VIBRA-TABS) 100 MG tablet     Sig: Take 1 tablet by mouth 2 times daily     Dispense:  60 tablet     Refill:  0       Follow up 2 week(s).

## 2021-11-24 ENCOUNTER — TELEPHONE (OUTPATIENT)
Dept: PRIMARY CARE CLINIC | Age: 56
End: 2021-11-24

## 2021-11-24 DIAGNOSIS — Z00.00 ANNUAL PHYSICAL EXAM: ICD-10-CM

## 2021-11-24 DIAGNOSIS — E03.9 ACQUIRED HYPOTHYROIDISM: ICD-10-CM

## 2021-11-24 DIAGNOSIS — N17.9 ACUTE RENAL FAILURE, UNSPECIFIED ACUTE RENAL FAILURE TYPE (HCC): ICD-10-CM

## 2021-11-24 DIAGNOSIS — L08.9 DIABETIC FOOT INFECTION (HCC): ICD-10-CM

## 2021-11-24 DIAGNOSIS — Z13.220 ENCOUNTER FOR LIPID SCREENING FOR CARDIOVASCULAR DISEASE: ICD-10-CM

## 2021-11-24 DIAGNOSIS — D64.9 ANEMIA, UNSPECIFIED TYPE: ICD-10-CM

## 2021-11-24 DIAGNOSIS — L97.524 ULCERATED, FOOT, LEFT, WITH NECROSIS OF BONE (HCC): ICD-10-CM

## 2021-11-24 DIAGNOSIS — Z12.5 SCREENING PSA (PROSTATE SPECIFIC ANTIGEN): ICD-10-CM

## 2021-11-24 DIAGNOSIS — Z13.6 ENCOUNTER FOR LIPID SCREENING FOR CARDIOVASCULAR DISEASE: ICD-10-CM

## 2021-11-24 DIAGNOSIS — E11.628 DIABETIC FOOT INFECTION (HCC): ICD-10-CM

## 2021-11-24 DIAGNOSIS — M86.172 OSTEOMYELITIS OF FOOT, LEFT, ACUTE (HCC): ICD-10-CM

## 2021-11-24 RX ORDER — MORPHINE SULFATE 60 MG/1
60 TABLET, FILM COATED, EXTENDED RELEASE ORAL 2 TIMES DAILY
Qty: 60 TABLET | Refills: 0 | Status: SHIPPED | OUTPATIENT
Start: 2021-11-24 | End: 2021-12-23 | Stop reason: SDUPTHER

## 2021-11-24 NOTE — TELEPHONE ENCOUNTER
----- Message from Lexi Davis sent at 11/24/2021 11:20 AM EST -----  Subject: Refill Request    QUESTIONS  Name of Medication? morphine (MS CONTIN) 60 MG extended release tablet  Patient-reported dosage and instructions? 60 Mg Twice a day  How many days do you have left? 6  Preferred Pharmacy? [facility]  Pharmacy phone number (if available)? [phone]  ---------------------------------------------------------------------------  --------------,  Name of Medication? oxyCODONE (OXY-IR) 15 MG immediate release tablet  Patient-reported dosage and instructions? 15 MG 4 times a day  How many days do you have left? 6  Preferred Pharmacy? [facility]  Pharmacy phone number (if available)? [phone]  Additional Information for Provider? Refills are due to be filled by   11/30/2021.  ---------------------------------------------------------------------------  --------------  CALL BACK INFO  What is the best way for the office to contact you? Do not leave any   message, patient will call back for answer  Preferred Call Back Phone Number?  [phone]

## 2021-11-26 RX ORDER — LEVOTHYROXINE SODIUM 0.05 MG/1
50 TABLET ORAL DAILY
Qty: 90 TABLET | Refills: 3 | Status: SHIPPED | OUTPATIENT
Start: 2021-11-26

## 2021-11-29 DIAGNOSIS — M54.12 RIGHT CERVICAL RADICULOPATHY: ICD-10-CM

## 2021-11-29 DIAGNOSIS — M86.9 OSTEOMYELITIS, UNSPECIFIED SITE, UNSPECIFIED TYPE (HCC): ICD-10-CM

## 2021-11-30 DIAGNOSIS — M54.12 RIGHT CERVICAL RADICULOPATHY: ICD-10-CM

## 2021-11-30 DIAGNOSIS — M86.9 OSTEOMYELITIS, UNSPECIFIED SITE, UNSPECIFIED TYPE (HCC): ICD-10-CM

## 2021-11-30 RX ORDER — OXYCODONE HYDROCHLORIDE 15 MG/1
15 TABLET ORAL EVERY 6 HOURS PRN
Qty: 120 TABLET | Refills: 0 | Status: SHIPPED | OUTPATIENT
Start: 2021-11-30 | End: 2021-12-23 | Stop reason: SDUPTHER

## 2021-11-30 RX ORDER — OXYCODONE HYDROCHLORIDE 15 MG/1
15 TABLET ORAL EVERY 6 HOURS PRN
Qty: 120 TABLET | Refills: 0 | OUTPATIENT
Start: 2021-11-30 | End: 2021-12-30

## 2021-12-15 ENCOUNTER — OFFICE VISIT (OUTPATIENT)
Dept: PODIATRY | Age: 56
End: 2021-12-15
Payer: MEDICARE

## 2021-12-15 VITALS — WEIGHT: 187 LBS | BODY MASS INDEX: 24 KG/M2 | HEIGHT: 74 IN

## 2021-12-15 DIAGNOSIS — E11.621 TYPE 2 DIABETES MELLITUS WITH FOOT ULCER, WITHOUT LONG-TERM CURRENT USE OF INSULIN (HCC): ICD-10-CM

## 2021-12-15 DIAGNOSIS — E11.42 TYPE 2 DIABETES MELLITUS WITH DIABETIC POLYNEUROPATHY, WITHOUT LONG-TERM CURRENT USE OF INSULIN (HCC): ICD-10-CM

## 2021-12-15 DIAGNOSIS — L97.509 TYPE 2 DIABETES MELLITUS WITH FOOT ULCER, WITHOUT LONG-TERM CURRENT USE OF INSULIN (HCC): ICD-10-CM

## 2021-12-15 DIAGNOSIS — Z89.432 STATUS POST TRANSMETATARSAL AMPUTATION OF FOOT, LEFT (HCC): ICD-10-CM

## 2021-12-15 DIAGNOSIS — L97.514 ULCER OF GREAT TOE, RIGHT, WITH NECROSIS OF BONE (HCC): ICD-10-CM

## 2021-12-15 DIAGNOSIS — M86.9 OSTEOMYELITIS OF GREAT TOE OF RIGHT FOOT (HCC): Primary | ICD-10-CM

## 2021-12-15 PROCEDURE — 2022F DILAT RTA XM EVC RTNOPTHY: CPT | Performed by: PODIATRIST

## 2021-12-15 PROCEDURE — G8427 DOCREV CUR MEDS BY ELIG CLIN: HCPCS | Performed by: PODIATRIST

## 2021-12-15 PROCEDURE — 3044F HG A1C LEVEL LT 7.0%: CPT | Performed by: PODIATRIST

## 2021-12-15 PROCEDURE — G8484 FLU IMMUNIZE NO ADMIN: HCPCS | Performed by: PODIATRIST

## 2021-12-15 PROCEDURE — 3017F COLORECTAL CA SCREEN DOC REV: CPT | Performed by: PODIATRIST

## 2021-12-15 PROCEDURE — 11043 DBRDMT MUSC&/FSCA 1ST 20/<: CPT | Performed by: PODIATRIST

## 2021-12-15 PROCEDURE — 1036F TOBACCO NON-USER: CPT | Performed by: PODIATRIST

## 2021-12-15 PROCEDURE — G8420 CALC BMI NORM PARAMETERS: HCPCS | Performed by: PODIATRIST

## 2021-12-15 PROCEDURE — 99214 OFFICE O/P EST MOD 30 MIN: CPT | Performed by: PODIATRIST

## 2021-12-15 ASSESSMENT — ENCOUNTER SYMPTOMS
VOMITING: 0
CONSTIPATION: 0
NAUSEA: 0
DIARRHEA: 0
COLOR CHANGE: 0

## 2021-12-15 NOTE — PROGRESS NOTES
[address]  Return Patient  Chief Complaint   Patient presents with    Wound Check     right hallux, has gotten a lot worse since last visit, more painful       Unknown Brakeman is a 61y.o. year old male who is here with ulcer right great toe. Using silvercel . The ulcer has gotten worse. More painful, into his ankle and leg. Similar to his symptoms on the left before the amputation. He had a TMA left -   surgery on 1/4/21. . Did not take the antibiotics that I prescribed. He has stomach discomfort, he did not call to inform me of this. He was also taking antibiotics that were 3years old that he had at home. Review of Systems   Constitutional: Negative for chills, diaphoresis, fatigue, fever and unexpected weight change. Cardiovascular: Negative for leg swelling. Gastrointestinal: Negative for constipation, diarrhea, nausea and vomiting. Musculoskeletal: Negative for arthralgias, gait problem and joint swelling. Skin: Positive for wound. Negative for color change, pallor and rash. Neurological: Negative for weakness and numbness. Vascular: DP and PT pulses palpable 2/4, Right Foot and 2/4 on the Left Foot. Edema is absent,  Right Foot and absent on the Left Foot. No erythema left, erythema right hallux    Neurological:   Sensation absent  to light touch to level of digits, both feet. Musculoskeletal:   Muscle strength is 5/5 on the Right Foot and 5/5 on the Left Foot. Structural deformities are present on the Right Foot and present on the Left Foot. TMA left. Integument:  Warm, dry, supple both feet. Wound dehiscence is absent. Infection is absent    Wound #:   1    diabetic foot ulcer   right foot hallux    Wound measurements:  #1  5 cm by 4 cm  by   5 mm        Wound Depth: subcutaneous. Fascia, bone     Drainage:    Moderate Type:bloody, serous  Wound Bed status:   Granulation Tissue: 70%   Necrotic 30%  Type: slough, fibrin, Epithelialization 10%   Hypergranular 0%   Periwound hyperkeratosis:  absent  Erythema absent    Odor:no  Maceration:no  Undermining/tract/tunneling:no  Culture taken:   no                   Radiographs: Three weightbearing views (AP, Oblique, and Lateral) of the right foot were obtained in the office today and reviewed, revealing no acute fracture, dislocation, or radioopaque foreign body/tumor. Overall alignment is satisfactory. Bony destruction of the distal phalanx of the hallux and head of the proximal phalanx with bone fragmentation present. Electronically signed by Bridgette Rivera DPM      Assesment :    Diagnosis Orders   1. Osteomyelitis of great toe of right foot (HCC)  XR FOOT RIGHT (MIN 3 VIEWS)    MRI FOOT RIGHT W CONTRAST   2. Ulcer of great toe, right, with necrosis of bone ([address]     3. Type 2 diabetes mellitus with diabetic polyneuropathy, without long-term current use of insulin ([address]     4. Type 2 diabetes mellitus with foot ulcer, without long-term current use of insulin (HCC)     5. Status post transmetatarsal amputation of foot, left ([address]           Plan: Pt was evaluated and examined. Patient was given personalized discharge instructions. Pt will follow up in 4 weeks or sooner if any problems arise. Diagnosis was discussed with the pt and all of their questions were answered in detail. Proper foot hygiene and care was discussed with the pt. Patient to check feet daily and contact the office with any questions/problems/concerns. Other comorbidity noted and will be managed by PCP. Procedure Note  Indications:  Based on my examination of this patient's wound(s)/ulcer(s) today, debridement is required to promote healing and evaluate the wound base.     Performed by: Bridgette Rivera DPM    Consent obtained:  Yes    Time out taken:  Yes    Pain Control:         Wound Location: left  Pre Debridement Measurements:  Are located in the [facility]  Documentation Flow Sheet    Wound/Ulcer #: 1    Procedure in Detail: Lidocaine gel soaked gauze was applied at beginning of wound evaluation. The wound(s) was excisionally debrided sharply of fibrotic, necrotic, and hyperkeratotic tissue, including a layer of surrounding healthy tissue using curette. The wound was debrided down through and including subcutaneous. Fascia,    Percent of Wound Debrided: 100%    Total Surface Area Debrided:   sq cm     Bleeding: Minimal      Plan for wound  Dress per physician order  Treatment: silvercel, daily  Monitor feet daily  Moisturizing cream daily  Clean sock  Good shoes  Ordered him to stop picking at dead skin on his feet, or he may develop a new ulceration. Discussed possibility of a partial hallux amputation vs TMA due to rapid worsening of the ulceration and infection. Dressing Documentation    This patient needs a dressings to aid in healing of the ulceration. I am prescribing silvercel, as the primary dressing  Sequence of dressing: silvercel, gauze 4x4, kerlix roll, secure with tape  Frequency of change: daily  Duration of supplies: 30 days    1) The ulceration has been surgically debrided on 12/16/21   2) The ulceration is located on the right hallux  3) The ulceration was assessed on 12/16/21   4) Type of ulceration:  diabetic  5) See above for size of ulceration(s)  6) Amount of drainage: moderate    Will order MRI for possible surgical planning. No orders of the defined types were placed in this encounter. Follow up 2 week(s).

## 2021-12-16 ENCOUNTER — TELEPHONE (OUTPATIENT)
Dept: PRIMARY CARE CLINIC | Age: 56
End: 2021-12-16

## 2021-12-16 NOTE — TELEPHONE ENCOUNTER
Saw Dr. Ciara Eric there are new pictures of his foot in his note. He is asking if his pain medication can be increased what he is on is not doing it for him. Please advise.

## 2021-12-17 RX ORDER — KETOROLAC TROMETHAMINE 10 MG/1
10 TABLET, FILM COATED ORAL EVERY 6 HOURS PRN
Qty: 20 TABLET | Refills: 0 | Status: SHIPPED | OUTPATIENT
Start: 2021-12-17 | End: 2022-02-24

## 2021-12-17 NOTE — TELEPHONE ENCOUNTER
Wife informed. Verbalized understanding.  Then stated that he is in a lot of pain will be having surgery again soon and asked if is oxy could be increased to every 4 hours \"he needs something\"

## 2021-12-20 ENCOUNTER — TELEPHONE (OUTPATIENT)
Dept: PRIMARY CARE CLINIC | Age: 56
End: 2021-12-20

## 2021-12-20 NOTE — TELEPHONE ENCOUNTER
Patient couldn't tolerate the Torodol due to his ulcers. Still asking if his pain medication can be increased from 6 hours to every 4 hours.
Patient informed.
no
poor balance

## 2021-12-23 DIAGNOSIS — M86.9 OSTEOMYELITIS, UNSPECIFIED SITE, UNSPECIFIED TYPE (HCC): ICD-10-CM

## 2021-12-23 DIAGNOSIS — L97.524 ULCERATED, FOOT, LEFT, WITH NECROSIS OF BONE (HCC): ICD-10-CM

## 2021-12-23 DIAGNOSIS — M86.172 OSTEOMYELITIS OF FOOT, LEFT, ACUTE (HCC): ICD-10-CM

## 2021-12-23 DIAGNOSIS — M54.12 RIGHT CERVICAL RADICULOPATHY: ICD-10-CM

## 2021-12-23 RX ORDER — OXYCODONE HYDROCHLORIDE 15 MG/1
15 TABLET ORAL EVERY 6 HOURS PRN
Qty: 120 TABLET | Refills: 0 | Status: ON HOLD | OUTPATIENT
Start: 2021-12-23 | End: 2022-01-21 | Stop reason: SDUPTHER

## 2021-12-23 RX ORDER — MORPHINE SULFATE 60 MG/1
60 TABLET, FILM COATED, EXTENDED RELEASE ORAL 2 TIMES DAILY
Qty: 60 TABLET | Refills: 0 | Status: ON HOLD | OUTPATIENT
Start: 2021-12-23 | End: 2022-01-21 | Stop reason: SDUPTHER

## 2021-12-23 NOTE — TELEPHONE ENCOUNTER
I advised the patients wife that he was not due for his oxycodone until 12/30/2021 and that we would be open Monday and she could call for a refill then. Pts wife states hes been in a lot of pain so he has taken extra and the pharmacy will refill on 12/28/2021 for him if we send the script. Please advise.

## 2022-01-03 ENCOUNTER — TELEPHONE (OUTPATIENT)
Dept: PRIMARY CARE CLINIC | Age: 57
End: 2022-01-03

## 2022-01-03 NOTE — TELEPHONE ENCOUNTER
Having a lot of breakthrough pain asking if you can send in some Tramadol or Bupenorphine. He has had both of these before and had good results with them. Please advise. Pharmacy [id number].

## 2022-01-12 ENCOUNTER — OFFICE VISIT (OUTPATIENT)
Dept: PODIATRY | Age: 57
End: 2022-01-12
Payer: MEDICARE

## 2022-01-12 VITALS — BODY MASS INDEX: 24 KG/M2 | HEIGHT: 74 IN | WEIGHT: 187 LBS

## 2022-01-12 DIAGNOSIS — M86.9 OSTEOMYELITIS OF GREAT TOE OF RIGHT FOOT (HCC): Primary | ICD-10-CM

## 2022-01-12 DIAGNOSIS — Z89.432 STATUS POST TRANSMETATARSAL AMPUTATION OF FOOT, LEFT (HCC): ICD-10-CM

## 2022-01-12 DIAGNOSIS — E11.621 TYPE 2 DIABETES MELLITUS WITH FOOT ULCER, WITHOUT LONG-TERM CURRENT USE OF INSULIN (HCC): ICD-10-CM

## 2022-01-12 DIAGNOSIS — L97.509 TYPE 2 DIABETES MELLITUS WITH FOOT ULCER, WITHOUT LONG-TERM CURRENT USE OF INSULIN (HCC): ICD-10-CM

## 2022-01-12 DIAGNOSIS — L97.514 ULCER OF GREAT TOE, RIGHT, WITH NECROSIS OF BONE (HCC): ICD-10-CM

## 2022-01-12 DIAGNOSIS — E11.42 TYPE 2 DIABETES MELLITUS WITH DIABETIC POLYNEUROPATHY, WITHOUT LONG-TERM CURRENT USE OF INSULIN (HCC): ICD-10-CM

## 2022-01-12 PROCEDURE — G8484 FLU IMMUNIZE NO ADMIN: HCPCS | Performed by: PODIATRIST

## 2022-01-12 PROCEDURE — G8427 DOCREV CUR MEDS BY ELIG CLIN: HCPCS | Performed by: PODIATRIST

## 2022-01-12 PROCEDURE — 99214 OFFICE O/P EST MOD 30 MIN: CPT | Performed by: PODIATRIST

## 2022-01-12 PROCEDURE — 1036F TOBACCO NON-USER: CPT | Performed by: PODIATRIST

## 2022-01-12 PROCEDURE — 3017F COLORECTAL CA SCREEN DOC REV: CPT | Performed by: PODIATRIST

## 2022-01-12 PROCEDURE — G8420 CALC BMI NORM PARAMETERS: HCPCS | Performed by: PODIATRIST

## 2022-01-12 PROCEDURE — 11043 DBRDMT MUSC&/FSCA 1ST 20/<: CPT | Performed by: PODIATRIST

## 2022-01-12 PROCEDURE — 2022F DILAT RTA XM EVC RTNOPTHY: CPT | Performed by: PODIATRIST

## 2022-01-12 PROCEDURE — 3046F HEMOGLOBIN A1C LEVEL >9.0%: CPT | Performed by: PODIATRIST

## 2022-01-12 ASSESSMENT — ENCOUNTER SYMPTOMS
COLOR CHANGE: 0
VOMITING: 0
CONSTIPATION: 0
NAUSEA: 0
DIARRHEA: 0

## 2022-01-12 NOTE — PROGRESS NOTES
[address]  Return Patient  Chief Complaint   Patient presents with    Wound Check     right foot     Diabetes     last blood sugar 130       Edwige Vidal is a 61y.o. year old male who is here with ulcer right great toe. Using silvercel . The ulcer has gotten worse still. More painful, into his ankle and leg. Similar to his symptoms on the left before the amputation. He had a TMA left -   surgery on 1/4/21. . Did not take the antibiotics that I prescribed again, did not call to let me know he stopped due to stomach discomfort, He was also taking antibiotics that were 3years old that he had at home. Did not get the MRI I ordered a month ago. Review of Systems   Constitutional: Negative for chills, diaphoresis, fatigue, fever and unexpected weight change. Cardiovascular: Negative for leg swelling. Gastrointestinal: Negative for constipation, diarrhea, nausea and vomiting. Musculoskeletal: Negative for arthralgias, gait problem and joint swelling. Skin: Positive for wound. Negative for color change, pallor and rash. Neurological: Negative for weakness and numbness. Vascular: DP and PT pulses palpable 2/4, Right Foot and 2/4 on the Left Foot. Edema is absent,  Right Foot and absent on the Left Foot. No erythema left, erythema right hallux    Neurological:   Sensation absent  to light touch to level of digits, both feet. Musculoskeletal:   Muscle strength is 5/5 on the Right Foot and 5/5 on the Left Foot. Structural deformities are present on the Right Foot and present on the Left Foot. TMA left. Integument:  Warm, dry, supple both feet. Wound dehiscence is absent. Infection is absent    Wound #:   1    diabetic foot ulcer   right foot hallux    Wound measurements:  #1  5 cm by 5 cm  by   5 mm        Wound Depth: subcutaneous. Fascia, bone     Drainage:    Moderate Type:bloody, serous  Wound Bed status:   Granulation Tissue: 70%   Necrotic 30% Type: slough, fibrin,   Epithelialization 10%   Hypergranular 0%   Periwound hyperkeratosis:  absent  Erythema absent    Odor:no  Maceration:no  Undermining/tract/tunneling:no  Culture taken:   no                   Radiographs: Three weightbearing views (AP, Oblique, and Lateral) of the right foot were obtained in the office today and reviewed, revealing no acute fracture, dislocation, or radioopaque foreign body/tumor. Overall alignment is satisfactory. Bony destruction of the distal phalanx of the hallux and head of the proximal phalanx with bone fragmentation present. Electronically signed by Miriam Askew DPM      Assesment :    Diagnosis Orders   1. Osteomyelitis of great toe of right foot (HCC)  MN DEBRIDEMENT, SKIN, SUB-Q TISSUE,MUSCLE,=<20 SQ CM   2. Ulcer of great toe, right, with necrosis of bone (HCC)  MN DEBRIDEMENT, SKIN, SUB-Q TISSUE,MUSCLE,=<20 SQ CM   3. Type 2 diabetes mellitus with diabetic polyneuropathy, without long-term current use of insulin (HCC)  MN DEBRIDEMENT, SKIN, SUB-Q TISSUE,MUSCLE,=<20 SQ CM   4. Type 2 diabetes mellitus with foot ulcer, without long-term current use of insulin (HCC)  MN DEBRIDEMENT, SKIN, SUB-Q TISSUE,MUSCLE,=<20 SQ CM   5. Status post transmetatarsal amputation of foot, left (HCC)  MN DEBRIDEMENT, SKIN, SUB-Q TISSUE,MUSCLE,=<20 SQ CM         Plan: Pt was evaluated and examined. Patient was given personalized discharge instructions. Pt will follow up in 4 weeks or sooner if any problems arise. Diagnosis was discussed with the pt and all of their questions were answered in detail. Proper foot hygiene and care was discussed with the pt. Patient to check feet daily and contact the office with any questions/problems/concerns. Other comorbidity noted and will be managed by PCP.     Procedure Note  Indications:  Based on my examination of this patient's wound(s)/ulcer(s) today, debridement is required to promote healing and evaluate the wound base.    Performed by: Adrian Page DPM    Consent obtained:  Yes    Time out taken:  Yes    Pain Control:         Wound Location: left  Pre Debridement Measurements:  Are located in the Wound/Ulcer Documentation Flow Sheet    Wound/Ulcer #: 1    Procedure in Detail: Lidocaine gel soaked gauze was applied at beginning of wound evaluation. The wound(s) was excisionally debrided sharply of fibrotic, necrotic, and hyperkeratotic tissue, including a layer of surrounding healthy tissue using curette. The wound was debrided down through and including subcutaneous. Fascia,    Percent of Wound Debrided: 100%    Total Surface Area Debrided:   sq cm     Bleeding: Minimal      Plan for wound  Dress per physician order  Discussed admission today to get infection under control with IV antibiotics, perform the MRI, with plans for a TMA on the right. He declined being admitted today. Would like to proceed next week sometime. I discussed with him and his wife that I recommend admission today and waiting may risk losing more of his foot or leg. Treatment: silvercel, daily  Monitor feet daily  Moisturizing cream daily  Clean sock  Good shoes  Ordered him to stop picking at dead skin on his feet, or he may develop a new ulceration. Discussed possibility of a partial hallux amputation vs TMA due to rapid worsening of the ulceration and infection. Dressing Documentation    This patient needs a dressings to aid in healing of the ulceration.      I am prescribing silvercel, as the primary dressing  Sequence of dressing: silvercel, gauze 4x4, kerlix roll, secure with tape  Frequency of change: daily  Duration of supplies: 30 days    1) The ulceration has been surgically debrided on 01/13/22   2) The ulceration is located on the right hallux  3) The ulceration was assessed on 01/13/22   4) Type of ulceration:  diabetic  5) See above for size of ulceration(s)  6) Amount of drainage: moderate    Will order MRI for possible surgical planning. No orders of the defined types were placed in this encounter. Follow up 2 week(s).

## 2022-01-17 ENCOUNTER — APPOINTMENT (OUTPATIENT)
Dept: MRI IMAGING | Age: 57
DRG: 305 | End: 2022-01-17
Attending: PODIATRIST
Payer: MEDICARE

## 2022-01-17 ENCOUNTER — HOSPITAL ENCOUNTER (INPATIENT)
Age: 57
LOS: 6 days | Discharge: HOME | DRG: 305 | End: 2022-01-23
Attending: PODIATRIST | Admitting: PODIATRIST
Payer: MEDICARE

## 2022-01-17 ENCOUNTER — APPOINTMENT (OUTPATIENT)
Dept: GENERAL RADIOLOGY | Age: 57
DRG: 305 | End: 2022-01-17
Attending: PODIATRIST
Payer: MEDICARE

## 2022-01-17 DIAGNOSIS — G89.18 ACUTE POST-OPERATIVE PAIN: Primary | ICD-10-CM

## 2022-01-17 DIAGNOSIS — Z98.890 STATUS POST RIGHT FOOT SURGERY: ICD-10-CM

## 2022-01-17 PROBLEM — E11.9 TYPE 2 DIABETES MELLITUS (HCC): Status: ACTIVE | Noted: 2018-03-15

## 2022-01-17 PROBLEM — M86.9 OSTEOMYELITIS OF GREAT TOE OF RIGHT FOOT (HCC): Status: ACTIVE | Noted: 2022-01-17

## 2022-01-17 LAB
ABSOLUTE EOS #: 0.22 K/UL (ref 0–0.44)
ABSOLUTE IMMATURE GRANULOCYTE: <0.03 K/UL (ref 0–0.3)
ABSOLUTE LYMPH #: 1.3 K/UL (ref 1.1–3.7)
ABSOLUTE MONO #: 0.5 K/UL (ref 0.1–1.2)
ALBUMIN SERPL-MCNC: 3.6 G/DL (ref 3.5–5.2)
ALBUMIN/GLOBULIN RATIO: 0.9 (ref 1–2.5)
ALP BLD-CCNC: 76 U/L (ref 40–129)
ALT SERPL-CCNC: 6 U/L (ref 5–41)
ANION GAP SERPL CALCULATED.3IONS-SCNC: 12 MMOL/L (ref 9–17)
AST SERPL-CCNC: 11 U/L
BASOPHILS # BLD: 1 % (ref 0–2)
BASOPHILS ABSOLUTE: 0.05 K/UL (ref 0–0.2)
BILIRUB SERPL-MCNC: <0.1 MG/DL (ref 0.3–1.2)
BUN BLDV-MCNC: 21 MG/DL (ref 6–20)
BUN/CREAT BLD: ABNORMAL (ref 9–20)
C-REACTIVE PROTEIN: 36 MG/L (ref 0–5)
CALCIUM SERPL-MCNC: 8.4 MG/DL (ref 8.6–10.4)
CHLORIDE BLD-SCNC: 101 MMOL/L (ref 98–107)
CO2: 27 MMOL/L (ref 20–31)
CREAT SERPL-MCNC: 0.72 MG/DL (ref 0.7–1.2)
CREAT SERPL-MCNC: 0.74 MG/DL (ref 0.7–1.2)
DIFFERENTIAL TYPE: ABNORMAL
EOSINOPHILS RELATIVE PERCENT: 3 % (ref 1–4)
FERRITIN: 23 UG/L (ref 30–400)
FOLATE: 12.4 NG/ML
GFR AFRICAN AMERICAN: >60 ML/MIN
GFR AFRICAN AMERICAN: >60 ML/MIN
GFR NON-AFRICAN AMERICAN: >60 ML/MIN
GFR NON-AFRICAN AMERICAN: >60 ML/MIN
GFR SERPL CREATININE-BSD FRML MDRD: ABNORMAL ML/MIN/{1.73_M2}
GFR SERPL CREATININE-BSD FRML MDRD: ABNORMAL ML/MIN/{1.73_M2}
GFR SERPL CREATININE-BSD FRML MDRD: NORMAL ML/MIN/{1.73_M2}
GFR SERPL CREATININE-BSD FRML MDRD: NORMAL ML/MIN/{1.73_M2}
GLUCOSE BLD-MCNC: 62 MG/DL (ref 75–110)
GLUCOSE BLD-MCNC: 84 MG/DL (ref 75–110)
GLUCOSE BLD-MCNC: 91 MG/DL (ref 70–99)
GLUCOSE BLD-MCNC: 91 MG/DL (ref 75–110)
HCT VFR BLD CALC: 30.6 % (ref 40.7–50.3)
HEMOGLOBIN: 9.1 G/DL (ref 13–17)
IMMATURE GRANULOCYTES: 0 %
IRON SATURATION: 6 % (ref 20–55)
IRON: 23 UG/DL (ref 59–158)
LYMPHOCYTES # BLD: 19 % (ref 24–43)
MCH RBC QN AUTO: 23.8 PG (ref 25.2–33.5)
MCHC RBC AUTO-ENTMCNC: 29.7 G/DL (ref 28.4–34.8)
MCV RBC AUTO: 79.9 FL (ref 82.6–102.9)
MONOCYTES # BLD: 7 % (ref 3–12)
NRBC AUTOMATED: 0 PER 100 WBC
PDW BLD-RTO: 15.7 % (ref 11.8–14.4)
PLATELET # BLD: 479 K/UL (ref 138–453)
PLATELET ESTIMATE: ABNORMAL
PMV BLD AUTO: 9.2 FL (ref 8.1–13.5)
POTASSIUM SERPL-SCNC: 4.2 MMOL/L (ref 3.7–5.3)
RBC # BLD: 3.83 M/UL (ref 4.21–5.77)
RBC # BLD: ABNORMAL 10*6/UL
SEDIMENTATION RATE, ERYTHROCYTE: 96 MM (ref 0–20)
SEG NEUTROPHILS: 70 % (ref 36–65)
SEGMENTED NEUTROPHILS ABSOLUTE COUNT: 4.69 K/UL (ref 1.5–8.1)
SODIUM BLD-SCNC: 140 MMOL/L (ref 135–144)
TOTAL IRON BINDING CAPACITY: 357 UG/DL (ref 250–450)
TOTAL PROTEIN: 7.4 G/DL (ref 6.4–8.3)
TRANSFERRIN: 315 MG/DL (ref 200–360)
UNSATURATED IRON BINDING CAPACITY: 334 UG/DL (ref 112–347)
VITAMIN B-12: 886 PG/ML (ref 232–1245)
WBC # BLD: 6.8 K/UL (ref 3.5–11.3)
WBC # BLD: ABNORMAL 10*3/UL

## 2022-01-17 PROCEDURE — 82947 ASSAY GLUCOSE BLOOD QUANT: CPT

## 2022-01-17 PROCEDURE — 36415 COLL VENOUS BLD VENIPUNCTURE: CPT

## 2022-01-17 PROCEDURE — 93970 EXTREMITY STUDY: CPT

## 2022-01-17 PROCEDURE — 85025 COMPLETE CBC W/AUTO DIFF WBC: CPT

## 2022-01-17 PROCEDURE — 84466 ASSAY OF TRANSFERRIN: CPT

## 2022-01-17 PROCEDURE — 80053 COMPREHEN METABOLIC PANEL: CPT

## 2022-01-17 PROCEDURE — 82607 VITAMIN B-12: CPT

## 2022-01-17 PROCEDURE — 6370000000 HC RX 637 (ALT 250 FOR IP): Performed by: PODIATRIST

## 2022-01-17 PROCEDURE — 82728 ASSAY OF FERRITIN: CPT

## 2022-01-17 PROCEDURE — 83550 IRON BINDING TEST: CPT

## 2022-01-17 PROCEDURE — 1200000000 HC SEMI PRIVATE

## 2022-01-17 PROCEDURE — 82746 ASSAY OF FOLIC ACID SERUM: CPT

## 2022-01-17 PROCEDURE — 99253 IP/OBS CNSLTJ NEW/EST LOW 45: CPT | Performed by: INTERNAL MEDICINE

## 2022-01-17 PROCEDURE — 73630 X-RAY EXAM OF FOOT: CPT

## 2022-01-17 PROCEDURE — 6370000000 HC RX 637 (ALT 250 FOR IP): Performed by: STUDENT IN AN ORGANIZED HEALTH CARE EDUCATION/TRAINING PROGRAM

## 2022-01-17 PROCEDURE — 2580000003 HC RX 258: Performed by: PODIATRIST

## 2022-01-17 PROCEDURE — 83540 ASSAY OF IRON: CPT

## 2022-01-17 PROCEDURE — 6360000002 HC RX W HCPCS: Performed by: PODIATRIST

## 2022-01-17 PROCEDURE — 85652 RBC SED RATE AUTOMATED: CPT

## 2022-01-17 PROCEDURE — 86140 C-REACTIVE PROTEIN: CPT

## 2022-01-17 PROCEDURE — 73718 MRI LOWER EXTREMITY W/O DYE: CPT

## 2022-01-17 PROCEDURE — 82565 ASSAY OF CREATININE: CPT

## 2022-01-17 RX ORDER — DEXTROSE MONOHYDRATE 25 G/50ML
12.5 INJECTION, SOLUTION INTRAVENOUS PRN
Status: DISCONTINUED | OUTPATIENT
Start: 2022-01-17 | End: 2022-01-23 | Stop reason: HOSPADM

## 2022-01-17 RX ORDER — PANTOPRAZOLE SODIUM 40 MG/1
40 TABLET, DELAYED RELEASE ORAL
Status: DISCONTINUED | OUTPATIENT
Start: 2022-01-18 | End: 2022-01-23 | Stop reason: HOSPADM

## 2022-01-17 RX ORDER — MORPHINE SULFATE 2 MG/ML
2 INJECTION, SOLUTION INTRAMUSCULAR; INTRAVENOUS
Status: DISCONTINUED | OUTPATIENT
Start: 2022-01-17 | End: 2022-01-19

## 2022-01-17 RX ORDER — POLYETHYLENE GLYCOL 3350 17 G/17G
17 POWDER, FOR SOLUTION ORAL DAILY PRN
Status: DISCONTINUED | OUTPATIENT
Start: 2022-01-17 | End: 2022-01-23 | Stop reason: HOSPADM

## 2022-01-17 RX ORDER — DEXTROSE MONOHYDRATE 50 MG/ML
100 INJECTION, SOLUTION INTRAVENOUS PRN
Status: DISCONTINUED | OUTPATIENT
Start: 2022-01-17 | End: 2022-01-23 | Stop reason: HOSPADM

## 2022-01-17 RX ORDER — SODIUM CHLORIDE 0.9 % (FLUSH) 0.9 %
5-40 SYRINGE (ML) INJECTION PRN
Status: DISCONTINUED | OUTPATIENT
Start: 2022-01-17 | End: 2022-01-23 | Stop reason: HOSPADM

## 2022-01-17 RX ORDER — BUPROPION HYDROCHLORIDE 300 MG/1
300 TABLET ORAL DAILY
Status: DISCONTINUED | OUTPATIENT
Start: 2022-01-17 | End: 2022-01-23 | Stop reason: HOSPADM

## 2022-01-17 RX ORDER — MORPHINE SULFATE 4 MG/ML
4 INJECTION, SOLUTION INTRAMUSCULAR; INTRAVENOUS
Status: DISCONTINUED | OUTPATIENT
Start: 2022-01-17 | End: 2022-01-19

## 2022-01-17 RX ORDER — PAROXETINE HYDROCHLORIDE 20 MG/1
40 TABLET, FILM COATED ORAL EVERY MORNING
Status: DISCONTINUED | OUTPATIENT
Start: 2022-01-18 | End: 2022-01-23 | Stop reason: HOSPADM

## 2022-01-17 RX ORDER — LEVOTHYROXINE SODIUM 0.05 MG/1
50 TABLET ORAL DAILY
Status: DISCONTINUED | OUTPATIENT
Start: 2022-01-17 | End: 2022-01-23 | Stop reason: HOSPADM

## 2022-01-17 RX ORDER — ONDANSETRON 4 MG/1
4 TABLET, ORALLY DISINTEGRATING ORAL EVERY 8 HOURS PRN
Status: DISCONTINUED | OUTPATIENT
Start: 2022-01-17 | End: 2022-01-23 | Stop reason: HOSPADM

## 2022-01-17 RX ORDER — SODIUM HYPOCHLORITE 1.25 MG/ML
SOLUTION TOPICAL DAILY
Status: DISCONTINUED | OUTPATIENT
Start: 2022-01-17 | End: 2022-01-23 | Stop reason: HOSPADM

## 2022-01-17 RX ORDER — NICOTINE POLACRILEX 4 MG
15 LOZENGE BUCCAL PRN
Status: DISCONTINUED | OUTPATIENT
Start: 2022-01-17 | End: 2022-01-23 | Stop reason: HOSPADM

## 2022-01-17 RX ORDER — ONDANSETRON 2 MG/ML
4 INJECTION INTRAMUSCULAR; INTRAVENOUS EVERY 6 HOURS PRN
Status: DISCONTINUED | OUTPATIENT
Start: 2022-01-17 | End: 2022-01-23 | Stop reason: HOSPADM

## 2022-01-17 RX ORDER — OXYCODONE HYDROCHLORIDE 5 MG/1
15 TABLET ORAL EVERY 6 HOURS PRN
Status: DISCONTINUED | OUTPATIENT
Start: 2022-01-17 | End: 2022-01-23 | Stop reason: HOSPADM

## 2022-01-17 RX ORDER — LISINOPRIL 10 MG/1
10 TABLET ORAL DAILY
Status: DISCONTINUED | OUTPATIENT
Start: 2022-01-17 | End: 2022-01-23 | Stop reason: HOSPADM

## 2022-01-17 RX ORDER — LACTOBACILLUS RHAMNOSUS GG 10B CELL
1 CAPSULE ORAL 2 TIMES DAILY WITH MEALS
Status: DISCONTINUED | OUTPATIENT
Start: 2022-01-17 | End: 2022-01-23 | Stop reason: HOSPADM

## 2022-01-17 RX ORDER — MORPHINE SULFATE 30 MG/1
60 TABLET, FILM COATED, EXTENDED RELEASE ORAL 2 TIMES DAILY
Status: DISCONTINUED | OUTPATIENT
Start: 2022-01-17 | End: 2022-01-23 | Stop reason: HOSPADM

## 2022-01-17 RX ORDER — OXYCODONE HYDROCHLORIDE 5 MG/1
5 TABLET ORAL EVERY 4 HOURS PRN
Status: DISCONTINUED | OUTPATIENT
Start: 2022-01-17 | End: 2022-01-17 | Stop reason: SDUPTHER

## 2022-01-17 RX ORDER — SODIUM CHLORIDE 0.9 % (FLUSH) 0.9 %
5-40 SYRINGE (ML) INJECTION EVERY 12 HOURS SCHEDULED
Status: DISCONTINUED | OUTPATIENT
Start: 2022-01-17 | End: 2022-01-23 | Stop reason: HOSPADM

## 2022-01-17 RX ORDER — SODIUM CHLORIDE 9 MG/ML
25 INJECTION, SOLUTION INTRAVENOUS PRN
Status: DISCONTINUED | OUTPATIENT
Start: 2022-01-17 | End: 2022-01-23 | Stop reason: HOSPADM

## 2022-01-17 RX ORDER — OXYCODONE HYDROCHLORIDE 5 MG/1
10 TABLET ORAL EVERY 4 HOURS PRN
Status: DISCONTINUED | OUTPATIENT
Start: 2022-01-17 | End: 2022-01-17 | Stop reason: SDUPTHER

## 2022-01-17 RX ADMIN — MORPHINE SULFATE 2 MG: 2 INJECTION, SOLUTION INTRAMUSCULAR; INTRAVENOUS at 22:49

## 2022-01-17 RX ADMIN — Medication 1500 MG: at 20:44

## 2022-01-17 RX ADMIN — ENOXAPARIN SODIUM 40 MG: 100 INJECTION SUBCUTANEOUS at 19:12

## 2022-01-17 RX ADMIN — Medication 1 CAPSULE: at 20:44

## 2022-01-17 RX ADMIN — OXYCODONE HYDROCHLORIDE 10 MG: 5 TABLET ORAL at 15:46

## 2022-01-17 RX ADMIN — SODIUM CHLORIDE, PRESERVATIVE FREE 10 ML: 5 INJECTION INTRAVENOUS at 20:45

## 2022-01-17 RX ADMIN — MORPHINE SULFATE 60 MG: 30 TABLET, FILM COATED, EXTENDED RELEASE ORAL at 20:44

## 2022-01-17 ASSESSMENT — PAIN SCALES - GENERAL
PAINLEVEL_OUTOF10: 8

## 2022-01-17 ASSESSMENT — ENCOUNTER SYMPTOMS
CHEST TIGHTNESS: 0
COUGH: 0
FACIAL SWELLING: 0
PHOTOPHOBIA: 0
ABDOMINAL DISTENTION: 0

## 2022-01-17 NOTE — CARE COORDINATION
Case Management Initial Discharge Plan  Queen Janus,             Met with:spouse to discuss discharge plans. Information verified: address, contacts, phone number, , insurance Yes  Insurance Provider: [employer]    Emergency Contact/Next of Kin name & number: Luz Seay, spouse at [phone]  Who are involved in patient's support system? Spouse and pt's mother    PCP: Stone Martínez MD  Date of last visit: few months ago      Discharge Planning    Living Arrangements:  Spouse/Significant Other     Home has 2 stories  4-5 stairs to climb to get into front door,   Location of bedroom/bathroom in home main    Patient able to perform ADL's:Assisted    Current Services (outpatient & in home) none  DME equipment: cane, walker, SC  DME provider: unknown    Is patient receiving oral anticoagulation therapy? No    If indicated:   Physician managing anticoagulation treatment: n/a  Where does patient obtain lab work for ATC treatment? n/a      Potential Assistance Needed:  Home Care    Patient agreeable to home care: Yes  Freedom of choice provided:  yes    Prior SNF/Rehab Placement and Facility: none  Agreeable to SNF/Rehab: No  [place] of choice provided: no     Evaluation: no    Expected Discharge date:       Patient expects to be discharged to: If home: is the family and/or caregiver wiling & able to provide support at home? yes  Who will be providing this support? Spouse and pt's mother. Pt's mother drives pt. Spouse does not drive    Follow Up Appointment: Best Day/ Time:      Transportation provider: Pt's mother  Transportation arrangements needed for discharge: No    Readmission Risk              Risk of Unplanned Readmission:  9             Does patient have a readmission risk score greater than 14?: No  If yes, follow-up appointment must be made within 7 days of discharge.      Goals of Care:       Educated spouse on transitional options, provided freedom of choice and are agreeable with plan      Discharge Plan: Home w/spouse.  [facility] [place], [facility]. choice list.             Electronically signed by Keagan Fonseca RN on 1/17/22 at 5:07 PM EST

## 2022-01-17 NOTE — H&P
H&P  Podiatric Medicine and Surgery     Subjective     Chief Complaint: right foot and leg pain    HPI:  Keron Arizmendi is a 61 y.o. male seen at [facility] on the floor for right foot pain and infection. He is well known to Dr. Saranya Tan who advised he be admitted 1/12/21 but pt refused until today. He has been seen for wound care over the course of the last couple years and demonstrates repetitive non-compliant behaviors including failure to follow up when scheduled, failure to fill prescriptions in a timely manner, and using substitute medications that have been [de-identified] years old. PCP is Eilleen Saint, MD    ROS:   Review of Systems   Constitutional: Negative for chills, fatigue and unexpected weight change. HENT: Negative for congestion and facial swelling. Eyes: Negative for photophobia and visual disturbance. Respiratory: Negative for cough and chest tightness. Cardiovascular: Positive for leg swelling. Negative for chest pain and palpitations. Gastrointestinal: Negative for abdominal distention. Musculoskeletal: Positive for gait problem and joint swelling. Skin: Positive for wound. Negative for pallor. Psychiatric/Behavioral: Negative for behavioral problems and confusion. Past Medical History   has a past medical history of Acute metabolic encephalopathy, CLAUDIA (acute kidney injury) ([address] Anemia, Anxiety, ARF (acute renal failure) ([address] Arthritis, BPH (benign prostatic hyperplasia), Chronic kidney disease, Depression, Diabetes mellitus ([address] Diabetic neuropathy ([address] GERD (gastroesophageal reflux disease), Hyperglycemia, Hyperkalemia, Hypertension, Hypothyroidism, Impacted tooth, Necrosis of bone of foot ([address] Osteomyelitis of left foot ([address] PAD (peripheral artery disease) ([address] Pneumonia, Septic shock ([address] and Type 2 diabetes mellitus ([address]     Past Surgical History   has a past surgical history that includes Appendectomy; knee surgery (Right); Tonsillectomy; back surgery; Toe amputation (Bilateral, 3/6/2017); Upper gastrointestinal endoscopy (N/A, 5/20/2019); Foot surgery (Left, 01/04/2020); and Foot Amputation (Left, 1/4/2020). Medications  Prior to Admission medications    Medication Sig Start Date End Date Taking? Authorizing Provider   morphine (MS CONTIN) 60 MG extended release tablet Take 1 tablet by mouth 2 times daily for 30 days. 12/23/21 1/22/22  Mary Cuellar MD   oxyCODONE (OXY-IR) 15 MG immediate release tablet Take 1 tablet by mouth every 6 hours as needed for Pain for up to 30 days. 12/23/21 1/22/22  Mary Cuellar MD   ketorolac (TORADOL) 10 MG tablet Take 1 tablet by mouth every 6 hours as needed for Pain 12/17/21 12/17/22  Mary Cuellar MD   levothyroxine (SYNTHROID) 50 MCG tablet Take 1 tablet by mouth daily 11/26/21   Mary Cuellar MD   fluocinonide (LIDEX) 0.05 % ointment Apply 1 gm  topically 2 times daily to legs 11/17/21   Fracisco Joseph DPM   ONETOUCH ULTRA strip TEST TWO TIMES A DAY AS NEEDED 9/21/21   Mary Cuellar MD   buPROPion (WELLBUTRIN XL) 300 MG extended release tablet TAKE ONE TABLET BY MOUTH DAILY 9/7/21   Mary Cuellar MD   PARoxetine (PAXIL) 20 MG tablet Take 4 tablets by mouth every morning 8/3/21   Mary Cuellar MD   Lancets ([address] [place]) [facility] TEST TWICE A DAY 3/8/21   Mary Cuellar MD   sodium hypochlorite (DAKINS) 0.125 % SOLN external solution APPLY TOPICALLY EVERY 12 HOURS.  APPLY AS A WET TO DRY DRESSING TWO TIMES A DAY, CAN ALSO USE TO CLEANSE WOUND 5/11/20   Fracisco Joseph DPM   lisinopril (PRINIVIL;ZESTRIL) 10 MG tablet Take 1 tablet by mouth daily 4/29/20   Mary Cuellar MD   metFORMIN (GLUCOPHAGE) 500 MG tablet Take 0.5 tablets by mouth daily (with breakfast) 4/29/20   Mary Cuellar MD   megestrol (MEGACE ORAL) 40 MG/ML suspension Take 10 mLs by mouth daily 12/24/19   Mary Cuellar MD   [facility] 4 MG/0.1ML LIQD nasal spray 4 mg 10/29/19   Historical Provider, MD   atorvastatin (LIPITOR) 20 MG tablet Take 1 tablet by mouth daily 10/10/19   Shameka Polanco MD   lactobacillus (CULTURELLE) capsule Take 1 capsule by mouth 2 times daily (with meals) 5/21/19   Shameka Polanco MD   magnesium oxide (MAG-OX) 400 (241.3 Mg) MG TABS tablet Take 1 tablet by mouth 2 times daily 5/21/19   Shameka Polanco MD   pantoprazole (PROTONIX) 40 MG tablet Take 1 tablet by mouth every morning (before breakfast) 5/22/19   Shameka Polanco MD   povidone-iodine (BETADINE) 7.5 % external solution Apply to wound. Apply as a wet to dry dressing 4/16/19   Nanci Martinez DPM   Wound Dressings (ADAPTIC NON-ADHERING DRESSING) PADS Apply 1 Units topically 2 times daily 9/28/18   Shameka Polanco MD   Gauze Pads & Dressings (COMBINE ABD) 5\"X9\" PADS 1 Units by Does not apply route 2 times daily 9/28/18   Shameka Polanco MD   Gauze Pads & Dressings [facility] GAUZE ROLL LARGE) MISC 1 Units by Does not apply route 2 times daily 6/12/17 11/17/21  Logan Potts MD    Scheduled Meds:   insulin lispro  0-6 Units SubCUTAneous TID WC    insulin lispro  0-3 Units SubCUTAneous Nightly    sodium chloride flush  5-40 mL IntraVENous 2 times per day    enoxaparin  40 mg SubCUTAneous Daily    cefepime  2,000 mg IntraVENous Q12H     Continuous Infusions:   dextrose      sodium chloride       PRN Meds:.glucose, dextrose, glucagon (rDNA), dextrose, sodium chloride flush, sodium chloride, ondansetron **OR** ondansetron, polyethylene glycol    Allergies  is allergic to bactrim [sulfamethoxazole-trimethoprim], fiorinal [butalbital-aspirin-caffeine], statins, tylenol [acetaminophen], and peanut butter flavor [flavoring agent]. Family History  family history includes Cancer in his maternal grandfather; Diabetes in his father; No Known Problems in his mother. Social History   reports that he has never smoked.  He has never used smokeless tobacco.   reports no history of alcohol use. reports previous drug use. Frequency: 2.00 times per week. Drug: Marijuana Berta Foster. Objective     Vitals:  No data found. Average, Min, and Max for last 24 hours Vitals:  TEMPERATURE:  No data recorded    RESPIRATIONS RANGE: No data recorded    PULSE RANGE: No data recorded    BLOOD PRESSURE RANGE:  No data recorded.  ; No data recorded. PULSE OXIMETRY RANGE: No data recorded  I&O:  No intake/output data recorded. CBC:  No results for input(s): WBC, HGB, HCT, PLT, CRP in the last 72 hours. Invalid input(s):  ESR     BMP:  No results for input(s): NA, K, CL, CO2, BUN, CREATININE, GLUCOSE, CALCIUM in the last 72 hours. Coags:  No results for input(s): APTT, PROT, INR in the last 72 hours. Lab Results   Component Value Date    LABA1C 5.3 08/03/2021     Lab Results   Component Value Date    SEDRATE 47 (H) 12/29/2019     Lab Results   Component Value Date    CRP 9.4 (H) 01/06/2020       General: A&Ox3, NAD  Heart: Regular rate and rhythm. Lungs: Equal air entry. No increased effort. Abdomen: Soft, non-tender to palpation. Not distended. Physical Exam:  Vascular: DP and PT pulses are +2. CFT <3 seconds to all remaining pedal digits. Non-pitting right pedal edema. Temperature gradient increased right dorsal foot. Neuro: Light touch sensation is present to the level of the pedal digits. Musculoskeletal: Muscle strength is 5/5, baseline, to all lower extremity muscle groups. Palpation of right leg and foot elicit pain. Gross deformity is present, left foot TMA, right foot partial hallux amputation. Dermatologic: Full thickness ulcer located right foot/1st interspace and measures approximately 7cm x 7cm x 0.5 cm. Base is fibronecrotic. Periwound skin appears erythematous. Significant amount of serous drainage noted without associated mal odor. Right foot erythema with associated increase in warmth. Neg probe to bone. Slight sinus tracking.  The wound does undermine. Neg fluctuance, crepitus, or induration. Interdigital maceration absent, Bilateral.      Biomechanical evaluation:  R TMA with 10+ degrees of dorsiflexion @ AJ, small area of eschar, stable  L foot equinus to 5 degrees knee flexed/extened  Gait: Antalgic R  Plan: custom shoes with fillers, JEROME R, TMA R  Clinical:                Imaging:   MRI FOOT LEFT WO CONTRAST    (Results Pending)   XR FOOT LEFT (MIN 3 VIEWS)    (Results Pending)   XR FOOT RIGHT (MIN 3 VIEWS)    (Results Pending)       Cultures: Will obtain - pt was in too much pain for probing before his medication    Assessment     Momo Bernal is a 61 y.o. male with   Cellulitis right foot and leg  Chronic non-healing ulceration to level of muscle, right foot  DVT right leg  S/P TMA left foot  S/P digital amputation right foot  DMII with history of LE amputation    Active Problems:    Osteomyelitis of great toe of right foot ([address]  Resolved Problems:    * No resolved hospital problems.  *        Plan     Patient examined and evaluated at bedside   Treatment options discussed in detail with the patient including pain management, imagining   Radiographs reviewed and discussed in detail with patient internal amputation interval right 1st/2nd right foot neg for gas or OM   MRI right foot ordered for surgical planning  Duplex US ordered to r/o DVT right   US art 2020 showed no signs of occlusive disease  Medicine team consulted for med management and pain control  ID consulted for foot wound  Dressing applied to right lower extremity: DSD, 4x4's in webspaces  Heel status to right lower extremity - orders placed  Discussed with Dr. Alpesh Ugarte DPM   Podiatric Medicine & Surgery   1/17/2022 at 2:27 PM    DVT ppx: lovenox    Diet: carb control   Activity: heel WB RLE  Pain Control: 60 mg ER oxycontin    This note is created with the assistance of a speech recognition program.  While intending to generate a document that actually reflects the content of the visit, the document can still have some errors including those of syntax and sound a like substitutions which may escape proof reading. In such instances, actual meaning can be extrapolated by contextual diversion.     Electronically signed by Sid Back DPM on 1/17/2022 at 4:59 PM

## 2022-01-17 NOTE — PROGRESS NOTES
[address] [facility] Pharmacokinetic Monitoring Service - Vancomycin     Silvino Ryan is a 61 y.o. male starting on vancomycin therapy for cellulitis of foot & leg/osteomyelitis of toe. Pharmacy consulted by Dr. Judit Knox for monitoring and adjustment. Target Concentration: Goal AUC/FERNANDO 400-600 mg*hr/L    Additional Antimicrobials: cefepime    Pertinent Laboratory Values: Wt Readings from Last 1 Encounters:   01/12/22 187 lb (84.8 kg)     Temp Readings from Last 1 Encounters:   01/17/22 100.4 °F (38 °C) (Oral)     Estimated Creatinine Clearance: 128 mL/min (based on SCr of 0.74 mg/dL).   Recent Labs     01/17/22  1505 01/17/22  1512   CREATININE 0.72 0.74   WBC 6.8  --        Plan:  Dosing recommendations based on Bayesian software  Start vancomycin 1500 mg x 1 dose now, followed by vancomycin 1250 mg IV every 12 hours  Anticipated AUC of 507 and trough concentration of 15.4 at steady state  Renal labs as indicated   Vancomycin concentration not ordered yet  Pharmacy will continue to monitor patient and adjust therapy as indicated    Thank you for the consult,  Salima Weber, [facility]  1/17/2022 4:12 PM

## 2022-01-17 NOTE — PROGRESS NOTES
This is a 59-year-old male with background history of type 2 diabetes mellitus, hypertension, diabetic neuropathy, DVT right leg, s/p TMA left foot, s/p digital amputation right foot, and chronic back pain is admitted with the podiatry team for cellulitis right foot and leg and chronic nonhealing ulceration to level of muscle at right foot. Internal medicine team was consulted for management of diabetes and hypertension. Patient last HbA1c was 5.3 on 08/3/2021 and he is taking metformin for diabetes. 1. Prediabetes-optimally controlled medium dose sliding scale, check HbA1c  2. Hypertension-optimally controlled, continue lisinopril 10 mg daily  3. Hypothyroidism-continue levothyroxine 50 mcg daily, check TSH  4. Right foot and  leg cellulitis-management as per the primary team  5.  Chronic nonhealing ulcer of the right foot-management as per the primary team      Electronically signed by Zenia Ahn MD on 1/17/2022 at 8:14 PM

## 2022-01-17 NOTE — CONSULTS
[facility]  Internal Medicine Teaching Residency Program  Inpatient Daily Progress Note  ______________________________________________________________________________    Patient: Keron Arizmendi  YOB: 1965   MTT:[id number]    Acct: [de-identified]     Room: [address] [place] [address]  Admit date: 1/17/2022  Today's date: 01/17/22  Number of days in the hospital: 0    SUBJECTIVE   Admitting Diagnosis: Osteomyelitis of great toe of right foot ([address]  CC: Pain right foot  Pt examined at bedside. Chart & results reviewed. T-max 100.4, saturating on room air. Vitally stable  Consult for diabetes, hypertension and hypothyroid management    ROS:  Constitutional:  negative for chills, fevers, sweats  Respiratory:  negative for cough, dyspnea on exertion, hemoptysis, shortness of breath, wheezing  Cardiovascular:  negative for chest pain, chest pressure/discomfort, lower extremity edema, palpitations  Gastrointestinal:  negative for abdominal pain, constipation, diarrhea, nausea, vomiting  Neurological:  negative for dizziness, headache  Musculoskeletal: Pain right foot    BRIEF HISTORY     8year-old male with a history of type 2 diabetes mellitus hemoglobin A1c 5.3 8/21, hypertension, hypothyroidism TSH 4/20 0.62, diabetic neuropathy, s/p TMA left foot s/p right first and second digit amputation admitted with podiatry team for right  foot pain and chronic nonhealing ulcer. Patient refused to get admitted on 1/12, he is noncompliant with follow-up and medication refill. Use of substitute meds. Internal medicine is consulted for medical management. On evaluation patient is hemodynamically stable, saturating on room air. /78. Complaining of right foot pain. Denies any chest pain, headache, abdominal pain. Dressing applied to right foot. Pertinent labs are creatinine 0.74, BS 91, CRP 36, Hb 9.1, MCV 79.9, sed rate 96, ferritin 23, iron 23, iron saturation 6.   MRI foot no cellulitis, osteomyelitis cannot be ruled out. OBJECTIVE     Vital Signs:  /78   Pulse 68   Temp 98.3 °F (36.8 °C) (Oral)   Resp 18   SpO2 100%     Temp (24hrs), Av.4 °F (37.4 °C), Min:98.3 °F (36.8 °C), Max:100.4 °F (38 °C)    No intake/output data recorded. Physical Exam:  Constitutional: This is a well developed, well nourished, who is alert, oriented, cooperative and in no apparent distress. Head:normocephalic and atraumatic. EENT:  PERRLA. No conjunctival injections. Septum was midline, mucosa was without erythema, exudates or cobblestoning. No thrush was noted. Respiratory: Chest was symmetrical without dullness to percussion. Breath sounds bilaterally were clear to auscultation. There were no wheezes, rhonchi or rales. There is no intercostal retraction or use of accessory muscles. No egophony noted. Cardiovascular: Regular without murmur, clicks, gallops or rubs. Abdomen: Slightly rounded and soft without organomegaly. No rebound, rigidity or guarding was appreciated. Lymphatic: No lymphadenopathy. Musculoskeletal: Normal curvature of the spine. No gross muscle weakness. Extremities: Left foot TMA, right foot first and second digit amputation.   Wound/ulceration/skin changes            Neurological/Psychiatric: The patient's general behavior, level of consciousness, thought content and emotional status is normal.        Medications:  Scheduled Medications:    sodium chloride flush  5-40 mL IntraVENous 2 times per day    enoxaparin  40 mg SubCUTAneous Daily    cefepime  2,000 mg IntraVENous Q12H    vancomycin (VANCOCIN) intermittent dosing (placeholder)   Other RX Placeholder    sodium hypochlorite   Irrigation Daily    buPROPion  300 mg Oral Daily    levothyroxine  50 mcg Oral Daily    lisinopril  10 mg Oral Daily    morphine  60 mg Oral BID    [START ON 2022] pantoprazole  40 mg Oral QAM AC    insulin lispro  0-12 Units SubCUTAneous TID   insulin lispro  0-6 Units SubCUTAneous Nightly    [START ON 1/18/2022] vancomycin  1,250 mg IntraVENous Q12H    lactobacillus  1 capsule Oral BID WC    [START ON 1/18/2022] PARoxetine  40 mg Oral QAM     Continuous Infusions:    dextrose      sodium chloride      dextrose       PRN Medicationsglucose, 15 g, PRN  dextrose, 12.5 g, PRN  glucagon (rDNA), 1 mg, PRN  dextrose, 100 mL/hr, PRN  sodium chloride flush, 5-40 mL, PRN  sodium chloride, 25 mL, PRN  ondansetron, 4 mg, Q8H PRN   Or  ondansetron, 4 mg, Q6H PRN  polyethylene glycol, 17 g, Daily PRN  morphine, 2 mg, Q2H PRN   Or  morphine, 4 mg, Q2H PRN  oxyCODONE, 15 mg, Q6H PRN  glucose, 15 g, PRN  dextrose, 12.5 g, PRN  glucagon (rDNA), 1 mg, PRN  dextrose, 100 mL/hr, PRN        Diagnostic Labs:  CBC:   Recent Labs     01/17/22  1505   WBC 6.8   RBC 3.83*   HGB 9.1*   HCT 30.6*   MCV 79.9*   RDW 15.7*   *     BMP:   Recent Labs     01/17/22  1505 01/17/22  1512     --    K 4.2  --      --    CO2 27  --    BUN 21*  --    CREATININE 0.72 0.74     BNP: No results for input(s): BNP in the last 72 hours. PT/INR: No results for input(s): PROTIME, INR in the last 72 hours. APTT: No results for input(s): APTT in the last 72 hours. CARDIAC ENZYMES: No results for input(s): CKMB, CKMBINDEX, TROPONINI in the last 72 hours. Invalid input(s): CKTOTAL;3  FASTING LIPID PANEL:  Lab Results   Component Value Date    HDL 38 03/08/2020     LIVER PROFILE:   Recent Labs     01/17/22  1505   AST 11   ALT 6   BILITOT <0.10*   ALKPHOS 76      MICROBIOLOGY:   Lab Results   Component Value Date/Time    CULTURE DUPLICATE ORDER 32/26/0535 04:45 PM       Imaging:    XR FOOT LEFT (MIN 3 VIEWS)    Result Date: 1/17/2022  New amputation mid foot, age indeterminate. No subcutaneous gas. XR FOOT RIGHT (MIN 3 VIEWS)    Result Date: 1/17/2022  Interval new amputation 1st and 2nd digits, age indeterminate. No subcutaneous gas.      MRI FOOT RIGHT WO CONTRAST    Result Date: 1/17/2022  Very limited exam secondary to motion. Suspected marrow signal changes within the 1st proximal phalanx and to a lesser extent head of the 1st metatarsal.  Osteomyelitis cannot be excluded. Soft tissue edema is concerning for cellulitis. Questionable faint marrow edema within the 4th metatarsal diaphysis, cuboid, and visualized calcaneus. Findings are very nonspecific and may be reactive, secondary to osseous injuries, or possibly be artifactual.       ASSESSMENT & PLAN     1. Right foot cellulitis/? Osteomyelitis. Continue on vancomycin and cefepime. Control pain. Follow ID recommendations  2. Hypertension. Continue on lisinopril 10. Monitor BP  3. Diabetes mellitus type 2. HbA1c 5.3 8/21. On metformin 250 daily. MDSS, hypoglycemia protocol. POCT glucose q6.  4. Hypothyroidism. Continue on Synthroid 50 daily. TSH 0.62 4/20. Repeat TSH  5. Anxiety: continues on paroxetine 40 daily    DVT ppx : Lovenox  GI ppx: Pepcid  PT/OT: Following  Discharge Planning / SW: CM to assist with    Spencer Fuentes MD  Internal Medicine Resident, PGY-1  [facility]; [place], [place]  1/17/2022, 10:23 PM    Attending Physician Statement  I have discussed the care of Silvino Ryan with the resident team. I have examined the patient myself and taken ros and hpi , including pertinent history and exam findings,  with the resident. I have reviewed the key elements of all parts of the encounter with the resident. I agree with the assessment, plan and orders as documented by the resident. Active Problems:    Essential hypertension    Hypothyroidism    Cellulitis    Type 2 diabetes mellitus ([facility] [address] [facility]  Resolved Problems:    * No resolved hospital problems.  *      Electronically signed by Kobi Dawson MD

## 2022-01-18 LAB
ABSOLUTE EOS #: 0.28 K/UL (ref 0–0.4)
ABSOLUTE IMMATURE GRANULOCYTE: 0.11 K/UL (ref 0–0.3)
ABSOLUTE LYMPH #: 1.54 K/UL (ref 1–4.8)
ABSOLUTE MONO #: 0.5 K/UL (ref 0.1–0.8)
ANION GAP SERPL CALCULATED.3IONS-SCNC: 8 MMOL/L (ref 9–17)
BASOPHILS # BLD: 0 % (ref 0–2)
BASOPHILS ABSOLUTE: 0 K/UL (ref 0–0.2)
BUN BLDV-MCNC: 19 MG/DL (ref 6–20)
BUN/CREAT BLD: ABNORMAL (ref 9–20)
CALCIUM SERPL-MCNC: 8.6 MG/DL (ref 8.6–10.4)
CHLORIDE BLD-SCNC: 101 MMOL/L (ref 98–107)
CO2: 27 MMOL/L (ref 20–31)
CREAT SERPL-MCNC: 0.76 MG/DL (ref 0.7–1.2)
DIFFERENTIAL TYPE: ABNORMAL
EOSINOPHILS RELATIVE PERCENT: 5 % (ref 1–4)
ESTIMATED AVERAGE GLUCOSE: 128 MG/DL
GFR AFRICAN AMERICAN: >60 ML/MIN
GFR NON-AFRICAN AMERICAN: >60 ML/MIN
GFR SERPL CREATININE-BSD FRML MDRD: ABNORMAL ML/MIN/{1.73_M2}
GFR SERPL CREATININE-BSD FRML MDRD: ABNORMAL ML/MIN/{1.73_M2}
GLUCOSE BLD-MCNC: 108 MG/DL (ref 75–110)
GLUCOSE BLD-MCNC: 127 MG/DL (ref 75–110)
GLUCOSE BLD-MCNC: 91 MG/DL (ref 75–110)
GLUCOSE BLD-MCNC: 94 MG/DL (ref 70–99)
GLUCOSE BLD-MCNC: 97 MG/DL (ref 75–110)
GLUCOSE BLD-MCNC: 97 MG/DL (ref 75–110)
HBA1C MFR BLD: 6.1 % (ref 4–6)
HCT VFR BLD CALC: 29.8 % (ref 40.7–50.3)
HEMOGLOBIN: 8.9 G/DL (ref 13–17)
IMMATURE GRANULOCYTES: 2 %
LYMPHOCYTES # BLD: 28 % (ref 24–44)
MCH RBC QN AUTO: 24.1 PG (ref 25.2–33.5)
MCHC RBC AUTO-ENTMCNC: 29.9 G/DL (ref 28.4–34.8)
MCV RBC AUTO: 80.8 FL (ref 82.6–102.9)
MONOCYTES # BLD: 9 % (ref 1–7)
MORPHOLOGY: ABNORMAL
NRBC AUTOMATED: 0 PER 100 WBC
PDW BLD-RTO: 15.7 % (ref 11.8–14.4)
PLATELET # BLD: 456 K/UL (ref 138–453)
PLATELET ESTIMATE: ABNORMAL
PMV BLD AUTO: 9.5 FL (ref 8.1–13.5)
POTASSIUM SERPL-SCNC: 4.1 MMOL/L (ref 3.7–5.3)
RBC # BLD: 3.69 M/UL (ref 4.21–5.77)
RBC # BLD: ABNORMAL 10*6/UL
SEG NEUTROPHILS: 56 % (ref 36–66)
SEGMENTED NEUTROPHILS ABSOLUTE COUNT: 3.07 K/UL (ref 1.8–7.7)
SODIUM BLD-SCNC: 136 MMOL/L (ref 135–144)
TSH SERPL DL<=0.05 MIU/L-ACNC: 3.66 MIU/L (ref 0.3–5)
WBC # BLD: 5.5 K/UL (ref 3.5–11.3)
WBC # BLD: ABNORMAL 10*3/UL

## 2022-01-18 PROCEDURE — 99254 IP/OBS CNSLTJ NEW/EST MOD 60: CPT | Performed by: INTERNAL MEDICINE

## 2022-01-18 PROCEDURE — 36415 COLL VENOUS BLD VENIPUNCTURE: CPT

## 2022-01-18 PROCEDURE — 84443 ASSAY THYROID STIM HORMONE: CPT

## 2022-01-18 PROCEDURE — 87040 BLOOD CULTURE FOR BACTERIA: CPT

## 2022-01-18 PROCEDURE — 1200000000 HC SEMI PRIVATE

## 2022-01-18 PROCEDURE — 82947 ASSAY GLUCOSE BLOOD QUANT: CPT

## 2022-01-18 PROCEDURE — 83036 HEMOGLOBIN GLYCOSYLATED A1C: CPT

## 2022-01-18 PROCEDURE — 2580000003 HC RX 258: Performed by: PODIATRIST

## 2022-01-18 PROCEDURE — 85025 COMPLETE CBC W/AUTO DIFF WBC: CPT

## 2022-01-18 PROCEDURE — 80048 BASIC METABOLIC PNL TOTAL CA: CPT

## 2022-01-18 PROCEDURE — 6360000002 HC RX W HCPCS: Performed by: PODIATRIST

## 2022-01-18 PROCEDURE — 6370000000 HC RX 637 (ALT 250 FOR IP): Performed by: STUDENT IN AN ORGANIZED HEALTH CARE EDUCATION/TRAINING PROGRAM

## 2022-01-18 RX ORDER — DIAZEPAM 5 MG/1
5 TABLET ORAL EVERY 8 HOURS PRN
Status: ON HOLD | COMMUNITY
End: 2022-01-21 | Stop reason: SDUPTHER

## 2022-01-18 RX ADMIN — MORPHINE SULFATE 60 MG: 30 TABLET, FILM COATED, EXTENDED RELEASE ORAL at 20:40

## 2022-01-18 RX ADMIN — BUPROPION HYDROCHLORIDE 300 MG: 300 TABLET, FILM COATED, EXTENDED RELEASE ORAL at 09:32

## 2022-01-18 RX ADMIN — MORPHINE SULFATE 4 MG: 4 INJECTION INTRAVENOUS at 10:47

## 2022-01-18 RX ADMIN — MORPHINE SULFATE 2 MG: 2 INJECTION, SOLUTION INTRAMUSCULAR; INTRAVENOUS at 04:47

## 2022-01-18 RX ADMIN — Medication 1 CAPSULE: at 18:33

## 2022-01-18 RX ADMIN — PAROXETINE HYDROCHLORIDE HEMIHYDRATE 40 MG: 20 TABLET, FILM COATED ORAL at 09:31

## 2022-01-18 RX ADMIN — CEFEPIME 2000 MG: 2 INJECTION, POWDER, FOR SOLUTION INTRAVENOUS at 16:12

## 2022-01-18 RX ADMIN — MORPHINE SULFATE 4 MG: 4 INJECTION INTRAVENOUS at 22:14

## 2022-01-18 RX ADMIN — ENOXAPARIN SODIUM 40 MG: 100 INJECTION SUBCUTANEOUS at 09:32

## 2022-01-18 RX ADMIN — MORPHINE SULFATE 4 MG: 4 INJECTION INTRAVENOUS at 16:12

## 2022-01-18 RX ADMIN — Medication 1250 MG: at 09:33

## 2022-01-18 RX ADMIN — LEVOTHYROXINE SODIUM 50 MCG: 50 TABLET ORAL at 09:31

## 2022-01-18 RX ADMIN — SODIUM CHLORIDE, PRESERVATIVE FREE 10 ML: 5 INJECTION INTRAVENOUS at 20:40

## 2022-01-18 RX ADMIN — LISINOPRIL 10 MG: 10 TABLET ORAL at 09:31

## 2022-01-18 RX ADMIN — CEFEPIME 2000 MG: 2 INJECTION, POWDER, FOR SOLUTION INTRAVENOUS at 03:04

## 2022-01-18 RX ADMIN — OXYCODONE HYDROCHLORIDE 15 MG: 5 TABLET ORAL at 13:25

## 2022-01-18 RX ADMIN — Medication 1 CAPSULE: at 09:32

## 2022-01-18 RX ADMIN — Medication 1250 MG: at 20:40

## 2022-01-18 RX ADMIN — MORPHINE SULFATE 60 MG: 30 TABLET, FILM COATED, EXTENDED RELEASE ORAL at 09:33

## 2022-01-18 RX ADMIN — PANTOPRAZOLE SODIUM 40 MG: 40 TABLET, DELAYED RELEASE ORAL at 06:17

## 2022-01-18 ASSESSMENT — PAIN SCALES - GENERAL
PAINLEVEL_OUTOF10: 8
PAINLEVEL_OUTOF10: 7
PAINLEVEL_OUTOF10: 8
PAINLEVEL_OUTOF10: 8
PAINLEVEL_OUTOF10: 7

## 2022-01-18 NOTE — PROGRESS NOTES
Progress Note  Podiatric Medicine and Surgery     Subjective     CC: right foot/leg pain    Patient seen and examined at bedside. No acute events overnight. Afebrile, vital signs stable       TMA OR right foot 1/21/22    HPI :  María Cuello is a 61 y.o. male seen at [facility] on the floor for right foot pain and infection. He is well known to Dr. Brian Haddad who advised he be admitted 1/12/21 but pt refused until today. He has been seen for wound care over the course of the last couple years and demonstrates repetitive non-compliant behaviors including failure to follow up when scheduled, failure to fill prescriptions in a timely manner, and using substitute medications that have been [de-identified] years old. PCP is Enrrique Barfield MD    ROS: Denies N/V/F/C/SOB/CP. Otherwise negative except at stated in the HPI.      Medications:  Scheduled Meds:   sodium chloride flush  5-40 mL IntraVENous 2 times per day    enoxaparin  40 mg SubCUTAneous Daily    cefepime  2,000 mg IntraVENous Q12H    vancomycin (VANCOCIN) intermittent dosing (placeholder)   Other RX Placeholder    sodium hypochlorite   Irrigation Daily    buPROPion  300 mg Oral Daily    levothyroxine  50 mcg Oral Daily    lisinopril  10 mg Oral Daily    morphine  60 mg Oral BID    pantoprazole  40 mg Oral QAM AC    insulin lispro  0-12 Units SubCUTAneous TID WC    insulin lispro  0-6 Units SubCUTAneous Nightly    vancomycin  1,250 mg IntraVENous Q12H    lactobacillus  1 capsule Oral BID WC    PARoxetine  40 mg Oral QAM       Continuous Infusions:   dextrose      sodium chloride      dextrose         PRN Meds:glucose, dextrose, glucagon (rDNA), dextrose, sodium chloride flush, sodium chloride, ondansetron **OR** ondansetron, polyethylene glycol, morphine **OR** morphine, oxyCODONE, glucose, dextrose, glucagon (rDNA), dextrose    Objective     Vitals:  Patient Vitals for the past 8 hrs:   BP Temp Temp src Pulse Resp SpO2   01/18/22 0807 (!) 87/57 97.9 °F (36.6 °C) Oral 51 16 94 %     Average, Min, and Max for last 24 hours Vitals:  TEMPERATURE:  Temp  Av.9 °F (37.2 °C)  Min: 97.9 °F (36.6 °C)  Max: 100.4 °F (38 °C)    RESPIRATIONS RANGE: Resp  Av  Min: 16  Max: 18    PULSE RANGE: Pulse  Av.3  Min: 51  Max: 80    BLOOD PRESSURE RANGE:  Systolic (98ZFF), WTS:016 , Min:87 , ZM   ; Diastolic (00PXB), VZP:17, Min:57, Max:78      PULSE OXIMETRY RANGE: SpO2  Av %  Min: 94 %  Max: 100 %    I/O last 3 completed shifts:  In: -   Out: 800 [Urine:800]    CBC:  Recent Labs     22  1505 22  0634   WBC 6.8 5.5   HGB 9.1* 8.9*   HCT 30.6* 29.8*   * 456*   CRP 36.0*  --         BMP:  Recent Labs     22  1505 22  1512 22  0634     --  136   K 4.2  --  4.1     --  101   CO2 27  --  27   BUN 21*  --  19   CREATININE 0.72 0.74 0.76   GLUCOSE 91  --  94   CALCIUM 8.4*  --  8.6        Coags:  Recent Labs     22  1505   PROT 7.4       Lab Results   Component Value Date    SEDRATE 96 (H) 2022     Recent Labs     22  1505   CRP 36.0*       General: A&Ox3, NAD  Heart: Regular rate and rhythm. Lungs: Equal air entry. No increased effort. Abdomen: Soft, non-tender to palpation. Not distended. Physical Exam:  Vascular: DP and PT pulses are +2. CFT <3 seconds to all remaining pedal digits. Non-pitting right pedal edema. Temperature gradient increased right dorsal foot. Neuro: Light touch sensation is present to the level of the pedal digits. Musculoskeletal: Muscle strength is 5/5, baseline, to all lower extremity muscle groups. Palpation of right leg and foot elicit pain. Gross deformity is present, left foot TMA, right foot partial hallux amputation. Dermatologic: Full thickness ulcer located right foot/1st interspace and measures approximately 7cm x 7cm x 0.5 cm. Base is fibronecrotic. Periwound skin appears erythematous.  Significant amount of serous drainage noted without associated mal odor. Right foot erythema with associated increase in warmth. Neg probe to bone. Slight sinus tracking. The wound does undermine. Neg fluctuance, crepitus, or induration. Interdigital maceration absent, Bilateral.      Imaging:   MRI FOOT RIGHT WO CONTRAST   Final Result   Very limited exam secondary to motion. Suspected marrow signal changes within the 1st proximal phalanx and to a   lesser extent head of the 1st metatarsal.  Osteomyelitis cannot be excluded. Soft tissue edema is concerning for cellulitis. Questionable faint marrow edema within the 4th metatarsal diaphysis, cuboid,   and visualized calcaneus. Findings are very nonspecific and may be reactive,   secondary to osseous injuries, or possibly be artifactual.         XR FOOT LEFT (MIN 3 VIEWS)   Final Result   New amputation mid foot, age indeterminate. No subcutaneous gas. XR FOOT RIGHT (MIN 3 VIEWS)   Final Result   Interval new amputation 1st and 2nd digits, age indeterminate. No   subcutaneous gas. VL DUP LOWER EXTREMITY VENOUS BILATERAL    (Results Pending)       Cultures: n/a     Assessment   Analy Rodriguez is a 61 y.o. male with   Cellulitis right foot and leg  Chronic non-healing ulceration to level of muscle, right foot  DVT right leg  S/P TMA left foot  S/P digital amputation right foot  DMII with history of LE amputation    Active Problems:    Essential hypertension    Hypothyroidism    Cellulitis    Type 2 diabetes mellitus ([facility] [address]  Resolved Problems:    * No resolved hospital problems.  *       Plan   Patient examined and evaluated at bedside   Treatment options discussed in detail with the patient including pain management, imagining   Radiographs reviewed and discussed in detail with patient internal amputation interval right 1st/2nd right foot neg for gas or OM  MRI right foot ordered OM right 1st / 2nd met  [address] [place] [address] showed no signs of occlusive disease  Medicine team consulted for med management and pain control  ID consulted for foot wound  Plan to take to OR 1/21/22 at 8:45 am for TMA right foot w/ JEROME  Dressing applied to right lower extremity: DSD, 4x4's in webspaces  Heel status to right lower extremity - orders placed  Discussed with Dr. Radha Celeste    DVT ppx: lovenox    Diet: carb control           Activity: heel WB RLE  Pain Control: 60 mg ER oxycontin BID    Hannah Weinberg DPM   Podiatric Medicine & Surgery   1/18/2022 at 8:46 AM    I performed a history and physical examination of the patient and discussed management with the resident. I reviewed the residents note and agree with the documented findings and plan of care. Any areas of disagreement are noted on the chart. I was personally present for the key portions of any procedures. I have documented in the chart those procedures where I was not present during the key portions. I have reviewed the Podiatry Resident progress note. I agree with the chief complaint, past medical history, past surgical history, allergies, medications, social and family history as documented unless otherwise noted below. Documentation of the HPI, Physical Exam and Medical Decision Making performed by medical students or scribes is based on my personal performance of the HPI, PE and MDM. I have personally evaluated this patient and have completed at least one if not all key elements of the E/M (history, physical exam, and MDM). Additional findings are as noted.      Stanford Zavala DPM on 1/18/2022 at 3:99 PM  Board Certified, American Board of Podiatric Surgery  Fellow, [employer] [facility] and [employer]

## 2022-01-18 NOTE — PROGRESS NOTES
[facility]  Internal Medicine Teaching Residency Program  Inpatient Daily Progress Note  ______________________________________________________________________________    Patient: Queen Cristina  YOB: 1965   [id number]    Acct: [de-identified]     Room: [facility]-  Admit date: 2022  Today's date: 22  Number of days in the hospital: 1    SUBJECTIVE   Admitting Diagnosis: <principal problem not specified>  CC: Right foot pain  Pt examined at bedside. Chart & results reviewed. No acute issues overnight  Vitally stable, afebrile  Complaining of pain right foot  CRP 36, sed rate 96  MRI foot: cellulitis, osteomyelitis cannot be ruled out    ROS:  Constitutional:  negative for chills, fevers, sweats  Respiratory:  negative for cough, dyspnea on exertion, hemoptysis, shortness of breath, wheezing  Cardiovascular:  negative for chest pain, chest pressure/discomfort, lower extremity edema, palpitations  Gastrointestinal:  negative for abdominal pain, constipation, diarrhea, nausea, vomiting  Neurological:  negative for dizziness, headache  BRIEF HISTORY     57-year-old male with PMH of type 2 diabetes mellitus, hypertension, hypothyroidism, s/p TMA left foot, s/p right first and second digit amputation who is noncompliant with follow-up and medication presented with right foot pain. Found to have cellulitis/osteomyelitis. Podiatry is the primary. Internal medicine consulted for management of diabetes, hypertension, hypothyroidism. We will follow-up    OBJECTIVE     Vital Signs:  /78   Pulse 68   Temp 98.3 °F (36.8 °C) (Oral)   Resp 18   SpO2 100%     Temp (24hrs), Av.4 °F (37.4 °C), Min:98.3 °F (36.8 °C), Max:100.4 °F (38 °C)    No intake/output data recorded. Physical Exam:  Constitutional: This is  61y.o. year old male who is alert, oriented, cooperative and in no apparent distress. Head:normocephalic and atraumatic. EENT:  PERRLA. No conjunctival injections. Septum was midline, mucosa was without erythema,  Respiratory: Chest was symmetrical without dullness to percussion. Breath sounds bilaterally were clear to auscultation. There were no wheezes, rhonchi or rales. There is no intercostal retraction or use of accessory muscles. No egophony noted. Cardiovascular: Regular without murmur, clicks, gallops or rubs. Abdomen: Slightly rounded and soft without organomegaly. No rebound, rigidity or guarding was appreciated. Musculoskeletal: Normal curvature of the spine. No gross muscle weakness. Extremities: S/p left TMA, right first and second digit amputation. Skin:  Warm and dry. Good color, turgor and pigmentation.   Positive for lesions Neurological/Psychiatric: The patient's general behavior, level of consciousness, thought content and emotional status is normal.                    Medications:  Scheduled Medications:    sodium chloride flush  5-40 mL IntraVENous 2 times per day    enoxaparin  40 mg SubCUTAneous Daily    cefepime  2,000 mg IntraVENous Q12H    vancomycin (VANCOCIN) intermittent dosing (placeholder)   Other RX Placeholder    sodium hypochlorite   Irrigation Daily    buPROPion  300 mg Oral Daily    levothyroxine  50 mcg Oral Daily    lisinopril  10 mg Oral Daily    morphine  60 mg Oral BID    pantoprazole  40 mg Oral QAM AC    insulin lispro  0-12 Units SubCUTAneous TID WC    insulin lispro  0-6 Units SubCUTAneous Nightly    vancomycin  1,250 mg IntraVENous Q12H    lactobacillus  1 capsule Oral BID WC    PARoxetine  40 mg Oral QAM     Continuous Infusions:    dextrose      sodium chloride      dextrose       PRN Medicationsglucose, 15 g, PRN  dextrose, 12.5 g, PRN  glucagon (rDNA), 1 mg, PRN  dextrose, 100 mL/hr, PRN  sodium chloride flush, 5-40 mL, PRN  sodium chloride, 25 mL, PRN  ondansetron, 4 mg, Q8H PRN   Or  ondansetron, 4 mg, Q6H PRN  polyethylene glycol, 17 g, Daily PRN  morphine, 2 mg, Q2H PRN   Or  morphine, 4 mg, Q2H PRN  oxyCODONE, 15 mg, Q6H PRN  glucose, 15 g, PRN  dextrose, 12.5 g, PRN  glucagon (rDNA), 1 mg, PRN  dextrose, 100 mL/hr, PRN        Diagnostic Labs:  CBC:   Recent Labs     01/17/22  1505   WBC 6.8   RBC 3.83*   HGB 9.1*   HCT 30.6*   MCV 79.9*   RDW 15.7*   *     BMP:   Recent Labs     01/17/22  1505 01/17/22  1512     --    K 4.2  --      --    CO2 27  --    BUN 21*  --    CREATININE 0.72 0.74     BNP: No results for input(s): BNP in the last 72 hours. PT/INR: No results for input(s): PROTIME, INR in the last 72 hours. APTT: No results for input(s): APTT in the last 72 hours. CARDIAC ENZYMES: No results for input(s): CKMB, CKMBINDEX, TROPONINI in the last 72 hours. Invalid input(s): CKTOTAL;3  FASTING LIPID PANEL:  Lab Results   Component Value Date    HDL 38 03/08/2020     LIVER PROFILE:   Recent Labs     01/17/22  1505   AST 11   ALT 6   BILITOT <0.10*   ALKPHOS 76      MICROBIOLOGY:   Lab Results   Component Value Date/Time    CULTURE DUPLICATE ORDER 11/79/6892 04:45 PM       Imaging:    XR FOOT LEFT (MIN 3 VIEWS)    Result Date: 1/17/2022  New amputation mid foot, age indeterminate. No subcutaneous gas. XR FOOT RIGHT (MIN 3 VIEWS)    Result Date: 1/17/2022  Interval new amputation 1st and 2nd digits, age indeterminate. No subcutaneous gas. MRI FOOT RIGHT WO CONTRAST    Result Date: 1/17/2022  Very limited exam secondary to motion. Suspected marrow signal changes within the 1st proximal phalanx and to a lesser extent head of the 1st metatarsal.  Osteomyelitis cannot be excluded. Soft tissue edema is concerning for cellulitis. Questionable faint marrow edema within the 4th metatarsal diaphysis, cuboid, and visualized calcaneus. Findings are very nonspecific and may be reactive, secondary to osseous injuries, or possibly be artifactual.       ASSESSMENT & PLAN     1. Right foot cellulitis/? Osteomyelitis.   Continue on vancomycin and cefepime. Control pain. Follow ID recommendations  2. Hypertension. Continue on lisinopril 10. Monitor BP  3. Diabetes mellitus type 2. HbA1c 5.3 8/21. On metformin 250 daily. MDSS, hypoglycemia protocol. POCT glucose q6.  4. Hypothyroidism. Continue on Synthroid 50 daily. TSH 0.62 4/20. Repeat TSH  5. Anxiety: continues on paroxetine 40 daily     DVT ppx : Lovenox  GI ppx: Pepcid  PT/OT: Following  Discharge Planning / SW: LIN to assist with    Nafisa Su MD  Internal Medicine Resident, PGY-1  [address], [place]  1/18/2022, 5:45 AM

## 2022-01-18 NOTE — PROGRESS NOTES
[facility] ([facility])  Occupational Therapy Not Seen Note    DATE: 2022    NAME: Donis Garcia  MRN: [id number]   : 1965      Patient not seen this date for Occupational Therapy due to:     Other: dopplers ordered, will eval once results available     Next Scheduled Treatment: check back 2022    Electronically signed by JAMIL Hurd on 2022 at 2:19 PM

## 2022-01-18 NOTE — PROGRESS NOTES
Physical Therapy         Physical Therapy Cancel Note      DATE: 2022    NAME: Kathy See  MRN: [id number]   : 1965      Patient not seen this date for Physical Therapy due to:    Pt. has orders for LE dopplers. Will wait for results and potential anticoagulation treatment to be completed before pursuing mobility.        Electronically signed by ELIGIO Bray on 2022 at 4:10 PM

## 2022-01-18 NOTE — CONSULTS
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 status post first and second digit amputation  · History left foot amputation  · Diabetes mellitus type 2  · Right leg DVT  · Essential hypertension  · Hypothyroidism  · Hx MRSA foot 2018  · Allergy to Bactrim    Recommendations:   · Continue broad spectrum antibiotics pending culture results    Medical Decision Making/Summary/Discussion:1/18/2022     ·   Infection Control Recommendations   · [place] Precautions  · Contact Isolation     Antimicrobial Stewardship Recommendations     · Simplification of therapy  · Targeted therapy    Coordination of Outpatient Care:   · Estimated Length of IV antimicrobials:  · Patient will need Midline Catheter Insertion:   · Patient will need PICC line Insertion:  · Patient will need: Home IV , [place],  SNF,  LTAC: TBD  · Patient will need outpatient wound care:    Chief complaint/reason for consultation:   · Diabetic foot wound      History of Present Illness:   Eva Pablo is a 61y.o.-year-old male who was initially admitted on 1/17/2022. Patient seen at the request of Dr. Juan C Fisher:    This patient presented to the ED with complaints of cellulitis of the right foot and leg with a chronic nonhealing ulcer to level of muscle of the right foot. He follows up with Dr. Cristal Piedra with Podiatry, but per EHR, he is known to be noncompliant with medications and follow-up office visits. Dr. Cristal Piedra had recommended that he present to the hospital on 1/12/2022 for his right foot wound, but the patient has refused until 1/17/2022. He was started on vancomycin and cefepime    Imaging:     XR FOOT LEFT (MIN 3 VIEWS)     Result Date: 1/17/2022  New amputation mid foot, age indeterminate.   No subcutaneous gas.      XR FOOT RIGHT (MIN 3 Procedure Laterality Date    APPENDECTOMY      BACK SURGERY      Lower x 4. Had abscess after appendectomy.      FOOT AMPUTATION Left 1/4/2020    TRANSMETATARSAL FOOT AMPUTATION, REMOVAL OF FOREIGN BODY performed by Randy Jennings DPM at [address] Left 01/04/2020    TRANSMETATARSAL FOOT AMPUTATION, REMOVAL OF FOREIGN BODY    KNEE SURGERY Right     scope    TOE AMPUTATION Bilateral 3/6/2017    TOE AMPUTATION LEFT HALLUX AND 2ND DIGIT AND RIGHT 2ND DIGIT performed by Jagjit Ann DPM at [address] ENDOSCOPY N/A 5/20/2019    EGD WITH BIOPSY performed by Riddhi Sloan MD at [facility] OR       Medications:      insulin lispro  0-6 Units SubCUTAneous TID WC    insulin lispro  0-3 Units SubCUTAneous Nightly    sodium chloride flush  5-40 mL IntraVENous 2 times per day    enoxaparin  40 mg SubCUTAneous Daily    cefepime  2,000 mg IntraVENous Q12H    vancomycin (VANCOCIN) intermittent dosing (placeholder)   Other RX Placeholder    sodium hypochlorite   Irrigation Daily    buPROPion  300 mg Oral Daily    levothyroxine  50 mcg Oral Daily    lisinopril  10 mg Oral Daily    morphine  60 mg Oral BID    pantoprazole  40 mg Oral QAM AC    vancomycin  1,250 mg IntraVENous Q12H    lactobacillus  1 capsule Oral BID WC    PARoxetine  40 mg Oral QAM       Social History:     Social History     Socioeconomic History    Marital status:      Spouse name: Not on file    Number of children: Not on file    Years of education: Not on file    Highest education level: Not on file   Occupational History    Not on file   Tobacco Use    Smoking status: Never Smoker    Smokeless tobacco: Never Used   Vaping Use    Vaping Use: Never used   Substance and Sexual Activity    Alcohol use: No    Drug use: Not Currently     Frequency: 2.0 times per week     Types: Marijuana Ardia Brown)    Sexual activity: Not on file   Other Topics Concern    Not on file Social History Narrative    Not on file     Social Determinants of Health     Financial Resource Strain: Low Risk     Difficulty of Paying Living Expenses: Not hard at all   Food Insecurity: No Food Insecurity    Worried About Running Out of Food in the Last Year: Never true    [address] in the Last Year: Never true   Transportation Needs:     Lack of Transportation (Medical): Not on file    Lack of Transportation (Non-Medical): Not on file   Physical Activity:     Days of Exercise per Week: Not on file    Minutes of Exercise per Session: Not on file   Stress:     Feeling of Stress : Not on file   Social Connections:     Frequency of Communication with Friends and Family: Not on file    Frequency of Social Gatherings with Friends and Family: Not on file    Attends Mosque Services: Not on file    Active Member of [address] [place] [address] or Organizations: Not on file    Attends Club or Organization Meetings: Not on file    Marital Status: Not on file   Intimate Partner Violence:     Fear of Current or Ex-Partner: Not on file    Emotionally Abused: Not on file    Physically Abused: Not on file    Sexually Abused: Not on file   Housing Stability:     Unable to Pay for Housing in the Last Year: Not on file    Number of [place] in the Last Year: Not on file    Unstable Housing in the Last Year: Not on file       Family History:     Family History   Problem Relation Age of Onset    Diabetes Father     Cancer Maternal Grandfather         Colon Cancer; Prostate Cancer    No Known Problems Mother         Allergies:   Bactrim [sulfamethoxazole-trimethoprim], Fiorinal [butalbital-aspirin-caffeine], Statins, Tylenol [acetaminophen], and Peanut butter flavor [flavoring agent]     Review of Systems:   Constitutional: No fevers or chills. No systemic complaints  Head: No headaches  Eyes: No double vision or blurry vision. No conjunctival inflammation. ENT: No sore throat or runny nose. . No hearing loss, tinnitus or vertigo. Cardiovascular: No chest pain or palpitations. No shortness of breath. No MONTES  Lung: No shortness of breath or cough. No sputum production  Abdomen: No nausea, vomiting, diarrhea, or abdominal pain. Nasir Brittle No cramps. Genitourinary: No increased urinary frequency, or dysuria. No hematuria. No suprapubic or CVA pain  Musculoskeletal: Bilateral lower extremity pain  Hematologic: No bleeding or bruising. Integument: Right foot wound  Psychiatric: No depression. Endocrine: No polyuria, no polydipsia, no polyphagia. Physical Examination :     Patient Vitals for the past 8 hrs:   BP Temp Temp src Pulse Resp SpO2   01/18/22 1130 111/62 -- -- -- -- --   01/18/22 1059 115/73 -- -- -- -- --   01/18/22 0807 (!) 87/57 97.9 °F (36.6 °C) Oral 51 16 94 %     General Appearance: Awake, alert, and in no apparent distress  Head:  Normocephalic, no trauma  Eyes: Pupils equal, round, reactive to light and accommodation; extraocular movements intact; sclera anicteric; conjunctivae pink. No embolic phenomena. ENT: Oropharynx clear, without erythema, exudate, or thrush. No tenderness of sinuses. Mouth/throat: mucosa pink and moist. No lesions. Dentition in good repair. Neck:Supple, without lymphadenopathy. Thyroid normal, No bruits. Pulmonary/Chest: Clear to auscultation, without wheezes, rales, or rhonchi. No dullness to percussion. Cardiovascular: Regular rate and rhythm without murmurs, rubs, or gallops. Abdomen: Soft, non tender. Bowel sounds normal. No organomegaly  All four Extremities: toe amputations right foot, partial foot amputation left foot  Neurologic: No gross sensory or motor deficits.   Skin: right foot wound    Medical Decision Making -Laboratory:   I have independently reviewed/ordered the following labs:    CBC with Differential:   Recent Labs     01/17/22  1505 01/18/22  0634   WBC 6.8 5.5   HGB 9.1* 8.9*   HCT 30.6* 29.8*   * 456*   LYMPHOPCT 19* 28   MONOPCT 7 9*     BMP:   Recent Labs     01/17/22  1505 01/17/22  1505 01/17/22  1512 01/18/22  0634     --   --  136   K 4.2  --   --  4.1     --   --  101   CO2 27  --   --  27   BUN 21*  --   --  19   CREATININE 0.72   < > 0.74 0.76    < > = values in this interval not displayed. Hepatic Function Panel:   Recent Labs     01/17/22  1505   PROT 7.4   LABALBU 3.6   BILITOT <0.10*   ALKPHOS 76   ALT 6   AST 11     No results for input(s): RPR in the last 72 hours. No results for input(s): HIV in the last 72 hours. No results for input(s): BC in the last 72 hours.   Lab Results   Component Value Date    MUCUS NOT REPORTED 04/20/2020    RBC 3.69 01/18/2022    TRICHOMONAS NOT REPORTED 04/20/2020    WBC 5.5 01/18/2022    YEAST NOT REPORTED 04/20/2020    TURBIDITY CLEAR 04/20/2020     Lab Results   Component Value Date    CREATININE 0.76 01/18/2022    GLUCOSE 94 01/18/2022       Medical Decision Making-Imaging:     Narrative   EXAMINATION:   MRI OF THE RIGHT FOOT WITHOUT CONTRAST, 1/17/2022 5:34 pm       TECHNIQUE:   Multiplanar multisequence MRI of the right foot was performed without the   administration of intravenous contrast.       COMPARISON:   Right foot radiographs 01/17/2022       HISTORY:   ORDERING SYSTEM PROVIDED HISTORY: R/O OM left calc   TECHNOLOGIST PROVIDED HISTORY:   R/O OM left calc   Reason for Exam: R/O OM       FINDINGS:   Significantly limited exam secondary to motion, patient body habitus, and   lack of IV contrast.  Within this limitation:       BONE MARROW: Postsurgical findings from prior amputation of the 1st and 2nd   digit, better visualized on prior radiograph.  T2 hyperintense T1 hypointense   signal within the 1st proximal phalanx is suspected.  A questionable area of   T2 hyperintense signal and T1 hypointense signal along the medial margin of   the 1st metatarsal head (series 10 and 11, image 11).  No discrete T2 marrow   signal changes of the 2nd metatarsal or 2nd proximal phalanx definitively visualized.  No discrete fracture is identified on the provided images.    Questionable T2 hyperintense signal within the calcaneus and cuboid, only   partially visualized and incompletely evaluated.  The calcaneus is not   completely included in the study.  Questionable T2 hyperintense signal within   the diaphysis of the 4th metatarsal.       SOFT TISSUES: Likely postsurgical changes of the 1st and 2nd digit flexor and   extensor tendons.  Edema signal and atrophic changes within the plantar   musculature, particularly the middle band of the plantar fascia, likely   neuropathic.  No soft tissue gas grossly identified, however tiny foci of gas   would be difficult to visualize with the amount of motion on this study.  No   discrete drainable fluid collection grossly identified.           Impression   Very limited exam secondary to motion.       Suspected marrow signal changes within the 1st proximal phalanx and to a   lesser extent head of the 1st metatarsal.  Osteomyelitis cannot be excluded.       Soft tissue edema is concerning for cellulitis.       Questionable faint marrow edema within the 4th metatarsal diaphysis, cuboid,   and visualized calcaneus.  Findings are very nonspecific and may be reactive,   secondary to osseous injuries, or possibly be artifactual.     Narrative   EXAMINATION:   THREE XRAY VIEWS OF THE LEFT FOOT       1/17/2022 2:52 pm       COMPARISON:   None.       HISTORY:   ORDERING SYSTEM PROVIDED HISTORY: eval OM/gas   TECHNOLOGIST PROVIDED HISTORY:   eval OM/gas       FINDINGS:   New interval amputation proximal metatarsals.  Cortical margins otherwise   intact.  Soft tissues unremarkable.           Impression   New amputation mid foot, age indeterminate.  No subcutaneous gas.         Narrative   EXAMINATION:   THREE XRAY VIEWS OF THE RIGHT FOOT       1/17/2022 2:52 pm       COMPARISON:   December 15, 2021.       HISTORY:   ORDERING SYSTEM PROVIDED HISTORY: r/o gas/OM   TECHNOLOGIST PROVIDED HISTORY:   r/o gas/OM       FINDINGS:   Interval amputation 1st and 2nd proximal phalanges.  Cortical margins   otherwise intact.  Alignment anatomic.  Soft tissues unremarkable.  No   subcutaneous gas.           Impression   Interval new amputation 1st and 2nd digits, age indeterminate.  No   subcutaneous gas.               Medical Decision [id number]:       Medical Decision Making-Other:     Note:  · Labs, medications, radiologic studies were reviewed with personal review of films  · Large amounts of data were reviewed  · Discussed with nursing Staff, Discharge planner  · Infection Control and Prevention measures reviewed  · All prior entries were reviewed  · Administer medications as ordered  · Prognosis: Guarded  · Discharge planning reviewed      Thank you for allowing us to participate in the care of this patient. Please call with questions. Jeromy Cortes, APRN - CNP     ATTESTATION:    I have discussed the case, including pertinent history and exam findings with the APRN. I have evaluated the  History, physical findings and pictures of the patient and the key elements of the encounter have been performed by me. I have reviewed the laboratory data, other diagnostic studies and discussed them with the APRN. I have updated the medical record where necessary. I agree with the assessment, plan and orders as documented by the APRN.     Bakari Mckeon MD.      Pager: [phone] - Office: [phone]

## 2022-01-19 LAB
ABSOLUTE EOS #: 0.28 K/UL (ref 0–0.44)
ABSOLUTE IMMATURE GRANULOCYTE: 0.03 K/UL (ref 0–0.3)
ABSOLUTE LYMPH #: 1.47 K/UL (ref 1.1–3.7)
ABSOLUTE MONO #: 0.6 K/UL (ref 0.1–1.2)
ANION GAP SERPL CALCULATED.3IONS-SCNC: 18 MMOL/L (ref 9–17)
BASOPHILS # BLD: 1 % (ref 0–2)
BASOPHILS ABSOLUTE: 0.06 K/UL (ref 0–0.2)
BUN BLDV-MCNC: 20 MG/DL (ref 6–20)
BUN/CREAT BLD: ABNORMAL (ref 9–20)
CALCIUM SERPL-MCNC: 9.1 MG/DL (ref 8.6–10.4)
CHLORIDE BLD-SCNC: 104 MMOL/L (ref 98–107)
CO2: 22 MMOL/L (ref 20–31)
CREAT SERPL-MCNC: 0.9 MG/DL (ref 0.7–1.2)
DIFFERENTIAL TYPE: ABNORMAL
EOSINOPHILS RELATIVE PERCENT: 5 % (ref 1–4)
GFR AFRICAN AMERICAN: >60 ML/MIN
GFR NON-AFRICAN AMERICAN: >60 ML/MIN
GFR SERPL CREATININE-BSD FRML MDRD: ABNORMAL ML/MIN/{1.73_M2}
GFR SERPL CREATININE-BSD FRML MDRD: ABNORMAL ML/MIN/{1.73_M2}
GLUCOSE BLD-MCNC: 104 MG/DL (ref 70–99)
GLUCOSE BLD-MCNC: 106 MG/DL (ref 75–110)
GLUCOSE BLD-MCNC: 111 MG/DL (ref 75–110)
GLUCOSE BLD-MCNC: 143 MG/DL (ref 75–110)
GLUCOSE BLD-MCNC: 152 MG/DL (ref 75–110)
HCT VFR BLD CALC: 31.4 % (ref 40.7–50.3)
HEMOGLOBIN: 9.4 G/DL (ref 13–17)
IMMATURE GRANULOCYTES: 1 %
LYMPHOCYTES # BLD: 24 % (ref 24–43)
MCH RBC QN AUTO: 23.6 PG (ref 25.2–33.5)
MCHC RBC AUTO-ENTMCNC: 29.9 G/DL (ref 28.4–34.8)
MCV RBC AUTO: 78.9 FL (ref 82.6–102.9)
MONOCYTES # BLD: 10 % (ref 3–12)
NRBC AUTOMATED: 0 PER 100 WBC
PDW BLD-RTO: 15.5 % (ref 11.8–14.4)
PLATELET # BLD: 444 K/UL (ref 138–453)
PLATELET ESTIMATE: ABNORMAL
PMV BLD AUTO: 9.1 FL (ref 8.1–13.5)
POTASSIUM SERPL-SCNC: 4.3 MMOL/L (ref 3.7–5.3)
RBC # BLD: 3.98 M/UL (ref 4.21–5.77)
RBC # BLD: ABNORMAL 10*6/UL
SEG NEUTROPHILS: 59 % (ref 36–65)
SEGMENTED NEUTROPHILS ABSOLUTE COUNT: 3.58 K/UL (ref 1.5–8.1)
SODIUM BLD-SCNC: 144 MMOL/L (ref 135–144)
VANCOMYCIN TROUGH DATE LAST DOSE: NORMAL
VANCOMYCIN TROUGH DOSE AMOUNT: NORMAL
VANCOMYCIN TROUGH TIME LAST DOSE: NORMAL
VANCOMYCIN TROUGH: 16.4 UG/ML (ref 10–20)
WBC # BLD: 6 K/UL (ref 3.5–11.3)
WBC # BLD: ABNORMAL 10*3/UL

## 2022-01-19 PROCEDURE — 6360000002 HC RX W HCPCS: Performed by: PODIATRIST

## 2022-01-19 PROCEDURE — 80048 BASIC METABOLIC PNL TOTAL CA: CPT

## 2022-01-19 PROCEDURE — 6370000000 HC RX 637 (ALT 250 FOR IP): Performed by: STUDENT IN AN ORGANIZED HEALTH CARE EDUCATION/TRAINING PROGRAM

## 2022-01-19 PROCEDURE — 80202 ASSAY OF VANCOMYCIN: CPT

## 2022-01-19 PROCEDURE — 6360000002 HC RX W HCPCS: Performed by: STUDENT IN AN ORGANIZED HEALTH CARE EDUCATION/TRAINING PROGRAM

## 2022-01-19 PROCEDURE — 86140 C-REACTIVE PROTEIN: CPT

## 2022-01-19 PROCEDURE — 1200000000 HC SEMI PRIVATE

## 2022-01-19 PROCEDURE — 2580000003 HC RX 258: Performed by: PODIATRIST

## 2022-01-19 PROCEDURE — 85025 COMPLETE CBC W/AUTO DIFF WBC: CPT

## 2022-01-19 PROCEDURE — 99232 SBSQ HOSP IP/OBS MODERATE 35: CPT | Performed by: INTERNAL MEDICINE

## 2022-01-19 PROCEDURE — 76937 US GUIDE VASCULAR ACCESS: CPT

## 2022-01-19 PROCEDURE — 82947 ASSAY GLUCOSE BLOOD QUANT: CPT

## 2022-01-19 PROCEDURE — 36415 COLL VENOUS BLD VENIPUNCTURE: CPT

## 2022-01-19 PROCEDURE — 6370000000 HC RX 637 (ALT 250 FOR IP): Performed by: PODIATRIST

## 2022-01-19 PROCEDURE — 94760 N-INVAS EAR/PLS OXIMETRY 1: CPT

## 2022-01-19 RX ORDER — DIAZEPAM 5 MG/1
5 TABLET ORAL EVERY 8 HOURS PRN
Status: DISCONTINUED | OUTPATIENT
Start: 2022-01-19 | End: 2022-01-23 | Stop reason: HOSPADM

## 2022-01-19 RX ORDER — MORPHINE SULFATE 4 MG/ML
4 INJECTION, SOLUTION INTRAMUSCULAR; INTRAVENOUS
Status: DISCONTINUED | OUTPATIENT
Start: 2022-01-19 | End: 2022-01-20

## 2022-01-19 RX ORDER — LANOLIN ALCOHOL/MO/W.PET/CERES
325 CREAM (GRAM) TOPICAL
Status: DISCONTINUED | OUTPATIENT
Start: 2022-01-19 | End: 2022-01-23 | Stop reason: HOSPADM

## 2022-01-19 RX ORDER — MORPHINE SULFATE 2 MG/ML
2 INJECTION, SOLUTION INTRAMUSCULAR; INTRAVENOUS EVERY 4 HOURS PRN
Status: DISCONTINUED | OUTPATIENT
Start: 2022-01-19 | End: 2022-01-20

## 2022-01-19 RX ADMIN — Medication 1250 MG: at 09:12

## 2022-01-19 RX ADMIN — SODIUM HYPOCHLORITE: 1.25 SOLUTION TOPICAL at 09:14

## 2022-01-19 RX ADMIN — SODIUM CHLORIDE, PRESERVATIVE FREE 10 ML: 5 INJECTION INTRAVENOUS at 09:12

## 2022-01-19 RX ADMIN — CEFEPIME 2000 MG: 2 INJECTION, POWDER, FOR SOLUTION INTRAVENOUS at 02:29

## 2022-01-19 RX ADMIN — MORPHINE SULFATE 60 MG: 30 TABLET, FILM COATED, EXTENDED RELEASE ORAL at 20:59

## 2022-01-19 RX ADMIN — BUPROPION HYDROCHLORIDE 300 MG: 300 TABLET, FILM COATED, EXTENDED RELEASE ORAL at 09:15

## 2022-01-19 RX ADMIN — ENOXAPARIN SODIUM 40 MG: 100 INJECTION SUBCUTANEOUS at 09:12

## 2022-01-19 RX ADMIN — DIAZEPAM 5 MG: 5 TABLET ORAL at 18:14

## 2022-01-19 RX ADMIN — LEVOTHYROXINE SODIUM 50 MCG: 50 TABLET ORAL at 09:12

## 2022-01-19 RX ADMIN — MORPHINE SULFATE 4 MG: 4 INJECTION INTRAVENOUS at 14:06

## 2022-01-19 RX ADMIN — PAROXETINE HYDROCHLORIDE HEMIHYDRATE 40 MG: 20 TABLET, FILM COATED ORAL at 09:12

## 2022-01-19 RX ADMIN — Medication 1250 MG: at 20:59

## 2022-01-19 RX ADMIN — OXYCODONE HYDROCHLORIDE 15 MG: 5 TABLET ORAL at 18:14

## 2022-01-19 RX ADMIN — MORPHINE SULFATE 60 MG: 30 TABLET, FILM COATED, EXTENDED RELEASE ORAL at 09:11

## 2022-01-19 RX ADMIN — FERROUS SULFATE TAB EC 325 MG (65 MG FE EQUIVALENT) 325 MG: 325 (65 FE) TABLET DELAYED RESPONSE at 09:12

## 2022-01-19 RX ADMIN — LISINOPRIL 10 MG: 10 TABLET ORAL at 09:12

## 2022-01-19 RX ADMIN — OXYCODONE HYDROCHLORIDE 15 MG: 5 TABLET ORAL at 11:38

## 2022-01-19 RX ADMIN — PANTOPRAZOLE SODIUM 40 MG: 40 TABLET, DELAYED RELEASE ORAL at 06:25

## 2022-01-19 RX ADMIN — Medication 1 CAPSULE: at 17:35

## 2022-01-19 RX ADMIN — MORPHINE SULFATE 4 MG: 4 INJECTION INTRAVENOUS at 22:32

## 2022-01-19 RX ADMIN — CEFEPIME 2000 MG: 2 INJECTION, POWDER, FOR SOLUTION INTRAVENOUS at 14:06

## 2022-01-19 RX ADMIN — MORPHINE SULFATE 4 MG: 4 INJECTION INTRAVENOUS at 02:31

## 2022-01-19 RX ADMIN — SODIUM CHLORIDE, PRESERVATIVE FREE 10 ML: 5 INJECTION INTRAVENOUS at 20:59

## 2022-01-19 RX ADMIN — Medication 1 CAPSULE: at 09:11

## 2022-01-19 ASSESSMENT — PAIN SCALES - GENERAL
PAINLEVEL_OUTOF10: 8
PAINLEVEL_OUTOF10: 8
PAINLEVEL_OUTOF10: 7
PAINLEVEL_OUTOF10: 7
PAINLEVEL_OUTOF10: 5
PAINLEVEL_OUTOF10: 3
PAINLEVEL_OUTOF10: 8
PAINLEVEL_OUTOF10: 5
PAINLEVEL_OUTOF10: 7

## 2022-01-19 NOTE — PROGRESS NOTES
Pharmacy Vancomycin Consult     Vancomycin Day: 3  Current Dosin mg every 12 hours  Current indication: cellulitis of foot and leg/osteomyelitis of toe    Temp max:  98.3    Recent Labs     22  0634 22  0626   BUN 19 20   CREATININE 0.76 0.90   WBC 5.5 6.0       Intake/Output Summary (Last 24 hours) at 2022 1136  Last data filed at 2022 8510  Gross per 24 hour   Intake --   Output 1500 ml   Net -1500 ml     Culture Date      Source                       Results  Pending    Ht Readings from Last 1 Encounters:   22 6' 2\" (1.88 m)        Wt Readings from Last 1 Encounters:   22 187 lb (84.8 kg)       There is no height or weight on file to calculate BMI. Estimated Creatinine Clearance: 105 mL/min (based on SCr of 0.9 mg/dL).     Trough: 16.4    Assessment/Plan:  Continue current therapy    Brooke Ellis Pharm D.  2022  11:37 AM

## 2022-01-19 NOTE — PROGRESS NOTES
Physical Therapy        Physical Therapy Cancel Note      DATE: 2022    NAME: Analy Rodriguez  MRN: [id number]   : 1965      Patient not seen this date for Physical Therapy due to:    Patient Declined: with various reasons, encouraged and offered solutions, pt continued to declined. ck pm as able.       Electronically signed by Mesha Moses PT on  at 10:09 AM

## 2022-01-19 NOTE — PROGRESS NOTES
[facility]  Internal Medicine Teaching Residency Program  Inpatient Daily Progress Note  ______________________________________________________________________________    Patient: Silvino Ryan  YOB: 1965   [facility]:[id number]    Acct: [de-identified]     Room: [address] [place] [address]  Admit date: 2022  Today's date: 22  Number of days in the hospital: 2    SUBJECTIVE   Admitting Diagnosis: Cellulitis  CC: Right foot pain  Pt examined at bedside. Chart & results reviewed. Afebrile, vitally stable  No acute issues overnight  BS   BC negative    ROS:  Constitutional:  negative for chills, fevers, sweats  Respiratory:  negative for cough, dyspnea on exertion, hemoptysis, shortness of breath, wheezing  Cardiovascular:  negative for chest pain, chest pressure/discomfort, lower extremity edema, palpitations  Gastrointestinal:  negative for abdominal pain, constipation, diarrhea, nausea, vomiting  Neurological:  negative for dizziness, headache  BRIEF HISTORY     50-year-old male with PMH of type 2 diabetes mellitus, hypertension, hypothyroidism, s/p TMA left foot, s/p right first and second digit amputation who is noncompliant with follow-up and medication presented with right foot pain. Found to have cellulitis/osteomyelitis. Podiatry is the primary. Blood glucose level and blood pressure are well controlled. Continue with the same treatment. Podiatry plannin to take to OR 22 at 8:45 am for TMA right foot w/ JEROME. We will follow    OBJECTIVE     Vital Signs:  /67   Pulse 66   Temp 98.3 °F (36.8 °C) (Oral)   Resp 14   SpO2 95%     Temp (24hrs), Av.3 °F (36.8 °C), Min:98.3 °F (36.8 °C), Max:98.3 °F (36.8 °C)    In: -   Out: 1389 [Urine:1825]    Physical Exam:  Constitutional: Alert, oriented, cooperative and in no apparent distress. Head:normocephalic and atraumatic. EENT:  PERRLA. No conjunctival injections.    Septum was midline, mucosa was without erythema, exudates or cobblestoning. No thrush was noted. Respiratory: Chest was symmetrical without dullness to percussion. Breath sounds bilaterally were clear to auscultation. There were no wheezes, rhonchi or rales. There is no intercostal retraction or use of accessory muscles. No egophony noted. Cardiovascular: Regular without murmur, clicks, gallops or rubs. Abdomen: Slightly rounded and soft without organomegaly. No rebound, rigidity or guarding was appreciated. Musculoskeletal: Normal curvature of the spine. No gross muscle weakness. Extremities:  No lower extremity edema, ulcerations, tenderness, varicosities or erythema. Muscle size, tone and strength are normal.  No involuntary movements are noted. Skin:  Warm and dry. Good color, turgor and pigmentation. No lesions or scars.   No cyanosis or clubbing  Neurological/Psychiatric: The patient's general behavior, level of consciousness, thought content and emotional status is normal.        Medications:  Scheduled Medications:    ferrous sulfate  325 mg Oral Daily with breakfast    insulin lispro  0-6 Units SubCUTAneous TID WC    insulin lispro  0-3 Units SubCUTAneous Nightly    sodium chloride flush  5-40 mL IntraVENous 2 times per day    enoxaparin  40 mg SubCUTAneous Daily    cefepime  2,000 mg IntraVENous Q12H    vancomycin (VANCOCIN) intermittent dosing (placeholder)   Other RX Placeholder    sodium hypochlorite   Irrigation Daily    buPROPion  300 mg Oral Daily    levothyroxine  50 mcg Oral Daily    lisinopril  10 mg Oral Daily    morphine  60 mg Oral BID    pantoprazole  40 mg Oral QAM AC    vancomycin  1,250 mg IntraVENous Q12H    lactobacillus  1 capsule Oral BID WC    PARoxetine  40 mg Oral QAM     Continuous Infusions:    dextrose      sodium chloride      dextrose       PRN Medicationsmorphine, 2 mg, Q4H PRN   Or  morphine, 4 mg, Q2H PRN  diazePAM, 5 mg, Q8H PRN  glucose, 15 g, PRN  dextrose, 12.5 g, PRN  glucagon (rDNA), 1 mg, PRN  dextrose, 100 mL/hr, PRN  sodium chloride flush, 5-40 mL, PRN  sodium chloride, 25 mL, PRN  ondansetron, 4 mg, Q8H PRN   Or  ondansetron, 4 mg, Q6H PRN  polyethylene glycol, 17 g, Daily PRN  oxyCODONE, 15 mg, Q6H PRN  glucose, 15 g, PRN  dextrose, 12.5 g, PRN  glucagon (rDNA), 1 mg, PRN  dextrose, 100 mL/hr, PRN        Diagnostic Labs:  CBC:   Recent Labs     01/17/22  1505 01/18/22  0634 01/19/22  0626   WBC 6.8 5.5 6.0   RBC 3.83* 3.69* 3.98*   HGB 9.1* 8.9* 9.4*   HCT 30.6* 29.8* 31.4*   MCV 79.9* 80.8* 78.9*   RDW 15.7* 15.7* 15.5*   * 456* 444     BMP:   Recent Labs     01/17/22  1505 01/17/22  1505 01/17/22  1512 01/18/22  0634 01/19/22  0626     --   --  136 144   K 4.2  --   --  4.1 4.3     --   --  101 104   CO2 27  --   --  27 22   BUN 21*  --   --  19 20   CREATININE 0.72   < > 0.74 0.76 0.90    < > = values in this interval not displayed. BNP: No results for input(s): BNP in the last 72 hours. PT/INR: No results for input(s): PROTIME, INR in the last 72 hours. APTT: No results for input(s): APTT in the last 72 hours. CARDIAC ENZYMES: No results for input(s): CKMB, CKMBINDEX, TROPONINI in the last 72 hours. Invalid input(s): CKTOTAL;3  FASTING LIPID PANEL:  Lab Results   Component Value Date    HDL 38 03/08/2020     LIVER PROFILE:   Recent Labs     01/17/22  1505   AST 11   ALT 6   BILITOT <0.10*   ALKPHOS 76      MICROBIOLOGY:   Lab Results   Component Value Date/Time    CULTURE NO GROWTH 12 HOURS 01/18/2022 02:09 PM    CULTURE NO GROWTH 12 HOURS 01/18/2022 02:09 PM       Imaging:    XR FOOT LEFT (MIN 3 VIEWS)    Result Date: 1/17/2022  New amputation mid foot, age indeterminate. No subcutaneous gas. XR FOOT RIGHT (MIN 3 VIEWS)    Result Date: 1/17/2022  Interval new amputation 1st and 2nd digits, age indeterminate. No subcutaneous gas.      MRI FOOT RIGHT WO CONTRAST    Result Date: 1/17/2022  Very limited exam secondary to motion. Suspected marrow signal changes within the 1st proximal phalanx and to a lesser extent head of the 1st metatarsal.  Osteomyelitis cannot be excluded. Soft tissue edema is concerning for cellulitis. Questionable faint marrow edema within the 4th metatarsal diaphysis, cuboid, and visualized calcaneus. Findings are very nonspecific and may be reactive, secondary to osseous injuries, or possibly be artifactual.       ASSESSMENT & PLAN     1. Right foot cellulitis/ ? Osteomyelitis.  Continue on vancomycin and cefepime.  Control pain.  Follow ID recommendations  2. Hypertension. Continue on lisinopril 10.  Monitor BP  3. Diabetes mellitus type 2.  HbA1c 5.3 8/21.  On metformin 250 daily at home.  MDSS, hypoglycemia protocol.  POCT glucose q6.  4. Hypothyroidism.  Continue on Synthroid 50 daily.  TSH 0.62 4/20.  Repeat TSH  5. Anxiety: continues on paroxetine 40 daily     DVT ppx : Lovenox  GI ppx: Pepcid  PT/OT: Following  Discharge Planning / SW: CM to assist with    Yogi Alicea MD  Internal Medicine Resident, PGY-1  [address], [place]  1/19/2022, 3:20 PM    Attending Physician Statement  I have discussed the care of Ward Gates with the resident team. I have examined the patient myself and taken ros and hpi , including pertinent history and exam findings,  with the resident. I have reviewed the key elements of all parts of the encounter with the resident. I agree with the assessment, plan and orders as documented by the resident. Principal Problem:    Cellulitis  Active Problems:    Essential hypertension    Hypothyroidism    Type 2 diabetes mellitus ([facility] [address]  Resolved Problems:    * No resolved hospital problems.  *        Electronically signed by Vale Earl MD

## 2022-01-19 NOTE — PROGRESS NOTES
Progress Note  Podiatric Medicine and Surgery     Subjective     CC: right foot/leg pain    Patient seen and examined at bedside  No acute events overnight  Afebrile, vital signs stable  Pt       Plan: TMA OR right foot 1/21/22    HPI :  Isaiah Freeman is a 61 y.o. male seen at [facility] on the floor for right foot pain and infection. He is well known to Dr. Verena Chavez who advised he be admitted 1/12/21 but pt refused until today. He has been seen for wound care over the course of the last couple years and demonstrates repetitive non-compliant behaviors including failure to follow up when scheduled, failure to fill prescriptions in a timely manner, and using substitute medications that have been [de-identified] years old. PCP is Omari Gavin MD    ROS: Arik N/V/F/C/SOB/CP. Otherwise negative except at stated in the HPI.      Medications:  Scheduled Meds:   ferrous sulfate  325 mg Oral Daily with breakfast    insulin lispro  0-6 Units SubCUTAneous TID WC    insulin lispro  0-3 Units SubCUTAneous Nightly    sodium chloride flush  5-40 mL IntraVENous 2 times per day    enoxaparin  40 mg SubCUTAneous Daily    cefepime  2,000 mg IntraVENous Q12H    vancomycin (VANCOCIN) intermittent dosing (placeholder)   Other RX Placeholder    sodium hypochlorite   Irrigation Daily    buPROPion  300 mg Oral Daily    levothyroxine  50 mcg Oral Daily    lisinopril  10 mg Oral Daily    morphine  60 mg Oral BID    pantoprazole  40 mg Oral QAM AC    vancomycin  1,250 mg IntraVENous Q12H    lactobacillus  1 capsule Oral BID WC    PARoxetine  40 mg Oral QAM       Continuous Infusions:   dextrose      sodium chloride      dextrose         PRN Meds:morphine **OR** morphine, glucose, dextrose, glucagon (rDNA), dextrose, sodium chloride flush, sodium chloride, ondansetron **OR** ondansetron, polyethylene glycol, oxyCODONE, glucose, dextrose, glucagon (rDNA), dextrose    Objective     Vitals:  Patient Vitals for the past 8 hrs: BP Pulse SpO2   22 0811 124/67 66 95 %     Average, Min, and Max for last 24 hours Vitals:  TEMPERATURE:  Temp  Av.3 °F (36.8 °C)  Min: 98.3 °F (36.8 °C)  Max: 98.3 °F (36.8 °C)    RESPIRATIONS RANGE: Resp  Av  Min: 14  Max: 14    PULSE RANGE: Pulse  Av.5  Min: 61  Max: 66    BLOOD PRESSURE RANGE:  Systolic (27KRF), MPQ:576 , Min:109 , XVP:354   ; Diastolic (03DIT), HKM:13, Min:53, Max:73      PULSE OXIMETRY RANGE: SpO2  Av.5 %  Min: 95 %  Max: 96 %    I/O last 3 completed shifts:  In: -   Out: 2300 [Urine:2300]    CBC:  Recent Labs     22  1505 22  0634 22  0626   WBC 6.8 5.5 6.0   HGB 9.1* 8.9* 9.4*   HCT 30.6* 29.8* 31.4*   * 456* 444   CRP 36.0*  --   --         BMP:  Recent Labs     22  1505 22  1505 22  1512 22  0634 22  0626     --   --  136 144   K 4.2  --   --  4.1 4.3     --   --  101 104   CO2 27  --   --  27 22   BUN 21*  --   --  19 20   CREATININE 0.72   < > 0.74 0.76 0.90   GLUCOSE 91  --   --  94 104*   CALCIUM 8.4*  --   --  8.6 9.1    < > = values in this interval not displayed. Coags:  Recent Labs     22  1505   PROT 7.4       Lab Results   Component Value Date    SEDRATE 96 (H) 2022     Recent Labs     22  1505   CRP 36.0*       General: A&Ox3, NAD  Heart: Regular rate and rhythm. Lungs: Equal air entry. No increased effort. Abdomen: Soft, non-tender to palpation. Not distended. Physical Exam:  Vascular: DP and PT pulses are +2. CFT <3 seconds to all remaining pedal digits. Non-pitting right pedal edema. Temperature gradient increased right dorsal foot. Neuro: Light touch sensation is present to the level of the pedal digits. Musculoskeletal: Muscle strength is 5/5, baseline, to all lower extremity muscle groups. Palpation of right leg and foot elicit pain. Gross deformity is present, left foot TMA, right foot partial hallux amputation.      Dermatologic: Full thickness ulcer located right foot/1st interspace and measures approximately 4cm x 4cm x 0.5 cm. Base is fibronecrotic. Periwound skin appears erythematous. Significant amount of serous drainage noted without associated mal odor. Right foot erythema with associated increase in warmth. Neg probe to bone. Slight sinus tracking. The wound does undermine. Neg fluctuance, crepitus, or induration. Interdigital maceration absent, Bilateral.              Imaging:   MRI FOOT RIGHT WO CONTRAST   Final Result   Very limited exam secondary to motion. Suspected marrow signal changes within the 1st proximal phalanx and to a   lesser extent head of the 1st metatarsal.  Osteomyelitis cannot be excluded. Soft tissue edema is concerning for cellulitis. Questionable faint marrow edema within the 4th metatarsal diaphysis, cuboid,   and visualized calcaneus. Findings are very nonspecific and may be reactive,   secondary to osseous injuries, or possibly be artifactual.         VL DUP LOWER EXTREMITY VENOUS BILATERAL   Final Result      XR FOOT LEFT (MIN 3 VIEWS)   Final Result   New amputation mid foot, age indeterminate. No subcutaneous gas. XR FOOT RIGHT (MIN 3 VIEWS)   Final Result   Interval new amputation 1st and 2nd digits, age indeterminate. No   subcutaneous gas. Cultures: n/a     Assessment   Gilberto Mohr is a 61 y.o. male with   Cellulitis right foot and leg  Chronic non-healing ulceration to level of muscle, right foot  DVT right leg  S/P TMA left foot  S/P digital amputation right foot  DMII with history of LE amputation    Active Problems:    Essential hypertension    Hypothyroidism    Cellulitis    Type 2 diabetes mellitus ([facility] [address]  Resolved Problems:    * No resolved hospital problems.  *       Plan   Patient examined and evaluated at bedside   Treatment options discussed in detail with the patient including pain management, imagining   Radiographs reviewed and discussed in detail with patient internal amputation interval right 1st/2nd right foot neg for gas or OM  MRI right foot ordered OM right 1st / 2nd met  US art 2020 showed no signs of occlusive disease  Medicine team consulted for med management and pain control  ID consulted for foot wound  Plan to take to OR 1/21/22 at 8:45 am for TMA right foot w/ JEROME - on board  Dressing applied to right lower extremity: DSD, 4x4's in webspaces  Heel status to right lower extremity - orders placed  Discussed with Dr. Pritesh Meraz    DVT ppx: lovenox    Diet: carb control           Activity: heel WB RLE  Pain Control: 60 mg ER oxycontin BID    Carlita Diaz DPM   Podiatric Medicine & Surgery   1/19/2022 at 9:34 AM    I performed a history and physical examination of the patient and discussed management with the resident. I reviewed the residents note and agree with the documented findings and plan of care. Any areas of disagreement are noted on the chart. I was personally present for the key portions of any procedures. I have documented in the chart those procedures where I was not present during the key portions. I have reviewed the Podiatry Resident progress note. I agree with the chief complaint, past medical history, past surgical history, allergies, medications, social and family history as documented unless otherwise noted below. Documentation of the HPI, Physical Exam and Medical Decision Making performed by medical students or scribes is based on my personal performance of the HPI, PE and MDM. I have personally evaluated this patient and have completed at least one if not all key elements of the E/M (history, physical exam, and MDM). Additional findings are as noted.      Sita Brice DPM on 1/21/2022 at 2:82 AM  Board Certified, American Board of Podiatric Surgery  Fellow, [employer] and [email or web address]

## 2022-01-19 NOTE — PROGRESS NOTES
Occupational [address]  Occupational Therapy Not Seen Note    DATE: 2022    NAME: Momo Bernal  MRN: [id number]   : 1965      Patient not seen this date for Occupational Therapy due to:      Patient Declined: Pt refusing EOB/OOB activity at this time with various reports/complaints stating \"I don't have any pants on\" and \"I just got my pain meds and I'm out of it\" and \"I don't want to walk on the dirty floor and I can't wear a sock because it will stick to my toe\". Despite writer providing solutions to various complaints and encouragement/education regarding the importance of therapy and activity promotion, pt continues to decline.        Next Scheduled Treatment: Will check back PM as able or 2022    Electronically signed by Kenney Rob OT on 2022 at 9:52 AM

## 2022-01-19 NOTE — PROGRESS NOTES
[facility] - progress note  Today's Date and Time: 1/19/2022, 8:48 AM    Impression :   · Cellulitis of the right foot  · Chronic nonhealing ulcer of the right foot status post first and second digit amputation  · History left foot amputation  · Diabetes mellitus type 2  · Right leg DVT  · Essential hypertension  · Hypothyroidism  · Hx MRSA foot 2018  · Allergy to Bactrim    Recommendations:   · Continue broad spectrum antibiotics pending culture results    Medical Decision Making/Summary/Discussion:1/19/2022     ·   Infection Control Recommendations   · [place] Precautions  · Contact Isolation     Antimicrobial Stewardship Recommendations     · Simplification of therapy  · Targeted therapy    Coordination of Outpatient Care:   · Estimated Length of IV antimicrobials:  · Patient will need Midline Catheter Insertion:   · Patient will need PICC line Insertion:  · Patient will need: Home IV , [place],  SNF,  LTAC: TBD  · Patient will need outpatient wound care:    Chief complaint/reason for consultation:   · Diabetic foot wound      History of Present Illness:   Imelda Muller is a 61y.o.-year-old male who was initially admitted on 1/17/2022. Patient seen at the request of Dr. Gabriel Tello:    This patient presented to the ED with complaints of cellulitis of the right foot and leg with a chronic nonhealing ulcer to level of muscle of the right foot. He follows up with Dr. Darryle Carolin with Podiatry, but per EHR, he is known to be noncompliant with medications and follow-up office visits. Dr. Darryle Carolin had recommended that he present to the hospital on 1/12/2022 for his right foot wound, but the patient has refused until 1/17/2022. He was started on vancomycin and cefepime    Imaging:     XR FOOT LEFT (MIN 3 VIEWS)     Result Date: 1/17/2022  New amputation mid foot, age indeterminate.   No subcutaneous gas.      XR FOOT RIGHT (MIN 3 VIEWS)     Result Date: 1/17/2022  Interval new amputation 1st and 2nd digits, age indeterminate. No subcutaneous gas.      MRI FOOT RIGHT WO CONTRAST     Result Date: 1/17/2022  Very limited exam secondary to motion. Suspected marrow signal changes within the 1st proximal phalanx and to a lesser extent head of the 1st metatarsal.  Osteomyelitis cannot be excluded. Soft tissue edema is concerning for cellulitis. Questionable faint marrow edema within the 4th metatarsal diaphysis, cuboid, and visualized calcaneus. Findings are very nonspecific and may be reactive, secondary to osseous injuries, or possibly be artifactual.        CURRENT EVALUATION 1/19/2022    Afebrile   VS stable   on room air    Denies any new symptoms  Right foot, great toe partial amputation, redness of the great toe extending on to the sole    Podiatry onboard, MRI shows concern for osteomyelitis, plan  for TMA right foot w/ JEROME on 01/21/22  On cefepime and vancomycin    Labs, X rays reviewed: 1/19/2022    BUN:19 > 20  Cr:0.76 > 0.90    WBC:5.5 > 6.0  Hb:8.9 > 9.4  Plat: 456 > 444    CRP: 36.0  Ferritin: 23  Sed rate: 96    Cultures:  Urine:  · none  Blood:  · 01/18/22: no growth  Sputum :  · none  Wound:  · none    Images:    Right foot 1/17/21      Discussed with patient, RN, family. I have personally reviewed the past medical history, past surgical history, medications, social history, and family history, and I have updated the database accordingly.   Past Medical History:     Past Medical History:   Diagnosis Date    Acute metabolic encephalopathy     CLAUDIA (acute kidney injury) ([address]     Anemia     Anxiety     ARF (acute renal failure) (HCC)     Arthritis     lower back, knees    BPH (benign prostatic hyperplasia)     Chronic kidney disease     prostate    Depression     Diabetes mellitus ([address]     Diabetic neuropathy (HCC)     GERD (gastroesophageal reflux disease)     Hyperglycemia     Hyperkalemia     Hypertension     Hypothyroidism     Impacted tooth     [facility]rosis of bone of foot ([facility] [address]     Osteomyelitis of left foot (HCC)     PAD (peripheral artery disease) ([facility] [address]     Pneumonia     Septic shock (HCC)     Type 2 diabetes mellitus ([facility] [address]        Past Surgical  History:     Past Surgical History:   Procedure Laterality Date    APPENDECTOMY      BACK SURGERY      Lower x 4. Had abscess after appendectomy.      FOOT AMPUTATION Left 1/4/2020    TRANSMETATARSAL FOOT AMPUTATION, REMOVAL OF FOREIGN BODY performed by Nanci Martinez DPM at [facility] Left 01/04/2020    TRANSMETATARSAL FOOT AMPUTATION, REMOVAL OF FOREIGN BODY    KNEE SURGERY Right     scope    TOE AMPUTATION Bilateral 3/6/2017    TOE AMPUTATION LEFT HALLUX AND 2ND DIGIT AND RIGHT 2ND DIGIT performed by Katarina Britton DPM at [facility]  ENDOSCOPY N/A 5/20/2019    EGD WITH BIOPSY performed by Samir Downs MD at [facility] OR       Medications:      ferrous sulfate  325 mg Oral Daily with breakfast    insulin lispro  0-6 Units SubCUTAneous TID WC    insulin lispro  0-3 Units SubCUTAneous Nightly    sodium chloride flush  5-40 mL IntraVENous 2 times per day    enoxaparin  40 mg SubCUTAneous Daily    cefepime  2,000 mg IntraVENous Q12H    vancomycin (VANCOCIN) intermittent dosing (placeholder)   Other RX Placeholder    sodium hypochlorite   Irrigation Daily    buPROPion  300 mg Oral Daily    levothyroxine  50 mcg Oral Daily    lisinopril  10 mg Oral Daily    morphine  60 mg Oral BID    pantoprazole  40 mg Oral QAM AC    vancomycin  1,250 mg IntraVENous Q12H    lactobacillus  1 capsule Oral BID WC    PARoxetine  40 mg Oral QAM       Social History:     Social History     Socioeconomic History    Marital status:      Spouse name: Not on file    Number of children: Not on file    Years of education: Not on file    Highest education level: Not on file   Occupational History    Not on file   Tobacco Use    Smoking status: Never Smoker    Smokeless tobacco: Never Used   Vaping Use    Vaping Use: Never used   Substance and Sexual Activity    Alcohol use: No    Drug use: Not Currently     Frequency: 2.0 times per week     Types: Marijuana Nicky Chu)    Sexual activity: Not on file   Other Topics Concern    Not on file   Social History Narrative    Not on file     Social Determinants of Health     Financial Resource Strain: Low Risk     Difficulty of Paying Living Expenses: Not hard at all   Food Insecurity: No Food Insecurity    Worried About Running Out of Food in the Last Year: Never true    Vicente of Food in the Last Year: Never true   Transportation Needs:     Lack of Transportation (Medical): Not on file    Lack of Transportation (Non-Medical):  Not on file   Physical Activity:     Days of Exercise per Week: Not on file    Minutes of Exercise per Session: Not on file   Stress:     Feeling of Stress : Not on file   Social Connections:     Frequency of Communication with Friends and Family: Not on file    Frequency of Social Gatherings with Friends and Family: Not on file    Attends Baptism Services: Not on file    Active Member of [address] [place] [address] or Organizations: Not on file    Attends Club or Organization Meetings: Not on file    Marital Status: Not on file   Intimate Partner Violence:     Fear of Current or Ex-Partner: Not on file    Emotionally Abused: Not on file    Physically Abused: Not on file    Sexually Abused: Not on file   Housing Stability:     Unable to Pay for Housing in the Last Year: Not on file    Number of [place] in the Last Year: Not on file    Unstable Housing in the Last Year: Not on file       Family History:     Family History   Problem Relation Age of Onset    Diabetes Father     Cancer Maternal Grandfather         Colon Cancer; Prostate Cancer    No Known Problems Mother         Allergies:   Bactrim [sulfamethoxazole-trimethoprim], Fiorinal [butalbital-aspirin-caffeine], Statins, Tylenol [acetaminophen], and Peanut butter flavor [flavoring agent]     Review of Systems:   Constitutional: No fevers or chills. No systemic complaints  Head: No headaches  Eyes: No double vision or blurry vision. No conjunctival inflammation. ENT: No sore throat or runny nose. . No hearing loss, tinnitus or vertigo. Cardiovascular: No chest pain or palpitations. No shortness of breath. No MONTES  Lung: No shortness of breath or cough. No sputum production  Abdomen: No nausea, vomiting, diarrhea, or abdominal pain. Jesse Reeds No cramps. Genitourinary: No increased urinary frequency, or dysuria. No hematuria. No suprapubic or CVA pain  Musculoskeletal: Bilateral lower extremity pain  Hematologic: No bleeding or bruising. Integument: Right foot wound  Psychiatric: No depression. Endocrine: No polyuria, no polydipsia, no polyphagia. Physical Examination :     Patient Vitals for the past 8 hrs:   BP Pulse SpO2   01/19/22 0811 124/67 66 95 %     General Appearance: Awake, alert, and in no apparent distress  Head:  Normocephalic, no trauma  Eyes: Pupils equal, round, reactive to light and accommodation; extraocular movements intact; sclera anicteric; conjunctivae pink. No embolic phenomena. ENT: Oropharynx clear, without erythema, exudate, or thrush. No tenderness of sinuses. Mouth/throat: mucosa pink and moist. No lesions. Dentition in good repair. Neck:Supple, without lymphadenopathy. Thyroid normal, No bruits. Pulmonary/Chest: Clear to auscultation, without wheezes, rales, or rhonchi. No dullness to percussion. Cardiovascular: Regular rate and rhythm without murmurs, rubs, or gallops. Abdomen: Soft, non tender. Bowel sounds normal. No organomegaly  All four Extremities: toe amputations right foot, partial foot amputation left foot  Neurologic: No gross sensory or motor deficits.   Skin: right foot wound    Medical Decision Making -Laboratory:   I have independently reviewed/ordered the following labs:    CBC with Differential:   Recent Labs     01/18/22  0634 01/19/22  0626   WBC 5.5 6.0   HGB 8.9* 9.4*   HCT 29.8* 31.4*   * 444   LYMPHOPCT 28 24   MONOPCT 9* 10     BMP:   Recent Labs     01/18/22  0634 01/19/22  0626    144   K 4.1 4.3    104   CO2 27 22   BUN 19 20   CREATININE 0.76 0.90     Hepatic Function Panel:   Recent Labs     01/17/22  1505   PROT 7.4   LABALBU 3.6   BILITOT <0.10*   ALKPHOS 76   ALT 6   AST 11     No results for input(s): RPR in the last 72 hours. No results for input(s): HIV in the last 72 hours. No results for input(s): BC in the last 72 hours.   Lab Results   Component Value Date    MUCUS NOT REPORTED 04/20/2020    RBC 3.98 01/19/2022    TRICHOMONAS NOT REPORTED 04/20/2020    WBC 6.0 01/19/2022    YEAST NOT REPORTED 04/20/2020    TURBIDITY CLEAR 04/20/2020     Lab Results   Component Value Date    CREATININE 0.90 01/19/2022    GLUCOSE 104 01/19/2022       Medical Decision Making-Imaging:     Narrative   EXAMINATION:   MRI OF THE RIGHT FOOT WITHOUT CONTRAST, 1/17/2022 5:34 pm       TECHNIQUE:   Multiplanar multisequence MRI of the right foot was performed without the   administration of intravenous contrast.       COMPARISON:   Right foot radiographs 01/17/2022       HISTORY:   ORDERING SYSTEM PROVIDED HISTORY: R/O OM left calc   TECHNOLOGIST PROVIDED HISTORY:   R/O OM left calc   Reason for Exam: R/O OM       FINDINGS:   Significantly limited exam secondary to motion, patient body habitus, and   lack of IV contrast.  Within this limitation:       BONE MARROW: Postsurgical findings from prior amputation of the 1st and 2nd   digit, better visualized on prior radiograph.  T2 hyperintense T1 hypointense   signal within the 1st proximal phalanx is suspected.  A questionable area of   T2 hyperintense signal and T1 hypointense signal along the medial margin of   the 1st metatarsal head (series 10 and 11, image 11).  No discrete T2 marrow   signal changes of the 2nd metatarsal or 2nd proximal phalanx definitively   visualized.  No discrete fracture is identified on the provided images. Questionable T2 hyperintense signal within the calcaneus and cuboid, only   partially visualized and incompletely evaluated.  The calcaneus is not   completely included in the study.  Questionable T2 hyperintense signal within   the diaphysis of the 4th metatarsal.       SOFT TISSUES: Likely postsurgical changes of the 1st and 2nd digit flexor and   extensor tendons.  Edema signal and atrophic changes within the plantar   musculature, particularly the middle band of the plantar fascia, likely   neuropathic.  No soft tissue gas grossly identified, however tiny foci of gas   would be difficult to visualize with the amount of motion on this study.  No   discrete drainable fluid collection grossly identified.           Impression   Very limited exam secondary to motion.       Suspected marrow signal changes within the 1st proximal phalanx and to a   lesser extent head of the 1st metatarsal.  Osteomyelitis cannot be excluded.       Soft tissue edema is concerning for cellulitis.       Questionable faint marrow edema within the 4th metatarsal diaphysis, cuboid,   and visualized calcaneus.  Findings are very nonspecific and may be reactive,   secondary to osseous injuries, or possibly be artifactual.     Narrative   EXAMINATION:   THREE XRAY VIEWS OF THE LEFT FOOT       1/17/2022 2:52 pm       COMPARISON:   None.       HISTORY:   ORDERING SYSTEM PROVIDED HISTORY: eval OM/gas   TECHNOLOGIST PROVIDED HISTORY:   eval OM/gas       FINDINGS:   New interval amputation proximal metatarsals.  Cortical margins otherwise   intact.  Soft tissues unremarkable.           Impression   New amputation mid foot, age indeterminate.  No subcutaneous gas.         Narrative   EXAMINATION:   THREE XRAY VIEWS OF THE RIGHT FOOT       1/17/2022 2:52 pm       COMPARISON:   December 15, 2021.

## 2022-01-20 LAB
ABSOLUTE EOS #: 0.22 K/UL (ref 0–0.44)
ABSOLUTE IMMATURE GRANULOCYTE: 0 K/UL (ref 0–0.3)
ABSOLUTE LYMPH #: 1.71 K/UL (ref 1.1–3.7)
ABSOLUTE MONO #: 0.55 K/UL (ref 0.1–1.2)
ANION GAP SERPL CALCULATED.3IONS-SCNC: 10 MMOL/L (ref 9–17)
BASOPHILS # BLD: 1 % (ref 0–2)
BASOPHILS ABSOLUTE: 0.06 K/UL (ref 0–0.2)
BUN BLDV-MCNC: 19 MG/DL (ref 6–20)
BUN/CREAT BLD: NORMAL (ref 9–20)
C-REACTIVE PROTEIN: 17 MG/L (ref 0–5)
CALCIUM SERPL-MCNC: 9 MG/DL (ref 8.6–10.4)
CHLORIDE BLD-SCNC: 99 MMOL/L (ref 98–107)
CO2: 26 MMOL/L (ref 20–31)
CREAT SERPL-MCNC: 0.8 MG/DL (ref 0.7–1.2)
DIFFERENTIAL TYPE: ABNORMAL
EOSINOPHILS RELATIVE PERCENT: 4 % (ref 1–4)
GFR AFRICAN AMERICAN: >60 ML/MIN
GFR NON-AFRICAN AMERICAN: >60 ML/MIN
GFR SERPL CREATININE-BSD FRML MDRD: NORMAL ML/MIN/{1.73_M2}
GFR SERPL CREATININE-BSD FRML MDRD: NORMAL ML/MIN/{1.73_M2}
GLUCOSE BLD-MCNC: 102 MG/DL (ref 75–110)
GLUCOSE BLD-MCNC: 121 MG/DL (ref 75–110)
GLUCOSE BLD-MCNC: 136 MG/DL (ref 75–110)
GLUCOSE BLD-MCNC: 137 MG/DL (ref 75–110)
GLUCOSE BLD-MCNC: 94 MG/DL (ref 70–99)
HCT VFR BLD CALC: 32.6 % (ref 40.7–50.3)
HEMOGLOBIN: 9.6 G/DL (ref 13–17)
IMMATURE GRANULOCYTES: 0 %
LYMPHOCYTES # BLD: 31 % (ref 24–43)
MCH RBC QN AUTO: 23.6 PG (ref 25.2–33.5)
MCHC RBC AUTO-ENTMCNC: 29.4 G/DL (ref 28.4–34.8)
MCV RBC AUTO: 80.1 FL (ref 82.6–102.9)
MONOCYTES # BLD: 10 % (ref 3–12)
MORPHOLOGY: ABNORMAL
MORPHOLOGY: ABNORMAL
NRBC AUTOMATED: 0 PER 100 WBC
PDW BLD-RTO: 15.5 % (ref 11.8–14.4)
PLATELET # BLD: 493 K/UL (ref 138–453)
PLATELET ESTIMATE: ABNORMAL
PMV BLD AUTO: 9.5 FL (ref 8.1–13.5)
POTASSIUM SERPL-SCNC: 4.4 MMOL/L (ref 3.7–5.3)
RBC # BLD: 4.07 M/UL (ref 4.21–5.77)
RBC # BLD: ABNORMAL 10*6/UL
SEG NEUTROPHILS: 54 % (ref 36–65)
SEGMENTED NEUTROPHILS ABSOLUTE COUNT: 2.96 K/UL (ref 1.5–8.1)
SODIUM BLD-SCNC: 135 MMOL/L (ref 135–144)
WBC # BLD: 5.5 K/UL (ref 3.5–11.3)
WBC # BLD: ABNORMAL 10*3/UL

## 2022-01-20 PROCEDURE — 99232 SBSQ HOSP IP/OBS MODERATE 35: CPT | Performed by: INTERNAL MEDICINE

## 2022-01-20 PROCEDURE — 36415 COLL VENOUS BLD VENIPUNCTURE: CPT

## 2022-01-20 PROCEDURE — 87635 SARS-COV-2 COVID-19 AMP PRB: CPT

## 2022-01-20 PROCEDURE — 85025 COMPLETE CBC W/AUTO DIFF WBC: CPT

## 2022-01-20 PROCEDURE — 82947 ASSAY GLUCOSE BLOOD QUANT: CPT

## 2022-01-20 PROCEDURE — 2580000003 HC RX 258: Performed by: PODIATRIST

## 2022-01-20 PROCEDURE — 6360000002 HC RX W HCPCS: Performed by: PODIATRIST

## 2022-01-20 PROCEDURE — 6360000002 HC RX W HCPCS: Performed by: STUDENT IN AN ORGANIZED HEALTH CARE EDUCATION/TRAINING PROGRAM

## 2022-01-20 PROCEDURE — 6370000000 HC RX 637 (ALT 250 FOR IP): Performed by: STUDENT IN AN ORGANIZED HEALTH CARE EDUCATION/TRAINING PROGRAM

## 2022-01-20 PROCEDURE — 1200000000 HC SEMI PRIVATE

## 2022-01-20 PROCEDURE — 6370000000 HC RX 637 (ALT 250 FOR IP): Performed by: PODIATRIST

## 2022-01-20 PROCEDURE — 94761 N-INVAS EAR/PLS OXIMETRY MLT: CPT

## 2022-01-20 PROCEDURE — 80048 BASIC METABOLIC PNL TOTAL CA: CPT

## 2022-01-20 RX ORDER — MORPHINE SULFATE 4 MG/ML
4 INJECTION, SOLUTION INTRAMUSCULAR; INTRAVENOUS EVERY 4 HOURS PRN
Status: DISCONTINUED | OUTPATIENT
Start: 2022-01-20 | End: 2022-01-23 | Stop reason: HOSPADM

## 2022-01-20 RX ORDER — MORPHINE SULFATE 2 MG/ML
2 INJECTION, SOLUTION INTRAMUSCULAR; INTRAVENOUS EVERY 4 HOURS PRN
Status: DISCONTINUED | OUTPATIENT
Start: 2022-01-20 | End: 2022-01-23 | Stop reason: HOSPADM

## 2022-01-20 RX ADMIN — OXYCODONE HYDROCHLORIDE 15 MG: 5 TABLET ORAL at 10:50

## 2022-01-20 RX ADMIN — Medication 1 CAPSULE: at 08:29

## 2022-01-20 RX ADMIN — MORPHINE SULFATE 4 MG: 4 INJECTION INTRAVENOUS at 06:06

## 2022-01-20 RX ADMIN — SODIUM CHLORIDE, PRESERVATIVE FREE 10 ML: 5 INJECTION INTRAVENOUS at 21:05

## 2022-01-20 RX ADMIN — Medication 1250 MG: at 21:04

## 2022-01-20 RX ADMIN — LISINOPRIL 10 MG: 10 TABLET ORAL at 08:29

## 2022-01-20 RX ADMIN — DIAZEPAM 5 MG: 5 TABLET ORAL at 12:53

## 2022-01-20 RX ADMIN — Medication 1250 MG: at 08:30

## 2022-01-20 RX ADMIN — OXYCODONE HYDROCHLORIDE 15 MG: 5 TABLET ORAL at 23:34

## 2022-01-20 RX ADMIN — CEFEPIME 2000 MG: 2 INJECTION, POWDER, FOR SOLUTION INTRAVENOUS at 15:38

## 2022-01-20 RX ADMIN — MORPHINE SULFATE 60 MG: 30 TABLET, FILM COATED, EXTENDED RELEASE ORAL at 08:30

## 2022-01-20 RX ADMIN — MORPHINE SULFATE 60 MG: 30 TABLET, FILM COATED, EXTENDED RELEASE ORAL at 21:04

## 2022-01-20 RX ADMIN — PAROXETINE HYDROCHLORIDE HEMIHYDRATE 40 MG: 20 TABLET, FILM COATED ORAL at 08:29

## 2022-01-20 RX ADMIN — BUPROPION HYDROCHLORIDE 300 MG: 300 TABLET, FILM COATED, EXTENDED RELEASE ORAL at 08:29

## 2022-01-20 RX ADMIN — MORPHINE SULFATE 4 MG: 4 INJECTION INTRAVENOUS at 19:52

## 2022-01-20 RX ADMIN — PANTOPRAZOLE SODIUM 40 MG: 40 TABLET, DELAYED RELEASE ORAL at 06:05

## 2022-01-20 RX ADMIN — MORPHINE SULFATE 4 MG: 4 INJECTION INTRAVENOUS at 12:08

## 2022-01-20 RX ADMIN — OXYCODONE HYDROCHLORIDE 15 MG: 5 TABLET ORAL at 17:39

## 2022-01-20 RX ADMIN — LEVOTHYROXINE SODIUM 50 MCG: 50 TABLET ORAL at 08:29

## 2022-01-20 RX ADMIN — ENOXAPARIN SODIUM 40 MG: 100 INJECTION SUBCUTANEOUS at 08:29

## 2022-01-20 RX ADMIN — MORPHINE SULFATE 4 MG: 4 INJECTION INTRAVENOUS at 15:38

## 2022-01-20 RX ADMIN — Medication 1 CAPSULE: at 17:39

## 2022-01-20 RX ADMIN — SODIUM HYPOCHLORITE: 1.25 SOLUTION TOPICAL at 08:31

## 2022-01-20 RX ADMIN — MORPHINE SULFATE 4 MG: 4 INJECTION INTRAVENOUS at 01:17

## 2022-01-20 RX ADMIN — SODIUM CHLORIDE, PRESERVATIVE FREE 10 ML: 5 INJECTION INTRAVENOUS at 08:30

## 2022-01-20 RX ADMIN — FERROUS SULFATE TAB EC 325 MG (65 MG FE EQUIVALENT) 325 MG: 325 (65 FE) TABLET DELAYED RESPONSE at 08:29

## 2022-01-20 RX ADMIN — CEFEPIME 2000 MG: 2 INJECTION, POWDER, FOR SOLUTION INTRAVENOUS at 03:24

## 2022-01-20 ASSESSMENT — PAIN DESCRIPTION - PAIN TYPE
TYPE: ACUTE PAIN

## 2022-01-20 ASSESSMENT — PAIN DESCRIPTION - FREQUENCY
FREQUENCY: CONTINUOUS

## 2022-01-20 ASSESSMENT — PAIN SCALES - GENERAL
PAINLEVEL_OUTOF10: 8
PAINLEVEL_OUTOF10: 6
PAINLEVEL_OUTOF10: 9
PAINLEVEL_OUTOF10: 8
PAINLEVEL_OUTOF10: 9
PAINLEVEL_OUTOF10: 8

## 2022-01-20 ASSESSMENT — PAIN DESCRIPTION - LOCATION
LOCATION: FOOT

## 2022-01-20 ASSESSMENT — PAIN DESCRIPTION - ORIENTATION
ORIENTATION: RIGHT

## 2022-01-20 NOTE — PROGRESS NOTES
[facility] - progress note  Today's Date and Time: 1/20/2022, 10:41 AM    Impression :   · Cellulitis of the right foot  · Chronic nonhealing ulcer of the right foot status post first and second digit amputation  · History left foot amputation  · Diabetes mellitus type 2  · Right leg DVT  · Essential hypertension  · Hypothyroidism  · Hx MRSA foot 2018  · Allergy to Bactrim    Recommendations:     · Continue cefepime and vancomycin    Medical Decision Making/Summary/Discussion:1/20/2022     ·   Infection Control Recommendations   · [place] Precautions  · Contact Isolation     Antimicrobial Stewardship Recommendations     · Simplification of therapy  · Targeted therapy    Coordination of Outpatient Care:   · Estimated Length of IV antimicrobials:  · Patient will need Midline Catheter Insertion:   · Patient will need PICC line Insertion:  · Patient will need: Home IV , [place],  SNF,  LTAC: TBD  · Patient will need outpatient wound care:    Chief complaint/reason for consultation:   · Diabetic foot wound      History of Present Illness:   Mellissa Samuel is a 61y.o.-year-old male who was initially admitted on 1/17/2022. Patient seen at the request of Dr. Javier Evans:    This patient presented to the ED with complaints of cellulitis of the right foot and leg with a chronic nonhealing ulcer to level of muscle of the right foot. He follows up with Dr. Yumiko López with Podiatry, but per EHR, he is known to be noncompliant with medications and follow-up office visits. Dr. Yumiko López had recommended that he present to the hospital on 1/12/2022 for his right foot wound, but the patient has refused until 1/17/2022. He was started on vancomycin and cefepime    Imaging:     XR FOOT LEFT (MIN 3 VIEWS)     Result Date: 1/17/2022  New amputation mid foot, age indeterminate.   No subcutaneous gas.      XR FOOT RIGHT (MIN 3 VIEWS)     Result Date:  Hypothyroidism     Impacted tooth     Necrosis of bone of foot (HCC)     Osteomyelitis of left foot (HCC)     PAD (peripheral artery disease) [facility] [address]     Pneumonia     Septic shock (HCC)     Type 2 diabetes mellitus ([facility] [address]        Past Surgical  History:     Past Surgical History:   Procedure Laterality Date    APPENDECTOMY      BACK SURGERY      Lower x 4. Had abscess after appendectomy.      FOOT AMPUTATION Left 1/4/2020    TRANSMETATARSAL FOOT AMPUTATION, REMOVAL OF FOREIGN BODY performed by Macey Escobedo DPM at [place] [address]. Left 01/04/2020    TRANSMETATARSAL FOOT AMPUTATION, REMOVAL OF FOREIGN BODY    KNEE SURGERY Right     scope    TOE AMPUTATION Bilateral 3/6/2017    TOE AMPUTATION LEFT HALLUX AND 2ND DIGIT AND RIGHT 2ND DIGIT performed by Edelmira Willingham DPM at [address] [facility] ENDOSCOPY N/A 5/20/2019    EGD WITH BIOPSY performed by Brisa Torres MD [facility] OR       Medications:      ferrous sulfate  325 mg Oral Daily with breakfast    insulin lispro  0-6 Units SubCUTAneous TID WC    insulin lispro  0-3 Units SubCUTAneous Nightly    sodium chloride flush  5-40 mL IntraVENous 2 times per day    enoxaparin  40 mg SubCUTAneous Daily    cefepime  2,000 mg IntraVENous Q12H    vancomycin (VANCOCIN) intermittent dosing (placeholder)   Other RX Placeholder    sodium hypochlorite   Irrigation Daily    buPROPion  300 mg Oral Daily    levothyroxine  50 mcg Oral Daily    lisinopril  10 mg Oral Daily    morphine  60 mg Oral BID    pantoprazole  40 mg Oral QAM AC    vancomycin  1,250 mg IntraVENous Q12H    lactobacillus  1 capsule Oral BID WC    PARoxetine  40 mg Oral QAM       Social History:     Social History     Socioeconomic History    Marital status:      Spouse name: Not on file    Number of children: Not on file    Years of education: Not on file    Highest education level: Not on file   Occupational History    Not on file   Tobacco Use    Smoking status: Never Smoker    Smokeless tobacco: Never Used   Vaping Use    Vaping Use: Never used   Substance and Sexual Activity    Alcohol use: No    Drug use: Not Currently     Frequency: 2.0 times per week     Types: Marijuana Lisha Oneal    Sexual activity: Not on file   Other Topics Concern    Not on file   Social History Narrative    Not on file     Social Determinants of Health     Financial Resource Strain: Low Risk     Difficulty of Paying Living Expenses: Not hard at all   Food Insecurity: No Food Insecurity    Worried About Running Out of Food in the Last Year: Never true    Vicente of Food in the Last Year: Never true   Transportation Needs:     Lack of Transportation (Medical): Not on file    Lack of Transportation (Non-Medical):  Not on file   Physical Activity:     Days of Exercise per Week: Not on file    Minutes of Exercise per Session: Not on file   Stress:     Feeling of Stress : Not on file   Social Connections:     Frequency of Communication with Friends and Family: Not on file    Frequency of Social Gatherings with Friends and Family: Not on file    Attends [employer] Services: Not on file    Active Member of [address] [place] [address] or Organizations: Not on file    Attends Club or Organization Meetings: Not on file    Marital Status: Not on file   Intimate Partner Violence:     Fear of Current or Ex-Partner: Not on file    Emotionally Abused: Not on file    Physically Abused: Not on file    Sexually Abused: Not on file   Housing Stability:     Unable to Pay for Housing in the Last Year: Not on file    Number of [place] in the Last Year: Not on file    Unstable Housing in the Last Year: Not on file       Family History:     Family History   Problem Relation Age of Onset    Diabetes Father     Cancer Maternal Grandfather         Colon Cancer; Prostate Cancer    No Known Problems Mother         Allergies:   Bactrim [sulfamethoxazole-trimethoprim], Fiorinal [butalbital-aspirin-caffeine], Statins, Tylenol [acetaminophen], and Peanut butter flavor [flavoring agent]     Review of Systems:   Constitutional: No fevers or chills. No systemic complaints  Head: No headaches  Eyes: No double vision or blurry vision. No conjunctival inflammation. ENT: No sore throat or runny nose. . No hearing loss, tinnitus or vertigo. Cardiovascular: No chest pain or palpitations. No shortness of breath. No MONTES  Lung: No shortness of breath or cough. No sputum production  Abdomen: No nausea, vomiting, diarrhea, or abdominal pain. Presley Miyamoto No cramps. Genitourinary: No increased urinary frequency, or dysuria. No hematuria. No suprapubic or CVA pain  Musculoskeletal: Bilateral lower extremity pain  Hematologic: No bleeding or bruising. Integument: Right foot wound  Psychiatric: No depression. Endocrine: No polyuria, no polydipsia, no polyphagia. Physical Examination :     Patient Vitals for the past 8 hrs:   BP Temp Temp src Pulse Resp SpO2   01/20/22 0839 120/71 98.1 °F (36.7 °C) Oral 57 18 96 %     General Appearance: Awake, alert, and in no apparent distress  Head:  Normocephalic, no trauma  Eyes: Pupils equal, round, reactive to light and accommodation; extraocular movements intact; sclera anicteric; conjunctivae pink. No embolic phenomena. ENT: Oropharynx clear, without erythema, exudate, or thrush. No tenderness of sinuses. Mouth/throat: mucosa pink and moist. No lesions. Dentition in good repair. Neck:Supple, without lymphadenopathy. Thyroid normal, No bruits. Pulmonary/Chest: Clear to auscultation, without wheezes, rales, or rhonchi. No dullness to percussion. Cardiovascular: Regular rate and rhythm without murmurs, rubs, or gallops. Abdomen: Soft, non tender. Bowel sounds normal. No organomegaly  All four Extremities: toe amputations right foot, partial foot amputation left foot  Neurologic: No gross sensory or motor deficits.   Skin: right foot wound    Medical Decision Making -Laboratory:   I have independently reviewed/ordered the following labs:    CBC with Differential:   Recent Labs     01/19/22  0626 01/20/22  0624   WBC 6.0 5.5   HGB 9.4* 9.6*   HCT 31.4* 32.6*    493*   LYMPHOPCT 24 31   MONOPCT 10 10     BMP:   Recent Labs     01/19/22  0626 01/20/22  0624    135   K 4.3 4.4    99   CO2 22 26   BUN 20 19   CREATININE 0.90 0.80     Hepatic Function Panel:   Recent Labs     01/17/22  1505   PROT 7.4   LABALBU 3.6   BILITOT <0.10*   ALKPHOS 76   ALT 6   AST 11     No results for input(s): RPR in the last 72 hours. No results for input(s): HIV in the last 72 hours. No results for input(s): BC in the last 72 hours.   Lab Results   Component Value Date    MUCUS NOT REPORTED 04/20/2020    RBC 4.07 01/20/2022    TRICHOMONAS NOT REPORTED 04/20/2020    WBC 5.5 01/20/2022    YEAST NOT REPORTED 04/20/2020    TURBIDITY CLEAR 04/20/2020     Lab Results   Component Value Date    CREATININE 0.80 01/20/2022    GLUCOSE 94 01/20/2022       Medical Decision Making-Imaging:     Narrative   EXAMINATION:   MRI OF THE RIGHT FOOT WITHOUT CONTRAST, 1/17/2022 5:34 pm       TECHNIQUE:   Multiplanar multisequence MRI of the right foot was performed without the   administration of intravenous contrast.       COMPARISON:   Right foot radiographs 01/17/2022       HISTORY:   ORDERING SYSTEM PROVIDED HISTORY: R/O OM left calc   TECHNOLOGIST PROVIDED HISTORY:   R/O OM left calc   Reason for Exam: R/O OM       FINDINGS:   Significantly limited exam secondary to motion, patient body habitus, and   lack of IV contrast.  Within this limitation:       BONE MARROW: Postsurgical findings from prior amputation of the 1st and 2nd   digit, better visualized on prior radiograph.  T2 hyperintense T1 hypointense   signal within the 1st proximal phalanx is suspected.  A questionable area of   T2 hyperintense signal and T1 hypointense signal along the medial margin of   the 1st metatarsal head (series 10 and 11, image 11).  No discrete T2 marrow   signal changes of the 2nd metatarsal or 2nd proximal phalanx definitively   visualized.  No discrete fracture is identified on the provided images.    Questionable T2 hyperintense signal within the calcaneus and cuboid, only   partially visualized and incompletely evaluated.  The calcaneus is not   completely included in the study.  Questionable T2 hyperintense signal within   the diaphysis of the 4th metatarsal.       SOFT TISSUES: Likely postsurgical changes of the 1st and 2nd digit flexor and   extensor tendons.  Edema signal and atrophic changes within the plantar   musculature, particularly the middle band of the plantar fascia, likely   neuropathic.  No soft tissue gas grossly identified, however tiny foci of gas   would be difficult to visualize with the amount of motion on this study.  No   discrete drainable fluid collection grossly identified.           Impression   Very limited exam secondary to motion.       Suspected marrow signal changes within the 1st proximal phalanx and to a   lesser extent head of the 1st metatarsal.  Osteomyelitis cannot be excluded.       Soft tissue edema is concerning for cellulitis.       Questionable faint marrow edema within the 4th metatarsal diaphysis, cuboid,   and visualized calcaneus.  Findings are very nonspecific and may be reactive,   secondary to osseous injuries, or possibly be artifactual.     Narrative   EXAMINATION:   THREE XRAY VIEWS OF THE LEFT FOOT       1/17/2022 2:52 pm       COMPARISON:   None.       HISTORY:   ORDERING SYSTEM PROVIDED HISTORY: eval OM/gas   TECHNOLOGIST PROVIDED HISTORY:   eval OM/gas       FINDINGS:   New interval amputation proximal metatarsals.  Cortical margins otherwise   intact.  Soft tissues unremarkable.           Impression   New amputation mid foot, age indeterminate.  No subcutaneous gas.         Narrative   EXAMINATION:   THREE XRAY VIEWS OF THE RIGHT FOOT       1/17/2022 2:52 pm     COMPARISON:   December 15, 2021.       HISTORY:   ORDERING SYSTEM PROVIDED HISTORY: r/o gas/OM   TECHNOLOGIST PROVIDED HISTORY:   r/o gas/OM       FINDINGS:   Interval amputation 1st and 2nd proximal phalanges.  Cortical margins   otherwise intact.  Alignment anatomic.  Soft tissues unremarkable.  No   subcutaneous gas.           Impression   Interval new amputation 1st and 2nd digits, age indeterminate.  No   subcutaneous gas.               Medical Decision [id number]:       Medical Decision Making-Other:     Note:  · Labs, medications, radiologic studies were reviewed with personal review of films  · Large amounts of data were reviewed  · Discussed with nursing Staff, Discharge planner  · Infection Control and Prevention measures reviewed  · All prior entries were reviewed  · Administer medications as ordered  · Prognosis: Guarded  · Discharge planning reviewed      Thank you for allowing us to participate in the care of this patient. Please call with questions. MD Maricel     ATTESTATION:    I have discussed the case, including pertinent history and exam findings with the residents and students. I have seen and examined the patient and the key elements of the encounter have been performed by me. I was present when the student obtained his information or examined the patient. I have reviewed the laboratory data, other diagnostic studies and discussed them with the residents. I have updated the medical record where necessary. I agree with the assessment, plan and orders as documented by the resident/ student.     Yves Duvall MD.

## 2022-01-20 NOTE — PROGRESS NOTES
[facility]up[facility]  Occupational Therapy Not Seen Note    DATE: 2022    NAME: Isaiah Freeman  MRN: [id number]   : 1965      Patient not seen this date for Occupational Therapy due to:    Surgery/Procedure: Per podiatry note, plan for TMA right foot tomorrow, . Will await surgery prior to EOB/OOB activity with therapy.     Next Scheduled Treatment: Check back as able     Electronically signed by Bruce Ingram OT on 2022 at 1:47 PM

## 2022-01-20 NOTE — PROGRESS NOTES
Physical Therapy        Physical Therapy Cancel Note      DATE: 2022    NAME: Tahir Escamilla  MRN: [id number]   : 1965      Patient not seen this date for Physical Therapy due to: TMA OR right foot 22, PT to attempt intervention Post Op. Nursing notified.           Electronically signed by Anabel Snow PT on 2022 at 3:35 PM

## 2022-01-20 NOTE — PROGRESS NOTES
Progress Note  Podiatric Medicine and Surgery     Subjective     CC: Right foot/leg pain    Patient seen and examined, family at bedside. Patient resting comfortably in bed. No new complaints overnight. Afebrile (Tmax 99), vital signs stable    HPI :  Rony Telles is a 61 y.o. male seen at [facility] on the floor for right foot pain and infection. He is well known to Dr. Arlinda Nissen who advised he be admitted 1/12/21 but pt refused until today. He has been seen for wound care over the course of the last couple years and demonstrates repetitive non-compliant behaviors including failure to follow up when scheduled, failure to fill prescriptions in a timely manner, and using substitute medications that have been [de-identified] years old. PCP is Chris Palomares MD    ROS: Denies N/V/F/C/SOB/CP. Otherwise negative except at stated in the HPI.      Medications:  Scheduled Meds:   ferrous sulfate  325 mg Oral Daily with breakfast    insulin lispro  0-6 Units SubCUTAneous TID WC    insulin lispro  0-3 Units SubCUTAneous Nightly    sodium chloride flush  5-40 mL IntraVENous 2 times per day    enoxaparin  40 mg SubCUTAneous Daily    cefepime  2,000 mg IntraVENous Q12H    vancomycin (VANCOCIN) intermittent dosing (placeholder)   Other RX Placeholder    sodium hypochlorite   Irrigation Daily    buPROPion  300 mg Oral Daily    levothyroxine  50 mcg Oral Daily    lisinopril  10 mg Oral Daily    morphine  60 mg Oral BID    pantoprazole  40 mg Oral QAM AC    vancomycin  1,250 mg IntraVENous Q12H    lactobacillus  1 capsule Oral BID WC    PARoxetine  40 mg Oral QAM       Continuous Infusions:   dextrose      sodium chloride      dextrose         PRN Meds:morphine **OR** morphine, diazePAM, glucose, dextrose, glucagon (rDNA), dextrose, sodium chloride flush, sodium chloride, ondansetron **OR** ondansetron, polyethylene glycol, oxyCODONE, glucose, dextrose, glucagon (rDNA), dextrose    Objective     Vitals:  Patient Vitals for the past 8 hrs:   BP Temp Temp src Pulse Resp SpO2   22 (!) 101/58 99 °F (37.2 °C) Oral 76 18 94 %     Average, Min, and Max for last 24 hours Vitals:  TEMPERATURE:  Temp  Av.6 °F (37 °C)  Min: 98.1 °F (36.7 °C)  Max: 99 °F (37.2 °C)    RESPIRATIONS RANGE: Resp  Av  Min: 18  Max: 18    PULSE RANGE: Pulse  Av.5  Min: 57  Max: 76    BLOOD PRESSURE RANGE:  Systolic (28QYW), WKD:383 , Min:101 , PKQ:209   ; Diastolic (77FEX), DMX:13, Min:58, Max:71      PULSE OXIMETRY RANGE: SpO2  Av %  Min: 94 %  Max: 96 %    I/O last 3 completed shifts: In: 10 [I.V.:10]  Out: 3125 [[facility]:0142]    CBC:  Recent Labs     22  0634 22  0626 22  0803 22  0624   WBC 5.5 6.0  --  5.5   HGB 8.9* 9.4*  --  9.6*   HCT 29.8* 31.4*  --  32.6*   * 444  --  493*   CRP  --   --  17.0*  --         BMP:  Recent Labs     22  0634 22  0626 22  0624    144 135   K 4.1 4.3 4.4    104 99   CO2 27 22 26   BUN 19 20 19   CREATININE 0.76 0.90 0.80   GLUCOSE 94 104* 94   CALCIUM 8.6 9.1 9.0        Coags:  No results for input(s): APTT, PROT, INR in the last 72 hours. Lab Results   Component Value Date    SEDRATE 96 (H) 2022     Recent Labs     22  0803   CRP 17.0*       General: A&Ox3, NAD  Heart: Regular rate and rhythm. Lungs: Equal air entry. No increased effort. Abdomen: Soft, non-tender to palpation. Not distended. Physical Exam:  Vascular: DP and PT pulses are +2. CFT <3 seconds to all remaining pedal digits. Non-pitting right pedal edema. Temperature gradient increased right dorsal foot. Neuro: Light touch sensation is present to the level of the pedal digits. Musculoskeletal: Muscle strength is 5/5, baseline, to all lower extremity muscle groups. Palpation of right leg and foot elicit pain. Gross deformity is present, left foot TMA, right foot partial hallux amputation.      Dermatologic: Full thickness ulcer located right foot/1st interspace and measures approximately 4cm x 4cm x 0.5 cm. Base is fibronecrotic. Periwound skin appears erythematous. Significant amount of serous drainage noted without associated mal odor. Right foot erythema with associated increase in warmth. Neg probe to bone. Slight sinus tracking. The wound does undermine. Neg fluctuance, crepitus, or induration. Interdigital maceration absent, Bilateral.              Imaging:   MRI FOOT RIGHT WO CONTRAST   Final Result   Very limited exam secondary to motion. Suspected marrow signal changes within the 1st proximal phalanx and to a   lesser extent head of the 1st metatarsal.  Osteomyelitis cannot be excluded. Soft tissue edema is concerning for cellulitis. Questionable faint marrow edema within the 4th metatarsal diaphysis, cuboid,   and visualized calcaneus. Findings are very nonspecific and may be reactive,   secondary to osseous injuries, or possibly be artifactual.         VL DUP LOWER EXTREMITY VENOUS BILATERAL   Final Result      XR FOOT LEFT (MIN 3 VIEWS)   Final Result   New amputation mid foot, age indeterminate. No subcutaneous gas. XR FOOT RIGHT (MIN 3 VIEWS)   Final Result   Interval new amputation 1st and 2nd digits, age indeterminate. No   subcutaneous gas. Assessment   Marie Kwon is a 61 y.o. male with   Cellulitis right foot and leg  Chronic non-healing ulceration to level of muscle, right foot  DVT right leg  S/P TMA left foot  S/P digital amputation right foot  DMII with history of LE amputation    Principal Problem:    Cellulitis  Active Problems:    Essential hypertension    Hypothyroidism    Type 2 diabetes mellitus ([facility] [address]  Resolved Problems:    * No resolved hospital problems. *       Plan   Patient examined and evaluated at bedside    Relayed to patient that the plan is still for OR to have a TMA on the right to rid infection. Patient understands and is agreeable to plan.    US art 2020 showed no signs of occlusive disease  Medical management per IM  Antibiotics per ID  Vanc/Cefepime   Plan to take to OR 1/21/22 at 8:45 am for TMA right foot w/ JEROME - on board  Covid pending   NPO at midnight   Hold [facility]  Consent signed, witnessed and in patient's chart   Leg marked  Dressing applied to right lower extremity: DSD, 4x4's in webspaces  Heel status to right lower extremity - orders placed  Discussed with Dr. Brian Haddad    DVT ppx: lovenox    Diet: carb control           Activity: heel WB RLE  Pain Control: 60 mg ER oxycontin BID    Sb Bailey DPM   Podiatric Medicine & Surgery   1/20/2022 at 10:20 PM    I performed a history and physical examination of the patient and discussed management with the resident. I reviewed the residents note and agree with the documented findings and plan of care. Any areas of disagreement are noted on the chart. I was personally present for the key portions of any procedures. I have documented in the chart those procedures where I was not present during the key portions. I have reviewed the Podiatry Resident progress note. I agree with the chief complaint, past medical history, past surgical history, allergies, medications, social and family history as documented unless otherwise noted below. Documentation of the HPI, Physical Exam and Medical Decision Making performed by medical students or scribes is based on my personal performance of the HPI, PE and MDM. I have personally evaluated this patient and have completed at least one if not all key elements of the E/M (history, physical exam, and MDM). Additional findings are as noted.      Meryl Ratliff DPM on 1/21/2022 at 2:95 AM  Board Certified, American Board of Podiatric Surgery  Fellow, [employer] and [employer]

## 2022-01-20 NOTE — PROGRESS NOTES
[facility]  Internal Medicine Teaching Residency Program  Inpatient Daily Progress Note  ______________________________________________________________________________    Patient: Tahir Escamilla  YOB: 1965   [facility]:[id number]    Acct: [de-identified]     Room: [address] [place] [address]  Admit date: 2022  Today's date: 22  Number of days in the hospital: 3    SUBJECTIVE   Admitting Diagnosis: Cellulitis  CC: Right foot pain    Pt examined at bedside. Chart & results reviewed. Afebrile, vitally stable  No acute issues overnight  BS 84  BC negative  Plan for OR tomorrow am by podiatry at 08:45AM    ROS:  Constitutional:  negative for chills, fevers, sweats  Respiratory:  negative for cough, dyspnea on exertion, hemoptysis, shortness of breath, wheezing  Cardiovascular:  negative for chest pain, chest pressure/discomfort, lower extremity edema, palpitations  Gastrointestinal:  negative for abdominal pain, constipation, diarrhea, nausea, vomiting  Neurological:  negative for dizziness, headache  BRIEF HISTORY     48-year-old male with PMH of type 2 diabetes mellitus, hypertension, hypothyroidism, s/p TMA left foot, s/p right first and second digit amputation who is noncompliant with follow-up and medication presented with right foot pain. Found to have cellulitis/osteomyelitis. Podiatry is the primary. Blood glucose level and blood pressure are well controlled. Continue with the same treatment. Podiatry plannin to take to OR 22 at 8:45 am for TMA right foot w/ JEROME. We will follow    OBJECTIVE     Vital Signs:  /71   Pulse 57   Temp 98.1 °F (36.7 °C) (Oral)   Resp 18   SpO2 96%     Temp (24hrs), Av.3 °F (36.8 °C), Min:98.1 °F (36.7 °C), Max:98.5 °F (36.9 °C)    In: 10   Out: 3125 [Urine:3125]    Physical Exam:  Constitutional: Alert, oriented, cooperative and in no apparent distress. Head:normocephalic and atraumatic. EENT:  PERRLA.   No PRN  diazePAM, 5 mg, Q8H PRN  glucose, 15 g, PRN  dextrose, 12.5 g, PRN  glucagon (rDNA), 1 mg, PRN  dextrose, 100 mL/hr, PRN  sodium chloride flush, 5-40 mL, PRN  sodium chloride, 25 mL, PRN  ondansetron, 4 mg, Q8H PRN   Or  ondansetron, 4 mg, Q6H PRN  polyethylene glycol, 17 g, Daily PRN  oxyCODONE, 15 mg, Q6H PRN  glucose, 15 g, PRN  dextrose, 12.5 g, PRN  glucagon (rDNA), 1 mg, PRN  dextrose, 100 mL/hr, PRN        Diagnostic Labs:  CBC:   Recent Labs     01/18/22  0634 01/19/22  0626 01/20/22  0624   WBC 5.5 6.0 5.5   RBC 3.69* 3.98* 4.07*   HGB 8.9* 9.4* 9.6*   HCT 29.8* 31.4* 32.6*   MCV 80.8* 78.9* 80.1*   RDW 15.7* 15.5* 15.5*   * 444 493*     BMP:   Recent Labs     01/18/22  0634 01/19/22  0626 01/20/22  0624    144 135   K 4.1 4.3 4.4    104 99   CO2 27 22 26   BUN 19 20 19   CREATININE 0.76 0.90 0.80     BNP: No results for input(s): BNP in the last 72 hours. PT/INR: No results for input(s): PROTIME, INR in the last 72 hours. APTT: No results for input(s): APTT in the last 72 hours. CARDIAC ENZYMES: No results for input(s): CKMB, CKMBINDEX, TROPONINI in the last 72 hours. Invalid input(s): CKTOTAL;3  FASTING LIPID PANEL:  Lab Results   Component Value Date    HDL 38 03/08/2020     LIVER PROFILE:   Recent Labs     01/17/22  1505   AST 11   ALT 6   BILITOT <0.10*   ALKPHOS 76      MICROBIOLOGY:   Lab Results   Component Value Date/Time    CULTURE NO GROWTH 1 DAY 01/18/2022 02:09 PM    CULTURE NO GROWTH 1 DAY 01/18/2022 02:09 PM       Imaging:    XR FOOT LEFT (MIN 3 VIEWS)    Result Date: 1/17/2022  New amputation mid foot, age indeterminate. No subcutaneous gas. XR FOOT RIGHT (MIN 3 VIEWS)    Result Date: 1/17/2022  Interval new amputation 1st and 2nd digits, age indeterminate. No subcutaneous gas. MRI FOOT RIGHT WO CONTRAST    Result Date: 1/17/2022  Very limited exam secondary to motion.  Suspected marrow signal changes within the 1st proximal phalanx and to a lesser extent head of the 1st metatarsal.  Osteomyelitis cannot be excluded. Soft tissue edema is concerning for cellulitis. Questionable faint marrow edema within the 4th metatarsal diaphysis, cuboid, and visualized calcaneus. Findings are very nonspecific and may be reactive, secondary to osseous injuries, or possibly be artifactual.       ASSESSMENT & PLAN     1. Right foot cellulitis/ ? Osteomyelitis.  Continue on vancomycin and cefepime.  Control pain.  Follow ID recommendations. Wound cultures pending. Plan for OR by podiatry tomorrow. 2. Hypertension. Continue on lisinopril 10.  Monitor BP  3. Diabetes mellitus type 2.  HbA1c 5.3 8/21.  On metformin 250 daily at home.  MDSS, hypoglycemia protocol.  POCT glucose q6.  4. Hypothyroidism.  Continue on Synthroid 50 daily.  TSH 0.62 4/20.  Repeat TSH  5. Anxiety: continues on paroxetine 40 daily     DVT ppx : Lovenox  GI ppx: Pepcid  PT/OT: Following  Discharge Planning / SW: CM to assist with    Harley Spencer MD  Internal Medicine Resident, PGY-2  [facility]; [place], [place]  1/20/2022, 2:34 PM    Attending Physician Statement  I have discussed the care of Tristian Yoder with the resident team. I have examined the patient myself and taken ros and hpi , including pertinent history and exam findings,  with the resident. I have reviewed the key elements of all parts of the encounter with the resident. I agree with the assessment, plan and orders as documented by the resident. Principal Problem:    Cellulitis  Active Problems:    Essential hypertension    Hypothyroidism    Type 2 diabetes mellitus ([facility] [address]  Resolved Problems:    * No resolved hospital problems.  *    Electronically signed by Ammon Dawson MD

## 2022-01-21 ENCOUNTER — APPOINTMENT (OUTPATIENT)
Dept: GENERAL RADIOLOGY | Age: 57
DRG: 305 | End: 2022-01-21
Attending: PODIATRIST
Payer: MEDICARE

## 2022-01-21 ENCOUNTER — ANESTHESIA (OUTPATIENT)
Dept: OPERATING ROOM | Age: 57
DRG: 305 | End: 2022-01-21
Payer: MEDICARE

## 2022-01-21 ENCOUNTER — ANESTHESIA EVENT (OUTPATIENT)
Dept: OPERATING ROOM | Age: 57
DRG: 305 | End: 2022-01-21
Payer: MEDICARE

## 2022-01-21 VITALS — SYSTOLIC BLOOD PRESSURE: 136 MMHG | TEMPERATURE: 97.1 F | DIASTOLIC BLOOD PRESSURE: 85 MMHG | OXYGEN SATURATION: 100 %

## 2022-01-21 DIAGNOSIS — M86.172 OSTEOMYELITIS OF FOOT, LEFT, ACUTE (HCC): ICD-10-CM

## 2022-01-21 DIAGNOSIS — M54.12 RIGHT CERVICAL RADICULOPATHY: ICD-10-CM

## 2022-01-21 DIAGNOSIS — M86.9 OSTEOMYELITIS, UNSPECIFIED SITE, UNSPECIFIED TYPE (HCC): ICD-10-CM

## 2022-01-21 DIAGNOSIS — F34.1 DYSTHYMIA: Primary | ICD-10-CM

## 2022-01-21 DIAGNOSIS — L97.524 ULCERATED, FOOT, LEFT, WITH NECROSIS OF BONE (HCC): ICD-10-CM

## 2022-01-21 LAB
ABSOLUTE EOS #: 0.16 K/UL (ref 0–0.44)
ABSOLUTE IMMATURE GRANULOCYTE: <0.03 K/UL (ref 0–0.3)
ABSOLUTE LYMPH #: 1.43 K/UL (ref 1.1–3.7)
ABSOLUTE MONO #: 0.54 K/UL (ref 0.1–1.2)
ANION GAP SERPL CALCULATED.3IONS-SCNC: 11 MMOL/L (ref 9–17)
BASOPHILS # BLD: 1 % (ref 0–2)
BASOPHILS ABSOLUTE: 0.07 K/UL (ref 0–0.2)
BUN BLDV-MCNC: 22 MG/DL (ref 6–20)
BUN/CREAT BLD: ABNORMAL (ref 9–20)
C-REACTIVE PROTEIN: 4.6 MG/L (ref 0–5)
CALCIUM SERPL-MCNC: 9.1 MG/DL (ref 8.6–10.4)
CHLORIDE BLD-SCNC: 101 MMOL/L (ref 98–107)
CO2: 25 MMOL/L (ref 20–31)
CREAT SERPL-MCNC: 0.8 MG/DL (ref 0.7–1.2)
DIFFERENTIAL TYPE: ABNORMAL
EOSINOPHILS RELATIVE PERCENT: 3 % (ref 1–4)
GFR AFRICAN AMERICAN: >60 ML/MIN
GFR NON-AFRICAN AMERICAN: >60 ML/MIN
GFR SERPL CREATININE-BSD FRML MDRD: ABNORMAL ML/MIN/{1.73_M2}
GFR SERPL CREATININE-BSD FRML MDRD: ABNORMAL ML/MIN/{1.73_M2}
GLUCOSE BLD-MCNC: 101 MG/DL (ref 75–110)
GLUCOSE BLD-MCNC: 101 MG/DL (ref 75–110)
GLUCOSE BLD-MCNC: 109 MG/DL (ref 75–110)
GLUCOSE BLD-MCNC: 94 MG/DL (ref 70–99)
GLUCOSE BLD-MCNC: 99 MG/DL (ref 75–110)
HCT VFR BLD CALC: 32.5 % (ref 40.7–50.3)
HEMOGLOBIN: 9.6 G/DL (ref 13–17)
IMMATURE GRANULOCYTES: 0 %
LYMPHOCYTES # BLD: 26 % (ref 24–43)
MCH RBC QN AUTO: 23.5 PG (ref 25.2–33.5)
MCHC RBC AUTO-ENTMCNC: 29.5 G/DL (ref 28.4–34.8)
MCV RBC AUTO: 79.7 FL (ref 82.6–102.9)
MONOCYTES # BLD: 10 % (ref 3–12)
NRBC AUTOMATED: 0 PER 100 WBC
PDW BLD-RTO: 15.7 % (ref 11.8–14.4)
PLATELET # BLD: 473 K/UL (ref 138–453)
PLATELET ESTIMATE: ABNORMAL
PMV BLD AUTO: 9.2 FL (ref 8.1–13.5)
POTASSIUM SERPL-SCNC: 4.1 MMOL/L (ref 3.7–5.3)
RBC # BLD: 4.08 M/UL (ref 4.21–5.77)
RBC # BLD: ABNORMAL 10*6/UL
SARS-COV-2, RAPID: NOT DETECTED
SEG NEUTROPHILS: 60 % (ref 36–65)
SEGMENTED NEUTROPHILS ABSOLUTE COUNT: 3.2 K/UL (ref 1.5–8.1)
SODIUM BLD-SCNC: 137 MMOL/L (ref 135–144)
SPECIMEN DESCRIPTION: NORMAL
WBC # BLD: 5.4 K/UL (ref 3.5–11.3)
WBC # BLD: ABNORMAL 10*3/UL

## 2022-01-21 PROCEDURE — 6370000000 HC RX 637 (ALT 250 FOR IP): Performed by: PODIATRIST

## 2022-01-21 PROCEDURE — 88311 DECALCIFY TISSUE: CPT

## 2022-01-21 PROCEDURE — 7100000000 HC PACU RECOVERY - FIRST 15 MIN: Performed by: PODIATRIST

## 2022-01-21 PROCEDURE — 28805 AMPUTATION THRU METATARSAL: CPT | Performed by: PODIATRIST

## 2022-01-21 PROCEDURE — 0Y6M0ZB DETACHMENT AT RIGHT FOOT, PARTIAL 2ND RAY, OPEN APPROACH: ICD-10-PCS | Performed by: PODIATRIST

## 2022-01-21 PROCEDURE — 2580000003 HC RX 258: Performed by: PODIATRIST

## 2022-01-21 PROCEDURE — 99232 SBSQ HOSP IP/OBS MODERATE 35: CPT | Performed by: INTERNAL MEDICINE

## 2022-01-21 PROCEDURE — 2500000003 HC RX 250 WO HCPCS: Performed by: PODIATRIST

## 2022-01-21 PROCEDURE — 3700000001 HC ADD 15 MINUTES (ANESTHESIA): Performed by: PODIATRIST

## 2022-01-21 PROCEDURE — 80048 BASIC METABOLIC PNL TOTAL CA: CPT

## 2022-01-21 PROCEDURE — 0Y6M0ZC DETACHMENT AT RIGHT FOOT, PARTIAL 3RD RAY, OPEN APPROACH: ICD-10-PCS | Performed by: PODIATRIST

## 2022-01-21 PROCEDURE — 2709999900 HC NON-CHARGEABLE SUPPLY: Performed by: PODIATRIST

## 2022-01-21 PROCEDURE — 85025 COMPLETE CBC W/AUTO DIFF WBC: CPT

## 2022-01-21 PROCEDURE — 0Y6M0Z9 DETACHMENT AT RIGHT FOOT, PARTIAL 1ST RAY, OPEN APPROACH: ICD-10-PCS | Performed by: PODIATRIST

## 2022-01-21 PROCEDURE — 6360000002 HC RX W HCPCS: Performed by: ANESTHESIOLOGY

## 2022-01-21 PROCEDURE — 14041 TIS TRNFR F/C/C/M/N/A/G/H/F: CPT | Performed by: PODIATRIST

## 2022-01-21 PROCEDURE — 3700000000 HC ANESTHESIA ATTENDED CARE: Performed by: PODIATRIST

## 2022-01-21 PROCEDURE — 1200000000 HC SEMI PRIVATE

## 2022-01-21 PROCEDURE — 2580000003 HC RX 258: Performed by: SPECIALIST

## 2022-01-21 PROCEDURE — 73630 X-RAY EXAM OF FOOT: CPT

## 2022-01-21 PROCEDURE — 88307 TISSUE EXAM BY PATHOLOGIST: CPT

## 2022-01-21 PROCEDURE — 86140 C-REACTIVE PROTEIN: CPT

## 2022-01-21 PROCEDURE — 6360000002 HC RX W HCPCS: Performed by: PODIATRIST

## 2022-01-21 PROCEDURE — 3600000004 HC SURGERY LEVEL 4 BASE: Performed by: PODIATRIST

## 2022-01-21 PROCEDURE — 6360000002 HC RX W HCPCS: Performed by: STUDENT IN AN ORGANIZED HEALTH CARE EDUCATION/TRAINING PROGRAM

## 2022-01-21 PROCEDURE — 0Y6M0ZF DETACHMENT AT RIGHT FOOT, PARTIAL 5TH RAY, OPEN APPROACH: ICD-10-PCS | Performed by: PODIATRIST

## 2022-01-21 PROCEDURE — 27606 INCISION OF ACHILLES TENDON: CPT | Performed by: PODIATRIST

## 2022-01-21 PROCEDURE — 0L8N0ZZ DIVISION OF RIGHT LOWER LEG TENDON, OPEN APPROACH: ICD-10-PCS | Performed by: PODIATRIST

## 2022-01-21 PROCEDURE — 6360000002 HC RX W HCPCS: Performed by: SPECIALIST

## 2022-01-21 PROCEDURE — 7100000001 HC PACU RECOVERY - ADDTL 15 MIN: Performed by: PODIATRIST

## 2022-01-21 PROCEDURE — A4217 STERILE WATER/SALINE, 500 ML: HCPCS | Performed by: PODIATRIST

## 2022-01-21 PROCEDURE — 36415 COLL VENOUS BLD VENIPUNCTURE: CPT

## 2022-01-21 PROCEDURE — 3600000014 HC SURGERY LEVEL 4 ADDTL 15MIN: Performed by: PODIATRIST

## 2022-01-21 PROCEDURE — 0Y6M0ZD DETACHMENT AT RIGHT FOOT, PARTIAL 4TH RAY, OPEN APPROACH: ICD-10-PCS | Performed by: PODIATRIST

## 2022-01-21 PROCEDURE — 6360000002 HC RX W HCPCS

## 2022-01-21 PROCEDURE — 2500000003 HC RX 250 WO HCPCS: Performed by: SPECIALIST

## 2022-01-21 PROCEDURE — 82947 ASSAY GLUCOSE BLOOD QUANT: CPT

## 2022-01-21 RX ORDER — MORPHINE SULFATE 60 MG/1
60 TABLET, FILM COATED, EXTENDED RELEASE ORAL 2 TIMES DAILY
Qty: 60 TABLET | Refills: 0 | Status: SHIPPED | OUTPATIENT
Start: 2022-01-21 | End: 2022-02-18 | Stop reason: SDUPTHER

## 2022-01-21 RX ORDER — OXYCODONE HYDROCHLORIDE 15 MG/1
15 TABLET ORAL EVERY 6 HOURS PRN
Qty: 120 TABLET | Refills: 0 | Status: SHIPPED | OUTPATIENT
Start: 2022-01-21 | End: 2022-02-18 | Stop reason: SDUPTHER

## 2022-01-21 RX ORDER — GLYCOPYRROLATE 1 MG/5 ML
SYRINGE (ML) INTRAVENOUS PRN
Status: DISCONTINUED | OUTPATIENT
Start: 2022-01-21 | End: 2022-01-21 | Stop reason: SDUPTHER

## 2022-01-21 RX ORDER — PROPOFOL 10 MG/ML
INJECTION, EMULSION INTRAVENOUS PRN
Status: DISCONTINUED | OUTPATIENT
Start: 2022-01-21 | End: 2022-01-21 | Stop reason: SDUPTHER

## 2022-01-21 RX ORDER — MAGNESIUM HYDROXIDE 1200 MG/15ML
LIQUID ORAL CONTINUOUS PRN
Status: COMPLETED | OUTPATIENT
Start: 2022-01-21 | End: 2022-01-21

## 2022-01-21 RX ORDER — MIDAZOLAM HYDROCHLORIDE 2 MG/2ML
2 INJECTION, SOLUTION INTRAMUSCULAR; INTRAVENOUS ONCE
Status: COMPLETED | OUTPATIENT
Start: 2022-01-21 | End: 2022-01-21

## 2022-01-21 RX ORDER — NEOSTIGMINE METHYLSULFATE 5 MG/5 ML
SYRINGE (ML) INTRAVENOUS PRN
Status: DISCONTINUED | OUTPATIENT
Start: 2022-01-21 | End: 2022-01-21 | Stop reason: SDUPTHER

## 2022-01-21 RX ORDER — PROPOFOL 10 MG/ML
INJECTION, EMULSION INTRAVENOUS PRN
Status: DISCONTINUED | OUTPATIENT
Start: 2022-01-21 | End: 2022-01-21

## 2022-01-21 RX ORDER — FENTANYL CITRATE 50 UG/ML
25 INJECTION, SOLUTION INTRAMUSCULAR; INTRAVENOUS EVERY 5 MIN PRN
Status: DISCONTINUED | OUTPATIENT
Start: 2022-01-21 | End: 2022-01-21 | Stop reason: HOSPADM

## 2022-01-21 RX ORDER — LIDOCAINE HYDROCHLORIDE 10 MG/ML
INJECTION, SOLUTION EPIDURAL; INFILTRATION; INTRACAUDAL; PERINEURAL PRN
Status: DISCONTINUED | OUTPATIENT
Start: 2022-01-21 | End: 2022-01-21 | Stop reason: SDUPTHER

## 2022-01-21 RX ORDER — FENTANYL CITRATE 50 UG/ML
50 INJECTION, SOLUTION INTRAMUSCULAR; INTRAVENOUS EVERY 5 MIN PRN
Status: COMPLETED | OUTPATIENT
Start: 2022-01-21 | End: 2022-01-21

## 2022-01-21 RX ORDER — ROCURONIUM BROMIDE 10 MG/ML
INJECTION, SOLUTION INTRAVENOUS PRN
Status: DISCONTINUED | OUTPATIENT
Start: 2022-01-21 | End: 2022-01-21 | Stop reason: SDUPTHER

## 2022-01-21 RX ORDER — ONDANSETRON 2 MG/ML
INJECTION INTRAMUSCULAR; INTRAVENOUS PRN
Status: DISCONTINUED | OUTPATIENT
Start: 2022-01-21 | End: 2022-01-21 | Stop reason: SDUPTHER

## 2022-01-21 RX ORDER — FENTANYL CITRATE 50 UG/ML
INJECTION, SOLUTION INTRAMUSCULAR; INTRAVENOUS PRN
Status: DISCONTINUED | OUTPATIENT
Start: 2022-01-21 | End: 2022-01-21 | Stop reason: SDUPTHER

## 2022-01-21 RX ORDER — DIAZEPAM 5 MG/1
5 TABLET ORAL EVERY 8 HOURS PRN
Qty: 90 TABLET | Refills: 0 | Status: SHIPPED | OUTPATIENT
Start: 2022-01-21 | End: 2022-02-18 | Stop reason: SDUPTHER

## 2022-01-21 RX ORDER — SODIUM CHLORIDE, SODIUM LACTATE, POTASSIUM CHLORIDE, CALCIUM CHLORIDE 600; 310; 30; 20 MG/100ML; MG/100ML; MG/100ML; MG/100ML
INJECTION, SOLUTION INTRAVENOUS CONTINUOUS PRN
Status: DISCONTINUED | OUTPATIENT
Start: 2022-01-21 | End: 2022-01-21 | Stop reason: SDUPTHER

## 2022-01-21 RX ADMIN — ONDANSETRON 4 MG: 2 INJECTION INTRAMUSCULAR; INTRAVENOUS at 10:42

## 2022-01-21 RX ADMIN — PHENYLEPHRINE HYDROCHLORIDE 100 MCG: 10 INJECTION INTRAVENOUS at 11:17

## 2022-01-21 RX ADMIN — PHENYLEPHRINE HYDROCHLORIDE 75 MCG: 10 INJECTION INTRAVENOUS at 10:54

## 2022-01-21 RX ADMIN — Medication 0.4 MG: at 11:14

## 2022-01-21 RX ADMIN — FENTANYL CITRATE 50 MCG: 50 INJECTION, SOLUTION INTRAMUSCULAR; INTRAVENOUS at 11:45

## 2022-01-21 RX ADMIN — HYDROMORPHONE HYDROCHLORIDE 0.5 MG: 1 INJECTION, SOLUTION INTRAMUSCULAR; INTRAVENOUS; SUBCUTANEOUS at 12:15

## 2022-01-21 RX ADMIN — LIDOCAINE HYDROCHLORIDE 50 MG: 10 INJECTION, SOLUTION EPIDURAL; INFILTRATION; INTRACAUDAL; PERINEURAL at 09:27

## 2022-01-21 RX ADMIN — HYDROMORPHONE HYDROCHLORIDE 0.5 MG: 1 INJECTION, SOLUTION INTRAMUSCULAR; INTRAVENOUS; SUBCUTANEOUS at 12:30

## 2022-01-21 RX ADMIN — FENTANYL CITRATE 50 MCG: 50 INJECTION, SOLUTION INTRAMUSCULAR; INTRAVENOUS at 11:28

## 2022-01-21 RX ADMIN — MIDAZOLAM 2 MG: 1 INJECTION INTRAMUSCULAR; INTRAVENOUS at 08:23

## 2022-01-21 RX ADMIN — CEFEPIME 2000 MG: 2 INJECTION, POWDER, FOR SOLUTION INTRAVENOUS at 15:28

## 2022-01-21 RX ADMIN — FENTANYL CITRATE 100 MCG: 50 INJECTION, SOLUTION INTRAMUSCULAR; INTRAVENOUS at 09:27

## 2022-01-21 RX ADMIN — PROPOFOL 170 MG: 10 INJECTION, EMULSION INTRAVENOUS at 09:27

## 2022-01-21 RX ADMIN — HYDROMORPHONE HYDROCHLORIDE 0.5 MG: 1 INJECTION, SOLUTION INTRAMUSCULAR; INTRAVENOUS; SUBCUTANEOUS at 12:20

## 2022-01-21 RX ADMIN — PHENYLEPHRINE HYDROCHLORIDE 75 MCG: 10 INJECTION INTRAVENOUS at 11:09

## 2022-01-21 RX ADMIN — MORPHINE SULFATE 4 MG: 4 INJECTION INTRAVENOUS at 20:08

## 2022-01-21 RX ADMIN — PHENYLEPHRINE HYDROCHLORIDE 75 MCG: 10 INJECTION INTRAVENOUS at 10:31

## 2022-01-21 RX ADMIN — FENTANYL CITRATE 50 MCG: 50 INJECTION, SOLUTION INTRAMUSCULAR; INTRAVENOUS at 12:00

## 2022-01-21 RX ADMIN — MORPHINE SULFATE 4 MG: 4 INJECTION INTRAVENOUS at 04:37

## 2022-01-21 RX ADMIN — PHENYLEPHRINE HYDROCHLORIDE 100 MCG: 10 INJECTION INTRAVENOUS at 09:56

## 2022-01-21 RX ADMIN — PHENYLEPHRINE HYDROCHLORIDE 100 MCG: 10 INJECTION INTRAVENOUS at 10:02

## 2022-01-21 RX ADMIN — HYDROMORPHONE HYDROCHLORIDE 0.5 MG: 1 INJECTION, SOLUTION INTRAMUSCULAR; INTRAVENOUS; SUBCUTANEOUS at 12:25

## 2022-01-21 RX ADMIN — Medication 1250 MG: at 21:27

## 2022-01-21 RX ADMIN — Medication 1 CAPSULE: at 18:25

## 2022-01-21 RX ADMIN — SODIUM CHLORIDE, POTASSIUM CHLORIDE, SODIUM LACTATE AND CALCIUM CHLORIDE: 600; 310; 30; 20 INJECTION, SOLUTION INTRAVENOUS at 11:00

## 2022-01-21 RX ADMIN — PHENYLEPHRINE HYDROCHLORIDE 75 MCG: 10 INJECTION INTRAVENOUS at 09:40

## 2022-01-21 RX ADMIN — ROCURONIUM BROMIDE 50 MG: 10 INJECTION INTRAVENOUS at 09:27

## 2022-01-21 RX ADMIN — Medication 2 MG: at 11:14

## 2022-01-21 RX ADMIN — MORPHINE SULFATE 60 MG: 30 TABLET, FILM COATED, EXTENDED RELEASE ORAL at 21:27

## 2022-01-21 RX ADMIN — Medication 0.3 MG: at 09:43

## 2022-01-21 RX ADMIN — CEFEPIME 2000 MG: 2 INJECTION, POWDER, FOR SOLUTION INTRAVENOUS at 02:55

## 2022-01-21 RX ADMIN — FENTANYL CITRATE 50 MCG: 50 INJECTION, SOLUTION INTRAMUSCULAR; INTRAVENOUS at 11:50

## 2022-01-21 RX ADMIN — OXYCODONE HYDROCHLORIDE 15 MG: 5 TABLET ORAL at 14:06

## 2022-01-21 RX ADMIN — MORPHINE SULFATE 4 MG: 4 INJECTION INTRAVENOUS at 15:30

## 2022-01-21 RX ADMIN — OXYCODONE HYDROCHLORIDE 15 MG: 5 TABLET ORAL at 22:54

## 2022-01-21 RX ADMIN — SODIUM CHLORIDE, PRESERVATIVE FREE 10 ML: 5 INJECTION INTRAVENOUS at 20:08

## 2022-01-21 RX ADMIN — FENTANYL CITRATE 50 MCG: 50 INJECTION, SOLUTION INTRAMUSCULAR; INTRAVENOUS at 11:55

## 2022-01-21 RX ADMIN — FENTANYL CITRATE 50 MCG: 50 INJECTION, SOLUTION INTRAMUSCULAR; INTRAVENOUS at 11:38

## 2022-01-21 RX ADMIN — PHENYLEPHRINE HYDROCHLORIDE 75 MCG: 10 INJECTION INTRAVENOUS at 10:39

## 2022-01-21 RX ADMIN — MORPHINE SULFATE 4 MG: 4 INJECTION INTRAVENOUS at 00:41

## 2022-01-21 RX ADMIN — PHENYLEPHRINE HYDROCHLORIDE 100 MCG: 10 INJECTION INTRAVENOUS at 10:19

## 2022-01-21 RX ADMIN — SODIUM CHLORIDE, POTASSIUM CHLORIDE, SODIUM LACTATE AND CALCIUM CHLORIDE: 600; 310; 30; 20 INJECTION, SOLUTION INTRAVENOUS at 08:13

## 2022-01-21 ASSESSMENT — PULMONARY FUNCTION TESTS
PIF_VALUE: 14
PIF_VALUE: 13
PIF_VALUE: 14
PIF_VALUE: 14
PIF_VALUE: 3
PIF_VALUE: 14
PIF_VALUE: 1
PIF_VALUE: 14
PIF_VALUE: 13
PIF_VALUE: 14
PIF_VALUE: 13
PIF_VALUE: 14
PIF_VALUE: 16
PIF_VALUE: 14
PIF_VALUE: 18
PIF_VALUE: 14
PIF_VALUE: 13
PIF_VALUE: 14
PIF_VALUE: 14
PIF_VALUE: 1
PIF_VALUE: 14
PIF_VALUE: 1
PIF_VALUE: 14
PIF_VALUE: 2
PIF_VALUE: 6
PIF_VALUE: 14
PIF_VALUE: 14
PIF_VALUE: 18
PIF_VALUE: 14
PIF_VALUE: 14
PIF_VALUE: 5
PIF_VALUE: 14
PIF_VALUE: 13
PIF_VALUE: 14
PIF_VALUE: 2
PIF_VALUE: 14
PIF_VALUE: 12
PIF_VALUE: 16
PIF_VALUE: 14
PIF_VALUE: 19
PIF_VALUE: 14
PIF_VALUE: 1
PIF_VALUE: 14
PIF_VALUE: 1
PIF_VALUE: 13
PIF_VALUE: 14
PIF_VALUE: 13
PIF_VALUE: 14
PIF_VALUE: 14
PIF_VALUE: 13
PIF_VALUE: 17
PIF_VALUE: 14
PIF_VALUE: 13
PIF_VALUE: 14
PIF_VALUE: 1
PIF_VALUE: 16
PIF_VALUE: 14
PIF_VALUE: 14
PIF_VALUE: 1
PIF_VALUE: 1
PIF_VALUE: 14
PIF_VALUE: 19

## 2022-01-21 ASSESSMENT — PAIN SCALES - GENERAL
PAINLEVEL_OUTOF10: 8
PAINLEVEL_OUTOF10: 8
PAINLEVEL_OUTOF10: 9
PAINLEVEL_OUTOF10: 5
PAINLEVEL_OUTOF10: 8
PAINLEVEL_OUTOF10: 8
PAINLEVEL_OUTOF10: 9
PAINLEVEL_OUTOF10: 8
PAINLEVEL_OUTOF10: 9
PAINLEVEL_OUTOF10: 8
PAINLEVEL_OUTOF10: 6

## 2022-01-21 ASSESSMENT — PAIN - FUNCTIONAL ASSESSMENT: PAIN_FUNCTIONAL_ASSESSMENT: 0-10

## 2022-01-21 ASSESSMENT — PAIN DESCRIPTION - LOCATION
LOCATION: FOOT

## 2022-01-21 ASSESSMENT — PAIN DESCRIPTION - DESCRIPTORS: DESCRIPTORS: BURNING;CONSTANT

## 2022-01-21 ASSESSMENT — PAIN DESCRIPTION - PAIN TYPE
TYPE: SURGICAL PAIN
TYPE: ACUTE PAIN
TYPE: ACUTE PAIN
TYPE: SURGICAL PAIN

## 2022-01-21 ASSESSMENT — PAIN DESCRIPTION - FREQUENCY
FREQUENCY: CONTINUOUS
FREQUENCY: CONTINUOUS

## 2022-01-21 ASSESSMENT — PAIN DESCRIPTION - ORIENTATION
ORIENTATION: RIGHT

## 2022-01-21 NOTE — PROGRESS NOTES
Dr Jovanni Dan called to confirm drain in place -unable to view and pain and meds given in pacu -may resume normal floor orders for pain

## 2022-01-21 NOTE — ANESTHESIA PRE PROCEDURE
Department of Anesthesiology  Preprocedure Note       Name:  Eva Pablo   Age:  61 y.o.  :  1965                                          MRN:  [id number]         Date:  2022      Surgeon: Naomy Messina):  Christianne Hewitt DPM    Procedure: Damien Reyes AMPUTATION FOOT, TENDOACHILLES LENGTHENING  (SAGGITAL SAW, CYSTO TUBING) (Right )    Medications prior to admission:   Prior to Admission medications    Medication Sig Start Date End Date Taking? Authorizing Provider   diazePAM (VALIUM) 5 MG tablet Take 5 mg by mouth every 8 hours as needed for Anxiety. Historical Provider, MD   morphine (MS CONTIN) 60 MG extended release tablet Take 1 tablet by mouth 2 times daily for 30 days. 21  Gilberto Pacheco MD   oxyCODONE (OXY-IR) 15 MG immediate release tablet Take 1 tablet by mouth every 6 hours as needed for Pain for up to 30 days. 21  Gilberto Pacheco MD   ketorolac (TORADOL) 10 MG tablet Take 1 tablet by mouth every 6 hours as needed for Pain 21  Gilberto Pacheco MD   levothyroxine (SYNTHROID) 50 MCG tablet Take 1 tablet by mouth daily 21   Gilberto Pacheco MD   fluocinonide (LIDEX) 0.05 % ointment Apply 1 gm  topically 2 times daily to legs 21   Christianne Hewitt DPM   ONETOUCH ULTRA strip TEST TWO TIMES A DAY AS NEEDED 21   Gilberto Pacheco MD   buPROPion (WELLBUTRIN XL) 300 MG extended release tablet TAKE ONE TABLET BY MOUTH DAILY 21   Gilberto Pacheco MD   PARoxetine (PAXIL) 20 MG tablet Take 4 tablets by mouth every morning 8/3/21   Gilberto Pacheco MD   Lancets ([address]) [address] TEST TWICE A DAY 3/8/21   Gilberto Pacheco MD   sodium hypochlorite (DAKINS) 0.125 % SOLN external solution APPLY TOPICALLY EVERY 12 HOURS.  APPLY AS A WET TO DRY DRESSING TWO TIMES A DAY, CAN ALSO USE TO CLEANSE WOUND 20   Christianne Hewitt DPM   lisinopril (PRINIVIL;ZESTRIL) 10 MG tablet Take 1 tablet by mouth daily 4/29/20   Fransisco Munoz MD   metFORMIN (GLUCOPHAGE) 500 MG tablet Take 0.5 tablets by mouth daily (with breakfast) 4/29/20   Fransisco Munoz MD   megestrol (MEGACE ORAL) 40 MG/ML suspension Take 10 mLs by mouth daily 12/24/19   Fransisco Munoz MD   [facility] 4 MG/0.1ML LIQD nasal spray 4 mg 10/29/19   Historical Provider, MD   atorvastatin (LIPITOR) 20 MG tablet Take 1 tablet by mouth daily 10/10/19   Fransisco Munoz MD   lactobacillus (CULTURELLE) capsule Take 1 capsule by mouth 2 times daily (with meals) 5/21/19   Fransisco Munoz MD   magnesium oxide (MAG-OX) 400 (241.3 Mg) MG TABS tablet Take 1 tablet by mouth 2 times daily 5/21/19   Fransisco Munoz MD   pantoprazole (PROTONIX) 40 MG tablet Take 1 tablet by mouth every morning (before breakfast) 5/22/19   Fransisco Munoz MD   povidone-iodine (BETADINE) 7.5 % external solution Apply to wound. Apply as a wet to dry dressing 4/16/19   Jacquelin Sutton DPM   Wound Dressings (ADAPTIC NON-ADHERING DRESSING) PADS Apply 1 Units topically 2 times daily 9/28/18   Fransisco Munoz MD   Gauze Pads & Dressings (COMBINE ABD) 5\"X9\" PADS 1 Units by Does not apply route 2 times daily 9/28/18   Fransisco Munoz MD   [facility]ings [facility] 1 Units by Does not apply route 2 times daily 6/12/17 11/17/21  Zara Nj MD       Current medications:    No current facility-administered medications for this visit. No current outpatient medications on file.      Facility-Administered Medications Ordered in Other Visits   Medication Dose Route Frequency Provider Last Rate Last Admin    [facility] Hold] morphine injection 4 mg  4 mg IntraVENous Q4H PRN Ever Antis, DPM   4 mg at 01/21/22 0437    Or    [MAR Hold] morphine (PF) injection 2 mg  2 mg IntraVENous Q4H PRN Ever Antis, DPM        [MAR Hold] ferrous sulfate (FE TABS 325) EC tablet 325 mg  325 mg Oral Daily with breakfast Nain Johnson MD   325 mg at 01/20/22 0829    [MAR Hold] diazePAM (VALIUM) tablet 5 mg  5 mg Oral Q8H PRN Carlita Diaz DPM   5 mg at 01/20/22 1253    [MAR Hold] insulin lispro (HUMALOG) injection vial 0-6 Units  0-6 Units SubCUTAneous TID WC Jacquelin Ferrari MD        [facility] Hold] insulin lispro (HUMALOG) injection vial 0-3 Units  0-3 Units SubCUTAneous Nightly Jacquelin Ferrari MD        [facility] Hold] glucose (GLUTOSE) 40 % oral gel 15 g  15 g Oral PRN Carlita Diaz DPM        [MAR Hold] dextrose 50 % IV solution  12.5 g IntraVENous PRN Carlita Diaz DPM        [MAR Hold] glucagon (rDNA) injection 1 mg  1 mg IntraMUSCular PRN Carlita Diaz DPM        [MAR Hold] dextrose 5 % solution  100 mL/hr IntraVENous PRN Carlita Diaz DPM        [MAR Hold] sodium chloride flush 0.9 % injection 5-40 mL  5-40 mL IntraVENous 2 times per day Carlita Diaz DPM   10 mL at 01/20/22 2105    [MAR Hold] sodium chloride flush 0.9 % injection 5-40 mL  5-40 mL IntraVENous PRN Carlita Diaz DPM   10 mL at 01/19/22 0912    [MAR Hold] 0.9 % sodium chloride infusion  25 mL IntraVENous PRN Carlita Diaz DPM        [MAR Hold] enoxaparin (LOVENOX) injection 40 mg  40 mg SubCUTAneous Daily Carlita Diaz DPM   40 mg at 01/20/22 0829    [MAR Hold] ondansetron (ZOFRAN-ODT) disintegrating tablet 4 mg  4 mg Oral Q8H PRN Carlita Diaz DPM        Or    [MAR Hold] ondansetron (ZOFRAN) injection 4 mg  4 mg IntraVENous Q6H PRN Carlita Diaz DPM        [MAR Hold] polyethylene glycol (GLYCOLAX) packet 17 g  17 g Oral Daily PRN Carlita Diaz DPM        [MAR Hold] cefepime (MAXIPIME) 2000 mg IVPB minibag  2,000 mg IntraVENous Q12H Carlita Diaz DPM   Stopped at 01/21/22 0555    [MAR Hold] vancomycin (VANCOCIN) intermittent dosing (placeholder)   Other RX Placeholder Carlita Diaz DPM        [MAR Hold] sodium hypochlorite (DAKINS) 0.125 % external solution   Irrigation Daily Carlita Diaz DPM   Given at 01/20/22 0831   Karina Coe Hold] buPROPion (WELLBUTRIN XL) extended release tablet 300 mg  300 mg Oral Daily Willena Dubin, MD   300 mg at 01/20/22 0829    [MAR Hold] levothyroxine (SYNTHROID) tablet 50 mcg  50 mcg Oral Daily Willena Dubin, MD   50 mcg at 01/20/22 0829    [MAR Hold] lisinopril (PRINIVIL;ZESTRIL) tablet 10 mg  10 mg Oral Daily Willena Dubin, MD   10 mg at 01/20/22 0829    [MAR Hold] morphine (MS CONTIN) extended release tablet 60 mg  60 mg Oral BID Willena Dubin, MD   60 mg at 01/20/22 2104    [MAR Hold] oxyCODONE (ROXICODONE) immediate release tablet 15 mg  15 mg Oral Q6H PRN Willena Dubin, MD   15 mg at 01/20/22 2334    [MAR Hold] pantoprazole (PROTONIX) tablet 40 mg  40 mg Oral QAM AC Willena Dubin, MD   40 mg at 01/20/22 0605    [MAR Hold] glucose (GLUTOSE) 40 % oral gel 15 g  15 g Oral PRN Willena Dubin, MD        [facility] Hold] dextrose 50 % IV solution  12.5 g IntraVENous PRN Willena Dubin, MD        [facility] Hold] glucagon (rDNA) injection 1 mg  1 mg IntraMUSCular PRN Willena Dubin, MD        [facility] Hold] dextrose 5 % solution  100 mL/hr IntraVENous PRN Willena Dubin, MD        [facility] Hold] vancomycin (VANCOCIN) 1250 mg in dextrose 5 % 250 mL IVPB  1,250 mg IntraVENous Q12H Drea García DPM   Stopped at 01/21/22 0020    [MAR Hold] lactobacillus (CULTURELLE) capsule 1 capsule  1 capsule Oral BID WC Willena Dubin, MD   1 capsule at 01/20/22 1739    [MAR Hold] PARoxetine (PAXIL) tablet 40 mg  40 mg Oral QAM Willena Dubin, MD   40 mg at 01/20/22 1385       Allergies:     Allergies   Allergen Reactions    Bactrim [Sulfamethoxazole-Trimethoprim]      reported renal failure    Fiorinal [Butalbital-Aspirin-Caffeine] Other (See Comments)     Generalized blisters    Statins Other (See Comments)     Muscle aches    Tylenol [Acetaminophen]      Pt states it shuts down his kidneys    Peanut Butter Flavor [Flavoring Agent] Nausea And Vomiting       Problem List:    Patient Active Problem List   Diagnosis Code    Anxiety disorder F41.9    Benign prostatic hyperplasia N40.0    Depression F32. A    Edema R60.9    Esophageal reflux K21.9    Essential hypertension I10    Hypothyroidism E03.9    Right cervical radiculopathy M54.12    Osteomyelitis ([facility]) M86.9    Osteomyelitis of foot, left, acute ([facility]) M86.172    Non compliance with medical treatment Z91.19    Cellulitis L03.90    Acute renal failure (ARF) ([facility]) N17.9    Ulcerated, foot, left, with necrosis of bone ([facility]) L97.524    Type 2 diabetes mellitus ([facility]) E11.9    Anemia D64.9    Hematemesis with nausea K92.0    Erosive gastritis K29.60    Ulcerated, foot, left, with fat layer exposed ([facility]) L97.522    Renal failure N19    Hyperkalemia E87.5    Hypocalcemia V58.28    Metabolic acidosis B75.3    Diabetic ulcer of toe of left foot associated with type 2 diabetes mellitus ([facility]) E11.621, L97.529    Hyperphosphatemia E83.39    Chronic, continuous use of opioids F11.90    Acute metabolic encephalopathy H59.72    Diabetic foot infection ([facility]) E11.628, L08.9    H/O amputation of lesser toe, right ([facility]) Z89.421    Seizure-like activity ([facility]) R56.9    PAD (peripheral artery disease) ([facility]) I73.9       Past Medical History:        Diagnosis Date    Acute metabolic encephalopathy     CLAUDIA (acute kidney injury) ([address]     Anemia     Anxiety     ARF (acute renal failure) ([facility])     Arthritis     lower back, knees    BPH (benign prostatic hyperplasia)     Chronic kidney disease     prostate    Depression     Diabetes mellitus ([facility])     Diabetic neuropathy ([facility])     GERD (gastroesophageal reflux disease)     Hyperglycemia     Hyperkalemia     Hypertension     Hypothyroidism     Impacted tooth     Necrosis of bone of foot ([address]     Osteomyelitis of left foot ([address]     PAD (peripheral artery disease) ([address]     Pneumonia     Septic shock ([address]     Type 2 diabetes mellitus ([address]        Past Surgical History:        Procedure Laterality Date    APPENDECTOMY      BACK SURGERY      Lower x 4. Had abscess after appendectomy.  FOOT AMPUTATION Left 1/4/2020    TRANSMETATARSAL FOOT AMPUTATION, REMOVAL OF FOREIGN BODY performed by Meryl Ratliff DPM at [address] Left 01/04/2020    TRANSMETATARSAL FOOT AMPUTATION, REMOVAL OF FOREIGN BODY    KNEE SURGERY Right     scope    TOE AMPUTATION Bilateral 3/6/2017    TOE AMPUTATION LEFT HALLUX AND 2ND DIGIT AND RIGHT 2ND DIGIT performed by Jenaro Burton DPM at [address] [facility] ENDOSCOPY N/A 5/20/2019    EGD WITH BIOPSY performed by Brittani Lafleur MD at [address] History:    Social History     Tobacco Use    Smoking status: Never Smoker    Smokeless tobacco: Never Used   Substance Use Topics    Alcohol use: No                                Counseling given: Not Answered      Vital Signs (Current): There were no vitals filed for this visit.                                            BP Readings from Last 3 Encounters:   01/21/22 132/74   08/03/21 (!) 144/84   01/26/21 118/74       NPO Status:                                                                                 BMI:   Wt Readings from Last 3 Encounters:   01/21/22 190 lb (86.2 kg)   01/12/22 187 lb (84.8 kg)   12/15/21 187 lb (84.8 kg)     There is no height or weight on file to calculate BMI.    CBC:   Lab Results   Component Value Date    WBC 5.4 01/21/2022    RBC 4.08 01/21/2022    HGB 9.6 01/21/2022    HCT 32.5 01/21/2022    MCV 79.7 01/21/2022    RDW 15.7 01/21/2022     01/21/2022       CMP:   Lab Results   Component Value Date     01/21/2022    K 4.1 01/21/2022     01/21/2022    CO2 25 01/21/2022    BUN 22 01/21/2022    CREATININE 0.80 01/21/2022    GFRAA >60 01/21/2022    LABGLOM >60 01/21/2022    GLUCOSE 94 01/21/2022    PROT 7.4 01/17/2022    CALCIUM 9.1 01/21/2022    BILITOT <0.10 01/17/2022    ALKPHOS 76 01/17/2022    AST 11 01/17/2022    ALT 6 01/17/2022       POC Tests:   Recent Labs 01/21/22  0806   POCGLU 99       Coags:   Lab Results   Component Value Date    PROTIME 14.0 04/20/2020    INR 1.1 04/20/2020    APTT 33.9 04/20/2020       HCG (If Applicable): No results found for: PREGTESTUR, PREGSERUM, HCG, HCGQUANT     ABGs: No results found for: PHART, PO2ART, UOR1CEX, FVB7AMS, BEART, T0GSJSCB     Type & Screen (If Applicable):  No results found for: LABABO, LABRH    Anesthesia Evaluation    Airway: Mallampati: III  TM distance: >3 FB   Neck ROM: full  Mouth opening: > = 3 FB Dental:    (+) partials      Pulmonary:normal exam    (+) pneumonia:                             Cardiovascular:    (+) hypertension:,                   Neuro/Psych:   (+) neuromuscular disease:, psychiatric history:            GI/Hepatic/Renal:   (+) GERD:, PUD, renal disease: ARF,           Endo/Other:    (+) DiabetesType II DM, , hypothyroidism::., .                 Abdominal:             Vascular: Other Findings:               Anesthesia Plan      general     ASA 3       Induction: intravenous. MIPS: Postoperative opioids intended. Anesthetic plan and risks discussed with patient. Plan discussed with CRNA.                   Candelaria Stephens MD   1/21/2022

## 2022-01-21 NOTE — PROGRESS NOTES
[facility]  Internal Medicine Teaching Residency Program  Inpatient Daily Progress Note  ______________________________________________________________________________    Patient: Edwige Vidal  YOB: 1965   ABK:[id number]    Acct: [de-identified]     Room: [facility]7658-  Admit date: 2022  Today's date: 22  Number of days in the hospital: 4    SUBJECTIVE   Admitting Diagnosis: Cellulitis  CC: Right foot pain    Pt examined at bedside. Chart & results reviewed. Afebrile, vitally stable  No acute issues overnight  Vital stable  BS stable  Transmetatarsal amputation of the right foot,Achilles tendon lengthening,adjacent tissue transfer by podiatry today    ROS:  Constitutional:  negative for chills, fevers, sweats  Respiratory:  negative for cough, dyspnea on exertion, hemoptysis, shortness of breath, wheezing  Cardiovascular:  negative for chest pain, chest pressure/discomfort, lower extremity edema, palpitations  Gastrointestinal:  negative for abdominal pain, constipation, diarrhea, nausea, vomiting  Neurological:  negative for dizziness, headache  BRIEF HISTORY     39-year-old male with PMH of type 2 diabetes mellitus, hypertension, hypothyroidism, s/p TMA left foot, s/p right first and second digit amputation who is noncompliant with follow-up and medication presented with right foot pain. Found to have cellulitis/osteomyelitis. Podiatry is the primary. Blood glucose level and blood pressure are well controlled. Continue with the same treatment. Transmetatarsal amputation of the right foot,Achilles tendon lengthening,adjacent tissue transfer by podiatry today.   We will follow    OBJECTIVE     Vital Signs:  BP (!) 119/56   Pulse 68   Temp 97.9 °F (36.6 °C) (Oral)   Resp 14   Ht 6' 2\" (1.88 m)   Wt 190 lb (86.2 kg)   SpO2 99%   BMI 24.39 kg/m²     Temp (24hrs), Av.8 °F (36.6 °C), Min:97 °F (36.1 °C), Max:99 °F (37.2 °C)    In: 1710   Out: 2175 [Urine:2175]    Physical Exam:  Constitutional: Alert, oriented, cooperative and in no apparent distress. Head:normocephalic and atraumatic. EENT:  PERRLA. No conjunctival injections. Septum was midline, mucosa was without erythema, exudates or cobblestoning. No thrush was noted. Respiratory: Chest was symmetrical without dullness to percussion. Breath sounds bilaterally were clear to auscultation. There were no wheezes, rhonchi or rales. There is no intercostal retraction or use of accessory muscles. No egophony noted. Cardiovascular: Regular without murmur, clicks, gallops or rubs. Abdomen: Slightly rounded and soft without organomegaly. No rebound, rigidity or guarding was appreciated. Musculoskeletal: Normal curvature of the spine. No gross muscle weakness. Extremities: Left lower extremity TMA, right lower extremity Transmetatarsal amputation of the right foot,Achilles tendon lengthening,adjacent tissue transfer.   Dressing applied  Neurological/Psychiatric: The patient's general behavior, level of consciousness, thought content and emotional status is normal.        Medications:  Scheduled Medications:    ferrous sulfate  325 mg Oral Daily with breakfast    insulin lispro  0-6 Units SubCUTAneous TID WC    insulin lispro  0-3 Units SubCUTAneous Nightly    sodium chloride flush  5-40 mL IntraVENous 2 times per day    enoxaparin  40 mg SubCUTAneous Daily    cefepime  2,000 mg IntraVENous Q12H    vancomycin (VANCOCIN) intermittent dosing (placeholder)   Other RX Placeholder    sodium hypochlorite   Irrigation Daily    buPROPion  300 mg Oral Daily    levothyroxine  50 mcg Oral Daily    lisinopril  10 mg Oral Daily    morphine  60 mg Oral BID    pantoprazole  40 mg Oral QAM AC    vancomycin  1,250 mg IntraVENous Q12H    lactobacillus  1 capsule Oral BID WC    PARoxetine  40 mg Oral QAM     Continuous Infusions:    dextrose      sodium chloride      dextrose PRN Medicationsmorphine, 4 mg, Q4H PRN   Or  morphine, 2 mg, Q4H PRN  diazePAM, 5 mg, Q8H PRN  glucose, 15 g, PRN  dextrose, 12.5 g, PRN  glucagon (rDNA), 1 mg, PRN  dextrose, 100 mL/hr, PRN  sodium chloride flush, 5-40 mL, PRN  sodium chloride, 25 mL, PRN  ondansetron, 4 mg, Q8H PRN   Or  ondansetron, 4 mg, Q6H PRN  polyethylene glycol, 17 g, Daily PRN  oxyCODONE, 15 mg, Q6H PRN  glucose, 15 g, PRN  dextrose, 12.5 g, PRN  glucagon (rDNA), 1 mg, PRN  dextrose, 100 mL/hr, PRN        Diagnostic Labs:  CBC:   Recent Labs     01/19/22  0626 01/20/22  0624 01/21/22  0608   WBC 6.0 5.5 5.4   RBC 3.98* 4.07* 4.08*   HGB 9.4* 9.6* 9.6*   HCT 31.4* 32.6* 32.5*   MCV 78.9* 80.1* 79.7*   RDW 15.5* 15.5* 15.7*    493* 473*     BMP:   Recent Labs     01/19/22  0626 01/20/22  0624 01/21/22  0608    135 137   K 4.3 4.4 4.1    99 101   CO2 22 26 25   BUN 20 19 22*   CREATININE 0.90 0.80 0.80     BNP: No results for input(s): BNP in the last 72 hours. PT/INR: No results for input(s): PROTIME, INR in the last 72 hours. APTT: No results for input(s): APTT in the last 72 hours. CARDIAC ENZYMES: No results for input(s): CKMB, CKMBINDEX, TROPONINI in the last 72 hours. Invalid input(s): CKTOTAL;3  FASTING LIPID PANEL:  Lab Results   Component Value Date    HDL 38 03/08/2020     LIVER PROFILE:   No results for input(s): AST, ALT, ALB, BILIDIR, BILITOT, ALKPHOS in the last 72 hours. MICROBIOLOGY:   Lab Results   Component Value Date/Time    CULTURE NO GROWTH 2 DAYS 01/18/2022 02:09 PM    CULTURE NO GROWTH 2 DAYS 01/18/2022 02:09 PM       Imaging:    XR FOOT LEFT (MIN 3 VIEWS)    Result Date: 1/17/2022  New amputation mid foot, age indeterminate. No subcutaneous gas. XR FOOT RIGHT (MIN 3 VIEWS)    Result Date: 1/17/2022  Interval new amputation 1st and 2nd digits, age indeterminate. No subcutaneous gas. MRI FOOT RIGHT WO CONTRAST    Result Date: 1/17/2022  Very limited exam secondary to motion.

## 2022-01-21 NOTE — BRIEF OP NOTE
Brief Postoperative Note      Patient: Rich Keith  YOB: 1965  MRN: [id number]    Date of Procedure: 1/21/2022    Pre-Op Diagnosis: OSTEOMYEOLYTITIS, cellulitis    Post-Op Diagnosis: Same       Procedure(s):  TRANSMETARSAL AMPUTATION FOOT, TENDOACHILLES LENGTHENING Adjacent tissue transfer (flap)    Surgeon(s):  Miriam Askew DPM    Assistant:  Resident: Krystal Schaefer DPM; Romy Zacarias DPM    Anesthesia: General    Estimated Blood Loss (mL): Minimal    Complications: None    Specimens:   ID Type Source Tests Collected by Time Destination   A : RIGHT TOES Bone Toe SURGICAL PATHOLOGY Miriam Askew DPM 1/21/2022 1005        Implants:  * No implants in log *      Drains:   Closed/Suction Drain Right; Anterior Foot Other (Comment) (Active)       Findings: The patient required a transmetatarsal amputation due to infection the purulent drainage was noted at the first MPJ on the right side and due to the extensive tissue loss it was necessary for us to mobilize a flap to close the transmetatarsal amputation primarily. In addition the patient had an extremely taut Achilles tendon therefore a tendo Achilles lengthening was performed on the right leg to relieve the risk of ulceration to the remaining stump.     Electronically signed by Romy Zacarias DPM on 1/21/2022 at 11:58 AM

## 2022-01-21 NOTE — PROGRESS NOTES
[facility]up[facility] [address] [employer]  Occupational Therapy Not Seen Note    DATE: 2022    NAME: Marie Kwon  MRN: [id number]   : 1965      Patient not seen this date for Occupational Therapy due to:    Surgery/Procedure: Pt currently off floor in OR for L transmetatarsal amputation foot with tendo achilles lengthening.     Electronically signed by JAMIL Tello on 2022 at 11:11 AM

## 2022-01-21 NOTE — TELEPHONE ENCOUNTER
Patient wife states she would like to pick these up this weekend before the patient is discharged home from the hospital on Monday.      Med check scheduled for 2/22/22

## 2022-01-21 NOTE — ANESTHESIA POSTPROCEDURE EVALUATION
Department of Anesthesiology  Postprocedure Note    Patient: Arneta Pallas  MRN: [id number]  YOB: 1965  Date of evaluation: 1/21/2022  Time:  1:43 PM     Procedure Summary     Date: 01/21/22 Room / Location: [address]    Anesthesia Start: 4876 Anesthesia Stop: 6035    Procedure: TRANSMETARSAL AMPUTATION FOOT, TENDOACHILLES LENGTHENING (Right Foot) Diagnosis: (OSTEOMYEOLYTITIS)    Surgeons: Marcos Chavez DPM Responsible Provider: Berta Doan MD    Anesthesia Type: general ASA Status: 3          Anesthesia Type: general    Mariah Phase I: Mariah Score: 9    Mariah Phase II:      Last vitals: Reviewed and per EMR flowsheets.        Anesthesia Post Evaluation    Patient location during evaluation: PACU  Patient participation: complete - patient participated  Level of consciousness: awake and alert  Pain score: 2  Airway patency: patent  Nausea & Vomiting: no nausea and no vomiting  Complications: no  Cardiovascular status: hemodynamically stable  Respiratory status: acceptable  Hydration status: euvolemic

## 2022-01-21 NOTE — PROGRESS NOTES
Progress Note  Podiatric Medicine and Surgery     Subjective     CC: Right foot/leg pain    Patient seen and examined and feeling well. He has not had anything to eat or drink since midnight and states he is ready for his right TMA today. Afebrile, slight hypotension (101/58)        HPI :  Michael Emery is a 61 y.o. male seen at [facility] on the floor for right foot pain and infection. He is well known to Dr. Africa England who advised he be admitted 1/12/21 but pt refused until today. He has been seen for wound care over the course of the last couple years and demonstrates repetitive non-compliant behaviors including failure to follow up when scheduled, failure to fill prescriptions in a timely manner, and using substitute medications that have been [de-identified] years old. PCP is Mary Cuellar MD    ROS: Denies N/V/F/C/SOB/CP. Otherwise negative except at stated in the HPI.      Medications:  Scheduled Meds:   ferrous sulfate  325 mg Oral Daily with breakfast    insulin lispro  0-6 Units SubCUTAneous TID WC    insulin lispro  0-3 Units SubCUTAneous Nightly    sodium chloride flush  5-40 mL IntraVENous 2 times per day    enoxaparin  40 mg SubCUTAneous Daily    cefepime  2,000 mg IntraVENous Q12H    vancomycin (VANCOCIN) intermittent dosing (placeholder)   Other RX Placeholder    sodium hypochlorite   Irrigation Daily    buPROPion  300 mg Oral Daily    levothyroxine  50 mcg Oral Daily    lisinopril  10 mg Oral Daily    morphine  60 mg Oral BID    pantoprazole  40 mg Oral QAM AC    vancomycin  1,250 mg IntraVENous Q12H    lactobacillus  1 capsule Oral BID WC    PARoxetine  40 mg Oral QAM       Continuous Infusions:   dextrose      sodium chloride      dextrose         PRN Meds:morphine **OR** morphine, diazePAM, glucose, dextrose, glucagon (rDNA), dextrose, sodium chloride flush, sodium chloride, ondansetron **OR** ondansetron, polyethylene glycol, oxyCODONE, glucose, dextrose, glucagon (rDNA), dextrose    Objective     Vitals:  No data found. Average, Min, and Max for last 24 hours Vitals:  TEMPERATURE:  Temp  Av.6 °F (37 °C)  Min: 98.1 °F (36.7 °C)  Max: 99 °F (37.2 °C)    RESPIRATIONS RANGE: Resp  Av  Min: 18  Max: 18    PULSE RANGE: Pulse  Av.5  Min: 57  Max: 76    BLOOD PRESSURE RANGE:  Systolic (93GIF), FND:855 , Min:101 , ZEC:344   ; Diastolic (88OUX), JGX:28, Min:58, Max:71      PULSE OXIMETRY RANGE: SpO2  Av %  Min: 94 %  Max: 96 %    I/O last 3 completed shifts: In: 10 [I.V.:10]  Out: 3125 [PDKPR:0312]    CBC:  Recent Labs     22  0626 22  0803 22  0624 22  0608   WBC 6.0  --  5.5 5.4   HGB 9.4*  --  9.6* 9.6*   HCT 31.4*  --  32.6* 32.5*     --  493* 473*   CRP  --  17.0*  --   --         BMP:  Recent Labs     22  0626 22  0624    135   K 4.3 4.4    99   CO2 22 26   BUN 20 19   CREATININE 0.90 0.80   GLUCOSE 104* 94   CALCIUM 9.1 9.0        Coags:  No results for input(s): APTT, PROT, INR in the last 72 hours. Lab Results   Component Value Date    SEDRATE 96 (H) 2022     Recent Labs     22  0803   CRP 17.0*       General: A&Ox3, NAD  Heart: Regular rate and rhythm. Lungs: Equal air entry. No increased effort. Abdomen: Soft, non-tender to palpation. Not distended. Physical Exam:  Exam from 22 - OR today for R TMA  Vascular: DP and PT pulses are +2. CFT <3 seconds to all remaining pedal digits. Non-pitting right pedal edema. Temperature gradient increased right dorsal foot. Neuro: Light touch sensation is present to the level of the pedal digits. Musculoskeletal: Muscle strength is 5/5, baseline, to all lower extremity muscle groups. Palpation of right leg and foot elicit pain. Gross deformity is present, left foot TMA, right foot partial hallux amputation. Dermatologic: Full thickness ulcer located right foot/1st interspace and measures approximately 4cm x 4cm x 0.5 cm.  Base is fibronecrotic. Periwound skin appears erythematous. Significant amount of serous drainage noted without associated mal odor. Right foot erythema with associated increase in warmth. Neg probe to bone. Slight sinus tracking. The wound does undermine. Neg fluctuance, crepitus, or induration. Interdigital maceration absent, Bilateral.      See media panel - OR today        Imaging:   MRI FOOT RIGHT WO CONTRAST   Final Result   Very limited exam secondary to motion. Suspected marrow signal changes within the 1st proximal phalanx and to a   lesser extent head of the 1st metatarsal.  Osteomyelitis cannot be excluded. Soft tissue edema is concerning for cellulitis. Questionable faint marrow edema within the 4th metatarsal diaphysis, cuboid,   and visualized calcaneus. Findings are very nonspecific and may be reactive,   secondary to osseous injuries, or possibly be artifactual.         VL DUP LOWER EXTREMITY VENOUS BILATERAL   Final Result      XR FOOT LEFT (MIN 3 VIEWS)   Final Result   New amputation mid foot, age indeterminate. No subcutaneous gas. XR FOOT RIGHT (MIN 3 VIEWS)   Final Result   Interval new amputation 1st and 2nd digits, age indeterminate. No   subcutaneous gas. Assessment   Frank Ortega is a 61 y.o. male with   Cellulitis right foot and leg  Chronic non-healing ulceration to level of muscle, right foot  DVT right leg  S/P TMA left foot  S/P digital amputation right foot  DMII with history of LE amputation    Principal Problem:    Cellulitis  Active Problems:    Essential hypertension    Hypothyroidism    Type 2 diabetes mellitus ([facility] [address]  Resolved Problems:    * No resolved hospital problems. *       Plan   Patient examined and evaluated at bedside    Relayed to patient that the plan is still for OR to have a TMA on the right to rid infection. Patient understands and is agreeable to plan.    US art 2020 showed no signs of occlusive disease  Medical management per IM  Antibiotics per ID  Vanc/Cefepime   Plan to take to OR 1/21/22 at 8:45 am for TMA right foot w/ JEROME - on board  Covid pending   NPO at midnight   Hold [facility]  Consent signed, witnessed and in patient's chart   Leg marked  Dressing intact to right lower extremity: DSD, 4x4's in webspaces  Heel status to right lower extremity - orders placed  Discussed with Dr. Mel Vang    DVT ppx: hold lovenox    Diet: NPO          Activity: heel WB RLE  Pain Control: 60 mg ER oxycontin BID    Ivett Whaley DPM   Podiatric Medicine & Surgery   1/21/2022 at 6:49 AM    I performed a history and physical examination of the patient and discussed management with the resident. I reviewed the residents note and agree with the documented findings and plan of care. Any areas of disagreement are noted on the chart. I was personally present for the key portions of any procedures. I have documented in the chart those procedures where I was not present during the key portions. I have reviewed the Podiatry Resident progress note. I agree with the chief complaint, past medical history, past surgical history, allergies, medications, social and family history as documented unless otherwise noted below. Documentation of the HPI, Physical Exam and Medical Decision Making performed by medical students or scribes is based on my personal performance of the HPI, PE and MDM. I have personally evaluated this patient and have completed at least one if not all key elements of the E/M (history, physical exam, and MDM). Additional findings are as noted.      Dorie Osman DPM on 1/21/2022 at 4:29 AM  Board Certified, American Board of Podiatric Surgery  Fellow, [employer] and [employer]

## 2022-01-21 NOTE — OP NOTE
Operative Note      Patient: Ward Gates  YOB: 1965  MRN: [id number]    Date of Procedure: 1/21/2022    Pre-Op Diagnosis:   1. Other specified deformities of right lower leg  2. acquired posterior equinus, right  3. cellulitis of foot, right  4. diabetic infection of right foot   5. type 2 diabetes mellitus with other skin complications  6. local infection of the skin and subcutaneous tissue, right foot  7. other acute osteomyelitis, right ankle and foot    Post-Op Diagnosis: Same       Procedure(s):  1. TRANSMETARSAL AMPUTATION FOOT  2. TENDOACHILLES LENGTHENING   3. Adjacent tissue transfer (flap) of 14x5 tissue defect. 70 sq cm. Surgeon(s):  Randy Jennings DPM    Assistant:  Resident: Brendan Harrington DPM; Mo Berger DPM    Anesthesia: General    Estimated Blood Loss (mL): Minimal    Complications: None    Specimens:   ID Type Source Tests Collected by Time Destination   A : RIGHT TOES Bone Toe SURGICAL PATHOLOGY Randy Jennings DPM 1/21/2022 1005        Implants:  * No implants in log *      Drains:   Closed/Suction Drain Right; Anterior Foot Other (Comment) (Active)       Findings: The patient required a transmetatarsal amputation due to infection the purulent drainage was noted at the first MPJ on the right side and due to the extensive tissue loss it was necessary for us to mobilize a flap to close the transmetatarsal amputation primarily. In addition the patient had an extremely taut Achilles tendon therefore a tendo Achilles lengthening was performed on the right leg to relieve the risk of ulceration to the remaining stump. INDICATIONS FOR Clint Ray has had multiple ulcers and infections in the past which lead to amputations including a transmetatarsal amputation on his left side which has been successful.  After infectious work up patient was found to have cellulitis extending across the right forefoot as well as osteomyelitis of the right hallux and first MPJ with correlating loss of viable soft tissue. Patient has failed conservative treatments and elected to undergo surgical amputation. Risks and benefits were discussed. No guarantees were given or implied. Consent is signed and in the chart. PROCEDURE IN DETAIL: The patient was transported from pre-op to the operating room and placed on the operating table in the supine position with a safety strap across the lap. An ankle tourniquet was applied right thigh. After adequate sedation by the Anesthesia, an ankle block of 10 mL of a one-to-one racemic mixture of 0.5% Marcaine plain and 1% lidocaine plain was injected. The foot was then prepped and draped in the usual aseptic fashion. An eschmark bandage was used to exsanguinate the foot and leg. The ankle tourniquet was inflated to 250mmHg. Attention was directed to the right foot. There was noted to be purulent drainage from the right first MPJ. A #10 blade was used to create two converging semieliptical incisions around forefoot however it needed to be modified on the medial aspect to extend more proximally for adequate soft tissue closure. Immediately up incision purulent drainage was expressed from the medial forefoot from the first MPJ. Incision was deepened to the level of bone. A thick and appropriate plantar flap was created utilizing sharp dissection and an additional flap was created medially and plantarly and was freed from surrounding tissues for dorsal mobilization. Neurovascular structures were all intact and retracted plantarly during the dissection. Previous amputations noted to left foot. Each metatarsal was freed from surrounding soft tissue attachments utilizing sharp dissection and periosteal elevator. Each metatarsal was then transected utilizing a oscillating sagittal saw and the distal metatarsals were excised from medial to lateral. Care was taken to maintain the metatarsal parabola.   After each metatarsal was cut the distal forefoot was removed and passed from the table. The metatarsals were resected until healthy marrow cavity was noted. A proximal clearance fragment was taken from the first metatarsal and sent to pathology. The remaining surgical wound was then meticulously inspected to assess for remaining infection, determine viability, and look for any pulsatile bleeding vessels. The remaining defect measured 14 cm x 5 cm. 70 sq cm. The extensor and flexor tendons were resected as proximal as possible. No pulsatile bleeding vessels were visualized. Adequate bleeding and tissue perfusion noted to remaining tissue and are medial plantar flap was further dissected to be mobilized for dorsal closure. Surgical wound was then irrigated with copious amounts normal saline solution. The remaining soft tissues appear viable with bleeding noted and without signs of infection or necrosis. There was noted to be redundant plantar flap laterally, soft tissue and skin edges were remodeled to facilitate appropriate approximation of flap edges and our plantar medial tissue flap was transposed dorsally which allowed closure of the amputation site. The plantar flap was directed over the metatarsal shafts and sutured dorsally with retention sutures and 2-0 nylon and 2-0 Vicryl. At this time attention was directed to the posterior ankle where 2 medial and 1 lateral small stab incisions were made with a 15 blade to release the tendon Achilles and allow for greater dorsiflexion of the foot on the ankle. These were sutured with 3-0 nylon    The ankle tourniquet was released and a hyperemic response was noted to the right foot. Dressings consisted of Adaptic, 4 x 4s, ABDs, Kerlix and an ACE bandage were applied as well as a posterior splint. The patient tolerated the above procedure and anesthesia well without complications.  The patient was transported from the operating room to the PACU with vital signs stable and vascular status intact.     Electronically signed by Ana Hoff DPM on 1/21/2022 at 11:58 AM

## 2022-01-21 NOTE — PROGRESS NOTES
[facility] - progress note  Today's Date and Time: 1/21/2022, 4:25 PM    Impression :   · Cellulitis of the right foot  · Chronic nonhealing ulcer of the right foot status post first and second digit amputation  · History left foot amputation  · Diabetes mellitus type 2  · Right leg DVT  · Essential hypertension  · Hypothyroidism  · Hx MRSA foot 2018  · Allergy to Bactrim    Recommendations:     · Continue cefepime and vancomycin    Medical Decision Making/Summary/Discussion:1/21/2022     · Transmetatarsal amputation and tendoachilles lengthening 1/21/22  Infection Control Recommendations   · [place] Precautions  · Contact Isolation     Antimicrobial Stewardship Recommendations     · Simplification of therapy  · Targeted therapy    Coordination of Outpatient Care:   · Estimated Length of IV antimicrobials:  · Patient will need Midline Catheter Insertion:   · Patient will need PICC line Insertion:  · Patient will need: Home IV , [place],  SNF,  LTAC: TBD  · Patient will need outpatient wound care:    Chief complaint/reason for consultation:   · Diabetic foot wound      History of Present Illness:   Rony Telles is a 61y.o.-year-old male who was initially admitted on 1/17/2022. Patient seen at the request of Dr. Ivy Franco:    This patient presented to the ED with complaints of cellulitis of the right foot and leg with a chronic nonhealing ulcer to level of muscle of the right foot. He follows up with Dr. Arlinda Nissen with Podiatry, but per EHR, he is known to be noncompliant with medications and follow-up office visits. Dr. Arlinda Nissen had recommended that he present to the hospital on 1/12/2022 for his right foot wound, but the patient has refused until 1/17/2022. He was started on vancomycin and cefepime    Imaging:     XR FOOT LEFT (MIN 3 VIEWS)     Result Date: 1/17/2022  New amputation mid foot, age indeterminate.   No subcutaneous gas.      XR FOOT RIGHT (MIN 3 VIEWS)     Result Date: 1/17/2022  Interval new amputation 1st and 2nd digits, age indeterminate. No subcutaneous gas.      MRI FOOT RIGHT WO CONTRAST     Result Date: 1/17/2022  Very limited exam secondary to motion. Suspected marrow signal changes within the 1st proximal phalanx and to a lesser extent head of the 1st metatarsal.  Osteomyelitis cannot be excluded. Soft tissue edema is concerning for cellulitis. Questionable faint marrow edema within the 4th metatarsal diaphysis, cuboid, and visualized calcaneus. Findings are very nonspecific and may be reactive, secondary to osseous injuries, or possibly be artifactual.        CURRENT EVALUATION 1/21/2022    Afebrile  VS stable    The patient is doing well on room air. SpO2: 100%    Podiatry onboard, MRI shows concern for osteomyelitis  TMA right foot w/ JEROME completed on 01/21/22  On cefepime and vancomycin    Labs, X rays reviewed: 1/21/2022    BUN:22  Cr:0.80    WBC:5.4  Hb:9.6  Plat:473    CRP: 36.0  Ferritin: 23  Sed rate: 96    Cultures:  Urine:  · none  Blood:  · 01/18/22: no growth  Sputum :  · none  Wound:  · none    Images:    Right foot 1/17/21      Discussed with patient, RN, family. I have personally reviewed the past medical history, past surgical history, medications, social history, and family history, and I have updated the database accordingly.   Past Medical History:     Past Medical History:   Diagnosis Date    Acute metabolic encephalopathy     CLAUDIA (acute kidney injury) ([address]     Anemia     Anxiety     ARF (acute renal failure) (HCC)     Arthritis     lower back, knees    BPH (benign prostatic hyperplasia)     Chronic kidney disease     prostate    Depression     Diabetes mellitus ([address]     Diabetic neuropathy (HCC)     GERD (gastroesophageal reflux disease)     Hyperglycemia     Hyperkalemia     Hypertension     Hypothyroidism     Impacted tooth     Necrosis of bone of foot ([address]     Osteomyelitis of left foot (HCC)     PAD (peripheral artery disease) (HCC)     Pneumonia     Septic shock (HCC)     Type 2 diabetes mellitus ([facility]        Past Surgical  History:     Past Surgical History:   Procedure Laterality Date    AMPUTATION Right 01/21/2022    TRANSMETARSAL AMPUTATION FOOT, TENDOACHILLES LENGTHENING (Right Foot)    APPENDECTOMY      BACK SURGERY      Lower x 4. Had abscess after appendectomy.      FOOT AMPUTATION Left 1/4/2020    TRANSMETATARSAL FOOT AMPUTATION, REMOVAL OF FOREIGN BODY performed by Macey Escobedo DPM at [place] [id number]. Left 01/04/2020    TRANSMETATARSAL FOOT AMPUTATION, REMOVAL OF FOREIGN BODY    KNEE SURGERY Right     scope    TOE AMPUTATION Bilateral 3/6/2017    TOE AMPUTATION LEFT HALLUX AND 2ND DIGIT AND RIGHT 2ND DIGIT performed by Edelmira Willingham DPM at [address]  ENDOSCOPY N/A 5/20/2019    EGD WITH BIOPSY performed by Brisa Torres MD at [facility] OR       Medications:      ferrous sulfate  325 mg Oral Daily with breakfast    insulin lispro  0-6 Units SubCUTAneous TID WC    insulin lispro  0-3 Units SubCUTAneous Nightly    sodium chloride flush  5-40 mL IntraVENous 2 times per day    enoxaparin  40 mg SubCUTAneous Daily    cefepime  2,000 mg IntraVENous Q12H    vancomycin (VANCOCIN) intermittent dosing (placeholder)   Other RX Placeholder    sodium hypochlorite   Irrigation Daily    buPROPion  300 mg Oral Daily    levothyroxine  50 mcg Oral Daily    lisinopril  10 mg Oral Daily    morphine  60 mg Oral BID    pantoprazole  40 mg Oral QAM AC    vancomycin  1,250 mg IntraVENous Q12H    lactobacillus  1 capsule Oral BID WC    PARoxetine  40 mg Oral QAM       Social History:     Social History     Socioeconomic History    Marital status:      Spouse name: Not on file    Number of children: Not on file    Years of education: Not on file    Highest education level: Not on file Occupational History    Not on file   Tobacco Use    Smoking status: Never Smoker    Smokeless tobacco: Never Used   Vaping Use    Vaping Use: Never used   Substance and Sexual Activity    Alcohol use: No    Drug use: Not Currently     Frequency: 2.0 times per week     Types: Marijuana Dolan Meckel)    Sexual activity: Not on file   Other Topics Concern    Not on file   Social History Narrative    Not on file     Social Determinants of Health     Financial Resource Strain: Low Risk     Difficulty of Paying Living Expenses: Not hard at all   Food Insecurity: No Food Insecurity    Worried About Running Out of Food in the Last Year: Never true    Vicente of Food in the Last Year: Never true   Transportation Needs:     Lack of Transportation (Medical): Not on file    Lack of Transportation (Non-Medical):  Not on file   Physical Activity:     Days of Exercise per Week: Not on file    Minutes of Exercise per Session: Not on file   Stress:     Feeling of Stress : Not on file   Social Connections:     Frequency of Communication with Friends and Family: Not on file    Frequency of Social Gatherings with Friends and Family: Not on file    Attends Orthodox Services: Not on file    Active Member of [address] [place] [address] [employer] or Organizations: Not on file    Attends Club or Organization Meetings: Not on file    Marital Status: Not on file   Intimate Partner Violence:     Fear of Current or Ex-Partner: Not on file    Emotionally Abused: Not on file    Physically Abused: Not on file    Sexually Abused: Not on file   Housing Stability:     Unable to Pay for Housing in the Last Year: Not on file    Number of [place] in the Last Year: Not on file    Unstable Housing in the Last Year: Not on file       Family History:     Family History   Problem Relation Age of Onset    Diabetes Father     Cancer Maternal Grandfather         Colon Cancer; Prostate Cancer    No Known Problems Mother         Allergies:   Bactrim [sulfamethoxazole-trimethoprim], Fiorinal [butalbital-aspirin-caffeine], Statins, Tylenol [acetaminophen], and Peanut butter flavor [flavoring agent]     Review of Systems:   Constitutional: No fevers or chills. No systemic complaints  Head: No headaches  Eyes: No double vision or blurry vision. No conjunctival inflammation. ENT: No sore throat or runny nose. . No hearing loss, tinnitus or vertigo. Cardiovascular: No chest pain or palpitations. No shortness of breath. No MONTES  Lung: No shortness of breath or cough. No sputum production  Abdomen: No nausea, vomiting, diarrhea, or abdominal pain. Ward Pickles No cramps. Genitourinary: No increased urinary frequency, or dysuria. No hematuria. No suprapubic or CVA pain  Musculoskeletal: Bilateral lower extremity pain  Hematologic: No bleeding or bruising. Integument: Right foot wound  Psychiatric: No depression. Endocrine: No polyuria, no polydipsia, no polyphagia. Physical Examination :     Patient Vitals for the past 8 hrs:   BP Temp Temp src Pulse Resp SpO2   01/21/22 1511 124/69 98.1 °F (36.7 °C) Oral 64 15 100 %   01/21/22 1318 (!) 119/56 97.9 °F (36.6 °C) Oral 68 14 99 %   01/21/22 1245 115/68 -- -- 64 13 99 %   01/21/22 1230 113/62 97.2 °F (36.2 °C) Temporal 69 12 99 %   01/21/22 1215 119/64 -- -- 77 12 94 %   01/21/22 1202 -- -- -- -- -- 95 %   01/21/22 1200 129/81 -- -- 86 12 94 %   01/21/22 1150 -- -- -- -- -- 99 %   01/21/22 1145 124/75 -- -- 81 13 99 %   01/21/22 1132 115/73 97 °F (36.1 °C) Temporal 87 12 98 %     General Appearance: Awake, alert, and in no apparent distress  Head:  Normocephalic, no trauma  Eyes: Pupils equal, round, reactive to light and accommodation; extraocular movements intact; sclera anicteric; conjunctivae pink. No embolic phenomena. ENT: Oropharynx clear, without erythema, exudate, or thrush. No tenderness of sinuses. Mouth/throat: mucosa pink and moist. No lesions. Dentition in good repair. Neck:Supple, without lymphadenopathy.  Thyroid normal, No bruits. Pulmonary/Chest: Clear to auscultation, without wheezes, rales, or rhonchi. No dullness to percussion. Cardiovascular: Regular rate and rhythm without murmurs, rubs, or gallops. Abdomen: Soft, non tender. Bowel sounds normal. No organomegaly  All four Extremities: toe amputations right foot, partial foot amputation left foot  Neurologic: No gross sensory or motor deficits. Skin: right foot wound    Medical Decision Making -Laboratory:   I have independently reviewed/ordered the following labs:    CBC with Differential:   Recent Labs     01/20/22  0624 01/21/22  0608   WBC 5.5 5.4   HGB 9.6* 9.6*   HCT 32.6* 32.5*   * 473*   LYMPHOPCT 31 26   MONOPCT 10 10     BMP:   Recent Labs     01/20/22  0624 01/21/22  0608    137   K 4.4 4.1   CL 99 101   CO2 26 25   BUN 19 22*   CREATININE 0.80 0.80     Hepatic Function Panel:   No results for input(s): PROT, LABALBU, BILIDIR, IBILI, BILITOT, ALKPHOS, ALT, AST in the last 72 hours. No results for input(s): RPR in the last 72 hours. No results for input(s): HIV in the last 72 hours. No results for input(s): BC in the last 72 hours.   Lab Results   Component Value Date    MUCUS NOT REPORTED 04/20/2020    RBC 4.08 01/21/2022    TRICHOMONAS NOT REPORTED 04/20/2020    WBC 5.4 01/21/2022    YEAST NOT REPORTED 04/20/2020    TURBIDITY CLEAR 04/20/2020     Lab Results   Component Value Date    CREATININE 0.80 01/21/2022    GLUCOSE 94 01/21/2022       Medical Decision Making-Imaging:     Narrative   EXAMINATION:   MRI OF THE RIGHT FOOT WITHOUT CONTRAST, 1/17/2022 5:34 pm       TECHNIQUE:   Multiplanar multisequence MRI of the right foot was performed without the   administration of intravenous contrast.       COMPARISON:   Right foot radiographs 01/17/2022       HISTORY:   ORDERING SYSTEM PROVIDED HISTORY: R/O OM left calc   TECHNOLOGIST PROVIDED HISTORY:   R/O OM left calc   Reason for Exam: R/O OM       FINDINGS:   Significantly limited exam secondary to motion, patient body habitus, and   lack of IV contrast.  Within this limitation:       BONE MARROW: Postsurgical findings from prior amputation of the 1st and 2nd   digit, better visualized on prior radiograph.  T2 hyperintense T1 hypointense   signal within the 1st proximal phalanx is suspected.  A questionable area of   T2 hyperintense signal and T1 hypointense signal along the medial margin of   the 1st metatarsal head (series 10 and 11, image 11).  No discrete T2 marrow   signal changes of the 2nd metatarsal or 2nd proximal phalanx definitively   visualized.  No discrete fracture is identified on the provided images.    Questionable T2 hyperintense signal within the calcaneus and cuboid, only   partially visualized and incompletely evaluated.  The calcaneus is not   completely included in the study.  Questionable T2 hyperintense signal within   the diaphysis of the 4th metatarsal.       SOFT TISSUES: Likely postsurgical changes of the 1st and 2nd digit flexor and   extensor tendons.  Edema signal and atrophic changes within the plantar   musculature, particularly the middle band of the plantar fascia, likely   neuropathic.  No soft tissue gas grossly identified, however tiny foci of gas   would be difficult to visualize with the amount of motion on this study.  No   discrete drainable fluid collection grossly identified.           Impression   Very limited exam secondary to motion.       Suspected marrow signal changes within the 1st proximal phalanx and to a   lesser extent head of the 1st metatarsal.  Osteomyelitis cannot be excluded.       Soft tissue edema is concerning for cellulitis.       Questionable faint marrow edema within the 4th metatarsal diaphysis, cuboid,   and visualized calcaneus.  Findings are very nonspecific and may be reactive,   secondary to osseous injuries, or possibly be artifactual.     Narrative   EXAMINATION:   THREE XRAY VIEWS OF THE LEFT FOOT       1/17/2022 2:52 pm     COMPARISON:   None.       HISTORY:   ORDERING SYSTEM PROVIDED HISTORY: eval OM/gas   TECHNOLOGIST PROVIDED HISTORY:   eval OM/gas       FINDINGS:   New interval amputation proximal metatarsals.  Cortical margins otherwise   intact.  Soft tissues unremarkable.           Impression   New amputation mid foot, age indeterminate.  No subcutaneous gas.         Narrative   EXAMINATION:   THREE XRAY VIEWS OF THE RIGHT FOOT       1/17/2022 2:52 pm       COMPARISON:   December 15, 2021.       HISTORY:   ORDERING SYSTEM PROVIDED HISTORY: r/o gas/OM   TECHNOLOGIST PROVIDED HISTORY:   r/o gas/OM       FINDINGS:   Interval amputation 1st and 2nd proximal phalanges.  Cortical margins   otherwise intact.  Alignment anatomic.  Soft tissues unremarkable.  No   subcutaneous gas.           Impression   Interval new amputation 1st and 2nd digits, age indeterminate.  No   subcutaneous gas.               Medical Decision Gygnsb-Pfkhpcya-Sidhe:       Medical Decision Making-Other:     Note:  · Labs, medications, radiologic studies were reviewed with personal review of films  · Large amounts of data were reviewed  · Discussed with nursing Staff, Discharge planner  · Infection Control and Prevention measures reviewed  · All prior entries were reviewed  · Administer medications as ordered  · Prognosis: Guarded  · Discharge planning reviewed      Thank you for allowing us to participate in the care of this patient. Please call with questions. Jeromy Cortes APRN - CNP     ATTESTATION:    I have discussed the case, including pertinent history and exam findings with the APRN. I have evaluated the  History, physical findings and pictures of the patient and the key elements of the encounter have been performed by me. I have reviewed the laboratory data, other diagnostic studies and discussed them with the APRN. I have updated the medical record where necessary.     I agree with the assessment, plan and orders as documented by the Carolee Martin MD.

## 2022-01-22 LAB
ABSOLUTE EOS #: 0.07 K/UL (ref 0–0.44)
ABSOLUTE IMMATURE GRANULOCYTE: 0.04 K/UL (ref 0–0.3)
ABSOLUTE LYMPH #: 1.03 K/UL (ref 1.1–3.7)
ABSOLUTE MONO #: 0.74 K/UL (ref 0.1–1.2)
ANION GAP SERPL CALCULATED.3IONS-SCNC: 9 MMOL/L (ref 9–17)
BASOPHILS # BLD: 0 % (ref 0–2)
BASOPHILS ABSOLUTE: 0.04 K/UL (ref 0–0.2)
BUN BLDV-MCNC: 15 MG/DL (ref 6–20)
BUN/CREAT BLD: ABNORMAL (ref 9–20)
CALCIUM SERPL-MCNC: 9 MG/DL (ref 8.6–10.4)
CHLORIDE BLD-SCNC: 100 MMOL/L (ref 98–107)
CO2: 28 MMOL/L (ref 20–31)
CREAT SERPL-MCNC: 0.64 MG/DL (ref 0.7–1.2)
DIFFERENTIAL TYPE: ABNORMAL
EOSINOPHILS RELATIVE PERCENT: 1 % (ref 1–4)
GFR AFRICAN AMERICAN: >60 ML/MIN
GFR NON-AFRICAN AMERICAN: >60 ML/MIN
GFR SERPL CREATININE-BSD FRML MDRD: ABNORMAL ML/MIN/{1.73_M2}
GFR SERPL CREATININE-BSD FRML MDRD: ABNORMAL ML/MIN/{1.73_M2}
GLUCOSE BLD-MCNC: 108 MG/DL (ref 70–99)
GLUCOSE BLD-MCNC: 109 MG/DL (ref 75–110)
GLUCOSE BLD-MCNC: 110 MG/DL (ref 75–110)
GLUCOSE BLD-MCNC: 111 MG/DL (ref 75–110)
GLUCOSE BLD-MCNC: 137 MG/DL (ref 75–110)
HCT VFR BLD CALC: 33.6 % (ref 40.7–50.3)
HEMOGLOBIN: 10 G/DL (ref 13–17)
IMMATURE GRANULOCYTES: 0 %
LYMPHOCYTES # BLD: 10 % (ref 24–43)
MCH RBC QN AUTO: 23.4 PG (ref 25.2–33.5)
MCHC RBC AUTO-ENTMCNC: 29.8 G/DL (ref 28.4–34.8)
MCV RBC AUTO: 78.7 FL (ref 82.6–102.9)
MONOCYTES # BLD: 7 % (ref 3–12)
NRBC AUTOMATED: 0 PER 100 WBC
PDW BLD-RTO: 15.5 % (ref 11.8–14.4)
PLATELET # BLD: 472 K/UL (ref 138–453)
PLATELET ESTIMATE: ABNORMAL
PMV BLD AUTO: 9.1 FL (ref 8.1–13.5)
POTASSIUM SERPL-SCNC: 4.2 MMOL/L (ref 3.7–5.3)
RBC # BLD: 4.27 M/UL (ref 4.21–5.77)
RBC # BLD: ABNORMAL 10*6/UL
SEG NEUTROPHILS: 81 % (ref 36–65)
SEGMENTED NEUTROPHILS ABSOLUTE COUNT: 8.16 K/UL (ref 1.5–8.1)
SODIUM BLD-SCNC: 137 MMOL/L (ref 135–144)
WBC # BLD: 10.1 K/UL (ref 3.5–11.3)
WBC # BLD: ABNORMAL 10*3/UL

## 2022-01-22 PROCEDURE — 6360000002 HC RX W HCPCS: Performed by: PODIATRIST

## 2022-01-22 PROCEDURE — 85025 COMPLETE CBC W/AUTO DIFF WBC: CPT

## 2022-01-22 PROCEDURE — 6370000000 HC RX 637 (ALT 250 FOR IP): Performed by: PODIATRIST

## 2022-01-22 PROCEDURE — 76937 US GUIDE VASCULAR ACCESS: CPT

## 2022-01-22 PROCEDURE — 2580000003 HC RX 258: Performed by: PODIATRIST

## 2022-01-22 PROCEDURE — 80048 BASIC METABOLIC PNL TOTAL CA: CPT

## 2022-01-22 PROCEDURE — 97166 OT EVAL MOD COMPLEX 45 MIN: CPT

## 2022-01-22 PROCEDURE — 97535 SELF CARE MNGMENT TRAINING: CPT

## 2022-01-22 PROCEDURE — 97530 THERAPEUTIC ACTIVITIES: CPT

## 2022-01-22 PROCEDURE — 99232 SBSQ HOSP IP/OBS MODERATE 35: CPT | Performed by: INTERNAL MEDICINE

## 2022-01-22 PROCEDURE — 2700000000 HC OXYGEN THERAPY PER DAY

## 2022-01-22 PROCEDURE — 1200000000 HC SEMI PRIVATE

## 2022-01-22 PROCEDURE — 97162 PT EVAL MOD COMPLEX 30 MIN: CPT

## 2022-01-22 PROCEDURE — 82947 ASSAY GLUCOSE BLOOD QUANT: CPT

## 2022-01-22 PROCEDURE — 36415 COLL VENOUS BLD VENIPUNCTURE: CPT

## 2022-01-22 PROCEDURE — 94760 N-INVAS EAR/PLS OXIMETRY 1: CPT

## 2022-01-22 RX ADMIN — OXYCODONE HYDROCHLORIDE 15 MG: 5 TABLET ORAL at 11:43

## 2022-01-22 RX ADMIN — Medication 1250 MG: at 09:03

## 2022-01-22 RX ADMIN — Medication 1 CAPSULE: at 17:35

## 2022-01-22 RX ADMIN — ONDANSETRON 4 MG: 2 INJECTION INTRAMUSCULAR; INTRAVENOUS at 10:34

## 2022-01-22 RX ADMIN — ENOXAPARIN SODIUM 40 MG: 100 INJECTION SUBCUTANEOUS at 09:11

## 2022-01-22 RX ADMIN — SODIUM CHLORIDE, PRESERVATIVE FREE 10 ML: 5 INJECTION INTRAVENOUS at 21:39

## 2022-01-22 RX ADMIN — MORPHINE SULFATE 4 MG: 4 INJECTION INTRAVENOUS at 22:48

## 2022-01-22 RX ADMIN — MORPHINE SULFATE 4 MG: 4 INJECTION INTRAVENOUS at 14:17

## 2022-01-22 RX ADMIN — Medication 1 CAPSULE: at 09:11

## 2022-01-22 RX ADMIN — Medication 1250 MG: at 21:24

## 2022-01-22 RX ADMIN — BUPROPION HYDROCHLORIDE 300 MG: 300 TABLET, FILM COATED, EXTENDED RELEASE ORAL at 09:10

## 2022-01-22 RX ADMIN — OXYCODONE HYDROCHLORIDE 15 MG: 5 TABLET ORAL at 05:21

## 2022-01-22 RX ADMIN — MORPHINE SULFATE 4 MG: 4 INJECTION INTRAVENOUS at 10:28

## 2022-01-22 RX ADMIN — FERROUS SULFATE TAB EC 325 MG (65 MG FE EQUIVALENT) 325 MG: 325 (65 FE) TABLET DELAYED RESPONSE at 09:10

## 2022-01-22 RX ADMIN — PAROXETINE HYDROCHLORIDE HEMIHYDRATE 40 MG: 20 TABLET, FILM COATED ORAL at 09:10

## 2022-01-22 RX ADMIN — MORPHINE SULFATE 60 MG: 30 TABLET, FILM COATED, EXTENDED RELEASE ORAL at 09:11

## 2022-01-22 RX ADMIN — MORPHINE SULFATE 4 MG: 4 INJECTION INTRAVENOUS at 00:58

## 2022-01-22 RX ADMIN — OXYCODONE HYDROCHLORIDE 15 MG: 5 TABLET ORAL at 17:35

## 2022-01-22 RX ADMIN — PANTOPRAZOLE SODIUM 40 MG: 40 TABLET, DELAYED RELEASE ORAL at 05:21

## 2022-01-22 RX ADMIN — LEVOTHYROXINE SODIUM 50 MCG: 50 TABLET ORAL at 09:10

## 2022-01-22 RX ADMIN — CEFEPIME 2000 MG: 2 INJECTION, POWDER, FOR SOLUTION INTRAVENOUS at 02:38

## 2022-01-22 RX ADMIN — CEFEPIME 2000 MG: 2 INJECTION, POWDER, FOR SOLUTION INTRAVENOUS at 14:17

## 2022-01-22 RX ADMIN — MORPHINE SULFATE 4 MG: 4 INJECTION INTRAVENOUS at 06:22

## 2022-01-22 RX ADMIN — MORPHINE SULFATE 60 MG: 30 TABLET, FILM COATED, EXTENDED RELEASE ORAL at 21:37

## 2022-01-22 RX ADMIN — MORPHINE SULFATE 4 MG: 4 INJECTION INTRAVENOUS at 18:16

## 2022-01-22 ASSESSMENT — PAIN DESCRIPTION - LOCATION
LOCATION: FOOT
LOCATION: FOOT

## 2022-01-22 ASSESSMENT — PAIN SCALES - GENERAL
PAINLEVEL_OUTOF10: 8
PAINLEVEL_OUTOF10: 9
PAINLEVEL_OUTOF10: 8
PAINLEVEL_OUTOF10: 9
PAINLEVEL_OUTOF10: 9
PAINLEVEL_OUTOF10: 8
PAINLEVEL_OUTOF10: 8
PAINLEVEL_OUTOF10: 9
PAINLEVEL_OUTOF10: 9
PAINLEVEL_OUTOF10: 8
PAINLEVEL_OUTOF10: 9

## 2022-01-22 ASSESSMENT — PAIN DESCRIPTION - ORIENTATION
ORIENTATION: RIGHT
ORIENTATION: RIGHT

## 2022-01-22 ASSESSMENT — PAIN DESCRIPTION - PAIN TYPE: TYPE: SURGICAL PAIN

## 2022-01-22 NOTE — PLAN OF CARE
Problem: Pain:  Goal: Pain level will decrease  Description: Pain level will decrease  1/22/2022 1253 by Mehnaz Garrett RN  Outcome: Ongoing  1/22/2022 7126 by Reina Najera RN  Outcome: Ongoing

## 2022-01-22 NOTE — PROGRESS NOTES
Occupational Therapy   Occupational Therapy Initial Assessment  Date: 2022   Patient Name: Kathy See  MRN: [id number]     : 1965    Date of Service: 2022  HPI:  Kathy See is a 61 y.o. male [facility] on the floor for right foot pain and infection. He is well known to Dr. Pritesh Meraz who advised he be admitted 21 but pt refused until today. He has been seen for wound care over the course of the last couple years and demonstrates repetitive non-compliant behaviors including failure to follow up when scheduled, failure to fill prescriptions in a timely manner, and using substitute medications that have been [de-identified] years old. Discharge Recommendations:    Further therapy recommended at discharge. Continue to assess     Assessment   Performance deficits / Impairments: Decreased functional mobility ; Decreased ADL status; Decreased high-level IADLs;Decreased endurance  Prognosis: Good  Decision Making: Medium Complexity  Patient Education: pt ed on pOC, purpose ofeval, importance of movement, WB status, safety during functional transfers/functional mobility. good return  REQUIRES OT FOLLOW UP: Yes  Activity Tolerance  Activity Tolerance: Patient Tolerated treatment well  Safety Devices  Safety Devices in place: Yes  Type of devices: Gait belt;Call light within reach; Left in bed  Restraints  Initially in place: No           Patient Diagnosis(es): There were no encounter diagnoses.      has a past medical history of Acute metabolic encephalopathy, CLAUDIA (acute kidney injury) ([address] Anemia, Anxiety, ARF (acute renal failure) ([address] Arthritis, BPH (benign prostatic hyperplasia), Chronic kidney disease, Depression, Diabetes mellitus ([address] Diabetic neuropathy ([address] GERD (gastroesophageal reflux disease), Hyperglycemia, Hyperkalemia, Hypertension, Hypothyroidism, Impacted tooth, Necrosis of bone of foot ([address] Osteomyelitis of left foot ([address] PAD (peripheral artery disease) ([facility] Pneumonia, Septic shock ([facility] and Type 2 diabetes mellitus ([facility] has a past surgical history that includes Appendectomy; knee surgery (Right); Tonsillectomy; back surgery; Toe amputation (Bilateral, 3/6/2017); Upper gastrointestinal endoscopy (N/A, 5/20/2019); Foot surgery (Left, 01/04/2020); Foot Amputation (Left, 1/4/2020); and amputation (Right, 01/21/2022). Restrictions  Restrictions/Precautions  Restrictions/Precautions: Weight Bearing  Required Braces or Orthoses?: No  Lower Extremity Weight Bearing Restrictions  Right Lower Extremity Weight Bearing:  (Heel WB to R LE)    Subjective   General  Patient assessed for rehabilitation services?: Yes  Family / Caregiver Present: Yes (pt wife present for duration of session)  General Comment  Comments: RN ok'd for therapy this afternoon. pt agreeable to participate insession and cooperative/pleasant throughout  Patient Currently in Pain: Yes  Pain Assessment  Pain Assessment: 0-10  Pain Level: 9  Pain Type: Surgical pain  Pain Location: Foot  Pain Orientation: Right  Non-Pharmaceutical Pain Intervention(s): Ambulation/Increased Activity; Therapeutic presence;Distraction  Response to Pain Intervention: Patient Satisfied    Social/Functional History  Social/Functional History  Lives With: Spouse  Type of Home: House  Home Layout: Two level,Able to Live on Main level with bedroom/bathroom  Home Access: Stairs to enter with rails  Entrance Stairs - Number of Steps: 3 then 2 small ones  Entrance Stairs - Rails: Right  Bathroom Shower/Tub: Walk-in shower  Bathroom Toilet: Handicap height  Bathroom Equipment: Commode,Grab bars in shower,Shower chair  Home Equipment: Rolling walker,Standard walker,Cane (pt reported primarily using cane)  Receives Help From: Family  ADL Assistance: Independent  Homemaking Assistance: Needs assistance  Homemaking Responsibilities: No (pt reported wife completes IADLs)  Ambulation Assistance: Independent  Transfer Assistance: Independent  Active : Yes  Mode of Transportation: Car  Occupation: On disability  Leisure & Hobbies: play x-box, reading, watch discovery  Additional Comments: pt reported wife and mom able to assist PRN       Objective   Vision: Impaired  Vision Exceptions: Wears glasses at all times  Hearing: Within functional limits    Orientation  Overall Orientation Status: Within Functional Limits     Balance  Sitting Balance: Modified independent  (~8 minutes on eOB)  Standing Balance: Contact guard assistance (with RW)  Standing Balance  Time: ~2 minutes  Activity: pt completed functional steps forward/backward and side steps toward [facility]  Comment: pt with no LOB and able to maintain heel wB throughout session  Functional Mobility  Functional - Mobility Device: Rolling Walker  Activity: Other  Assist Level: Contact guard assistance  ADL  Feeding: Modified independent   Grooming: Modified independent   UE Bathing: Supervision  LE Bathing: Minimal assistance  UE Dressing: Supervision  LE Dressing: Minimal assistance  Toileting: Minimal assistance  Tone RUE  RUE Tone: Normotonic  Tone LUE  LUE Tone: Normotonic  Coordination  Movements Are Fluid And Coordinated: Yes     Bed mobility  Supine to Sit: Stand by assistance  Sit to Supine: Stand by assistance  Scooting: Stand by assistance  Transfers  Sit to stand: Contact guard assistance  Stand to sit: Contact guard assistance  Transfer Comments: with RW     Cognition  Overall Cognitive Status: WFL        Sensation  Overall Sensation Status: Impaired (pt reported N/T in digits 1-3 in R hand, lateral thigh on B LE)        LUE AROM (degrees)  LUE AROM : WFL  Left Hand AROM (degrees)  Left Hand AROM: WFL  RUE AROM (degrees)  RUE AROM : WFL  Right Hand AROM (degrees)  Right Hand AROM: WFL  LUE Strength  Gross LUE Strength: WFL  L Hand General: 5/5  RUE Strength  Gross RUE Strength: WFL  R Hand General: 5/5      Plan   Plan  Times per week: 2-3x/wk           AM-PAC Score AM-PAC Inpatient Daily Activity Raw Score: 20 (01/22/22 1501)  AM-PAC Inpatient ADL T-Scale Score : 42.03 (01/22/22 1501)  ADL Inpatient CMS 0-100% Score: 38.32 (01/22/22 1501)  ADL Inpatient CMS G-Code Modifier : CJ (01/22/22 1501)    Goals  Short term goals  Time Frame for Short term goals: pt will, by discharge  Short term goal 1: complete LB ADLs and toileting tasks with SBA and set up  Short term goal 2: complete UB ADLs with mod I  Short term goal 3: increase activity tolerance to 20+ minutes in order to participate in daily tasks  Short term goal 4: dem SBA during functional transfers/functionalmobility with LRD, as needed  Short term goal 5: dem ~5 minutes dynamic standing balance with SBA and LRD in order to complete functional tasks  Short term goal 6: maintain heel WB to R LE during functional transfers/functional mobility       Therapy Time   Individual Concurrent Group Co-treatment   Time In 1317         Time Out 1341         Minutes 24      co-eval with PT    Timed Code Treatment Minutes: Mateusz 6205, OTR/L

## 2022-01-22 NOTE — PROGRESS NOTES
Progress Note  Podiatric Medicine and Surgery     Subjective     CC: Right foot/leg pain    Patient seen and examined and feeling well. S/p right TMA(DOS:1/21/22)  Pain well tolerated. HPI :  Rony Telles is a 61 y.o. male seen at [facility] on the floor for right foot pain and infection. He is well known to Dr. Arlinda Nissen who advised he be admitted 1/12/21 but pt refused until today. He has been seen for wound care over the course of the last couple years and demonstrates repetitive non-compliant behaviors including failure to follow up when scheduled, failure to fill prescriptions in a timely manner, and using substitute medications that have been [de-identified] years old. PCP is Chris Palomares MD    ROS: Denies N/V/F/C/SOB/CP. Otherwise negative except at stated in the HPI.      Medications:  Scheduled Meds:   ferrous sulfate  325 mg Oral Daily with breakfast    insulin lispro  0-6 Units SubCUTAneous TID WC    insulin lispro  0-3 Units SubCUTAneous Nightly    sodium chloride flush  5-40 mL IntraVENous 2 times per day    enoxaparin  40 mg SubCUTAneous Daily    cefepime  2,000 mg IntraVENous Q12H    vancomycin (VANCOCIN) intermittent dosing (placeholder)   Other RX Placeholder    sodium hypochlorite   Irrigation Daily    buPROPion  300 mg Oral Daily    levothyroxine  50 mcg Oral Daily    lisinopril  10 mg Oral Daily    morphine  60 mg Oral BID    pantoprazole  40 mg Oral QAM AC    vancomycin  1,250 mg IntraVENous Q12H    lactobacillus  1 capsule Oral BID WC    PARoxetine  40 mg Oral QAM       Continuous Infusions:   dextrose      sodium chloride      dextrose         PRN Meds:morphine **OR** morphine, diazePAM, glucose, dextrose, glucagon (rDNA), dextrose, sodium chloride flush, sodium chloride, ondansetron **OR** ondansetron, polyethylene glycol, oxyCODONE, glucose, dextrose, glucagon (rDNA), dextrose    Objective     Vitals:  Patient Vitals for the past 8 hrs:   BP Temp Temp src Pulse Resp 22 0415 114/61 98.4 °F (36.9 °C) Oral 74 18   22 0000 112/62 98.3 °F (36.8 °C) Oral 71 16     Average, Min, and Max for last 24 hours Vitals:  TEMPERATURE:  Temp  Av.8 °F (36.6 °C)  Min: 97 °F (36.1 °C)  Max: 98.4 °F (36.9 °C)    RESPIRATIONS RANGE: Resp  Av.1  Min: 0  Max: 18    PULSE RANGE: Pulse  Av.2  Min: 64  Max: 87    BLOOD PRESSURE RANGE:  Systolic (35SSH), UOE:789 , Min:65 , PVU:497   ; Diastolic (06ODI), GUF:15, Min:44, Max:93      PULSE OXIMETRY RANGE: SpO2  Av %  Min: 94 %  Max: 100 %    I/O last 3 completed shifts: In: 1700 [P.O.:200; I.V.:1500]  Out: 2925 [Urine:2925]    CBC:  Recent Labs     22  0803 22  0624 22  0608 22  1421   WBC  --  5.5 5.4  --    HGB  --  9.6* 9.6*  --    HCT  --  32.6* 32.5*  --    PLT  --  493* 473*  --    CRP 17.0*  --   --  4.6        BMP:  Recent Labs     22  0624 22  0608    137   K 4.4 4.1   CL 99 101   CO2 26 25   BUN 19 22*   CREATININE 0.80 0.80   GLUCOSE 94 94   CALCIUM 9.0 9.1        Coags:  No results for input(s): APTT, PROT, INR in the last 72 hours. Lab Results   Component Value Date    SEDRATE 96 (H) 2022     Recent Labs     22  0803 22  1421   CRP 17.0* 4.6       General: A&Ox3, NAD  Heart: Regular rate and rhythm. Lungs: Equal air entry. No increased effort. Abdomen: Soft, non-tender to palpation. Not distended. Physical Exam:    Dressing remains intact and clean. Patient is able to move extremities up and down. Imaging:   XR FOOT RIGHT (MIN 3 VIEWS)   Final Result   1. Transmetatarsal amputation. 2. Overlying surgical drain. 3. Remnant osseous structures of the right foot appear intact. 4. No unexpected retained radiopaque foreign body. 5. Overlying splint material obscures detail. 6. Correlate with procedural report. MRI FOOT RIGHT WO CONTRAST   Final Result   Very limited exam secondary to motion.       Suspected marrow signal changes within the 1st proximal phalanx and to a   lesser extent head of the 1st metatarsal.  Osteomyelitis cannot be excluded. Soft tissue edema is concerning for cellulitis. Questionable faint marrow edema within the 4th metatarsal diaphysis, cuboid,   and visualized calcaneus. Findings are very nonspecific and may be reactive,   secondary to osseous injuries, or possibly be artifactual.         VL DUP LOWER EXTREMITY VENOUS BILATERAL   Final Result      XR FOOT LEFT (MIN 3 VIEWS)   Final Result   New amputation mid foot, age indeterminate. No subcutaneous gas. XR FOOT RIGHT (MIN 3 VIEWS)   Final Result   Interval new amputation 1st and 2nd digits, age indeterminate. No   subcutaneous gas. Assessment   Arneta Pallas is a 61 y.o. male with   Cellulitis right foot and leg  Chronic non-healing ulceration to level of muscle, right foot  DVT right leg  S/P TMA left foot  S/P digital amputation right foot  DMII with history of LE amputation    Principal Problem:    Cellulitis  Active Problems:    Essential hypertension    Hypothyroidism    Type 2 diabetes mellitus (HCC)    Diabetic infection of right foot (HCC)    Osteomyelitis of foot, right, acute (HCC)    Cellulitis of foot, right    Acquired posterior equinus, right  Resolved Problems:    * No resolved hospital problems. *       Plan   Patient examined and evaluated at bedside    [address] [place] [address] showed no signs of occlusive disease  Medical management per IM  Antibiotics per ID  Vanc/Cefepime   Dressing intact to right lower extremity:   PT/OT evaluation appreciated. Patient will need to be nonweightbearing to RLE. Patient will be discharged once PT/OT evaluation is done and antibiotic recommendation is finalized.    Discussed with Dr. Cindy Marcelo    DVT ppx: hold lovenox    Diet: Carb control         Activity: nonweightbearing to right lower extremity  Pain Control: 60 mg ER oxycontin BID    Denise Drain, DPM   Podiatric Medicine & Surgery   1/22/2022 at 7:08 AM    I performed a history and physical examination of the patient and discussed management with the resident. I reviewed the residents note and agree with the documented findings and plan of care. Any areas of disagreement are noted on the chart. I was personally present for the key portions of any procedures. I have documented in the chart those procedures where I was not present during the key portions. I have reviewed the Podiatry Resident progress note. I agree with the chief complaint, past medical history, past surgical history, allergies, medications, social and family history as documented unless otherwise noted below. Documentation of the HPI, Physical Exam and Medical Decision Making performed by medical students or scribes is based on my personal performance of the HPI, PE and MDM. I have personally evaluated this patient and have completed at least one if not all key elements of the E/M (history, physical exam, and MDM). Additional findings are as noted.      Nanci Martinez DPM on 1/24/2022 at 8:12 AM  Board Certified, American Board of Podiatric Surgery  Fellow, [employer]  Foot and [facility]

## 2022-01-22 NOTE — PROGRESS NOTES
[facility] - progress note  Today's Date and Time: 1/22/2022, 9:51 AM    Impression :   · Cellulitis of the right foot  · Chronic nonhealing ulcer of the right foot status post first and second digit amputation  · History left foot amputation  · Diabetes mellitus type 2  · Right leg DVT  · Essential hypertension  · Hypothyroidism  · Hx MRSA foot 2018  · Allergy to Bactrim    Recommendations:     · Continue cefepime and vancomycin    Medical Decision Making/Summary/Discussion:1/22/2022       · Transmetatarsal amputation and tendon achilles lengthening 1/21/22    Infection Control Recommendations   · [place] Precautions  · Contact Isolation     Antimicrobial Stewardship Recommendations     · Simplification of therapy  · Targeted therapy    Coordination of Outpatient Care:   · Estimated Length of IV antimicrobials:  · Patient will need Midline Catheter Insertion:   · Patient will need PICC line Insertion:  · Patient will need: Home IV , [place],  SNF,  LTAC: TBD  · Patient will need outpatient wound care:    Chief complaint/reason for consultation:   · Diabetic foot wound      History of Present Illness:   Eva Pablo is a 61y.o.-year-old male who was initially admitted on 1/17/2022. Patient seen at the request of Dr. Juan C Fisher:    This patient presented to the ED with complaints of cellulitis of the right foot and leg with a chronic nonhealing ulcer to level of muscle of the right foot. He follows up with Dr. Cristal Piedra with Podiatry, but per EHR, he is known to be noncompliant with medications and follow-up office visits. Dr. Cristal Piedra had recommended that he present to the hospital on 1/12/2022 for his right foot wound, but the patient has refused until 1/17/2022. He was started on vancomycin and cefepime    Imaging:     XR FOOT LEFT (MIN 3 VIEWS)     Result Date: 1/17/2022  New amputation mid foot, age indeterminate.   No subcutaneous gas.      XR FOOT RIGHT (MIN 3 VIEWS)     Result Date: 1/17/2022  Interval new amputation 1st and 2nd digits, age indeterminate. No subcutaneous gas.      MRI FOOT RIGHT WO CONTRAST     Result Date: 1/17/2022  Very limited exam secondary to motion. Suspected marrow signal changes within the 1st proximal phalanx and to a lesser extent head of the 1st metatarsal.  Osteomyelitis cannot be excluded. Soft tissue edema is concerning for cellulitis. Questionable faint marrow edema within the 4th metatarsal diaphysis, cuboid, and visualized calcaneus. Findings are very nonspecific and may be reactive, secondary to osseous injuries, or possibly be artifactual.        CURRENT EVALUATION 1/22/2022    Afebrile  VS stable  On 2 L NC    POD 1 - TMA right foot w/ JEROME completed on 01/21/22    Patient is awake alert and talking  States that he is feeling alright  Denies any pain of the right leg    On cefepime and vancomycin    Labs, X rays reviewed: 1/22/2022    BUN:22 > 15  Cr:0.80 > 0.64    WBC:5.4 > 10.1  Hb:9.6 > 10  Plat:473 > 472    CRP: 36.0 > 17 > 4.6  Ferritin: 23  Sed rate: 96    Cultures:  Urine:  · none  Blood:  · 01/18/22: no growth  Sputum :  · none  Wound:  · none    Images:    Right foot 1/17/21      Discussed with patient, RN, family. I have personally reviewed the past medical history, past surgical history, medications, social history, and family history, and I have updated the database accordingly.   Past Medical History:     Past Medical History:   Diagnosis Date    Acute metabolic encephalopathy     CLAUDIA (acute kidney injury) ([address]     Anemia     Anxiety     ARF (acute renal failure) (HCC)     Arthritis     lower back, knees    BPH (benign prostatic hyperplasia)     Chronic kidney disease     prostate    Depression     Diabetes mellitus ([address]     Diabetic neuropathy (HCC)     GERD (gastroesophageal reflux disease)     Hyperglycemia     Hyperkalemia     Hypertension     Hypothyroidism     Impacted tooth     [facility]rosis of bone of foot [facility]     Osteomyelitis of left foot (HCC)     PAD (peripheral artery disease) (HCC)     Pneumonia     Septic shock (HCC)     Type 2 diabetes mellitus ([facility]        Past Surgical  History:     Past Surgical History:   Procedure Laterality Date    AMPUTATION Right 01/21/2022    TRANSMETARSAL AMPUTATION FOOT, TENDOACHILLES LENGTHENING (Right Foot)    APPENDECTOMY      BACK SURGERY      Lower x 4. Had abscess after appendectomy.      FOOT AMPUTATION Left 1/4/2020    TRANSMETATARSAL FOOT AMPUTATION, REMOVAL OF FOREIGN BODY performed by Nanci Martinez DPM at [facility] Left 01/04/2020    TRANSMETATARSAL FOOT AMPUTATION, REMOVAL OF FOREIGN BODY    KNEE SURGERY Right     scope    TOE AMPUTATION Bilateral 3/6/2017    TOE AMPUTATION LEFT HALLUX AND 2ND DIGIT AND RIGHT 2ND DIGIT performed by Katarina Britton DPM at [address] ENDOSCOPY N/A 5/20/2019    EGD WITH BIOPSY performed by Samir Downs MD at [facility] OR       Medications:      ferrous sulfate  325 mg Oral Daily with breakfast    insulin lispro  0-6 Units SubCUTAneous TID WC    insulin lispro  0-3 Units SubCUTAneous Nightly    sodium chloride flush  5-40 mL IntraVENous 2 times per day    enoxaparin  40 mg SubCUTAneous Daily    cefepime  2,000 mg IntraVENous Q12H    vancomycin (VANCOCIN) intermittent dosing (placeholder)   Other RX Placeholder    sodium hypochlorite   Irrigation Daily    buPROPion  300 mg Oral Daily    levothyroxine  50 mcg Oral Daily    lisinopril  10 mg Oral Daily    morphine  60 mg Oral BID    pantoprazole  40 mg Oral QAM AC    vancomycin  1,250 mg IntraVENous Q12H    lactobacillus  1 capsule Oral BID WC    PARoxetine  40 mg Oral QAM       Social History:     Social History     Socioeconomic History    Marital status:      Spouse name: Not on file    Number of children: Not on file    Years of education: Not on file    Highest education level: Not on file   Occupational History    Not on file   Tobacco Use    Smoking status: Never Smoker    Smokeless tobacco: Never Used   Vaping Use    Vaping Use: Never used   Substance and Sexual Activity    Alcohol use: No    Drug use: Not Currently     Frequency: 2.0 times per week     Types: Marijuana Veldon Bunting)    Sexual activity: Not on file   Other Topics Concern    Not on file   Social History Narrative    Not on file     Social Determinants of Health     Financial Resource Strain: Low Risk     Difficulty of Paying Living Expenses: Not hard at all   Food Insecurity: No Food Insecurity    Worried About Running Out of Food in the Last Year: Never true    Vicente of Food in the Last Year: Never true   Transportation Needs:     Lack of Transportation (Medical): Not on file    Lack of Transportation (Non-Medical):  Not on file   Physical Activity:     Days of Exercise per Week: Not on file    Minutes of Exercise per Session: Not on file   Stress:     Feeling of Stress : Not on file   Social Connections:     Frequency of Communication with Friends and Family: Not on file    Frequency of Social Gatherings with Friends and Family: Not on file    Attends Rastafarian Services: Not on file    Active Member of [address] [place] [address] [employer] or Organizations: Not on file    Attends Club or Organization Meetings: Not on file    Marital Status: Not on file   Intimate Partner Violence:     Fear of Current or Ex-Partner: Not on file    Emotionally Abused: Not on file    Physically Abused: Not on file    Sexually Abused: Not on file   Housing Stability:     Unable to Pay for Housing in the Last Year: Not on file    Number of [place] in the Last Year: Not on file    Unstable Housing in the Last Year: Not on file       Family History:     Family History   Problem Relation Age of Onset    Diabetes Father     Cancer Maternal Grandfather         Colon Cancer; Prostate Cancer  No Known Problems Mother         Allergies:   Bactrim [sulfamethoxazole-trimethoprim], Fiorinal [butalbital-aspirin-caffeine], Statins, Tylenol [acetaminophen], and Peanut butter flavor [flavoring agent]     Review of Systems:   Constitutional: No fevers or chills. No systemic complaints  Head: No headaches  Eyes: No double vision or blurry vision. No conjunctival inflammation. ENT: No sore throat or runny nose. . No hearing loss, tinnitus or vertigo. Cardiovascular: No chest pain or palpitations. No shortness of breath. No MONTES  Lung: No shortness of breath or cough. No sputum production  Abdomen: No nausea, vomiting, diarrhea, or abdominal pain. Kennedy Brocks No cramps. Genitourinary: No increased urinary frequency, or dysuria. No hematuria. No suprapubic or CVA pain  Musculoskeletal: Bilateral lower extremity pain  Hematologic: No bleeding or bruising. Integument: Right foot wound  Psychiatric: No depression. Endocrine: No polyuria, no polydipsia, no polyphagia. Physical Examination :     Patient Vitals for the past 8 hrs:   BP Temp Temp src Pulse Resp SpO2   01/22/22 0846 (!) 117/59 98.3 °F (36.8 °C) Oral 79 18 98 %   01/22/22 0415 114/61 98.4 °F (36.9 °C) Oral 74 18 --     General Appearance: Awake, alert, and in no apparent distress  Head:  Normocephalic, no trauma  Eyes: Pupils equal, round, reactive to light and accommodation; extraocular movements intact; sclera anicteric; conjunctivae pink. No embolic phenomena. ENT: Oropharynx clear, without erythema, exudate, or thrush. No tenderness of sinuses. Mouth/throat: mucosa pink and moist. No lesions. Dentition in good repair. Neck:Supple, without lymphadenopathy. Thyroid normal, No bruits. Pulmonary/Chest: Clear to auscultation, without wheezes, rales, or rhonchi. No dullness to percussion. Cardiovascular: Regular rate and rhythm without murmurs, rubs, or gallops. Abdomen: Soft, non tender.  Bowel sounds normal. No organomegaly  All four Extremities: TMA of right foot, partial foot amputation left foot  Neurologic: No gross sensory or motor deficits. Skin: right foot wound    Medical Decision Making -Laboratory:   I have independently reviewed/ordered the following labs:    CBC with Differential:   Recent Labs     01/21/22  0608 01/22/22  0750   WBC 5.4 10.1   HGB 9.6* 10.0*   HCT 32.5* 33.6*   * 472*   LYMPHOPCT 26 10*   MONOPCT 10 7     BMP:   Recent Labs     01/21/22  0608 01/22/22  0750    137   K 4.1 4.2    100   CO2 25 28   BUN 22* 15   CREATININE 0.80 0.64*     Hepatic Function Panel:   No results for input(s): PROT, LABALBU, BILIDIR, IBILI, BILITOT, ALKPHOS, ALT, AST in the last 72 hours. No results for input(s): RPR in the last 72 hours. No results for input(s): HIV in the last 72 hours. No results for input(s): BC in the last 72 hours.   Lab Results   Component Value Date    MUCUS NOT REPORTED 04/20/2020    RBC 4.27 01/22/2022    TRICHOMONAS NOT REPORTED 04/20/2020    WBC 10.1 01/22/2022    YEAST NOT REPORTED 04/20/2020    TURBIDITY CLEAR 04/20/2020     Lab Results   Component Value Date    CREATININE 0.64 01/22/2022    GLUCOSE 108 01/22/2022       Medical Decision Making-Imaging:     Narrative   EXAMINATION:   MRI OF THE RIGHT FOOT WITHOUT CONTRAST, 1/17/2022 5:34 pm       TECHNIQUE:   Multiplanar multisequence MRI of the right foot was performed without the   administration of intravenous contrast.       COMPARISON:   Right foot radiographs 01/17/2022       HISTORY:   ORDERING SYSTEM PROVIDED HISTORY: R/O OM left calc   TECHNOLOGIST PROVIDED HISTORY:   R/O OM left calc   Reason for Exam: R/O OM       FINDINGS:   Significantly limited exam secondary to motion, patient body habitus, and   lack of IV contrast.  Within this limitation:       BONE MARROW: Postsurgical findings from prior amputation of the 1st and 2nd   digit, better visualized on prior radiograph.  T2 hyperintense T1 hypointense   signal within the 1st proximal phalanx is suspected.  A questionable area of   T2 hyperintense signal and T1 hypointense signal along the medial margin of   the 1st metatarsal head (series 10 and 11, image 11).  No discrete T2 marrow   signal changes of the 2nd metatarsal or 2nd proximal phalanx definitively   visualized.  No discrete fracture is identified on the provided images.    Questionable T2 hyperintense signal within the calcaneus and cuboid, only   partially visualized and incompletely evaluated.  The calcaneus is not   completely included in the study.  Questionable T2 hyperintense signal within   the diaphysis of the 4th metatarsal.       SOFT TISSUES: Likely postsurgical changes of the 1st and 2nd digit flexor and   extensor tendons.  Edema signal and atrophic changes within the plantar   musculature, particularly the middle band of the plantar fascia, likely   neuropathic.  No soft tissue gas grossly identified, however tiny foci of gas   would be difficult to visualize with the amount of motion on this study.  No   discrete drainable fluid collection grossly identified.           Impression   Very limited exam secondary to motion.       Suspected marrow signal changes within the 1st proximal phalanx and to a   lesser extent head of the 1st metatarsal.  Osteomyelitis cannot be excluded.       Soft tissue edema is concerning for cellulitis.       Questionable faint marrow edema within the 4th metatarsal diaphysis, cuboid,   and visualized calcaneus.  Findings are very nonspecific and may be reactive,   secondary to osseous injuries, or possibly be artifactual.     Narrative   EXAMINATION:   THREE XRAY VIEWS OF THE LEFT FOOT       1/17/2022 2:52 pm       COMPARISON:   None.       HISTORY:   ORDERING SYSTEM PROVIDED HISTORY: eval OM/gas   TECHNOLOGIST PROVIDED HISTORY:   eval OM/gas       FINDINGS:   New interval amputation proximal metatarsals.  Cortical margins otherwise   intact.  Soft tissues unremarkable.           Impression   New amputation mid foot, age indeterminate.  No subcutaneous gas.         Narrative   EXAMINATION:   THREE XRAY VIEWS OF THE RIGHT FOOT       1/17/2022 2:52 pm       COMPARISON:   December 15, 2021.       HISTORY:   ORDERING SYSTEM PROVIDED HISTORY: r/o gas/OM   TECHNOLOGIST PROVIDED HISTORY:   r/o gas/OM       FINDINGS:   Interval amputation 1st and 2nd proximal phalanges.  Cortical margins   otherwise intact.  Alignment anatomic.  Soft tissues unremarkable.  No   subcutaneous gas.           Impression   Interval new amputation 1st and 2nd digits, age indeterminate.  No   subcutaneous gas.               Medical Decision [id number]:       Medical Decision Making-Other:     Note:  · Labs, medications, radiologic studies were reviewed with personal review of films  · Large amounts of data were reviewed  · Discussed with nursing Staff, Discharge planner  · Infection Control and Prevention measures reviewed  · All prior entries were reviewed  · Administer medications as ordered  · Prognosis: Guarded  · Discharge planning reviewed      Thank you for allowing us to participate in the care of this patient. Please call with questions. Ina Robles MD     ATTESTATION:    I have discussed the case, including pertinent history and exam findings with the residents and students. I have seen and examined the patient and the key elements of the encounter have been performed by me. I was present when the student obtained his information or examined the patient. I have reviewed the laboratory data, other diagnostic studies and discussed them with the residents. I have updated the medical record where necessary. I agree with the assessment, plan and orders as documented by the resident/ student.     Yves Duvall MD.

## 2022-01-22 NOTE — PLAN OF CARE
Problem: Pain:  Goal: Pain level will decrease  Description: Pain level will decrease  1/22/2022 1255 by Aparna Phillips RN  Outcome: Ongoing  1/22/2022 1253 by Aparna Phillips RN  Outcome: Ongoing  1/22/2022 0642 by Randell Rosenbaum RN  Outcome: Ongoing

## 2022-01-22 NOTE — PROGRESS NOTES
Physical Therapy    Facility/Department: [facility]  Initial Assessment    NAME: Kathy See  : 1965  MRN: [id number]    Date of Service: 2022  Linda Jimenez a 61 y. o. male seen at [address] [place] [address] on the floor for right foot pain and infection. Kristian Hayes is well known to Dr. Pritesh Meraz who advised he be admitted 21 but pt refused until today. Kristian Hayes has been seen for wound care over the course of the last couple years and demonstrates repetitive non-compliant behaviors including failure to follow up when scheduled, failure to fill prescriptions in a timely manner, and using substitute medications that have been [de-identified] years old. Discharge Recommendations:  Patient would benefit from continued therapy after discharge   Assessment   Body structures, Functions, Activity limitations: Decreased functional mobility ; Decreased strength;Decreased endurance  Assessment: The pt transferred sit to stand with a RW x CGA with Heel WB R LE. He transferred in a safe manner but fatigued quickly with the transfer. He could benefit from a continuation of PT to address his deficits following his DC  Prognosis: Good  Decision Making: Medium Complexity  PT Education: Goals;PT Role;Plan of Care  REQUIRES PT FOLLOW UP: Yes  Activity Tolerance  Activity Tolerance: Patient limited by fatigue       Patient Diagnosis(es): There were no encounter diagnoses.      has a past medical history of Acute metabolic encephalopathy, CLAUDIA (acute kidney injury) ([address] Anemia, Anxiety, ARF (acute renal failure) ([address] Arthritis, BPH (benign prostatic hyperplasia), Chronic kidney disease, Depression, Diabetes mellitus ([address] Diabetic neuropathy ([address] GERD (gastroesophageal reflux disease), Hyperglycemia, Hyperkalemia, Hypertension, Hypothyroidism, Impacted tooth, Necrosis of bone of foot ([address] Osteomyelitis of left foot ([address] PAD (peripheral artery disease) ([address] Pneumonia, Septic shock ([address] and Type 2 diabetes mellitus ([facility] has a past surgical history that includes Appendectomy; knee surgery (Right); Tonsillectomy; back surgery; Toe amputation (Bilateral, 3/6/2017); Upper gastrointestinal endoscopy (N/A, 5/20/2019); Foot surgery (Left, 01/04/2020); Foot Amputation (Left, 1/4/2020); and amputation (Right, 01/21/2022).     Restrictions  Restrictions/Precautions  Restrictions/Precautions: Weight Bearing  Required Braces or Orthoses?: No  Lower Extremity Weight Bearing Restrictions  Right Lower Extremity Weight Bearing:  (Heel WB to R LE)  Partial Weight Bearing Percentage Or Pounds: heel WB R LE  Vision/Hearing  Vision: Impaired  Vision Exceptions: Wears glasses at all times  Hearing: Within functional limits     Subjective  General  Patient assessed for rehabilitation services?: Yes  Response To Previous Treatment: Not applicable  Family / Caregiver Present: Yes  Follows Commands: Within Functional Limits  Subjective  Subjective: RN and pt agreeable to PT eval  Pain Screening  Patient Currently in Pain: Yes  Pain Assessment  Pain Assessment: 0-10  Pain Level: 9  Pain Location: Foot  Pain Orientation: Right  Vital Signs  Patient Currently in Pain: Yes     Orientation  Orientation  Overall Orientation Status: Within Normal Limits  Social/Functional History  Social/Functional History  Lives With: Spouse  Type of Home: House  Home Layout: Two level,Able to Live on Main level with bedroom/bathroom  Home Access: Stairs to enter with rails  Entrance Stairs - Number of Steps: 3 then 2 small ones  Entrance Stairs - Rails: Right  Bathroom Shower/Tub: Walk-in shower  Bathroom Toilet: Handicap height  Bathroom Equipment: Commode,Grab bars in shower,Shower chair  Home Equipment: Rolling walker,Standard walker,Cane (pt reported primarily using cane)  Receives Help From: Family  ADL Assistance: Independent  Homemaking Assistance: Needs assistance  Homemaking Responsibilities: No (pt reported wife completes IADLs)  Ambulation Assistance: Independent  Transfer Assistance: Independent  Active : Yes  Mode of Transportation: Car  Occupation: On [address]: play x-box, reading, watch discovery  Additional Comments: pt reported wife and mom able to assist PRN  Cognition      Objective        AROM RLE (degrees)  RLE AROM: WFL  AROM LLE (degrees)  LLE AROM : WNL  AROM RUE (degrees)  RUE AROM : WNL  AROM LUE (degrees)  LUE AROM : WNL  Strength RLE  Comment: N/T  Strength LLE  Strength LLE: WNL  Strength RUE  Strength RUE: WNL  Strength LUE  Strength LUE: WNL     Sensation  Overall Sensation Status: Impaired  Bed mobility  Supine to Sit: Contact guard assistance  Sit to Supine: Contact guard assistance  Scooting: Contact guard assistance  Transfers  Sit to Stand: Contact guard assistance  Stand to sit: Contact guard assistance  Ambulation  Ambulation?: No  Ambulation 1  Surface: level tile  Device: Rolling Walker  Assistance: Contact guard assistance  Distance: sit to stand with a RW x CGA with heel WB R LE     Balance  Posture: Good  Sitting - Static: Good  Sitting - Dynamic: Fair  Standing - Static: Fair  Standing - Dynamic: [address] [place] [address]  Times per week: 5-6x wk  Current Treatment Recommendations: Strengthening,Functional Mobility Training,Transfer Training,Gait Training,Safety Education & Training,Endurance Training,Stair training  Safety Devices  Type of devices: Nurse notified,Left in bed,Call light within reach    G-Code     OutComes Score     AM-PAC Score  AM-PAC Inpatient Mobility Raw Score : 18 (01/22/22 1520)  AM-PAC Inpatient T-Scale Score : 43.63 (01/22/22 1520)  Mobility Inpatient CMS 0-100% Score: 46.58 (01/22/22 1520)  Mobility Inpatient CMS G-Code Modifier : CK (01/22/22 1520)        Goals  Short term goals  Time Frame for Short term goals: 10 visits  Short term goal 1: transfers with SBA  Short term goal 2: amb 50 ft with a RW x SBA with Heel WB R LE  Short term goal 3: ascend/descend 4 steps with SBA  Short term goal 4: 20 min exercise program x SBA  Patient Goals   Patient goals : Return home     Therapy Time   Individual Concurrent Group Co-treatment   Time In 2100         Time Out 1340         Minutes 25              2 or [address], PT

## 2022-01-23 VITALS
OXYGEN SATURATION: 95 % | SYSTOLIC BLOOD PRESSURE: 118 MMHG | WEIGHT: 190 LBS | HEIGHT: 74 IN | TEMPERATURE: 98.4 F | RESPIRATION RATE: 16 BRPM | HEART RATE: 78 BPM | BODY MASS INDEX: 24.38 KG/M2 | DIASTOLIC BLOOD PRESSURE: 74 MMHG

## 2022-01-23 LAB
ABSOLUTE EOS #: 0.13 K/UL (ref 0–0.44)
ABSOLUTE IMMATURE GRANULOCYTE: 0.04 K/UL (ref 0–0.3)
ABSOLUTE LYMPH #: 1.32 K/UL (ref 1.1–3.7)
ABSOLUTE MONO #: 0.94 K/UL (ref 0.1–1.2)
ANION GAP SERPL CALCULATED.3IONS-SCNC: 11 MMOL/L (ref 9–17)
BASOPHILS # BLD: 1 % (ref 0–2)
BASOPHILS ABSOLUTE: 0.06 K/UL (ref 0–0.2)
BUN BLDV-MCNC: 16 MG/DL (ref 6–20)
BUN/CREAT BLD: ABNORMAL (ref 9–20)
CALCIUM SERPL-MCNC: 9.1 MG/DL (ref 8.6–10.4)
CHLORIDE BLD-SCNC: 99 MMOL/L (ref 98–107)
CO2: 26 MMOL/L (ref 20–31)
CREAT SERPL-MCNC: 0.65 MG/DL (ref 0.7–1.2)
CULTURE: NORMAL
CULTURE: NORMAL
DIFFERENTIAL TYPE: ABNORMAL
EOSINOPHILS RELATIVE PERCENT: 2 % (ref 1–4)
GFR AFRICAN AMERICAN: >60 ML/MIN
GFR NON-AFRICAN AMERICAN: >60 ML/MIN
GFR SERPL CREATININE-BSD FRML MDRD: ABNORMAL ML/MIN/{1.73_M2}
GFR SERPL CREATININE-BSD FRML MDRD: ABNORMAL ML/MIN/{1.73_M2}
GLUCOSE BLD-MCNC: 100 MG/DL (ref 75–110)
GLUCOSE BLD-MCNC: 101 MG/DL (ref 70–99)
GLUCOSE BLD-MCNC: 105 MG/DL (ref 75–110)
GLUCOSE BLD-MCNC: 129 MG/DL (ref 75–110)
HCT VFR BLD CALC: 33.5 % (ref 40.7–50.3)
HEMOGLOBIN: 9.9 G/DL (ref 13–17)
IMMATURE GRANULOCYTES: 1 %
LYMPHOCYTES # BLD: 17 % (ref 24–43)
Lab: NORMAL
Lab: NORMAL
MCH RBC QN AUTO: 23.6 PG (ref 25.2–33.5)
MCHC RBC AUTO-ENTMCNC: 29.6 G/DL (ref 28.4–34.8)
MCV RBC AUTO: 79.8 FL (ref 82.6–102.9)
MONOCYTES # BLD: 12 % (ref 3–12)
NRBC AUTOMATED: 0 PER 100 WBC
PDW BLD-RTO: 15.6 % (ref 11.8–14.4)
PLATELET # BLD: 443 K/UL (ref 138–453)
PLATELET ESTIMATE: ABNORMAL
PMV BLD AUTO: 9.2 FL (ref 8.1–13.5)
POTASSIUM SERPL-SCNC: 3.9 MMOL/L (ref 3.7–5.3)
RBC # BLD: 4.2 M/UL (ref 4.21–5.77)
RBC # BLD: ABNORMAL 10*6/UL
SEG NEUTROPHILS: 67 % (ref 36–65)
SEGMENTED NEUTROPHILS ABSOLUTE COUNT: 5.4 K/UL (ref 1.5–8.1)
SODIUM BLD-SCNC: 136 MMOL/L (ref 135–144)
SPECIMEN DESCRIPTION: NORMAL
SPECIMEN DESCRIPTION: NORMAL
VANCOMYCIN RANDOM DATE LAST DOSE: NORMAL
VANCOMYCIN RANDOM DOSE AMOUNT: NORMAL
VANCOMYCIN RANDOM TIME LAST DOSE: NORMAL
VANCOMYCIN RANDOM: 30.5 UG/ML
WBC # BLD: 7.9 K/UL (ref 3.5–11.3)
WBC # BLD: ABNORMAL 10*3/UL

## 2022-01-23 PROCEDURE — 6360000002 HC RX W HCPCS: Performed by: PODIATRIST

## 2022-01-23 PROCEDURE — 80202 ASSAY OF VANCOMYCIN: CPT

## 2022-01-23 PROCEDURE — 83516 IMMUNOASSAY NONANTIBODY: CPT

## 2022-01-23 PROCEDURE — 99232 SBSQ HOSP IP/OBS MODERATE 35: CPT | Performed by: INTERNAL MEDICINE

## 2022-01-23 PROCEDURE — 6360000002 HC RX W HCPCS: Performed by: STUDENT IN AN ORGANIZED HEALTH CARE EDUCATION/TRAINING PROGRAM

## 2022-01-23 PROCEDURE — 82947 ASSAY GLUCOSE BLOOD QUANT: CPT

## 2022-01-23 PROCEDURE — 82784 ASSAY IGA/IGD/IGG/IGM EACH: CPT

## 2022-01-23 PROCEDURE — 36415 COLL VENOUS BLD VENIPUNCTURE: CPT

## 2022-01-23 PROCEDURE — 2580000003 HC RX 258: Performed by: PODIATRIST

## 2022-01-23 PROCEDURE — 85025 COMPLETE CBC W/AUTO DIFF WBC: CPT

## 2022-01-23 PROCEDURE — 6370000000 HC RX 637 (ALT 250 FOR IP): Performed by: PODIATRIST

## 2022-01-23 PROCEDURE — 2580000003 HC RX 258: Performed by: STUDENT IN AN ORGANIZED HEALTH CARE EDUCATION/TRAINING PROGRAM

## 2022-01-23 PROCEDURE — 80048 BASIC METABOLIC PNL TOTAL CA: CPT

## 2022-01-23 RX ORDER — LANOLIN ALCOHOL/MO/W.PET/CERES
325 CREAM (GRAM) TOPICAL
Qty: 90 TABLET | Refills: 3 | Status: SHIPPED | OUTPATIENT
Start: 2022-01-24 | End: 2022-01-23

## 2022-01-23 RX ORDER — DOXYCYCLINE HYCLATE 100 MG
100 TABLET ORAL 2 TIMES DAILY
Qty: 28 TABLET | Refills: 0 | Status: SHIPPED | OUTPATIENT
Start: 2022-01-23 | End: 2022-02-06

## 2022-01-23 RX ORDER — LANOLIN ALCOHOL/MO/W.PET/CERES
325 CREAM (GRAM) TOPICAL
Qty: 90 TABLET | Refills: 3 | Status: SHIPPED | OUTPATIENT
Start: 2022-01-24

## 2022-01-23 RX ADMIN — MORPHINE SULFATE 2 MG: 2 INJECTION, SOLUTION INTRAMUSCULAR; INTRAVENOUS at 08:55

## 2022-01-23 RX ADMIN — ENOXAPARIN SODIUM 40 MG: 100 INJECTION SUBCUTANEOUS at 08:55

## 2022-01-23 RX ADMIN — MORPHINE SULFATE 4 MG: 4 INJECTION INTRAVENOUS at 03:49

## 2022-01-23 RX ADMIN — PANTOPRAZOLE SODIUM 40 MG: 40 TABLET, DELAYED RELEASE ORAL at 08:55

## 2022-01-23 RX ADMIN — MORPHINE SULFATE 60 MG: 30 TABLET, FILM COATED, EXTENDED RELEASE ORAL at 08:55

## 2022-01-23 RX ADMIN — OXYCODONE HYDROCHLORIDE 15 MG: 5 TABLET ORAL at 13:50

## 2022-01-23 RX ADMIN — OXYCODONE HYDROCHLORIDE 15 MG: 5 TABLET ORAL at 08:15

## 2022-01-23 RX ADMIN — PAROXETINE HYDROCHLORIDE HEMIHYDRATE 40 MG: 20 TABLET, FILM COATED ORAL at 08:55

## 2022-01-23 RX ADMIN — Medication 1250 MG: at 08:55

## 2022-01-23 RX ADMIN — MORPHINE SULFATE 2 MG: 2 INJECTION, SOLUTION INTRAMUSCULAR; INTRAVENOUS at 12:46

## 2022-01-23 RX ADMIN — LEVOTHYROXINE SODIUM 50 MCG: 50 TABLET ORAL at 08:55

## 2022-01-23 RX ADMIN — BUPROPION HYDROCHLORIDE 300 MG: 300 TABLET, FILM COATED, EXTENDED RELEASE ORAL at 08:55

## 2022-01-23 RX ADMIN — MORPHINE SULFATE 2 MG: 2 INJECTION, SOLUTION INTRAMUSCULAR; INTRAVENOUS at 16:56

## 2022-01-23 RX ADMIN — Medication 1 CAPSULE: at 08:55

## 2022-01-23 RX ADMIN — FERROUS SULFATE TAB EC 325 MG (65 MG FE EQUIVALENT) 325 MG: 325 (65 FE) TABLET DELAYED RESPONSE at 08:55

## 2022-01-23 RX ADMIN — OXYCODONE HYDROCHLORIDE 15 MG: 5 TABLET ORAL at 00:44

## 2022-01-23 RX ADMIN — IRON SUCROSE 200 MG: 20 INJECTION, SOLUTION INTRAVENOUS at 11:27

## 2022-01-23 RX ADMIN — CEFEPIME 2000 MG: 2 INJECTION, POWDER, FOR SOLUTION INTRAVENOUS at 03:38

## 2022-01-23 ASSESSMENT — PAIN SCALES - GENERAL
PAINLEVEL_OUTOF10: 6
PAINLEVEL_OUTOF10: 9
PAINLEVEL_OUTOF10: 8
PAINLEVEL_OUTOF10: 8
PAINLEVEL_OUTOF10: 10
PAINLEVEL_OUTOF10: 8

## 2022-01-23 NOTE — PROGRESS NOTES
[facility]  Internal Medicine Teaching Residency Program  Inpatient Daily Progress Note  ______________________________________________________________________________    Patient: Gilberto Mohr  YOB: 1965   ZNI:[id number]    Acct: [de-identified]     Room: 26/1-12  Admit date: 1/17/2022  Today's date: 01/23/22  Number of days in the hospital: 6    SUBJECTIVE   Admitting Diagnosis: Cellulitis  CC: Right foot pain  Pt examined at bedside. Chart & results reviewed. No acute issues overnight  POD day 2, TMA right foot  On vancomycin and cefepime  BS, blood pressure stable  Celiac disease panel   Iron deficiency anemia. Continue on iron supplements  Ok to DC,Follow with pcp    ROS:  Constitutional:  negative for chills, fevers, sweats  Respiratory:  negative for cough, dyspnea on exertion, hemoptysis, shortness of breath, wheezing  Cardiovascular:  negative for chest pain, chest pressure/discomfort, lower extremity edema, palpitations  Gastrointestinal:  negative for abdominal pain, constipation, diarrhea, nausea, vomiting  Neurological:  negative for dizziness, headache  BRIEF HISTORY       62-year-old male with PMH of type 2 diabetes mellitus, hypertension, hypothyroidism, s/p TMA left foot, s/p right first and second digit amputation who is noncompliant with follow-up and medication presented with right foot pain.  Found to have cellulitis/osteomyelitis.  Podiatry is the primary. Blood glucose level and blood pressure are well controlled. Continue with the same treatment. Transmetatarsal amputation of the right foot,Achilles tendon lengthening,adjacent tissue transfer by podiatry today. Iron deficiency anemia. Continue on iron supplementation. Sent celiac disease panel. Okay to DC.  Follow-up with PCP    OBJECTIVE     Vital Signs:  /74   Pulse 78   Temp 98.4 °F (36.9 °C) (Oral)   Resp 16   Ht 6' 2\" (1.88 m)   Wt 190 lb (86.2 kg) SpO2 95%   BMI 24.39 kg/m²     Temp (24hrs), Av.6 °F (37 °C), Min:98.4 °F (36.9 °C), Max:98.8 °F (37.1 °C)    In: -   Out: 950 [Urine:950]    Physical Exam:  Constitutional: alert, oriented, cooperative and in no apparent distress. Head:normocephalic and atraumatic. Respiratory: Chest was symmetrical without dullness to percussion. Breath sounds bilaterally were clear to auscultation. There were no wheezes, rhonchi or rales. There is no intercostal retraction or use of accessory muscles. No egophony noted. Cardiovascular: Regular without murmur, clicks, gallops or rubs. Abdomen: Slightly rounded and soft without organomegaly. No rebound, rigidity or guarding was appreciated. Musculoskeletal: Normal curvature of the spine. No gross muscle weakness. Extremities: Left lower extremity TMA-healed, right lower extremity TMA. Dressing applied  skin:  Warm and dry. Good color, turgor and pigmentation. No lesions or scars.   No cyanosis or clubbing  Neurological/Psychiatric: The patient's general behavior, level of consciousness, thought content and emotional status is normal.        Medications:  Scheduled Medications:    iron sucrose  200 mg IntraVENous Q24H    ferrous sulfate  325 mg Oral Daily with breakfast    insulin lispro  0-6 Units SubCUTAneous TID WC    insulin lispro  0-3 Units SubCUTAneous Nightly    sodium chloride flush  5-40 mL IntraVENous 2 times per day    enoxaparin  40 mg SubCUTAneous Daily    cefepime  2,000 mg IntraVENous Q12H    vancomycin (VANCOCIN) intermittent dosing (placeholder)   Other RX Placeholder    sodium hypochlorite   Irrigation Daily    buPROPion  300 mg Oral Daily    levothyroxine  50 mcg Oral Daily    lisinopril  10 mg Oral Daily    morphine  60 mg Oral BID    pantoprazole  40 mg Oral QAM AC    vancomycin  1,250 mg IntraVENous Q12H    lactobacillus  1 capsule Oral BID WC    PARoxetine  40 mg Oral QAM     Continuous Infusions:    dextrose      sodium chloride      dextrose       PRN Medicationsmorphine, 4 mg, Q4H PRN   Or  morphine, 2 mg, Q4H PRN  diazePAM, 5 mg, Q8H PRN  glucose, 15 g, PRN  dextrose, 12.5 g, PRN  glucagon (rDNA), 1 mg, PRN  dextrose, 100 mL/hr, PRN  sodium chloride flush, 5-40 mL, PRN  sodium chloride, 25 mL, PRN  ondansetron, 4 mg, Q8H PRN   Or  ondansetron, 4 mg, Q6H PRN  polyethylene glycol, 17 g, Daily PRN  oxyCODONE, 15 mg, Q6H PRN  glucose, 15 g, PRN  dextrose, 12.5 g, PRN  glucagon (rDNA), 1 mg, PRN  dextrose, 100 mL/hr, PRN        Diagnostic Labs:  CBC:   Recent Labs     01/21/22  0608 01/22/22  0750 01/23/22  0712   WBC 5.4 10.1 7.9   RBC 4.08* 4.27 4.20*   HGB 9.6* 10.0* 9.9*   HCT 32.5* 33.6* 33.5*   MCV 79.7* 78.7* 79.8*   RDW 15.7* 15.5* 15.6*   * 472* 443     BMP:   Recent Labs     01/21/22  0608 01/22/22  0750 01/23/22  0712    137 136   K 4.1 4.2 3.9    100 99   CO2 25 28 26   BUN 22* 15 16   CREATININE 0.80 0.64* 0.65*     BNP: No results for input(s): BNP in the last 72 hours. PT/INR: No results for input(s): PROTIME, INR in the last 72 hours. APTT: No results for input(s): APTT in the last 72 hours. CARDIAC ENZYMES: No results for input(s): CKMB, CKMBINDEX, TROPONINI in the last 72 hours. Invalid input(s): CKTOTAL;3  FASTING LIPID PANEL:  Lab Results   Component Value Date    HDL 38 03/08/2020     LIVER PROFILE: No results for input(s): AST, ALT, ALB, BILIDIR, BILITOT, ALKPHOS in the last 72 hours. MICROBIOLOGY:   Lab Results   Component Value Date/Time    CULTURE NO GROWTH 4 DAYS 01/18/2022 02:09 PM    CULTURE NO GROWTH 4 DAYS 01/18/2022 02:09 PM       Imaging:    XR FOOT LEFT (MIN 3 VIEWS)    Result Date: 1/17/2022  New amputation mid foot, age indeterminate. No subcutaneous gas. XR FOOT RIGHT (MIN 3 VIEWS)    Result Date: 1/21/2022  1. Transmetatarsal amputation. 2. Overlying surgical drain. 3. Remnant osseous structures of the right foot appear intact.  4. No unexpected retained radiopaque foreign body. 5. Overlying splint material obscures detail. 6. Correlate with procedural report. XR FOOT RIGHT (MIN 3 VIEWS)    Result Date: 1/17/2022  Interval new amputation 1st and 2nd digits, age indeterminate. No subcutaneous gas. MRI FOOT RIGHT WO CONTRAST    Result Date: 1/17/2022  Very limited exam secondary to motion. Suspected marrow signal changes within the 1st proximal phalanx and to a lesser extent head of the 1st metatarsal.  Osteomyelitis cannot be excluded. Soft tissue edema is concerning for cellulitis. Questionable faint marrow edema within the 4th metatarsal diaphysis, cuboid, and visualized calcaneus. Findings are very nonspecific and may be reactive, secondary to osseous injuries, or possibly be artifactual.       ASSESSMENT & PLAN       -Right foot cellulitis/ Osteomyelitis. POD 2. Transmetatarsal amputation of the right foot,Achilles tendon lengthening,adjacent tissue transfer by podiatry   Continue on vancomycin and cefepime.  Control pain.  Follow ID recommendations. .  -Hypertension. Continue on lisinopril 10.  Monitor BP  -Diabetes mellitus type 2.  HbA1c 5.3 8/21.  On metformin 250 daily at home.  MDSS, hypoglycemia protocol.  POCT glucose q6. -Hypothyroidism.  Continue on Synthroid 50 daily.  TSH 0.62 4/20.  Repeat TSH  -Anxiety: continues on paroxetine 40 daily  -Iron deficiency anemia. Iron supplement, sent celiac disease panel. Follow-up with PCP     DVT ppx : Lovenox  GI ppx: Pepcid  PT/OT: Following  Discharge Planning: Okay to discharge    Stacy Carvalho MD  Internal Medicine Resident, PGY-1  [facility]; [place], [place]  1/23/2022, 8:05 AM      I have discussed the care of Aden Humphries , including pertinent history and exam findings,    today with the resident. I have seen and examined the patient and the key elements of all parts of the encounter have been performed by me .    I agree with the assessment, plan and orders as documented by the resident. Principal Problem:    Cellulitis  Active Problems:    Essential hypertension    Hypothyroidism    Type 2 diabetes mellitus (HCC)    Diabetic infection of right foot ([address]    Osteomyelitis of foot, right, acute ([address]    Cellulitis of foot, right    Acquired posterior equinus, right  Resolved Problems:    * No resolved hospital problems. *        Overall  course ;                                   are improving over time.         Needs outpatient GI Evaluation for  DEISY   Had Colonoscopy few years ago   Patient Voiced understanding           Electronically signed by Philip De Leon MD

## 2022-01-23 NOTE — PROGRESS NOTES
[facility] - progress note  Today's Date and Time: 1/23/2022, 4:44 PM    Impression :   · Cellulitis of the right foot  · Chronic nonhealing ulcer of the right foot status post first and second digit amputation  · History left foot amputation  · Diabetes mellitus type 2  · Right leg DVT  · Essential hypertension  · Hypothyroidism  · Hx MRSA foot 2018  · Allergy to Bactrim    Recommendations:     · Continue cefepime and vancomycin    Medical Decision Making/Summary/Discussion:1/23/2022       · Transmetatarsal amputation and tendon achilles lengthening 1/21/22    Infection Control Recommendations   · [place] Precautions  · Contact Isolation     Antimicrobial Stewardship Recommendations     · Simplification of therapy  · Targeted therapy    Coordination of Outpatient Care:   · Estimated Length of IV antimicrobials:  · Patient will need Midline Catheter Insertion:   · Patient will need PICC line Insertion:  · Patient will need: Home IV , [place],  SNF,  LTAC: TBD  · Patient will need outpatient wound care:    Chief complaint/reason for consultation:   · Diabetic foot wound      History of Present Illness:   Isaiah Freeman is a 61y.o.-year-old male who was initially admitted on 1/17/2022. Patient seen at the request of Dr. Tamie Au:    This patient presented to the ED with complaints of cellulitis of the right foot and leg with a chronic nonhealing ulcer to level of muscle of the right foot. He follows up with Dr. Verena Chavez with Podiatry, but per EHR, he is known to be noncompliant with medications and follow-up office visits. Dr. Verena Chavez had recommended that he present to the hospital on 1/12/2022 for his right foot wound, but the patient has refused until 1/17/2022. He was started on vancomycin and cefepime    Imaging:     XR FOOT LEFT (MIN 3 VIEWS)     Result Date: 1/17/2022  New amputation mid foot, age indeterminate.   No subcutaneous gas.      XR FOOT RIGHT (MIN 3 VIEWS)     Result Date: 1/17/2022  Interval new amputation 1st and 2nd digits, age indeterminate. No subcutaneous gas.      MRI FOOT RIGHT WO CONTRAST     Result Date: 1/17/2022  Very limited exam secondary to motion. Suspected marrow signal changes within the 1st proximal phalanx and to a lesser extent head of the 1st metatarsal.  Osteomyelitis cannot be excluded. Soft tissue edema is concerning for cellulitis. Questionable faint marrow edema within the 4th metatarsal diaphysis, cuboid, and visualized calcaneus. Findings are very nonspecific and may be reactive, secondary to osseous injuries, or possibly be artifactual.        CURRENT EVALUATION 1/23/2022    Afebrile  VS stable  Saturating well on room air    TMA right foot w/ JEROME completed on 01/21/22    Patient is awake alert and talking    Patient is being discharged today    Labs, X rays reviewed: 1/23/2022    BUN:22 > 15 > 16  Cr:0.80 > 0.64 > 0.65    WBC:5.4 > 10.1 > 7.9  Hb:9.6 > 10 > 9.9  Plat:473 > 472 > 443    CRP: 36.0 > 17 > 4.6  Ferritin: 23  Sed rate: 96    Cultures:  Urine:  · none  Blood:  · 01/18/22: no growth  Sputum :  · none  Wound:  · none    Images:    Right foot 1/17/21      Discussed with patient, RN, family. I have personally reviewed the past medical history, past surgical history, medications, social history, and family history, and I have updated the database accordingly.   Past Medical History:     Past Medical History:   Diagnosis Date    Acute metabolic encephalopathy     CLAUDIA (acute kidney injury) ([address]     Anemia     Anxiety     ARF (acute renal failure) (HCC)     Arthritis     lower back, knees    BPH (benign prostatic hyperplasia)     Chronic kidney disease     prostate    Depression     Diabetes mellitus ([address]     Diabetic neuropathy (HCC)     GERD (gastroesophageal reflux disease)     Hyperglycemia     Hyperkalemia     Hypertension     Hypothyroidism     Impacted tooth     [facility] of [facility] foot [facility]     Osteomyelitis of left foot ([facility]     [facility]peripheral [facility] disease) [facility]     Pneumonia     Septic shock (HCC)     Type 2 diabetes mellitus ([facility]        Past Surgical  History:     Past Surgical History:   Procedure Laterality Date    AMPUTATION Right 01/21/2022    TRANSMETARSAL AMPUTATION FOOT, TENDOACHILLES LENGTHENING (Right Foot)    APPENDECTOMY      BACK SURGERY      Lower x 4. Had abscess after appendectomy.      FOOT AMPUTATION Left 1/4/2020    TRANSMETATARSAL FOOT AMPUTATION, REMOVAL OF FOREIGN BODY performed by Genoveva Escobar DPM at [facility] Left 01/04/2020    TRANSMETATARSAL FOOT AMPUTATION, REMOVAL OF FOREIGN BODY    KNEE SURGERY Right     scope    TOE AMPUTATION Bilateral 3/6/2017    TOE AMPUTATION LEFT HALLUX AND 2ND DIGIT AND RIGHT 2ND DIGIT performed by Kirsten Prado DPM at [address] ENDOSCOPY N/A 5/20/2019    EGD WITH BIOPSY performed by Merna Irving MD at [address] [place] [address] OR       Medications:      iron sucrose  200 mg IntraVENous Q24H    ferrous sulfate  325 mg Oral Daily with breakfast    insulin lispro  0-6 Units SubCUTAneous TID WC    insulin lispro  0-3 Units SubCUTAneous Nightly    sodium chloride flush  5-40 mL IntraVENous 2 times per day    enoxaparin  40 mg SubCUTAneous Daily    cefepime  2,000 mg IntraVENous Q12H    vancomycin (VANCOCIN) intermittent dosing (placeholder)   Other RX Placeholder    sodium hypochlorite   Irrigation Daily    buPROPion  300 mg Oral Daily    levothyroxine  50 mcg Oral Daily    lisinopril  10 mg Oral Daily    morphine  60 mg Oral BID    pantoprazole  40 mg Oral QAM AC    vancomycin  1,250 mg IntraVENous Q12H    lactobacillus  1 capsule Oral BID WC    PARoxetine  40 mg Oral QAM       Social History:     Social History     Socioeconomic History    Marital status:      Spouse name: Not on file    Number of children: Not on file    Years of education: Not on file    Highest education level: Not on file   Occupational History    Not on file   Tobacco Use    Smoking status: Never Smoker    Smokeless tobacco: Never Used   Vaping Use    Vaping Use: Never used   Substance and Sexual Activity    Alcohol use: No    Drug use: Not Currently     Frequency: 2.0 times per week     Types: Marijuana Fredick Copping)    Sexual activity: Not on file   Other Topics Concern    Not on file   Social History Narrative    Not on file     Social Determinants of Health     Financial Resource Strain: Low Risk     Difficulty of Paying Living Expenses: Not hard at all   Food Insecurity: No Food Insecurity    Worried About Running Out of Food in the Last Year: Never true    Vicente of Food in the Last Year: Never true   Transportation Needs:     Lack of Transportation (Medical): Not on file    Lack of Transportation (Non-Medical):  Not on file   Physical Activity:     Days of Exercise per Week: Not on file    Minutes of Exercise per Session: Not on file   Stress:     Feeling of Stress : Not on file   Social Connections:     Frequency of Communication with Friends and Family: Not on file    Frequency of Social Gatherings with Friends and Family: Not on file    Attends Christian Services: Not on file    Active Member of [address] [place] [address] [employer] or Organizations: Not on file    Attends Club or Organization Meetings: Not on file    Marital Status: Not on file   Intimate Partner Violence:     Fear of Current or Ex-Partner: Not on file    Emotionally Abused: Not on file    Physically Abused: Not on file    Sexually Abused: Not on file   Housing Stability:     Unable to Pay for Housing in the Last Year: Not on file    Number of [place] in the Last Year: Not on file    Unstable Housing in the Last Year: Not on file       Family History:     Family History   Problem Relation Age of Onset    Diabetes Father     Cancer Maternal Grandfather         Colon Cancer; Prostate Cancer    No Known Problems Mother         Allergies:   Bactrim [sulfamethoxazole-trimethoprim], Fiorinal [butalbital-aspirin-caffeine], Statins, Tylenol [acetaminophen], and Peanut butter flavor [flavoring agent]     Review of Systems:   Constitutional: No fevers or chills. No systemic complaints  Head: No headaches  Eyes: No double vision or blurry vision. No conjunctival inflammation. ENT: No sore throat or runny nose. . No hearing loss, tinnitus or vertigo. Cardiovascular: No chest pain or palpitations. No shortness of breath. No MONTES  Lung: No shortness of breath or cough. No sputum production  Abdomen: No nausea, vomiting, diarrhea, or abdominal pain. Juanell Leyland No cramps. Genitourinary: No increased urinary frequency, or dysuria. No hematuria. No suprapubic or CVA pain  Musculoskeletal: Bilateral lower extremity pain  Hematologic: No bleeding or bruising. Integument: Right foot wound  Psychiatric: No depression. Endocrine: No polyuria, no polydipsia, no polyphagia. Physical Examination :     No data found. General Appearance: Awake, alert, and in no apparent distress  Head:  Normocephalic, no trauma  Eyes: Pupils equal, round, reactive to light and accommodation; extraocular movements intact; sclera anicteric; conjunctivae pink. No embolic phenomena. ENT: Oropharynx clear, without erythema, exudate, or thrush. No tenderness of sinuses. Mouth/throat: mucosa pink and moist. No lesions. Dentition in good repair. Neck:Supple, without lymphadenopathy. Thyroid normal, No bruits. Pulmonary/Chest: Clear to auscultation, without wheezes, rales, or rhonchi. No dullness to percussion. Cardiovascular: Regular rate and rhythm without murmurs, rubs, or gallops. Abdomen: Soft, non tender. Bowel sounds normal. No organomegaly  All four Extremities: TMA of right foot, partial foot amputation left foot  Neurologic: No gross sensory or motor deficits.   Skin: right foot wound    Medical Decision Making -Laboratory:   I have independently reviewed/ordered the following labs:    CBC with Differential:   Recent Labs     01/22/22  0750 01/23/22  0712   WBC 10.1 7.9   HGB 10.0* 9.9*   HCT 33.6* 33.5*   * 443   LYMPHOPCT 10* 17*   MONOPCT 7 12     BMP:   Recent Labs     01/22/22  0750 01/23/22  0712    136   K 4.2 3.9    99   CO2 28 26   BUN 15 16   CREATININE 0.64* 0.65*     Hepatic Function Panel:   No results for input(s): PROT, LABALBU, BILIDIR, IBILI, BILITOT, ALKPHOS, ALT, AST in the last 72 hours. No results for input(s): RPR in the last 72 hours. No results for input(s): HIV in the last 72 hours. No results for input(s): BC in the last 72 hours.   Lab Results   Component Value Date    MUCUS NOT REPORTED 04/20/2020    RBC 4.20 01/23/2022    TRICHOMONAS NOT REPORTED 04/20/2020    WBC 7.9 01/23/2022    YEAST NOT REPORTED 04/20/2020    TURBIDITY CLEAR 04/20/2020     Lab Results   Component Value Date    CREATININE 0.65 01/23/2022    GLUCOSE 101 01/23/2022       Medical Decision Making-Imaging:     Narrative   EXAMINATION:   MRI OF THE RIGHT FOOT WITHOUT CONTRAST, 1/17/2022 5:34 pm       TECHNIQUE:   Multiplanar multisequence MRI of the right foot was performed without the   administration of intravenous contrast.       COMPARISON:   Right foot radiographs 01/17/2022       HISTORY:   ORDERING SYSTEM PROVIDED HISTORY: R/O OM left calc   TECHNOLOGIST PROVIDED HISTORY:   R/O OM left calc   Reason for Exam: R/O OM       FINDINGS:   Significantly limited exam secondary to motion, patient body habitus, and   lack of IV contrast.  Within this limitation:       BONE MARROW: Postsurgical findings from prior amputation of the 1st and 2nd   digit, better visualized on prior radiograph.  T2 hyperintense T1 hypointense   signal within the 1st proximal phalanx is suspected.  A questionable area of   T2 hyperintense signal and T1 hypointense signal along the medial margin of   the 1st metatarsal head (series 10 and 11, image 11).  No discrete T2 marrow   signal changes of the 2nd metatarsal or 2nd proximal phalanx definitively   visualized.  No discrete fracture is identified on the provided images.    Questionable T2 hyperintense signal within the calcaneus and cuboid, only   partially visualized and incompletely evaluated.  The calcaneus is not   completely included in the study.  Questionable T2 hyperintense signal within   the diaphysis of the 4th metatarsal.       SOFT TISSUES: Likely postsurgical changes of the 1st and 2nd digit flexor and   extensor tendons.  Edema signal and atrophic changes within the plantar   musculature, particularly the middle band of the plantar fascia, likely   neuropathic.  No soft tissue gas grossly identified, however tiny foci of gas   would be difficult to visualize with the amount of motion on this study.  No   discrete drainable fluid collection grossly identified.           Impression   Very limited exam secondary to motion.       Suspected marrow signal changes within the 1st proximal phalanx and to a   lesser extent head of the 1st metatarsal.  Osteomyelitis cannot be excluded.       Soft tissue edema is concerning for cellulitis.       Questionable faint marrow edema within the 4th metatarsal diaphysis, cuboid,   and visualized calcaneus.  Findings are very nonspecific and may be reactive,   secondary to osseous injuries, or possibly be artifactual.     Narrative   EXAMINATION:   THREE XRAY VIEWS OF THE LEFT FOOT       1/17/2022 2:52 pm       COMPARISON:   None.       HISTORY:   ORDERING SYSTEM PROVIDED HISTORY: eval OM/gas   TECHNOLOGIST PROVIDED HISTORY:   eval OM/gas       FINDINGS:   New interval amputation proximal metatarsals.  Cortical margins otherwise   intact.  Soft tissues unremarkable.           Impression   New amputation mid foot, age indeterminate.  No subcutaneous gas.         Narrative   EXAMINATION:   THREE XRAY VIEWS OF THE RIGHT FOOT       1/17/2022 2:52 pm     COMPARISON:   December 15, 2021.       HISTORY:   ORDERING SYSTEM PROVIDED HISTORY: r/o gas/OM   TECHNOLOGIST PROVIDED HISTORY:   r/o gas/OM       FINDINGS:   Interval amputation 1st and 2nd proximal phalanges.  Cortical margins   otherwise intact.  Alignment anatomic.  Soft tissues unremarkable.  No   subcutaneous gas.           Impression   Interval new amputation 1st and 2nd digits, age indeterminate.  No   subcutaneous gas.               Medical Decision [id number]-[id number]-Rqvux:       Medical Decision Making-Other:     Note:  · Labs, medications, radiologic studies were reviewed with personal review of films  · Large amounts of data were reviewed  · Discussed with nursing Staff, Discharge planner  · Infection Control and Prevention measures reviewed  · All prior entries were reviewed  · Administer medications as ordered  · Prognosis: Guarded  · Discharge planning reviewed      Thank you for allowing us to participate in the care of this patient. Please call with questions. MD Rosa Marc participated in the evaluation of this patient. ATTESTATION:    I have discussed the case, including pertinent history and exam findings with the residents and students. I have seen and examined the patient and the key elements of the encounter have been performed by me. I was present when the student obtained his information or examined the patient. I have reviewed the laboratory data, other diagnostic studies and discussed them with the residents. I have updated the medical record where necessary. I agree with the assessment, plan and orders as documented by the resident/ student.     Ana Freitas MD.

## 2022-01-23 NOTE — DISCHARGE INSTR - COC
Continuity of Care Form    Patient Name: Nancy Schilling   :  1965  MRN:  [id number]    Admit date:  2022  Discharge date:  ***    Code Status Order: Full Code   Advance Directives:   Advance Care Flowsheet Documentation       Date/Time Healthcare Directive Type of Healthcare Directive Copy in [address] Agent's Name Healthcare Agent's Phone Number    [phone] Yes, patient has an advance directive for healthcare treatment Living will No, copy requested from medical records -- -- --            Admitting Physician:  Leticia Buck DPM  PCP: Kelly Gorman MD    Discharging Nurse: [facility] Unit/Room#: 9441/2821-77  Discharging Unit Phone Number: ***    Emergency Contact:   Extended Emergency Contact Information  Primary Emergency Contact: [facility] *Gris ferrera  Address: [address] [place] [address] Phone: [phone]  Relation: Spouse  Hearing or visual needs: None  Other needs: None  Preferred language: English   needed? No    Past Surgical History:  Past Surgical History:   Procedure Laterality Date    AMPUTATION Right 2022    TRANSMETARSAL AMPUTATION FOOT, TENDOACHILLES LENGTHENING (Right Foot)    APPENDECTOMY      BACK SURGERY      Lower x 4. Had abscess after appendectomy.      FOOT AMPUTATION Left 2020    TRANSMETATARSAL FOOT AMPUTATION, REMOVAL OF FOREIGN BODY performed by Leticia Buck DPM at [place] [address] Left 2020    TRANSMETATARSAL FOOT AMPUTATION, REMOVAL OF FOREIGN BODY    KNEE SURGERY Right     scope    TOE AMPUTATION Bilateral 3/6/2017    TOE AMPUTATION LEFT HALLUX AND 2ND DIGIT AND RIGHT 2ND DIGIT performed by Cruz Bella DPM at [address] [facility] [address] [facility] [address] N/A 2019    EGD WITH BIOPSY performed by Dipesh Valiente MD at [address]       Immunization History:   Immunization History Administered Date(s) Administered    Influenza, Quadv, IM, PF (6 mo and older Fluzone, Flulaval, Fluarix, and 3 yrs and older Afluria) 10/04/2016, 09/19/2017, 09/12/2018, 12/24/2019, 01/26/2021    Pneumococcal Conjugate 13-valent ([id number]) 10/04/2016    Pneumococcal Polysaccharide (Nrxwxjmes23) 12/13/2017    Tdap (Boostrix, Adacel) 04/24/2018       Active Problems:  Patient Active Problem List   Diagnosis Code    Anxiety disorder F41.9    Benign prostatic hyperplasia N40.0    Depression F32. A    Edema R60.9    Esophageal reflux K21.9    Essential hypertension I10    Hypothyroidism E03.9    Right cervical radiculopathy M54.12    Osteomyelitis ([facility]) M86.9    Osteomyelitis of foot, left, acute ([facility]) M86.172    Non compliance with medical treatment Z91.19    Cellulitis L03.90    Acute renal failure (ARF) ([facility]) N17.9    Ulcerated, foot, left, with necrosis of bone ([facility]) L97.524    Type 2 diabetes mellitus ([facility]) E11.9    Anemia D64.9    Hematemesis with nausea K92.0    Erosive gastritis K29.60    Ulcerated, foot, left, with fat layer exposed ([facility]) L97.522    Renal failure N19    Hyperkalemia E87.5    Hypocalcemia G22.72    Metabolic acidosis H85.5    Diabetic ulcer of toe of left foot associated with type 2 diabetes mellitus ([facility]) E11.621, L97.529    Hyperphosphatemia E83.39    Chronic, continuous use of opioids K21.84    Acute metabolic encephalopathy X32.86    Diabetic foot infection ([facility]) E11.628, L08.9    H/O amputation of lesser toe, right ([facility]) Z89.421    Seizure-like activity ([facility]) R56.9    PAD (peripheral artery disease) ([facility]) I73.9    Diabetic infection of right foot ([facility]) E11.628, L08.9    Osteomyelitis of foot, right, acute ([facility]) M86.171    Cellulitis of foot, right L03.115    Acquired posterior equinus, right M21.861       Isolation/Infection:   Isolation            Contact          Patient Infection Status       Infection Onset Added Last Indicated Last Indicated By Review Planned Expiration Resolved Resolved By    MRSA  03/10/17 12/29/19 ANAEROBIC AND AEROBIC CULTURE        2018 foot    Resolved    C-diff Rule Out  19 C DIFF TOXIN/ANTIGEN (Ordered)   19 Ronald Christianson RN            Nurse Assessment:  Last Vital Signs: /74   Pulse 78   Temp 98.4 °F (36.9 °C) (Oral)   Resp 16   Ht 6' 2\" (1.88 m)   Wt 190 lb (86.2 kg)   SpO2 95%   BMI 24.39 kg/m²     Last documented pain score (0-10 scale): Pain Level: 8  Last Weight:   Wt Readings from Last 1 Encounters:   22 190 lb (86.2 kg)     Mental Status:  {IP PT MENTAL STATUS:88626}    IV Access:  { SHAMIR IV ACCESS:879753523}    Nursing Mobility/ADLs:  Walking   {CHP DME LCRK:511302988}  Transfer  {P DME NFOF:861200856}  Bathing  {P DME KKLI:964379623}  Dressing  {CHP DME UULU:677958148}  Toileting  {CHP DME IRNZ:644113990}  Feeding  {P DME FNEZ:972541817}  Med Admin  {[facility] DME XATU:248636163}  Med Delivery   { SHAMIR MED Delivery:145385761}    Wound Care Documentation and Therapy:        Elimination:  Continence:    Bowel: {YES / UM:05108}  Bladder: {YES / XC:16185}  Urinary Catheter: {Urinary Catheter:348245698}   Colostomy/Ileostomy/Ileal Conduit: {YES / W}       Date of Last BM: ***  No intake or output data in the 24 hours ending 22 1235  I/O last 3 completed shifts:  In: -   Out: 2400 [Urine:2400]    Safety Concerns:     [address] Safety Concerns:120113940}    Impairments/Disabilities:      [address] Impairments/Disabilities:423489404}    Nutrition Therapy:  Current Nutrition Therapy:   [address] Diet List:001091754}    Routes of Feeding: {CHP DME Other Feedings:795276716}  Liquids: {Slp liquid thickness:52904}  Daily Fluid Restriction: {CHP DME Yes amt example:506694277}  Last Modified Barium Swallow with Video (Video Swallowing Test): {Done Not Done QFWB:509669762}    Treatments at the Time of Hospital Discharge:   Respiratory Treatments: ***  Oxygen Therapy:  {Therapy; copd oxygen:63506}  Ventilator:    [address] Vent UXQD:533010001}    Rehab Therapies: {THERAPEUTIC INTERVENTION:4245432002}  Weight Bearing Status/Restrictions: { CC Weight Bearin}  Other Medical Equipment (for information only, NOT a DME order):  {EQUIPMENT:813900969}  Other Treatments: ***    Patient's personal belongings (please select all that are sent with patient):  {P DME Belongings:240414237}    RN SIGNATURE:  {Esignature:599890925}    CASE MANAGEMENT/SOCIAL WORK SECTION    Inpatient Status Date: ***    Readmission Risk Assessment Score:  Readmission Risk              Risk of Unplanned Readmission:  17           Discharging to Facility/ Agency   Name:   Address:  Phone:  Fax:    Dialysis Facility (if applicable)   Name:  Address:  Dialysis Schedule:  Phone:  Fax:    / signature: {Esignature:274056975}    PHYSICIAN SECTION    Prognosis: {Prognosis:0659375331}    Condition at Discharge: Julianna Tinoco Wong Patient Condition:788979945}    Rehab Potential (if transferring to Rehab): {Prognosis:7113674863}    Recommended Labs or Other Treatments After Discharge:     Patient need to be non weight bearing with posterior splint to the right lower extremity at all time  Patient need to follow up with Dr. Bety Walters next week. Patient will need to keep dressing intact until his next visit with Dr. Bety Walters. Physician Certification: I certify the above information and transfer of Sridhar Jones  is necessary for the continuing treatment of the diagnosis listed and that he requires {Admit to Appropriate Level of Care:14714} for {GREATER/LESS:700789313} 30 days.      Update Admission H&P: [facility]0789[facility]}    PHYSICIAN SIGNATURE:  Ashlie Leonard DPM

## 2022-01-23 NOTE — CARE COORDINATION
Transitional planning      Writer to room to discuss d/c plan, patient refusing to be discharged due to being out of meds at home , primary RN was able to get meds filled and patient can pick them up tomorrow. Patient states he takes his meds at 9pm and 9am, offered to allow patient to stay until 9pm and discharge after that , patient continued to be argumentative and questioning discharge and states no one looked at his leg, instructed patient it is not supposed to be removed until office visit.

## 2022-01-23 NOTE — PROGRESS NOTES
Progress Note  Podiatric Medicine and Surgery     Subjective     CC: Right foot/leg pain    Patient seen and examined . S/p right TMA(DOS:1/21/22)  Pain well tolerated. HPI :  Ward Gates is a 61 y.o. male seen at [facility] on the floor for right foot pain and infection. He is well known to Dr. Nikkie Anderson who advised he be admitted 1/12/21 but pt refused until today. He has been seen for wound care over the course of the last couple years and demonstrates repetitive non-compliant behaviors including failure to follow up when scheduled, failure to fill prescriptions in a timely manner, and using substitute medications that have been [de-identified] years old. PCP is Dede Myers MD    ROS: Denies N/V/F/C/SOB/CP. Otherwise negative except at stated in the HPI.      Medications:  Scheduled Meds:   iron sucrose  200 mg IntraVENous Q24H    ferrous sulfate  325 mg Oral Daily with breakfast    insulin lispro  0-6 Units SubCUTAneous TID WC    insulin lispro  0-3 Units SubCUTAneous Nightly    sodium chloride flush  5-40 mL IntraVENous 2 times per day    enoxaparin  40 mg SubCUTAneous Daily    cefepime  2,000 mg IntraVENous Q12H    vancomycin (VANCOCIN) intermittent dosing (placeholder)   Other RX Placeholder    sodium hypochlorite   Irrigation Daily    buPROPion  300 mg Oral Daily    levothyroxine  50 mcg Oral Daily    lisinopril  10 mg Oral Daily    morphine  60 mg Oral BID    pantoprazole  40 mg Oral QAM AC    vancomycin  1,250 mg IntraVENous Q12H    lactobacillus  1 capsule Oral BID WC    PARoxetine  40 mg Oral QAM       Continuous Infusions:   dextrose      sodium chloride      dextrose         PRN Meds:morphine **OR** morphine, diazePAM, glucose, dextrose, glucagon (rDNA), dextrose, sodium chloride flush, sodium chloride, ondansetron **OR** ondansetron, polyethylene glycol, oxyCODONE, glucose, dextrose, glucagon (rDNA), dextrose    Objective     Vitals:  Patient Vitals for the past 8 hrs:   BP Temp Temp src Pulse Resp SpO2   22 0800 118/74 98.4 °F (36.9 °C) Oral 78 16 95 %     Average, Min, and Max for last 24 hours Vitals:  TEMPERATURE:  Temp  Av.6 °F (37 °C)  Min: 98.4 °F (36.9 °C)  Max: 98.8 °F (37.1 °C)    RESPIRATIONS RANGE: Resp  Av  Min: 16  Max: 18    PULSE RANGE: Pulse  Av.5  Min: 73  Max: 78    BLOOD PRESSURE RANGE:  Systolic (09PWQ), AHY:756 , Min:118 , QBE:310   ; Diastolic (74CXP), LAE:75, Min:74, Max:75      PULSE OXIMETRY RANGE: SpO2  Av.5 %  Min: 95 %  Max: 96 %    I/O last 3 completed shifts:  In: -   Out: 2400 [Urine:2400]    CBC:  Recent Labs     22  0608 22  1421 22  0750 22  0712   WBC 5.4  --  10.1 7.9   HGB 9.6*  --  10.0* 9.9*   HCT 32.5*  --  33.6* 33.5*   *  --  472* 443   CRP  --  4.6  --   --         BMP:  Recent Labs     22  0608 22  0750 22  0712    137 136   K 4.1 4.2 3.9    100 99   CO2 25 28 26   BUN 22* 15 16   CREATININE 0.80 0.64* 0.65*   GLUCOSE 94 108* 101*   CALCIUM 9.1 9.0 9.1        Coags:  No results for input(s): APTT, PROT, INR in the last 72 hours. Lab Results   Component Value Date    SEDRATE 96 (H) 2022     Recent Labs     22  1421   CRP 4.6       General: A&Ox3, NAD  Heart: Regular rate and rhythm. Lungs: Equal air entry. No increased effort. Abdomen: Soft, non-tender to palpation. Not distended. Physical Exam:    Dressing remains intact and clean. Patient is able to move extremities up and down. Imaging:   XR FOOT RIGHT (MIN 3 VIEWS)   Final Result   1. Transmetatarsal amputation. 2. Overlying surgical drain. 3. Remnant osseous structures of the right foot appear intact. 4. No unexpected retained radiopaque foreign body. 5. Overlying splint material obscures detail. 6. Correlate with procedural report. MRI FOOT RIGHT WO CONTRAST   Final Result   Very limited exam secondary to motion.       Suspected marrow signal changes within the 1st proximal phalanx and to a   lesser extent head of the 1st metatarsal.  Osteomyelitis cannot be excluded. Soft tissue edema is concerning for cellulitis. Questionable faint marrow edema within the 4th metatarsal diaphysis, cuboid,   and visualized calcaneus. Findings are very nonspecific and may be reactive,   secondary to osseous injuries, or possibly be artifactual.         VL DUP LOWER EXTREMITY VENOUS BILATERAL   Final Result      XR FOOT LEFT (MIN 3 VIEWS)   Final Result   New amputation mid foot, age indeterminate. No subcutaneous gas. XR FOOT RIGHT (MIN 3 VIEWS)   Final Result   Interval new amputation 1st and 2nd digits, age indeterminate. No   subcutaneous gas. Assessment   Kathy See is a 61 y.o. male with   S/p right TMA with JEROME. Cellulitis right foot and leg  Chronic non-healing ulceration to level of muscle, right foot  DVT right leg  S/P TMA left foot  S/P digital amputation right foot  DMII with history of LE amputation    Principal Problem:    Cellulitis  Active Problems:    Essential hypertension    Hypothyroidism    Type 2 diabetes mellitus (HCC)    Diabetic infection of right foot (HCC)    Osteomyelitis of foot, right, acute (HCC)    Cellulitis of foot, right    Acquired posterior equinus, right  Resolved Problems:    * No resolved hospital problems. *       Plan   Patient examined and evaluated at bedside    [address] [place] [address] showed no signs of occlusive disease  Medical management per IM  Antibiotics per ID  Vanc/Cefepime   Dressing and posterior splint intact to right lower extremity:   PT/OT evaluation appreciated. Patient reports having equipment for ambulation at home from previous foot surgeries. Patient is clinically stable to be discharged from podiatry standpoint. Will discharge patient home with oral doxycycline. Patient will need to be non-weight bearing to the right lower extremity.  Patient will need to follow up with  Brian Haddad next week. Discussed with Dr. Brian Haddad    DVT ppx: hold lovenox    Diet: Carb control         Activity: nonweightbearing to right lower extremity  Pain Control: 60 mg ER oxycontin BID    Dian Ordaz DPM   Podiatric Medicine & Surgery   1/23/2022 at 11:18 AM    I performed a history and physical examination of the patient and discussed management with the resident. I reviewed the residents note and agree with the documented findings and plan of care. Any areas of disagreement are noted on the chart. I was personally present for the key portions of any procedures. I have documented in the chart those procedures where I was not present during the key portions. I have reviewed the Podiatry Resident progress note. I agree with the chief complaint, past medical history, past surgical history, allergies, medications, social and family history as documented unless otherwise noted below. Documentation of the HPI, Physical Exam and Medical Decision Making performed by medical students or scribes is based on my personal performance of the HPI, PE and MDM. I have personally evaluated this patient and have completed at least one if not all key elements of the E/M (history, physical exam, and MDM). Additional findings are as noted.      Meryl Ratliff DPM on 1/24/2022 at 3:88 AM  Board Certified, American Board of Podiatric Surgery  Fellow, [employer]

## 2022-01-23 NOTE — PROGRESS NOTES
[address] [facility] Pharmacokinetic Monitoring Service - Vancomycin    Consulting Provider: Dr. Gordon Gamboa   Indication: cellulitis of foot & leg/osteomyelitis of toe  Target Concentration: Goal AUC/FERNANDO 400-600 mg*hr/L  Day of Therapy: 6  Additional Antimicrobials: cefepime    Pertinent Laboratory Values: Wt Readings from Last 1 Encounters:   01/21/22 190 lb (86.2 kg)     Temp Readings from Last 1 Encounters:   01/23/22 98.4 °F (36.9 °C) (Oral)     Estimated Creatinine Clearance: 146 mL/min (A) (based on SCr of 0.65 mg/dL (L)). Recent Labs     01/22/22  0750 01/23/22  0712   CREATININE 0.64* 0.65*   WBC 10.1 7.9     Pertinent Cultures:  Culture Date Source Results   1/18 Blood x2 No growth   MRSA Nasal Swab: N/A. Non-respiratory infection.     Recent vancomycin administrations                     vancomycin (VANCOCIN) 1250 mg in dextrose 5 % 250 mL IVPB (mg) 1,250 mg New Bag 01/23/22 0855     1,250 mg New Bag 01/22/22 2124     1,250 mg New Bag  0903     1,250 mg New Bag 01/21/22 2127     1,250 mg New Bag 01/20/22 2104                    Assessment:  Date/Time Current Dose Concentration Timing of Concentration (h) AUC   1/23 1250 mg Q12H 30.5 mg/dL 3 hours post dose 538   Note: Serum concentrations collected for AUC dosing may appear elevated if collected in close proximity to the dose administered, this is not necessarily an indication of toxicity    Plan:  Current dosing regimen is therapeutic  Continue current dose  Pharmacy will continue to monitor patient and adjust therapy as indicated    Thank you for the consult,  Elizabeth Johnson, PharmD 1/23/2022 12:50 PM

## 2022-01-23 NOTE — PLAN OF CARE
A DME walker was ordered and placed on the patient for pain control and stabilization due to right foot surgery. Patient is ambulatory with weakness and instability therefore a wheeled walker will help with the weakness, stabilization and pain control.

## 2022-01-23 NOTE — PLAN OF CARE
Writer went to talk to the patient about discharge plan. Informed patient due to his agreement with his pain management provider. No narcotic can be prescribed to him to go home with. Patient was very upset and told writer that he can't get his refill until next Tuesday and he doesn't want to leave until he gets his refill. Informed patient that he is clinically stable to be discharged and we can't keep him here because he doesn't want to leave. Nurse presented in the room during the conversation as well.

## 2022-01-23 NOTE — PROGRESS NOTES
[facility] - progress note  Today's Date and Time: 1/23/2022, 12:10 PM    Impression :   · Cellulitis of the right foot  · Chronic nonhealing ulcer of the right foot status post first and second digit amputation  · History left foot amputation  · Diabetes mellitus type 2  · Right leg DVT  · Essential hypertension  · Hypothyroidism  · Hx MRSA foot 2018  · Allergy to Bactrim    Recommendations:     · Continue cefepime and vancomycin    Medical Decision Making/Summary/Discussion:1/23/2022       · Transmetatarsal amputation and tendon achilles lengthening 1/21/22    Infection Control Recommendations   · [place] Precautions  · Contact Isolation     Antimicrobial Stewardship Recommendations     · Simplification of therapy  · Targeted therapy    Coordination of Outpatient Care:   · Estimated Length of IV antimicrobials:  · Patient will need Midline Catheter Insertion:   · Patient will need PICC line Insertion:  · Patient will need: Home IV , [place],  SNF,  LTAC: TBD  · Patient will need outpatient wound care:    Chief complaint/reason for consultation:   · Diabetic foot wound      History of Present Illness:   Yovana Amaya is a 61y.o.-year-old male who was initially admitted on 1/17/2022. Patient seen at the request of Dr. Julia Holman:    This patient presented to the ED with complaints of cellulitis of the right foot and leg with a chronic nonhealing ulcer to level of muscle of the right foot. He follows up with Dr. Christal Abbasi with Podiatry, but per EHR, he is known to be noncompliant with medications and follow-up office visits. Dr. Christal Abbasi had recommended that he present to the hospital on 1/12/2022 for his right foot wound, but the patient has refused until 1/17/2022. He was started on vancomycin and cefepime    Imaging:     XR FOOT LEFT (MIN 3 VIEWS)     Result Date: 1/17/2022  New amputation mid foot, age indeterminate.   No subcutaneous gas.      XR FOOT RIGHT (MIN 3 VIEWS)     Result Date: 1/17/2022  Interval new amputation 1st and 2nd digits, age indeterminate. No subcutaneous gas.      MRI FOOT RIGHT WO CONTRAST     Result Date: 1/17/2022  Very limited exam secondary to motion. Suspected marrow signal changes within the 1st proximal phalanx and to a lesser extent head of the 1st metatarsal.  Osteomyelitis cannot be excluded. Soft tissue edema is concerning for cellulitis. Questionable faint marrow edema within the 4th metatarsal diaphysis, cuboid, and visualized calcaneus. Findings are very nonspecific and may be reactive, secondary to osseous injuries, or possibly be artifactual.        CURRENT EVALUATION 1/23/2022    Afebrile  VS stable  Saturating well on room air    TMA right foot w/ JEROME completed on 01/21/22    Patient is awake alert and talking    Patient is being discharged today    Labs, X rays reviewed: 1/23/2022    BUN:22 > 15 > 16  Cr:0.80 > 0.64 > 0.65    WBC:5.4 > 10.1 > 7.9  Hb:9.6 > 10 > 9.9  Plat:473 > 472 > 443    CRP: 36.0 > 17 > 4.6  Ferritin: 23  Sed rate: 96    Cultures:  Urine:  · none  Blood:  · 01/18/22: no growth  Sputum :  · none  Wound:  · none    Images:    Right foot 1/17/21      Discussed with patient, RN, family. I have personally reviewed the past medical history, past surgical history, medications, social history, and family history, and I have updated the database accordingly.   Past Medical History:     Past Medical History:   Diagnosis Date    Acute metabolic encephalopathy     CLAUDIA (acute kidney injury) ([address]     Anemia     Anxiety     ARF (acute renal failure) (HCC)     Arthritis     lower back, knees    BPH (benign prostatic hyperplasia)     Chronic kidney disease     prostate    Depression     Diabetes mellitus ([address]     Diabetic neuropathy (HCC)     GERD (gastroesophageal reflux disease)     Hyperglycemia     Hyperkalemia     Hypertension     Hypothyroidism     Impacted tooth     [facility]rosis of bone of foot [facility]     Osteomyelitis of left foot ([facility]     [facility]peripheral [facility]) [facility]     Pneumonia     Septic shock (HCC)     Type 2 diabetes mellitus ([facility]        Past Surgical  History:     Past Surgical History:   Procedure Laterality Date    AMPUTATION Right 01/21/2022    TRANSMETARSAL AMPUTATION FOOT, TENDOACHILLES LENGTHENING (Right Foot)    APPENDECTOMY      BACK SURGERY      Lower x 4. Had abscess after appendectomy.      FOOT AMPUTATION Left 1/4/2020    TRANSMETATARSAL FOOT AMPUTATION, REMOVAL OF FOREIGN BODY performed by Nanci Martinez DPM at [address] [place] [address] Left 01/04/2020    TRANSMETATARSAL FOOT AMPUTATION, REMOVAL OF FOREIGN BODY    KNEE SURGERY Right     scope    TOE AMPUTATION Bilateral 3/6/2017    TOE AMPUTATION LEFT HALLUX AND 2ND DIGIT AND RIGHT 2ND DIGIT [facility] N/A 5/20/2019    EGD WITH BIOPSY performed by Samir Downs MD at [address] [place] [address] OR       Medications:      iron sucrose  200 mg IntraVENous Q24H    ferrous sulfate  325 mg Oral Daily with breakfast    insulin lispro  0-6 Units SubCUTAneous TID WC    insulin lispro  0-3 Units SubCUTAneous Nightly    sodium chloride flush  5-40 mL IntraVENous 2 times per day    enoxaparin  40 mg SubCUTAneous Daily    cefepime  2,000 mg IntraVENous Q12H    vancomycin (VANCOCIN) intermittent dosing (placeholder)   Other RX Placeholder    sodium hypochlorite   Irrigation Daily    buPROPion  300 mg Oral Daily    levothyroxine  50 mcg Oral Daily    lisinopril  10 mg Oral Daily    morphine  60 mg Oral BID    pantoprazole  40 mg Oral QAM AC    vancomycin  1,250 mg IntraVENous Q12H    lactobacillus  1 capsule Oral BID WC    PARoxetine  40 mg Oral QAM       Social History:     Social History     Socioeconomic History    Marital status:      Spouse name: Not on file    Number of children: Not on file    Years of education: Not on file    Highest education level: Not on file   Occupational History    Not on file   Tobacco Use    Smoking status: Never Smoker    Smokeless tobacco: Never Used   Vaping Use    Vaping Use: Never used   Substance and Sexual Activity    Alcohol use: No    Drug use: Not Currently     Frequency: 2.0 times per week     Types: Marijuana Dolan Meckel)    Sexual activity: Not on file   Other Topics Concern    Not on file   Social History Narrative    Not on file     Social Determinants of Health     Financial Resource Strain: Low Risk     Difficulty of Paying Living Expenses: Not hard at all   Food Insecurity: No Food Insecurity    Worried About Running Out of Food in the Last Year: Never true    Vicente of Food in the Last Year: Never true   Transportation Needs:     Lack of Transportation (Medical): Not on file    Lack of Transportation (Non-Medical):  Not on file   Physical Activity:     Days of Exercise per Week: Not on file    Minutes of Exercise per Session: Not on file   Stress:     Feeling of Stress : Not on file   Social Connections:     Frequency of Communication with Friends and Family: Not on file    Frequency of Social Gatherings with Friends and Family: Not on file    Attends Faith Services: Not on file    Active Member of [address] [place] [address] [employer] or Organizations: Not on file    Attends Club or Organization Meetings: Not on file    Marital Status: Not on file   Intimate Partner Violence:     Fear of Current or Ex-Partner: Not on file    Emotionally Abused: Not on file    Physically Abused: Not on file    Sexually Abused: Not on file   Housing Stability:     Unable to Pay for Housing in the Last Year: Not on file    Number of [place] in the Last Year: Not on file    Unstable Housing in the Last Year: Not on file       Family History:     Family History   Problem Relation Age of Onset    Diabetes Father     Cancer Maternal Grandfather         Colon Cancer; Prostate Cancer    No Known Problems Mother         Allergies:   Bactrim [sulfamethoxazole-trimethoprim], Fiorinal [butalbital-aspirin-caffeine], Statins, Tylenol [acetaminophen], and Peanut butter flavor [flavoring agent]     Review of Systems:   Constitutional: No fevers or chills. No systemic complaints  Head: No headaches  Eyes: No double vision or blurry vision. No conjunctival inflammation. ENT: No sore throat or runny nose. . No hearing loss, tinnitus or vertigo. Cardiovascular: No chest pain or palpitations. No shortness of breath. No MONTES  Lung: No shortness of breath or cough. No sputum production  Abdomen: No nausea, vomiting, diarrhea, or abdominal pain. Thurl Mutton No cramps. Genitourinary: No increased urinary frequency, or dysuria. No hematuria. No suprapubic or CVA pain  Musculoskeletal: Bilateral lower extremity pain  Hematologic: No bleeding or bruising. Integument: Right foot wound  Psychiatric: No depression. Endocrine: No polyuria, no polydipsia, no polyphagia. Physical Examination :     Patient Vitals for the past 8 hrs:   BP Temp Temp src Pulse Resp SpO2   01/23/22 0800 118/74 98.4 °F (36.9 °C) Oral 78 16 95 %     General Appearance: Awake, alert, and in no apparent distress  Head:  Normocephalic, no trauma  Eyes: Pupils equal, round, reactive to light and accommodation; extraocular movements intact; sclera anicteric; conjunctivae pink. No embolic phenomena. ENT: Oropharynx clear, without erythema, exudate, or thrush. No tenderness of sinuses. Mouth/throat: mucosa pink and moist. No lesions. Dentition in good repair. Neck:Supple, without lymphadenopathy. Thyroid normal, No bruits. Pulmonary/Chest: Clear to auscultation, without wheezes, rales, or rhonchi. No dullness to percussion. Cardiovascular: Regular rate and rhythm without murmurs, rubs, or gallops. Abdomen: Soft, non tender.  Bowel sounds normal. No organomegaly  All four Extremities: TMA of right foot, partial foot amputation left foot  Neurologic: No gross sensory or motor deficits. Skin: right foot wound    Medical Decision Making -Laboratory:   I have independently reviewed/ordered the following labs:    CBC with Differential:   Recent Labs     01/22/22  0750 01/23/22  0712   WBC 10.1 7.9   HGB 10.0* 9.9*   HCT 33.6* 33.5*   * 443   LYMPHOPCT 10* 17*   MONOPCT 7 12     BMP:   Recent Labs     01/22/22  0750 01/23/22  0712    136   K 4.2 3.9    99   CO2 28 26   BUN 15 16   CREATININE 0.64* 0.65*     Hepatic Function Panel:   No results for input(s): PROT, LABALBU, BILIDIR, IBILI, BILITOT, ALKPHOS, ALT, AST in the last 72 hours. No results for input(s): RPR in the last 72 hours. No results for input(s): HIV in the last 72 hours. No results for input(s): BC in the last 72 hours.   Lab Results   Component Value Date    MUCUS NOT REPORTED 04/20/2020    RBC 4.20 01/23/2022    TRICHOMONAS NOT REPORTED 04/20/2020    WBC 7.9 01/23/2022    YEAST NOT REPORTED 04/20/2020    TURBIDITY CLEAR 04/20/2020     Lab Results   Component Value Date    CREATININE 0.65 01/23/2022    GLUCOSE 101 01/23/2022       Medical Decision Making-Imaging:     Narrative   EXAMINATION:   MRI OF THE RIGHT FOOT WITHOUT CONTRAST, 1/17/2022 5:34 pm       TECHNIQUE:   Multiplanar multisequence MRI of the right foot was performed without the   administration of intravenous contrast.       COMPARISON:   Right foot radiographs 01/17/2022       HISTORY:   ORDERING SYSTEM PROVIDED HISTORY: R/O OM left calc   TECHNOLOGIST PROVIDED HISTORY:   R/O OM left calc   Reason for Exam: R/O OM       FINDINGS:   Significantly limited exam secondary to motion, patient body habitus, and   lack of IV contrast.  Within this limitation:       BONE MARROW: Postsurgical findings from prior amputation of the 1st and 2nd   digit, better visualized on prior radiograph.  T2 hyperintense T1 hypointense   signal within the 1st proximal phalanx is suspected.  A questionable area of   T2 subcutaneous gas.         Narrative   EXAMINATION:   THREE XRAY VIEWS OF THE RIGHT FOOT       1/17/2022 2:52 pm       COMPARISON:   December 15, 2021.       HISTORY:   ORDERING SYSTEM PROVIDED HISTORY: r/o gas/OM   TECHNOLOGIST PROVIDED HISTORY:   r/o gas/OM       FINDINGS:   Interval amputation 1st and 2nd proximal phalanges.  Cortical margins   otherwise intact.  Alignment anatomic.  Soft tissues unremarkable.  No   subcutaneous gas.           Impression   Interval new amputation 1st and 2nd digits, age indeterminate.  No   subcutaneous gas.               Medical Decision Jrawsh-[id number]:       Medical Decision Making-Other:     Note:  · Labs, medications, radiologic studies were reviewed with personal review of films  · Large amounts of data were reviewed  · Discussed with nursing Staff, Discharge planner  · Infection Control and Prevention measures reviewed  · All prior entries were reviewed  · Administer medications as ordered  · Prognosis: Guarded  · Discharge planning reviewed      Thank you for allowing us to participate in the care of this patient. Please call with questions. Jared Jackson MD    [facility] participated in the evaluation of this patient.

## 2022-01-24 LAB
GLIADIN DEAMINIDATED PEPTIDE AB IGA: 18 U/ML
GLIADIN DEAMINIDATED PEPTIDE AB IGG: <0.4 U/ML
IGA: 407 MG/DL (ref 70–400)
SURGICAL PATHOLOGY REPORT: NORMAL
TISSUE TRANSGLUTAMINASE IGA: 2.5 U/ML

## 2022-01-24 NOTE — DISCHARGE SUMMARY
advised he be admitted 1/12/21 but pt refused until 1/17/21. Oneil Miller has been seen for wound care over the course of the last couple years and demonstrates repetitive non-compliant behaviors including failure to follow up when scheduled, failure to fill prescriptions in a timely manner, and using substitute medications that have been [de-identified] years old. Oneil Miller has a full thickness ulcer located right foot 1st interspace. X-ray and MRI of right foot was taken which reviewed 1st and 2nd met osteomyelitis. Venous duplex showed no sign of DVT. It was then decided to proceed with right transmetatarsal amputation and achilles tendon lengthening given the extent of the infection. Surgery was performed on 1/21/2022. A posterior splint was applied post op. On 1/23, it was then decided that patient is clinically stable to be discharged home. Patient was unwilling to be discharged because he ran out his narcotic a few days before admission and it won't be refilled until 1/25. Patient usually sees a pain management physician for prescription of pain medication. Case management , nursing staff and podiatry team had long discussion with patient and his family. Explain that we can't keep him here just for him to take his pain medication and he ran out of pain medication because he overdose it before admission. Patient and wife were upset. We managed to contact his pain management provider and was able to get medication refill on 1/24 morning instead. Patient was then discharged with 2 weeks of doxycycline. Patient will need to follow up with Dr. Lacho Carranza one week after discharge.      Consults: ID    Patient Instructions     Medications:      Medication List      START taking these medications    doxycycline hyclate 100 MG tablet  Commonly known as: VIBRA-TABS  Take 1 tablet by mouth 2 times daily for 14 days     ferrous sulfate 325 (65 Fe) MG EC tablet  Commonly known as: FE TABS 325  Take 1 tablet by mouth daily (with breakfast) CONTINUE taking these medications    Adaptic Non-Adhering Dressing Pads  Apply 1 Units topically 2 times daily     atorvastatin 20 MG tablet  Commonly known as: LIPITOR  Take 1 tablet by mouth daily     buPROPion 300 MG extended release tablet  Commonly known as: WELLBUTRIN XL  TAKE ONE TABLET BY MOUTH DAILY     diazePAM 5 MG tablet  Commonly known as: VALIUM  Take 1 tablet by mouth every 8 hours as needed for Anxiety for up to 30 days. fluocinonide 0.05 % ointment  Commonly known as: LIDEX  Apply 1 gm  topically 2 times daily to legs     * Kerlix Gauze Roll Large Misc  1 Units by Does not apply route 2 times daily     * Combine ABD 5\"X9\" Pads  1 Units by Does not apply route 2 times daily     ketorolac 10 MG tablet  Commonly known as: TORADOL  Take 1 tablet by mouth every 6 hours as needed for Pain     lactobacillus capsule  Take 1 capsule by mouth 2 times daily (with meals)     levothyroxine 50 MCG tablet  Commonly known as: SYNTHROID  Take 1 tablet by mouth daily     lisinopril 10 MG tablet  Commonly known as: PRINIVIL;ZESTRIL  Take 1 tablet by mouth daily     magnesium oxide 400 (241.3 Mg) MG Tabs tablet  Commonly known as: MAG-OX  Take 1 tablet by mouth 2 times daily     megestrol 40 MG/ML suspension  Commonly known as: Megace Oral  Take 10 mLs by mouth daily     metFORMIN 500 MG tablet  Commonly known as: GLUCOPHAGE  Take 0.5 tablets by mouth daily (with breakfast)     morphine 60 MG extended release tablet  Commonly known as: MS CONTIN  Take 1 tablet by mouth 2 times daily for 30 days. Narcan 4 MG/0.1ML Liqd nasal spray  Generic drug: naloxone     OneTouch Delica Plus MCVQCG27L Misc  TEST TWICE A DAY     OneTouch Ultra strip  Generic drug: blood glucose test strips  TEST TWO TIMES A DAY AS NEEDED     oxyCODONE 15 MG immediate release tablet  Commonly known as: OXY-IR  Take 1 tablet by mouth every 6 hours as needed for Pain for up to 30 days.      pantoprazole 40 MG tablet  Commonly known as:

## 2022-01-25 ENCOUNTER — TELEPHONE (OUTPATIENT)
Dept: PRIMARY CARE CLINIC | Age: 57
End: 2022-01-25

## 2022-01-26 ENCOUNTER — TELEPHONE (OUTPATIENT)
Dept: ADMINISTRATIVE | Age: 57
End: 2022-01-26

## 2022-01-27 ENCOUNTER — TELEPHONE (OUTPATIENT)
Dept: PRIMARY CARE CLINIC | Age: 57
End: 2022-01-27

## 2022-01-27 NOTE — TELEPHONE ENCOUNTER
Patients prior [facility]codone [facility] until 1/24/2023.

## 2022-02-08 ENCOUNTER — OFFICE VISIT (OUTPATIENT)
Dept: PODIATRY | Age: 57
End: 2022-02-08
Payer: MEDICARE

## 2022-02-08 VITALS — BODY MASS INDEX: 24.38 KG/M2 | WEIGHT: 190 LBS | HEIGHT: 74 IN

## 2022-02-08 DIAGNOSIS — Z89.431 STATUS POST TRANSMETATARSAL AMPUTATION OF RIGHT FOOT (HCC): ICD-10-CM

## 2022-02-08 DIAGNOSIS — E11.621 TYPE 2 DIABETES MELLITUS WITH FOOT ULCER, WITHOUT LONG-TERM CURRENT USE OF INSULIN (HCC): ICD-10-CM

## 2022-02-08 DIAGNOSIS — E11.42 TYPE 2 DIABETES MELLITUS WITH DIABETIC POLYNEUROPATHY, WITHOUT LONG-TERM CURRENT USE OF INSULIN (HCC): Primary | ICD-10-CM

## 2022-02-08 DIAGNOSIS — Z89.432 STATUS POST TRANSMETATARSAL AMPUTATION OF FOOT, LEFT (HCC): ICD-10-CM

## 2022-02-08 DIAGNOSIS — L97.313 LOWER LIMB ULCER, ANKLE, RIGHT, WITH NECROSIS OF MUSCLE (HCC): ICD-10-CM

## 2022-02-08 DIAGNOSIS — L97.509 TYPE 2 DIABETES MELLITUS WITH FOOT ULCER, WITHOUT LONG-TERM CURRENT USE OF INSULIN (HCC): ICD-10-CM

## 2022-02-08 DIAGNOSIS — T87.81 DEHISCENCE OF AMPUTATION STUMP (HCC): ICD-10-CM

## 2022-02-08 PROCEDURE — G8427 DOCREV CUR MEDS BY ELIG CLIN: HCPCS | Performed by: PODIATRIST

## 2022-02-08 PROCEDURE — L4360 PNEUMAT WALKING BOOT PRE CST: HCPCS | Performed by: PODIATRIST

## 2022-02-08 PROCEDURE — 1036F TOBACCO NON-USER: CPT | Performed by: PODIATRIST

## 2022-02-08 PROCEDURE — 3044F HG A1C LEVEL LT 7.0%: CPT | Performed by: PODIATRIST

## 2022-02-08 PROCEDURE — 2022F DILAT RTA XM EVC RTNOPTHY: CPT | Performed by: PODIATRIST

## 2022-02-08 PROCEDURE — 1111F DSCHRG MED/CURRENT MED MERGE: CPT | Performed by: PODIATRIST

## 2022-02-08 PROCEDURE — 3017F COLORECTAL CA SCREEN DOC REV: CPT | Performed by: PODIATRIST

## 2022-02-08 PROCEDURE — 99214 OFFICE O/P EST MOD 30 MIN: CPT | Performed by: PODIATRIST

## 2022-02-08 PROCEDURE — G8484 FLU IMMUNIZE NO ADMIN: HCPCS | Performed by: PODIATRIST

## 2022-02-08 PROCEDURE — G8420 CALC BMI NORM PARAMETERS: HCPCS | Performed by: PODIATRIST

## 2022-02-09 NOTE — PROGRESS NOTES
[facility]  Return Patient History and Physical      Ethan Christensen  AGE: 61 y.o. GENDER: male  : 1965  TODAY'S DATE:  2022    Chief Complaint:   Chief Complaint   Patient presents with    Post-Op Check     right foot     Diabetes     last blood sugar 115          History of the Present Illness       Ethan Christensen is a 61 y.o. male who presents today for evaluation and treatment for a diabetic foot ulcer, flap, dehisced surgical wound and surgical wound  wound which is located on the right    History of Wound: underwent TMA 22, discharged home 22. He did not feel that he should have been discharged due to not having pain medication at home. He was to follow the week that he was discharged but his wife could not get him out of the house. He was rescheduled for last week and refused to come in. Therefore, his dressing has not been changed since surgery. He has had some pain. Has kept the posterior splint intact. Wound Type:diabetic and surgical  Wound Location:right foot  Modifying factors:diabetes, poor glucose control and non-adherence    Lab Results   Component Value Date    LABA1C 6.1 (H) 2022                 Abx : Yes   Cultures :wereobtained  Pain : 5/10    PAST MEDICAL HISTORY        Diagnosis Date    Acute metabolic encephalopathy     CLAUDIA (acute kidney injury) ([address]     Anemia     Anxiety     [facility] renal failure) [facility]     Arthritis     lower back, knees    BPH (benign prostatic hyperplasia)     Chronic kidney disease     prostate    Depression     Diabetes mellitus ([address]     Diabetic neuropathy (HCC)     GERD (gastroesophageal reflux disease)     Hyperglycemia     Hyperkalemia     Hypertension     Hypothyroidism     Impacted tooth     Necrosis of bone of foot ([address]     Osteomyelitis of left foot ([address]     PAD (peripheral artery disease) ([address]     Pneumonia     Septic shock ([address]     Type 2 diabetes mellitus ([address] MEDICATIONS    Current Outpatient Medications on File Prior to Visit   Medication Sig Dispense Refill    ferrous sulfate (FE TABS 325) 325 (65 Fe) MG EC tablet Take 1 tablet by mouth daily (with breakfast) 90 tablet 3    morphine (MS CONTIN) 60 MG extended release tablet Take 1 tablet by mouth 2 times daily for 30 days. 60 tablet 0    oxyCODONE (OXY-IR) 15 MG immediate release tablet Take 1 tablet by mouth every 6 hours as needed for Pain for up to 30 days. 120 tablet 0    diazePAM (VALIUM) 5 MG tablet Take 1 tablet by mouth every 8 hours as needed for Anxiety for up to 30 days. 90 tablet 0    ketorolac (TORADOL) 10 MG tablet Take 1 tablet by mouth every 6 hours as needed for Pain 20 tablet 0    levothyroxine (SYNTHROID) 50 MCG tablet Take 1 tablet by mouth daily 90 tablet 3    fluocinonide (LIDEX) 0.05 % ointment Apply 1 gm  topically 2 times daily to legs 60 g 5    ONETOUCH ULTRA strip TEST TWO TIMES A DAY AS NEEDED 50 strip 0    buPROPion (WELLBUTRIN XL) 300 MG extended release tablet TAKE ONE TABLET BY MOUTH DAILY 30 tablet 9    PARoxetine (PAXIL) 20 MG tablet Take 4 tablets by mouth every morning 120 tablet 5    Lancets (ONETOUCH DELICA PLUS [id number]) MISC TEST TWICE A  each 5    sodium hypochlorite (DAKINS) 0.125 % SOLN external solution APPLY TOPICALLY EVERY 12 HOURS.  APPLY AS A WET TO DRY DRESSING TWO TIMES A DAY, CAN ALSO USE TO CLEANSE WOUND 437 mL 1    lisinopril (PRINIVIL;ZESTRIL) 10 MG tablet Take 1 tablet by mouth daily 90 tablet 3    metFORMIN (GLUCOPHAGE) 500 MG tablet Take 0.5 tablets by mouth daily (with breakfast) 45 tablet 3    megestrol (MEGACE ORAL) 40 MG/ML suspension Take 10 mLs by mouth daily 240 mL 3    NARCAN 4 MG/0.1ML LIQD nasal spray 4 mg      atorvastatin (LIPITOR) 20 MG tablet Take 1 tablet by mouth daily 90 tablet 3    lactobacillus (CULTURELLE) capsule Take 1 capsule by mouth 2 times daily (with meals) 60 capsule 0    magnesium oxide (MAG-OX) 400 (241.3 Mg) MG TABS tablet Take 1 tablet by mouth 2 times daily 30 tablet 11    pantoprazole (PROTONIX) 40 MG tablet Take 1 tablet by mouth every morning (before breakfast) 30 tablet 11    povidone-iodine (BETADINE) 7.5 % external solution Apply to wound. Apply as a wet to dry dressing 1 Bottle 1    Wound Dressings (ADAPTIC NON-ADHERING DRESSING) PADS Apply 1 Units topically 2 times daily 60 each 5    Gauze Pads & Dressings (COMBINE ABD) 5\"X9\" PADS 1 Units by Does not apply route 2 times daily 60 each 5    Gauze Pads & Dressings (KERLIX GAUZE ROLL LARGE) MISC 1 Units by Does not apply route 2 times daily 60 each 5     No current facility-administered medications on file prior to visit. ALLERGIES    Allergies   Allergen Reactions    Bactrim [Sulfamethoxazole-Trimethoprim]      reported renal failure    Fiorinal [Butalbital-Aspirin-Caffeine] Other (See Comments)     Generalized blisters    Statins Other (See Comments)     Muscle aches    Tylenol [Acetaminophen]      Pt states it shuts down his kidneys    Peanut Butter Flavor [Flavoring Agent] Nausea And Vomiting       PAST SURGICAL HISTORY    Past Surgical History:   Procedure Laterality Date    AMPUTATION Right 01/21/2022    TRANSMETARSAL AMPUTATION FOOT, TENDOACHILLES LENGTHENING (Right Foot)    APPENDECTOMY      BACK SURGERY      Lower x 4. Had abscess after appendectomy.      FOOT AMPUTATION Left 1/4/2020    TRANSMETATARSAL FOOT AMPUTATION, REMOVAL OF FOREIGN BODY performed by Sherren Chambers, DPM at [address] Left 01/04/2020    TRANSMETATARSAL FOOT AMPUTATION, REMOVAL OF FOREIGN BODY    KNEE SURGERY Right     scope    TOE AMPUTATION Bilateral 3/6/2017    TOE AMPUTATION LEFT HALLUX AND 2ND DIGIT AND RIGHT 2ND DIGIT performed by Kenton Brown DPM at [place] [id number] Right 1/21/2022    TRANSMETARSAL AMPUTATION FOOT, TENDOACHILLES LENGTHENING performed by Sherren Chambers, DPM at [facility] [address] [place] [address] UPPER GASTROINTESTINAL ENDOSCOPY N/A 5/20/2019    EGD WITH BIOPSY performed by Kelsey Vidal MD at [address] [facility] [address]    family history includes Cancer in his maternal grandfather; Diabetes in his father; No Known Problems in his mother. SOCIAL HISTORY    Social History     Tobacco Use    Smoking status: Never Smoker    Smokeless tobacco: Never Used   Vaping Use    Vaping Use: Never used   Substance Use Topics    Alcohol use: No    Drug use: Not Currently     Frequency: 2.0 times per week     Types: Marijuana (Weed)       Review of Systems    Objective:      Ht 6' 2\" (1.88 m)   Wt 190 lb (86.2 kg)   BMI 24.39 kg/m²   General Appearance: alert and oriented to person, place and time, well-developed and well-nourished, in no acute distress    Wound:         Wound #:   1    diabetic foot ulcer, dehisced surgical wound and surgical wound   right foot    Wound measurements:  #1  8 cm by 1 cm  by   1 cm         Wound Depth: subcutaneous, fascial, muscle and tendon. Drainage:    minimal Type:serosanguinous    Wound Bed status:   Granulation Tissue: 20%   Necrotic 80%  Type: slough, necrotic skin, sutures intact  Epithelialization 0%   Hypergranular 0%      Erythema absent       Odor:no    Maceration:yes    Undermining/tract/tunneling:yes    Culture taken:   no           Integument:  There is a pressure ulceration anterior right ankle. Depth is not determinable. 3 cm x 2 cm. No erythema. 100% eschar, adhered. Vascular:  DP/PT pulses palpable 1/4, Bilateral.    Edema absent, Right. Erythema absent, Right. Neurological:  Sensation absent to light touch to level of digits, Bilateral.      Assessment:         1. Type 2 diabetes mellitus with diabetic polyneuropathy, without long-term current use of insulin ([address]    2. Status post transmetatarsal amputation of right foot ([facility]    3.  Type 2 diabetes mellitus with foot ulcer, without long-term current use of insulin ([facility])    4. Status post transmetatarsal amputation of foot, left ([facility]    5. Dehiscence of amputation stump ([facility]    6. Lower limb ulcer, ankle, right, with necrosis of muscle ([facility]          Plan:   Jose Isbell Age:  61 y.o. Gender:  male   :  1965  Today's Date:  2022    Pt was evaluated and examined. Patient was given personalized discharge instructions. Pt will follow up in 1 weeks or sooner if any problems arise. Diagnosis was discussed with the pt and all of their questions were answered in detail. Proper foot hygiene and care was discussed with the pt. Patient to check feet daily and contact the office with any questions/problems/concerns. Other comorbidity noted and will be managed by PCP. Plan for wound  Dress per physician order  Treatment: Incision cleaned with peroxide. Dressed with betadine, gauze, ABD, kerlix and an ace wrap. CAM walker at all times  OK to dress foot in one week, with silvercel, DSD - supplies given to wife with instructions  I asked patient to come in sooner, but he refused to be seen sooner than 2 weeks. Discussed the state of his foot and need for intensive wound care     1. Discussed appropriate home care of this wound. 2. Dressings:   Orders Placed This Encounter   Procedures    NV PNEUMAT WALKING BOOT PRE CST     Right Foot      3. Follow up: 1 week. 4. Detailed home instructions and education material given to patient prior to discharge. I have dispensed a pneumatic equalizer walker. Due to the patient's diagnosis and related symptoms this is medically necessary for the treatment. The function of this device is to restrict and limit motion and provide stabilization and compression to the affected area. This device will allow the patient to slowly increase strength and ROM of the extremity. Specific instructions on weight bearing and ROM exercises were discussed and given to the patient.  The goals and function of this device was explained in detail to the patient. Upon gait analysis, the device appeared to be fitting well and the patient states that the device is comfortable at this time. The patient was shown how to properly apply, wear, and care for the device. The patient was able to apply properly and ambulate without distress. At that time, the device was  dispensed, it was suitable for the patient's condition and was not substandard. No guarantees were given and the precautions were reviewed. Written instructions and warranty information was given along with the list of the twenty-one (21) Durable Medical Equipment Supplier Guidelines.  All the questions were answered satisfactorily    Electronically signed by Jacquelin Sutton DPM on 2/9/2022 at 8:39 AM  2/8/2022

## 2022-02-17 DIAGNOSIS — F34.1 DYSTHYMIA: ICD-10-CM

## 2022-02-17 DIAGNOSIS — M54.12 RIGHT CERVICAL RADICULOPATHY: ICD-10-CM

## 2022-02-17 DIAGNOSIS — L97.524 ULCERATED, FOOT, LEFT, WITH NECROSIS OF BONE (HCC): ICD-10-CM

## 2022-02-17 DIAGNOSIS — M86.9 OSTEOMYELITIS, UNSPECIFIED SITE, UNSPECIFIED TYPE (HCC): ICD-10-CM

## 2022-02-17 DIAGNOSIS — M86.172 OSTEOMYELITIS OF FOOT, LEFT, ACUTE (HCC): ICD-10-CM

## 2022-02-18 RX ORDER — DIAZEPAM 5 MG/1
5 TABLET ORAL EVERY 8 HOURS PRN
Qty: 90 TABLET | Refills: 0 | Status: SHIPPED | OUTPATIENT
Start: 2022-02-18 | End: 2022-03-17 | Stop reason: SDUPTHER

## 2022-02-18 RX ORDER — OXYCODONE HYDROCHLORIDE 15 MG/1
15 TABLET ORAL EVERY 6 HOURS PRN
Qty: 120 TABLET | Refills: 0 | Status: SHIPPED | OUTPATIENT
Start: 2022-02-18 | End: 2022-03-17 | Stop reason: SDUPTHER

## 2022-02-18 RX ORDER — MORPHINE SULFATE 60 MG/1
60 TABLET, FILM COATED, EXTENDED RELEASE ORAL 2 TIMES DAILY
Qty: 60 TABLET | Refills: 0 | Status: SHIPPED | OUTPATIENT
Start: 2022-02-18 | End: 2022-03-17 | Stop reason: SDUPTHER

## 2022-02-24 ENCOUNTER — APPOINTMENT (OUTPATIENT)
Dept: GENERAL RADIOLOGY | Age: 57
DRG: 305 | End: 2022-02-24
Payer: MEDICARE

## 2022-02-24 ENCOUNTER — HOSPITAL ENCOUNTER (INPATIENT)
Age: 57
LOS: 5 days | Discharge: LEFT BEFORE DISCHARGE | DRG: 305 | End: 2022-03-01
Attending: EMERGENCY MEDICINE | Admitting: PODIATRIST
Payer: MEDICARE

## 2022-02-24 DIAGNOSIS — T81.31XA POSTOPERATIVE WOUND DEHISCENCE, INITIAL ENCOUNTER: Primary | ICD-10-CM

## 2022-02-24 PROBLEM — T81.30XA WOUND DEHISCENCE: Status: ACTIVE | Noted: 2022-02-24

## 2022-02-24 LAB
C-REACTIVE PROTEIN: 264.2 MG/L (ref 0–5)
CREAT SERPL-MCNC: 1.84 MG/DL (ref 0.7–1.2)
GFR AFRICAN AMERICAN: 46 ML/MIN
GFR NON-AFRICAN AMERICAN: 38 ML/MIN
GFR SERPL CREATININE-BSD FRML MDRD: ABNORMAL ML/MIN/{1.73_M2}
SEDIMENTATION RATE, ERYTHROCYTE: 78 MM (ref 0–20)

## 2022-02-24 PROCEDURE — 87186 SC STD MICRODIL/AGAR DIL: CPT

## 2022-02-24 PROCEDURE — 2580000003 HC RX 258

## 2022-02-24 PROCEDURE — 86140 C-REACTIVE PROTEIN: CPT

## 2022-02-24 PROCEDURE — 87077 CULTURE AEROBIC IDENTIFY: CPT

## 2022-02-24 PROCEDURE — 82565 ASSAY OF CREATININE: CPT

## 2022-02-24 PROCEDURE — 86403 PARTICLE AGGLUT ANTBDY SCRN: CPT

## 2022-02-24 PROCEDURE — 6360000002 HC RX W HCPCS

## 2022-02-24 PROCEDURE — 87205 SMEAR GRAM STAIN: CPT

## 2022-02-24 PROCEDURE — 1200000000 HC SEMI PRIVATE

## 2022-02-24 PROCEDURE — 6370000000 HC RX 637 (ALT 250 FOR IP)

## 2022-02-24 PROCEDURE — 87075 CULTR BACTERIA EXCEPT BLOOD: CPT

## 2022-02-24 PROCEDURE — 87070 CULTURE OTHR SPECIMN AEROBIC: CPT

## 2022-02-24 PROCEDURE — 99283 EMERGENCY DEPT VISIT LOW MDM: CPT

## 2022-02-24 PROCEDURE — 85652 RBC SED RATE AUTOMATED: CPT

## 2022-02-24 PROCEDURE — 73630 X-RAY EXAM OF FOOT: CPT

## 2022-02-24 PROCEDURE — 36415 COLL VENOUS BLD VENIPUNCTURE: CPT

## 2022-02-24 RX ORDER — SODIUM CHLORIDE 0.9 % (FLUSH) 0.9 %
5-40 SYRINGE (ML) INJECTION PRN
Status: DISCONTINUED | OUTPATIENT
Start: 2022-02-24 | End: 2022-03-02 | Stop reason: HOSPADM

## 2022-02-24 RX ORDER — FERROUS SULFATE 325(65) MG
325 TABLET ORAL
Status: DISCONTINUED | OUTPATIENT
Start: 2022-02-25 | End: 2022-03-02 | Stop reason: HOSPADM

## 2022-02-24 RX ORDER — DIAZEPAM 5 MG/1
5 TABLET ORAL EVERY 8 HOURS PRN
Status: DISCONTINUED | OUTPATIENT
Start: 2022-02-24 | End: 2022-03-02 | Stop reason: HOSPADM

## 2022-02-24 RX ORDER — OXYCODONE HYDROCHLORIDE 5 MG/1
15 TABLET ORAL EVERY 6 HOURS PRN
Status: DISCONTINUED | OUTPATIENT
Start: 2022-02-24 | End: 2022-02-25

## 2022-02-24 RX ORDER — ONDANSETRON 2 MG/ML
4 INJECTION INTRAMUSCULAR; INTRAVENOUS EVERY 6 HOURS PRN
Status: DISCONTINUED | OUTPATIENT
Start: 2022-02-24 | End: 2022-03-02 | Stop reason: HOSPADM

## 2022-02-24 RX ORDER — LEVOTHYROXINE SODIUM 0.05 MG/1
50 TABLET ORAL DAILY
Status: DISCONTINUED | OUTPATIENT
Start: 2022-02-24 | End: 2022-03-02 | Stop reason: HOSPADM

## 2022-02-24 RX ORDER — MORPHINE SULFATE 30 MG/1
60 TABLET, FILM COATED, EXTENDED RELEASE ORAL 2 TIMES DAILY
Status: DISCONTINUED | OUTPATIENT
Start: 2022-02-24 | End: 2022-03-02 | Stop reason: HOSPADM

## 2022-02-24 RX ORDER — SODIUM CHLORIDE 0.9 % (FLUSH) 0.9 %
5-40 SYRINGE (ML) INJECTION EVERY 12 HOURS SCHEDULED
Status: DISCONTINUED | OUTPATIENT
Start: 2022-02-24 | End: 2022-03-02 | Stop reason: HOSPADM

## 2022-02-24 RX ORDER — POLYETHYLENE GLYCOL 3350 17 G/17G
17 POWDER, FOR SOLUTION ORAL DAILY PRN
Status: DISCONTINUED | OUTPATIENT
Start: 2022-02-24 | End: 2022-03-02 | Stop reason: HOSPADM

## 2022-02-24 RX ORDER — LACTOBACILLUS RHAMNOSUS GG 10B CELL
1 CAPSULE ORAL 2 TIMES DAILY WITH MEALS
Status: DISCONTINUED | OUTPATIENT
Start: 2022-02-24 | End: 2022-03-02 | Stop reason: HOSPADM

## 2022-02-24 RX ORDER — ONDANSETRON 4 MG/1
4 TABLET, ORALLY DISINTEGRATING ORAL EVERY 8 HOURS PRN
Status: DISCONTINUED | OUTPATIENT
Start: 2022-02-24 | End: 2022-03-02 | Stop reason: HOSPADM

## 2022-02-24 RX ORDER — PAROXETINE HYDROCHLORIDE 40 MG/1
80 TABLET, FILM COATED ORAL EVERY MORNING
Status: DISCONTINUED | OUTPATIENT
Start: 2022-02-25 | End: 2022-03-02 | Stop reason: HOSPADM

## 2022-02-24 RX ORDER — SODIUM CHLORIDE 9 MG/ML
25 INJECTION, SOLUTION INTRAVENOUS PRN
Status: DISCONTINUED | OUTPATIENT
Start: 2022-02-24 | End: 2022-03-02 | Stop reason: HOSPADM

## 2022-02-24 RX ADMIN — MORPHINE SULFATE 60 MG: 30 TABLET, FILM COATED, EXTENDED RELEASE ORAL at 22:11

## 2022-02-24 RX ADMIN — VANCOMYCIN HYDROCHLORIDE 2000 MG: 1 INJECTION, POWDER, LYOPHILIZED, FOR SOLUTION INTRAVENOUS at 18:38

## 2022-02-24 ASSESSMENT — ENCOUNTER SYMPTOMS
SHORTNESS OF BREATH: 0
DIARRHEA: 0
NAUSEA: 0
VOMITING: 0
CONSTIPATION: 0
ABDOMINAL PAIN: 0
COUGH: 0

## 2022-02-24 ASSESSMENT — PAIN SCALES - GENERAL: PAINLEVEL_OUTOF10: 9

## 2022-02-24 NOTE — ED TRIAGE NOTES
Patient to emergency department requesting  wound check of right foot amputation. Per wife amputation was doen January 21, and noticed \"the stiches came loose and the skin is flopping around\" a few days ago.

## 2022-02-24 NOTE — H&P
Admission History and Physical  Podiatric Medicine and Surgery     Subjective     Chief Complaint: Wound dehiscence to the right foot    HPI:  Isaiah Freeman is a 61 y.o. male seen at [facility] for dehiscence of his s/p right TMA. . Patient is well-known to Dr. Verena Chavez and has not been compliant with his follow-up appointments. He failed to follow up in the office this week. Patient was at [facility] about a month ago for having his right forefoot amputated due to osteomyelitis and diabetes. Patient has been on doxycycline since surgery. HE has been weight bearing against instructions. Patient states that he came to ED because he noticed purulent drainage to the bottom incision site. Pt was admitted from ED for revision of his right TMA. Patient denies any nausea, vomiting, fever, chills or shortness of breath. He gradually removed all of the sutures that were present at his last appointment. PCP is Omari Gavin MD    ROS:   Review of Systems   Constitutional: Positive for activity change. Negative for chills, fatigue and fever. Respiratory: Negative for cough and shortness of breath. Cardiovascular: Negative for leg swelling. Skin: Positive for wound. Neurological: Negative for weakness. Past Medical History   has a past medical history of Acute metabolic encephalopathy, CLAUDIA (acute kidney injury) ([address] Anemia, Anxiety, ARF (acute renal failure) ([address] Arthritis, BPH (benign prostatic hyperplasia), Chronic kidney disease, Depression, Diabetes mellitus ([address] Diabetic neuropathy ([address] GERD (gastroesophageal reflux disease), Hyperglycemia, Hyperkalemia, Hypertension, Hypothyroidism, Impacted tooth, Necrosis of bone of foot ([address] Osteomyelitis of left foot ([address] PAD (peripheral artery disease) ([address] Pneumonia, Septic shock ([address] and Type 2 diabetes mellitus ([address]     Past Surgical History   has a past surgical history that includes Appendectomy; knee surgery (Right); Tonsillectomy; back surgery; Toe amputation (Bilateral, 3/6/2017); Upper gastrointestinal endoscopy (N/A, 5/20/2019); Foot surgery (Left, 01/04/2020); Foot Amputation (Left, 1/4/2020); amputation (Right, 01/21/2022); and Toe amputation (Right, 1/21/2022). Medications  Prior to Admission medications    Medication Sig Start Date End Date Taking? Authorizing Provider   morphine (MS CONTIN) 60 MG extended release tablet Take 1 tablet by mouth 2 times daily for 30 days. 2/18/22 3/20/22  Emerson Gowers, MD   oxyCODONE (OXY-IR) 15 MG immediate release tablet Take 1 tablet by mouth every 6 hours as needed for Pain for up to 30 days. 2/18/22 3/20/22  Emerson Gowers, MD   diazePAM (VALIUM) 5 MG tablet Take 1 tablet by mouth every 8 hours as needed for Anxiety for up to 30 days. 2/18/22 3/20/22  Emerson Gowers, MD   ferrous sulfate (FE TABS 325) 325 (65 Fe) MG EC tablet Take 1 tablet by mouth daily (with breakfast) 1/24/22   Nancy Hendrix DPM   ketorolac (TORADOL) 10 MG tablet Take 1 tablet by mouth every 6 hours as needed for Pain 12/17/21 12/17/22  Emerson Gowers, MD   levothyroxine (SYNTHROID) 50 MCG tablet Take 1 tablet by mouth daily 11/26/21   Emerson Gowers, MD   fluocinonide (LIDEX) 0.05 % ointment Apply 1 gm  topically 2 times daily to legs 11/17/21   Ashlie Honour, DPM   ONETOUCH ULTRA strip TEST TWO TIMES A DAY AS NEEDED 9/21/21   Emerson Gowers, MD   buPROPion (WELLBUTRIN XL) 300 MG extended release tablet TAKE ONE TABLET BY MOUTH DAILY 9/7/21   Emerson Gowers, MD   PARoxetine (PAXIL) 20 MG tablet Take 4 tablets by mouth every morning 8/3/21   Emerson Gowers, MD   Lancets (Ezzard Salines) [address] TEST TWICE A DAY 3/8/21   Emerson Gowers, MD   sodium hypochlorite (DAKINS) 0.125 % SOLN external solution APPLY TOPICALLY EVERY 12 HOURS.  APPLY AS A WET TO DRY DRESSING TWO TIMES A DAY, CAN ALSO USE TO CLEANSE WOUND 5/11/20   Ashlie Aisha, DPM   lisinopril (PRINIVIL;ZESTRIL) 10 MG tablet Take 1 tablet by mouth daily 4/29/20   Kenna Traylor MD   metFORMIN (GLUCOPHAGE) 500 MG tablet Take 0.5 tablets by mouth daily (with breakfast) 4/29/20   Kenna Trayolr MD   megestrol (MEGACE ORAL) 40 MG/ML suspension Take 10 mLs by mouth daily 12/24/19   Kenna Traylor MD   [facility] 4 MG/0.1ML LIQD nasal spray 4 mg 10/29/19   Historical Provider, MD   atorvastatin (LIPITOR) 20 MG tablet Take 1 tablet by mouth daily 10/10/19   Kenna Traylor MD   lactobacillus (CULTURELLE) capsule Take 1 capsule by mouth 2 times daily (with meals) 5/21/19   Kenna Traylor MD   magnesium oxide (MAG-OX) 400 (241.3 Mg) MG TABS tablet Take 1 tablet by mouth 2 times daily 5/21/19   Kenna Traylor MD   pantoprazole (PROTONIX) 40 MG tablet Take 1 tablet by mouth every morning (before breakfast) 5/22/19   Kenna Traylor MD   povidone-iodine (BETADINE) 7.5 % external solution Apply to wound. Apply as a wet to dry dressing 4/16/19   Ramona Devine DPM   Wound Dressings (ADAPTIC NON-ADHERING DRESSING) PADS Apply 1 Units topically 2 times daily 9/28/18   Kenna Traylor MD   Gauze Pads & Dressings (COMBINE ABD) 5\"X9\" PADS 1 Units by Does not apply route 2 times daily 9/28/18   Kenna Traylor MD   Gauze Pads & Dressings [facility] GAUZE [facility]) [address] [facility] 1 Units by Does not apply route 2 times daily 6/12/17 11/17/21  Raulito Alexander MD    Scheduled Meds:  Continuous Infusions:  PRN Meds:. Allergies  is allergic to bactrim [sulfamethoxazole-trimethoprim], fiorinal [butalbital-aspirin-caffeine], statins, tylenol [acetaminophen], and peanut butter flavor [flavoring agent]. Family History  family history includes Cancer in his maternal grandfather; Diabetes in his father; No Known Problems in his mother. Social History   reports that he has never smoked. He has never used smokeless tobacco.   reports no history of alcohol use. reports previous drug use.  Frequency: 2.00 times per week. Drug: Marijuana Christa Chin). Objective     Vitals:  Patient Vitals for the past 8 hrs:   BP Temp Pulse Resp SpO2 Height Weight   22 1411 (!) 129/45 98.6 °F (37 °C) 97 16 97 % 6' 2\" (1.88 m) 190 lb (86.2 kg)     Average, Min, and Max for last 24 hours Vitals:  TEMPERATURE:  Temp  Av.6 °F (37 °C)  Min: 98.6 °F (37 °C)  Max: 98.6 °F (37 °C)    RESPIRATIONS RANGE: Resp  Av  Min: 16  Max: 16    PULSE RANGE: Pulse  Av  Min: 97  Max: 97    BLOOD PRESSURE RANGE:  Systolic (80JVF), KEC:500 , Min:129 , ZU   ; Diastolic (99QKV), GCP:40, Min:45, Max:45      PULSE OXIMETRY RANGE: SpO2  Av %  Min: 97 %  Max: 97 %  I&O:  No intake/output data recorded. CBC:  No results for input(s): WBC, HGB, HCT, PLT, CRP in the last 72 hours. Invalid input(s):  ESR     BMP:  No results for input(s): NA, K, CL, CO2, BUN, CREATININE, GLUCOSE, CALCIUM in the last 72 hours. Coags:  No results for input(s): APTT, PROT, INR in the last 72 hours. Lab Results   Component Value Date    LABA1C 6.1 (H) 2022     Lab Results   Component Value Date    SEDRATE 96 (H) 2022      Lab Results   Component Value Date    CRP 4.6 2022        Physical Exam:    General: A&Ox3, NAD  Heart: Regular rate and rhythm. Lungs: Equal air entry. No increased effort. Abdomen: Soft, non-tender to palpation. Not distended. Lower Extremity:  Vascular: DP and PT pulses are palpable +1/4. Hair growth is absent to the level of the digits. Moderate nonpitting edema appreciated to the right foot. Neuro: Saph/sural/SP/DP/plantar sensation absent to light touch. Musculoskeletal: Muscle strength is adequate ROM, adequate strength to all lower extremity muscle groups.  Gross deformity is present with history of transmetatarsal amputation to the left foot and recent transmetatarsal amputation to the right foot    Dermatologic: There is full-thickness ulceration located to the previous amputation site of the right foot. The ulceration measures 14 cm x 7 cm x 4.5 cm. Base is fibro-granular with serous drainage noted to the incision site. No associated malodor. Erythema and warmth appreciated to the area. Does probe to bone at the amputated first metatarsal shaft. No fluctuance, crepitus or induration appreciated. All sutures are gone. Clinical Images:              Imaging:   XR FOOT RIGHT (MIN 3 VIEWS)   Final Result   Postoperative findings status post transmetatarsal amputation. Open wound is   noted without soft tissue gas otherwise appreciated. No new osseous   abnormality appreciated. Assessment     Imelda Muller is a 61 y.o. male with   1. Dehiscence of amputation stump; right foot  2. S/p right transmetatarsal amputation  3. DM2 with polyneuropathy  4. S/p left transmetatarsal amputation    Principal Problem:    Wound dehiscence  Resolved Problems:    * No resolved hospital problems. *       Plan     · Patient examined and evaluated at bedside   · Treatment options discussed in detail with the patient  · X-rays of the right foot taken in ED: No soft tissue emphysema appreciated and no osseous abnormality appreciated. Postoperative findings consistent with status post transmetatarsal amputation. · Internal medicine consulted for medical management  · Abx: Cefepime and Vancomycin  · Case management consulted for assistance with discharge planning  · Educated patient on the problems with noncompliance that may lead to further amputation, infection, risk of further limb loss and/or life. · At this time be plan on taking the patient to the OR tomorrow for revision of right transmetatarsal amputation and debridement of any nonviable soft tissue or bone with possible wound VAC application.  Time of surgery: TBD but after 1.30pm  · Patient will be n.p.o. after early breakfast  · Anticoagulants will be held at midnight  · Rapid Covid test ordered  · Patient will be consented tomorrow morning. · Dressing applied to Right foot: wet to dry DSD and ACE    DVT ppx: lovenox    Diet: carb control   Activity: PWB to Right lower extremity  Pain Control: panel ordered      Musa Leon DPM   Podiatric Medicine & Surgery   2/24/2022 at 4:23 PM      I performed a history and physical examination of the patient and discussed management with the resident. I reviewed the residents note and agree with the documented findings and plan of care. Any areas of disagreement are noted on the chart. I was personally present for the key portions of any procedures. I have documented in the chart those procedures where I was not present during the key portions. I have reviewed the Podiatry Resident progress note. I agree with the chief complaint, past medical history, past surgical history, allergies, medications, social and family history as documented unless otherwise noted below. Documentation of the HPI, Physical Exam and Medical Decision Making performed by medical students or scribes is based on my personal performance of the HPI, PE and MDM. I have personally evaluated this patient and have completed at least one if not all key elements of the E/M (history, physical exam, and MDM). Additional findings are as noted.      Fracisco Joseph DPM on 2/24/2022 at 8:24 PM  Board Certified, American Board of Podiatric Surgery  Fellow, [employer] and [facility]

## 2022-02-24 NOTE — PROGRESS NOTES
Vancomycin Dosing by Pharmacy - Initial Note   TODAY'S DATE:  2/24/2022  Patient name, age:  Eva Pablo, 61 y.o. Indication: SSTI, MRSA suspected. Additional antimicrobials:  cefepime, metronidazole    Allergies:  Bactrim [sulfamethoxazole-trimethoprim], Fiorinal [butalbital-aspirin-caffeine], Statins, Tylenol [acetaminophen], and Peanut butter flavor [flavoring agent]   Actual Weight:    Wt Readings from Last 1 Encounters:   02/24/22 228 lb 2.8 oz (103.5 kg)     Labs/Ancillary Data  Estimated Creatinine Clearance: 57 mL/min (A) (based on SCr of 1.84 mg/dL (H)). Recent Labs     02/24/22  1745   CREATININE 1.84*     No results found for: PROCAL  No intake or output data in the 24 hours ending 02/24/22 1854  Temp: 98.6    Recent vancomycin administrations                     vancomycin (VANCOCIN) 2,000 mg in dextrose 5 % 500 mL IVPB (mg) 2,000 mg New Bag 02/24/22 1838                  Culture Date / Source  /  Results  See micro    MRSA Nasal Swab: N/A. Non-respiratory infection. Jenniffer Trotter PLAN   Initial loading dose of 25mg/kg (max of 2,500 mg) = 2000  mg  x 1, then  vancomycin 1250 mg IV every 24 hours. Ensured BUN/sCr ordered at baseline and every 48 hours x at least 3 levels, then at least weekly. Vancomycin level ordered for 2/26 @ 0600. Will use bayesian method for dosing. This level will not be a trough. Target AUC/FERNANDO: 400-600. Vancomycin Target Concentration Parameters  Treatment  Population Target AUC/FERNANDO Target Trough   Invasive MRSA Infection (bacteremia, pneumonia, meningitis, endocarditis, osteomyelitis)  Sepsis (undifferentiated) 400-600 N/A   Infection due to non-MRSA pathogen  Empiric treatment of non-invasive MRSA infection  (SSTI, UTI) <500 10-15 mg/L   CrCl < 29 mL/min  Rapidly fluctuating serum creatinine   CLAUDIA N/A < 15 mg/L     Renal replacement therapy is dosed by levels, per hospital protocol. Abbreviations  * Pauc: probability that AUC is >400 (efficacy);  Pconc: probability that Ctrough is above 20 ?g/mL (toxicity); Tox: Probability of nephrotoxicity, based on Chasity et al. Clin Infect Dis 2009. Loading dose: 2000 mg at 18:00 02/24/2022. Regimen: 1250 mg IV every 24 hours. Start time: 18:51 on 02/24/2022  Exposure target: AUC24 (range)400-600 mg/L.hr   AUC24,ss: 445 mg/L.hr  Probability of AUC24 > 400: 62 %  Ctrough,ss: 13.8 mg/L  Probability of Ctrough,ss > 20: 19 %  Probability of nephrotoxicity (Lodise SAEED 2009): 9 %      Thank you for the consult. Pharmacy will continue to follow.    Keiko Leyva, [address]  2/24/2022  6:55 PM

## 2022-02-24 NOTE — PROGRESS NOTES
Medication History completed:    New medications: none    Medications discontinued: megestrol suspension, magnesium oxide, lisinopril, ketorolac, bupropion XL, atorvastatin    Changes to dosing: none    Stated allergies: As listed    Other pertinent information: Medications confirmed with [address] [place] [address]. The patient reports that he takes his metformin on an as needed basis, only when his sugar is over 120.      Thank you,  Chantel Vela, PharmD, BCPS  [phone]

## 2022-02-24 NOTE — ED PROVIDER NOTES
[address] ED  eMERGENCY dEPARTMENT eNCOUnter      Pt Name: Mellissa Samuel  MRN: [id number]  [place] 1965  Date of evaluation: 2/24/22      CHIEF COMPLAINT     No chief complaint on file. HISTORY OF PRESENT ILLNESS    Mellissa Samuel is a 61 y.o. male who presents complaining of wound check. Patient is here about a month after having his forefoot amputated due to osteomyelitis and diabetes. Patient did not have appropriate follow-up care but did follow-up 2 weeks ago with the podiatrist and noted that there was a bit of a wound that had developed there. A little bit of dehiscence and debridement was done at that time. Patient has been on doxycycline since surgery. Patient states that he has been following his normal regimen is just here because he is concerned that it has opened up more. Patient states that there is a little bit of purulent discharge through the medial aspect of the wound. Patient has not been in contact with podiatrist.  Patient had no fevers. Pain is controlled with home pain medication. REVIEW OF SYSTEMS       Review of Systems   Constitutional: Negative for chills and fever. Respiratory: Negative for cough and shortness of breath. Cardiovascular: Negative for chest pain and palpitations. Gastrointestinal: Negative for abdominal pain, constipation, diarrhea, nausea and vomiting. Skin: Positive for wound. Neurological: Negative for dizziness, seizures and headaches.        PAST MEDICAL HISTORY     Past Medical History:   Diagnosis Date    Acute metabolic encephalopathy     CLAUDIA (acute kidney injury) ([address]     Anemia     Anxiety     ARF (acute renal failure) (HCC)     Arthritis     lower back, knees    BPH (benign prostatic hyperplasia)     Chronic kidney disease     prostate    Depression     Diabetes mellitus ([address]     Diabetic neuropathy (HCC)     GERD (gastroesophageal reflux disease)     Hyperglycemia     Hyperkalemia     Hypertension     Hypothyroidism     Impacted tooth     [address] of [address] of foot [address]     Osteomyelitis of left foot (HCC)     PAD (peripheral artery disease) (HCC)     Pneumonia     Septic shock (HCC)     Type 2 diabetes mellitus ([facility]        SURGICAL HISTORY       Past Surgical History:   Procedure Laterality Date    AMPUTATION Right 01/21/2022    TRANSMETARSAL AMPUTATION FOOT, TENDOACHILLES LENGTHENING (Right Foot)    APPENDECTOMY      BACK SURGERY      Lower x 4. Had abscess after appendectomy.  FOOT AMPUTATION Left 1/4/2020    TRANSMETATARSAL FOOT AMPUTATION, REMOVAL OF FOREIGN BODY performed by Fracisco Joseph DPM at [address] [place] [address] Rd Left 01/04/2020    TRANSMETATARSAL FOOT AMPUTATION, REMOVAL OF FOREIGN BODY    KNEE SURGERY Right     scope    TOE AMPUTATION Bilateral 3/6/2017    TOE AMPUTATION LEFT HALLUX AND 2ND DIGIT AND RIGHT 2ND DIGIT performed by Cheryln Lefort, DPM at [address] [facility] Right 1/21/2022    TRANSMETARSAL AMPUTATION FOOT, TENDOACHILLES LENGTHENING performed by Fracisco Joseph DPM at [address] [facility] N/A 5/20/2019    EGD WITH BIOPSY performed by Piyush Welsh MD at [facility]       Previous Medications    ATORVASTATIN (LIPITOR) 20 MG TABLET    Take 1 tablet by mouth daily    BUPROPION (WELLBUTRIN XL) 300 MG EXTENDED RELEASE TABLET    TAKE ONE TABLET BY MOUTH DAILY    DIAZEPAM (VALIUM) 5 MG TABLET    Take 1 tablet by mouth every 8 hours as needed for Anxiety for up to 30 days.     FERROUS SULFATE (FE TABS 325) 325 (65 FE) MG EC TABLET    Take 1 tablet by mouth daily (with breakfast)    FLUOCINONIDE (LIDEX) 0.05 % OINTMENT    Apply 1 gm  topically 2 times daily to legs    GAUZE PADS & DRESSINGS (COMBINE ABD) 5\"X9\" PADS    1 Units by Does not apply route 2 times daily    GAUZE PADS & DRESSINGS (KERLIX GAUZE ROLL LARGE) MISC    1 Units by Does not apply route 2 times daily reports that he does not drink alcohol. PHYSICAL EXAM     INITIAL VITALS: BP (!) 129/45   Pulse 97   Temp 98.6 °F (37 °C)   Resp 16   Ht 6' 2\" (1.88 m)   Wt 190 lb (86.2 kg)   SpO2 97%   BMI 24.39 kg/m²      Physical Exam  Vitals and nursing note reviewed. Constitutional:       General: He is not in acute distress. Appearance: He is well-developed. He is not diaphoretic. HENT:      Head: Normocephalic and atraumatic. Eyes:      General: No scleral icterus. Right eye: No discharge. Left eye: No discharge. Conjunctiva/sclera: Conjunctivae normal.      Pupils: Pupils are equal, round, and reactive to light. Pulmonary:      Effort: Pulmonary effort is normal. No respiratory distress. Skin:     General: Skin is warm and dry. Coloration: Skin is not pale. Findings: No erythema or rash. Comments: See picture in media   Neurological:      Mental Status: He is alert and oriented to person, place, and time. Psychiatric:         Behavior: Behavior normal.         Thought Content: Thought content normal.         Judgment: Judgment normal.       Media Information             Document Information    Clinical Consent:  Wound   Postop wound dehiscence    02/24/2022 14:21   Attached To: Hospital Encounter on 2/24/22     Source Information    Kaylynn Song MD  [facility] Emergency Dept         DIAGNOSTIC RESULTS     RADIOLOGY:All plain film, CT,MRI, and formal ultrasound images (except ED bedside ultrasound) are read by the radiologist and the interpretations are directly viewed by the emergency physician. XR FOOT RIGHT (MIN 3 VIEWS)    Result Date: 2/24/2022  EXAMINATION: THREE XRAY VIEWS OF THE RIGHT FOOT 2/24/2022 2:49 pm COMPARISON: 01/21/2022 HISTORY: ORDERING SYSTEM PROVIDED HISTORY: Post op TECHNOLOGIST PROVIDED HISTORY: Post op Reason for Exam: wound check FINDINGS: Status post transmetatarsal amputation with overlying soft tissue wound.   No soft tissue gas otherwise appreciated beyond this wound. Mild dorsal foot swelling. No new osseous abnormality identified. Degenerative change in the talonavicular joint. Postoperative findings status post transmetatarsal amputation. Open wound is noted without soft tissue gas otherwise appreciated. No new osseous abnormality appreciated. LABS: All lab results were reviewed by myself, and all abnormals are listed below. Labs Reviewed - No data to display      MEDICAL DECISION MAKING:     Patient's wound definitely has a deepest but I do not see any signs of obvious active infection he is currently on doxycycline there is no exposed bone but there is flap has completely dehisced. Will consult with podiatry. EMERGENCY DEPARTMENT COURSE:   Vitals:    Vitals:    02/24/22 1411   BP: (!) 129/45   Pulse: 97   Resp: 16   Temp: 98.6 °F (37 °C)   SpO2: 97%   Weight: 190 lb (86.2 kg)   Height: 6' 2\" (1.88 m)       The patient was given the following medications while in the emergency department:  No orders of the defined types were placed in this encounter. -------------------------  4:11 PM EST  Podiatry resident has been down evaluated the patient feels that they need to be admitted as they will probably need an OR debridement and revision and possible amputation. Patient is in agreement to this and they are in the primarily and is requesting internal medicine on for consult. CONSULTS:  IP CONSULT TO PODIATRY    PROCEDURES:  None    FINAL IMPRESSION      1. Postoperative wound dehiscence, initial encounter          DISPOSITION/PLAN   DISPOSITION Decision To Admit 02/24/2022 04:10:53 PM      PATIENT REFERREDTO:  No follow-up provider specified.     DISCHARGEMEDICATIONS:  New Prescriptions    No medications on file       (Please note that portions of this note were completed with a voice recognition program.  Efforts were made to edit thedictations but occasionally words are mis-transcribed.)    Colvin Lombard, MD  Attending Emergency Physician                        Kandace Lamb MD  02/24/22 3083

## 2022-02-25 ENCOUNTER — ANESTHESIA EVENT (OUTPATIENT)
Dept: OPERATING ROOM | Age: 57
DRG: 305 | End: 2022-02-25
Payer: MEDICARE

## 2022-02-25 ENCOUNTER — ANESTHESIA (OUTPATIENT)
Dept: OPERATING ROOM | Age: 57
DRG: 305 | End: 2022-02-25
Payer: MEDICARE

## 2022-02-25 VITALS — SYSTOLIC BLOOD PRESSURE: 97 MMHG | DIASTOLIC BLOOD PRESSURE: 54 MMHG | OXYGEN SATURATION: 99 % | TEMPERATURE: 99.5 F

## 2022-02-25 LAB
ABSOLUTE EOS #: 0.08 K/UL (ref 0–0.4)
ABSOLUTE LYMPH #: 0.84 K/UL (ref 1–4.8)
ABSOLUTE MONO #: 0.99 K/UL (ref 0.1–1.3)
ANION GAP SERPL CALCULATED.3IONS-SCNC: 10 MMOL/L (ref 9–17)
BASOPHILS # BLD: 0 % (ref 0–2)
BASOPHILS ABSOLUTE: 0 K/UL (ref 0–0.2)
BUN BLDV-MCNC: 47 MG/DL (ref 6–20)
CALCIUM SERPL-MCNC: 8 MG/DL (ref 8.6–10.4)
CHLORIDE BLD-SCNC: 98 MMOL/L (ref 98–107)
CO2: 25 MMOL/L (ref 20–31)
CREAT SERPL-MCNC: 1.03 MG/DL (ref 0.7–1.2)
EOSINOPHILS RELATIVE PERCENT: 1 % (ref 0–4)
GFR AFRICAN AMERICAN: >60 ML/MIN
GFR NON-AFRICAN AMERICAN: >60 ML/MIN
GFR SERPL CREATININE-BSD FRML MDRD: ABNORMAL ML/MIN/{1.73_M2}
GLUCOSE BLD-MCNC: 100 MG/DL (ref 75–110)
GLUCOSE BLD-MCNC: 111 MG/DL (ref 75–110)
GLUCOSE BLD-MCNC: 125 MG/DL (ref 70–99)
HCT VFR BLD CALC: 28.1 % (ref 41–53)
HEMOGLOBIN: 8.9 G/DL (ref 13.5–17.5)
LYMPHOCYTES # BLD: 11 % (ref 24–44)
MCH RBC QN AUTO: 24.2 PG (ref 26–34)
MCHC RBC AUTO-ENTMCNC: 31.8 G/DL (ref 31–37)
MCV RBC AUTO: 76.1 FL (ref 80–100)
MONOCYTES # BLD: 13 % (ref 1–7)
MORPHOLOGY: ABNORMAL
PDW BLD-RTO: 21.3 % (ref 11.5–14.9)
PLATELET # BLD: 208 K/UL (ref 150–450)
PMV BLD AUTO: 7.6 FL (ref 6–12)
POTASSIUM SERPL-SCNC: 4.6 MMOL/L (ref 3.7–5.3)
RBC # BLD: 3.7 M/UL (ref 4.5–5.9)
SARS-COV-2, RAPID: NOT DETECTED
SEG NEUTROPHILS: 75 % (ref 36–66)
SEGMENTED NEUTROPHILS ABSOLUTE COUNT: 5.69 K/UL (ref 1.3–9.1)
SODIUM BLD-SCNC: 133 MMOL/L (ref 135–144)
SPECIMEN DESCRIPTION: NORMAL
WBC # BLD: 7.6 K/UL (ref 3.5–11)

## 2022-02-25 PROCEDURE — 2709999900 HC NON-CHARGEABLE SUPPLY: Performed by: PODIATRIST

## 2022-02-25 PROCEDURE — 3700000001 HC ADD 15 MINUTES (ANESTHESIA): Performed by: PODIATRIST

## 2022-02-25 PROCEDURE — 3700000000 HC ANESTHESIA ATTENDED CARE: Performed by: PODIATRIST

## 2022-02-25 PROCEDURE — 2720000010 HC SURG SUPPLY STERILE: Performed by: PODIATRIST

## 2022-02-25 PROCEDURE — 2580000003 HC RX 258: Performed by: ANESTHESIOLOGY

## 2022-02-25 PROCEDURE — 2500000003 HC RX 250 WO HCPCS

## 2022-02-25 PROCEDURE — 0Y6M0ZC DETACHMENT AT RIGHT FOOT, PARTIAL 3RD RAY, OPEN APPROACH: ICD-10-PCS

## 2022-02-25 PROCEDURE — 0Y6M0ZB DETACHMENT AT RIGHT FOOT, PARTIAL 2ND RAY, OPEN APPROACH: ICD-10-PCS

## 2022-02-25 PROCEDURE — 6360000002 HC RX W HCPCS

## 2022-02-25 PROCEDURE — 87186 SC STD MICRODIL/AGAR DIL: CPT

## 2022-02-25 PROCEDURE — 86403 PARTICLE AGGLUT ANTBDY SCRN: CPT

## 2022-02-25 PROCEDURE — 2500000003 HC RX 250 WO HCPCS: Performed by: ANESTHESIOLOGY

## 2022-02-25 PROCEDURE — 87635 SARS-COV-2 COVID-19 AMP PRB: CPT

## 2022-02-25 PROCEDURE — 6360000002 HC RX W HCPCS: Performed by: ANESTHESIOLOGY

## 2022-02-25 PROCEDURE — 0Y6M0ZD DETACHMENT AT RIGHT FOOT, PARTIAL 4TH RAY, OPEN APPROACH: ICD-10-PCS

## 2022-02-25 PROCEDURE — 87176 TISSUE HOMOGENIZATION CULTR: CPT

## 2022-02-25 PROCEDURE — 88307 TISSUE EXAM BY PATHOLOGIST: CPT

## 2022-02-25 PROCEDURE — 80048 BASIC METABOLIC PNL TOTAL CA: CPT

## 2022-02-25 PROCEDURE — 6370000000 HC RX 637 (ALT 250 FOR IP)

## 2022-02-25 PROCEDURE — 87075 CULTR BACTERIA EXCEPT BLOOD: CPT

## 2022-02-25 PROCEDURE — 3600000012 HC SURGERY LEVEL 2 ADDTL 15MIN: Performed by: PODIATRIST

## 2022-02-25 PROCEDURE — 28805 AMPUTATION THRU METATARSAL: CPT | Performed by: PODIATRIST

## 2022-02-25 PROCEDURE — 1200000000 HC SEMI PRIVATE

## 2022-02-25 PROCEDURE — 7100000000 HC PACU RECOVERY - FIRST 15 MIN: Performed by: PODIATRIST

## 2022-02-25 PROCEDURE — 0Y6M0Z9 DETACHMENT AT RIGHT FOOT, PARTIAL 1ST RAY, OPEN APPROACH: ICD-10-PCS

## 2022-02-25 PROCEDURE — 3600000002 HC SURGERY LEVEL 2 BASE: Performed by: PODIATRIST

## 2022-02-25 PROCEDURE — 87070 CULTURE OTHR SPECIMN AEROBIC: CPT

## 2022-02-25 PROCEDURE — 88311 DECALCIFY TISSUE: CPT

## 2022-02-25 PROCEDURE — 2580000003 HC RX 258

## 2022-02-25 PROCEDURE — 87205 SMEAR GRAM STAIN: CPT

## 2022-02-25 PROCEDURE — 82947 ASSAY GLUCOSE BLOOD QUANT: CPT

## 2022-02-25 PROCEDURE — 7100000001 HC PACU RECOVERY - ADDTL 15 MIN: Performed by: PODIATRIST

## 2022-02-25 PROCEDURE — 85025 COMPLETE CBC W/AUTO DIFF WBC: CPT

## 2022-02-25 PROCEDURE — 36415 COLL VENOUS BLD VENIPUNCTURE: CPT

## 2022-02-25 PROCEDURE — 0Y6M0ZF DETACHMENT AT RIGHT FOOT, PARTIAL 5TH RAY, OPEN APPROACH: ICD-10-PCS

## 2022-02-25 RX ORDER — ONDANSETRON 2 MG/ML
INJECTION INTRAMUSCULAR; INTRAVENOUS PRN
Status: DISCONTINUED | OUTPATIENT
Start: 2022-02-25 | End: 2022-02-25 | Stop reason: SDUPTHER

## 2022-02-25 RX ORDER — FENTANYL CITRATE 50 UG/ML
50 INJECTION, SOLUTION INTRAMUSCULAR; INTRAVENOUS EVERY 10 MIN PRN
Status: DISCONTINUED | OUTPATIENT
Start: 2022-02-25 | End: 2022-02-25

## 2022-02-25 RX ORDER — ONDANSETRON 2 MG/ML
4 INJECTION INTRAMUSCULAR; INTRAVENOUS
Status: DISCONTINUED | OUTPATIENT
Start: 2022-02-25 | End: 2022-02-25

## 2022-02-25 RX ORDER — SODIUM CHLORIDE, SODIUM LACTATE, POTASSIUM CHLORIDE, CALCIUM CHLORIDE 600; 310; 30; 20 MG/100ML; MG/100ML; MG/100ML; MG/100ML
INJECTION, SOLUTION INTRAVENOUS CONTINUOUS
Status: DISCONTINUED | OUTPATIENT
Start: 2022-02-25 | End: 2022-02-25

## 2022-02-25 RX ORDER — SODIUM CHLORIDE 0.9 % (FLUSH) 0.9 %
10 SYRINGE (ML) INJECTION PRN
Status: DISCONTINUED | OUTPATIENT
Start: 2022-02-25 | End: 2022-02-25

## 2022-02-25 RX ORDER — LIDOCAINE HYDROCHLORIDE 10 MG/ML
1 INJECTION, SOLUTION EPIDURAL; INFILTRATION; INTRACAUDAL; PERINEURAL
Status: DISCONTINUED | OUTPATIENT
Start: 2022-02-25 | End: 2022-02-25

## 2022-02-25 RX ORDER — SODIUM CHLORIDE 9 MG/ML
25 INJECTION, SOLUTION INTRAVENOUS PRN
Status: DISCONTINUED | OUTPATIENT
Start: 2022-02-25 | End: 2022-02-25

## 2022-02-25 RX ORDER — OXYCODONE HYDROCHLORIDE 5 MG/1
5 TABLET ORAL EVERY 6 HOURS PRN
Status: DISCONTINUED | OUTPATIENT
Start: 2022-02-25 | End: 2022-02-25

## 2022-02-25 RX ORDER — SODIUM CHLORIDE 0.9 % (FLUSH) 0.9 %
5-40 SYRINGE (ML) INJECTION EVERY 12 HOURS SCHEDULED
Status: DISCONTINUED | OUTPATIENT
Start: 2022-02-25 | End: 2022-02-25

## 2022-02-25 RX ORDER — LIDOCAINE HYDROCHLORIDE 10 MG/ML
INJECTION, SOLUTION EPIDURAL; INFILTRATION; INTRACAUDAL; PERINEURAL PRN
Status: DISCONTINUED | OUTPATIENT
Start: 2022-02-25 | End: 2022-02-25 | Stop reason: SDUPTHER

## 2022-02-25 RX ORDER — DIPHENHYDRAMINE HYDROCHLORIDE 50 MG/ML
12.5 INJECTION INTRAMUSCULAR; INTRAVENOUS
Status: DISCONTINUED | OUTPATIENT
Start: 2022-02-25 | End: 2022-02-25

## 2022-02-25 RX ORDER — SODIUM CHLORIDE, SODIUM LACTATE, POTASSIUM CHLORIDE, CALCIUM CHLORIDE 600; 310; 30; 20 MG/100ML; MG/100ML; MG/100ML; MG/100ML
INJECTION, SOLUTION INTRAVENOUS CONTINUOUS PRN
Status: DISCONTINUED | OUTPATIENT
Start: 2022-02-25 | End: 2022-02-25 | Stop reason: SDUPTHER

## 2022-02-25 RX ORDER — FENTANYL CITRATE 50 UG/ML
INJECTION, SOLUTION INTRAMUSCULAR; INTRAVENOUS PRN
Status: DISCONTINUED | OUTPATIENT
Start: 2022-02-25 | End: 2022-02-25 | Stop reason: SDUPTHER

## 2022-02-25 RX ORDER — EPHEDRINE SULFATE/0.9% NACL/PF 50 MG/5 ML
SYRINGE (ML) INTRAVENOUS PRN
Status: DISCONTINUED | OUTPATIENT
Start: 2022-02-25 | End: 2022-02-25 | Stop reason: SDUPTHER

## 2022-02-25 RX ORDER — MIDAZOLAM HYDROCHLORIDE 1 MG/ML
2 INJECTION INTRAMUSCULAR; INTRAVENOUS ONCE
Status: COMPLETED | OUTPATIENT
Start: 2022-02-25 | End: 2022-02-25

## 2022-02-25 RX ORDER — MIDAZOLAM HYDROCHLORIDE 1 MG/ML
INJECTION INTRAMUSCULAR; INTRAVENOUS PRN
Status: DISCONTINUED | OUTPATIENT
Start: 2022-02-25 | End: 2022-02-25 | Stop reason: SDUPTHER

## 2022-02-25 RX ORDER — PROPOFOL 10 MG/ML
INJECTION, EMULSION INTRAVENOUS PRN
Status: DISCONTINUED | OUTPATIENT
Start: 2022-02-25 | End: 2022-02-25 | Stop reason: SDUPTHER

## 2022-02-25 RX ORDER — PHENYLEPHRINE HYDROCHLORIDE 10 MG/ML
INJECTION INTRAVENOUS PRN
Status: DISCONTINUED | OUTPATIENT
Start: 2022-02-25 | End: 2022-02-25 | Stop reason: SDUPTHER

## 2022-02-25 RX ORDER — DEXAMETHASONE SODIUM PHOSPHATE 4 MG/ML
INJECTION, SOLUTION INTRA-ARTICULAR; INTRALESIONAL; INTRAMUSCULAR; INTRAVENOUS; SOFT TISSUE PRN
Status: DISCONTINUED | OUTPATIENT
Start: 2022-02-25 | End: 2022-02-25 | Stop reason: SDUPTHER

## 2022-02-25 RX ORDER — SODIUM CHLORIDE 0.9 % (FLUSH) 0.9 %
5-40 SYRINGE (ML) INJECTION PRN
Status: DISCONTINUED | OUTPATIENT
Start: 2022-02-25 | End: 2022-02-25

## 2022-02-25 RX ORDER — OXYCODONE HYDROCHLORIDE 10 MG/1
10 TABLET ORAL EVERY 6 HOURS PRN
Status: DISCONTINUED | OUTPATIENT
Start: 2022-02-25 | End: 2022-02-25

## 2022-02-25 RX ADMIN — CEFEPIME HYDROCHLORIDE 2000 MG: 2 INJECTION, POWDER, FOR SOLUTION INTRAVENOUS at 02:08

## 2022-02-25 RX ADMIN — ONDANSETRON 4 MG: 2 INJECTION INTRAMUSCULAR; INTRAVENOUS at 00:41

## 2022-02-25 RX ADMIN — MORPHINE SULFATE 60 MG: 30 TABLET, FILM COATED, EXTENDED RELEASE ORAL at 07:45

## 2022-02-25 RX ADMIN — FERROUS SULFATE TAB 325 MG (65 MG ELEMENTAL FE) 325 MG: 325 (65 FE) TAB at 07:45

## 2022-02-25 RX ADMIN — PROPOFOL 100 MG: 10 INJECTION, EMULSION INTRAVENOUS at 16:30

## 2022-02-25 RX ADMIN — LIDOCAINE HYDROCHLORIDE 50 MG: 10 INJECTION, SOLUTION EPIDURAL; INFILTRATION; INTRACAUDAL; PERINEURAL at 16:30

## 2022-02-25 RX ADMIN — MIDAZOLAM 2 MG: 1 INJECTION INTRAMUSCULAR; INTRAVENOUS at 15:14

## 2022-02-25 RX ADMIN — CEFEPIME HYDROCHLORIDE 2000 MG: 2 INJECTION, POWDER, FOR SOLUTION INTRAVENOUS at 09:59

## 2022-02-25 RX ADMIN — OXYCODONE HYDROCHLORIDE 15 MG: 5 TABLET ORAL at 00:41

## 2022-02-25 RX ADMIN — PHENYLEPHRINE HYDROCHLORIDE 100 MCG: 10 INJECTION INTRAVENOUS at 17:10

## 2022-02-25 RX ADMIN — FENTANYL CITRATE 50 MCG: 0.05 INJECTION, SOLUTION INTRAMUSCULAR; INTRAVENOUS at 15:14

## 2022-02-25 RX ADMIN — SODIUM CHLORIDE, PRESERVATIVE FREE 10 ML: 5 INJECTION INTRAVENOUS at 21:09

## 2022-02-25 RX ADMIN — OXYCODONE HYDROCHLORIDE 15 MG: 5 TABLET ORAL at 12:07

## 2022-02-25 RX ADMIN — FENTANYL CITRATE 50 MCG: 50 INJECTION, SOLUTION INTRAMUSCULAR; INTRAVENOUS at 17:54

## 2022-02-25 RX ADMIN — DEXAMETHASONE SODIUM PHOSPHATE 4 MG: 4 INJECTION, SOLUTION INTRAMUSCULAR; INTRAVENOUS at 16:37

## 2022-02-25 RX ADMIN — SODIUM CHLORIDE, PRESERVATIVE FREE 10 ML: 5 INJECTION INTRAVENOUS at 07:46

## 2022-02-25 RX ADMIN — Medication 10 MG: at 16:43

## 2022-02-25 RX ADMIN — MIDAZOLAM 2 MG: 1 INJECTION INTRAMUSCULAR; INTRAVENOUS at 16:25

## 2022-02-25 RX ADMIN — Medication 20 MG: at 16:35

## 2022-02-25 RX ADMIN — Medication 10 MG: at 16:50

## 2022-02-25 RX ADMIN — ONDANSETRON 4 MG: 2 INJECTION INTRAMUSCULAR; INTRAVENOUS at 17:17

## 2022-02-25 RX ADMIN — SODIUM CHLORIDE, POTASSIUM CHLORIDE, SODIUM LACTATE AND CALCIUM CHLORIDE: 600; 310; 30; 20 INJECTION, SOLUTION INTRAVENOUS at 14:54

## 2022-02-25 RX ADMIN — FENTANYL CITRATE 50 MCG: 50 INJECTION, SOLUTION INTRAMUSCULAR; INTRAVENOUS at 17:57

## 2022-02-25 RX ADMIN — Medication 10 MG: at 16:53

## 2022-02-25 RX ADMIN — PHENYLEPHRINE HYDROCHLORIDE 100 MCG: 10 INJECTION INTRAVENOUS at 17:22

## 2022-02-25 RX ADMIN — PHENYLEPHRINE HYDROCHLORIDE 100 MCG: 10 INJECTION INTRAVENOUS at 17:35

## 2022-02-25 RX ADMIN — ONDANSETRON 4 MG: 2 INJECTION INTRAMUSCULAR; INTRAVENOUS at 11:07

## 2022-02-25 RX ADMIN — Medication 1 CAPSULE: at 07:45

## 2022-02-25 RX ADMIN — VANCOMYCIN HYDROCHLORIDE 1250 MG: 1.25 INJECTION, POWDER, LYOPHILIZED, FOR SOLUTION INTRAVENOUS at 20:38

## 2022-02-25 RX ADMIN — SODIUM CHLORIDE, POTASSIUM CHLORIDE, SODIUM LACTATE AND CALCIUM CHLORIDE: 600; 310; 30; 20 INJECTION, SOLUTION INTRAVENOUS at 16:25

## 2022-02-25 RX ADMIN — MORPHINE SULFATE 60 MG: 30 TABLET, FILM COATED, EXTENDED RELEASE ORAL at 20:37

## 2022-02-25 RX ADMIN — METRONIDAZOLE 500 MG: 500 INJECTION, SOLUTION INTRAVENOUS at 08:42

## 2022-02-25 RX ADMIN — PHENYLEPHRINE HYDROCHLORIDE 100 MCG: 10 INJECTION INTRAVENOUS at 16:51

## 2022-02-25 RX ADMIN — METRONIDAZOLE 500 MG: 500 INJECTION, SOLUTION INTRAVENOUS at 00:22

## 2022-02-25 RX ADMIN — PHENYLEPHRINE HYDROCHLORIDE 100 MCG: 10 INJECTION INTRAVENOUS at 17:03

## 2022-02-25 RX ADMIN — BUPROPION HYDROCHLORIDE 80 MG: 150 TABLET, FILM COATED, EXTENDED RELEASE ORAL at 07:45

## 2022-02-25 RX ADMIN — LEVOTHYROXINE SODIUM 50 MCG: 0.05 TABLET ORAL at 05:54

## 2022-02-25 RX ADMIN — PROPOFOL 100 MG: 10 INJECTION, EMULSION INTRAVENOUS at 16:32

## 2022-02-25 RX ADMIN — OXYCODONE HYDROCHLORIDE 15 MG: 5 TABLET ORAL at 20:37

## 2022-02-25 ASSESSMENT — PULMONARY FUNCTION TESTS
PIF_VALUE: 13
PIF_VALUE: 14
PIF_VALUE: 6
PIF_VALUE: 15
PIF_VALUE: 2
PIF_VALUE: 5
PIF_VALUE: 14
PIF_VALUE: 6
PIF_VALUE: 13
PIF_VALUE: 1
PIF_VALUE: 6
PIF_VALUE: 13
PIF_VALUE: 6
PIF_VALUE: 6
PIF_VALUE: 14
PIF_VALUE: 5
PIF_VALUE: 13
PIF_VALUE: 2
PIF_VALUE: 6
PIF_VALUE: 6
PIF_VALUE: 14
PIF_VALUE: 1
PIF_VALUE: 13
PIF_VALUE: 6
PIF_VALUE: 13
PIF_VALUE: 0
PIF_VALUE: 4
PIF_VALUE: 6
PIF_VALUE: 14
PIF_VALUE: 14
PIF_VALUE: 0
PIF_VALUE: 13
PIF_VALUE: 6
PIF_VALUE: 14
PIF_VALUE: 5
PIF_VALUE: 14
PIF_VALUE: 6
PIF_VALUE: 1
PIF_VALUE: 6
PIF_VALUE: 14
PIF_VALUE: 13
PIF_VALUE: 6
PIF_VALUE: 5
PIF_VALUE: 14
PIF_VALUE: 4
PIF_VALUE: 6
PIF_VALUE: 13
PIF_VALUE: 6
PIF_VALUE: 5
PIF_VALUE: 6
PIF_VALUE: 6
PIF_VALUE: 14
PIF_VALUE: 4
PIF_VALUE: 6
PIF_VALUE: 15
PIF_VALUE: 5
PIF_VALUE: 14
PIF_VALUE: 6
PIF_VALUE: 5
PIF_VALUE: 5
PIF_VALUE: 14
PIF_VALUE: 14
PIF_VALUE: 6
PIF_VALUE: 5
PIF_VALUE: 14
PIF_VALUE: 6
PIF_VALUE: 14
PIF_VALUE: 6
PIF_VALUE: 14
PIF_VALUE: 1
PIF_VALUE: 15
PIF_VALUE: 15
PIF_VALUE: 5
PIF_VALUE: 14
PIF_VALUE: 13
PIF_VALUE: 6
PIF_VALUE: 6
PIF_VALUE: 1
PIF_VALUE: 15

## 2022-02-25 ASSESSMENT — PAIN SCALES - GENERAL
PAINLEVEL_OUTOF10: 9
PAINLEVEL_OUTOF10: 8
PAINLEVEL_OUTOF10: 9
PAINLEVEL_OUTOF10: 8

## 2022-02-25 ASSESSMENT — PAIN DESCRIPTION - DESCRIPTORS
DESCRIPTORS: ACHING
DESCRIPTORS: ACHING;DISCOMFORT

## 2022-02-25 ASSESSMENT — PAIN DESCRIPTION - ORIENTATION: ORIENTATION: RIGHT

## 2022-02-25 ASSESSMENT — PAIN DESCRIPTION - PAIN TYPE
TYPE: CHRONIC PAIN
TYPE: SURGICAL PAIN

## 2022-02-25 ASSESSMENT — PAIN DESCRIPTION - FREQUENCY: FREQUENCY: CONTINUOUS

## 2022-02-25 ASSESSMENT — PAIN - FUNCTIONAL ASSESSMENT: PAIN_FUNCTIONAL_ASSESSMENT: 0-10

## 2022-02-25 ASSESSMENT — PAIN DESCRIPTION - LOCATION
LOCATION: FOOT
LOCATION: FOOT

## 2022-02-25 NOTE — PLAN OF CARE
Problem: Falls - Risk of:  Goal: Will remain free from falls  Description: Will remain free from falls  Outcome: Met This Shift  Note: Call light is within reach, pt is able to make needs known, bed is in low position and wheels locked, side rails up x 2  Goal: Absence of physical injury  Description: Absence of physical injury  Outcome: Met This Shift     Problem: Skin Integrity:  Goal: Will show no infection signs and symptoms  Description: Will show no infection signs and symptoms  Outcome: Met This Shift  Goal: Absence of new skin breakdown  Description: Absence of new skin breakdown  Outcome: Met This Shift

## 2022-02-25 NOTE — PROGRESS NOTES
Patient admitted into room 2072. Vitals stable. Wife at bedside. Admission questions completed. Podiatry contacted for diet order. Dietary called for dinner order.

## 2022-02-25 NOTE — PROGRESS NOTES
Comprehensive Nutrition Assessment    Type and Reason for Visit:  Initial,Positive Nutrition Screen (Wound, Dental Soft Diet prior to admission)    Nutrition Recommendations/Plan:  NPO. Suggest addition of Ensure High Protein twice daily once appropriate, and goal diet of 5 Carbohydrate Choices per meal with Dental Soft or Diet Texture as Tolerated. Nutrition Assessment:  Pt admitted with dehiscence of his s/p right Transmetatasal Foot Amputation. Pt gone for surgery today; planned revision. NPO. Suggest advancement to 5 carohydrate choices per meal, dental soft or diet texure as tolerated, with Ensure High Protein twice daily if/when appropriate. Malnutrition Assessment:  Malnutrition Status: At risk for malnutrition (Comment)    Context:  Acute Illness     Findings of the 6 clinical characteristics of malnutrition:  Energy Intake:  Mild decrease in energy intake (Comment) (Based on available data)  Weight Loss:  No significant weight loss     Body Fat Loss:  No significant body fat loss     Muscle Mass Loss:  No significant muscle mass loss    Fluid Accumulation:  1 - Mild (Likely not related to nutritional status) Extremities   Strength:       Estimated Daily Nutrient Needs:  Energy (kcal):  6964-0862 based on [facility] with 1.2-1.25 factor; Weight Used for Energy Requirements:  Admission     Protein (g):  121-138 based on 1.4-1.6 gm per kg; Weight Used for Protein Requirements:  Ideal          Nutrition Related Findings:  Edema: +1 RLE, LLE. Wounds:  Surgical Incision (Surgery 2/25)       Current Nutrition Therapies:    Diet NPO Exceptions are: Sips of Water with Meds    Anthropometric Measures:  · Height: 6' 2\" (188 cm)  · Current Body Weight: 228 lb 2.8 oz (103.5 kg)   · Admission Body Weight: 228 lb 2.8 oz (103.5 kg)    · Ideal Body Weight: 190 lbs; % Ideal Body Weight 120.1 %   · BMI: 29.3  · BMI Categories: Overweight (BMI 25.0-29. 9)     · (No adjustments for amputation)    Nutrition Diagnosis:   · Increased nutrient needs related to  (healing) as evidenced by wounds    Nutrition Interventions:   Food and/or Nutrient Delivery:  Continue NPO  Nutrition Education/Counseling:  No recommendation at this time   Coordination of Nutrition Care:  Continue to monitor while inpatient    Goals:  PO intake to meet estimated needs       Nutrition Monitoring and Evaluation:   Behavioral-Environmental Outcomes:  None Identified   Food/Nutrient Intake Outcomes:  Diet Advancement/Tolerance (Initiate oral diet)  Physical Signs/Symptoms Outcomes:  Biochemical Data,Chewing or Swallowing,GI Status,Fluid Status or Edema,Skin,Weight     Discharge Planning: Too soon to determine     Some areas of assessment may be incomplete due to standard COVID-19 Precautions. Beba Herrera R.D., L.D.   Phone: [phone]

## 2022-02-25 NOTE — PROGRESS NOTES
Vancomycin Dosing by Pharmacy - Daily Note   Vancomycin Therapy Day:  2  Indication: SSTI    Allergies:  Bactrim [sulfamethoxazole-trimethoprim], Fiorinal [butalbital-aspirin-caffeine], Statins, Tylenol [acetaminophen], and Peanut butter flavor [flavoring agent]   Actual Weight:    Wt Readings from Last 1 Encounters:   02/24/22 228 lb 2.8 oz (103.5 kg)       Labs/Ancillary Data  Estimated Creatinine Clearance: 102 mL/min (based on SCr of 1.03 mg/dL). Recent Labs     02/24/22  1745 02/25/22  0553   CREATININE 1.84* 1.03   BUN  --  47*   WBC  --  7.6     No results found for: PROCAL  No intake or output data in the 24 hours ending 02/25/22 0856  Temp: 97.7 F    Culture Date / Source  /  Results  See micro  Recent vancomycin administrations                     vancomycin (VANCOCIN) 2,000 mg in dextrose 5 % 500 mL IVPB (mg) 2,000 mg New Bag 02/24/22 1838                    Vancomycin Concentrations:   TROUGH:  No results for input(s): VANCOTROUGH in the last 72 hours. RANDOM:  No results for input(s): VANCORANDOM in the last 72 hours. MRSA Nasal Swab: N/A. Non-respiratory infection. Gabe Santiago PLAN     Continue current dose of 1250 mg q24h IV  Ensured BUN/sCr ordered at baseline and every 48 hours x at least 3 levels, then at least weekly. Repeat vancomycin concentration ordered for 02/26 @ 0600   Pharmacy will continue to monitor patient and adjust therapy as indicated      Vancomycin Target Concentration Parameters  Treatment  Population Target AUC/FERNANDO Target Trough   Invasive MRSA Infection (bacteremia, pneumonia, meningitis, endocarditis, osteomyelitis)  Sepsis (undifferentiated) 400-600 N/A   Infection due to non-MRSA pathogen  Empiric treatment of non-invasive MRSA infection  (SSTI, UTI) <500 10-15 mg/L   CrCl < 29 mL/min  Rapidly fluctuating serum creatinine   CLAUDIA N/A < 15 mg/L     Renal replacement therapy is dosed by levels, per hospital protocol.   Abbreviations  * Pauc: probability that AUC is >400 (efficacy); Pconc: probability that Ctrough is above 20 ?g/mL (toxicity); Tox: Probability of nephrotoxicity, based on Chasity et al. Clin Infect Dis 2009. Loading dose: 2000 mg at 18:00 02/24/2022. Regimen: 1250 mg IV every 24 hours. Start time: 18:00 on 02/25/2022  Exposure target: AUC24 (range)400-600 mg/L.hr   AUC24,ss: 562 mg/L.hr  Probability of AUC24 > 400: 83 %  Ctrough,ss: 18.2 mg/L  Probability of Ctrough,ss > 20: 42 %  Probability of nephrotoxicity (Lodise SAEED 2009): 14 %  Snip appropriate part of Bayesian software results and enter here    Thank you for the consult. Pharmacy will continue to follow.     [facility], [address] [place] [address]    2/25/2022   9:02 AM

## 2022-02-25 NOTE — PROGRESS NOTES
Progress Note  Podiatric Medicine and Surgery     Subjective     CC: Wound dehiscence of the right foot from previous TMA    Patient seen and examined at bedside. No acute events overnight. Afebrile, vital signs stable   Pt NPO since 5am  Consent in chart    HPI :  Frank Ortega is a 61 y.o. male seen at [facility] for dehiscence of his s/p right TMA. . Patient is well-known to Dr. Lisbeth Treviño and has not been compliant with his follow-up appointments. He failed to follow up in the office this week. Patient was at [facility] about a month ago for having his right forefoot amputated due to osteomyelitis and diabetes. Patient has been on doxycycline since surgery. HE has been weight bearing against instructions. Patient states that he came to ED because he noticed purulent drainage to the bottom incision site. Pt was admitted from ED for revision of his right TMA. Patient denies any nausea, vomiting, fever, chills or shortness of breath. He gradually removed all of the sutures that were present at his last appointment. ROS: Denies N/V/F/C/SOB/CP. Otherwise negative except at stated in the HPI.      Medications:  Scheduled Meds:   sodium chloride flush  5-40 mL IntraVENous 2 times per day    enoxaparin  40 mg SubCUTAneous Daily    ferrous sulfate  325 mg Oral Daily with breakfast    lactobacillus  1 capsule Oral BID WC    levothyroxine  50 mcg Oral Daily    metFORMIN  250 mg Oral Daily with breakfast    morphine  60 mg Oral BID    PARoxetine  80 mg Oral QAM    cefepime  2,000 mg IntraVENous Once    Followed by    cefepime  2,000 mg IntraVENous Q8H    vancomycin (VANCOCIN) intermittent dosing (placeholder)   Other RX Placeholder    metroNIDAZOLE  500 mg IntraVENous Q8H    vancomycin  1,250 mg IntraVENous Q24H       Continuous Infusions:   sodium chloride         PRN Meds:sodium chloride flush, sodium chloride, ondansetron **OR** ondansetron, polyethylene glycol, diazePAM, oxyCODONE    Objective     Vitals:  Patient Vitals for the past 8 hrs:   BP Temp Pulse Resp SpO2   22 0733 (!) 110/56 97.7 °F (36.5 °C) 69 16 96 %     Average, Min, and Max for last 24 hours Vitals:  TEMPERATURE:  Temp  Av.2 °F (36.8 °C)  Min: 97.7 °F (36.5 °C)  Max: 98.6 °F (37 °C)    RESPIRATIONS RANGE: Resp  Av  Min: 16  Max: 16    PULSE RANGE: Pulse  Av  Min: 69  Max: 97    BLOOD PRESSURE RANGE:  Systolic (59ZZX), EGQ:348 , Min:110 , PENNIE:539   ; Diastolic (03GXL), TIB:23, Min:45, Max:56      PULSE OXIMETRY RANGE: SpO2  Av %  Min: 96 %  Max: 98 %    No intake/output data recorded. CBC:  Recent Labs     225 22  0553   WBC  --  7.6   HGB  --  8.9*   HCT  --  28.1*   PLT  --  208   .2*  --         BMP:  Recent Labs     225 22  0553   NA  --  133*   K  --  4.6   CL  --  98   CO2  --  25   BUN  --  47*   CREATININE 1.84* 1.03   GLUCOSE  --  125*   CALCIUM  --  8.0*        Coags:  No results for input(s): APTT, PROT, INR in the last 72 hours. Lab Results   Component Value Date    SEDRATE 78 (H) 2022     Recent Labs     22  1745   .2*       Lower Extremity Physical Exam: Physical exam from 22  Vascular: DP and PT pulses are palpable +1/4. Hair growth is absent to the level of the digits. Moderate nonpitting edema appreciated to the right foot. Neuro: Saph/sural/SP/DP/plantar sensation absent to light touch. Musculoskeletal: Muscle strength is adequate ROM, adequate strength to all lower extremity muscle groups. Gross deformity is present with history of transmetatarsal amputation to the left foot and recent transmetatarsal amputation to the right foot     Dermatologic: There is full-thickness ulceration located to the previous amputation site of the right foot. The ulceration measures 14 cm x 7 cm x 4.5 cm. Base is fibro-granular with serous drainage noted to the incision site. No associated malodor.   Erythema and warmth appreciated to the area. Does probe to bone at the amputated first metatarsal shaft. No fluctuance, crepitus or induration appreciated. All sutures are gone. Clinical Images:IMages from 2/24/22                Imaging:   XR FOOT RIGHT (MIN 3 VIEWS)   Final Result   Postoperative findings status post transmetatarsal amputation. Open wound is   noted without soft tissue gas otherwise appreciated. No new osseous   abnormality appreciated. Assessment   Nahomy Serna is a 61 y.o. male with   Dehiscence of amputation stump; right foot  S/p right transmetatarsal amputation  DM2 with polyneuropathy  S/p left transmetatarsal amputation    Principal Problem:    Wound dehiscence  Resolved Problems:    * No resolved hospital problems. *       Plan     Patient examined and evaluated at bedside   Treatment options discussed in detail with the patient  X-rays of the right foot taken in ED: No soft tissue emphysema appreciated and no osseous abnormality appreciated. Postoperative findings consistent with status post transmetatarsal amputation. Internal medicine consulted for medical management  Abx: Cefepime and Vancomycin  Case management consulted for assistance with discharge planning  OR today for revision of right transmetatarsal amputation and debridement of any nonviable soft tissue or bone with possible wound VAC application. Time of surgery: TBD   Pt NPO since 5am  Anticoagulants held at midnight  Consent in chart  Dressing applied to Right foot: wet to dry DSD and ACE  Discussed with     DVT ppx: lovenox    Diet: carb control   Activity: PWB to Right lower extremity  Pain Control: panel ordered      Sheldon Leary DPM   Podiatric Medicine & Surgery   2/25/2022 at 1:02 PM      I performed a history and physical examination of the patient and discussed management with the resident. I reviewed the residents note and agree with the documented findings and plan of care.  Any areas of disagreement are noted on the chart. I was personally present for the key portions of any procedures. I have documented in the chart those procedures where I was not present during the key portions. I have reviewed the Podiatry Resident progress note. I agree with the chief complaint, past medical history, past surgical history, allergies, medications, social and family history as documented unless otherwise noted below. Documentation of the HPI, Physical Exam and Medical Decision Making performed by medical students or scribes is based on my personal performance of the HPI, PE and MDM. I have personally evaluated this patient and have completed at least one if not all key elements of the E/M (history, physical exam, and MDM). Additional findings are as noted.      Kaveh Vera DPM on 2/25/2022 at 3:97 PM  Board Certified, American Board of Podiatric Surgery  Fellow, [employer] and [employer]

## 2022-02-25 NOTE — PLAN OF CARE
Nutrition Problem #1: Increased nutrient needs  Intervention: Food and/or Nutrient Delivery: Continue NPO  Nutritional Goals: PO intake to meet estimated needs

## 2022-02-25 NOTE — FLOWSHEET NOTE
02/25/22 1232   Encounter Summary   Services provided to: Patient; Family   Referral/Consult From: [facility]   (2/25/22 V)   Volunteer Visit Yes   Complexity of Encounter Low   Length of Encounter 15 minutes   Spiritual/Jainism   Type Spiritual support   Intervention Prayer;Communion   Sacraments   Communion Patient received communion;Family received communion

## 2022-02-25 NOTE — ANESTHESIA PRE PROCEDURE
Department of Anesthesiology  Preprocedure Note       Name:  Keron Arizmendi   Age:  61 y.o.  :  1965                                          MRN:  [id number]         Date:  2022      Surgeon: Tiffany Mueller):  Meli Mayen DPM    Procedure: Procedure(s):  FOOT AMPUTATION TRANSMETATARSAL    Medications prior to admission:   Prior to Admission medications    Medication Sig Start Date End Date Taking? Authorizing Provider   morphine (MS CONTIN) 60 MG extended release tablet Take 1 tablet by mouth 2 times daily for 30 days. 2/18/22 3/20/22 Yes Eilleen Saint, MD   oxyCODONE (OXY-IR) 15 MG immediate release tablet Take 1 tablet by mouth every 6 hours as needed for Pain for up to 30 days. 2/18/22 3/20/22 Yes Eilleen Saint, MD   diazePAM (VALIUM) 5 MG tablet Take 1 tablet by mouth every 8 hours as needed for Anxiety for up to 30 days. 2/18/22 3/20/22 Yes Eilleen Saint, MD   ferrous sulfate (FE TABS 325) 325 (65 Fe) MG EC tablet Take 1 tablet by mouth daily (with breakfast) 22  Yes Pantera Ledezma DPM   levothyroxine (SYNTHROID) 50 MCG tablet Take 1 tablet by mouth daily 21  Yes Eilleen Saint, MD   fluocinonide (LIDEX) 0.05 % ointment Apply 1 gm  topically 2 times daily to legs 21  Yes Meli Mayen DPM   PARoxetine (PAXIL) 20 MG tablet Take 4 tablets by mouth every morning 8/3/21  Yes Eilleen Saint, MD   sodium hypochlorite (DAKINS) 0.125 % SOLN external solution APPLY TOPICALLY EVERY 12 HOURS.  APPLY AS A WET TO DRY DRESSING TWO TIMES A DAY, CAN ALSO USE TO CLEANSE WOUND 20  Yes Meli Mayen DPM   metFORMIN (GLUCOPHAGE) 500 MG tablet Take 0.5 tablets by mouth daily (with breakfast) 20  Yes Eilleen Saint, MD   [facility] 4 MG/0.1ML LIQD nasal spray 4 mg by Nasal route as needed for Opioid Reversal  10/29/19  Yes Historical Provider, MD   lactobacillus (CULTURELLE) capsule Take 1 capsule by mouth 2 times daily (with meals) 19  Yes Rafiq Singer Azeb Coreas MD   pantoprazole (PROTONIX) 40 MG tablet Take 1 tablet by mouth every morning (before breakfast) 5/22/19  Yes Shameka Polanco MD   povidone-iodine (BETADINE) 7.5 % external solution Apply to wound. Apply as a wet to dry dressing 4/16/19  Yes Nanci Martinez DPM   ONETOUCH ULTRA strip TEST TWO TIMES A DAY AS NEEDED 9/21/21   Shameka Polanco MD   Lancets ([address]) [address] [facility] TEST TWICE A DAY 3/8/21   Shameka Polanco MD   Wound Dressings (ADAPTIC NON-ADHERING DRESSING) PADS Apply 1 Units topically 2 times daily 9/28/18   Shameka Polanco MD   Gauze Pads & Dressings (COMBINE ABD) 5\"X9\" PADS 1 Units by Does not apply route 2 times daily 9/28/18   Shameka Polanco MD   Gauze Pads & Dressings [facility] GAUZE ROLL LARGE) [address] [facility] 1 Units by Does not apply route 2 times daily 6/12/17 11/17/21  Logan Potts MD       Current medications:    Current Facility-Administered Medications   Medication Dose Route Frequency Provider Last Rate Last Admin    sodium chloride flush 0.9 % injection 5-40 mL  5-40 mL IntraVENous 2 times per day Mary Creamer, DPM   10 mL at 02/25/22 0746    sodium chloride flush 0.9 % injection 5-40 mL  5-40 mL IntraVENous PRN Mary Creamer, DPM        0.9 % sodium chloride infusion  25 mL IntraVENous PRN Mary Creamer, DPM        enoxaparin (LOVENOX) injection 40 mg  40 mg SubCUTAneous Daily Roosevelt Dowd DPM        ondansetron (ZOFRAN-ODT) disintegrating tablet 4 mg  4 mg Oral Q8H PRN Mary Creamer, DPM        Or    ondansetron [facility]) injection 4 mg  4 mg IntraVENous Q6H PRN Mary Creamer, DPM   4 mg at 02/25/22 1107    polyethylene glycol (GLYCOLAX) packet 17 g  17 g Oral Daily PRN Mary Creamer, DPM        diazePAM (VALIUM) tablet 5 mg  5 mg Oral Q8H PRN Mary Creamer, DPM        ferrous sulfate (IRON 325) tablet 325 mg  325 mg Oral Daily with breakfast Mary Creamer, DPM   325 mg at 02/25/22 0745    lactobacillus (CULTURELLE) capsule 1 capsule 1 capsule Oral BID WC Mary Creamer, DPM   1 capsule at 02/25/22 0745    levothyroxine (SYNTHROID) tablet 50 mcg  50 mcg Oral Daily Mary Creamer, DPM   50 mcg at 02/25/22 0554    metFORMIN (GLUCOPHAGE) tablet 250 mg  250 mg Oral Daily with breakfast Mary Creamer, DPM        morphine (MS CONTIN) extended release tablet 60 mg  60 mg Oral BID Mary Creamer, DPM   60 mg at 02/25/22 0745    oxyCODONE (ROXICODONE) immediate release tablet 15 mg  15 mg Oral Q6H PRN Mary Creamer, DPM   15 mg at 02/25/22 1207    PARoxetine (PAXIL) tablet 80 mg  80 mg Oral QAM Mary Creamer, DPM   80 mg at 02/25/22 0745    cefepime (MAXIPIME) 2000 mg IVPB minibag  2,000 mg IntraVENous Once Mary Creamer, DPM        Followed by   [facility] cefepime (MAXIPIME) 2000 mg IVPB minibag  2,000 mg IntraVENous Q8H Mary Creamer, DPM   Stopped at 02/25/22 1401    vancomycin Megan Sin) intermittent dosing (placeholder)   Other RX Placeholder Mary Creamer, DPM        metronidazole (FLAGYL) 500 mg in NaCl 100 mL IVPB premix  500 mg IntraVENous Q8H Mary Creamer, DPM   Stopped at 02/25/22 0944    vancomycin (VANCOCIN) 1,250 mg in dextrose 5 % 250 mL IVPB (ADDAVIAL)  1,250 mg IntraVENous Q24H Mary Creamer, DPM           Allergies: Allergies   Allergen Reactions    Bactrim [Sulfamethoxazole-Trimethoprim]      reported renal failure    Fiorinal [Butalbital-Aspirin-Caffeine] Other (See Comments)     Generalized blisters    Statins Other (See Comments)     Muscle aches    Tylenol [Acetaminophen]      Pt states it shuts down his kidneys    Peanut Butter Flavor [Flavoring Agent] Nausea And Vomiting       Problem List:    Patient Active Problem List   Diagnosis Code    Anxiety disorder F41.9    Benign prostatic hyperplasia N40.0    Depression F32. A    Edema R60.9    Esophageal reflux K21.9    Essential hypertension I10    Hypothyroidism E03.9    Right cervical radiculopathy M54.12    Osteomyelitis (HCC) M86.9    Osteomyelitis of foot, left, acute ([address] M86.172    Non compliance with medical treatment Z91.19    Cellulitis L03.90    Acute renal failure (ARF) ([facility]) N17.9    Ulcerated, foot, left, with necrosis of bone ([facility]) L97.524    Type 2 diabetes mellitus ([facility]) E11.9    Anemia D64.9    Hematemesis with nausea K92.0    Erosive gastritis K29.60    Ulcerated, foot, left, with fat layer exposed ([facility]) L97.522    Renal failure N19    Hyperkalemia E87.5    Hypocalcemia U00.25    Metabolic acidosis M97.0    Diabetic ulcer of toe of left foot associated with type 2 diabetes mellitus ([facility]) E11.621, L97.529    Hyperphosphatemia E83.39    Chronic, continuous use of opioids F11.90    Acute metabolic encephalopathy Z21.24    Diabetic foot infection ([facility]) E11.628, L08.9    H/O amputation of lesser toe, right ([facility]) Z89.421    Seizure-like activity ([facility]) R56.9    PAD (peripheral artery disease) ([facility]) I73.9    Diabetic infection of right foot ([facility]) E11.628, L08.9    Osteomyelitis of foot, right, acute ([facility]) M86.171    Cellulitis of foot, right L03.115    Acquired posterior equinus, right M21.861    Wound dehiscence T81.30XA       Past Medical History:        Diagnosis Date    Acute metabolic encephalopathy     CLAUDIA (acute kidney injury) ([address]     Anemia     Anxiety     ARF (acute renal failure) ([facility])     Arthritis     lower back, knees    BPH (benign prostatic hyperplasia)     Chronic kidney disease     prostate    Depression     Diabetes mellitus ([facility])     Diabetic neuropathy ([facility])     GERD (gastroesophageal reflux disease)     Hyperglycemia     Hyperkalemia     Hypertension     Hypothyroidism     Impacted tooth     Necrosis of bone of foot ([address]     Osteomyelitis of left foot ([address]     PAD (peripheral artery disease) ([address]     Pneumonia     Septic shock ([address]     Type 2 diabetes mellitus ([address]        Past Surgical History:        Procedure Laterality Date    AMPUTATION Right 01/21/2022    TRANSMETARSAL AMPUTATION FOOT, TENDOACHILLES LENGTHENING (Right Foot)    APPENDECTOMY      BACK SURGERY      Lower x 4. Had abscess after appendectomy.  FOOT AMPUTATION Left 1/4/2020    TRANSMETATARSAL FOOT AMPUTATION, REMOVAL OF FOREIGN BODY performed by Rissa Meza DPM at [facility] Left 01/04/2020    TRANSMETATARSAL FOOT AMPUTATION, REMOVAL OF FOREIGN BODY    KNEE SURGERY Right     scope    TOE AMPUTATION Bilateral 3/6/2017    TOE AMPUTATION LEFT HALLUX AND 2ND DIGIT AND RIGHT 2ND DIGIT performed by Cornelio Emmanuel DPM at [facility] Right 1/21/2022    TRANSMETARSAL AMPUTATION FOOT, TENDOACHILLES LENGTHENING performed by Rissa Meza DPM at [address] [place] [address] N/A 5/20/2019    EGD WITH BIOPSY performed by Matthew Reyes MD at [place] [address] History:    Social History     Tobacco Use    Smoking status: Never Smoker    Smokeless tobacco: Never Used   Substance Use Topics    Alcohol use: No                                Counseling given: Not Answered      Vital Signs (Current):   Vitals:    02/24/22 1411 02/24/22 1829 02/25/22 0733 02/25/22 1333   BP: (!) 129/45 (!) 120/56 (!) 110/56 (!) 113/57   Pulse: 97 80 69 68   Resp: 16 16 16 16   Temp: 98.6 °F (37 °C) 98.4 °F (36.9 °C) 97.7 °F (36.5 °C) 97.4 °F (36.3 °C)   TempSrc:  Oral     SpO2: 97% 98% 96% 94%   Weight: 190 lb (86.2 kg) 228 lb 2.8 oz (103.5 kg)     Height: 6' 2\" (1.88 m) 6' 2\" (1.88 m)                                                BP Readings from Last 3 Encounters:   02/25/22 (!) 113/57   01/23/22 118/74   01/21/22 136/85       NPO Status:                                                                                 BMI:   Wt Readings from Last 3 Encounters:   02/24/22 228 lb 2.8 oz (103.5 kg)   02/08/22 190 lb (86.2 kg)   01/21/22 190 lb (86.2 kg)     Body mass index is 29.3 kg/m².     CBC:   Lab Results   Component Value Date    WBC 7.6 02/25/2022    RBC 3.70 02/25/2022    HGB 8.9 02/25/2022    HCT 28.1 02/25/2022    MCV 76.1 02/25/2022    RDW 21.3 02/25/2022     02/25/2022       CMP:   Lab Results   Component Value Date     02/25/2022    K 4.6 02/25/2022    CL 98 02/25/2022    CO2 25 02/25/2022    BUN 47 02/25/2022    CREATININE 1.03 02/25/2022    GFRAA >60 02/25/2022    LABGLOM >60 02/25/2022    GLUCOSE 125 02/25/2022    PROT 7.4 01/17/2022    CALCIUM 8.0 02/25/2022    BILITOT <0.10 01/17/2022    ALKPHOS 76 01/17/2022    AST 11 01/17/2022    ALT 6 01/17/2022       POC Tests: No results for input(s): POCGLU, POCNA, POCK, POCCL, POCBUN, POCHEMO, POCHCT in the last 72 hours. Coags:   Lab Results   Component Value Date    PROTIME 14.0 04/20/2020    INR 1.1 04/20/2020    APTT 33.9 04/20/2020       HCG (If Applicable): No results found for: PREGTESTUR, PREGSERUM, HCG, HCGQUANT     ABGs: No results found for: PHART, PO2ART, SPN8MYA, LBI7BWT, BEART, H3CJQWYT     Type & Screen (If Applicable):  No results found for: LABABO, LABRH    Drug/Infectious Status (If Applicable):  Lab Results   Component Value Date    HEPCAB NONREACTIVE 11/03/2019       COVID-19 Screening (If Applicable):   Lab Results   Component Value Date    COVID19 Not Detected 02/25/2022           Anesthesia Evaluation  Patient summary reviewed and Nursing notes reviewed no history of anesthetic complications:   Airway: Mallampati: III        Dental:    (+) partials      Pulmonary:normal exam  breath sounds clear to auscultation                             Cardiovascular:    (+) hypertension:,         Rhythm: regular  Rate: normal                    Neuro/Psych:   (+) neuromuscular disease:, psychiatric history:depression/anxiety             GI/Hepatic/Renal:   (+) GERD:, PUD, renal disease (BPH):,           Endo/Other:    (+) DiabetesType II DM, , hypothyroidism, blood dyscrasia: anemia, arthritis: OA., electrolyte abnormalities (hyponatremia), .                  Abdominal:

## 2022-02-25 NOTE — CARE COORDINATION
CASE MANAGEMENT NOTE:    Admission Date:  2/24/2022 Yvonne Osman is a 61 y.o.  male    Admitted for : Wound dehiscence [T81.30XA]  Postoperative wound dehiscence, initial encounter [T81.31XA]    Met with:  Patient    PCP:  Dr. Mery Encarnacion:  Bradley Gaona      Is patient alert and oriented at time of discussion:  Yes    Current Residence/ Living Arrangements:  independently at home             Current Services PTA:  No    Does patient go to outpatient dialysis: No  If yes, location and chair time:     Is patient agreeable to VNS: No    Freedom of choice provided:  NA    List of [address] provided: NA    VNS chosen:  No    DME:  straight cane, walker and wheelchair    Home Oxygen: No    Nebulizer: No    CPAP/BIPAP: No    Supplier: N/A    Potential Assistance Needed: No    SNF needed: No    Freedom of choice and list provided: NA    [facility]:  [facility]       Does Patient want to use MEDS to BEDS? No    Is patient currently receiving oral anticoagulation therapy? No    Is the Patient an MARK NOVAK [facility] with Readmission Risk Score greater than 14%? No  If yes, pt needs a follow up appointment made within 7 days. Family Members/Caregivers that pt would like involved in their care:    Yes    If yes, list name here:  Gerri Baxter    Transportation Provider:  Family             Discharge Plan:  2/25/22 [place] Adv Pt is from home in a two story home DME Walker, cane and w/c VNS deines Plan is to discharge to home with no needs pt follows with Dr. Yoandy Faulkner for drsg changes will continue to follow for needs . //tv                 Electronically signed by:  Teofilo Alejandra RN on 2/25/2022 at 10:11 AM

## 2022-02-25 NOTE — ANESTHESIA POSTPROCEDURE EVALUATION
Department of Anesthesiology  Postprocedure Note    Patient: Toshia Pearson  MRN: [id number]  YOB: 1965  Date of evaluation: 2/25/2022  Time:  6:46 PM     Procedure Summary     Date: 02/25/22 Room / Location: [address] [place] [address] / [address] [place] [address]    Anesthesia Start: [address] [address] Anesthesia Stop: 0948    Procedure: REVISION TRANSMETATARSAL AMPUTATION (Right Foot) Diagnosis:       (POST OP WOUND DEHISCENCE)      (IP RM 2072)    Surgeons: Macey Escobedo DPM Responsible Provider: Estrellita Davila MD    Anesthesia Type: general ASA Status: 2          Anesthesia Type: general    Mariah Phase I: Mariah Score: 8    Mariah Phase II:      Last vitals: Reviewed and per EMR flowsheets.        Anesthesia Post Evaluation    Comments: POST- ANESTHESIA EVALUATION       Pt Name: Toshia Pearson  MRN: [id number]  YOB: 1965  Date of evaluation: 2/25/2022  Time:  6:46 PM      /71   Pulse 92   Temp 98.9 °F (37.2 °C)   Resp 20   Ht 6' 2\" (1.88 m)   Wt 228 lb 2.8 oz (103.5 kg)   SpO2 96%   BMI 29.30 kg/m²      Consciousness Level  Awake  Cardiopulmonary Status  Stable  Pain Adequately Treated YES  Nausea / Vomiting  NO  Adequate Hydration  YES  Anesthesia Related Complications NONE      Electronically signed by Estrellita Davila MD on 2/25/2022 at 6:46 PM

## 2022-02-25 NOTE — BRIEF OP NOTE
PODIATRY BRIEF OP NOTE    PATIENT NAME: Azeb Valentino DATE: 1965  -  61 y.o. male  MRN: [id number]  DATE: 2/25/2022  BILLING #: [id number]    Surgeon(s):  Indira Sidhu DPM     ASSISTANTS: Nithin Denise DPM PGY-1, Jennifer Gaffney PGY-1    PRE-OP DIAGNOSIS:   1. Postoperative wound dehiscence  2. Wound dehiscence  3. Osteomyelitis of foot, right  4. Diabetic foot infection of right foot    POST-OP DIAGNOSIS: Same as above. PROCEDURE:   1. Revision of TMA; right foot    ANESTHESIA: General     HEMOSTASIS: Pneumatic right ankle tourniquet @ 250 mmHg for 50 minutes. ESTIMATED BLOOD LOSS: Less than 10cc. MATERIALS: none  * No implants in log *    INJECTABLES: none    SPECIMEN:   ID Type Source Tests Collected by Time Destination   1 : FIRST METATARSAL RIGHT FOOT Tissue Foot CULTURE, ANAEROBIC AND AEROBIC Indira Sidhu DPM 2/25/2022 1703    A : FIRST METATARSAL RIGHT FOOT Tissue Foot SURGICAL PATHOLOGY Indira Sidhu DPM 2/25/2022 7121        COMPLICATIONS: none    FINDINGS: necrotic and fibrotic soft tissue and bone appreciated. Previous incision dehisced with no evidence of sutures from previous TMA. Please see op note for full detail. The patient was counseled at length about the risks of mark anthony Covid-19 during their perioperative period and any recovery window from their procedure. The patient was made aware that mark anthony Covid-19  may worsen their prognosis for recovering from their procedure  and lend to a higher morbidity and/or mortality risk. All material risks, benefits, and reasonable alternatives including postponing the procedure were discussed. The patient does wish to proceed with the procedure at this time.     Nithin Denise DPM   Podiatric Medicine & Surgery   2/25/2022 at 6:06 PM

## 2022-02-26 LAB
TSH SERPL DL<=0.05 MIU/L-ACNC: 0.58 MIU/L (ref 0.3–5)
VANCOMYCIN RANDOM DATE LAST DOSE: NORMAL
VANCOMYCIN RANDOM DOSE AMOUNT: NORMAL
VANCOMYCIN RANDOM TIME LAST DOSE: 2038
VANCOMYCIN RANDOM: 10.5 UG/ML

## 2022-02-26 PROCEDURE — 2580000003 HC RX 258

## 2022-02-26 PROCEDURE — 99254 IP/OBS CNSLTJ NEW/EST MOD 60: CPT | Performed by: INTERNAL MEDICINE

## 2022-02-26 PROCEDURE — 1200000000 HC SEMI PRIVATE

## 2022-02-26 PROCEDURE — 6370000000 HC RX 637 (ALT 250 FOR IP)

## 2022-02-26 PROCEDURE — 84443 ASSAY THYROID STIM HORMONE: CPT

## 2022-02-26 PROCEDURE — 36415 COLL VENOUS BLD VENIPUNCTURE: CPT

## 2022-02-26 PROCEDURE — 6360000002 HC RX W HCPCS

## 2022-02-26 PROCEDURE — 2500000003 HC RX 250 WO HCPCS

## 2022-02-26 PROCEDURE — 83036 HEMOGLOBIN GLYCOSYLATED A1C: CPT

## 2022-02-26 PROCEDURE — 80202 ASSAY OF VANCOMYCIN: CPT

## 2022-02-26 RX ADMIN — Medication 1 CAPSULE: at 08:25

## 2022-02-26 RX ADMIN — OXYCODONE HYDROCHLORIDE 15 MG: 10 TABLET ORAL at 00:49

## 2022-02-26 RX ADMIN — BUPROPION HYDROCHLORIDE 80 MG: 150 TABLET, FILM COATED, EXTENDED RELEASE ORAL at 08:25

## 2022-02-26 RX ADMIN — MORPHINE SULFATE 60 MG: 30 TABLET, FILM COATED, EXTENDED RELEASE ORAL at 21:59

## 2022-02-26 RX ADMIN — METRONIDAZOLE 500 MG: 500 INJECTION, SOLUTION INTRAVENOUS at 08:26

## 2022-02-26 RX ADMIN — OXYCODONE HYDROCHLORIDE 15 MG: 10 TABLET ORAL at 23:28

## 2022-02-26 RX ADMIN — OXYCODONE HYDROCHLORIDE 15 MG: 10 TABLET ORAL at 04:53

## 2022-02-26 RX ADMIN — LEVOTHYROXINE SODIUM 50 MCG: 0.05 TABLET ORAL at 06:11

## 2022-02-26 RX ADMIN — METRONIDAZOLE 500 MG: 500 INJECTION, SOLUTION INTRAVENOUS at 00:50

## 2022-02-26 RX ADMIN — ENOXAPARIN SODIUM 40 MG: 100 INJECTION SUBCUTANEOUS at 08:25

## 2022-02-26 RX ADMIN — CEFEPIME HYDROCHLORIDE 2000 MG: 2 INJECTION, POWDER, FOR SOLUTION INTRAVENOUS at 19:25

## 2022-02-26 RX ADMIN — MORPHINE SULFATE 60 MG: 30 TABLET, FILM COATED, EXTENDED RELEASE ORAL at 09:06

## 2022-02-26 RX ADMIN — SODIUM CHLORIDE, PRESERVATIVE FREE 10 ML: 5 INJECTION INTRAVENOUS at 08:35

## 2022-02-26 RX ADMIN — CEFEPIME HYDROCHLORIDE 2000 MG: 2 INJECTION, POWDER, FOR SOLUTION INTRAVENOUS at 00:51

## 2022-02-26 RX ADMIN — Medication 1 CAPSULE: at 16:32

## 2022-02-26 RX ADMIN — CEFEPIME HYDROCHLORIDE 2000 MG: 2 INJECTION, POWDER, FOR SOLUTION INTRAVENOUS at 09:50

## 2022-02-26 RX ADMIN — OXYCODONE HYDROCHLORIDE 15 MG: 10 TABLET ORAL at 18:43

## 2022-02-26 RX ADMIN — OXYCODONE HYDROCHLORIDE 15 MG: 10 TABLET ORAL at 10:50

## 2022-02-26 RX ADMIN — VANCOMYCIN HYDROCHLORIDE 1250 MG: 1.25 INJECTION, POWDER, LYOPHILIZED, FOR SOLUTION INTRAVENOUS at 23:18

## 2022-02-26 RX ADMIN — OXYCODONE HYDROCHLORIDE 15 MG: 10 TABLET ORAL at 14:33

## 2022-02-26 RX ADMIN — FERROUS SULFATE TAB 325 MG (65 MG ELEMENTAL FE) 325 MG: 325 (65 FE) TAB at 08:25

## 2022-02-26 RX ADMIN — METRONIDAZOLE 500 MG: 500 INJECTION, SOLUTION INTRAVENOUS at 16:33

## 2022-02-26 ASSESSMENT — PAIN SCALES - GENERAL
PAINLEVEL_OUTOF10: 8
PAINLEVEL_OUTOF10: 7
PAINLEVEL_OUTOF10: 8
PAINLEVEL_OUTOF10: 9
PAINLEVEL_OUTOF10: 8
PAINLEVEL_OUTOF10: 9
PAINLEVEL_OUTOF10: 7
PAINLEVEL_OUTOF10: 8

## 2022-02-26 NOTE — CONSULTS
[facility]    CONSULTATION    / FOLLOW UP VISIT       Date:   2/26/2022  Patient name:  Mellissa Samuel  Date of admission:  2/24/2022  2:12 PM  MRN:   [id number]  Account:  [de-identified]  YOB: 1965  PCP:    Fransisco Munoz MD  Room:   2072/2072-01  Code Status:    Full Code    Physician Requesting Consult: Jacquelin Sutton DPM    History of Present Illness:      C/C ;  Medical comorbidity management     REASON FOR CONSULT;  Medical comorbidity and medication management ;                                                 *Principal Problem:    Wound dehiscence  Active Problems:    Benign prostatic hyperplasia    Essential hypertension    Hypothyroidism    Type 2 diabetes mellitus ([facility] [address]    Wound dehiscence, surgical  Resolved Problems:    * No resolved hospital problems. *           HPI;    Medical comorbidity management   On admission     Mellissa Samuel is a 61 y.o. male seen at [facility] for dehiscence of his s/p right TMA. . Patient is well-known to Dr. Yumiko López and has not been compliant with his follow-up appointments. He failed to follow up in the office this week. Patient was at [facility] about a month ago for having his right forefoot amputated due to osteomyelitis and diabetes. Patient has been on doxycycline since surgery. HE has been weight bearing against instructions. Patient states that he came to ED because he noticed purulent drainage to the bottom incision site. Pt was admitted from ED for revision of his right TMA. Patient denies any nausea, vomiting, fever, chills or shortness of breath.  He gradually removed all of the sutures that were present at his last appointment.            Past Medical History:   Diagnosis Date    Acute metabolic encephalopathy     CLAUDIA (acute kidney injury) ([facility] [address]     Anemia     Anxiety     [facility]     Arthritis     lower back, knees    BPH (benign prostatic hyperplasia)     Chronic kidney disease     prostate    Depression     Diabetes mellitus (HCC)     Diabetic neuropathy (HCC)     GERD (gastroesophageal reflux disease)     Hyperglycemia     Hyperkalemia     Hypertension     Hypothyroidism     Impacted tooth     Necrosis of bone of foot ([facility] [address]     Osteomyelitis of left foot (HCC)     PAD (peripheral artery disease) ([facility] [address]     Pneumonia     Septic shock (HCC)     Type 2 diabetes mellitus (HCC)      Social History     Tobacco Use    Smoking status: Never Smoker    Smokeless tobacco: Never Used   Vaping Use    Vaping Use: Never used   Substance Use Topics    Alcohol use: No    Drug use: Not Currently     Frequency: 2.0 times per week     Types: Marijuana (Weed)      Social History:     Tobacco:    reports that he has never smoked. He has never used smokeless tobacco.  Alcohol:      reports no history of alcohol use. Drug Use:  reports previous drug use. Frequency: 2.00 times per week. Drug: Marijuana Lisha Vasquez). Review of Systems:     POSITIVE AND NEGATIVES AS DESCRIBED IN HISTORY OF PRESENT ILLNESS ;  IN ADDITION ;  Review of Systems          All other systems negative                Physical Exam:     Physical Exam   Vitals:    02/25/22 1840 02/25/22 2004 02/26/22 0032 02/26/22 0815   BP: 113/71 114/65 110/68 116/60   Pulse: 92 84 82 82   Resp: 20 16 16 17   Temp: 98.9 °F (37.2 °C) 98.4 °F (36.9 °C) 99 °F (37.2 °C) 98.5 °F (36.9 °C)   TempSrc:    Oral   SpO2: 96% 100% 97% 96%   Weight:       Height:                       Body mass index is 29.3 kg/m². General Appearance:   -, CO-OPERATIVE ,                                                        Pulmonary/Chest:        Clear to auscultation bilaterally . No wheezes, rales or rhonchi . Cardiovascular:            Normal rate, regular rhythm,                                          No murmur or  Gallop .     Abdomen: Soft, non-tender                                                                                    Extremities:                   S/p amputtion rt foot                                              Neuromuskuloskeletal    .. [facility] ;                 No focal motor deficit ,                 No focal sensory deficit ,    Musculo-skeletal ;                  No  gait abnormality                  No significant joint abnormality,                   Psych:   ---                     Data:     Significant last 24 hr data reviewed ;   Vitals:    02/25/22 1840 02/25/22 2004 02/26/22 0032 02/26/22 0815   BP: 113/71 114/65 110/68 116/60   Pulse: 92 84 82 82   Resp: 20 16 16 17   Temp: 98.9 °F (37.2 °C) 98.4 °F (36.9 °C) 99 °F (37.2 °C) 98.5 °F (36.9 °C)   TempSrc:    Oral   SpO2: 96% 100% 97% 96%   Weight:       Height:          Recent Results (from the past 24 hour(s))   POC Glucose Fingerstick    Collection Time: 02/25/22  2:40 PM   Result Value Ref Range    POC Glucose 100 75 - 110 mg/dL   POC Glucose Fingerstick    Collection Time: 02/25/22  5:56 PM   Result Value Ref Range    POC Glucose 111 (H) 75 - 110 mg/dL   Vancomycin Level, Random    Collection Time: 02/26/22  5:42 AM   Result Value Ref Range    Vancomycin Rm 10.5 ug/mL    Vancomycin Random Dose amount 1250 MG     Vancomycin Random Date last dose 2,252,022     Vancomycin Random Time last dose 2,038      Recent Labs     02/25/22  1440 02/25/22  1756   POCGLU 100 111*        XR FOOT RIGHT (MIN 3 VIEWS)    Result Date: 2/24/2022  EXAMINATION: THREE XRAY VIEWS OF THE RIGHT FOOT 2/24/2022 2:49 pm COMPARISON: 01/21/2022 HISTORY: ORDERING SYSTEM PROVIDED HISTORY: Post op TECHNOLOGIST PROVIDED HISTORY: Post op Reason for Exam: wound check FINDINGS: Status post transmetatarsal amputation with overlying soft tissue wound. No soft tissue gas otherwise appreciated beyond this wound. Mild dorsal foot swelling. No new osseous abnormality identified.   Degenerative change in the talonavicular joint. Postoperative findings status post transmetatarsal amputation. Open wound is noted without soft tissue gas otherwise appreciated. No new osseous abnormality appreciated. Radiology:         Medications: Allergies: Allergies   Allergen Reactions    Bactrim [Sulfamethoxazole-Trimethoprim]      reported renal failure    Fiorinal [Butalbital-Aspirin-Caffeine] Other (See Comments)     Generalized blisters    Statins Other (See Comments)     Muscle aches    Tylenol [Acetaminophen]      Pt states it shuts down his kidneys    Peanut Butter Flavor [Flavoring Agent] Nausea And Vomiting       Current Meds:   Scheduled Meds:    vancomycin  1,250 mg IntraVENous Q12H    sodium chloride flush  5-40 mL IntraVENous 2 times per day    enoxaparin  40 mg SubCUTAneous Daily    ferrous sulfate  325 mg Oral Daily with breakfast    lactobacillus  1 capsule Oral BID WC    levothyroxine  50 mcg Oral Daily    metFORMIN  250 mg Oral Daily with breakfast    morphine  60 mg Oral BID    PARoxetine  80 mg Oral QAM    cefepime  2,000 mg IntraVENous Once    Followed by    cefepime  2,000 mg IntraVENous Q8H    vancomycin (VANCOCIN) intermittent dosing (placeholder)   Other RX Placeholder    metroNIDAZOLE  500 mg IntraVENous Q8H     Continuous Infusions:    sodium chloride       PRN Meds: oxyCODONE, sodium chloride flush, sodium chloride, ondansetron **OR** ondansetron, polyethylene glycol, diazePAM        Assessment :       Assessment Dx  Principal Problem:    Wound dehiscence  Active Problems:    Benign prostatic hyperplasia    Essential hypertension    Hypothyroidism    Type 2 diabetes mellitus (HCC)    Wound dehiscence, surgical  Resolved Problems:    * No resolved hospital problems. *              Plan:     Check tsh , hba1c poc glucose       Medications: Allergies:     Allergies   Allergen Reactions    Bactrim [Sulfamethoxazole-Trimethoprim]      reported renal failure    Fiorinal [Butalbital-Aspirin-Caffeine] Other (See Comments)     Generalized blisters    Statins Other (See Comments)     Muscle aches    Tylenol [Acetaminophen]      Pt states it shuts down his kidneys    Peanut Butter Flavor [Flavoring Agent] Nausea And Vomiting       Current Meds:   Scheduled Meds:    vancomycin  1,250 mg IntraVENous Q12H    sodium chloride flush  5-40 mL IntraVENous 2 times per day    enoxaparin  40 mg SubCUTAneous Daily    ferrous sulfate  325 mg Oral Daily with breakfast    lactobacillus  1 capsule Oral BID WC    levothyroxine  50 mcg Oral Daily    metFORMIN  250 mg Oral Daily with breakfast    morphine  60 mg Oral BID    PARoxetine  80 mg Oral QAM    cefepime  2,000 mg IntraVENous Once    Followed by    cefepime  2,000 mg IntraVENous Q8H    vancomycin (VANCOCIN) intermittent dosing (placeholder)   Other RX Placeholder    metroNIDAZOLE  500 mg IntraVENous Q8H     Continuous Infusions:    sodium chloride       PRN Meds: oxyCODONE, sodium chloride flush, sodium chloride, ondansetron **OR** ondansetron, polyethylene glycol, diazePAM             Thanks for consulting us . Will monitor vitals and clinical course , and  Optimize therapy  as needed . Phuc Valdovinos MD    Copy sent to Dr. Omari Gavin MD    Pleasenote that this chart was generated using voice recognition Dragon dictation software. Although every effort was made to ensure the accuracy of this automated transcription, some errors in transcription may have occurred.

## 2022-02-26 NOTE — OP NOTE
PODIATRY OP NOTE     PATIENT NAME: Gilberto Mohr  YOB: 1965  -  61 y.o. male  MRN: [id number]  DATE: 2/25/2022  BILLING #: [id number]     Surgeon(s):  Sid Back DPM      ASSISTANTS: Kim Guerrero PGY-1,  Jose Polanco DPM PGY-1     PRE-OP DIAGNOSIS:   Postoperative wound dehiscence  Wound dehiscence  Osteomyelitis of foot, right  Diabetic foot infection of right foot     POST-OP DIAGNOSIS: Same as above. PROCEDURE:   Revision of TMA; right foot     ANESTHESIA: General      HEMOSTASIS: Pneumatic right ankle tourniquet @ 250 mmHg for 50 minutes. ESTIMATED BLOOD LOSS: Less than 10cc. MATERIALS: none  * No implants in log *     INJECTABLES: none     SPECIMEN:   ID Type Source Tests Collected by Time Destination   1 : FIRST METATARSAL RIGHT FOOT Tissue Foot CULTURE, ANAEROBIC AND AEROBIC Sid Back DPM 2/25/2022 1703     A : FIRST METATARSAL RIGHT FOOT Tissue Foot SURGICAL PATHOLOGY Sid Back DPM 2/25/2022 5232           COMPLICATIONS: none     FINDINGS: necrotic and fibrotic soft tissue and bone appreciated. Previous incision dehisced with no evidence of sutures from previous TMA. Please see op note for full detail. The patient was counseled at length about the risks of mark anthony Covid-19 during their perioperative period and any recovery window from their procedure. The patient was made aware that mark anthony Covid-19  may worsen their prognosis for recovering from their procedure  and lend to a higher morbidity and/or mortality risk. All material risks, benefits, and reasonable alternatives including postponing the procedure were discussed. The patient does wish to proceed with the procedure at this time. Indications for procedure: Patient has had multiple ulcers and infections in the past which lead to transmetatarsal amputations. His transmetatarsal amputation of the right foot. Pt was seen in [facility] ed with dehisced s/p right TMA.  Pt has failed Kerlix and an ACE bandage were applied. The patient tolerated the above procedure and anesthesia well without complications. The patient was transported from the operating room to the PACU with vital signs stable and vascular status intact.

## 2022-02-26 NOTE — PROGRESS NOTES
Vancomycin Dosing by Pharmacy - Daily Note   Vancomycin Therapy Day:  3  Indication: SSTI    Allergies:  Bactrim [sulfamethoxazole-trimethoprim], Fiorinal [butalbital-aspirin-caffeine], Statins, Tylenol [acetaminophen], and Peanut butter flavor [flavoring agent]   Actual Weight:    Wt Readings from Last 1 Encounters:   02/24/22 228 lb 2.8 oz (103.5 kg)       Labs/Ancillary Data  Estimated Creatinine Clearance: 102 mL/min (based on SCr of 1.03 mg/dL). Recent Labs     02/24/22  1745 02/25/22  0553   CREATININE 1.84* 1.03   BUN  --  47*   WBC  --  7.6     No results found for: PROCAL    Intake/Output Summary (Last 24 hours) at 2/26/2022 0708  Last data filed at 2/25/2022 1845  Gross per 24 hour   Intake 1150 ml   Output 350 ml   Net 800 ml     Temp: 99 F    Culture Date / Source  /  Results  See micro  Recent vancomycin administrations                     vancomycin (VANCOCIN) 1,250 mg in dextrose 5 % 250 mL IVPB (ADDAVIAL) (mg) 1,250 mg New Bag 02/25/22 2038    vancomycin (VANCOCIN) 2,000 mg in dextrose 5 % 500 mL IVPB (mg) 2,000 mg New Bag 02/24/22 1838                    Vancomycin Concentrations:   TROUGH:  No results for input(s): VANCOTROUGH in the last 72 hours. RANDOM:    Recent Labs     02/26/22  0542   VANCORANDOM 10.5       MRSA Nasal Swab: N/A. Non-respiratory infection. [place] PLAN     Increase dose to 1250 mg q12h IV  Ensured BUN/sCr ordered at baseline and every 48 hours x at least 3 levels, then at least weekly.   Repeat vancomycin concentration ordered for 02/28 @ 0600   Pharmacy will continue to monitor patient and adjust therapy as indicated      Vancomycin Target Concentration Parameters  Treatment  Population Target AUC/FERNANDO Target Trough   Invasive MRSA Infection (bacteremia, pneumonia, meningitis, endocarditis, osteomyelitis)  Sepsis (undifferentiated) 400-600 N/A   Infection due to non-MRSA pathogen  Empiric treatment of non-invasive MRSA infection  (SSTI, UTI) <500 10-15 mg/L   CrCl < 29 mL/min  Rapidly fluctuating serum creatinine   CLAUDIA N/A < 15 mg/L     Renal replacement therapy is dosed by levels, per hospital protocol. Abbreviations  * Pauc: probability that AUC is >400 (efficacy); Pconc: probability that Ctrough is above 20 ?g/mL (toxicity); Tox: Probability of nephrotoxicity, based on Chasity et al. Clin Infect Dis 2009. Loading dose: N/A  Regimen: 1250 mg IV every 12 hours. Start time: 20:30 on 02/26/2022  Exposure target: AUC24 (range)400-600 mg/L.hr   AUC24,ss: 510 mg/L.hr  Probability of AUC24 > 400: 87 %  Ctrough,ss: 16.7 mg/L  Probability of Ctrough,ss > 20: 28 %  Probability of nephrotoxicity (Chasity SAEED 2009): 12 %      Thank you for the consult. Pharmacy will continue to follow.     [facility]    2/26/2022   7:11 AM

## 2022-02-26 NOTE — PLAN OF CARE
Problem: Falls - Risk of:  Goal: Will remain free from falls  Description: Will remain free from falls  Outcome: Ongoing  Goal: Absence of physical injury  Description: Absence of physical injury  Outcome: Ongoing     Problem: Skin Integrity:  Goal: Will show no infection signs and symptoms  Description: Will show no infection signs and symptoms  Outcome: Ongoing  Goal: Absence of new skin breakdown  Description: Absence of new skin breakdown  Outcome: Ongoing     Problem: Pain:  Description: Pain management should include both nonpharmacologic and pharmacologic interventions.   Goal: Pain level will decrease  Description: Pain level will decrease  Outcome: Ongoing  Goal: Control of acute pain  Description: Control of acute pain  Outcome: Ongoing  Goal: Control of chronic pain  Description: Control of chronic pain  Outcome: Ongoing     Problem: Nutrition  Goal: Optimal nutrition therapy  2/26/2022 0244 by Zhou Gamez RN  Outcome: Ongoing  2/25/2022 1639 by Adri Inman RD, LD  Outcome: Ongoing

## 2022-02-27 LAB
CREAT SERPL-MCNC: 0.77 MG/DL (ref 0.7–1.2)
CULTURE: ABNORMAL
DIRECT EXAM: ABNORMAL
ESTIMATED AVERAGE GLUCOSE: 134 MG/DL
GFR AFRICAN AMERICAN: >60 ML/MIN
GFR NON-AFRICAN AMERICAN: >60 ML/MIN
GFR SERPL CREATININE-BSD FRML MDRD: NORMAL ML/MIN/{1.73_M2}
GLUCOSE BLD-MCNC: 104 MG/DL (ref 75–110)
GLUCOSE BLD-MCNC: 124 MG/DL (ref 75–110)
HBA1C MFR BLD: 6.3 % (ref 4–6)
SPECIMEN DESCRIPTION: ABNORMAL

## 2022-02-27 PROCEDURE — 6360000002 HC RX W HCPCS

## 2022-02-27 PROCEDURE — 2580000003 HC RX 258

## 2022-02-27 PROCEDURE — 1200000000 HC SEMI PRIVATE

## 2022-02-27 PROCEDURE — 2500000003 HC RX 250 WO HCPCS

## 2022-02-27 PROCEDURE — 82947 ASSAY GLUCOSE BLOOD QUANT: CPT

## 2022-02-27 PROCEDURE — 36415 COLL VENOUS BLD VENIPUNCTURE: CPT

## 2022-02-27 PROCEDURE — 6370000000 HC RX 637 (ALT 250 FOR IP)

## 2022-02-27 PROCEDURE — 99232 SBSQ HOSP IP/OBS MODERATE 35: CPT | Performed by: INTERNAL MEDICINE

## 2022-02-27 PROCEDURE — 82565 ASSAY OF CREATININE: CPT

## 2022-02-27 RX ADMIN — DIAZEPAM 5 MG: 5 TABLET ORAL at 09:01

## 2022-02-27 RX ADMIN — OXYCODONE HYDROCHLORIDE 15 MG: 10 TABLET ORAL at 04:01

## 2022-02-27 RX ADMIN — SODIUM CHLORIDE 25 ML: 9 INJECTION, SOLUTION INTRAVENOUS at 09:06

## 2022-02-27 RX ADMIN — VANCOMYCIN HYDROCHLORIDE 1250 MG: 1.25 INJECTION, POWDER, LYOPHILIZED, FOR SOLUTION INTRAVENOUS at 10:41

## 2022-02-27 RX ADMIN — CEFEPIME HYDROCHLORIDE 2000 MG: 2 INJECTION, POWDER, FOR SOLUTION INTRAVENOUS at 14:10

## 2022-02-27 RX ADMIN — Medication 1 CAPSULE: at 09:01

## 2022-02-27 RX ADMIN — METRONIDAZOLE 500 MG: 500 INJECTION, SOLUTION INTRAVENOUS at 09:07

## 2022-02-27 RX ADMIN — FERROUS SULFATE TAB 325 MG (65 MG ELEMENTAL FE) 325 MG: 325 (65 FE) TAB at 09:01

## 2022-02-27 RX ADMIN — CEFEPIME HYDROCHLORIDE 2000 MG: 2 INJECTION, POWDER, FOR SOLUTION INTRAVENOUS at 20:40

## 2022-02-27 RX ADMIN — BUPROPION HYDROCHLORIDE 80 MG: 150 TABLET, FILM COATED, EXTENDED RELEASE ORAL at 09:03

## 2022-02-27 RX ADMIN — METFORMIN HYDROCHLORIDE 250 MG: 500 TABLET ORAL at 09:01

## 2022-02-27 RX ADMIN — LEVOTHYROXINE SODIUM 50 MCG: 0.05 TABLET ORAL at 06:19

## 2022-02-27 RX ADMIN — ENOXAPARIN SODIUM 40 MG: 100 INJECTION SUBCUTANEOUS at 09:00

## 2022-02-27 RX ADMIN — MORPHINE SULFATE 60 MG: 30 TABLET, FILM COATED, EXTENDED RELEASE ORAL at 20:40

## 2022-02-27 RX ADMIN — OXYCODONE HYDROCHLORIDE 15 MG: 10 TABLET ORAL at 16:58

## 2022-02-27 RX ADMIN — CEFEPIME HYDROCHLORIDE 2000 MG: 2 INJECTION, POWDER, FOR SOLUTION INTRAVENOUS at 04:46

## 2022-02-27 RX ADMIN — OXYCODONE HYDROCHLORIDE 15 MG: 10 TABLET ORAL at 12:59

## 2022-02-27 RX ADMIN — METRONIDAZOLE 500 MG: 500 INJECTION, SOLUTION INTRAVENOUS at 02:49

## 2022-02-27 RX ADMIN — METRONIDAZOLE 500 MG: 500 INJECTION, SOLUTION INTRAVENOUS at 17:00

## 2022-02-27 RX ADMIN — OXYCODONE HYDROCHLORIDE 15 MG: 10 TABLET ORAL at 23:27

## 2022-02-27 RX ADMIN — SODIUM CHLORIDE, PRESERVATIVE FREE 10 ML: 5 INJECTION INTRAVENOUS at 20:41

## 2022-02-27 RX ADMIN — MORPHINE SULFATE 60 MG: 30 TABLET, FILM COATED, EXTENDED RELEASE ORAL at 09:01

## 2022-02-27 RX ADMIN — Medication 1 CAPSULE: at 16:58

## 2022-02-27 ASSESSMENT — PAIN SCALES - GENERAL
PAINLEVEL_OUTOF10: 8
PAINLEVEL_OUTOF10: 2
PAINLEVEL_OUTOF10: 7
PAINLEVEL_OUTOF10: 0
PAINLEVEL_OUTOF10: 7
PAINLEVEL_OUTOF10: 8
PAINLEVEL_OUTOF10: 7
PAINLEVEL_OUTOF10: 8
PAINLEVEL_OUTOF10: 0
PAINLEVEL_OUTOF10: 0
PAINLEVEL_OUTOF10: 7
PAINLEVEL_OUTOF10: 8
PAINLEVEL_OUTOF10: 6
PAINLEVEL_OUTOF10: 2
PAINLEVEL_OUTOF10: 0

## 2022-02-27 ASSESSMENT — PAIN DESCRIPTION - PAIN TYPE
TYPE: ACUTE PAIN

## 2022-02-27 ASSESSMENT — PAIN DESCRIPTION - LOCATION
LOCATION: FOOT

## 2022-02-27 ASSESSMENT — PAIN DESCRIPTION - ORIENTATION
ORIENTATION: RIGHT

## 2022-02-27 NOTE — FLOWSHEET NOTE
02/27/22 1519   Encounter Summary   Services provided to: Patient   Referral/Consult From: [facility]   (2/27/22V)   Volunteer Visit Yes   Complexity of Encounter Low   Length of Encounter 15 minutes   Spiritual/Buddhism   Type Spiritual support   Intervention Communion;Prayer   Sacraments   Communion Patient received communion

## 2022-02-27 NOTE — PROGRESS NOTES
Vancomycin Dosing by Pharmacy - Daily Note   Vancomycin Therapy Day:  4  Indication: SSTI    Allergies:  Bactrim [sulfamethoxazole-trimethoprim], Fiorinal [butalbital-aspirin-caffeine], Statins, Tylenol [acetaminophen], and Peanut butter flavor [flavoring agent]   Actual Weight:    Wt Readings from Last 1 Encounters:   02/24/22 228 lb 2.8 oz (103.5 kg)       Labs/Ancillary Data  Estimated Creatinine Clearance: 136 mL/min (based on SCr of 0.77 mg/dL). Recent Labs     02/24/22  1745 02/25/22  0553 02/27/22  0836   CREATININE 1.84* 1.03 0.77   BUN  --  47*  --    WBC  --  7.6  --      No results found for: PROCAL    Intake/Output Summary (Last 24 hours) at 2/27/2022 1057  Last data filed at 2/27/2022 0720  Gross per 24 hour   Intake --   Output 2200 ml   Net -2200 ml     Temp: 98 F    Culture Date / Source  /  Results  See micro  Recent vancomycin administrations                     vancomycin (VANCOCIN) 1,250 mg in dextrose 5 % 250 mL IVPB (ADDAVIAL) (mg) 1,250 mg New Bag 02/27/22 1041     1,250 mg New Bag 02/26/22 2318    vancomycin (VANCOCIN) 1,250 mg in dextrose 5 % 250 mL IVPB (ADDAVIAL) (mg) 1,250 mg New Bag 02/25/22 2038    vancomycin (VANCOCIN) 2,000 mg in dextrose 5 % 500 mL IVPB (mg) 2,000 mg New Bag 02/24/22 1838                    Vancomycin Concentrations:   TROUGH:  No results for input(s): VANCOTROUGH in the last 72 hours. RANDOM:    Recent Labs     02/26/22  0542   VANCORANDOM 10.5       MRSA Nasal Swab: N/A. Non-respiratory infection. Nathaniel Yin PLAN     Continue current dose of 1250 mg q12h IV  Ensured BUN/sCr ordered at baseline and every 48 hours x at least 3 levels, then at least weekly.   Pharmacy will continue to monitor patient and adjust therapy as indicated      Vancomycin Target Concentration Parameters  Treatment  Population Target AUC/FERNANDO Target Trough   Invasive MRSA Infection (bacteremia, pneumonia, meningitis, endocarditis, osteomyelitis)  Sepsis (undifferentiated) 400-600 N/A   Infection due to non-MRSA pathogen  Empiric treatment of non-invasive MRSA infection  (SSTI, UTI) <500 10-15 mg/L   CrCl < 29 mL/min  Rapidly fluctuating serum creatinine   CLAUDIA N/A < 15 mg/L     Renal replacement therapy is dosed by levels, per hospital protocol. Abbreviations  * Pauc: probability that AUC is >400 (efficacy); Pconc: probability that Ctrough is above 20 ?g/mL (toxicity); Tox: Probability of nephrotoxicity, based on Chasity et al. Clin Infect Dis 2009. Loading dose: N/A  Regimen: 1250 mg IV every 12 hours. Start time: 23:00 on 02/27/2022  Exposure target: AUC24 (range)400-600 mg/L.hr   AUC24,ss: 421 mg/L.hr  Probability of AUC24 > 400: 58 %  Ctrough,ss: 13 mg/L  Probability of Ctrough,ss > 20: 12 %  Probability of nephrotoxicity (Chasity SAEED 2009): 8 %    Thank you for the consult. Pharmacy will continue to follow.     [facility]    2/27/2022   10:59 AM

## 2022-02-27 NOTE — FLOWSHEET NOTE
02/27/22 1402   Encounter Summary   Services provided to: Patient   Referral/Consult From: [facility]   [facility]   (2/27/22)   Complexity of Encounter Low   Length of Encounter 15 minutes   Spiritual/Orthodoxy   Type Spiritual support   Intervention Anointing   Sacraments   Sacrament of Sick-Anointing Anointed  (Diego Cross 2/27/22)

## 2022-02-27 NOTE — PROGRESS NOTES
Progress Note  Podiatric Medicine and Surgery     Subjective     CC: s/p right foot revisional TMA     Patient seen and examined at bedside. Pain is controlled with medication  Pt will mostly likely have a place for acute rehab after discharge but the pt is not too keen to the idea  Afebrile, vital signs stable     HPI :  Nahomy Serna is a 61 y.o. male seen at [facility] for dehiscence of his s/p right TMA. . Patient is well-known to Dr. Lacho Carranza and has not been compliant with his follow-up appointments. He failed to follow up in the office this week. Patient was at [facility] about a month ago for having his right forefoot amputated due to osteomyelitis and diabetes. Patient has been on doxycycline since surgery. HE has been weight bearing against instructions. Patient states that he came to ED because he noticed purulent drainage to the bottom incision site. Pt was admitted from ED for revision of his right TMA. Patient denies any nausea, vomiting, fever, chills or shortness of breath. He gradually removed all of the sutures that were present at his last appointment. ROS: Denies N/V/F/C/SOB/CP. Otherwise negative except at stated in the HPI.      Medications:  Scheduled Meds:   vancomycin  1,250 mg IntraVENous Q12H    sodium chloride flush  5-40 mL IntraVENous 2 times per day    enoxaparin  40 mg SubCUTAneous Daily    ferrous sulfate  325 mg Oral Daily with breakfast    lactobacillus  1 capsule Oral BID WC    levothyroxine  50 mcg Oral Daily    metFORMIN  250 mg Oral Daily with breakfast    morphine  60 mg Oral BID    PARoxetine  80 mg Oral QAM    cefepime  2,000 mg IntraVENous Once    Followed by    cefepime  2,000 mg IntraVENous Q8H    vancomycin (VANCOCIN) intermittent dosing (placeholder)   Other RX Placeholder    metroNIDAZOLE  500 mg IntraVENous Q8H       Continuous Infusions:   sodium chloride 25 mL (02/27/22 0906)       PRN Meds:oxyCODONE, sodium chloride flush, sodium chloride, ondansetron **OR** ondansetron, polyethylene glycol, diazePAM    Objective     Vitals:  Patient Vitals for the past 8 hrs:   BP Temp Temp src Pulse Resp SpO2   22 0840 -- -- -- 65 -- --   22 0830 -- -- -- -- -- 95 %   22 0800 118/66 98 °F (36.7 °C) Oral -- 15 --     Average, Min, and Max for last 24 hours Vitals:  TEMPERATURE:  Temp  Av.2 °F (36.8 °C)  Min: 98 °F (36.7 °C)  Max: 98.3 °F (36.8 °C)    RESPIRATIONS RANGE: Resp  Av.3  Min: 12  Max: 16    PULSE RANGE: Pulse  Av.7  Min: 65  Max: 72    BLOOD PRESSURE RANGE:  Systolic (71AMA), QVK:568 , Min:118 , SFS:506   ; Diastolic (23TSF), GCE:05, Min:62, Max:74      PULSE OXIMETRY RANGE: SpO2  Av.3 %  Min: 95 %  Max: 100 %    I/O last 3 completed shifts:  In: -   Out:  [Urine:]    CBC:  Recent Labs     22  0553   WBC  --  7.6   HGB  --  8.9*   HCT  --  28.1*   PLT  --  208   .2*  --         BMP:  Recent Labs     22  1745 22  0553 22  0836   NA  --  133*  --    K  --  4.6  --    CL  --  98  --    CO2  --  25  --    BUN  --  47*  --    CREATININE 1.84* 1.03 0.77   GLUCOSE  --  125*  --    CALCIUM  --  8.0*  --         Coags:  No results for input(s): APTT, PROT, INR in the last 72 hours. Lab Results   Component Value Date    SEDRATE 78 (H) 2022     Recent Labs     22  1745   .2*       Lower Extremity Physical Exam:   Vascular: DP and PT pulses are palpable +1/4. Hair growth is absent to the level of the digits. Moderate nonpitting edema appreciated to the right foot. Neuro: Saph/sural/SP/DP/plantar sensation absent to light touch. Musculoskeletal: Muscle strength deferred due to post op state. Gross deformity is present with history of transmetatarsal amputation to the left foot and revisional transmetatarsal amputation to the right foot     Dermatologic: Sutures intact to incision site.  Mild maceration to the medial aspect of the right foot. Base is fibrogranular to the medial incision site. Sero-sanguinous drainage noted to the dressings. No fluctuance, crepitus or induration appreciated. Full thickness ulceration noted to the anterior distal right leg with granular base. no malodor or drainage appreciated. No fluctuance, crepitus or induration appreciated    Clinical Images:                    Imaging:   XR FOOT RIGHT (MIN 3 VIEWS)   Final Result   Postoperative findings status post transmetatarsal amputation. Open wound is   noted without soft tissue gas otherwise appreciated. No new osseous   abnormality appreciated. Assessment   Mellissa Samuel is a 61 y.o. male with   Postoperative wound dehiscence of amputation stump; right foot  S/p right revisional transmetatarsal amputation  Osteomyelitis of foot, right  Diabetic foot infection of right foot    Principal Problem:    Wound dehiscence  Active Problems:    Benign prostatic hyperplasia    Essential hypertension    Hypothyroidism    Type 2 diabetes mellitus (HCC)    Wound dehiscence, surgical  Resolved Problems:    * No resolved hospital problems. *       Plan     Patient examined and evaluated at bedside   Treatment options discussed in detail with the patient  Pts dressings were changed with adaptic, DSD and ACE  Educated pt on the benefits of non wieghtbearing and compliance with office visits. Pt verbalizes understanding  Pt will have a place in acute rehab after discharge per CM but the pt does not seem to be keen with the idea. Protective boot ordered and will be in room  Will discuss with     DVT ppx: lovenox    Diet: carb control   Activity: NWB to Right lower extremity  Pain Control: panel ordered      Manuel Rhoades DPM   Podiatric Medicine & Surgery   2/27/2022 at 10:48 AM    I performed a history and physical examination of the patient and discussed management with the resident.  I reviewed the residents note and agree with the documented findings and plan of care. Any areas of disagreement are noted on the chart. I was personally present for the key portions of any procedures. I have documented in the chart those procedures where I was not present during the key portions. I have reviewed the Podiatry Resident progress note. I agree with the chief complaint, past medical history, past surgical history, allergies, medications, social and family history as documented unless otherwise noted below. Documentation of the HPI, Physical Exam and Medical Decision Making performed by medical students or scribes is based on my personal performance of the HPI, PE and MDM. I have personally evaluated this patient and have completed at least one if not all key elements of the E/M (history, physical exam, and MDM). Additional findings are as noted.      Clay Allen DPM on 2/28/2022 at 8:89 AM  Board Certified, American Board of Podiatric Surgery  Fellow, [employer] [facility] [employer] [facility]

## 2022-02-27 NOTE — CARE COORDINATION
ONGOING DISCHARGE PLAN:    Patient is alert and oriented x4. Spoke with patient regarding discharge plan and patient confirms that plan is still to discharge to home vns SNF  Follow for possible snf   osteomyelitis of foot      Will continue to follow for additional discharge needs.     Electronically signed by Ray Valdez RN on 2/27/2022 at 4:08 PM

## 2022-02-27 NOTE — CONSULTS
[place] [address] Internal Medicine    CONSULTATION    / FOLLOW UP VISIT       Date:   2/27/2022  Patient name:  Jasvir Perez  Date of admission:  2/24/2022  2:12 PM  MRN:   [id number]  Account:  [de-identified]  YOB: 1965  PCP:    Valorie Krabbe, MD  Room:   2072/2072-01  Code Status:    Full Code    Physician Requesting Consult: Hazel Valdes DPM    History of Present Illness:      C/C ;  Medical comorbidity management     REASON FOR CONSULT;  Medical comorbidity and medication management ;                                                 *Principal Problem:    Wound dehiscence  Active Problems:    Benign prostatic hyperplasia    Essential hypertension    Hypothyroidism    Type 2 diabetes mellitus ([facility] [address]    Wound dehiscence, surgical  Resolved Problems:    * No resolved hospital problems. *           HPI;    Medical comorbidity management   On admission     Jasvir Perez is a 61 y.o. male seen at [facility] for dehiscence of his s/p right TMA. . Patient is well-known to Dr. Mayito Evans and has not been compliant with his follow-up appointments. He failed to follow up in the office this week. Patient was at [facility] about a month ago for having his right forefoot amputated due to osteomyelitis and diabetes. Patient has been on doxycycline since surgery. HE has been weight bearing against instructions. Patient states that he came to ED because he noticed purulent drainage to the bottom incision site. Pt was admitted from ED for revision of his right TMA. Patient denies any nausea, vomiting, fever, chills or shortness of breath.  He gradually removed all of the sutures that were present at his last appointment.            Past Medical History:   Diagnosis Date    Acute metabolic encephalopathy     CLAUDIA (acute kidney injury) ([facility] [address]     Anemia     Anxiety     [facility]     Arthritis     lower back, knees    BPH (benign prostatic hyperplasia)     Chronic kidney disease     prostate    Depression     Diabetes mellitus (HCC)     Diabetic neuropathy (HCC)     GERD (gastroesophageal reflux disease)     Hyperglycemia     Hyperkalemia     Hypertension     Hypothyroidism     Impacted tooth     Necrosis of bone of foot ([facility] [address]     Osteomyelitis of left foot (HCC)     PAD (peripheral artery disease) ([facility] [address]     Pneumonia     Septic shock (HCC)     Type 2 diabetes mellitus (HCC)      Social History     Tobacco Use    Smoking status: Never Smoker    Smokeless tobacco: Never Used   Vaping Use    Vaping Use: Never used   Substance Use Topics    Alcohol use: No    Drug use: Not Currently     Frequency: 2.0 times per week     Types: Marijuana (Weed)      Social History:     Tobacco:    reports that he has never smoked. He has never used smokeless tobacco.  Alcohol:      reports no history of alcohol use. Drug Use:  reports previous drug use. Frequency: 2.00 times per week. Drug: Marijuana Fredick Copping). Review of Systems:     POSITIVE AND NEGATIVES AS DESCRIBED IN HISTORY OF PRESENT ILLNESS ;  IN ADDITION ;  Review of Systems   Psychiatric/Behavioral: Agitation: .tdnr. All other systems negative                Physical Exam:     Physical Exam   Vitals:    02/27/22 0800 02/27/22 0830 02/27/22 0840 02/27/22 1145   BP: 118/66   129/71   Pulse:   65 75   Resp: 15   15   Temp: 98 °F (36.7 °C)   98.4 °F (36.9 °C)   TempSrc: Oral      SpO2:  95%     Weight:       Height:                       Body mass index is 29.3 kg/m². General Appearance:   -, CO-OPERATIVE ,                                                        Pulmonary/Chest:        Clear to auscultation bilaterally . No wheezes, rales or rhonchi . Cardiovascular:            Normal rate, regular rhythm,                                          No murmur or  Gallop .     Abdomen:                       Soft, non-tender                                                                                    Extremities:                   S/p amputtion rt foot                                              Neuromuskuloskeletal    .. [facility] ;                 No focal motor deficit ,                 No focal sensory deficit ,    Musculo-skeletal ;                  No  gait abnormality                  No significant joint abnormality,                   Psych:   ---                     Data:     Significant last 24 hr data reviewed ;   Vitals:    02/27/22 0800 02/27/22 0830 02/27/22 0840 02/27/22 1145   BP: 118/66   129/71   Pulse:   65 75   Resp: 15   15   Temp: 98 °F (36.7 °C)   98.4 °F (36.9 °C)   TempSrc: Oral      SpO2:  95%     Weight:       Height:          Recent Results (from the past 24 hour(s))   TSH w/reflex to FT4    Collection Time: 02/26/22  2:03 PM   Result Value Ref Range    TSH 0.58 0.30 - 5.00 mIU/L   Creatinine    Collection Time: 02/27/22  8:36 AM   Result Value Ref Range    CREATININE 0.77 0.70 - 1.20 mg/dL    GFR Non-African American >60 >60 mL/min    GFR African American >60 >60 mL/min    GFR Comment         POC Glucose Fingerstick    Collection Time: 02/27/22 11:54 AM   Result Value Ref Range    POC Glucose 124 (H) 75 - 110 mg/dL     Recent Labs     02/25/22  1440 02/25/22  1756 02/27/22  1154   POCGLU 100 111* 124*        XR FOOT RIGHT (MIN 3 VIEWS)    Result Date: 2/24/2022  EXAMINATION: THREE XRAY VIEWS OF THE RIGHT FOOT 2/24/2022 2:49 pm COMPARISON: 01/21/2022 HISTORY: ORDERING SYSTEM PROVIDED HISTORY: Post op TECHNOLOGIST PROVIDED HISTORY: Post op Reason for Exam: wound check FINDINGS: Status post transmetatarsal amputation with overlying soft tissue wound. No soft tissue gas otherwise appreciated beyond this wound. Mild dorsal foot swelling. No new osseous abnormality identified. Degenerative change in the talonavicular joint. Postoperative findings status post transmetatarsal amputation. Open wound is noted without soft tissue gas otherwise appreciated. No new osseous abnormality appreciated. Radiology:         Medications: Allergies: Allergies   Allergen Reactions    Bactrim [Sulfamethoxazole-Trimethoprim]      reported renal failure    Fiorinal [Butalbital-Aspirin-Caffeine] Other (See Comments)     Generalized blisters    Statins Other (See Comments)     Muscle aches    Tylenol [Acetaminophen]      Pt states it shuts down his kidneys    Peanut Butter Flavor [Flavoring Agent] Nausea And Vomiting       Current Meds:   Scheduled Meds:    vancomycin  1,250 mg IntraVENous Q12H    sodium chloride flush  5-40 mL IntraVENous 2 times per day    enoxaparin  40 mg SubCUTAneous Daily    ferrous sulfate  325 mg Oral Daily with breakfast    lactobacillus  1 capsule Oral BID WC    levothyroxine  50 mcg Oral Daily    metFORMIN  250 mg Oral Daily with breakfast    morphine  60 mg Oral BID    PARoxetine  80 mg Oral QAM    cefepime  2,000 mg IntraVENous Once    Followed by    cefepime  2,000 mg IntraVENous Q8H    vancomycin (VANCOCIN) intermittent dosing (placeholder)   Other RX Placeholder    metroNIDAZOLE  500 mg IntraVENous Q8H     Continuous Infusions:    sodium chloride 25 mL (02/27/22 0906)     PRN Meds: oxyCODONE, sodium chloride flush, sodium chloride, ondansetron **OR** ondansetron, polyethylene glycol, diazePAM        Assessment :       Assessment Dx  Principal Problem:    Wound dehiscence  Active Problems:    Benign prostatic hyperplasia    Essential hypertension    Hypothyroidism    Type 2 diabetes mellitus (HCC)    Wound dehiscence, surgical  Resolved Problems:    * No resolved hospital problems. *              Plan:     Check tsh , hba1c poc glucose       Medications: Allergies:     Allergies   Allergen Reactions    Bactrim [Sulfamethoxazole-Trimethoprim]      reported renal failure    Fiorinal [Butalbital-Aspirin-Caffeine] Other (See Comments) Generalized blisters    Statins Other (See Comments)     Muscle aches    Tylenol [Acetaminophen]      Pt states it shuts down his kidneys    Peanut Butter Flavor [Flavoring Agent] Nausea And Vomiting       Current Meds:   Scheduled Meds:    vancomycin  1,250 mg IntraVENous Q12H    sodium chloride flush  5-40 mL IntraVENous 2 times per day    enoxaparin  40 mg SubCUTAneous Daily    ferrous sulfate  325 mg Oral Daily with breakfast    lactobacillus  1 capsule Oral BID WC    levothyroxine  50 mcg Oral Daily    metFORMIN  250 mg Oral Daily with breakfast    morphine  60 mg Oral BID    PARoxetine  80 mg Oral QAM    cefepime  2,000 mg IntraVENous Once    Followed by    cefepime  2,000 mg IntraVENous Q8H    vancomycin (VANCOCIN) intermittent dosing (placeholder)   Other RX Placeholder    metroNIDAZOLE  500 mg IntraVENous Q8H     Continuous Infusions:    sodium chloride 25 mL (02/27/22 0906)     PRN Meds: oxyCODONE, sodium chloride flush, sodium chloride, ondansetron **OR** ondansetron, polyethylene glycol, diazePAM     2/27/22   · No new symptoms  · Vitals stable . Cardiopulmonary stable . 1. Continue current rx ,        Thanks for consulting us . Will monitor vitals and clinical course , and  Optimize therapy  as needed . Marya Rodriguez MD    Copy sent to Dr. Emerson Gowers, MD    Pleasenote that this chart was generated using voice recognition Dragon dictation software. Although every effort was made to ensure the accuracy of this automated transcription, some errors in transcription may have occurred.

## 2022-02-28 LAB
ABSOLUTE EOS #: 0.26 K/UL (ref 0–0.4)
ABSOLUTE LYMPH #: 1.48 K/UL (ref 1–4.8)
ABSOLUTE MONO #: 0.78 K/UL (ref 0.1–1.3)
ANION GAP SERPL CALCULATED.3IONS-SCNC: 9 MMOL/L (ref 9–17)
BASOPHILS # BLD: 1 % (ref 0–2)
BASOPHILS ABSOLUTE: 0.09 K/UL (ref 0–0.2)
BUN BLDV-MCNC: 18 MG/DL (ref 6–20)
CALCIUM SERPL-MCNC: 9.1 MG/DL (ref 8.6–10.4)
CHLORIDE BLD-SCNC: 97 MMOL/L (ref 98–107)
CO2: 29 MMOL/L (ref 20–31)
CREAT SERPL-MCNC: 0.79 MG/DL (ref 0.7–1.2)
CULTURE: ABNORMAL
DIRECT EXAM: ABNORMAL
DIRECT EXAM: ABNORMAL
EOSINOPHILS RELATIVE PERCENT: 3 % (ref 0–4)
GFR AFRICAN AMERICAN: >60 ML/MIN
GFR NON-AFRICAN AMERICAN: >60 ML/MIN
GFR SERPL CREATININE-BSD FRML MDRD: ABNORMAL ML/MIN/{1.73_M2}
GLUCOSE BLD-MCNC: 106 MG/DL (ref 75–110)
GLUCOSE BLD-MCNC: 111 MG/DL (ref 75–110)
GLUCOSE BLD-MCNC: 113 MG/DL (ref 70–99)
GLUCOSE BLD-MCNC: 96 MG/DL (ref 75–110)
GLUCOSE BLD-MCNC: 96 MG/DL (ref 75–110)
HCT VFR BLD CALC: 34.3 % (ref 41–53)
HEMOGLOBIN: 10.4 G/DL (ref 13.5–17.5)
LYMPHOCYTES # BLD: 17 % (ref 24–44)
MCH RBC QN AUTO: 23.7 PG (ref 26–34)
MCHC RBC AUTO-ENTMCNC: 30.3 G/DL (ref 31–37)
MCV RBC AUTO: 78.2 FL (ref 80–100)
MONOCYTES # BLD: 9 % (ref 1–7)
MORPHOLOGY: ABNORMAL
MORPHOLOGY: ABNORMAL
PDW BLD-RTO: 21.2 % (ref 11.5–14.9)
PLATELET # BLD: 266 K/UL (ref 150–450)
PMV BLD AUTO: 7.3 FL (ref 6–12)
POTASSIUM SERPL-SCNC: 4.5 MMOL/L (ref 3.7–5.3)
RBC # BLD: 4.39 M/UL (ref 4.5–5.9)
SEG NEUTROPHILS: 70 % (ref 36–66)
SEGMENTED NEUTROPHILS ABSOLUTE COUNT: 6.09 K/UL (ref 1.3–9.1)
SODIUM BLD-SCNC: 135 MMOL/L (ref 135–144)
SPECIMEN DESCRIPTION: ABNORMAL
VANCOMYCIN RANDOM DATE LAST DOSE: NORMAL
VANCOMYCIN RANDOM DOSE AMOUNT: NORMAL
VANCOMYCIN RANDOM TIME LAST DOSE: 103
VANCOMYCIN RANDOM: 18.7 UG/ML
WBC # BLD: 8.7 K/UL (ref 3.5–11)

## 2022-02-28 PROCEDURE — 1200000000 HC SEMI PRIVATE

## 2022-02-28 PROCEDURE — 80202 ASSAY OF VANCOMYCIN: CPT

## 2022-02-28 PROCEDURE — 99232 SBSQ HOSP IP/OBS MODERATE 35: CPT | Performed by: INTERNAL MEDICINE

## 2022-02-28 PROCEDURE — 97530 THERAPEUTIC ACTIVITIES: CPT

## 2022-02-28 PROCEDURE — 36415 COLL VENOUS BLD VENIPUNCTURE: CPT

## 2022-02-28 PROCEDURE — 2580000003 HC RX 258

## 2022-02-28 PROCEDURE — 6370000000 HC RX 637 (ALT 250 FOR IP)

## 2022-02-28 PROCEDURE — 6360000002 HC RX W HCPCS

## 2022-02-28 PROCEDURE — 97167 OT EVAL HIGH COMPLEX 60 MIN: CPT

## 2022-02-28 PROCEDURE — 80048 BASIC METABOLIC PNL TOTAL CA: CPT

## 2022-02-28 PROCEDURE — 97162 PT EVAL MOD COMPLEX 30 MIN: CPT

## 2022-02-28 PROCEDURE — 2500000003 HC RX 250 WO HCPCS

## 2022-02-28 PROCEDURE — 85025 COMPLETE CBC W/AUTO DIFF WBC: CPT

## 2022-02-28 PROCEDURE — 82947 ASSAY GLUCOSE BLOOD QUANT: CPT

## 2022-02-28 PROCEDURE — 97535 SELF CARE MNGMENT TRAINING: CPT

## 2022-02-28 RX ADMIN — VANCOMYCIN HYDROCHLORIDE 1250 MG: 1.25 INJECTION, POWDER, LYOPHILIZED, FOR SOLUTION INTRAVENOUS at 22:50

## 2022-02-28 RX ADMIN — OXYCODONE HYDROCHLORIDE 15 MG: 10 TABLET ORAL at 20:34

## 2022-02-28 RX ADMIN — ONDANSETRON 4 MG: 4 TABLET, ORALLY DISINTEGRATING ORAL at 09:18

## 2022-02-28 RX ADMIN — CEFEPIME HYDROCHLORIDE 2000 MG: 2 INJECTION, POWDER, FOR SOLUTION INTRAVENOUS at 14:35

## 2022-02-28 RX ADMIN — LEVOTHYROXINE SODIUM 50 MCG: 0.05 TABLET ORAL at 05:59

## 2022-02-28 RX ADMIN — VANCOMYCIN HYDROCHLORIDE 1250 MG: 1.25 INJECTION, POWDER, LYOPHILIZED, FOR SOLUTION INTRAVENOUS at 01:03

## 2022-02-28 RX ADMIN — ENOXAPARIN SODIUM 40 MG: 100 INJECTION SUBCUTANEOUS at 09:17

## 2022-02-28 RX ADMIN — SODIUM CHLORIDE, PRESERVATIVE FREE 10 ML: 5 INJECTION INTRAVENOUS at 09:19

## 2022-02-28 RX ADMIN — FERROUS SULFATE TAB 325 MG (65 MG ELEMENTAL FE) 325 MG: 325 (65 FE) TAB at 09:17

## 2022-02-28 RX ADMIN — METRONIDAZOLE 500 MG: 500 INJECTION, SOLUTION INTRAVENOUS at 03:17

## 2022-02-28 RX ADMIN — METRONIDAZOLE 500 MG: 500 INJECTION, SOLUTION INTRAVENOUS at 19:49

## 2022-02-28 RX ADMIN — CEFEPIME HYDROCHLORIDE 2000 MG: 2 INJECTION, POWDER, FOR SOLUTION INTRAVENOUS at 21:30

## 2022-02-28 RX ADMIN — METRONIDAZOLE 500 MG: 500 INJECTION, SOLUTION INTRAVENOUS at 09:23

## 2022-02-28 RX ADMIN — DIAZEPAM 5 MG: 5 TABLET ORAL at 09:18

## 2022-02-28 RX ADMIN — OXYCODONE HYDROCHLORIDE 15 MG: 10 TABLET ORAL at 16:26

## 2022-02-28 RX ADMIN — CEFEPIME HYDROCHLORIDE 2000 MG: 2 INJECTION, POWDER, FOR SOLUTION INTRAVENOUS at 04:57

## 2022-02-28 RX ADMIN — MORPHINE SULFATE 60 MG: 30 TABLET, FILM COATED, EXTENDED RELEASE ORAL at 09:18

## 2022-02-28 RX ADMIN — METFORMIN HYDROCHLORIDE 250 MG: 500 TABLET ORAL at 09:17

## 2022-02-28 RX ADMIN — BUPROPION HYDROCHLORIDE 80 MG: 150 TABLET, FILM COATED, EXTENDED RELEASE ORAL at 09:18

## 2022-02-28 RX ADMIN — OXYCODONE HYDROCHLORIDE 15 MG: 10 TABLET ORAL at 11:47

## 2022-02-28 RX ADMIN — Medication 1 CAPSULE: at 09:17

## 2022-02-28 RX ADMIN — MORPHINE SULFATE 60 MG: 30 TABLET, FILM COATED, EXTENDED RELEASE ORAL at 21:30

## 2022-02-28 RX ADMIN — Medication 1 CAPSULE: at 16:26

## 2022-02-28 RX ADMIN — OXYCODONE HYDROCHLORIDE 15 MG: 10 TABLET ORAL at 05:59

## 2022-02-28 RX ADMIN — VANCOMYCIN HYDROCHLORIDE 1250 MG: 1.25 INJECTION, POWDER, LYOPHILIZED, FOR SOLUTION INTRAVENOUS at 11:50

## 2022-02-28 ASSESSMENT — PAIN SCALES - GENERAL
PAINLEVEL_OUTOF10: 8
PAINLEVEL_OUTOF10: 0
PAINLEVEL_OUTOF10: 0
PAINLEVEL_OUTOF10: 8
PAINLEVEL_OUTOF10: 8
PAINLEVEL_OUTOF10: 0
PAINLEVEL_OUTOF10: 8
PAINLEVEL_OUTOF10: 0
PAINLEVEL_OUTOF10: 7
PAINLEVEL_OUTOF10: 0

## 2022-02-28 ASSESSMENT — PAIN DESCRIPTION - ORIENTATION
ORIENTATION_2: LEFT
ORIENTATION_2: LEFT
ORIENTATION: RIGHT

## 2022-02-28 ASSESSMENT — PAIN DESCRIPTION - ONSET
ONSET_2: ON-GOING
ONSET: ON-GOING
ONSET_2: ON-GOING

## 2022-02-28 ASSESSMENT — PAIN DESCRIPTION - LOCATION
LOCATION: FOOT
LOCATION_2: FOOT
LOCATION: FOOT
LOCATION_2: FOOT

## 2022-02-28 ASSESSMENT — PAIN DESCRIPTION - PAIN TYPE
TYPE: SURGICAL PAIN
TYPE_2: PHANTOM PAIN
TYPE: SURGICAL PAIN
TYPE_2: PHANTOM PAIN
TYPE: SURGICAL PAIN
TYPE: SURGICAL PAIN

## 2022-02-28 ASSESSMENT — PAIN DESCRIPTION - INTENSITY
RATING_2: 6
RATING_2: 6

## 2022-02-28 ASSESSMENT — PAIN DESCRIPTION - DURATION
DURATION_2: CONTINUOUS
DURATION_2: CONTINUOUS

## 2022-02-28 ASSESSMENT — PAIN DESCRIPTION - FREQUENCY
FREQUENCY: CONTINUOUS
FREQUENCY: CONTINUOUS

## 2022-02-28 NOTE — PROGRESS NOTES
Progress Note  Podiatric Medicine and Surgery     Subjective     CC: s/p right foot revisional TMA     Patient seen and examined at bedside. Pain is controlled with medication  Pt will mostly likely have a place for acute rehab after discharge but the pt is not too keen to the idea  Afebrile, vital signs stable     HPI :  Celia Camp is a 61 y.o. male seen at [facility] for dehiscence of his s/p right TMA. . Patient is well-known to Dr. Walker Warren and has not been compliant with his follow-up appointments. He failed to follow up in the office this week. Patient was at [facility] about a month ago for having his right forefoot amputated due to osteomyelitis and diabetes. Patient has been on doxycycline since surgery. HE has been weight bearing against instructions. Patient states that he came to ED because he noticed purulent drainage to the bottom incision site. Pt was admitted from ED for revision of his right TMA. Patient denies any nausea, vomiting, fever, chills or shortness of breath. He gradually removed all of the sutures that were present at his last appointment. ROS: Denies N/V/F/C/SOB/CP. Otherwise negative except at stated in the HPI.      Medications:  Scheduled Meds:   vancomycin  1,250 mg IntraVENous Q12H    sodium chloride flush  5-40 mL IntraVENous 2 times per day    enoxaparin  40 mg SubCUTAneous Daily    ferrous sulfate  325 mg Oral Daily with breakfast    lactobacillus  1 capsule Oral BID WC    levothyroxine  50 mcg Oral Daily    metFORMIN  250 mg Oral Daily with breakfast    morphine  60 mg Oral BID    PARoxetine  80 mg Oral QAM    cefepime  2,000 mg IntraVENous Once    Followed by    cefepime  2,000 mg IntraVENous Q8H    vancomycin (VANCOCIN) intermittent dosing (placeholder)   Other RX Placeholder    metroNIDAZOLE  500 mg IntraVENous Q8H       Continuous Infusions:   sodium chloride 25 mL (02/27/22 0906)       PRN Meds:oxyCODONE, sodium chloride flush, sodium chloride, ondansetron **OR** ondansetron, polyethylene glycol, diazePAM    Objective     Vitals:  No data found. Average, Min, and Max for last 24 hours Vitals:  TEMPERATURE:  Temp  Av.5 °F (36.9 °C)  Min: 98.4 °F (36.9 °C)  Max: 98.5 °F (36.9 °C)    RESPIRATIONS RANGE: Resp  Avg: 15.5  Min: 15  Max: 16    PULSE RANGE: Pulse  Av  Min: 73  Max: 75    BLOOD PRESSURE RANGE:  Systolic (55BZK), HFX:821 , Min:125 , CZV:488   ; Diastolic (37JFR), EV, Min:63, Max:71      PULSE OXIMETRY RANGE: SpO2  Av %  Min: 95 %  Max: 97 %    I/O last 3 completed shifts: In: 1450 [P.O.:950; IV Piggyback:500]  Out: 4350 [Urine:4225]    CBC:  No results for input(s): WBC, HGB, HCT, PLT, CRP in the last 72 hours. Invalid input(s):  ESR     BMP:  Recent Labs     22  0836   CREATININE 0.77        Coags:  No results for input(s): APTT, PROT, INR in the last 72 hours. Lab Results   Component Value Date    SEDRATE 78 (H) 2022     No results for input(s): CRP in the last 72 hours. Lower Extremity Physical Exam:   Vascular: DP and PT pulses are palpable +1/4. Hair growth is absent to the level of the digits. Moderate nonpitting edema appreciated to the right foot. Neuro: Saph/sural/SP/DP/plantar sensation absent to light touch. Musculoskeletal: Muscle strength deferred due to post op state. Gross deformity is present with history of transmetatarsal amputation to the left foot and revisional transmetatarsal amputation to the right foot     Dermatologic: Sutures intact to incision site. Mild maceration to the medial aspect of the right foot. Base is fibrogranular to the medial incision site. Sero-sanguinous drainage noted to the dressings. No fluctuance, crepitus or induration appreciated. Full thickness ulceration noted to the anterior distal right leg with granular base. no malodor or drainage appreciated.  No fluctuance, crepitus or induration appreciated    Clinical Images:                    Imaging:   XR FOOT RIGHT (MIN 3 VIEWS)   Final Result   Postoperative findings status post transmetatarsal amputation. Open wound is   noted without soft tissue gas otherwise appreciated. No new osseous   abnormality appreciated. Assessment   Rony Telles is a 61 y.o. male with   Postoperative wound dehiscence of amputation stump; right foot  S/p right revisional transmetatarsal amputation  Osteomyelitis of foot, right  Diabetic foot infection of right foot    Principal Problem:    Wound dehiscence  Active Problems:    Benign prostatic hyperplasia    Essential hypertension    Hypothyroidism    Type 2 diabetes mellitus (HCC)    Wound dehiscence, surgical  Resolved Problems:    * No resolved hospital problems. *       Plan     Patient examined and evaluated at bedside   Treatment options discussed in detail with the patient  Pts dressings were changed with adaptic, DSD and ACE  Educated pt on the benefits of non wieghtbearing and compliance with office visits. Pt verbalizes understanding  CM will try for possible SNF or vns after pt is discharged. PT/OT consulted  ID consulted for abx after discharge  ABX: cefepime, flagyl and vancomycin  Protective boot ordered per CM and will be in room  Will discuss with     DVT ppx: lovenox    Diet: carb control   Activity: NWB to Right lower extremity  Pain Control: panel ordered      Anya Almanzar DPM   Podiatric Medicine & Surgery   2/28/2022 at 9:50 AM    I performed a history and physical examination of the patient and discussed management with the resident. I reviewed the residents note and agree with the documented findings and plan of care. Any areas of disagreement are noted on the chart. I was personally present for the key portions of any procedures. I have documented in the chart those procedures where I was not present during the key portions. I have reviewed the Podiatry Resident progress note.  I agree with the chief complaint, past medical history, past surgical history, allergies, medications, social and family history as documented unless otherwise noted below. Documentation of the HPI, Physical Exam and Medical Decision Making performed by medical students or scribes is based on my personal performance of the HPI, PE and MDM. I have personally evaluated this patient and have completed at least one if not all key elements of the E/M (history, physical exam, and MDM). Additional findings are as noted.      Stanford Zavala DPM on 2/28/2022 at 67:14 AM  Board Certified, American Board of Podiatric Surgery  Fellow, [address] [place] [address]

## 2022-02-28 NOTE — PROGRESS NOTES
[id number]    Occupational Therapy Evaluation  Date: 22  Patient Name: Tristian Yoder       Room: 6463/9181-97  MRN: [id number]  Account: [de-identified]   : 1965  (61 y.o.) Gender: male     Discharge Recommendations:  Further Occupational Therapy is recommended upon facility discharge. Referring Practitioner: WENDY Cheek     Additional Pertinent Hx: Per chart review: \"has a past surgical history that includes Appendectomy; knee surgery (Right); Tonsillectomy; back surgery; Toe amputation (Bilateral, 3/6/2017); Upper gastrointestinal endoscopy (N/A, 2019); Foot surgery (Left, 2020); Foot Amputation (Left, 2020); amputation (Right, 2022); and Toe amputation (Right, 2022). \"    Treatment Diagnosis: imparied self care status  Past Medical History:  has a past medical history of Acute metabolic encephalopathy, CLAUDIA (acute kidney injury) ([address] Anemia, Anxiety, ARF (acute renal failure) ([address] Arthritis, BPH (benign prostatic hyperplasia), Chronic kidney disease, Depression, Diabetes mellitus ([address] Diabetic neuropathy ([address] GERD (gastroesophageal reflux disease), Hyperglycemia, Hyperkalemia, Hypertension, Hypothyroidism, Impacted tooth, Necrosis of bone of foot ([address] Osteomyelitis of left foot ([address] PAD (peripheral artery disease) ([address] Pneumonia, Septic shock ([address] and Type 2 diabetes mellitus ([address] Past Surgical History:   has a past surgical history that includes Appendectomy; knee surgery (Right); Tonsillectomy; back surgery; Toe amputation (Bilateral, 3/6/2017); Upper gastrointestinal endoscopy (N/A, 2019); Foot surgery (Left, 2020); Foot Amputation (Left, 2020); amputation (Right, 2022); Toe amputation (Right, 2022); and Foot Amputation (Right, 2022).     Restrictions  Restrictions/Precautions: Fall Risk,Weight Bearing,Isolation (MRSA,, peripheral IV left anterior forearm, right NWB)  Other position/activity restrictions: protective boot right foot ordered- nurse Arthor Stack to verify what type to be ordered  Required Braces or Orthoses?: Yes (protective boot right foot ordered- nurse Arthor Stack to verify what type to be ordered)     Vitals  Temp: 97.3 °F (36.3 °C)  Pulse: 96  Resp: 16  BP: 130/81  Height: 6' 2\" (188 cm)  Weight: 229 lb 11.5 oz (104.2 kg)  BMI (Calculated): 29.6  Oxygen Therapy  SpO2: 95 %  Pulse Oximeter Device Mode: Continuous  Pulse Oximeter Device Location: Left,Hand,Finger  O2 Device: None (Room air)  O2 Flow Rate (L/min): 0 L/min  Level of Consciousness: Alert (0)    Subjective  Subjective: 'I'm not getting out of bed.  The Dr told me not to get up\" Pt reports regarding what he is willing to participate in during OT/PT eval  Comments: Okay to be seen for OT/PT eval per RN  Overall Orientation Status: Within Functional Limits  Vision  Vision: Impaired  Vision Exceptions: Wears glasses at all times  Hearing  Hearing: Within functional limits  Social/Functional History  Lives With: Family (spouse and mother)  Type of Home: House  Home Layout: Two level,Performs ADL's on one level,Able to Live on Main level with bedroom/bathroom  Home Access: Stairs to enter with rails  Entrance Stairs - Number of Steps: 3  Entrance Stairs - Rails: Both  Bathroom Shower/Tub: Walk-in shower,Shower chair with back,Doors  Bathroom Toilet: Handicap height (grab bar on one side, BSC in bedroom)  Bathroom Equipment: Shower chair,Grab bars in shower,Hand-held shower,Grab bars around toilet  Home Equipment: Wheelchair-manual,4 wheeled walker,Rolling walker,Standard walker,Cane (rollator, BSC in bedroom)  ADL Assistance: Needs assistance (spouse assisting)  Homemaking Assistance: Needs assistance (spouse is primary)  Homemaking Responsibilities: No (spouse is primary)  Ambulation Assistance:  (used cane prior to TMA, has been using rollator since, noncompliant following NWB status per chart)  Transfer Assistance: Independent  Active : No  Patient's  Info: spouse or mother  Mode of Transportation: Car  IADL Comments: sleeps in recliner  Additional Comments: Pt gives inconsistance answers regarding PLOF. Per chart review, pt has been non compliant with NWB status since TMA surgery on 1/21/22  Pain Assessment  Pain Assessment: 0-10  Pain Level: 8  Pain Type: Surgical pain  Pain Location: Foot  Pain Orientation: Right  Pain Frequency: Continuous    Objective      Cognition  Overall Cognitive Status: WFL   Perception  Overall Perceptual Status: WFL  Sensation  Overall Sensation Status: Impaired (C/O numbness and tingling bilateral LEs)   ADL  Feeding: Setup  Grooming: Setup  UE Bathing: Stand by assistance  LE Bathing: Minimal assistance  UE Dressing: Stand by assistance  LE Dressing: Dependent/Total (Pt states BLE NWB; refuses to get out of bed at this time)  Toileting: Dependent/Total (Pt states BLE NWB; refuses to get out of bed at this time)  Additional Comments: ADL scores obtained through S/OT clinical reasoning/skilled observation. Pt is NWB through RLE per surgical percautions. Pt reports he is NWB through BLE per Dr order from his understanding. Discussed WB status of LLE with RN-RN states she will clarify with physician. Pt adamently refuses to get out of bed due to weight bearing concerns. Pt able to don sock on L foot while seated EOB. Pt displays Good sitting balance and bed mobility.     UE Function           LUE Strength  Gross LUE Strength: WFL  L Hand General: 5/5     LUE Tone: Normotonic     LUE AROM (degrees)  LUE AROM : WFL     Left Hand AROM (degrees)  Left Hand AROM: WFL  RUE Strength  Gross RUE Strength: WFL  R Hand General: 5/5      RUE Tone: Normotonic     RUE AROM (degrees)  RUE AROM : WFL     Right Hand AROM (degrees)  Right Hand AROM: WFL    Fine Motor Skills  Coordination  Movements Are Fluid And Coordinated: Yes          LUE Edema - Circumference (cm)  LUE Edema Present?: No     RUE Edema - Circumference (cm)  RUE Edema Present?: No          Mobility  Supine to Sit: Stand by assistance  Sit to Supine: Stand by assistance       Balance  Sitting Balance: Stand by assistance  Standing Balance: Unable to assess(comment) (pt refuses; states BLE NWB; NWB RLE per chart review)  Standing Balance  Comment: not tested- pt refused; states BLE NWB; NWB RLE per chart review  Functional Mobility  Functional Mobility Comments: not tested- pt refused; states BLE NWB; NWB RLE per chart review  Bed mobility  Rolling to Left: Stand by assistance  Rolling to Right: Stand by assistance  Supine to Sit: Stand by assistance  Sit to Supine: Stand by assistance  Scooting: Stand by assistance     Transfers  Transfer Comments: not tested- pt refused; states BLE NWB; NWB RLE per chart review  Functional Activity Tolerance  Functional Activity Tolerance:  Tolerates 30 min exercise with multiple rests     Assessment  Assessment  Performance deficits / Impairments: Decreased functional mobility ,Decreased ADL status,Decreased safe awareness,Decreased endurance,Decreased balance,Decreased high-level IADLs,Decreased coordination  Treatment Diagnosis: imparied self care status  Prognosis: Good  Decision Making: High Complexity  REQUIRES OT FOLLOW UP: Yes  Discharge Recommendations: Patient would benefit from continued therapy after discharge  Activity Tolerance: Patient Tolerated treatment well,Treatment limited secondary to medical complications (free text)  Activity Tolerance: NWB RLE; Pt states he is NWB BLE; Pt refuses to get out of bed          Functional Outcome Measures  AM-PAC Daily Activity Inpatient   How much help for putting on and taking off regular lower body clothing?: Total  How much help for Bathing?: A Lot  How much help for Toileting?: Total  How much help for putting on and taking off regular upper body clothing?: A Little  How much help for taking care of personal grooming?: A Little  How much help for eating meals?: A Little  AM-PAC Inpatient Daily Activity Raw Score: 13  AM-PAC Inpatient ADL T-Scale Score : 32.03  ADL Inpatient CMS 0-100% Score: 63.03  ADL Inpatient CMS G-Code Modifier : CL       Goals  Patient Goals   Patient goals :  To go home  Short term goals  Time Frame for Short term goals: by discharge  Short term goal 1: Pt will complete LB dressing/toileting/bathing Min A with RW for increased IND/participation in self care  Short term goal 2: Pt will engage in 15-20 minutes of functional activity for increased IND/participation in self care  Short term goal 3: Pt will verbalize/demonstrate Good adherance and understanding of RLE NWB for increased safety in ADL's  Short term goal 4: Pt will complete slide board transfers Min A x2 with Good safety to increase IND and participation in functional mobility/self care     Plan  Safety Devices  Safety Devices in place: Yes  Type of devices: Call light within reach,Patient at risk for falls,Left in bed,Nurse notified  Restraints  Initially in place: No     Plan  Times per week: 4-6  Times per day: Daily  Current Treatment Recommendations: Balance Training,Functional Mobility Training,Endurance Training,Wheelchair Mobility [employer] Management,Safety Education & Training,Patient/Caregiver Education & Training,Equipment Evaluation, Education, & procurement,Self-Care / ADL,Positioning          OT Individual Minutes  Time In: 1330  Time Out: 9735  Minutes: 26    Electronically signed by REJI Pressley/OT on 2/28/22 at 2:35 PM EST

## 2022-02-28 NOTE — CARE COORDINATION
ONGOING DISCHARGE PLAN:    Patient is alert and oriented x4. POD 3# R TMA Revision. Spoke with patient regarding discharge plan and patient confirms that plan is still to discharge home without VNS services. Patient lives in two story home with livable first floor with wife and mother. Podiatry recommended rehab or VNS and patient declined both. Cam walker recommended by podiatry patient states he already has one at home. Patient also has walkers, BSC, and glucometer. Active order for IV Cefepime, Flagyl & Vanco. New consult for Infectious Disease. Will continue to follow for additional discharge needs.     Electronically signed by Sg Mason RN on 2/28/2022 at 2:10 PM

## 2022-02-28 NOTE — FLOWSHEET NOTE
02/28/22 1216   Encounter Summary   Services provided to: Patient   Referral/Consult From: [place]   Continue Visiting   (2/28/22 V)   Volunteer Visit Yes   Complexity of Encounter Low   Length of Encounter 15 minutes   Spiritual/Baptism   Type Spiritual support   Intervention Prayer;Communion   Sacraments   Communion Patient received communion

## 2022-02-28 NOTE — CONSULTS
[address]    Date:   2/28/2022  Patient name:  Rich Keith  Date of admission:  2/24/2022  2:12 PM  MRN:   [id number]  YOB: 1965      HPI        Overnight no fever chills loss of appetite but no vomiting  REVIEW OF SYSTEMS:    · Cardiovascular: Negative for lightheadedness/orthostatic symptoms ,chest pain, dyspnea on exertion, palpitations or loss of consciousness. · Respiratory: Negative for cough or wheezing, sputum production, hemoptysis, pleuritic pain. · Gastrointestinal: Negative for nausea/vomiting, change in bowel habits, abdominal pain, dysphagia/appetite loss, hematemesis, blood in stools. Status post right transmetatarsal amputation revision pain controlled      OBJECTIVE:    /81   Pulse 96   Temp 97.3 °F (36.3 °C)   Resp 16   Ht 6' 2\" (1.88 m)   Wt 229 lb 11.5 oz (104.2 kg)   SpO2 95%   BMI 29.49 kg/m²    Edentulous  · Lungs: clear to auscultation bilaterally,no wheeze. · Heart: regular rate and rhythm, S1, S2 normal, no murmur, click, rub or gallop  · Abdomen: soft, non-tender; bowel sounds normal; no masses,  no organomegaly  · Extremities: Status post old left TMA and status post new right transmetatarsal amputation  · Neurological:  Reflexes normal and symmetric. Sensation grossly normal  · Skin - no rash, no lump   · Eye- no icterus no redness  · GI-oral mucosa moist,  · Lymphatic system-no lymphadenopathy no splenomegaly  · Mouth- mucous membrane moist  · Head-normocephalic atraumatic  · Neck- supple no carotid bruit,Thyroid not palpable.       Past Medical History:   has a past medical history of Acute metabolic encephalopathy, CLAUDIA (acute kidney injury) ([address] Anemia, Anxiety, ARF (acute renal failure) ([address] Arthritis, BPH (benign prostatic hyperplasia), Chronic kidney disease, Depression, Diabetes mellitus ([address] Diabetic neuropathy ([address] GERD (gastroesophageal reflux disease), Hyperglycemia, Hyperkalemia, Hypertension, Hypothyroidism, Impacted tooth, Necrosis of bone of foot ([facility] Osteomyelitis of left foot ([facility] PAD (peripheral artery disease) ([facility] Pneumonia, Septic shock ([facility] and Type 2 diabetes mellitus ([facility] Past Surgical History:   has a past surgical history that includes Appendectomy; knee surgery (Right); Tonsillectomy; back surgery; Toe amputation (Bilateral, 3/6/2017); Upper gastrointestinal endoscopy (N/A, 5/20/2019); Foot surgery (Left, 01/04/2020); Foot Amputation (Left, 1/4/2020); amputation (Right, 01/21/2022); Toe amputation (Right, 1/21/2022); and Foot Amputation (Right, 2/25/2022). Home Medications:    Prior to Admission medications    Medication Sig Start Date End Date Taking? Authorizing Provider   morphine (MS CONTIN) 60 MG extended release tablet Take 1 tablet by mouth 2 times daily for 30 days. 2/18/22 3/20/22 Yes Holli Bashir MD   oxyCODONE (OXY-IR) 15 MG immediate release tablet Take 1 tablet by mouth every 6 hours as needed for Pain for up to 30 days. 2/18/22 3/20/22 Yes Holli Bashir MD   diazePAM (VALIUM) 5 MG tablet Take 1 tablet by mouth every 8 hours as needed for Anxiety for up to 30 days. 2/18/22 3/20/22 Yes Holli Bashir MD   ferrous sulfate (FE TABS 325) 325 (65 Fe) MG EC tablet Take 1 tablet by mouth daily (with breakfast) 1/24/22  Yes Lisa Dyer DPM   levothyroxine (SYNTHROID) 50 MCG tablet Take 1 tablet by mouth daily 11/26/21  Yes Holli Bashir MD   fluocinonide (LIDEX) 0.05 % ointment Apply 1 gm  topically 2 times daily to legs 11/17/21  Yes Génesis Farrell DPM   PARoxetine (PAXIL) 20 MG tablet Take 4 tablets by mouth every morning 8/3/21  Yes Holli Bashir MD   sodium hypochlorite (DAKINS) 0.125 % SOLN external solution APPLY TOPICALLY EVERY 12 HOURS.  APPLY AS A WET TO DRY DRESSING TWO TIMES A DAY, CAN ALSO USE TO CLEANSE WOUND 5/11/20  Yes Génesis Farrell, AURELIANOM   metFORMIN (GLUCOPHAGE) 500 MG tablet Take 0.5 tablets by mouth daily (with breakfast) 4/29/20  Yes Enrrique Barfield MD   [facility] 4 MG/0.1ML LIQD nasal spray 4 mg by Nasal route as needed for Opioid Reversal  10/29/19  Yes Historical Provider, MD   lactobacillus (CULTURELLE) capsule Take 1 capsule by mouth 2 times daily (with meals) 5/21/19  Yes Enrrique Barfield MD   pantoprazole (PROTONIX) 40 MG tablet Take 1 tablet by mouth every morning (before breakfast) 5/22/19  Yes Enrrique Barfield MD   povidone-iodine (BETADINE) 7.5 % external solution Apply to wound. Apply as a wet to dry dressing 4/16/19  Yes Meryl Ratliff DPM   ONETOUCH ULTRA strip TEST TWO TIMES A DAY AS NEEDED 9/21/21   Enrrique Barfield MD   Lancets (Clance Rumps) [address] [facility] TEST TWICE A DAY 3/8/21   Enrrique Barfield MD   Wound Dressings (ADAPTIC NON-ADHERING DRESSING) PADS Apply 1 Units topically 2 times daily 9/28/18   Enrrique Barfield MD   Gauze Pads & Dressings (COMBINE ABD) 5\"X9\" PADS 1 Units by Does not apply route 2 times daily 9/28/18   Enrrique Barfield MD   Gauze Pads & Dressings [facility] GAUZE ROLL LARGE) MISC 1 Units by Does not apply route 2 times daily 6/12/17 11/17/21  Issa Lane MD       Allergies:  Bactrim [sulfamethoxazole-trimethoprim], Fiorinal [butalbital-aspirin-caffeine], Statins, Tylenol [acetaminophen], and Peanut butter flavor [flavoring agent]    Social History:   reports that he has never smoked. He has never used smokeless tobacco. He reports previous drug use. Frequency: 2.00 times per week. Drug: Marijuana Farrah Heys). He reports that he does not drink alcohol. Family History: family history includes Cancer in his maternal grandfather; Diabetes in his father; No Known Problems in his mother.     Laboratory Testing:  CBC:   Recent Labs     02/28/22  1117   WBC 8.7   HGB 10.4*        BMP:    Recent Labs     02/27/22  0836 02/28/22  1117   NA  --  135   K  --  4.5   CL  --  97*   CO2  --  29   BUN  --  18 Hyperkalemia    Hypocalcemia    Metabolic acidosis    Diabetic ulcer of toe of left foot associated with type 2 diabetes mellitus (HCC)    Hyperphosphatemia    Chronic, continuous use of opioids    Acute metabolic encephalopathy    Diabetic foot infection ([address]    H/O amputation of lesser toe, right ([address]    Seizure-like activity (HCC)    PAD (peripheral artery disease) ([address]    Diabetic infection of right foot ([address]    Osteomyelitis of foot, right, acute ([address]    Cellulitis of foot, right    Acquired posterior equinus, right    Wound dehiscence    Wound dehiscence, surgical       PLAN:  18-year-old gentleman with a history of uncontrolled diabetes mellitus with peripheral neuropathy history of left transmetatarsal's amputation of the foot admitted with wound infection and wound dehiscence of the recently done right transmetatarsal amputation  Status post revision TMA right foot pain controlled  On IV antibiotics and ID input requested per primary  Diabetes is well controlled with the current medication        Scotty Cameron MD, MD KOBE [facility] [address].    Phone [phone]   Fax: [phone]  Answering Service: [phone]

## 2022-02-28 NOTE — PROGRESS NOTES
Vancomycin Dosing by Pharmacy - Daily Note   Vancomycin Therapy Day:  5  Indication: SSTI    Allergies:  Bactrim [sulfamethoxazole-trimethoprim], Fiorinal [butalbital-aspirin-caffeine], Statins, Tylenol [acetaminophen], and Peanut butter flavor [flavoring agent]   Actual Weight:    Wt Readings from Last 1 Encounters:   02/24/22 228 lb 2.8 oz (103.5 kg)       Labs/Ancillary Data  Estimated Creatinine Clearance: 136 mL/min (based on SCr of 0.77 mg/dL). Recent Labs     02/27/22  0836   CREATININE 0.77     No results found for: PROCAL    Intake/Output Summary (Last 24 hours) at 2/28/2022 0758  Last data filed at 2/28/2022 0602  Gross per 24 hour   Intake 1450 ml   Output 2575 ml   Net -1125 ml     Temp: 98.5    Culture Date / Source  /  Results  See micro   Recent vancomycin administrations                     vancomycin (VANCOCIN) 1,250 mg in dextrose 5 % 250 mL IVPB (ADDAVIAL) (mg) 1,250 mg New Bag 02/28/22 0103     1,250 mg New Bag 02/27/22 1041     1,250 mg New Bag 02/26/22 2318    vancomycin (VANCOCIN) 1,250 mg in dextrose 5 % 250 mL IVPB (ADDAVIAL) (mg) 1,250 mg New Bag 02/25/22 2038                    Vancomycin Concentrations:   TROUGH:  No results for input(s): VANCOTROUGH in the last 72 hours. RANDOM:    Recent Labs     02/26/22  0542 02/28/22  0657   VANCORANDOM 10.5 18.7       MRSA Nasal Swab: N/A. Non-respiratory infection. Zulay Shah PLAN     Continue current dose of 1250 mg q12h IV  Ensured BUN/sCr ordered at baseline and every 48 hours x at least 3 levels, then at least weekly.   Pharmacy will continue to monitor patient and adjust therapy as indicated      Vancomycin Target Concentration Parameters  Treatment  Population Target AUC/FERNANDO Target Trough   Invasive MRSA Infection (bacteremia, pneumonia, meningitis, endocarditis, osteomyelitis)  Sepsis (undifferentiated) 400-600 N/A   Infection due to non-MRSA pathogen  Empiric treatment of non-invasive MRSA infection  (SSTI, UTI) <500 10-15 mg/L   CrCl < 29 mL/min  Rapidly fluctuating serum creatinine   CLAUDIA N/A < 15 mg/L     Renal replacement therapy is dosed by levels, per hospital protocol. Abbreviations  * Pauc: probability that AUC is >400 (efficacy); Pconc: probability that Ctrough is above 20 ?g/mL (toxicity); Tox: Probability of nephrotoxicity, based on Chasity et al. Clin Infect Dis 2009. Loading dose: N/A  Regimen: 1250 mg IV every 12 hours. Start time: 13:03 on 02/28/2022  Exposure target: AUC24 (range)400-600 mg/L.hr   AUC24,ss: 461 mg/L.hr  Probability of AUC24 > 400: 78 %  Ctrough,ss: 14.7 mg/L  Probability of Ctrough,ss > 20: 15 %  Probability of nephrotoxicity (Chasity SAEED 2009): 10 %    Thank you for the consult. Pharmacy will continue to follow.     Robyn Robin PharmD, BCPS  2/28/2022 7:59 AM

## 2022-02-28 NOTE — PROGRESS NOTES
Pt watches  tV  NO c/o of SOB or nausea at this time  Dressing dry and intact, to rt foot area, bed alarm on

## 2022-02-28 NOTE — PROGRESS NOTES
Patient to have CAM boot when up with therapy per podiatry resident [place]. Discharge planner notified.

## 2022-02-28 NOTE — PLAN OF CARE
Problem: Falls - Risk of:  Goal: Will remain free from falls  Description: Will remain free from falls  2/28/2022 0310 by Philly Mosqueda RN  Outcome: Met This Shift  2/27/2022 1949 by Rosalina Bradford RN  Outcome: Ongoing  Goal: Absence of physical injury  Description: Absence of physical injury  2/28/2022 0310 by Philly Mosqueda RN  Outcome: Met This Shift  2/27/2022 1949 by Rosalina Bradford RN  Outcome: Ongoing     Problem: Skin Integrity:  Goal: Will show no infection signs and symptoms  Description: Will show no infection signs and symptoms  Outcome: Met This Shift  Goal: Absence of new skin breakdown  Description: Absence of new skin breakdown  Outcome: Met This Shift     Problem: Pain:  Goal: Pain level will decrease  Description: Pain level will decrease  Outcome: Met This Shift  Goal: Control of acute pain  Description: Control of acute pain  Outcome: Met This Shift  Goal: Control of chronic pain  Description: Control of chronic pain  Outcome: Met This Shift     Problem: Nutrition  Goal: Optimal nutrition therapy  Outcome: Met This Shift

## 2022-02-28 NOTE — PROGRESS NOTES
Bedside reporting done, per Mario RN's  No complaints at this time  IV intact, no redness noted  Bed alarm on  Dressing intact to rt foot   Waffle care mattress cont. On pt's bed.

## 2022-02-28 NOTE — PROGRESS NOTES
Physical Therapy    Facility/Department: [facility] MED SURG  Initial Assessment    NAME: Yovana Amaya  : 1965  MRN: [id number]    Date of Service: 2022    Discharge Recommendations:  Patient would benefit from continued therapy after discharge   PT Equipment Recommendations  Equipment Needed:  (TBD)    Assessment   Body structures, Functions, Activity limitations: Decreased functional mobility ; Increased pain;Decreased ADL status; Decreased strength;Decreased ROM  Assessment: pt refusing to allow therapist to assess standing balance using wheeled walker while maintaining right LE NWB or pivot or sliding board transfer to chair. Pt appears to be self limiting. Treatment Diagnosis: impaired mobility due to recent revision right TMA  Specific instructions for Next Treatment: advance as tolerated, instruct in exercise for strengthening, advance to standing w/ wheeled walker right LE NWB and sliding board transfer/ W/C mobility (MUST MAITAIN RIGHT LE NWB IN PROTECTIVE BOOT)  Prognosis: Fair  Decision Making: Medium Complexity  History: admitted due to wound dehiscense  Exam: ROM, MMT, balance and bed mobility  Clinical Presentation: Bed mobility w/ SBA x 1,pt dangled at the EOB  approximately 10 minutes w/ SBA. Pt adamantly refused attempt to stand w/ wheeled walker right LE NWB or transfer to chair. Attempted to explain need to assess mobility and balance for safe D/C but pt adamantly refuses. Nurse Julia Garcia to verify and order protective boot for right LE. ( MUST MAITAIN RIGHT LE NWB IN PROTECTIVE BOOT)  PT Education: Goals; General Safety;PT Role;Plan of Care; Functional Mobility Training;Precautions;Weight-bearing Education;Transfer Training  Barriers to Learning: noncompliance w/ orders in past, refusal to attempt standing or transfer to chair while maintaining right LE NWB  REQUIRES PT FOLLOW UP: Yes  Activity Tolerance  Activity Tolerance: Patient limited by pain       Patient Diagnosis(es): The encounter diagnosis was Postoperative wound dehiscence, initial encounter. has a past medical history of Acute metabolic encephalopathy, CLAUDIA (acute kidney injury) ([facility] Anemia, Anxiety, ARF (acute renal failure) ([address] Arthritis, BPH (benign prostatic hyperplasia), Chronic kidney disease, Depression, Diabetes mellitus ([address] Diabetic neuropathy ([address] GERD (gastroesophageal reflux disease), Hyperglycemia, Hyperkalemia, Hypertension, Hypothyroidism, Impacted tooth, Necrosis of bone of foot ([address] Osteomyelitis of left foot ([address] PAD (peripheral artery disease) ([facility] Pneumonia, Septic shock ([facility] and Type 2 diabetes mellitus ([facility] has a past surgical history that includes Appendectomy; knee surgery (Right); Tonsillectomy; back surgery; Toe amputation (Bilateral, 3/6/2017); Upper gastrointestinal endoscopy (N/A, 5/20/2019); Foot surgery (Left, 01/04/2020); Foot Amputation (Left, 1/4/2020); amputation (Right, 01/21/2022); Toe amputation (Right, 1/21/2022); and Foot Amputation (Right, 2/25/2022).     Restrictions  Restrictions/Precautions  Restrictions/Precautions: Fall Risk,Weight Bearing,Isolation (MRSA,, peripheral IV left anterior forearm, right NWB)  Required Braces or Orthoses?: Yes (protective boot right foot ordered- nurse Nehal Reese to verify what type to be ordered)  Lower Extremity Weight Bearing Restrictions  Right Lower Extremity Weight Bearing: Non Weight Bearing  Required Braces or Orthoses  Right Lower Extremity Brace:  (protective boot right foot ordered- nurse Nehal Reese to verify what type to be ordered)  Position Activity Restriction  Other position/activity restrictions: protective boot right foot ordered- nurse Nehal Reese to verify what type to be ordered  Vision/Hearing  Vision: Impaired  Vision Exceptions: Wears glasses at all times  Hearing: Within functional limits     Subjective  General  Patient assessed for rehabilitation services?: Yes  Response To Previous Treatment: Not applicable  Family / Caregiver Present: No  Referring Practitioner: Mikie Velasquez  Referral Date : 02/28/22  Diagnosis: wound dehisence  Follows Commands: Within Functional Limits  Other (Comment): OK per nurse Sweetrima Ballard to proceed w/ PT evaluation  General Comment  Comments: pt has a hx of left TMA on 1-4-2020. Pt had the right TMA done on 1-. Pt has been noncompliant per chart and the wound dehiscensed. Pt had a revision of the right TMA on 2- by Dr. Suze Elizabeth. Subjective  Subjective: \"I am NOT getting up out of bed. \"  \"The doctor told me that I am NOT to get up. \"  pt unwilling to transfer to chair or attempt standing w/ wheeled walker right LE NWB.   Pain Screening  Patient Currently in Pain: Yes  Pain Assessment  Pain Assessment: 0-10  Pain Level: 8  Pain Type: Surgical pain  Pain Location: Foot  Pain Orientation: Right  Pain Frequency: Continuous  Pain Onset: On-going  Non-Pharmaceutical Pain Intervention(s): Repositioned;Elevation  Response to Pain Intervention: Patient Satisfied  Multiple Pain Sites: Yes  Pain 2  Pain Rating 2: 6  Pain Type 2: Phantom Pain  Pain Location 2: Foot  Pain Orientation 2: Left  Pain Duration 2: Continuous  Pain Onset 2: On-going  Non-Pharmaceutical Pain Intervention(s) 2: Elevation;Repositioned  Vital Signs  Patient Currently in Pain: Yes       Orientation  Orientation  Overall Orientation Status: Within Functional Limits  Social/Functional History  Social/Functional History  Lives With: Family (spouse and mother)  Type of Home: House  Home Layout: Two level,Performs ADL's on one level,Able to Live on Main level with bedroom/bathroom  Home Access: Stairs to enter with rails  Entrance Stairs - Number of Steps: 3  Entrance Stairs - Rails: Both  Bathroom Shower/Tub: Walk-in shower,Shower chair with back,Doors  Bathroom Toilet: Handicap height (grab bar on one side, BSC in bedroom)  Bathroom Equipment: Shower chair,Grab bars in shower,Hand-held shower,Grab bars around toilet  Home Equipment: Wheelchair-manual,4 wheeled walker,Rolling walker,Standard walker,Cane (rollator, BSC in bedroom)  ADL Assistance: Needs assistance (spouse assisting)  Homemaking Assistance: Needs assistance (spouse is primary)  Homemaking Responsibilities: No (spouse is primary)  Ambulation Assistance:  (used cane prior to TMA, has been using rollator since, noncompliant following NWB status per chart)  Transfer Assistance: Independent  Active : No  Patient's  Info: spouse or mother  Mode of Transportation: Car  IADL Comments: sleeps in recliner  Cognition        Objective     Observation/Palpation  Observation: MRSA,, peripheral IV left anterior forearm, surgical dressing (ace wrap)  in place right LE    AROM RLE (degrees)  RLE AROM: WFL  RLE General AROM: except right ankle NT due to recent right TMA  AROM LLE (degrees)  LLE AROM : WFL  LLE General AROM: except limited ankle movement S/P left TMA  AROM RUE (degrees)  RUE General AROM: see OT for UE assessment  AROM LUE (degrees)  LUE General AROM: see OT for UE assessment  Strength RLE  Strength RLE: Exception  Comment: right ankle deferred at this time due to recent right TMA, right knee is at least 3/5- pt deferred resistance for MMT due to increased pain right foot  R Hip Flexion: 4/5  R Hip ABduction: 4/5  R Hip ADduction: 4/5  R Knee Flexion: 3/5  R Knee Extension: 3/5  R Ankle Dorsiflexion: NT  R Ankle Plantar flexion: NT  Strength LLE  Strength LLE: Exception  L Hip Flexion: 4/5  L Hip ABduction: 4/5  L Hip ADduction: 4/5  L Knee Flexion: 4/5  L Knee Extension: 4/5  L Ankle Dorsiflexion: NT  L Ankle Plantar Flexion: NT  Strength RUE  Comment: see OT for UE assessment  Strength LUE  Comment: see OT for UE assessment     Sensation  Overall Sensation Status: Impaired (C/O numbness and tingling bilateral LEs)  Bed mobility  Rolling to Left: Stand by assistance  Supine to Sit: Stand by assistance  Sit to Supine: Stand by assistance  Comment: pt dangled at the EOB approximately 10 minutes w/ SBA. Pt adamantly refused attempt to stand w/ wheeled walker right LE NWB or transfer to chair. Attempted to explain need to assess mobility and balance for safe D/C but pt adamantly refuses. Transfers  Comment: pt refused  Ambulation  Ambulation?: No (NOT READY YET)  WB Status: right LE NWB in protective boot  Stairs/Curb  Stairs?: No  Wheelchair Activities  Propulsion: No (pt refused)     Balance  Sitting - Static: Good  Sitting - Dynamic: Good  Standing - Static:  (NT- pt refused)  Standing - Dynamic:  (NT- pt refused)        Plan   Plan  Times per week: daily x 7 days  Times per day: Daily  Specific instructions for Next Treatment: advance as tolerated, instruct in exercise for strengthening, advance to standing w/ wheeled walker right LE NWB and sliding board transfer/ W/C mobility (MUST MAITAIN RIGHT LE NWB IN PROTECTIVE BOOT)  Current Treatment Recommendations: Strengthening,Safety Education & Training,Balance Training,Endurance Training,Patient/Caregiver Education & Training,Equipment Evaluation, Education, & procurement,Wheelchair Mobility Training,Functional Mobility Training,Transfer Training  Safety Devices  Type of devices:  All fall risk precautions in place,Bed alarm in place,Call light within reach,Gait belt,Patient at risk for falls,Left in bed,Nurse notified (nurse Yuliana Michael and Zenobia July)    G-Code       OutComes Score                                                  AM-PAC Score  -PAC Inpatient Mobility Raw Score : 10 (02/28/22 1329)  -PAC Inpatient T-Scale Score : 32.29 (02/28/22 1329)  Mobility Inpatient CMS 0-100% Score: 76.75 (02/28/22 1329)  Mobility Inpatient CMS G-Code Modifier : CL (02/28/22 1329)          Goals  Short term goals  Time Frame for Short term goals: daily x 7 days  Short term goal 1: pt to demonstrate good technique for LE strengthening exercises, standing balance while maintaining right LE NWB and sliding board transfers  Short term goal 2: pt to demonstrate independent bed mobility for rolling and supine <> sit transfers for position change  Short term goal 3: pt to demonstrate good sitting balance unsupported at the EOB x 20 minutes to improve overall core strength w/ supervision  Short term goal 4: pt to demonstrate sliding board transfers bed <> W/C w/ min x 2 while maintaining right LE NWB while in protective boot  Short term goal 5: pt to demonstrate W/C mobility 50-80' using bilateral arms and left LE w/ supervision while maintaining right LE NWB while in protective boot  Short term goal 6: pt to demonstrate sit <> stand transfers w/ min x 2 using wheeled walker while maintaining right LE NWB while in protective boot  Short term goal 7: pt to demonstrate fair standing balance using wheeled walker w/ min x 2 for 2 minutes while maintaining right LE NWB while in protective boot  Short term goal 8: progress to stand pivot transfer to W/C w/ 2 assist when pt able to demonstrate safe standing  balance and maintain right LE NWB status in protective boot  Patient Goals   Patient goals : did not state a goal       Therapy Time   Individual Concurrent Group Co-treatment   Time In 1329         Time Out 1356         Minutes 27         Timed Code Treatment Minutes: 8 Minutes       Concepcion Washington, PT

## 2022-03-01 VITALS
BODY MASS INDEX: 29.48 KG/M2 | HEART RATE: 91 BPM | RESPIRATION RATE: 18 BRPM | DIASTOLIC BLOOD PRESSURE: 75 MMHG | TEMPERATURE: 98.6 F | SYSTOLIC BLOOD PRESSURE: 134 MMHG | WEIGHT: 229.72 LBS | HEIGHT: 74 IN | OXYGEN SATURATION: 100 %

## 2022-03-01 LAB
CREAT SERPL-MCNC: 0.86 MG/DL (ref 0.7–1.2)
GFR AFRICAN AMERICAN: >60 ML/MIN
GFR NON-AFRICAN AMERICAN: >60 ML/MIN
GFR SERPL CREATININE-BSD FRML MDRD: NORMAL ML/MIN/{1.73_M2}
GLUCOSE BLD-MCNC: 129 MG/DL (ref 75–110)
GLUCOSE BLD-MCNC: 92 MG/DL (ref 75–110)
GLUCOSE BLD-MCNC: 94 MG/DL (ref 75–110)
SURGICAL PATHOLOGY REPORT: NORMAL

## 2022-03-01 PROCEDURE — 1200000000 HC SEMI PRIVATE

## 2022-03-01 PROCEDURE — 82947 ASSAY GLUCOSE BLOOD QUANT: CPT

## 2022-03-01 PROCEDURE — 99232 SBSQ HOSP IP/OBS MODERATE 35: CPT | Performed by: INTERNAL MEDICINE

## 2022-03-01 PROCEDURE — 6360000002 HC RX W HCPCS

## 2022-03-01 PROCEDURE — 2500000003 HC RX 250 WO HCPCS

## 2022-03-01 PROCEDURE — 2580000003 HC RX 258

## 2022-03-01 PROCEDURE — 36415 COLL VENOUS BLD VENIPUNCTURE: CPT

## 2022-03-01 PROCEDURE — 82565 ASSAY OF CREATININE: CPT

## 2022-03-01 PROCEDURE — 6370000000 HC RX 637 (ALT 250 FOR IP)

## 2022-03-01 RX ORDER — DOXYCYCLINE HYCLATE 100 MG
100 TABLET ORAL 2 TIMES DAILY
Qty: 20 TABLET | Refills: 0 | Status: SHIPPED | OUTPATIENT
Start: 2022-03-01 | End: 2022-03-07 | Stop reason: SDUPTHER

## 2022-03-01 RX ADMIN — BUPROPION HYDROCHLORIDE 80 MG: 150 TABLET, FILM COATED, EXTENDED RELEASE ORAL at 08:19

## 2022-03-01 RX ADMIN — OXYCODONE HYDROCHLORIDE 15 MG: 10 TABLET ORAL at 01:28

## 2022-03-01 RX ADMIN — CEFEPIME HYDROCHLORIDE 2000 MG: 2 INJECTION, POWDER, FOR SOLUTION INTRAVENOUS at 05:54

## 2022-03-01 RX ADMIN — MORPHINE SULFATE 60 MG: 30 TABLET, FILM COATED, EXTENDED RELEASE ORAL at 08:17

## 2022-03-01 RX ADMIN — ENOXAPARIN SODIUM 40 MG: 100 INJECTION SUBCUTANEOUS at 08:16

## 2022-03-01 RX ADMIN — METRONIDAZOLE 500 MG: 500 INJECTION, SOLUTION INTRAVENOUS at 08:26

## 2022-03-01 RX ADMIN — OXYCODONE HYDROCHLORIDE 15 MG: 10 TABLET ORAL at 18:41

## 2022-03-01 RX ADMIN — METFORMIN HYDROCHLORIDE 250 MG: 500 TABLET ORAL at 08:16

## 2022-03-01 RX ADMIN — OXYCODONE HYDROCHLORIDE 15 MG: 10 TABLET ORAL at 05:52

## 2022-03-01 RX ADMIN — OXYCODONE HYDROCHLORIDE 15 MG: 10 TABLET ORAL at 10:15

## 2022-03-01 RX ADMIN — METRONIDAZOLE 500 MG: 500 INJECTION, SOLUTION INTRAVENOUS at 01:22

## 2022-03-01 RX ADMIN — OXYCODONE HYDROCHLORIDE 15 MG: 10 TABLET ORAL at 14:22

## 2022-03-01 RX ADMIN — FERROUS SULFATE TAB 325 MG (65 MG ELEMENTAL FE) 325 MG: 325 (65 FE) TAB at 08:17

## 2022-03-01 RX ADMIN — MORPHINE SULFATE 60 MG: 30 TABLET, FILM COATED, EXTENDED RELEASE ORAL at 21:35

## 2022-03-01 RX ADMIN — ENOXAPARIN SODIUM 30 MG: 100 INJECTION SUBCUTANEOUS at 21:34

## 2022-03-01 RX ADMIN — LEVOTHYROXINE SODIUM 50 MCG: 0.05 TABLET ORAL at 05:52

## 2022-03-01 RX ADMIN — VANCOMYCIN HYDROCHLORIDE 1250 MG: 1.25 INJECTION, POWDER, LYOPHILIZED, FOR SOLUTION INTRAVENOUS at 11:32

## 2022-03-01 RX ADMIN — Medication 1 CAPSULE: at 08:17

## 2022-03-01 ASSESSMENT — PAIN DESCRIPTION - LOCATION
LOCATION: FOOT

## 2022-03-01 ASSESSMENT — PAIN SCALES - GENERAL
PAINLEVEL_OUTOF10: 8
PAINLEVEL_OUTOF10: 5
PAINLEVEL_OUTOF10: 8
PAINLEVEL_OUTOF10: 0
PAINLEVEL_OUTOF10: 8

## 2022-03-01 ASSESSMENT — PAIN DESCRIPTION - ORIENTATION
ORIENTATION: RIGHT

## 2022-03-01 ASSESSMENT — PAIN DESCRIPTION - PAIN TYPE
TYPE: SURGICAL PAIN

## 2022-03-01 NOTE — PROGRESS NOTES
Physical Therapy  REVISED GOALS  [address] [facility]     Date: 3/1/22  Patient Name: Eva Pablo       Room:   MRN: [id number]  Account: [de-identified]   : 1965  (61 y.o.) Gender: male   Patient Height Height: 6' 2\" (188 cm)  Patient Weight 229 lb 11.5 oz (104.2 kg)      22 1124   Short term goals   Time Frame for Short term goals daily x 7 days   Short term goal 1 pt to demonstrate good technique for LE strengthening exercises, standing balance while maintaining right LE heel WB w/ CAM BOOT (protective boot) and sliding board transfers   Short term goal 2 pt to demonstrate independent bed mobility for rolling and supine <> sit transfers for position change   Short term goal 3 pt to demonstrate good sitting balance unsupported at the EOB x 20 minutes to improve overall core strength w/ supervision   Short term goal 4 pt to demonstrate sliding board transfers bed <> W/C w/ min x 2 while maintaining right LE heel WB  while in CAM BOOT (protective boot)   Short term goal 5 pt to demonstrate W/C mobility 50-80' using bilateral arms and left LE w/ supervision while maintaining right LE heel WB  while in CAM BOOT ( protective boot)   Short term goal 6 pt to demonstrate sit <> stand transfers w/ min x 2 using wheeled walker while maintaining right LE heel WB  while in CAM BOOT (protective boot)   Short term goal 7 pt to demonstrate fair standing balance using wheeled walker w/ min x 2 for 2 minutes while maintaining right LE heel WB  while in CAM BOOT (protective boot)   Short term goal 8 progress to stand pivot transfer to W/C w/ 2 assist when pt able to demonstrate safe standing  balance and maintain right LE heel WB  status while in CAM BOOT (protective boot)

## 2022-03-01 NOTE — PROGRESS NOTES
Upon entering the room to administer the next ATB the pt informed this nurse that his IV site was stinging. The site was swollen and tender to the touch. This nurse asked the pt when did this start hurting and the stated, \"it was stinging this morning but I didn't say anything because I figured I was going home and it would be taken out\". This nurse informed the pt that she would be back to attempt another IV. The pt verbalized understanding.

## 2022-03-01 NOTE — CONSULTS
[address]    Date:   3/1/2022  Patient name:  Silvino Ryan  Date of admission:  2/24/2022  2:12 PM  MRN:   [id number]  YOB: 1965      HPI        Overnight no fever chills loss of appetite but no vomiting  REVIEW OF SYSTEMS:    · Cardiovascular: Negative for lightheadedness/orthostatic symptoms ,chest pain, dyspnea on exertion, palpitations or loss of consciousness. · Respiratory: Negative for cough or wheezing, sputum production, hemoptysis, pleuritic pain. · Gastrointestinal: Negative for nausea/vomiting, change in bowel habits, abdominal pain, dysphagia/appetite loss, hematemesis, blood in stools. Status post right transmetatarsal amputation revision pain controlled      OBJECTIVE:    /66   Pulse 72   Temp 98.1 °F (36.7 °C) (Oral)   Resp 16   Ht 6' 2\" (1.88 m)   Wt 229 lb 11.5 oz (104.2 kg)   SpO2 99%   BMI 29.49 kg/m²    Edentulous  · Lungs: clear to auscultation bilaterally,no wheeze. · Heart: regular rate and rhythm, S1, S2 normal, no murmur, click, rub or gallop  · Abdomen: soft, non-tender; bowel sounds normal; no masses,  no organomegaly  · Extremities: Status post old left TMA and status post new right transmetatarsal amputation  · Neurological:  Reflexes normal and symmetric. Sensation grossly normal  · Skin - no rash, no lump   · Eye- no icterus no redness  · GI-oral mucosa moist,  · Lymphatic system-no lymphadenopathy no splenomegaly  · Mouth- mucous membrane moist  · Head-normocephalic atraumatic  · Neck- supple no carotid bruit,Thyroid not palpable.       Past Medical History:   has a past medical history of Acute metabolic encephalopathy, CLAUDIA (acute kidney injury) ([address] Anemia, Anxiety, ARF (acute renal failure) ([address] Arthritis, BPH (benign prostatic hyperplasia), Chronic kidney disease, Depression, Diabetes mellitus ([address] Diabetic neuropathy ([address] GERD (gastroesophageal reflux disease), Hyperglycemia, Hyperkalemia, Hypertension, Hypothyroidism, Impacted tooth, Necrosis of bone of foot ([facility] Osteomyelitis of left foot ([facility] PAD (peripheral artery disease) ([facility] Pneumonia, Septic shock ([facility] and Type 2 diabetes mellitus ([facility] Past Surgical History:   has a past surgical history that includes Appendectomy; knee surgery (Right); Tonsillectomy; back surgery; Toe amputation (Bilateral, 3/6/2017); Upper gastrointestinal endoscopy (N/A, 5/20/2019); Foot surgery (Left, 01/04/2020); Foot Amputation (Left, 1/4/2020); amputation (Right, 01/21/2022); Toe amputation (Right, 1/21/2022); and Foot Amputation (Right, 2/25/2022). Home Medications:    Prior to Admission medications    Medication Sig Start Date End Date Taking? Authorizing Provider   morphine (MS CONTIN) 60 MG extended release tablet Take 1 tablet by mouth 2 times daily for 30 days. 2/18/22 3/20/22 Yes Cassie Elizondo MD   oxyCODONE (OXY-IR) 15 MG immediate release tablet Take 1 tablet by mouth every 6 hours as needed for Pain for up to 30 days. 2/18/22 3/20/22 Yes Cassie Elizondo MD   diazePAM (VALIUM) 5 MG tablet Take 1 tablet by mouth every 8 hours as needed for Anxiety for up to 30 days. 2/18/22 3/20/22 Yes Cassie Elizondo MD   ferrous sulfate (FE TABS 325) 325 (65 Fe) MG EC tablet Take 1 tablet by mouth daily (with breakfast) 1/24/22  Yes Cecelia Astorga, DPM   levothyroxine (SYNTHROID) 50 MCG tablet Take 1 tablet by mouth daily 11/26/21  Yes Cassie Elizondo MD   fluocinonide (LIDEX) 0.05 % ointment Apply 1 gm  topically 2 times daily to legs 11/17/21  Yes Natalie Monday, DPM   PARoxetine (PAXIL) 20 MG tablet Take 4 tablets by mouth every morning 8/3/21  Yes Cassie Elizondo MD   sodium hypochlorite (DAKINS) 0.125 % SOLN external solution APPLY TOPICALLY EVERY 12 HOURS.  APPLY AS A WET TO DRY DRESSING TWO TIMES A DAY, CAN ALSO USE TO CLEANSE WOUND 5/11/20  Yes Natalie Monday, DPM   metFORMIN (GLUCOPHAGE) 500 MG tablet Take 0.5 tablets by mouth daily (with breakfast) 4/29/20  Yes Sonal De La Rosa MD   [facility] 4 MG/0.1ML LIQD nasal spray 4 mg by Nasal route as needed for Opioid Reversal  10/29/19  Yes Historical Provider, MD   lactobacillus (CULTURELLE) capsule Take 1 capsule by mouth 2 times daily (with meals) 5/21/19  Yes Sonal De La Rosa MD   pantoprazole (PROTONIX) 40 MG tablet Take 1 tablet by mouth every morning (before breakfast) 5/22/19  Yes Sonal De La Rosa MD   povidone-iodine (BETADINE) 7.5 % external solution Apply to wound. Apply as a wet to dry dressing 4/16/19  Yes Nicolle Evans DPM   ONETOUCH ULTRA strip TEST TWO TIMES A DAY AS NEEDED 9/21/21   Sonal De La Rosa MD   Lancets (Gopi Plough) [address] [facility] TEST TWICE A DAY 3/8/21   Sonal De La Rosa MD   Wound Dressings (ADAPTIC NON-ADHERING DRESSING) PADS Apply 1 Units topically 2 times daily 9/28/18   Sonal De La Rosa MD   Gauze Pads & Dressings (COMBINE ABD) 5\"X9\" PADS 1 Units by Does not apply route 2 times daily 9/28/18   Sonal De La Rosa MD   Gauze Pads & Dressings [facility] GAUZE ROLL LARGE) MISC 1 Units by Does not apply route 2 times daily 6/12/17 11/17/21  Dane Cuevas MD       Allergies:  Bactrim [sulfamethoxazole-trimethoprim], Fiorinal [butalbital-aspirin-caffeine], Statins, Tylenol [acetaminophen], and Peanut butter flavor [flavoring agent]    Social History:   reports that he has never smoked. He has never used smokeless tobacco. He reports previous drug use. Frequency: 2.00 times per week. Drug: Marijuana Lanis Katerine). He reports that he does not drink alcohol. Family History: family history includes Cancer in his maternal grandfather; Diabetes in his father; No Known Problems in his mother.     Laboratory Testing:  CBC:   Recent Labs     02/28/22  1117   WBC 8.7   HGB 10.4*        BMP:    Recent Labs     02/27/22  0836 02/28/22  1117 03/01/22  0546   NA  --  135  --    K  --  4.5  --    CL  --  97*  --    CO2 --  29  --    BUN  --  18  --    CREATININE 0.77 0.79 0.86   GLUCOSE  --  113*  --      S. Calcium:  Recent Labs     02/28/22  1117   CALCIUM 9.1     S. Ionized Calcium:No results for input(s): IONCA in the last 72 hours. S. Magnesium:No results for input(s): MG in the last 72 hours. S. Phosphorus:No results for input(s): PHOS in the last 72 hours. S. Glucose:  Recent Labs     02/28/22 2021 03/01/22  0554 03/01/22  1107   POCGLU 96 92 129*     Glycosylated hemoglobin A1C:   Recent Labs     02/26/22  1403   LABA1C 6.3*     INR: No results for input(s): INR in the last 72 hours. Hepatic functions: No results for input(s): ALKPHOS, ALT, AST, PROT, BILITOT, BILIDIR, LABALBU in the last 72 hours. Pancreatic functions:No results for input(s): LACTA, AMYLASE in the last 72 hours. S. Lactic Acid: No results for input(s): LACTA in the last 72 hours. Cardiac enzymes:No results for input(s): CKTOTAL, CKMB, CKMBINDEX, TROPONINI in the last 72 hours. BNP:No results for input(s): BNP in the last 72 hours. Lipid profile: No results for input(s): CHOL, TRIG, HDL, LDL, LDLCALC in the last 72 hours. Blood Gases: No results found for: PH, PCO2, PO2, HCO3, O2SAT  Thyroid functions:   Lab Results   Component Value Date    TSH 0.58 02/26/2022        Imaging/Diagonstics:  EKG: Normal sinus rhythm    CXR: No acute cardiopulmonary findings.           ASSESSMENT:    Patient Active Problem List   Diagnosis    Anxiety disorder    Benign prostatic hyperplasia    Depression    Edema    Esophageal reflux    Essential hypertension    Hypothyroidism    Right cervical radiculopathy    Osteomyelitis (HCC)    Osteomyelitis of foot, left, acute (HCC)    Non compliance with medical treatment    Cellulitis    Acute renal failure (ARF) (HCC)    Ulcerated, foot, left, with necrosis of bone (HCC)    Type 2 diabetes mellitus (HCC)    Anemia    Hematemesis with nausea    Erosive gastritis    Ulcerated, foot, left, with fat layer exposed ([address]    Renal failure    Hyperkalemia    Hypocalcemia    Metabolic acidosis    Diabetic ulcer of toe of left foot associated with type 2 diabetes mellitus (HCC)    Hyperphosphatemia    Chronic, continuous use of opioids    Acute metabolic encephalopathy    Diabetic foot infection ([address]    H/O amputation of lesser toe, right ([address]    Seizure-like activity (HCC)    PAD (peripheral artery disease) ([address]    Diabetic infection of right foot ([address]    Osteomyelitis of foot, right, acute ([address]    Cellulitis of foot, right    Acquired posterior equinus, right    Wound dehiscence    Wound dehiscence, surgical       PLAN:  58-year-old gentleman with a history of uncontrolled diabetes mellitus with peripheral neuropathy history of left transmetatarsal's amputation of the foot admitted with wound infection and wound dehiscence of the recently done right transmetatarsal amputation  Status post revision TMA right foot pain controlled  On IV antibiotics and ID input requested per primary  Diabetes is well controlled with the current medication        Contreras Ross MD, MD [facility] [address], [address] [place] [address].    Phone [phone]   Fax: [phone]  Answering Service: [phone]

## 2022-03-01 NOTE — PROGRESS NOTES
Progress Note  Podiatric Medicine and Surgery     Subjective     CC: s/p right foot revisional TMA, DOS 02/25/22    Patient seen and examined at bedside. Pain is controlled with medication, no acute events overnight  Pt refusing to work with PT/OT, refusing to go to acute rehab, does not want to go home with PICC line or [place] [id number] for dressing changes. Afebrile, vital signs stable     HPI :  Eva Pablo is a 61 y.o. male seen at [facility] for dehiscence of his s/p right TMA. . Patient is well-known to Dr. Cristal Piedra and has not been compliant with his follow-up appointments. He failed to follow up in the office this week. Patient was at [facility] about a month ago for having his right forefoot amputated due to osteomyelitis and diabetes. Patient has been on doxycycline since surgery. HE has been weight bearing against instructions. Patient states that he came to ED because he noticed purulent drainage to the bottom incision site. Pt was admitted from ED for revision of his right TMA. Patient denies any nausea, vomiting, fever, chills or shortness of breath. He gradually removed all of the sutures that were present at his last appointment. ROS: Denies N/V/F/C/SOB/CP. Otherwise negative except at stated in the HPI.      Medications:  Scheduled Meds:   cefepime  2,000 mg IntraVENous Once    Followed by    cefepime  2,000 mg IntraVENous Q8H    vancomycin  1,250 mg IntraVENous Q12H    sodium chloride flush  5-40 mL IntraVENous 2 times per day    enoxaparin  40 mg SubCUTAneous Daily    ferrous sulfate  325 mg Oral Daily with breakfast    lactobacillus  1 capsule Oral BID WC    levothyroxine  50 mcg Oral Daily    metFORMIN  250 mg Oral Daily with breakfast    morphine  60 mg Oral BID    PARoxetine  80 mg Oral QAM    vancomycin (VANCOCIN) intermittent dosing (placeholder)   Other RX Placeholder    metroNIDAZOLE  500 mg IntraVENous Q8H       Continuous Infusions:   sodium chloride 25 mL (02/27/22 9577)       PRN Meds:oxyCODONE, sodium chloride flush, sodium chloride, ondansetron **OR** ondansetron, polyethylene glycol, diazePAM    Objective     Vitals:  Patient Vitals for the past 8 hrs:   BP Temp Pulse Resp SpO2   22 0558 122/64 97.9 °F (36.6 °C) 71 16 97 %     Average, Min, and Max for last 24 hours Vitals:  TEMPERATURE:  Temp  Av.3 °F (36.8 °C)  Min: 97.3 °F (36.3 °C)  Max: 99.6 °F (37.6 °C)    RESPIRATIONS RANGE: Resp  Av  Min: 16  Max: 16    PULSE RANGE: Pulse  Av.3  Min: 71  Max: 96    BLOOD PRESSURE RANGE:  Systolic (76WRM), FVW:644 , Min:122 , WQI:032   ; Diastolic (88CKN), MFD:16, Min:64, Max:81      PULSE OXIMETRY RANGE: SpO2  Av.3 %  Min: 95 %  Max: 97 %    I/O last 3 completed shifts: In: 2407 [P.O.:1075; IV Piggyback:300]  Out: 3500 [Urine:3500]    CBC:  Recent Labs     22  1117   WBC 8.7   HGB 10.4*   HCT 34.3*           BMP:  Recent Labs     22  0836 22  1117 22  0546   NA  --  135  --    K  --  4.5  --    CL  --  97*  --    CO2  --  29  --    BUN  --  18  --    CREATININE 0.77 0.79 0.86   GLUCOSE  --  113*  --    CALCIUM  --  9.1  --         Coags:  No results for input(s): APTT, PROT, INR in the last 72 hours. Lab Results   Component Value Date    SEDRATE 78 (H) 2022     No results for input(s): CRP in the last 72 hours. Lower Extremity Physical Exam:   Vascular: DP and PT pulses are palpable, bilaterally. Hair growth is absent to the level of the digits. Moderate nonpitting edema appreciated to the right foot. Neuro: Saph/sural/SP/DP/plantar sensation absent to light touch. Musculoskeletal: Muscle strength deferred due to post op state. Gross deformity is present with history of transmetatarsal amputation to the left foot and revisional transmetatarsal amputation to the right foot. Dermatologic: Sutures intact to incision site, no signs of dehiscence appreciated.  Mild fibrogranular tissue to the medial aspect of the right foot. No malodor, minimal sero-sanguinous drainage noted to the dressings. No fluctuance, crepitus or induration appreciated. Full thickness ulceration noted to the anterior distal right leg with granular base. No malodor or drainage appreciated. No fluctuance, crepitus or induration appreciated    Clinical Images:                    Imaging:   XR FOOT RIGHT (MIN 3 VIEWS)   Final Result   Postoperative findings status post transmetatarsal amputation. Open wound is   noted without soft tissue gas otherwise appreciated. No new osseous   abnormality appreciated. Assessment   Isaiah Freeman is a 61 y.o. male with   Postoperative wound dehiscence of amputation stump; right foot  S/p right revisional transmetatarsal amputation  Osteomyelitis of foot, right  Diabetic foot infection of right foot    Principal Problem:    Wound dehiscence  Active Problems:    Benign prostatic hyperplasia    Essential hypertension    Hypothyroidism    Type 2 diabetes mellitus (HCC)    Wound dehiscence, surgical  Resolved Problems:    * No resolved hospital problems. *       Plan     Patient examined and evaluated at bedside   Treatment options discussed in detail with the patient  Educated pt on the benefits of working with PT/OT for heel weight bearing to optimize return to function. Pt verbalizes understanding  ID recs appreciated for PICC line vs PO abx for dc  ABX: cefepime, flagyl and vancomycin  Recommend wearing protective boot at all times when working with PT/OT  Will discuss with     DVT ppx: lovenox    Diet: carb control   Activity: Heel WB to Right lower extremity  Pain Control: panel ordered      [facility]   Podiatric Medicine & Surgery   3/1/2022 at 8:47 AM    I performed a history and physical examination of the patient and discussed management with the resident. I reviewed the residents note and agree with the documented findings and plan of care.  Any areas of disagreement are noted on the chart. I was personally present for the key portions of any procedures. I have documented in the chart those procedures where I was not present during the key portions. I have reviewed the Podiatry Resident progress note. I agree with the chief complaint, past medical history, past surgical history, allergies, medications, social and family history as documented unless otherwise noted below. Documentation of the HPI, Physical Exam and Medical Decision Making performed by medical students or scribes is based on my personal performance of the HPI, PE and MDM. I have personally evaluated this patient and have completed at least one if not all key elements of the E/M (history, physical exam, and MDM). Additional findings are as noted.      Sita Brice DPM on 3/1/2022 at 44:08 AM  Board Certified, American Board of Podiatric Surgery  Fellow, [employer]

## 2022-03-01 NOTE — PROGRESS NOTES
Weight bearing restrictions verified with podiatry resident Mitch Piedra. No restrictions to left lower extremity, new order placed for restrictions on right lower extremity per podiatry.

## 2022-03-01 NOTE — PROGRESS NOTES
Physical Therapy        Physical Therapy Cancel Note      DATE: 3/1/2022    NAME: Isaiah Freeman  MRN: [id number]   : 1965      Patient not seen this date for Physical Therapy due to: Other: Pt  declines physical therapy, educated in importance of therapy, pt reports he has been through this before, Reports he has enough assist to get into his home. Reports has a wheelchair at home, reviewed WB precautions. educated in using w/c for mobility at this time due to Hx of L TMA and recent revision R TMA. Pt /spouse verbalizes understanding. Pt being DC home today.     Electronically signed by Curt Andrews PT on 3/1/2022 at 2:30 PM

## 2022-03-01 NOTE — PROGRESS NOTES
Comprehensive Nutrition Assessment    Type and Reason for Visit:  Reassess    Nutrition Recommendations/Plan: Continue current diet and Ensure High Protein twice daily. Nutrition Assessment:  Pt states the antibiotics upset his GI tract and contribute to decreased appetite. He does like Ensure. Average intake estimated at 26-50% of meals and 100% of supplements. Will continue current plan of care. Malnutrition Assessment:  Malnutrition Status: At risk for malnutrition (Comment)    Context:  Acute Illness     Findings of the 6 clinical characteristics of malnutrition:  Energy Intake:  Mild decrease in energy intake (Comment) (Based on available data)  Weight Loss:  No significant weight loss     Body Fat Loss:  No significant body fat loss     Muscle Mass Loss:  No significant muscle mass loss    Fluid Accumulation:  1 - Mild (Likely not related to nutritional status) Extremities   Strength:   Not measured    Estimated Daily Nutrient Needs:  Energy (kcal):  4211-4342 based on [place] with 1.2-1.25 factor; Weight Used for Energy Requirements:  Admission     Protein (g):  121-138 based on 1.4-1.6 gm per kg; Weight Used for Protein Requirements:  Ideal          Nutrition Related Findings:  Edema: +1 RLE, LLE. Labs and meds reviewed. POC Glucose: 92, 129. Metformin. Other labs and meds reviewed. Decreased appetite. Wounds:  Surgical Incision (Surgery 2/25)       Current Nutrition Therapies:    ADULT DIET; Regular; 5 carb choices (75 gm/meal)  ADULT ORAL NUTRITION SUPPLEMENT; Lunch, Dinner;  Low Calorie/High Protein Oral Supplement    Anthropometric Measures:  · Height: 6' 2\" (188 cm)  · Current Body Weight: 228 lb 2.8 oz (103.5 kg)   · Admission Body Weight: 228 lb 2.8 oz (103.5 kg)      Nutrition Diagnosis:   · Increased nutrient needs related to  (healing) as evidenced by wounds    Nutrition Interventions:   Food and/or Nutrient Delivery:  Continue Current Diet,Continue Oral Nutrition Supplement  Nutrition Education/Counseling:  No recommendation at this time   Coordination of Nutrition Care:  Continue to monitor while inpatient    Goals:  PO intake to meet estimated needs       Nutrition Monitoring and Evaluation:   Behavioral-Environmental Outcomes:  None Identified   Food/Nutrient Intake Outcomes:  Food and Nutrient Intake,Supplement Intake  Physical Signs/Symptoms Outcomes:  Biochemical Data,Chewing or Swallowing,GI Status,Fluid Status or Edema,Skin,Weight     Discharge Planning: Too soon to determine     Some areas of assessment may be incomplete due to standard COVID-19 Precautions. Miranda Marks R.D., L.D.   Phone: [phone]

## 2022-03-01 NOTE — PROGRESS NOTES
Bedside reporting done, per Galina Rn's  IV intact, no redness noted  Bed alarm on  Pt. Has no complaints at this time  Pt's family cont. At bedside  Dressing dry and intact to rt foot area  Waffle care mattress cont.  On pt's bed  Urinal at bedside  HOB up  @  50 degrees

## 2022-03-01 NOTE — PLAN OF CARE
Problem: Falls - Risk of:  Goal: Will remain free from falls  Description: Will remain free from falls  3/1/2022 0445 by Eliazar Pruitt RN  Outcome: Ongoing     Problem: Falls - Risk of:  Goal: Absence of physical injury  Description: Absence of physical injury  3/1/2022 0445 by Eliazar Pruitt RN  Outcome: Ongoing     Problem: Skin Integrity:  Goal: Will show no infection signs and symptoms  Description: Will show no infection signs and symptoms  Outcome: Ongoing     Problem: Skin Integrity:  Goal: Absence of new skin breakdown  Description: Absence of new skin breakdown  Outcome: Ongoing     Problem: Pain:  Goal: Pain level will decrease  Description: Pain level will decrease  Outcome: Ongoing     Problem: Pain:  Goal: Control of acute pain  Description: Control of acute pain  Outcome: Ongoing     Problem: Musculor/Skeletal Functional Status  Goal: Absence of falls  Outcome: Ongoing     Problem: Musculor/Skeletal Functional Status  Goal: Highest potential functional level  Outcome: Ongoing

## 2022-03-01 NOTE — PROGRESS NOTES
Pt refused to try to walk with therapy at this time. Tried to encourage pt. To walk, pt.  Did not want to walk at this time

## 2022-03-01 NOTE — PROGRESS NOTES
Message sent to pharmacy regarding timing of cefepime and length of infusion interfering with other IV medications. Cefepime will be changed to infuse over one-half hour instead of 4.

## 2022-03-01 NOTE — PLAN OF CARE
Problem: Skin Integrity:  Goal: Absence of new skin breakdown  Description: Absence of new skin breakdown  3/1/2022 1642 by Lewis Farrar RN  Outcome: Ongoing  3/1/2022 0445 by Eric Mart RN  Outcome: Ongoing     Problem: Pain:  Goal: Control of chronic pain  Description: Control of chronic pain  3/1/2022 1642 by Lewis Farrar RN  Outcome: Ongoing  3/1/2022 0445 by Eric Mart RN  Outcome: Ongoing

## 2022-03-01 NOTE — PROGRESS NOTES
Physical Therapy  CLARIFICATION NOTE  [address]      Date: 3/1/22  Patient Name: Jose Isbell       Room:   MRN: [id number]  Account: [de-identified]   : 1965  (61 y.o.) Gender: male   Patient Height Height: 6' 2\" (188 cm)  Patient Weight 229 lb 11.5 oz (104.2 kg)       Therapist asked for charge nurse Rich Mota to verify orders w/ doctor as pt claiming to be NWB bilateral feet. Per her note dated 3-1-2022 at 80- No WB restrictions on the left LE. New order placed by Dr. Regi Fletcher on 3-1-2022 at [phone] states that the patient can now be heel WB on the right LE while wearing protective boot in PT & OT sessions at all times. Cam boot was ordered for right LE but pt has one at home.

## 2022-03-01 NOTE — CARE COORDINATION
ONGOING DISCHARGE PLAN:     POD #4 for TMA revision Right foot. Patient is alert and oriented x4. Spoke with patient regarding discharge plan and patient confirms that plan is still home with spouse. Pt has CAM walker at home along with walker and . Pt stated his wife would help with dressing changes and declined VNS services. Pt also has DME at home and denies needs. Pt informed writer that he does not want to go home with IV ATB and would prefer to go home on oral ATB. Awaiting ID input. Following for needs. Will continue to follow for additional discharge needs.     Electronically signed by Priyank Young RN on 3/1/2022 at 10:35 AM

## 2022-03-01 NOTE — PROGRESS NOTES
Vancomycin Dosing by Pharmacy - Daily Note   Vancomycin Therapy Day:  6  Indication: 6    Allergies:  Bactrim [sulfamethoxazole-trimethoprim], Fiorinal [butalbital-aspirin-caffeine], Statins, Tylenol [acetaminophen], and Peanut butter flavor [flavoring agent]   Actual Weight:    Wt Readings from Last 1 Encounters:   02/28/22 229 lb 11.5 oz (104.2 kg)       Labs/Ancillary Data  Estimated Creatinine Clearance: 122 mL/min (based on SCr of 0.86 mg/dL). Recent Labs     02/27/22  0836 02/28/22  1117 03/01/22  0546   CREATININE 0.77 0.79 0.86   BUN  --  18  --    WBC  --  8.7  --      No results found for: PROCAL    Intake/Output Summary (Last 24 hours) at 3/1/2022 0807  Last data filed at 3/1/2022 0522  Gross per 24 hour   Intake 725 ml   Output 2500 ml   Net -1775 ml     Temp: 99.6    Culture Date / Source  /  Results  See micro   Recent vancomycin administrations                     vancomycin (VANCOCIN) 1,250 mg in dextrose 5 % 250 mL IVPB (ADDAVIAL) (mg) 1,250 mg New Bag 02/28/22 2250     1,250 mg New Bag  1150     1,250 mg New Bag  0103     1,250 mg New Bag 02/27/22 1041     1,250 mg New Bag 02/26/22 2318                    Vancomycin Concentrations:   TROUGH:  No results for input(s): VANCOTROUGH in the last 72 hours. RANDOM:    Recent Labs     02/28/22  0657   VANCORANDOM 18.7       MRSA Nasal Swab: N/A. Non-respiratory infection. Goyo Sae PLAN     Continue current dose of 1250 mg q12h IV  Ensured BUN/sCr ordered at baseline and every 48 hours x at least 3 levels, then at least weekly.   Pharmacy will continue to monitor patient and adjust therapy as indicated      Vancomycin Target Concentration Parameters  Treatment  Population Target AUC/FERNANDO Target Trough   Invasive MRSA Infection (bacteremia, pneumonia, meningitis, endocarditis, osteomyelitis)  Sepsis (undifferentiated) 400-600 N/A   Infection due to non-MRSA pathogen  Empiric treatment of non-invasive MRSA infection  (SSTI, UTI) <500 10-15 mg/L   CrCl < 29 mL/min  Rapidly fluctuating serum creatinine   CLAUDIA N/A < 15 mg/L     Renal replacement therapy is dosed by levels, per hospital protocol. Abbreviations  * Pauc: probability that AUC is >400 (efficacy); Pconc: probability that Ctrough is above 20 ?g/mL (toxicity); Tox: Probability of nephrotoxicity, based on Chasity et al. Clin Infect Dis 2009. Loading dose: N/A  Regimen: 1250 mg IV every 12 hours. Start time: 10:50 on 03/01/2022  Exposure target: AUC24 (range)400-600 mg/L.hr   AUC24,ss: 495 mg/L.hr  Probability of AUC24 > 400: 88 %  Ctrough,ss: 16.1 mg/L  Probability of Ctrough,ss > 20: 23 %  Probability of nephrotoxicity (Chasity SAEED 2009): 11 %    Thank you for the consult. Pharmacy will continue to follow.     Radha Traylor PharmD, BCPS  3/1/2022 8:08 AM

## 2022-03-01 NOTE — PROGRESS NOTES
Podiatry resident notified patient does not want to see Dr. Gogo Rolle, and ID was deferring recommendations to primary, and recommending follow up with Dr. Rohan Meyer at [facility]. Lyric

## 2022-03-01 NOTE — PLAN OF CARE
Nutrition Problem #1: Increased nutrient needs  Intervention: Food and/or Nutrient Delivery: Continue Current Diet,Continue Oral Nutrition Supplement  Nutritional Goals: PO intake to meet estimated needs

## 2022-03-01 NOTE — PROGRESS NOTES
Patient was seen and examined bedside. Podiatry consulted ID for further recommendations regarding antibiotics for discharge. Anaerobic and aerobic culture shows growth of MRSA, strepto cocci beta-hemolytic group B and Serratia. Patient adamantly refuses PICC line placement for IV antibiotics. Benefits, risk, and alternatives were discussed with patient. Patient states that he does not want to see Dr. Patsy Andrews. At this time, we will defer antibiotic therapy to primary team.  Recommend following up with Dr. Malissa Pitts outpatient as patient has seen this provider before.     Electronically signed by Komal Aguilar MD on 3/1/2022 at 2:17 PM

## 2022-03-02 ENCOUNTER — TELEPHONE (OUTPATIENT)
Dept: PRIMARY CARE CLINIC | Age: 57
End: 2022-03-02

## 2022-03-02 NOTE — PLAN OF CARE
Patient seen at bedside. Received message from RN that he is refusing IV replacement after infiltration of site. Patient refused to be seen by infectious disease earlier today for antibiotic therapy suggestion, and is refusing PICC line placement to go home. Discussed with patient that we recommend PICC line is optimal for treating wound dehiscence to his right TMA site and MRSA infection that is growing on his right foot. Patient states that he would still prefer oral antibiotics to go home on. He was educated on the risks versus benefits of PICC line versus oral antibiotics, and still is adamant on being sent home with oral.  Discussed with patient in detail that this is against our medical advice, he says he will still like to go home today.

## 2022-03-02 NOTE — TELEPHONE ENCOUNTER
[facility] Transitions Initial Follow Up Call    Outreach made within 2 business days of discharge: Yes    Patient: Edwige Vidal Patient : 1965   MRN: [id number]  Reason for Admission: There are no discharge diagnoses documented for the most recent discharge. Discharge Date: 3/1/22       Spoke with: MICHAEL- SPOUSE PER HIPAA     Discharge department/facility: [address] [place] [address] Interactive Patient Contact:  Was patient able to fill all prescriptions: Yes  Was patient instructed to bring all medications to the follow-up visit: Yes  Is patient taking all medications as directed in the discharge summary? Yes  Does patient understand their discharge instructions: Yes  Does patient have questions or concerns that need addressed prior to 7-14 day follow up office visit: no    Scheduled appointment with PCP within 7-14 days  APPT SCHEDULED WITH PCP     Follow Up  No future appointments.     Yarely Ca MA

## 2022-03-02 NOTE — PROGRESS NOTES
Pt left AMA. Prescription for PO abx provided as well as a copy of AMA form per pt's request. Josiephine Favor NP notified via [employer].

## 2022-03-02 NOTE — PLAN OF CARE
Problem: Falls - Risk of:  Goal: Will remain free from falls  Description: Will remain free from falls  3/2/2022 0109 by Germán Antunez RN  Outcome: Completed  3/1/2022 1642 by Allen Stiles RN  Outcome: Ongoing  Goal: Absence of physical injury  Description: Absence of physical injury  3/2/2022 0109 by Germán Antunez RN  Outcome: Completed  3/1/2022 1642 by Allen Stiles RN  Outcome: Ongoing     Problem: Skin Integrity:  Goal: Will show no infection signs and symptoms  Description: Will show no infection signs and symptoms  3/2/2022 0109 by Germán Antunez RN  Outcome: Completed  3/1/2022 1642 by Allen Stiles RN  Outcome: Ongoing  Goal: Absence of new skin breakdown  Description: Absence of new skin breakdown  3/2/2022 0109 by Germán Antunez RN  Outcome: Completed  3/1/2022 1642 by Allen Stiles RN  Outcome: Ongoing     Problem: Pain:  Goal: Pain level will decrease  Description: Pain level will decrease  3/2/2022 0109 by Germán Antunez RN  Outcome: Completed  3/1/2022 1642 by Allen Stiles RN  Outcome: Ongoing  Goal: Control of acute pain  Description: Control of acute pain  3/2/2022 0109 by Germán Antunez RN  Outcome: Completed  3/1/2022 1642 by Allen Stiles RN  Outcome: Ongoing  Goal: Control of chronic pain  Description: Control of chronic pain  3/2/2022 0109 by Germán Antunez RN  Outcome: Completed  3/1/2022 1642 by Allen Stiles RN  Outcome: Ongoing     Problem: Nutrition  Goal: Optimal nutrition therapy  3/2/2022 0109 by Germán Antunez RN  Outcome: Completed  3/1/2022 1642 by Allen Stiles RN  Outcome: Ongoing  3/1/2022 1414 by Yoandy Inman RD, LD  Outcome: Ongoing     Problem: Musculor/Skeletal Functional Status  Goal: Highest potential functional level  3/2/2022 0109 by Germán Antunez RN  Outcome: Completed  3/1/2022 1642 by Allen Stiles RN  Outcome: Ongoing  Goal: Absence of falls  3/2/2022 0109 by Germán Antunez RN  Outcome: Completed  3/1/2022 1642 by Allen Stiles RN  Outcome: Ongoing Problem: Musculor/Skeletal Functional Status  Goal: Highest potential functional level  3/2/2022 0109 by Mark Anthony Johnson RN  Outcome: Completed  3/1/2022 1642 by Gustavo Subramanian RN  Outcome: Ongoing  Goal: Absence of falls  3/2/2022 0109 by Mark Anthony Johnson RN  Outcome: Completed  3/1/2022 1642 by Gustavo Subramanian RN  Outcome: Ongoing

## 2022-03-07 RX ORDER — PAROXETINE HYDROCHLORIDE 20 MG/1
TABLET, FILM COATED ORAL
Qty: 120 TABLET | Refills: 5 | Status: SHIPPED | OUTPATIENT
Start: 2022-03-07 | End: 2022-10-21

## 2022-03-07 RX ORDER — DOXYCYCLINE HYCLATE 100 MG
100 TABLET ORAL 2 TIMES DAILY
Qty: 20 TABLET | Refills: 0 | Status: SHIPPED | OUTPATIENT
Start: 2022-03-07 | End: 2022-03-17

## 2022-03-07 NOTE — TELEPHONE ENCOUNTER
Patients wife called and said they went to [facility] over the weekend and received a script for doxy 100mg. The resident forgot to sign and requested a script to be send to pharmacy.

## 2022-03-10 ENCOUNTER — OFFICE VISIT (OUTPATIENT)
Dept: PODIATRY | Age: 57
End: 2022-03-10
Payer: MEDICARE

## 2022-03-10 VITALS — BODY MASS INDEX: 29.52 KG/M2 | HEIGHT: 74 IN | WEIGHT: 230 LBS

## 2022-03-10 DIAGNOSIS — E11.621 TYPE 2 DIABETES MELLITUS WITH FOOT ULCER, WITHOUT LONG-TERM CURRENT USE OF INSULIN (HCC): ICD-10-CM

## 2022-03-10 DIAGNOSIS — L97.313 LOWER LIMB ULCER, ANKLE, RIGHT, WITH NECROSIS OF MUSCLE (HCC): ICD-10-CM

## 2022-03-10 DIAGNOSIS — E11.42 TYPE 2 DIABETES MELLITUS WITH DIABETIC POLYNEUROPATHY, WITHOUT LONG-TERM CURRENT USE OF INSULIN (HCC): ICD-10-CM

## 2022-03-10 DIAGNOSIS — T87.81 DEHISCENCE OF AMPUTATION STUMP (HCC): ICD-10-CM

## 2022-03-10 DIAGNOSIS — Z89.431 STATUS POST TRANSMETATARSAL AMPUTATION OF RIGHT FOOT (HCC): Primary | ICD-10-CM

## 2022-03-10 DIAGNOSIS — L97.509 TYPE 2 DIABETES MELLITUS WITH FOOT ULCER, WITHOUT LONG-TERM CURRENT USE OF INSULIN (HCC): ICD-10-CM

## 2022-03-10 PROCEDURE — G8484 FLU IMMUNIZE NO ADMIN: HCPCS | Performed by: PODIATRIST

## 2022-03-10 PROCEDURE — 11043 DBRDMT MUSC&/FSCA 1ST 20/<: CPT | Performed by: PODIATRIST

## 2022-03-10 PROCEDURE — G8428 CUR MEDS NOT DOCUMENT: HCPCS | Performed by: PODIATRIST

## 2022-03-10 PROCEDURE — 3017F COLORECTAL CA SCREEN DOC REV: CPT | Performed by: PODIATRIST

## 2022-03-10 PROCEDURE — 2022F DILAT RTA XM EVC RTNOPTHY: CPT | Performed by: PODIATRIST

## 2022-03-10 PROCEDURE — 3044F HG A1C LEVEL LT 7.0%: CPT | Performed by: PODIATRIST

## 2022-03-10 PROCEDURE — 1111F DSCHRG MED/CURRENT MED MERGE: CPT | Performed by: PODIATRIST

## 2022-03-10 PROCEDURE — G8417 CALC BMI ABV UP PARAM F/U: HCPCS | Performed by: PODIATRIST

## 2022-03-10 PROCEDURE — 99213 OFFICE O/P EST LOW 20 MIN: CPT | Performed by: PODIATRIST

## 2022-03-10 PROCEDURE — 1036F TOBACCO NON-USER: CPT | Performed by: PODIATRIST

## 2022-03-10 NOTE — PROGRESS NOTES
[address]  Post Op note  Chief Complaint   Patient presents with    Post-Op Check     right foot    Other     last blood sugar 119       Rony Telles is a 61y.o. year old male who is 15 day(s) post op from foot surgery. Type: revision of TMA right, debridement of ankle ulceration. Vital signs are stable. Pain level is 3. Patient denies N/V/F/C. Patient has been compliant with postoperative instructions. While in the hospital, he refused to have a PICC line for proper antibiotics. He refused PT sessions. He refused going to a skilled facility so that he may be safe and heal properly. Review of Systems    Vascular: DP and PT pulses palpable 2/4, Right Foot and 2/4 on the Left Foot. CFT <Na seconds, Right Foot and <NA seconds on the Left Foot. Edema is present,  Right Foot and absenton the Left Foot. Neurological:   Sensation absent  to light touch to level of digits, both feet. Musculoskeletal:   Muscle strength is 4/5 on the Right Foot and 4/5 on the Left Foot. Structural deformities are present on the Right Foot and present on the Left Foot. Integument:  Warm, dry, supple both feet. Incisions are healing well. Wound dehiscence is absent. Infection is absent. Wound #:   1    diabetic foot ulcer   right leg anterior ankle    Wound measurements:  #1  3 cm by 2 cm  by   1 cm        Wound Depth: fascial, muscle and tendon. Drainage:    moderate Type:serosanguinous  Wound Bed status:   Granulation Tissue: 30%   Necrotic 70%  Type: scar, fibrin, slough, eschar  Epithelialization 0%   Hypergranular 0%   Periwound hyperkeratosis:  absent  Erythema absent    Odor:no  Maceration:no  Undermining/tract/tunneling:no  Culture taken:   no                            Assesment : Post operative progress stable. Diagnosis Orders   1. Status post transmetatarsal amputation of right foot ([facility] [address]     2.  Type 2 diabetes mellitus with foot ulcer, without long-term current use of insulin (HCC)  ND DEBRIDEMENT, SKIN, SUB-Q TISSUE,MUSCLE,=<20 SQ CM   3. Dehiscence of amputation stump ([address]     4. Type 2 diabetes mellitus with diabetic polyneuropathy, without long-term current use of insulin ([address]     5. Lower limb ulcer, ankle, right, with necrosis of muscle (HCC)  ND DEBRIDEMENT, SKIN, SUB-Q TISSUE,MUSCLE,=<20 SQ CM         Plan: Procedure Note  Indications:  Based on my examination of this patient's wound(s)/ulcer(s) today, debridement is required to promote healing and evaluate the wound base. Performed by: Guy Marte DPM    Consent obtained:  Yes    Time out taken:  Yes    Pain Control:         Wound Location: right  Pre Debridement Measurements:  Are located in the Wound/Ulcer Documentation Flow Sheet    Wound/Ulcer #: 1    Procedure in Detail: Lidocaine gel soaked gauze was applied at beginning of wound evaluation. The wound(s) was excisionally debrided sharply of fibrotic, necrotic, and hyperkeratotic tissue, including a layer of surrounding healthy tissue using curette, scissors and forceps. The wound was debrided down through and including fascial, muscle and tendon. Percent of Wound Debrided: 100%    Total Surface Area Debrided:   sq cm     Bleeding: Minimal      Plan for wound  Dress per physician order  Treatment: silvercel to ulceration, silvercel to incision line every other day  DSD    Dressing Documentation    This patient needs a dressings to aid in healing of the ulceration.      I am prescribing silvercel, as the primary dressing  Sequence of dressing: silvercel, 4x4 gauze, kerlix roll, secure with tape  Frequency of change: every other day  Duration of supplies: 30 days    1) The ulceration has been surgically debrided on 03/11/22   2) The ulceration is located on the right ankle  3) The ulceration was assessed on 03/11/22   4) Type of ulceration:  diabetic  5) See above for size of ulceration(s)  6) Amount of drainage: moderate    No weight       1. Discussed appropriate home care of this wound. 2. Dressings:   Orders Placed This Encounter   Procedures    MD DEBRIDEMENT, SKIN, SUB-Q TISSUE,MUSCLE,=<20 SQ CM      3. Follow up: 1 week. 4. Detailed home instructions and education material given to patient prior to discharge. Sutures in place. No orders of the defined types were placed in this encounter. Follow up 1week(s).

## 2022-03-15 ENCOUNTER — TELEPHONE (OUTPATIENT)
Dept: PODIATRY | Age: 57
End: 2022-03-15

## 2022-03-15 NOTE — TELEPHONE ENCOUNTER
Pt wife called and stated she was waiting to hear back regarding pt changing his appt to the 28th. Writer unable to reach clinical staff. Please contact pt.

## 2022-03-17 DIAGNOSIS — M86.9 OSTEOMYELITIS, UNSPECIFIED SITE, UNSPECIFIED TYPE (HCC): ICD-10-CM

## 2022-03-17 DIAGNOSIS — L97.524 ULCERATED, FOOT, LEFT, WITH NECROSIS OF BONE (HCC): ICD-10-CM

## 2022-03-17 DIAGNOSIS — M86.172 OSTEOMYELITIS OF FOOT, LEFT, ACUTE (HCC): ICD-10-CM

## 2022-03-17 DIAGNOSIS — M54.12 RIGHT CERVICAL RADICULOPATHY: ICD-10-CM

## 2022-03-17 DIAGNOSIS — F34.1 DYSTHYMIA: ICD-10-CM

## 2022-03-17 RX ORDER — MORPHINE SULFATE 60 MG/1
60 TABLET, FILM COATED, EXTENDED RELEASE ORAL 2 TIMES DAILY
Qty: 60 TABLET | Refills: 0 | Status: SHIPPED | OUTPATIENT
Start: 2022-03-17 | End: 2022-04-14 | Stop reason: SDUPTHER

## 2022-03-17 RX ORDER — OXYCODONE HYDROCHLORIDE 15 MG/1
15 TABLET ORAL EVERY 6 HOURS PRN
Qty: 120 TABLET | Refills: 0 | Status: SHIPPED | OUTPATIENT
Start: 2022-03-17 | End: 2022-04-14 | Stop reason: SDUPTHER

## 2022-03-17 RX ORDER — DIAZEPAM 5 MG/1
5 TABLET ORAL EVERY 8 HOURS PRN
Qty: 90 TABLET | Refills: 0 | Status: SHIPPED | OUTPATIENT
Start: 2022-03-17 | End: 2022-05-02 | Stop reason: SDUPTHER

## 2022-03-17 NOTE — TELEPHONE ENCOUNTER
Patient states he is going to run out soon. Patient has had recent amputation and would like this refilled as soon as possible.      LAST VISIT:   8/3/2021     Future Appointments   Date Time Provider Tasha Prado   3/30/2022  2:15 PM [address]   4/4/2022  3:20 PM Inna Welch MD [facility]

## 2022-03-30 ENCOUNTER — OFFICE VISIT (OUTPATIENT)
Dept: PODIATRY | Age: 57
End: 2022-03-30
Payer: MEDICARE

## 2022-03-30 VITALS — WEIGHT: 230 LBS | BODY MASS INDEX: 29.52 KG/M2 | HEIGHT: 74 IN

## 2022-03-30 DIAGNOSIS — E11.621 TYPE 2 DIABETES MELLITUS WITH FOOT ULCER, WITHOUT LONG-TERM CURRENT USE OF INSULIN (HCC): ICD-10-CM

## 2022-03-30 DIAGNOSIS — Z89.431 STATUS POST TRANSMETATARSAL AMPUTATION OF RIGHT FOOT (HCC): Primary | ICD-10-CM

## 2022-03-30 DIAGNOSIS — L97.509 TYPE 2 DIABETES MELLITUS WITH FOOT ULCER, WITHOUT LONG-TERM CURRENT USE OF INSULIN (HCC): ICD-10-CM

## 2022-03-30 DIAGNOSIS — E11.42 TYPE 2 DIABETES MELLITUS WITH DIABETIC POLYNEUROPATHY, WITHOUT LONG-TERM CURRENT USE OF INSULIN (HCC): ICD-10-CM

## 2022-03-30 DIAGNOSIS — T87.81 DEHISCENCE OF AMPUTATION STUMP (HCC): ICD-10-CM

## 2022-03-30 DIAGNOSIS — L97.521 ULCERATED, FOOT, LEFT, LIMITED TO BREAKDOWN OF SKIN (HCC): ICD-10-CM

## 2022-03-30 DIAGNOSIS — Z91.199 NON COMPLIANCE WITH MEDICAL TREATMENT: ICD-10-CM

## 2022-03-30 DIAGNOSIS — L97.513 ULCERATED, FOOT, RIGHT, WITH NECROSIS OF MUSCLE (HCC): ICD-10-CM

## 2022-03-30 DIAGNOSIS — L97.313 LOWER LIMB ULCER, ANKLE, RIGHT, WITH NECROSIS OF MUSCLE (HCC): ICD-10-CM

## 2022-03-30 PROCEDURE — 11043 DBRDMT MUSC&/FSCA 1ST 20/<: CPT | Performed by: PODIATRIST

## 2022-03-30 PROCEDURE — 99999 PR OFFICE/OUTPT VISIT,PROCEDURE ONLY: CPT | Performed by: PODIATRIST

## 2022-03-30 NOTE — PROGRESS NOTES
[address]  Post Op note  Chief Complaint   Patient presents with    Wound Check     left foot    Post-Op Check     right foot    Diabetes     last blood sugar 127       Rubens Pineda is a 61y.o. year old male who is about 1 month post op from foot surgery. Type: revision of TMA right, debridement of ankle ulceration. Vital signs are stable. Pain level is 3. Patient denies N/V/F/C. Patient removed all of his sutures himself. Has a new open area left foot. Has not gotten dressings, did not call. His insurance coverage changed. Review of Systems    Vascular: DP and PT pulses palpable 2/4, Right Foot and 2/4 on the Left Foot. CFT <Na seconds, Right Foot and <NA seconds on the Left Foot. Edema is minimal ,  Right Foot and absenton the Left Foot. Neurological:   Sensation absent  to light touch to level of digits, both feet. Musculoskeletal:   Muscle strength is 4/5 on the Right Foot and 4/5 on the Left Foot. Structural deformities are present on the Right Foot and present on the Left Foot. Integument:  Warm, dry, supple both feet. Incisions are healing well. Wound dehiscence is absent. Infection is absent. Wound #:   1    diabetic foot ulcer   right leg anterior ankle    Wound measurements:  #1  2 cm by 1.3 cm  by   1 cm        Wound Depth: fascial, muscle and tendon. Drainage:    moderate Type:serosanguinous  Wound Bed status:   Granulation Tissue: 30%   Necrotic 70%  Type: scar, fibrin, slough, eschar  Epithelialization 0%   Hypergranular 0%   Periwound hyperkeratosis:  absent  Erythema absent    Odor:no  Maceration:no  Undermining/tract/tunneling:no  Culture taken:   no             Wound #:   2    diabetic foot ulcer and dehisced surgical wound   right foot    Wound measurements:  #1  3 cm by 0.8 cm  by   4 mm        Wound Depth: fascial and muscle.     Drainage:    moderate Type:serosanguinous  Wound Bed status:   Granulation Tissue: 70%   Necrotic 30%  Type: slough  Epithelialization 0%   Hypergranular 0%   Periwound hyperkeratosis:  absent  Erythema absent    Odor:no  Maceration:no  Undermining/tract/tunneling:no  Culture taken:   no           Wound #:   3    diabetic foot ulcer   left foot    Wound measurements:  #1  2 cm by 1.5 cm  by   1 mm        Wound Depth: partial thickness. Drainage:    minimal Type:serosanguinous  Wound Bed status:   Granulation Tissue: 0%   Necrotic 50%  Type: slough  Epithelialization 50%   Hypergranular 0%   Periwound hyperkeratosis:  absent  Erythema absent    Odor:no  Maceration:no  Undermining/tract/tunneling:no  Culture taken:   no                    Assesment : Post operative progress stable. Diagnosis Orders   1. Status post transmetatarsal amputation of right foot ([facility]     2. Type 2 diabetes mellitus with foot ulcer, without long-term current use of insulin ([facility])  TN DEBRIDEMENT, SKIN, SUB-Q TISSUE,MUSCLE,=<20 SQ CM   3. Dehiscence of amputation stump ([facility]     4. Type 2 diabetes mellitus with diabetic polyneuropathy, without long-term current use of insulin ([facility]     5. Lower limb ulcer, ankle, right, with necrosis of muscle (HCC)  TN DEBRIDEMENT, SKIN, SUB-Q TISSUE,MUSCLE,=<20 SQ CM   6. Non compliance with medical treatment     7. Ulcerated, foot, right, with necrosis of muscle (HCC)  TN DEBRIDEMENT, SKIN, SUB-Q TISSUE,MUSCLE,=<20 SQ CM   8. Ulcerated, foot, left, limited to breakdown of skin [facility])           Plan: Procedure Note  Indications:  Based on my examination of this patient's wound(s)/ulcer(s) today, debridement is required to promote healing and evaluate the wound base.     Performed by: Fracisco Joseph DPM    Consent obtained:  Yes    Time out taken:  Yes    Pain Control:         Wound Location: right  Pre Debridement Measurements:  Are located in the Wound/Ulcer Documentation Flow Sheet    Wound/Ulcer #: 1, 2, ulcerations right foot    Procedure in Detail: Lidocaine gel soaked gauze was applied at beginning of wound evaluation. The wound(s) was excisionally debrided sharply of fibrotic, necrotic, and hyperkeratotic tissue, including a layer of surrounding healthy tissue using curette, scissors and forceps. The wound was debrided down through and including fascial, muscle and tendon. Percent of Wound Debrided: 100%    Total Surface Area Debrided:   sq cm     Bleeding: Minimal      Plan for wound  Dress per physician order  Treatment: silvercel to ulcerations both feet, silvercel to incision line every other day  DSD    Dressing Documentation    This patient needs a dressings to aid in healing of the ulceration. I am prescribing silvercel, as the primary dressing  Sequence of dressing: silvercel, 4x4 gauze, kerlix roll, secure with tape  Frequency of change: every other day  Duration of supplies: 30 days    1) The ulceration has been surgically debrided on 03/30/22   2) The ulceration is located on the right ankle  3) The ulceration was assessed on 03/30/22   4) Type of ulceration:  diabetic  5) See above for size of ulceration(s)  6) Amount of drainage: moderate    No weight       1. Discussed appropriate home care of this wound. 2. Dressings:   Orders Placed This Encounter   Procedures    AK DEBRIDEMENT, SKIN, SUB-Q TISSUE,MUSCLE,=<20 SQ CM      3. Follow up: 1 week. 4. Detailed home instructions and education material given to patient prior to discharge. Sutures in place. No orders of the defined types were placed in this encounter. Follow up 1week(s).

## 2022-03-31 NOTE — DISCHARGE SUMMARY
Discharge Summary  Podiatric Medicine and Surgery    Patient Identification     Aden Humphries is a 61 y.o. male  :  1965  MRN: [id number]  Acct: [de-identified]     Admit date: 2022  Discharge date and time: 3/1/2022 10:15 PM     Admitting Physician: Natalie Monday, DPM   Discharge Physician: Cary Ponce DPM     Admission Diagnoses: Wound dehiscence [T81.30XA]  Postoperative wound dehiscence, initial encounter [T81.31XA]  Discharge Diagnoses:   Patient Active Problem List   Diagnosis    Anxiety disorder    Benign prostatic hyperplasia    Depression    Edema    Esophageal reflux    Essential hypertension    Hypothyroidism    Right cervical radiculopathy    Osteomyelitis ([address]    Osteomyelitis of foot, left, acute (HCC)    Non compliance with medical treatment    Cellulitis    Acute renal failure (ARF) (HCC)    Ulcerated, foot, left, with necrosis of bone (HCC)    Type 2 diabetes mellitus ([address]    Anemia    Hematemesis with nausea    Erosive gastritis    Ulcerated, foot, left, with fat layer exposed ([address]    Renal failure    Hyperkalemia    Hypocalcemia    Metabolic acidosis    Diabetic ulcer of toe of left foot associated with type 2 diabetes mellitus (HCC)    Hyperphosphatemia    Chronic, continuous use of opioids    Acute metabolic encephalopathy    Diabetic foot infection ([address]    H/O amputation of lesser toe, right (HCC)    Seizure-like activity (HCC)    PAD (peripheral artery disease) ([address]    Diabetic infection of right foot ([address]    Osteomyelitis of foot, right, acute ([address]    Cellulitis of foot, right    Acquired posterior equinus, right    Wound dehiscence    Wound dehiscence, surgical       Admission Condition: fair  Discharged Condition: fair    Hospital Course     Brief Inpatient Course: Admitted on 2022  2:12 PM for Wound dehiscence [T81.30XA]  Postoperative wound dehiscence, initial encounter Brian Isaac.   Patient underwent revision of transmetatarsal amputation of right foot on 2/25/2022. He noticed purulent drainage from previous incision site and therefore was admitted for IV antibiotic course and to revise the amputation site. Consults: ID, internal medicine    Patient Instructions     Medications:      Medication List      CONTINUE taking these medications    * Kerlix Gauze Roll Large Misc  1 Units by Does not apply route 2 times daily     * Combine ABD 5\"X9\" Pads  1 Units by Does not apply route 2 times daily     OneTouch Delica Plus [id number] Misc  TEST TWICE A DAY         * This list has 2 medication(s) that are the same as other medications prescribed for you. Read the directions carefully, and ask your doctor or other care provider to review them with you. ASK your doctor about these medications    Adaptic Non-Adhering Dressing Pads  Apply 1 Units topically 2 times daily     ferrous sulfate 325 (65 Fe) MG EC tablet  Commonly known as: FE TABS 325  Take 1 tablet by mouth daily (with breakfast)     fluocinonide 0.05 % ointment  Commonly known as: LIDEX  Apply 1 gm  topically 2 times daily to legs     lactobacillus capsule  Take 1 capsule by mouth 2 times daily (with meals)     levothyroxine 50 MCG tablet  Commonly known as: SYNTHROID  Take 1 tablet by mouth daily     metFORMIN 500 MG tablet  Commonly known as: GLUCOPHAGE  Take 0.5 tablets by mouth daily (with breakfast)     Narcan 4 MG/0.1ML Liqd nasal spray  Generic drug: naloxone     OneTouch Ultra strip  Generic drug: blood glucose test strips  TEST TWO TIMES A DAY AS NEEDED     pantoprazole 40 MG tablet  Commonly known as: PROTONIX  Take 1 tablet by mouth every morning (before breakfast)     PARoxetine 20 MG tablet  Commonly known as: PAXIL  Take 4 tablets by mouth every morning     povidone-iodine 7.5 % external solution  Commonly known as: BETADINE  Apply to wound. Apply as a wet to dry dressing     sodium hypochlorite 0.125 % Soln external solution  Commonly known as: DAKINS  APPLY TOPICALLY EVERY 12 HOURS. APPLY AS A WET TO DRY DRESSING TWO TIMES A DAY, CAN ALSO USE TO CLEANSE WOUND            Activity:  Non weight bearing to right lower extremity. Diet: The patient is asked to make an attempt to improve diet and exercise patterns to aid in medical management of this problem. Disposition: Patient left AMA on 3/1/22. Wound Care: keep wound clean and dry    Follow-up: No follow-ups on file.       Nenita Wong DPM  Podiatric Medicine & Surgery

## 2022-04-04 ENCOUNTER — OFFICE VISIT (OUTPATIENT)
Dept: PRIMARY CARE CLINIC | Age: 57
End: 2022-04-04
Payer: MEDICARE

## 2022-04-04 VITALS
HEIGHT: 74 IN | SYSTOLIC BLOOD PRESSURE: 126 MMHG | HEART RATE: 97 BPM | OXYGEN SATURATION: 98 % | BODY MASS INDEX: 29.46 KG/M2 | WEIGHT: 229.6 LBS | DIASTOLIC BLOOD PRESSURE: 84 MMHG

## 2022-04-04 DIAGNOSIS — I73.9 PAD (PERIPHERAL ARTERY DISEASE) (HCC): ICD-10-CM

## 2022-04-04 DIAGNOSIS — L97.524 DIABETIC ULCER OF TOE OF LEFT FOOT ASSOCIATED WITH TYPE 2 DIABETES MELLITUS, WITH NECROSIS OF BONE (HCC): ICD-10-CM

## 2022-04-04 DIAGNOSIS — F11.90 CHRONIC, CONTINUOUS USE OF OPIOIDS: ICD-10-CM

## 2022-04-04 DIAGNOSIS — E11.621 DIABETIC ULCER OF TOE OF LEFT FOOT ASSOCIATED WITH TYPE 2 DIABETES MELLITUS, WITH NECROSIS OF BONE (HCC): ICD-10-CM

## 2022-04-04 DIAGNOSIS — Z00.00 ENCOUNTER FOR WELL ADULT EXAM WITHOUT ABNORMAL FINDINGS: Primary | ICD-10-CM

## 2022-04-04 PROCEDURE — 99396 PREV VISIT EST AGE 40-64: CPT | Performed by: FAMILY MEDICINE

## 2022-04-04 ASSESSMENT — ENCOUNTER SYMPTOMS
SHORTNESS OF BREATH: 0
WHEEZING: 0
ABDOMINAL PAIN: 0
COUGH: 0
VOMITING: 0
EYE REDNESS: 0
RHINORRHEA: 0
NAUSEA: 0
SORE THROAT: 0
DIARRHEA: 0
EYE DISCHARGE: 0
BACK PAIN: 1

## 2022-04-04 ASSESSMENT — PATIENT HEALTH QUESTIONNAIRE - PHQ9
SUM OF ALL RESPONSES TO PHQ QUESTIONS 1-9: 2
SUM OF ALL RESPONSES TO PHQ QUESTIONS 1-9: 2
1. LITTLE INTEREST OR PLEASURE IN DOING THINGS: 1
SUM OF ALL RESPONSES TO PHQ QUESTIONS 1-9: 2
2. FEELING DOWN, DEPRESSED OR HOPELESS: 1
SUM OF ALL RESPONSES TO PHQ9 QUESTIONS 1 & 2: 2
SUM OF ALL RESPONSES TO PHQ QUESTIONS 1-9: 2

## 2022-04-04 NOTE — PROGRESS NOTES
Well Adult Note  Name: Tommy Fraser Date: 2022   MRN: 1 Sex: Male   Age: 61 y.o. Ethnicity: Non- / Non    : 1965 Race: White (non-)      Steffanie Smith is here for well adult exam.  History:  Pt here for annual exam.  Had metatarsal amputation of both feet. Has open sores on feet still. Has open ulcer on right anterior ankle. States feet swollen. Pt states blood sugars doing well. Still on oxycodone and morphine. Pt states was given oxycodone every 4 hours. Pt states most of the pain in the feet. Pt states had four back surgery. Having back pain as well. Patient requesting an increase in his pain medication. States with swollen feet and wounds has been having more discomfort      Review of Systems   Constitutional: Negative for chills and fever. HENT: Negative for rhinorrhea and sore throat. Eyes: Negative for discharge and redness. Respiratory: Negative for cough, shortness of breath and wheezing. Cardiovascular: Negative for chest pain and palpitations. Gastrointestinal: Negative for abdominal pain, diarrhea, nausea and vomiting. Genitourinary: Negative for dysuria and frequency. Musculoskeletal: Positive for arthralgias and back pain. Negative for myalgias. Neurological: Negative for dizziness, light-headedness and headaches. Psychiatric/Behavioral: Negative for sleep disturbance. Allergies   Allergen Reactions    Bactrim [Sulfamethoxazole-Trimethoprim]      reported renal failure    Fiorinal [Butalbital-Aspirin-Caffeine] Other (See Comments)     Generalized blisters    Statins Other (See Comments)     Muscle aches    Tylenol [Acetaminophen]      Pt states it shuts down his kidneys    Peanut Butter Flavor [Flavoring Agent] Nausea And Vomiting         Prior to Visit Medications    Medication Sig Taking?  Authorizing Provider   diazePAM (VALIUM) 5 MG tablet Take 1 tablet by mouth every 8 hours as needed for Anxiety for up to 30 days. Yes Candi Lopez MD   oxyCODONE (OXY-IR) 15 MG immediate release tablet Take 1 tablet by mouth every 6 hours as needed for Pain for up to 30 days. Yes Candi Lopez MD   morphine (MS CONTIN) 60 MG extended release tablet Take 1 tablet by mouth 2 times daily for 30 days. Yes Candi Lopez MD   PARoxetine (PAXIL) 20 MG tablet TAKE FOUR TABLETS BY MOUTH EVERY MORNING Yes Candi Lopez MD   ferrous sulfate (FE TABS 325) 325 (65 Fe) MG EC tablet Take 1 tablet by mouth daily (with breakfast) Yes [facility], DPGA[facility]ENCIO   levothyroxine (SYNTHROID) 50 MCG tablet Take 1 tablet by mouth daily Yes Candi Lopez MD   fluocinonide (LIDEX) 0.05 % ointment Apply 1 gm  topically 2 times daily to legs Yes Yesi Traylor DPGAUDENCIO   ONETOUCH ULTRA strip TEST TWO TIMES A DAY AS NEEDED Yes Candi Lopez MD   Lancets ([address]) Rj Willett MD   sodium hypochlorite (DAKINS) 0.125 % SOLN external solution APPLY TOPICALLY EVERY 12 HOURS. APPLY AS A WET TO DRY DRESSING TWO TIMES A DAY, CAN ALSO USE TO CLEANSE WOUND Yes Yesi Traylor DPGAUDENCIO   metFORMIN (GLUCOPHAGE) 500 MG tablet Take 0.5 tablets by mouth daily (with breakfast) Yes Candi Lopez MD   [facility] 4 MG/0.1ML LIQD nasal spray 4 mg by Nasal route as needed for Opioid Reversal  Yes Shaan Hayward MD   lactobacillus (CULTURELLE) capsule Take 1 capsule by mouth 2 times daily (with meals) Yes Candi Lopez MD   pantoprazole (PROTONIX) 40 MG tablet Take 1 tablet by mouth every morning (before breakfast) Yes Candi Lopez MD   povidone-iodine (BETADINE) 7.5 % external solution Apply to wound. Apply as a wet to dry dressing Yes Yesi Traylor, DPM   Wound Dressings (ADAPTIC NON-ADHERING DRESSING) PADS Apply 1 Units topically 2 times daily Yes Candi Lopez MD   Gauze Pads & Dressings (COMBINE ABD) 5\"X9\" PADS 1 Units by Does not apply route 2 times daily Yes Ramakrishna Powell MD   Gauze Pads & Dressings ([facility]) [address] 1 Units by Does not apply route 2 times daily  Tish Rolle MD         Past Medical History:   Diagnosis Date    Acute metabolic encephalopathy     CLAUDIA (acute kidney injury) ([address]     Anemia     Anxiety     [facility] failure) [facility]     Arthritis     lower back, knees    BPH (benign prostatic hyperplasia)     Chronic kidney disease     prostate    Depression     Diabetes mellitus ([address]     Diabetic neuropathy (HCC)     GERD (gastroesophageal reflux disease)     Hyperglycemia     Hyperkalemia     Hypertension     Hypothyroidism     Impacted tooth     Necrosis of bone of foot ([address]     Osteomyelitis of left foot ([address]     PAD (peripheral artery disease) ([facility])     Pneumonia     Septic shock ([facility])     Type 2 diabetes mellitus ([address]        Past Surgical History:   Procedure Laterality Date    AMPUTATION Right 01/21/2022    TRANSMETARSAL AMPUTATION FOOT, TENDOACHILLES LENGTHENING (Right Foot)    APPENDECTOMY      BACK SURGERY      Lower x 4. Had abscess after appendectomy.      FOOT AMPUTATION Left 1/4/2020    TRANSMETATARSAL FOOT AMPUTATION, REMOVAL OF FOREIGN BODY performed by Zulay Natarajan DPM at [address] [place] [address] Right 2/25/2022    REVISION TRANSMETATARSAL AMPUTATION performed by Zulay Natarajan DPM at [facility] Left 01/04/2020    TRANSMETATARSAL FOOT AMPUTATION, REMOVAL OF FOREIGN BODY    KNEE SURGERY Right     scope    TOE AMPUTATION Bilateral 3/6/2017    TOE AMPUTATION LEFT HALLUX AND 2ND DIGIT AND RIGHT 2ND DIGIT performed by Duran Guzman DPM at [facility] Right 1/21/2022    TRANSMETARSAL AMPUTATION FOOT, TENDOACHILLES LENGTHENING performed by Zulay Natarajan DPM at [address] [place] [address] N/A 5/20/2019    EGD WITH BIOPSY performed by Radha Jj MD at [address] [place] [address] History   Problem Relation Age of Onset    Diabetes Father     Cancer Maternal Grandfather         Colon Cancer; Prostate Cancer    No Known Problems Mother        Social History     Tobacco Use    Smoking status: Never Smoker    Smokeless tobacco: Never Used   Vaping Use    Vaping Use: Never used   Substance Use Topics    Alcohol use: No    Drug use: Not Currently     Frequency: 2.0 times per week     Types: Marijuana (Weed)       Objective   /84   Pulse 97   Ht 6' 2.04\" (1.881 m)   Wt 229 lb 9.6 oz (104.1 kg)   SpO2 98%   BMI 29.45 kg/m²   Wt Readings from Last 3 Encounters:   04/04/22 229 lb 9.6 oz (104.1 kg)   03/30/22 230 lb (104.3 kg)   03/10/22 230 lb (104.3 kg)     There were no vitals filed for this visit. Physical Exam  Vitals and nursing note reviewed. Constitutional:       General: He is not in acute distress. Appearance: He is well-developed. He is not ill-appearing. HENT:      Head: Normocephalic and atraumatic. Right Ear: External ear normal.      Left Ear: External ear normal.   Eyes:      General: No scleral icterus. Right eye: No discharge. Left eye: No discharge. Conjunctiva/sclera: Conjunctivae normal.      Pupils: Pupils are equal, round, and reactive to light. Neck:      Thyroid: No thyromegaly. Trachea: No tracheal deviation. Cardiovascular:      Rate and Rhythm: Normal rate and regular rhythm. Heart sounds: Normal heart sounds. Pulmonary:      Effort: Pulmonary effort is normal. No respiratory distress. Breath sounds: Normal breath sounds. No wheezing. Lymphadenopathy:      Cervical: No cervical adenopathy. Skin:     General: Skin is warm. Findings: No rash. Neurological:      Mental Status: He is alert and oriented to person, place, and time. Psychiatric:         Mood and Affect: Mood normal.         Behavior: Behavior normal.         Thought Content: Thought content normal.           Assessment   Plan   1.  Encounter for well adult exam without abnormal findings  2. PAD (peripheral artery disease) ([address]  3. Chronic, continuous use of opioids  4. Diabetic ulcer of toe of left foot associated with type 2 diabetes mellitus, with necrosis of bone [facility])         Personalized Preventive Plan   Current Health Maintenance Status  Immunization History   Administered Date(s) Administered    Influenza, Quadv, IM, PF (6 mo and older Fluzone, Flulaval, Fluarix, and 3 yrs and older Afluria) 10/04/2016, 09/19/2017, 09/12/2018, 12/24/2019, 01/26/2021    Pneumococcal Conjugate 13-valent ([id number]) 10/04/2016    Pneumococcal Polysaccharide ([id number]) 12/13/2017    Tdap (Boostrix, Adacel) 04/24/2018        Health Maintenance   Topic Date Due    COVID-19 Vaccine (1) Never done    Depression Monitoring  Never done    HIV screen  Never done    Diabetic retinal exam  Never done    Hepatitis B vaccine (1 of 3 - Risk 3-dose series) Never done    Shingles Vaccine (1 of 2) Never done    Colorectal Cancer Screen  12/16/2020    Flu vaccine (Season Ended) 09/01/2022    Diabetic microalbuminuria test  09/03/2022    Lipid screen  11/17/2022    A1C test (Diabetic or Prediabetic)  02/26/2023    TSH testing  02/26/2023    Potassium monitoring  02/28/2023    Creatinine monitoring  03/01/2023    DTaP/Tdap/Td vaccine (2 - Td or Tdap) 04/24/2028    Pneumococcal 0-64 years Vaccine (2 of 2 - PPSV23) 06/04/2029    Hepatitis C screen  Completed    Hepatitis A vaccine  Aged Out    Hib vaccine  Aged Out    Meningococcal (ACWY) vaccine  Aged Out     Recommendations for [email or web address] Due: see orders and patient instructions/AVS.    Return in about 3 months (around 7/4/2022) for DIABETES. Patient advised with government oversight currently not comfortable prescribing his pain medication at its present dose.   Needs referral and opinion from pain management to see if it is still appropriate  Patient is reluctant but agrees  Continue to follow-up with podiatry regarding the feet. A1c was reviewed  We will arrange for colonoscopy later in the year once his feet have resolved  Patient is to call within the month with the name of what pain management physician he wants to see.

## 2022-04-14 DIAGNOSIS — L97.524 ULCERATED, FOOT, LEFT, WITH NECROSIS OF BONE (HCC): ICD-10-CM

## 2022-04-14 DIAGNOSIS — M86.172 OSTEOMYELITIS OF FOOT, LEFT, ACUTE (HCC): ICD-10-CM

## 2022-04-14 DIAGNOSIS — M86.9 OSTEOMYELITIS, UNSPECIFIED SITE, UNSPECIFIED TYPE (HCC): ICD-10-CM

## 2022-04-14 DIAGNOSIS — M54.12 RIGHT CERVICAL RADICULOPATHY: ICD-10-CM

## 2022-04-14 RX ORDER — MORPHINE SULFATE 60 MG/1
60 TABLET, FILM COATED, EXTENDED RELEASE ORAL 2 TIMES DAILY
Qty: 60 TABLET | Refills: 0 | Status: SHIPPED | OUTPATIENT
Start: 2022-04-14 | End: 2022-05-12 | Stop reason: SDUPTHER

## 2022-04-14 RX ORDER — OXYCODONE HYDROCHLORIDE 15 MG/1
15 TABLET ORAL EVERY 6 HOURS PRN
Qty: 120 TABLET | Refills: 0 | Status: SHIPPED | OUTPATIENT
Start: 2022-04-14 | End: 2022-05-12 | Stop reason: SDUPTHER

## 2022-04-14 NOTE — TELEPHONE ENCOUNTER
PATIENT STATES THAT YOU TOLD HIM AT HIS VISIT YOU WOULD PUT ON THE SCRIPTS OK TO FILL ON 04/16 DUE TO THE HOLIDAY.  NOTED ON SCRIPT IF NOT OKAY WILL NEED TO CHANGE

## 2022-04-19 ENCOUNTER — OFFICE VISIT (OUTPATIENT)
Dept: PODIATRY | Age: 57
End: 2022-04-19
Payer: MEDICARE

## 2022-04-19 VITALS — BODY MASS INDEX: 29.39 KG/M2 | HEIGHT: 74 IN | WEIGHT: 229 LBS

## 2022-04-19 DIAGNOSIS — Z89.432 STATUS POST TRANSMETATARSAL AMPUTATION OF FOOT, LEFT (HCC): ICD-10-CM

## 2022-04-19 DIAGNOSIS — Z89.431 STATUS POST TRANSMETATARSAL AMPUTATION OF RIGHT FOOT (HCC): Primary | ICD-10-CM

## 2022-04-19 DIAGNOSIS — L97.313 LOWER LIMB ULCER, ANKLE, RIGHT, WITH NECROSIS OF MUSCLE (HCC): ICD-10-CM

## 2022-04-19 DIAGNOSIS — T87.81 DEHISCENCE OF AMPUTATION STUMP (HCC): ICD-10-CM

## 2022-04-19 DIAGNOSIS — L97.513 ULCERATED, FOOT, RIGHT, WITH NECROSIS OF MUSCLE (HCC): ICD-10-CM

## 2022-04-19 DIAGNOSIS — Z91.199 NON COMPLIANCE WITH MEDICAL TREATMENT: ICD-10-CM

## 2022-04-19 DIAGNOSIS — L97.521 ULCERATED, FOOT, LEFT, LIMITED TO BREAKDOWN OF SKIN (HCC): ICD-10-CM

## 2022-04-19 DIAGNOSIS — E11.621 TYPE 2 DIABETES MELLITUS WITH FOOT ULCER, WITHOUT LONG-TERM CURRENT USE OF INSULIN (HCC): ICD-10-CM

## 2022-04-19 DIAGNOSIS — L97.509 TYPE 2 DIABETES MELLITUS WITH FOOT ULCER, WITHOUT LONG-TERM CURRENT USE OF INSULIN (HCC): ICD-10-CM

## 2022-04-19 DIAGNOSIS — E11.42 TYPE 2 DIABETES MELLITUS WITH DIABETIC POLYNEUROPATHY, WITHOUT LONG-TERM CURRENT USE OF INSULIN (HCC): ICD-10-CM

## 2022-04-19 PROCEDURE — 3044F HG A1C LEVEL LT 7.0%: CPT | Performed by: PODIATRIST

## 2022-04-19 PROCEDURE — G8417 CALC BMI ABV UP PARAM F/U: HCPCS | Performed by: PODIATRIST

## 2022-04-19 PROCEDURE — G8427 DOCREV CUR MEDS BY ELIG CLIN: HCPCS | Performed by: PODIATRIST

## 2022-04-19 PROCEDURE — 2022F DILAT RTA XM EVC RTNOPTHY: CPT | Performed by: PODIATRIST

## 2022-04-19 PROCEDURE — 1036F TOBACCO NON-USER: CPT | Performed by: PODIATRIST

## 2022-04-19 PROCEDURE — 3017F COLORECTAL CA SCREEN DOC REV: CPT | Performed by: PODIATRIST

## 2022-04-19 PROCEDURE — 11042 DBRDMT SUBQ TIS 1ST 20SQCM/<: CPT | Performed by: PODIATRIST

## 2022-04-19 PROCEDURE — 99213 OFFICE O/P EST LOW 20 MIN: CPT | Performed by: PODIATRIST

## 2022-04-19 NOTE — PROGRESS NOTES
[address]  Post Op note  Chief Complaint   Patient presents with    Wound Check     b/l feet    Diabetes            Joelle Young is a 61y.o. year old male who is post op from foot surgery. Type: revision of TMA right, debridement of ankle ulceration. Vital signs are stable. Pain level is 3. Patient denies N/V/F/C. Patient removed all of his sutures himself. Has a new open area left foot. Has not gotten dressings, did not call. His insurance coverage changed. Review of Systems    Vascular: DP and PT pulses palpable 2/4, Right Foot and 2/4 on the Left Foot. CFT <Na seconds, Right Foot and <NA seconds on the Left Foot. Edema is minimal ,  Right Foot and absenton the Left Foot. Neurological:   Sensation absent  to light touch to level of digits, both feet. Musculoskeletal:   Muscle strength is 4/5 on the Right Foot and 4/5 on the Left Foot. Structural deformities are present on the Right Foot and present on the Left Foot. Integument:  Warm, dry, supple both feet. Incisions are healing well. Wound dehiscence is absent. Infection is absent.      Wound #:   1    diabetic foot ulcer   right leg anterior ankle    Wound measurements:  #1  1.3 cm by 1.3 cm  by   0.5 cm        Wound Depth: subcutaneous tissue    Drainage:    moderate Type:serosanguinous  Wound Bed status:   Granulation Tissue: 60%   Necrotic 40%  Type: fibrin, slough  Epithelialization 0%   Hypergranular 0%   Periwound hyperkeratosis:  absent  Erythema absent    Odor:no  Maceration:no  Undermining/tract/tunneling:no  Culture taken:   no             Wound #:   2    diabetic foot ulcer and dehisced surgical wound   right foot    Wound measurements:  #1  1 cm by 0.3 cm  by   2 mm        Wound Depth: subcutaneous tissue    Drainage:    moderate Type:serosanguinous  Wound Bed status:   Granulation Tissue: 70%   Necrotic 30%  Type: slough  Epithelialization 0%   Hypergranular 0%   Periwound hyperkeratosis:  absent  Erythema absent    Odor:no  Maceration:no  Undermining/tract/tunneling:no  Culture taken:   no           Wound #:   3    diabetic foot ulcer   left foot    Wound measurements:  #1  2 cm by 1.5 cm  by   1 mm        Wound Depth: partial thickness. Drainage:    minimal Type:serosanguinous  Wound Bed status:   Granulation Tissue: 0%   Necrotic 50%  Type: slough  Epithelialization 50%   Hypergranular 0%   Periwound hyperkeratosis:  absent  Erythema absent    Odor:no  Maceration:no  Undermining/tract/tunneling:no  Culture taken:   no                                Assesment : Post operative progress stable. Diagnosis Orders   1. Status post transmetatarsal amputation of right foot ([facility]     2. Type 2 diabetes mellitus with foot ulcer, without long-term current use of insulin ([facility]     3. Dehiscence of amputation stump ([facility]     4. Type 2 diabetes mellitus with diabetic polyneuropathy, without long-term current use of insulin (HCC)  [place] DEBRIDEMENT, SKIN, SUB-Q TISSUE,=<20 SQ CM   5. Lower limb ulcer, ankle, right, with necrosis of muscle (HCC)  [place] DEBRIDEMENT, SKIN, SUB-Q TISSUE,=<20 SQ CM   6. Non compliance with medical treatment     7. Ulcerated, foot, right, with necrosis of muscle (HCC)  [place] DEBRIDEMENT, SKIN, SUB-Q TISSUE,=<20 SQ CM   8. Ulcerated, foot, left, limited to breakdown of skin (HCC)  [place] DEBRIDEMENT, SKIN, SUB-Q TISSUE,=<20 SQ CM   9. Status post transmetatarsal amputation of foot, left (HCC)           Plan: Procedure Note  Indications:  Based on my examination of this patient's wound(s)/ulcer(s) today, debridement is required to promote healing and evaluate the wound base.     Performed by: Raul Rogers DPM    Consent obtained:  Yes    Time out taken:  Yes    Pain Control:         Wound Location: right  Pre Debridement Measurements:  Are located in the Wound/Ulcer Documentation Flow Sheet    Wound/Ulcer #: 1, 2, ulcerations right foot    Procedure in Detail: Lidocaine gel soaked gauze was applied at beginning of wound evaluation. The wound(s) was excisionally debrided sharply of fibrotic, necrotic, and hyperkeratotic tissue, including a layer of surrounding healthy tissue using curette, scissors and forceps. The wound was debrided down through and including subcutaneous tissue. Percent of Wound Debrided: 100%    Total Surface Area Debrided:   sq cm     Bleeding: Minimal      Plan for wound  Dress per physician order  Treatment: silvercel to ulcerations both feet, silvercel to incision line every other day  DSD    Dressing Documentation    This patient needs a dressings to aid in healing of the ulceration. I am prescribing silvercel, as the primary dressing  Sequence of dressing: silvercel, 4x4 gauze, kerlix roll, secure with tape  Frequency of change: every other day  Duration of supplies: 30 days    1) The ulceration has been surgically debrided on 04/19/22   2) The ulceration is located on the right ankle  3) The ulceration was assessed on 04/19/22   4) Type of ulceration:  diabetic  5) See above for size of ulceration(s)  6) Amount of drainage: moderate    No weight       1. Discussed appropriate home care of this wound. 2. Dressings:   Orders Placed This Encounter   Procedures    [place] DEBRIDEMENT, SKIN, SUB-Q TISSUE,=<20 SQ CM      3. Follow up: 2 week. 4. Detailed home instructions and education material given to patient prior to discharge. Sutures in place. No orders of the defined types were placed in this encounter. Follow up 2 week(s).

## 2022-05-02 ENCOUNTER — TELEPHONE (OUTPATIENT)
Dept: PRIMARY CARE CLINIC | Age: 57
End: 2022-05-02

## 2022-05-02 DIAGNOSIS — F34.1 DYSTHYMIA: ICD-10-CM

## 2022-05-02 RX ORDER — DIAZEPAM 5 MG/1
5 TABLET ORAL EVERY 8 HOURS PRN
Qty: 90 TABLET | Refills: 0 | Status: SHIPPED | OUTPATIENT
Start: 2022-05-02 | End: 2022-06-01

## 2022-05-02 RX ORDER — DIAZEPAM 5 MG/1
TABLET ORAL
Qty: 90 TABLET | OUTPATIENT
Start: 2022-05-02

## 2022-05-02 NOTE — TELEPHONE ENCOUNTER
----- Message from [address] [facility] [address] sent at 5/2/2022  1:05 PM EDT -----  Subject: Refill Request    QUESTIONS  Name of Medication? diazePAM (VALIUM) 5 MG tablet  Patient-reported dosage and instructions? 5mg  How many days do you have left? 0  Preferred Pharmacy? [facility] [id number]  Pharmacy phone number (if available)? [phone]  ---------------------------------------------------------------------------  --------------  CALL BACK INFO  What is the best way for the office to contact you? OK to leave message on   voicemail  Preferred Call Back Phone Number? [phone]  ---------------------------------------------------------------------------  --------------  SCRIPT ANSWERS  Relationship to Patient? Third Party  Third Party Type? Other  Other Third Party Type? spouse  Representative Name?  mani

## 2022-05-12 DIAGNOSIS — M86.172 OSTEOMYELITIS OF FOOT, LEFT, ACUTE (HCC): ICD-10-CM

## 2022-05-12 DIAGNOSIS — M54.12 RIGHT CERVICAL RADICULOPATHY: ICD-10-CM

## 2022-05-12 DIAGNOSIS — L97.524 ULCERATED, FOOT, LEFT, WITH NECROSIS OF BONE (HCC): ICD-10-CM

## 2022-05-12 DIAGNOSIS — M86.9 OSTEOMYELITIS, UNSPECIFIED SITE, UNSPECIFIED TYPE (HCC): ICD-10-CM

## 2022-05-12 RX ORDER — OXYCODONE HYDROCHLORIDE 15 MG/1
15 TABLET ORAL EVERY 6 HOURS PRN
Qty: 120 TABLET | Refills: 0 | Status: SHIPPED | OUTPATIENT
Start: 2022-05-12 | End: 2022-06-09 | Stop reason: SDUPTHER

## 2022-05-12 RX ORDER — MORPHINE SULFATE 60 MG/1
60 TABLET, FILM COATED, EXTENDED RELEASE ORAL 2 TIMES DAILY
Qty: 60 TABLET | Refills: 0 | Status: SHIPPED | OUTPATIENT
Start: 2022-05-12 | End: 2022-06-09 | Stop reason: SDUPTHER

## 2022-06-09 DIAGNOSIS — M86.9 OSTEOMYELITIS, UNSPECIFIED SITE, UNSPECIFIED TYPE (HCC): ICD-10-CM

## 2022-06-09 DIAGNOSIS — M86.172 OSTEOMYELITIS OF FOOT, LEFT, ACUTE (HCC): ICD-10-CM

## 2022-06-09 DIAGNOSIS — L97.524 ULCERATED, FOOT, LEFT, WITH NECROSIS OF BONE (HCC): ICD-10-CM

## 2022-06-09 DIAGNOSIS — M54.12 RIGHT CERVICAL RADICULOPATHY: ICD-10-CM

## 2022-06-09 RX ORDER — MORPHINE SULFATE 60 MG/1
60 TABLET, FILM COATED, EXTENDED RELEASE ORAL 2 TIMES DAILY
Qty: 60 TABLET | Refills: 0 | Status: SHIPPED | OUTPATIENT
Start: 2022-06-09 | End: 2022-07-07 | Stop reason: SDUPTHER

## 2022-06-09 RX ORDER — OXYCODONE HYDROCHLORIDE 15 MG/1
15 TABLET ORAL EVERY 6 HOURS PRN
Qty: 120 TABLET | Refills: 0 | Status: SHIPPED | OUTPATIENT
Start: 2022-06-09 | End: 2022-07-07 | Stop reason: SDUPTHER

## 2022-06-23 DIAGNOSIS — F34.1 DYSTHYMIA: ICD-10-CM

## 2022-06-23 DIAGNOSIS — L97.509 TYPE 2 DIABETES MELLITUS WITH FOOT ULCER, WITH LONG-TERM CURRENT USE OF INSULIN (HCC): ICD-10-CM

## 2022-06-23 DIAGNOSIS — E11.621 TYPE 2 DIABETES MELLITUS WITH FOOT ULCER, WITH LONG-TERM CURRENT USE OF INSULIN (HCC): ICD-10-CM

## 2022-06-23 DIAGNOSIS — Z79.4 TYPE 2 DIABETES MELLITUS WITH FOOT ULCER, WITH LONG-TERM CURRENT USE OF INSULIN (HCC): ICD-10-CM

## 2022-06-23 RX ORDER — BLOOD SUGAR DIAGNOSTIC
STRIP MISCELLANEOUS
Qty: 50 STRIP | Refills: 0 | Status: SHIPPED | OUTPATIENT
Start: 2022-06-23

## 2022-06-23 RX ORDER — DIAZEPAM 5 MG/1
5 TABLET ORAL EVERY 8 HOURS PRN
Qty: 90 TABLET | Refills: 0 | Status: SHIPPED | OUTPATIENT
Start: 2022-06-23 | End: 2022-07-25

## 2022-06-23 RX ORDER — LANCETS 33 GAUGE
EACH MISCELLANEOUS
Qty: 100 EACH | Refills: 5 | Status: SHIPPED | OUTPATIENT
Start: 2022-06-23

## 2022-07-07 DIAGNOSIS — L97.524 ULCERATED, FOOT, LEFT, WITH NECROSIS OF BONE (HCC): ICD-10-CM

## 2022-07-07 DIAGNOSIS — M86.172 OSTEOMYELITIS OF FOOT, LEFT, ACUTE (HCC): ICD-10-CM

## 2022-07-07 DIAGNOSIS — M54.12 RIGHT CERVICAL RADICULOPATHY: ICD-10-CM

## 2022-07-07 DIAGNOSIS — M86.9 OSTEOMYELITIS, UNSPECIFIED SITE, UNSPECIFIED TYPE (HCC): ICD-10-CM

## 2022-07-07 RX ORDER — MORPHINE SULFATE 60 MG/1
60 TABLET, FILM COATED, EXTENDED RELEASE ORAL 2 TIMES DAILY
Qty: 60 TABLET | Refills: 0 | Status: SHIPPED | OUTPATIENT
Start: 2022-07-07 | End: 2022-08-06

## 2022-07-07 RX ORDER — OXYCODONE HYDROCHLORIDE 15 MG/1
15 TABLET ORAL EVERY 6 HOURS PRN
Qty: 120 TABLET | Refills: 0 | Status: SHIPPED | OUTPATIENT
Start: 2022-07-07 | End: 2022-08-06

## 2022-07-07 NOTE — TELEPHONE ENCOUNTER
LAST VISIT:   4/4/2022     Future Appointments   Date Time Provider Tasha Prado   7/21/2022  3:45 PM Junior Chanel, [address]

## 2022-07-21 ENCOUNTER — OFFICE VISIT (OUTPATIENT)
Dept: PODIATRY | Age: 57
End: 2022-07-21
Payer: MEDICARE

## 2022-07-21 VITALS — WEIGHT: 220 LBS | BODY MASS INDEX: 28.23 KG/M2 | HEIGHT: 74 IN

## 2022-07-21 DIAGNOSIS — L97.522 ULCER OF LEFT FOOT, WITH FAT LAYER EXPOSED (HCC): ICD-10-CM

## 2022-07-21 DIAGNOSIS — Z89.431 STATUS POST TRANSMETATARSAL AMPUTATION OF RIGHT FOOT (HCC): ICD-10-CM

## 2022-07-21 DIAGNOSIS — Z89.432 STATUS POST TRANSMETATARSAL AMPUTATION OF FOOT, LEFT (HCC): ICD-10-CM

## 2022-07-21 DIAGNOSIS — L97.509 TYPE 2 DIABETES MELLITUS WITH FOOT ULCER, WITHOUT LONG-TERM CURRENT USE OF INSULIN (HCC): ICD-10-CM

## 2022-07-21 DIAGNOSIS — E11.42 TYPE 2 DIABETES MELLITUS WITH DIABETIC POLYNEUROPATHY, WITHOUT LONG-TERM CURRENT USE OF INSULIN (HCC): Primary | ICD-10-CM

## 2022-07-21 DIAGNOSIS — L97.513 ULCERATED, FOOT, RIGHT, WITH NECROSIS OF MUSCLE (HCC): ICD-10-CM

## 2022-07-21 DIAGNOSIS — E11.621 TYPE 2 DIABETES MELLITUS WITH FOOT ULCER, WITHOUT LONG-TERM CURRENT USE OF INSULIN (HCC): ICD-10-CM

## 2022-07-21 DIAGNOSIS — Z91.199 NON COMPLIANCE WITH MEDICAL TREATMENT: ICD-10-CM

## 2022-07-21 PROCEDURE — 99213 OFFICE O/P EST LOW 20 MIN: CPT | Performed by: PODIATRIST

## 2022-07-21 PROCEDURE — G8417 CALC BMI ABV UP PARAM F/U: HCPCS | Performed by: PODIATRIST

## 2022-07-21 PROCEDURE — 1036F TOBACCO NON-USER: CPT | Performed by: PODIATRIST

## 2022-07-21 PROCEDURE — 11042 DBRDMT SUBQ TIS 1ST 20SQCM/<: CPT | Performed by: PODIATRIST

## 2022-07-21 PROCEDURE — 3017F COLORECTAL CA SCREEN DOC REV: CPT | Performed by: PODIATRIST

## 2022-07-21 PROCEDURE — G8427 DOCREV CUR MEDS BY ELIG CLIN: HCPCS | Performed by: PODIATRIST

## 2022-07-21 PROCEDURE — 3044F HG A1C LEVEL LT 7.0%: CPT | Performed by: PODIATRIST

## 2022-07-21 PROCEDURE — 2022F DILAT RTA XM EVC RTNOPTHY: CPT | Performed by: PODIATRIST

## 2022-07-21 NOTE — PROGRESS NOTES
[address]  Post Op note  Chief Complaint   Patient presents with    Wound Check     B/l feet     Diabetes     Blood sugar 105         Gayla Gambino is a 61y.o. year old male who is here for follow up ulcerations bilateral feet, S/P TMA right and left. I have not seen him in 3 months. . Vital signs are stable. Using Silvercel daily, maybe. Pain level is 3. Patient denies N/V/F/C. Review of Systems    Vascular: DP and PT pulses palpable 2/4, Right Foot and 2/4 on the Left Foot. CFT <Na seconds, Right Foot and <NA seconds on the Left Foot. Edema is minimal ,  Right Foot and absenton the Left Foot. Neurological:   Sensation absent  to light touch to level of digits, both feet. Musculoskeletal:   Muscle strength is 4/5 on the Right Foot and 4/5 on the Left Foot. Structural deformities are present on the Right Foot and present on the Left Foot. Integument:  Warm, dry, supple both feet. Incisions are healing well. Wound dehiscence is absent. Infection is absent.      Wound #:   1    diabetic foot ulcer   right leg anterior ankle    Wound measurements:  #1  Healed      Wound #:   2    Diabetic foot ulcer  right foot    Wound measurements:  #1  2.5 cm by 1.5 cm  by   2 mm        Wound Depth: subcutaneous tissue    Drainage:    moderate Type:serosanguinous  Wound Bed status:   Granulation Tissue: 70%   Necrotic 30%  Type: slough  Epithelialization 0%   Hypergranular 0%   Periwound hyperkeratosis:  absent  Erythema absent    Odor:no  Maceration:no  Undermining/tract/tunneling:no  Culture taken:   no           Wound #:   3    diabetic foot ulcer   left foot    Wound measurements:  #1  4.5 cm by 3.5 cm  by   2 mm        Wound Depth: subcutaneous tissue    Drainage:    minimal Type:serosanguinous  Wound Bed status:   Granulation Tissue: 60%   Necrotic 40%  Type: slough  Epithelialization 0%   Hypergranular 0%   Periwound hyperkeratosis:  absent  Erythema absent Odor:no  Maceration:no  Undermining/tract/tunneling:no  Culture taken:   no                      Assesment : Post operative progress stable. Diagnosis Orders   1. Type 2 diabetes mellitus with diabetic polyneuropathy, without long-term current use of insulin ([facility])  Amb External Referral For Orthotics      2. Type 2 diabetes mellitus with foot ulcer, without long-term current use of insulin ([facility])  Amb External Referral For Orthotics    [place] DEBRIDEMENT, SKIN, SUB-Q TISSUE,=<20 SQ CM      3. Status post transmetatarsal amputation of right foot ([address]  Amb External Referral For Orthotics      4. Status post transmetatarsal amputation of foot, left ([facility])  Amb External Referral For Orthotics      5. Non compliance with medical treatment        6. Ulcerated, foot, right, with necrosis of muscle ([facility])  [place] DEBRIDEMENT, SKIN, SUB-Q TISSUE,=<20 SQ CM      7. Ulcer of left foot, with fat layer exposed ([address]  [place] DEBRIDEMENT, SKIN, SUB-Q TISSUE,=<20 SQ CM            Plan: Procedure Note  Indications:  Based on my examination of this patient's wound(s)/ulcer(s) today, debridement is required to promote healing and evaluate the wound base. Performed by: Carli Chavez DPM    Consent obtained:  Yes    Time out taken:  Yes    Pain Control:         Wound Location: right  Pre Debridement Measurements:  Are located in the Wound/Ulcer Documentation Flow Sheet    Wound/Ulcer #: right and left foot ulcerations    Procedure in Detail: Lidocaine gel soaked gauze was applied at beginning of wound evaluation. The wound(s) was excisionally debrided sharply of fibrotic, necrotic, and hyperkeratotic tissue, including a layer of surrounding healthy tissue using curette, scissors and forceps. The wound was debrided down through and including subcutaneous tissue.      Percent of Wound Debrided: 100%    Total Surface Area Debrided:   sq cm     Bleeding: Minimal      Plan for wound  Dress per physician order  Treatment: silvercel to ulcerations both feet,   DSD  Discussed proper shoes today. Will write for diabetic shoes, with orthotics and toe filler, with carbon fiber foot plates bilateral.     Diabetic shoes and insoles: This patient would benefit from extra depth diabetic shoes and custom inserts. I feel he/she qualifies due to the above performed physical exam and diagnosis. I will do the appropriate paperwork and send it to their primary care physician. Then the patient will be measured for shoes and custom inserts to properly off load and protect his/her feet. I feel that he is causing worsening of the ulcerations. It appears that he picks at the ulcerations. There appears to be excoriations around the periphery of the ulcerations. His fingernail also have blood underneath. Discussed skin graft substitute today as an options to help start healing the ulcerations. I feel that this would be a benefit as I would see him weekly and he would have to keep the dressings intact, keeping him from picking at the ulcerations. Dressing Documentation    This patient needs a dressings to aid in healing of the ulceration. I am prescribing silvercel, as the primary dressing  Sequence of dressing: silvercel, 4x4 gauze, kerlix roll, secure with tape  Frequency of change: every other day  Duration of supplies: 30 days    1) The ulceration has been surgically debrided on 07/21/22   2) The ulceration is located on the right ankle  3) The ulceration was assessed on 07/21/22   4) Type of ulceration:  diabetic  5) See above for size of ulceration(s)  6) Amount of drainage: moderate    No weight       1. Discussed appropriate home care of this wound.   2. Dressings:   Orders Placed This Encounter   Procedures    Amb External Referral For Orthotics     Referral Priority:   Routine     Referral Type:   Consult for Advice and Opinion     Requested Specialty:   Durable Medical Equipment     Number of Visits Requested:   1    [place] DEBRIDEMENT, SKIN, SUB-Q TISSUE,=<20 SQ CM        3. Follow up: 2 week. 4. Detailed home instructions and education material given to patient prior to discharge. Sutures in place. No orders of the defined types were placed in this encounter. Follow up 2 week(s).

## 2022-07-22 DIAGNOSIS — F34.1 DYSTHYMIA: ICD-10-CM

## 2022-07-25 RX ORDER — DIAZEPAM 5 MG/1
TABLET ORAL
Qty: 90 TABLET | Refills: 0 | Status: SHIPPED | OUTPATIENT
Start: 2022-07-25 | End: 2022-08-22

## 2022-07-30 ENCOUNTER — APPOINTMENT (OUTPATIENT)
Dept: GENERAL RADIOLOGY | Age: 57
End: 2022-07-30
Payer: MEDICARE

## 2022-07-30 ENCOUNTER — HOSPITAL ENCOUNTER (EMERGENCY)
Age: 57
Discharge: HOME | End: 2022-07-30
Attending: STUDENT IN AN ORGANIZED HEALTH CARE EDUCATION/TRAINING PROGRAM
Payer: MEDICARE

## 2022-07-30 VITALS
TEMPERATURE: 98.4 F | DIASTOLIC BLOOD PRESSURE: 75 MMHG | OXYGEN SATURATION: 96 % | RESPIRATION RATE: 18 BRPM | SYSTOLIC BLOOD PRESSURE: 121 MMHG | HEART RATE: 74 BPM

## 2022-07-30 DIAGNOSIS — R11.2 NAUSEA AND VOMITING, INTRACTABILITY OF VOMITING NOT SPECIFIED, UNSPECIFIED VOMITING TYPE: ICD-10-CM

## 2022-07-30 DIAGNOSIS — F11.93 OPIOID WITHDRAWAL (HCC): Primary | ICD-10-CM

## 2022-07-30 DIAGNOSIS — R19.7 DIARRHEA, UNSPECIFIED TYPE: ICD-10-CM

## 2022-07-30 LAB
ABSOLUTE EOS #: 0 K/UL (ref 0–0.4)
ABSOLUTE LYMPH #: 1 K/UL (ref 1–4.8)
ABSOLUTE MONO #: 0.6 K/UL (ref 0.1–1.3)
ALBUMIN SERPL-MCNC: 4.7 G/DL (ref 3.5–5.2)
ALP BLD-CCNC: 104 U/L (ref 40–129)
ALT SERPL-CCNC: 9 U/L (ref 5–41)
ANION GAP SERPL CALCULATED.3IONS-SCNC: 14 MMOL/L (ref 9–17)
AST SERPL-CCNC: 17 U/L
BASOPHILS # BLD: 0 % (ref 0–2)
BASOPHILS ABSOLUTE: 0 K/UL (ref 0–0.2)
BILIRUB SERPL-MCNC: 0.3 MG/DL (ref 0.3–1.2)
BUN BLDV-MCNC: 16 MG/DL (ref 6–20)
CALCIUM SERPL-MCNC: 9.9 MG/DL (ref 8.6–10.4)
CHLORIDE BLD-SCNC: 97 MMOL/L (ref 98–107)
CO2: 23 MMOL/L (ref 20–31)
CREAT SERPL-MCNC: 0.92 MG/DL (ref 0.7–1.2)
EOSINOPHILS RELATIVE PERCENT: 0 % (ref 0–4)
GFR AFRICAN AMERICAN: >60 ML/MIN
GFR NON-AFRICAN AMERICAN: >60 ML/MIN
GFR SERPL CREATININE-BSD FRML MDRD: ABNORMAL ML/MIN/{1.73_M2}
GLUCOSE BLD-MCNC: 113 MG/DL (ref 70–99)
HCT VFR BLD CALC: 43.2 % (ref 41–53)
HEMOGLOBIN: 14.8 G/DL (ref 13.5–17.5)
LYMPHOCYTES # BLD: 11 % (ref 24–44)
MCH RBC QN AUTO: 26.4 PG (ref 26–34)
MCHC RBC AUTO-ENTMCNC: 34.2 G/DL (ref 31–37)
MCV RBC AUTO: 77 FL (ref 80–100)
MONOCYTES # BLD: 6 % (ref 1–7)
PDW BLD-RTO: 20.4 % (ref 11.5–14.9)
PLATELET # BLD: 419 K/UL (ref 150–450)
PMV BLD AUTO: 8 FL (ref 6–12)
POTASSIUM SERPL-SCNC: 3.7 MMOL/L (ref 3.7–5.3)
RBC # BLD: 5.6 M/UL (ref 4.5–5.9)
SEG NEUTROPHILS: 83 % (ref 36–66)
SEGMENTED NEUTROPHILS ABSOLUTE COUNT: 7.5 K/UL (ref 1.3–9.1)
SODIUM BLD-SCNC: 134 MMOL/L (ref 135–144)
TOTAL PROTEIN: 9 G/DL (ref 6.4–8.3)
TROPONIN, HIGH SENSITIVITY: 7 NG/L (ref 0–22)
WBC # BLD: 9.1 K/UL (ref 3.5–11)

## 2022-07-30 PROCEDURE — 96374 THER/PROPH/DIAG INJ IV PUSH: CPT

## 2022-07-30 PROCEDURE — 36415 COLL VENOUS BLD VENIPUNCTURE: CPT

## 2022-07-30 PROCEDURE — 96376 TX/PRO/DX INJ SAME DRUG ADON: CPT

## 2022-07-30 PROCEDURE — 84484 ASSAY OF TROPONIN QUANT: CPT

## 2022-07-30 PROCEDURE — 71045 X-RAY EXAM CHEST 1 VIEW: CPT

## 2022-07-30 PROCEDURE — 99285 EMERGENCY DEPT VISIT HI MDM: CPT

## 2022-07-30 PROCEDURE — 96375 TX/PRO/DX INJ NEW DRUG ADDON: CPT

## 2022-07-30 PROCEDURE — 6360000002 HC RX W HCPCS: Performed by: STUDENT IN AN ORGANIZED HEALTH CARE EDUCATION/TRAINING PROGRAM

## 2022-07-30 PROCEDURE — 85025 COMPLETE CBC W/AUTO DIFF WBC: CPT

## 2022-07-30 PROCEDURE — 93005 ELECTROCARDIOGRAM TRACING: CPT | Performed by: STUDENT IN AN ORGANIZED HEALTH CARE EDUCATION/TRAINING PROGRAM

## 2022-07-30 PROCEDURE — 80053 COMPREHEN METABOLIC PANEL: CPT

## 2022-07-30 RX ORDER — ONDANSETRON 2 MG/ML
4 INJECTION INTRAMUSCULAR; INTRAVENOUS ONCE
Status: COMPLETED | OUTPATIENT
Start: 2022-07-30 | End: 2022-07-30

## 2022-07-30 RX ORDER — ONDANSETRON 4 MG/1
4 TABLET, ORALLY DISINTEGRATING ORAL 3 TIMES DAILY PRN
Qty: 21 TABLET | Refills: 0 | Status: SHIPPED | OUTPATIENT
Start: 2022-07-30 | End: 2022-08-05 | Stop reason: SDUPTHER

## 2022-07-30 RX ORDER — LOPERAMIDE HYDROCHLORIDE 2 MG/1
2 CAPSULE ORAL 4 TIMES DAILY PRN
Qty: 28 CAPSULE | Refills: 0 | Status: SHIPPED | OUTPATIENT
Start: 2022-07-30 | End: 2022-08-06

## 2022-07-30 RX ADMIN — ONDANSETRON 4 MG: 2 INJECTION INTRAMUSCULAR; INTRAVENOUS at 15:36

## 2022-07-30 RX ADMIN — HYDROMORPHONE HYDROCHLORIDE 1 MG: 1 INJECTION, SOLUTION INTRAMUSCULAR; INTRAVENOUS; SUBCUTANEOUS at 17:03

## 2022-07-30 RX ADMIN — HYDROMORPHONE HYDROCHLORIDE 1 MG: 1 INJECTION, SOLUTION INTRAMUSCULAR; INTRAVENOUS; SUBCUTANEOUS at 15:36

## 2022-07-30 ASSESSMENT — PAIN SCALES - GENERAL
PAINLEVEL_OUTOF10: 9
PAINLEVEL_OUTOF10: 5
PAINLEVEL_OUTOF10: 8
PAINLEVEL_OUTOF10: 10

## 2022-07-30 ASSESSMENT — ENCOUNTER SYMPTOMS
VOMITING: 1
NAUSEA: 1
EYE DISCHARGE: 0
EYE REDNESS: 0
CHEST TIGHTNESS: 0
ABDOMINAL PAIN: 0
SORE THROAT: 0
RHINORRHEA: 0
SHORTNESS OF BREATH: 0
DIARRHEA: 0

## 2022-07-30 ASSESSMENT — PAIN - FUNCTIONAL ASSESSMENT: PAIN_FUNCTIONAL_ASSESSMENT: 0-10

## 2022-07-30 ASSESSMENT — PAIN DESCRIPTION - ORIENTATION
ORIENTATION: RIGHT;LEFT

## 2022-07-30 ASSESSMENT — PAIN DESCRIPTION - DESCRIPTORS
DESCRIPTORS_2: ACHING
DESCRIPTORS: SHARP
DESCRIPTORS: SHARP

## 2022-07-30 ASSESSMENT — PAIN DESCRIPTION - LOCATION
LOCATION: FOOT
LOCATION_2: HEAD

## 2022-07-30 ASSESSMENT — LIFESTYLE VARIABLES
HOW OFTEN DO YOU HAVE A DRINK CONTAINING ALCOHOL: NEVER
HOW MANY STANDARD DRINKS CONTAINING ALCOHOL DO YOU HAVE ON A TYPICAL DAY: PATIENT DOES NOT DRINK

## 2022-07-30 ASSESSMENT — PAIN DESCRIPTION - INTENSITY: RATING_2: 7

## 2022-07-30 NOTE — ED PROVIDER NOTES
[address]    Pt Name: Alexander Billy  MRN: [id number]  [place] 1965  Date of evaluation: 7/30/22  CHIEF COMPLAINT       Chief Complaint   Patient presents with    Chest Pain    Withdrawal     2 days without Morphine 60mg BID & Oxycodone 15mg QID.  wants pts to cut down on meds and pt tried to stopped himself. Emesis    Headache    Nausea     HISTORY OF PRESENT ILLNESS   This is a 60 yo male with history of hypertension, diabetes, PAD, diabetic foot wound status post amputations, chronic pain on chronic opioids presents concerned he is with withdrawal    Patient states he is withdrawing because he has not had his chronic opioid medications    He initially told me that he was attempting to wean off of these medications    It seems that he developed withdrawal symptoms so then he took more of his medication and now he is run out before he could get a new prescription    Upon chart review of PDMP he had the following prescriptions on 7/9/22 for 30 day supply: 60 tablets of morphine 60 mg ER, 120 tablets oxycodone 15 mg IR, and on 7/25/22 he received 90 tablets diazepam 5 mg     His symptoms include diffuse myalgias, diarrhea, nausea, vomiting    He denies fevers chills drainage from the wounds. REVIEW OF SYSTEMS     Review of Systems   Constitutional:  Negative for chills and fever. HENT:  Negative for rhinorrhea and sore throat. Eyes:  Negative for discharge and redness. Respiratory:  Negative for chest tightness and shortness of breath. Cardiovascular:  Negative for chest pain. Gastrointestinal:  Positive for nausea and vomiting. Negative for abdominal pain and diarrhea. Genitourinary:  Negative for dysuria and frequency. Musculoskeletal:  Negative for arthralgias and myalgias. Skin:  Negative for rash. Neurological:  Negative for weakness and numbness. Psychiatric/Behavioral:  Negative for suicidal ideas.     All other systems reviewed and are negative. PASTMEDICAL HISTORY     Past Medical History:   Diagnosis Date    Acute metabolic encephalopathy     CLAUDIA (acute kidney injury) ([facility]     Anemia     Anxiety     ARF (acute renal failure) ([facility])     Arthritis     lower back, knees    BPH (benign prostatic hyperplasia)     Chronic kidney disease     prostate    Depression     Diabetes mellitus (HCC)     Diabetic neuropathy (HCC)     GERD (gastroesophageal reflux disease)     Hyperglycemia     Hyperkalemia     Hypertension     Hypothyroidism     Impacted tooth     Necrosis of bone of foot (HCC)     Osteomyelitis of left foot ([facility])     PAD (peripheral artery disease) ([facility])     Pneumonia     Septic shock ([facility])     Type 2 diabetes mellitus ([facility]      Past Problem List  Patient Active Problem List   Diagnosis Code    Anxiety disorder F41.9    Benign prostatic hyperplasia N40.0    Depression F32. A    Edema R60.9    Esophageal reflux K21.9    Essential hypertension I10    Hypothyroidism E03.9    Right cervical radiculopathy M54.12    Osteomyelitis ([facility]) M86.9    Osteomyelitis of foot, left, acute ([facility]) M86.172    Non compliance with medical treatment Z91.19    Cellulitis L03.90    Acute renal failure (ARF) ([facility]) N17.9    Ulcerated, foot, left, with necrosis of bone ([facility]) L97.524    Type 2 diabetes mellitus ([facility]) E11.9    Anemia D64.9    Hematemesis with nausea K92.0    Erosive gastritis K29.60    Ulcerated, foot, left, with fat layer exposed ([facility]) L97.522    Renal failure N19    Hyperkalemia E87.5    Hypocalcemia V01.34    Metabolic acidosis L21.3    Diabetic ulcer of toe of left foot associated with type 2 diabetes mellitus ([facility]) E11.621, L97.529    Hyperphosphatemia E83.39    Chronic, continuous use of opioids G67.67    Acute metabolic encephalopathy B24.34    Diabetic foot infection ([facility]) E11.628, L08.9    H/O amputation of lesser toe, right ([facility]) Z89.421    Seizure-like activity ([facility]) R56.9    PAD (peripheral artery disease) ([facility]) I73.9    Diabetic infection of right foot ([facility]) E11.628, L08.9    Osteomyelitis of foot, right, acute ([facility]) M86.171    Cellulitis of foot, right L03.115    Acquired posterior equinus, right M21.861    Wound dehiscence T81.30XA    Wound dehiscence, surgical T81.31XA     SURGICAL HISTORY       Past Surgical History:   Procedure Laterality Date    AMPUTATION Right 01/21/2022    TRANSMETARSAL AMPUTATION FOOT, TENDOACHILLES LENGTHENING (Right Foot)    APPENDECTOMY      BACK SURGERY      Lower x 4. Had abscess after appendectomy.      FOOT AMPUTATION Left 1/4/2020    TRANSMETATARSAL FOOT AMPUTATION, REMOVAL OF FOREIGN BODY performed by Latasha Javier DPM at [facility] Right 2/25/2022    REVISION TRANSMETATARSAL AMPUTATION performed by Latasha Javier DPM at [address] [place] [address] Left 01/04/2020    TRANSMETATARSAL FOOT AMPUTATION, REMOVAL OF FOREIGN BODY    KNEE SURGERY Right     scope    TOE AMPUTATION Bilateral 3/6/2017    TOE AMPUTATION LEFT HALLUX AND 2ND DIGIT AND RIGHT 2ND DIGIT performed by Leslie Winston DPM at [facility] Right 1/21/2022    TRANSMETARSAL AMPUTATION FOOT, TENDOACHILLES LENGTHENING performed by Latasha Javier DPM at [facility] 5/20/2019    EGD WITH BIOPSY performed by Brent Anand MD at [address] [place] [address]       Previous Medications    DIAZEPAM (VALIUM) 5 MG TABLET    TAKE ONE TABLET BY MOUTH EVERY 8 HOURS AS NEEDED FOR ANXIETY    FERROUS SULFATE (FE TABS 325) 325 (65 FE) MG EC TABLET    Take 1 tablet by mouth daily (with breakfast)    FLUOCINONIDE (LIDEX) 0.05 % OINTMENT    Apply 1 gm  topically 2 times daily to legs    GAUZE PADS & DRESSINGS (COMBINE ABD) 5\"X9\" PADS    1 Units by Does not apply route 2 times daily    GAUZE PADS & DRESSINGS (KERLIX GAUZE ROLL LARGE) MISC    1 Units by Does not apply route 2 times daily    LACTOBACILLUS (CULTURELLE) CAPSULE    Take 1 capsule by mouth 2 times daily (with meals) LANCETS (ONETOUCH DELICA PLUS [id number]) MISC    USE TO TEST TWICE A DAY    LEVOTHYROXINE (SYNTHROID) 50 MCG TABLET    Take 1 tablet by mouth daily    METFORMIN (GLUCOPHAGE) 500 MG TABLET    Take 0.5 tablets by mouth daily (with breakfast)    MORPHINE (MS CONTIN) 60 MG EXTENDED RELEASE TABLET    Take 1 tablet by mouth 2 times daily for 30 days. NARCAN 4 MG/0.1ML LIQD NASAL SPRAY    4 mg by Nasal route as needed for Opioid Reversal     ONETOUCH ULTRA STRIP    TEST TWO TIMES A DAY AS NEEDED    OXYCODONE (OXY-IR) 15 MG IMMEDIATE RELEASE TABLET    Take 1 tablet by mouth every 6 hours as needed for Pain for up to 30 days. PANTOPRAZOLE (PROTONIX) 40 MG TABLET    Take 1 tablet by mouth every morning (before breakfast)    PAROXETINE (PAXIL) 20 MG TABLET    TAKE FOUR TABLETS BY MOUTH EVERY MORNING    POVIDONE-IODINE (BETADINE) 7.5 % EXTERNAL SOLUTION    Apply to wound. Apply as a wet to dry dressing    SODIUM HYPOCHLORITE (DAKINS) 0.125 % SOLN EXTERNAL SOLUTION    APPLY TOPICALLY EVERY 12 HOURS. APPLY AS A WET TO DRY DRESSING TWO TIMES A DAY, CAN ALSO USE TO CLEANSE WOUND    WOUND DRESSINGS (ADAPTIC NON-ADHERING DRESSING) PADS    Apply 1 Units topically 2 times daily     ALLERGIES     is allergic to bactrim [sulfamethoxazole-trimethoprim], fiorinal [butalbital-aspirin-caffeine], statins, tylenol [acetaminophen], and peanut butter flavor [flavoring agent]. FAMILY HISTORY     He indicated that the status of his mother is unknown. He indicated that the status of his father is unknown. He indicated that the status of his maternal grandfather is unknown.      SOCIAL HISTORY       Social History     Tobacco Use    Smoking status: Never    Smokeless tobacco: Never   Vaping Use    Vaping Use: Never used   Substance Use Topics    Alcohol use: No    Drug use: Not Currently     Frequency: 2.0 times per week     Types: Marijuana (Weed)     PHYSICAL EXAM     INITIAL VITALS: /82   Pulse 82   Temp 98.4 °F (36.9 °C) (Oral) Resp 20   SpO2 97%    Physical Exam  Vitals and nursing note reviewed. Constitutional:       Appearance: Normal appearance. HENT:      Head: Normocephalic and atraumatic. Nose: Nose normal.      Mouth/Throat:      Mouth: Mucous membranes are moist.   Eyes:      Conjunctiva/sclera: Conjunctivae normal.      Pupils: Pupils are equal, round, and reactive to light. Cardiovascular:      Rate and Rhythm: Normal rate and regular rhythm. Pulses: Normal pulses. Heart sounds: Normal heart sounds. Pulmonary:      Effort: Pulmonary effort is normal.      Breath sounds: Normal breath sounds. Abdominal:      Palpations: Abdomen is soft. Tenderness: There is no abdominal tenderness. Musculoskeletal:         General: No swelling or deformity. Cervical back: Normal range of motion. Comments: Bilateral tarsal amputations patient refuses to have dressing removed   Skin:     General: Skin is warm. Findings: No rash. Neurological:      General: No focal deficit present. Mental Status: He is alert and oriented to person, place, and time. Psychiatric:         Mood and Affect: Mood normal.       MEDICAL DECISION MAKIN-year-old presenting concern for opioid withdrawal    Electrolyte arrangement, infection, ACS, opioid withdrawal    Will obtain basic labs including troponin and provide opioids  ED Course as of 22 1644   Sat 2022   1616 CBC with Auto Differential(!):    WBC 9.1   RBC 5.60   Hemoglobin Quant 14.8   Hematocrit 43.2   MCV 77.0(!)   MCH 26.4   MCHC 34.2   RDW 20.4(!)   Platelet Count 380   MPV 8.0   Seg Neutrophils 83(!)   Lymphocytes 11(!)   Monocytes 6   Eosinophils % 0   Basophils 0   Segs Absolute 7.50   Absolute Lymph # 1.00   Absolute Mono # 0.60   Absolute Eos # 0.00   Basophils Absolute 0.00  Normal [CECILIA]   1625 CMPFrancia Slates ):    GLUCOSE, FASTING,(!)   BUN,BUNPL 16   Creatinine 0.92   CALCIUM, SERUM, 336523 9.9   Sodium 134(!)   Potassium 3.7 Chloride 97(!)   CO2 23   Anion Gap 14   Alk Phos 104   ALT 9   AST 17   Bilirubin 0.30   Total Protein 9.0(!)   Albumin 4.7   GFR Non- >60   GFR  >60   GFR Comment       Normal [CECILIA]   1626 Troponin:    Troponin, High Sensitivity 7  Negative [CECILIA]   1630 XR CHEST PORTABLE  Normal [CECILIA]   1641 Reassessed patient states that the pain medication helped his pain is coming back down    He has not had any nausea vomiting or diarrhea here    I have explained to him at length that I cannot prescribe opioids as an outpatient    Will give 1 more dose of medication here and discharge with symptomatic therapy Zofran and Imodium    He can call his primary doctor for refills of his chronic opioid medication [CECILIA]      ED Course User Index  [CECILIA] Junior Prescott MD       CRITICAL CARE:       PROCEDURES:    EKG 12 Lead    Date/Time: 7/30/2022 3:34 PM  Performed by: Junior Prescott MD  Authorized by: Junior Prescott MD     ECG reviewed by ED Physician in the absence of a cardiologist: yes    Interpretation:     Interpretation: normal    Rate:     ECG rate:  77    ECG rate assessment: normal    Rhythm:     Rhythm: sinus rhythm    Ectopy:     Ectopy: none    QRS:     QRS axis:  Normal    QRS intervals:  Normal    QRS conduction: normal    ST segments:     ST segments:  Normal  T waves:     T waves: normal      DIAGNOSTIC RESULTS   EKG:All EKG's are interpreted by the Emergency Department Physician who either signs or Co-signs this chart in the absence of a cardiologist.        RADIOLOGY:All plain film, CT, MRI, and formal ultrasound images (except ED bedside ultrasound) are read by the radiologist, see reports below, unless otherwisenoted in MDM or here. XR CHEST PORTABLE   Final Result   No acute process. LABS: All lab results were reviewed by myself, and all abnormals are listed below.   Labs Reviewed   CBC WITH AUTO DIFFERENTIAL - Abnormal; Notable for the following components: capsule     Sig: Take 1 capsule by mouth 4 times daily as needed for Diarrhea     Dispense:  28 capsule     Refill:  0     CONSULTS:  None    FINAL IMPRESSION      1. Opioid withdrawal ([address]    2. Nausea and vomiting, intractability of vomiting not specified, unspecified vomiting type    3. Diarrhea, unspecified type          DISPOSITION/PLAN   DISPOSITION Decision To Discharge 07/30/2022 04:39:10 PM      PATIENT REFERRED TO:  [place]. [address] [place] [address]  [phone]    Schedule an appointment as soon as possible for a visit       [facility] ED  [place] [id number] [phone]    As needed  DISCHARGE MEDICATIONS:  New Prescriptions    LOPERAMIDE (RA ANTI-DIARRHEAL) 2 MG CAPSULE    Take 1 capsule by mouth 4 times daily as needed for Diarrhea    ONDANSETRON (ZOFRAN-ODT) 4 MG DISINTEGRATING TABLET    Take 1 tablet by mouth 3 times daily as needed for Nausea or Vomiting     The care is provided during an unprecedented national emergency due to the novel coronavirus, COVID 19.   MD Junior Dsos MD  07/30/22 1270

## 2022-08-01 LAB
EKG ATRIAL RATE: 77 BPM
EKG P AXIS: 6 DEGREES
EKG P-R INTERVAL: 156 MS
EKG Q-T INTERVAL: 398 MS
EKG QRS DURATION: 92 MS
EKG QTC CALCULATION (BAZETT): 450 MS
EKG R AXIS: 32 DEGREES
EKG T AXIS: 46 DEGREES
EKG VENTRICULAR RATE: 77 BPM

## 2022-08-02 ENCOUNTER — TELEPHONE (OUTPATIENT)
Dept: PRIMARY CARE CLINIC | Age: 57
End: 2022-08-02

## 2022-08-02 DIAGNOSIS — M54.12 RIGHT CERVICAL RADICULOPATHY: ICD-10-CM

## 2022-08-02 DIAGNOSIS — F11.20 UNCOMPLICATED OPIOID DEPENDENCE (HCC): Primary | ICD-10-CM

## 2022-08-02 NOTE — TELEPHONE ENCOUNTER
Pt tried cutting down on his pain meds and ended up in two different er's. Was put on Suboxone 8mg-2mg q d, wants tabs, not the film, if you will give him a rx. Pt has appt with Dr. Rock Real on the 22nd, will need enough until then. Pt's spouse calling for him and asking that you review his records and advise. [address].

## 2022-08-02 NOTE — TELEPHONE ENCOUNTER
Lashon Turpin called requesting to speak with Jacquelin Cortés did not want to give any information requesting Jj calls back when she is available     Please review

## 2022-08-03 NOTE — TELEPHONE ENCOUNTER
Patient has oxycodone and morphine ordered. He should not be on opioid and suboxone at same time. Is he out of oxycodone and morphine?  Did he follow up with rehab referral from ER?

## 2022-08-03 NOTE — TELEPHONE ENCOUNTER
Patients wife stating he is not taking Oxycodone and Morphine per Maynor's message yesterday, he would like to take just Suboxone. She also stated patient has not and will not be following up with the rehab he was referred to. Wife states he was prescribed clonidine, Flexeril, Ativan and Phenergan in the hospital and will be running out of this medication on Saturday. [facility]

## 2022-08-04 RX ORDER — CYCLOBENZAPRINE HCL 10 MG
10 TABLET ORAL 2 TIMES DAILY PRN
Qty: 30 TABLET | Refills: 0 | Status: SHIPPED | OUTPATIENT
Start: 2022-08-04 | End: 2022-09-21 | Stop reason: SDUPTHER

## 2022-08-04 RX ORDER — CLONIDINE HYDROCHLORIDE 0.1 MG/1
0.1 TABLET ORAL 2 TIMES DAILY
Qty: 36 TABLET | Refills: 0 | Status: SHIPPED | OUTPATIENT
Start: 2022-08-04 | End: 2022-09-08 | Stop reason: SDUPTHER

## 2022-08-04 RX ORDER — PROMETHAZINE HYDROCHLORIDE 25 MG/1
25 TABLET ORAL EVERY 6 HOURS PRN
Qty: 30 TABLET | Refills: 0 | Status: SHIPPED | OUTPATIENT
Start: 2022-08-04 | End: 2022-08-17 | Stop reason: SDUPTHER

## 2022-08-04 RX ORDER — BUPRENORPHINE HYDROCHLORIDE AND NALOXONE HYDROCHLORIDE DIHYDRATE 8; 2 MG/1; MG/1
1 TABLET SUBLINGUAL DAILY
Qty: 18 TABLET | Refills: 0 | Status: SHIPPED | OUTPATIENT
Start: 2022-08-04 | End: 2022-08-08 | Stop reason: SDUPTHER

## 2022-08-04 NOTE — TELEPHONE ENCOUNTER
Suboxone, clonidine, cyclobenzaprine and promethazine sent to [facility]. Do not take any opioids while taking suboxone.

## 2022-08-05 ENCOUNTER — TELEPHONE (OUTPATIENT)
Dept: PRIMARY CARE CLINIC | Age: 57
End: 2022-08-05

## 2022-08-05 DIAGNOSIS — F11.20 UNCOMPLICATED OPIOID DEPENDENCE (HCC): ICD-10-CM

## 2022-08-05 RX ORDER — ONDANSETRON 4 MG/1
4 TABLET, ORALLY DISINTEGRATING ORAL 3 TIMES DAILY PRN
Qty: 21 TABLET | Refills: 0 | Status: SHIPPED | OUTPATIENT
Start: 2022-08-05 | End: 2022-08-17 | Stop reason: SDUPTHER

## 2022-08-05 NOTE — TELEPHONE ENCOUNTER
See other messages, per Dr. Tse Face. Pt's spouse notified. Do you want him to come in [facility] [place] re the Suboxone?

## 2022-08-05 NOTE — TELEPHONE ENCOUNTER
Pt's spouse calling. Asking if pt can take the Suboxone bid instead of qd? Pt is not getting enough relief. Please advise.   [phone]

## 2022-08-05 NOTE — TELEPHONE ENCOUNTER
He can increase to BID for now. Will forward to Gilmer Palomino for next week to make sure he agrees.  F/u with Rafa Márquez for Monday am.

## 2022-08-08 RX ORDER — BUPRENORPHINE HYDROCHLORIDE AND NALOXONE HYDROCHLORIDE DIHYDRATE 8; 2 MG/1; MG/1
1 TABLET SUBLINGUAL 2 TIMES DAILY
Qty: 26 TABLET | Refills: 0 | Status: SHIPPED | OUTPATIENT
Start: 2022-08-10 | End: 2022-08-15 | Stop reason: SDUPTHER

## 2022-08-08 NOTE — TELEPHONE ENCOUNTER
Pt made an appt for 8/18/22. Offered appt for this week, but pt's spouse said that he can't come in this week. Will need a refill on the Suboxone. Uses [facility] on [address]. Asking, if he can continue on the bid, until appt?

## 2022-08-15 ENCOUNTER — TELEPHONE (OUTPATIENT)
Dept: PRIMARY CARE CLINIC | Age: 57
End: 2022-08-15

## 2022-08-15 DIAGNOSIS — F11.20 UNCOMPLICATED OPIOID DEPENDENCE (HCC): ICD-10-CM

## 2022-08-15 RX ORDER — BUPRENORPHINE HYDROCHLORIDE AND NALOXONE HYDROCHLORIDE DIHYDRATE 8; 2 MG/1; MG/1
1 TABLET SUBLINGUAL 2 TIMES DAILY
Qty: 26 TABLET | Refills: 0 | Status: SHIPPED | OUTPATIENT
Start: 2022-08-15 | End: 2022-08-25 | Stop reason: SDUPTHER

## 2022-08-15 NOTE — TELEPHONE ENCOUNTER
Pharmacy calling. Asking, if the Suboxone is for Opioid Dependency, they will need your other [address] #. The directions are different on this rx. Pt keeps calling them.

## 2022-08-16 DIAGNOSIS — F11.20 UNCOMPLICATED OPIOID DEPENDENCE (HCC): ICD-10-CM

## 2022-08-17 RX ORDER — PROMETHAZINE HYDROCHLORIDE 25 MG/1
25 TABLET ORAL EVERY 6 HOURS PRN
Qty: 30 TABLET | Refills: 0 | Status: SHIPPED | OUTPATIENT
Start: 2022-08-17 | End: 2022-09-02 | Stop reason: SDUPTHER

## 2022-08-17 RX ORDER — ONDANSETRON 4 MG/1
4 TABLET, ORALLY DISINTEGRATING ORAL 3 TIMES DAILY PRN
Qty: 21 TABLET | Refills: 0 | Status: SHIPPED | OUTPATIENT
Start: 2022-08-17 | End: 2022-09-02 | Stop reason: SDUPTHER

## 2022-08-18 ENCOUNTER — OFFICE VISIT (OUTPATIENT)
Dept: PODIATRY | Age: 57
End: 2022-08-18
Payer: MEDICARE

## 2022-08-18 ENCOUNTER — TELEPHONE (OUTPATIENT)
Dept: PRIMARY CARE CLINIC | Age: 57
End: 2022-08-18

## 2022-08-18 VITALS — WEIGHT: 220 LBS | BODY MASS INDEX: 28.23 KG/M2 | HEIGHT: 74 IN

## 2022-08-18 DIAGNOSIS — Z91.199 NON COMPLIANCE WITH MEDICAL TREATMENT: ICD-10-CM

## 2022-08-18 DIAGNOSIS — L97.509 TYPE 2 DIABETES MELLITUS WITH FOOT ULCER, WITHOUT LONG-TERM CURRENT USE OF INSULIN (HCC): ICD-10-CM

## 2022-08-18 DIAGNOSIS — Z89.431 STATUS POST TRANSMETATARSAL AMPUTATION OF RIGHT FOOT (HCC): Primary | ICD-10-CM

## 2022-08-18 DIAGNOSIS — L97.522 ULCER OF LEFT FOOT, WITH FAT LAYER EXPOSED (HCC): ICD-10-CM

## 2022-08-18 DIAGNOSIS — E11.621 TYPE 2 DIABETES MELLITUS WITH FOOT ULCER, WITHOUT LONG-TERM CURRENT USE OF INSULIN (HCC): ICD-10-CM

## 2022-08-18 DIAGNOSIS — Z89.432 STATUS POST TRANSMETATARSAL AMPUTATION OF FOOT, LEFT (HCC): ICD-10-CM

## 2022-08-18 PROCEDURE — 1036F TOBACCO NON-USER: CPT | Performed by: PODIATRIST

## 2022-08-18 PROCEDURE — 99213 OFFICE O/P EST LOW 20 MIN: CPT | Performed by: PODIATRIST

## 2022-08-18 PROCEDURE — 3017F COLORECTAL CA SCREEN DOC REV: CPT | Performed by: PODIATRIST

## 2022-08-18 PROCEDURE — 2022F DILAT RTA XM EVC RTNOPTHY: CPT | Performed by: PODIATRIST

## 2022-08-18 PROCEDURE — G8417 CALC BMI ABV UP PARAM F/U: HCPCS | Performed by: PODIATRIST

## 2022-08-18 PROCEDURE — 3044F HG A1C LEVEL LT 7.0%: CPT | Performed by: PODIATRIST

## 2022-08-18 PROCEDURE — 11042 DBRDMT SUBQ TIS 1ST 20SQCM/<: CPT | Performed by: PODIATRIST

## 2022-08-18 PROCEDURE — G8427 DOCREV CUR MEDS BY ELIG CLIN: HCPCS | Performed by: PODIATRIST

## 2022-08-18 NOTE — TELEPHONE ENCOUNTER
Left message for patient to call back to schedule. Also stated Cassandra Bradshaw is not in office next week, we can set him up for a different date though.

## 2022-08-18 NOTE — PROGRESS NOTES
[address] [facility] [address]  Post Op note  Chief Complaint   Patient presents with    Wound Check     B/l    Diabetes     Last blood sugar 99         Dai Moreno is a 61y.o. year old male who is here for follow up ulcerations bilateral feet, S/P TMA right and left. His right is healed. Vital signs are stable. Using Silvercel daily, maybe. Pain level is 3. Patient denies N/V/F/C. Review of Systems    Vascular: DP and PT pulses palpable 2/4, Right Foot and 2/4 on the Left Foot. CFT <Na seconds, Right Foot and <NA seconds on the Left Foot. Edema is minimal ,  Right Foot and absenton the Left Foot. Neurological:   Sensation absent  to light touch to level of digits, both feet. Musculoskeletal:   Muscle strength is 4/5 on the Right Foot and 4/5 on the Left Foot. Structural deformities are present on the Right Foot and present on the Left Foot. Integument:  Warm, dry, supple both feet. Incisions are healing well. Wound dehiscence is absent. Infection is absent. Wound #:   1    diabetic foot ulcer   right leg anterior ankle    Wound measurements:  #1  Healed      Wound #:   2    Diabetic foot ulcer  right foot    Wound measurements:  #1  healed      Wound #:   3    diabetic foot ulcer   left foot    Wound measurements:  #1  2cm by 1.4cm  by   2 mm        Wound Depth: subcutaneous tissue    Drainage:    severe Type:serosanguinous  Wound Bed status:   Granulation Tissue: 60%   Necrotic 40%  Type: slough  Epithelialization 0%   Hypergranular 0%   Periwound hyperkeratosis:  absent  Erythema absent    Odor:no  Maceration:no  Undermining/tract/tunneling:no  Culture taken:   no                        Assesment : Post operative progress stable. Diagnosis Orders   1.  Status post transmetatarsal amputation of right foot (HCC)  NJ DEBRIDEMENT, SKIN, SUB-Q TISSUE,=<20 SQ CM      2. Status post transmetatarsal amputation of foot, left (HCC)  NJ DEBRIDEMENT, SKIN, SUB-Q TISSUE,=<20 SQ CM      3. Non compliance with medical treatment  TN DEBRIDEMENT, SKIN, SUB-Q TISSUE,=<20 SQ CM      4. Ulcer of left foot, with fat layer exposed ([address]  TN DEBRIDEMENT, SKIN, SUB-Q TISSUE,=<20 SQ CM      5. Type 2 diabetes mellitus with foot ulcer, without long-term current use of insulin (HCC)  TN DEBRIDEMENT, SKIN, SUB-Q TISSUE,=<20 SQ CM            Plan: Procedure Note left foot  Indications:  Based on my examination of this patient's wound(s)/ulcer(s) today, debridement is required to promote healing and evaluate the wound base. Performed by: Mara Maxwell DPM    Consent obtained:  Yes    Time out taken:  Yes    Pain Control:         Wound Location: right  Pre Debridement Measurements:  Are located in the Wound/Ulcer Documentation Flow Sheet    Wound/Ulcer #: left foot ulcerations    Procedure in Detail: Lidocaine gel soaked gauze was applied at beginning of wound evaluation. The wound(s) was excisionally debrided sharply of fibrotic, necrotic, and hyperkeratotic tissue, including a layer of surrounding healthy tissue using curette, scissors and forceps. The wound was debrided down through and including subcutaneous tissue. Percent of Wound Debrided: 100%    Total Surface Area Debrided:   sq cm     Bleeding: Minimal      Plan for wound  Dress per physician order  Treatment: silvercel to ulcerations both feet,   DSD  Discussed proper shoes today. Will write for diabetic shoes, with orthotics and toe filler, with carbon fiber foot plates bilateral.     Diabetic shoes and insoles: This patient would benefit from extra depth diabetic shoes and custom inserts. I feel he/she qualifies due to the above performed physical exam and diagnosis. I will do the appropriate paperwork and send it to their primary care physician. Then the patient will be measured for shoes and custom inserts to properly off load and protect his/her feet.        Dressing Documentation    This patient needs a dressings to aid in healing of the ulceration. I am prescribing silvercel, as the primary dressing  Sequence of dressing: silvercel, 4x4 gauze, kerlix roll, secure with tape  Frequency of change: every other day  Duration of supplies: 30 days    1) The ulceration has been surgically debrided on 08/18/22   2) The ulceration is located on the right ankle  3) The ulceration was assessed on 08/18/22   4) Type of ulceration:  diabetic  5) See above for size of ulceration(s)  6) Amount of drainage: moderate to severe    No weight       1. Discussed appropriate home care of this wound. 2. Dressings:   Orders Placed This Encounter   Procedures    ND DEBRIDEMENT, SKIN, SUB-Q TISSUE,=<20 SQ CM          3. Follow up: 4 week. 4. Detailed home instructions and education material given to patient prior to discharge. No orders of the defined types were placed in this encounter.

## 2022-08-22 DIAGNOSIS — F34.1 DYSTHYMIA: ICD-10-CM

## 2022-08-22 RX ORDER — DIAZEPAM 5 MG/1
TABLET ORAL
Qty: 90 TABLET | Refills: 0 | Status: SHIPPED | OUTPATIENT
Start: 2022-08-22 | End: 2022-09-21 | Stop reason: SDUPTHER

## 2022-08-25 DIAGNOSIS — F11.20 UNCOMPLICATED OPIOID DEPENDENCE (HCC): ICD-10-CM

## 2022-08-25 RX ORDER — BUPRENORPHINE HYDROCHLORIDE AND NALOXONE HYDROCHLORIDE DIHYDRATE 8; 2 MG/1; MG/1
1 TABLET SUBLINGUAL 2 TIMES DAILY
Qty: 26 TABLET | Refills: 0 | Status: SHIPPED | OUTPATIENT
Start: 2022-08-27 | End: 2022-09-08 | Stop reason: SDUPTHER

## 2022-09-02 DIAGNOSIS — F11.20 UNCOMPLICATED OPIOID DEPENDENCE (HCC): ICD-10-CM

## 2022-09-02 RX ORDER — PROMETHAZINE HYDROCHLORIDE 25 MG/1
25 TABLET ORAL EVERY 6 HOURS PRN
Qty: 30 TABLET | Refills: 0 | Status: SHIPPED | OUTPATIENT
Start: 2022-09-02 | End: 2022-09-12 | Stop reason: SDUPTHER

## 2022-09-02 RX ORDER — ONDANSETRON 4 MG/1
4 TABLET, ORALLY DISINTEGRATING ORAL 3 TIMES DAILY PRN
Qty: 21 TABLET | Refills: 0 | Status: SHIPPED | OUTPATIENT
Start: 2022-09-02 | End: 2022-09-12 | Stop reason: SDUPTHER

## 2022-09-07 DIAGNOSIS — F11.20 UNCOMPLICATED OPIOID DEPENDENCE (HCC): ICD-10-CM

## 2022-09-08 RX ORDER — BUPRENORPHINE HYDROCHLORIDE AND NALOXONE HYDROCHLORIDE DIHYDRATE 8; 2 MG/1; MG/1
1 TABLET SUBLINGUAL 2 TIMES DAILY
Qty: 26 TABLET | Refills: 0 | Status: SHIPPED | OUTPATIENT
Start: 2022-09-08 | End: 2022-09-19 | Stop reason: SDUPTHER

## 2022-09-08 RX ORDER — CLONIDINE HYDROCHLORIDE 0.1 MG/1
0.1 TABLET ORAL 2 TIMES DAILY
Qty: 36 TABLET | Refills: 0 | Status: SHIPPED | OUTPATIENT
Start: 2022-09-08 | End: 2022-09-30 | Stop reason: SDUPTHER

## 2022-09-12 DIAGNOSIS — F11.20 UNCOMPLICATED OPIOID DEPENDENCE (HCC): ICD-10-CM

## 2022-09-12 RX ORDER — PROMETHAZINE HYDROCHLORIDE 25 MG/1
25 TABLET ORAL EVERY 6 HOURS PRN
Qty: 30 TABLET | Refills: 0 | Status: SHIPPED | OUTPATIENT
Start: 2022-09-12 | End: 2022-09-30 | Stop reason: SDUPTHER

## 2022-09-12 RX ORDER — ONDANSETRON 4 MG/1
4 TABLET, ORALLY DISINTEGRATING ORAL 3 TIMES DAILY PRN
Qty: 21 TABLET | Refills: 0 | Status: SHIPPED | OUTPATIENT
Start: 2022-09-12 | End: 2022-09-30 | Stop reason: SDUPTHER

## 2022-09-15 ENCOUNTER — TELEPHONE (OUTPATIENT)
Dept: PRIMARY CARE CLINIC | Age: 57
End: 2022-09-15

## 2022-09-15 DIAGNOSIS — F11.20 UNCOMPLICATED OPIOID DEPENDENCE (HCC): ICD-10-CM

## 2022-09-15 RX ORDER — BUPRENORPHINE HYDROCHLORIDE AND NALOXONE HYDROCHLORIDE DIHYDRATE 8; 2 MG/1; MG/1
1 TABLET SUBLINGUAL 2 TIMES DAILY
Qty: 26 TABLET | Refills: 0 | Status: CANCELLED | OUTPATIENT
Start: 2022-09-15 | End: 2022-09-28

## 2022-09-15 NOTE — TELEPHONE ENCOUNTER
No, I can not increase his suboxone without seeing the patient. He should have enough suboxone to get him through 9/21/2022. When it gets closer to 9/21/2022 he can call for a refill and I will give a supply to last until his appointment (9/29/22). I will not do any more refills after that until I see him. He needs to bring all his open wrappers and unused suboxone to the appointment.

## 2022-09-15 NOTE — TELEPHONE ENCOUNTER
Patient's wife is calling stating that he had to cancel apt because of fever and withdrawals are horrible. Asking if you can increase the suboxone to 3 times daily instead of the two times daily.  If it was possible to still send the medication in , he will be out of them before the apt    Raciel Hair

## 2022-09-19 DIAGNOSIS — F11.20 UNCOMPLICATED OPIOID DEPENDENCE (HCC): ICD-10-CM

## 2022-09-19 RX ORDER — BUPRENORPHINE HYDROCHLORIDE AND NALOXONE HYDROCHLORIDE DIHYDRATE 8; 2 MG/1; MG/1
1 TABLET SUBLINGUAL 2 TIMES DAILY
Qty: 18 TABLET | Refills: 0 | Status: SHIPPED | OUTPATIENT
Start: 2022-09-20 | End: 2022-09-29 | Stop reason: SDUPTHER

## 2022-09-19 NOTE — TELEPHONE ENCOUNTER
LAST VISIT:   4/4/2022     Future Appointments   Date Time Provider Tasha Prado   9/21/2022  2:45 PM Kobi Byrnes DPM [address]   9/29/2022  2:00 PM MALISSA Sweeney - CNP [facility]            Patients wife told he needs to keep his appt.  9/29/22 due to not being seen since 4/4/22

## 2022-09-21 ENCOUNTER — OFFICE VISIT (OUTPATIENT)
Dept: PODIATRY | Age: 57
End: 2022-09-21
Payer: MEDICARE

## 2022-09-21 VITALS — HEIGHT: 72 IN | BODY MASS INDEX: 29.8 KG/M2 | WEIGHT: 220 LBS

## 2022-09-21 DIAGNOSIS — Z89.431 STATUS POST TRANSMETATARSAL AMPUTATION OF RIGHT FOOT (HCC): Primary | ICD-10-CM

## 2022-09-21 DIAGNOSIS — E11.621 TYPE 2 DIABETES MELLITUS WITH FOOT ULCER, WITHOUT LONG-TERM CURRENT USE OF INSULIN (HCC): ICD-10-CM

## 2022-09-21 DIAGNOSIS — F34.1 DYSTHYMIA: ICD-10-CM

## 2022-09-21 DIAGNOSIS — M54.12 RIGHT CERVICAL RADICULOPATHY: ICD-10-CM

## 2022-09-21 DIAGNOSIS — L97.509 TYPE 2 DIABETES MELLITUS WITH FOOT ULCER, WITHOUT LONG-TERM CURRENT USE OF INSULIN (HCC): ICD-10-CM

## 2022-09-21 DIAGNOSIS — Z89.432 STATUS POST TRANSMETATARSAL AMPUTATION OF FOOT, LEFT (HCC): ICD-10-CM

## 2022-09-21 DIAGNOSIS — L97.522 ULCER OF LEFT FOOT, WITH FAT LAYER EXPOSED (HCC): ICD-10-CM

## 2022-09-21 PROCEDURE — 11042 DBRDMT SUBQ TIS 1ST 20SQCM/<: CPT | Performed by: PODIATRIST

## 2022-09-21 PROCEDURE — 99999 PR OFFICE/OUTPT VISIT,PROCEDURE ONLY: CPT | Performed by: PODIATRIST

## 2022-09-21 RX ORDER — CYCLOBENZAPRINE HCL 10 MG
10 TABLET ORAL 2 TIMES DAILY PRN
Qty: 30 TABLET | Refills: 0 | Status: SHIPPED | OUTPATIENT
Start: 2022-09-21 | End: 2022-10-17

## 2022-09-21 RX ORDER — DIAZEPAM 5 MG/1
TABLET ORAL
Qty: 90 TABLET | Refills: 0 | Status: SHIPPED | OUTPATIENT
Start: 2022-09-21 | End: 2022-10-21

## 2022-09-21 NOTE — PROGRESS NOTES
[address]  Post Op note  Chief Complaint   Patient presents with    Wound Check     B/l foot      Diabetes     Last blood sugar: 108         Hansel Paz is a 61y.o. year old male who is here for follow up ulcerations bilateral feet, S/P TMA right and left. His right is healed. Vital signs are stable. Using Silvercel daily, maybe. Pain level is 3. Patient denies N/V/F/C. Review of Systems    Vascular: DP and PT pulses palpable 2/4, Right Foot and 2/4 on the Left Foot. CFT <Na seconds, Right Foot and <NA seconds on the Left Foot. Edema is minimal ,  Right Foot and absenton the Left Foot. Neurological:   Sensation absent  to light touch to level of digits, both feet. Musculoskeletal:   Muscle strength is 4/5 on the Right Foot and 4/5 on the Left Foot. Structural deformities are present on the Right Foot and present on the Left Foot. Integument:  Warm, dry, supple both feet. Incisions are healing well. Wound dehiscence is absent. Infection is absent. Wound #:   1    diabetic foot ulcer   right leg anterior ankle    Wound measurements:  #1  Healed      Wound #:   2    Diabetic foot ulcer  right foot    Wound measurements:  #1  healed      Wound #:   3    diabetic foot ulcer   left foot    Wound measurements:  #1  2cm by 0.7 cm  by   2 mm        Wound Depth: subcutaneous tissue    Drainage:    severe Type:serosanguinous  Wound Bed status:   Granulation Tissue: 60%   Necrotic 40%  Type: slough  Epithelialization 0%   Hypergranular 0%   Periwound hyperkeratosis:  absent  Erythema absent    Odor:no  Maceration:no  Undermining/tract/tunneling:no  Culture taken:   no                Assesment : Post operative progress stable. Diagnosis Orders   1. Status post transmetatarsal amputation of right foot ([address]        2. Status post transmetatarsal amputation of foot, left ([address]        3.  Ulcer of left foot, with fat layer exposed ([address]  VA DEBRIDEMENT, SKIN, SUB-Q TISSUE,=<20 SQ CM      4. Type 2 diabetes mellitus with foot ulcer, without long-term current use of insulin (HCC)  DC DEBRIDEMENT, SKIN, SUB-Q TISSUE,=<20 SQ CM            Plan: Procedure Note left foot  Indications:  Based on my examination of this patient's wound(s)/ulcer(s) today, debridement is required to promote healing and evaluate the wound base. Performed by: Bridgette Rivera DPM    Consent obtained:  Yes    Time out taken:  Yes    Pain Control:         Wound Location: right  Pre Debridement Measurements:  Are located in the Wound/Ulcer Documentation Flow Sheet    Wound/Ulcer #: left foot ulcerations    Procedure in Detail: Lidocaine gel soaked gauze was applied at beginning of wound evaluation. The wound(s) was excisionally debrided sharply of fibrotic, necrotic, and hyperkeratotic tissue, including a layer of surrounding healthy tissue using curette, scissors and forceps. The wound was debrided down through and including subcutaneous tissue. Percent of Wound Debrided: 100%    Total Surface Area Debrided:   sq cm     Bleeding: Minimal      Plan for wound  Dress per physician order  Treatment: silvercel to ulcerations both feet,   DSD  Discussed proper shoes today. Will write for diabetic shoes, with orthotics and toe filler, with carbon fiber foot plates bilateral.     Diabetic shoes and insoles: This patient would benefit from extra depth diabetic shoes and custom inserts. I feel he/she qualifies due to the above performed physical exam and diagnosis. I will do the appropriate paperwork and send it to their primary care physician. Then the patient will be measured for shoes and custom inserts to properly off load and protect his/her feet. Dressing Documentation    This patient needs a dressings to aid in healing of the ulceration.      I am prescribing silvercel, as the primary dressing  Sequence of dressing: silvercel, 4x4 gauze, kerlix roll, secure with tape  Frequency of change: every other day  Duration of supplies: 30 days    1) The ulceration has been surgically debrided on 09/21/22   2) The ulceration is located on the right ankle  3) The ulceration was assessed on 09/21/22   4) Type of ulceration:  diabetic  5) See above for size of ulceration(s)  6) Amount of drainage: moderate to severe    No weight       1. Discussed appropriate home care of this wound. 2. Dressings:   Orders Placed This Encounter   Procedures    FL DEBRIDEMENT, SKIN, SUB-Q TISSUE,=<20 SQ CM          3. Follow up: 4 week. 4. Detailed home instructions and education material given to patient prior to discharge. No orders of the defined types were placed in this encounter.

## 2022-09-21 NOTE — TELEPHONE ENCOUNTER
LAST VISIT:   4/4/2022     Future Appointments   Date Time Provider Tasha Prado   9/21/2022  2:45 PM Robson Melton DPM [address]   9/29/2022  2:00 PM MALISSA Castañeda - CNP [facility]

## 2022-09-29 ENCOUNTER — OFFICE VISIT (OUTPATIENT)
Dept: PRIMARY CARE CLINIC | Age: 57
End: 2022-09-29
Payer: MEDICARE

## 2022-09-29 VITALS
WEIGHT: 268 LBS | OXYGEN SATURATION: 96 % | SYSTOLIC BLOOD PRESSURE: 110 MMHG | HEART RATE: 106 BPM | BODY MASS INDEX: 36.35 KG/M2 | DIASTOLIC BLOOD PRESSURE: 76 MMHG

## 2022-09-29 DIAGNOSIS — G89.29 OTHER CHRONIC BACK PAIN: ICD-10-CM

## 2022-09-29 DIAGNOSIS — Z79.899 MEDICATION MANAGEMENT: Primary | ICD-10-CM

## 2022-09-29 DIAGNOSIS — M54.9 OTHER CHRONIC BACK PAIN: ICD-10-CM

## 2022-09-29 DIAGNOSIS — Z79.899 HIGH RISK MEDICATION USE: ICD-10-CM

## 2022-09-29 DIAGNOSIS — M54.12 RIGHT CERVICAL RADICULOPATHY: ICD-10-CM

## 2022-09-29 DIAGNOSIS — F11.20 UNCOMPLICATED OPIOID DEPENDENCE (HCC): ICD-10-CM

## 2022-09-29 LAB
6-ACETYLMORPHINE, UR: NORMAL
AMPHETAMINE SCREEN, URINE: NORMAL
BARBITURATE SCREEN, URINE: NORMAL
BENZODIAZEPINE SCREEN, URINE: NORMAL
CANNABINOID SCREEN URINE: NORMAL
COCAINE METABOLITE, URINE: NORMAL
CREATININE URINE: NORMAL
EDDP, URINE: NORMAL
ETHANOL URINE: NORMAL
MDMA URINE: NORMAL
METHADONE SCREEN, URINE: NORMAL
METHAMPHETAMINE, URINE: NORMAL
OPIATES, URINE: NORMAL
OXYCODONE: NORMAL
PCP: NORMAL
PH, URINE: NORMAL
PHENCYCLIDINE, URINE: NORMAL
PROPOXYPHENE, URINE: NORMAL
TRICYCLIC ANTIDEPRESSANTS, UR: NORMAL

## 2022-09-29 PROCEDURE — 99214 OFFICE O/P EST MOD 30 MIN: CPT | Performed by: NURSE PRACTITIONER

## 2022-09-29 PROCEDURE — 1036F TOBACCO NON-USER: CPT | Performed by: NURSE PRACTITIONER

## 2022-09-29 PROCEDURE — 3017F COLORECTAL CA SCREEN DOC REV: CPT | Performed by: NURSE PRACTITIONER

## 2022-09-29 PROCEDURE — G8417 CALC BMI ABV UP PARAM F/U: HCPCS | Performed by: NURSE PRACTITIONER

## 2022-09-29 PROCEDURE — G8427 DOCREV CUR MEDS BY ELIG CLIN: HCPCS | Performed by: NURSE PRACTITIONER

## 2022-09-29 RX ORDER — BUPRENORPHINE HYDROCHLORIDE AND NALOXONE HYDROCHLORIDE DIHYDRATE 8; 2 MG/1; MG/1
1 TABLET SUBLINGUAL 3 TIMES DAILY
Qty: 42 TABLET | Refills: 0 | Status: SHIPPED | OUTPATIENT
Start: 2022-09-29 | End: 2022-10-11 | Stop reason: SDUPTHER

## 2022-09-29 RX ORDER — BUPRENORPHINE HYDROCHLORIDE AND NALOXONE HYDROCHLORIDE DIHYDRATE 8; 2 MG/1; MG/1
1 TABLET SUBLINGUAL 3 TIMES DAILY
Qty: 42 TABLET | Refills: 0 | Status: SHIPPED | OUTPATIENT
Start: 2022-09-29 | End: 2022-09-29 | Stop reason: SDUPTHER

## 2022-09-29 SDOH — ECONOMIC STABILITY: FOOD INSECURITY: WITHIN THE PAST 12 MONTHS, YOU WORRIED THAT YOUR FOOD WOULD RUN OUT BEFORE YOU GOT MONEY TO BUY MORE.: NEVER TRUE

## 2022-09-29 SDOH — ECONOMIC STABILITY: FOOD INSECURITY: WITHIN THE PAST 12 MONTHS, THE FOOD YOU BOUGHT JUST DIDN'T LAST AND YOU DIDN'T HAVE MONEY TO GET MORE.: NEVER TRUE

## 2022-09-29 ASSESSMENT — ENCOUNTER SYMPTOMS
BACK PAIN: 1
NAUSEA: 1
DIARRHEA: 1
SHORTNESS OF BREATH: 0
COUGH: 0

## 2022-09-29 ASSESSMENT — SOCIAL DETERMINANTS OF HEALTH (SDOH): HOW HARD IS IT FOR YOU TO PAY FOR THE VERY BASICS LIKE FOOD, HOUSING, MEDICAL CARE, AND HEATING?: NOT HARD AT ALL

## 2022-09-29 NOTE — PROGRESS NOTES
Anemia     Anxiety     ARF (acute renal failure) (HCC)     Arthritis     lower back, knees    BPH (benign prostatic hyperplasia)     Chronic kidney disease     prostate    Depression     Diabetes mellitus (HCC)     Diabetic neuropathy (HCC)     GERD (gastroesophageal reflux disease)     Hyperglycemia     Hyperkalemia     Hypertension     Hypothyroidism     Impacted tooth     Necrosis of bone of foot (HCC)     Osteomyelitis of left foot (HCC)     PAD (peripheral artery disease) (HCC)     Pneumonia     Septic shock (HCC)     Type 2 diabetes mellitus ([facility]       Past Surgical History:   Procedure Laterality Date    AMPUTATION Right 01/21/2022    TRANSMETARSAL AMPUTATION FOOT, TENDOACHILLES LENGTHENING (Right Foot)    APPENDECTOMY      BACK SURGERY      Lower x 4. Had abscess after appendectomy.      FOOT AMPUTATION Left 1/4/2020    TRANSMETATARSAL FOOT AMPUTATION, REMOVAL OF FOREIGN BODY performed by Kobi Byrnes DPM at [facility] Right 2/25/2022    REVISION TRANSMETATARSAL AMPUTATION performed by Kobi Byrnes DPM at [address] [place] [address] Left 01/04/2020    TRANSMETATARSAL FOOT AMPUTATION, REMOVAL OF FOREIGN BODY    KNEE SURGERY Right     scope    TOE AMPUTATION Bilateral 3/6/2017    TOE AMPUTATION LEFT HALLUX AND 2ND DIGIT AND RIGHT 2ND DIGIT performed by Hayden Dc DPM at [facility] Right 1/21/2022    TRANSMETARSAL AMPUTATION FOOT, TENDOACHILLES LENGTHENING performed by Kobi Byrnes DPM at [place] [address] N/A 5/20/2019    EGD WITH BIOPSY performed by Kamilah Esparza MD at [address] [place] [address]        Family History   Problem Relation Age of Onset    Diabetes Father     Cancer Maternal Grandfather         Colon Cancer; Prostate Cancer    No Known Problems Mother        Social History     Tobacco Use    Smoking status: Never    Smokeless tobacco: Never   Substance Use Topics    Alcohol use: No      Current Outpatient each 5    Gauze Pads & Dressings (COMBINE ABD) 5\"X9\" PADS 1 Units by Does not apply route 2 times daily 60 each 5    Gauze Pads & Dressings (KERLIX GAUZE ROLL LARGE) MISC 1 Units by Does not apply route 2 times daily 60 each 5     No current facility-administered medications for this visit. Allergies   Allergen Reactions    Bactrim [Sulfamethoxazole-Trimethoprim]      reported renal failure    Fiorinal [Butalbital-Aspirin-Caffeine] Other (See Comments)     Generalized blisters    Statins Other (See Comments)     Muscle aches    Tylenol [Acetaminophen]      Pt states it shuts down his kidneys    Peanut Butter Flavor [Flavoring Agent] Nausea And Vomiting       Health Maintenance   Topic Date Due    COVID-19 Vaccine (1) Never done    HIV screen  Never done    Diabetic retinal exam  Never done    Shingles vaccine (1 of 2) Never done    Colorectal Cancer Screen  12/16/2020    Flu vaccine (1) 08/01/2022    Diabetic microalbuminuria test  09/03/2022    Lipids  11/17/2022    A1C test (Diabetic or Prediabetic)  02/26/2023    Depression Monitoring  04/04/2023    DTaP/Tdap/Td vaccine (2 - Td or Tdap) 04/24/2028    Pneumococcal 0-64 years Vaccine (3 - PPSV23 or PCV20) 06/04/2029    Hepatitis C screen  Completed    Hepatitis A vaccine  Aged Out    Hepatitis B vaccine  Aged Out    Hib vaccine  Aged Out    Meningococcal (ACWY) vaccine  Aged Out       Subjective:      Review of Systems   Constitutional:  Negative for fever. HENT:  Negative for congestion and ear pain. Respiratory:  Negative for cough and shortness of breath. Cardiovascular:  Negative for chest pain. Gastrointestinal:  Positive for diarrhea and nausea. Musculoskeletal:  Positive for arthralgias, back pain, gait problem and neck pain. Skin:  Negative for rash and wound. Psychiatric/Behavioral:  The patient is nervous/anxious.       Objective:     /76   Pulse (!) 106   Wt 268 lb (121.6 kg)   SpO2 96%   BMI 36.35 kg/m²   Physical 14 days. Dispense:  42 tablet     Refill:  0     NADEAN:  [id number]    buprenorphine-naloxone (SUBOXONE) 8-2 MG SUBL SL tablet     Sig: Place 1 tablet under the tongue 3 times daily for 14 days. Dispense:  42 tablet     Refill:  0     NADEAN:  [id number]       Patient given educational materials - see patient instructions. Discussed use, benefit, and side effects of prescribed medications. All patient questions answered. Pt voiced understanding. Reviewed health maintenance. Instructed to continue current medications, diet and exercise. Patient agreed with treatment plan. Follow up as directed.      Electronically signed by MALISSA Rubio CNP on 9/29/2022 at 2:46 PM

## 2022-09-30 DIAGNOSIS — F11.20 UNCOMPLICATED OPIOID DEPENDENCE (HCC): ICD-10-CM

## 2022-09-30 RX ORDER — ONDANSETRON 4 MG/1
4 TABLET, ORALLY DISINTEGRATING ORAL 3 TIMES DAILY PRN
Qty: 21 TABLET | Refills: 0 | Status: SHIPPED | OUTPATIENT
Start: 2022-09-30 | End: 2022-10-17

## 2022-09-30 RX ORDER — PROMETHAZINE HYDROCHLORIDE 25 MG/1
25 TABLET ORAL EVERY 6 HOURS PRN
Qty: 30 TABLET | Refills: 0 | Status: SHIPPED | OUTPATIENT
Start: 2022-09-30 | End: 2022-10-10

## 2022-09-30 RX ORDER — CLONIDINE HYDROCHLORIDE 0.1 MG/1
0.1 TABLET ORAL 2 TIMES DAILY
Qty: 36 TABLET | Refills: 0 | Status: SHIPPED | OUTPATIENT
Start: 2022-09-30 | End: 2022-10-17

## 2022-10-03 DIAGNOSIS — F11.20 UNCOMPLICATED OPIOID DEPENDENCE (HCC): ICD-10-CM

## 2022-10-03 DIAGNOSIS — Z79.899 MEDICATION MANAGEMENT: ICD-10-CM

## 2022-10-06 ENCOUNTER — OFFICE VISIT (OUTPATIENT)
Dept: PRIMARY CARE CLINIC | Age: 57
End: 2022-10-06
Payer: MEDICARE

## 2022-10-06 VITALS
WEIGHT: 271 LBS | SYSTOLIC BLOOD PRESSURE: 100 MMHG | OXYGEN SATURATION: 93 % | BODY MASS INDEX: 36.75 KG/M2 | DIASTOLIC BLOOD PRESSURE: 64 MMHG | HEART RATE: 102 BPM

## 2022-10-06 DIAGNOSIS — Z23 NEED FOR IMMUNIZATION AGAINST INFLUENZA: ICD-10-CM

## 2022-10-06 DIAGNOSIS — M54.12 RIGHT CERVICAL RADICULOPATHY: ICD-10-CM

## 2022-10-06 DIAGNOSIS — Z79.899 MEDICATION MANAGEMENT: ICD-10-CM

## 2022-10-06 DIAGNOSIS — F11.20 UNCOMPLICATED OPIOID DEPENDENCE (HCC): Primary | ICD-10-CM

## 2022-10-06 DIAGNOSIS — Z79.899 HIGH RISK MEDICATION USE: ICD-10-CM

## 2022-10-06 LAB
ALCOHOL URINE: ABNORMAL
AMPHETAMINE SCREEN, URINE: ABNORMAL
BARBITURATE SCREEN, URINE: ABNORMAL
BENZODIAZEPINE SCREEN, URINE: ABNORMAL
BUPRENORPHINE URINE: ABNORMAL
COCAINE METABOLITE SCREEN URINE: ABNORMAL
FENTANYL SCREEN, URINE: ABNORMAL
GABAPENTIN SCREEN, URINE: ABNORMAL
MDMA URINE: ABNORMAL
METHADONE SCREEN, URINE: ABNORMAL
METHAMPHETAMINE, URINE: ABNORMAL
OPIATE SCREEN URINE: ABNORMAL
OXYCODONE SCREEN URINE: ABNORMAL
PHENCYCLIDINE SCREEN URINE: ABNORMAL
PROPOXYPHENE SCREEN, URINE: ABNORMAL
SYNTHETIC CANNABINOIDS(K2) SCREEN, URINE: ABNORMAL
THC SCREEN, URINE: ABNORMAL
TRAMADOL SCREEN URINE: ABNORMAL
TRICYCLIC ANTIDEPRESSANTS, UR: ABNORMAL

## 2022-10-06 PROCEDURE — 99213 OFFICE O/P EST LOW 20 MIN: CPT | Performed by: NURSE PRACTITIONER

## 2022-10-06 PROCEDURE — 3017F COLORECTAL CA SCREEN DOC REV: CPT | Performed by: NURSE PRACTITIONER

## 2022-10-06 PROCEDURE — G8482 FLU IMMUNIZE ORDER/ADMIN: HCPCS | Performed by: NURSE PRACTITIONER

## 2022-10-06 PROCEDURE — 1036F TOBACCO NON-USER: CPT | Performed by: NURSE PRACTITIONER

## 2022-10-06 PROCEDURE — G8427 DOCREV CUR MEDS BY ELIG CLIN: HCPCS | Performed by: NURSE PRACTITIONER

## 2022-10-06 PROCEDURE — 90471 IMMUNIZATION ADMIN: CPT | Performed by: NURSE PRACTITIONER

## 2022-10-06 PROCEDURE — G8417 CALC BMI ABV UP PARAM F/U: HCPCS | Performed by: NURSE PRACTITIONER

## 2022-10-06 PROCEDURE — 90674 CCIIV4 VAC NO PRSV 0.5 ML IM: CPT | Performed by: NURSE PRACTITIONER

## 2022-10-06 PROCEDURE — 80305 DRUG TEST PRSMV DIR OPT OBS: CPT | Performed by: NURSE PRACTITIONER

## 2022-10-06 NOTE — PROGRESS NOTES
[address] [facility] PRIMARY CARE  [address]  [facility] [address]  Dept: Tahira Mace Gui Almanzar is a 61 y.o. male Established patient, who presents today for his medical conditions/complaints as noted below. Chief Complaint   Patient presents with    Medication Management     Patient is here today due to the results of his drug screen. He says something is wrong with it. He has not had any narcotics in 2 months. He would like to do both tests today. HPI:     HPI  He would like to do another drug test oral and urine. She states he has not had any opioid. Today he states he wants to stay on suboxone. On the last oral drug screen he tested positive for opioids. He is taking the 3 suboxone per day. He states he still has the pain in neck. No withdrawal symptoms after taking suboxone. He states he thinks he will have a hard time controlling the pain on any medications.   States he is shocked and angry about test.      Reviewed prior notes: None   Reviewed previous:  Labs    LDL Calculated (mg/dL)   Date Value   03/08/2020 74   03/29/2019 82       (goal LDL is <100)   AST (U/L)   Date Value   07/30/2022 17     ALT (U/L)   Date Value   07/30/2022 9     BUN (mg/dL)   Date Value   07/30/2022 16     Hemoglobin A1C (%)   Date Value   02/26/2022 6.3 (H)     TSH (mIU/L)   Date Value   02/26/2022 0.58     BP Readings from Last 3 Encounters:   10/06/22 100/64   09/29/22 110/76   07/30/22 121/75          (goal 120/80)    Past Medical History:   Diagnosis Date    Acute metabolic encephalopathy     CLAUDIA (acute kidney injury) (HCC)     Anemia     Anxiety     ARF (acute renal failure) (HCC)     Arthritis     lower back, knees    BPH (benign prostatic hyperplasia)     Chronic kidney disease     prostate    Depression     Diabetes mellitus (HCC)     Diabetic neuropathy (HCC)     GERD (gastroesophageal reflux disease)     Hyperglycemia     Hyperkalemia Hypertension     Hypothyroidism     Impacted tooth     Necrosis of bone of foot ([facility]     Osteomyelitis of left foot (HCC)     PAD (peripheral artery disease) (HCC)     Pneumonia     Septic shock (HCC)     Type 2 diabetes mellitus ([facility]       Past Surgical History:   Procedure Laterality Date    AMPUTATION Right 01/21/2022    TRANSMETARSAL AMPUTATION FOOT, TENDOACHILLES LENGTHENING (Right Foot)    APPENDECTOMY      BACK SURGERY      Lower x 4. Had abscess after appendectomy.      FOOT AMPUTATION Left 1/4/2020    TRANSMETATARSAL FOOT AMPUTATION, REMOVAL OF FOREIGN BODY performed by Lea Chau DPM at [facility] Right 2/25/2022    REVISION TRANSMETATARSAL AMPUTATION performed by Lea Chau DPM at [address] [place] [address] Left 01/04/2020    TRANSMETATARSAL FOOT AMPUTATION, REMOVAL OF FOREIGN BODY    KNEE SURGERY Right     scope    TOE AMPUTATION Bilateral 3/6/2017    TOE AMPUTATION LEFT HALLUX AND 2ND DIGIT AND RIGHT 2ND DIGIT performed by Keon Elizondo DPM at [address] [place] [address] Right 1/21/2022    TRANSMETARSAL AMPUTATION FOOT, TENDOACHILLES LENGTHENING performed by Lea Chau DPM at [facility] N/A 5/20/2019    EGD WITH BIOPSY performed by Gale Marquez MD at [address] History   Problem Relation Age of Onset    Diabetes Father     Cancer Maternal Grandfather         Colon Cancer; Prostate Cancer    No Known Problems Mother        Social History     Tobacco Use    Smoking status: Never    Smokeless tobacco: Never   Substance Use Topics    Alcohol use: No      Current Outpatient Medications   Medication Sig Dispense Refill    ondansetron (ZOFRAN-ODT) 4 MG disintegrating tablet Take 1 tablet by mouth 3 times daily as needed for Nausea or Vomiting 21 tablet 0    cloNIDine (CATAPRES) 0.1 MG tablet Take 1 tablet by mouth 2 times daily 36 tablet 0    promethazine (PHENERGAN) 25 MG tablet Take 1 tablet by mouth [Sulfamethoxazole-Trimethoprim]      reported renal failure    Fiorinal [Butalbital-Aspirin-Caffeine] Other (See Comments)     Generalized blisters    Statins Other (See Comments)     Muscle aches    Tylenol [Acetaminophen]      Pt states it shuts down his kidneys    Peanut Butter Flavor [Flavoring Agent] Nausea And Vomiting       Health Maintenance   Topic Date Due    COVID-19 Vaccine (1) Never done    HIV screen  Never done    Diabetic retinal exam  Never done    Shingles vaccine (1 of 2) Never done    Colorectal Cancer Screen  12/16/2020    Flu vaccine (1) 08/01/2022    Diabetic microalbuminuria test  09/03/2022    Lipids  11/17/2022    A1C test (Diabetic or Prediabetic)  02/26/2023    Depression Monitoring  04/04/2023    DTaP/Tdap/Td vaccine (2 - Td or Tdap) 04/24/2028    Pneumococcal 0-64 years Vaccine (3 - PPSV23 or PCV20) 06/04/2029    Hepatitis C screen  Completed    Hepatitis A vaccine  Aged Out    Hepatitis B vaccine  Aged Out    Hib vaccine  Aged Out    Meningococcal (ACWY) vaccine  Aged Out       Subjective:      Review of Systems  Constitutional:  Negative for fever. HENT:  Negative for congestion and ear pain. Respiratory:  Negative for cough and shortness of breath. Cardiovascular:  Negative for chest pain. Gastrointestinal:  Positive for diarrhea and nausea. Musculoskeletal:  Positive for arthralgias, back pain, gait problem and neck pain. Skin:  Negative for rash and wound. Psychiatric/Behavioral:  The patient is nervous/anxious  Objective:     /64   Pulse (!) 102   Wt 271 lb (122.9 kg)   SpO2 93%   BMI 36.75 kg/m²   Physical Exam  Vitals and nursing note reviewed. Constitutional:       Appearance: He is obese. HENT:      Head: Normocephalic and atraumatic. Right Ear: External ear normal.      Left Ear: External ear normal.   Cardiovascular:      Rate and Rhythm: Normal rate and regular rhythm. Heart sounds: Normal heart sounds.    Pulmonary:      Effort: Pulmonary effort is normal.      Breath sounds: Normal breath sounds. Abdominal:      General: Bowel sounds are normal.      Palpations: Abdomen is soft. Feet:      Comments: Bilateral toes amputated  Neurological:      Mental Status: He is alert and oriented to person, place, and time. Cranial Nerves: No cranial nerve deficit. Psychiatric:         Mood and Affect: Mood normal.         Behavior: Behavior normal.         Thought Content: Thought content normal.     Controlled substances monitoring: possible medication side effects, risk of tolerance and/or dependence, and alternative treatments discussed, signs of potential drug abuse or diversion identified- opioids and suboxone in oral swab test, OARRS report reviewed today- activity consistent with treatment plan, and random urine drug screen sent today. Assessment/Plan:   1. Uncomplicated opioid dependence ([facility]  2. Medication management  3. Right cervical radiculopathy  4. Need for immunization against influenza  -     Influenza, FLUCELVAX, (age 10 mo+), IM, Preservative Free, 0.5 mL  5. High risk medication use  -     POCT Rapid Drug Screen     Suboxone 8/2 mg tablets 3x/day  Redo drug screen urine and oral    Return in about 2 weeks (around 10/20/2022) for Suboxone check. Data Unavailable     Orders Placed This Encounter   Procedures    Influenza, FLUCELVAX, (age 10 mo+), IM, Preservative Free, 0.5 mL    POCT Rapid Drug Screen       No orders of the defined types were placed in this encounter. Patient given educational materials - see patient instructions. Discussed use, benefit, and side effects of prescribed medications. All patient questions answered. Pt voiced understanding. Reviewed health maintenance. Instructed to continue current medications, diet and exercise. Patient agreed with treatment plan. Follow up as directed.      Electronically signed by MALISSA Sanchez CNP on 10/6/2022 at 2:58 PM

## 2022-10-11 DIAGNOSIS — F11.20 UNCOMPLICATED OPIOID DEPENDENCE (HCC): ICD-10-CM

## 2022-10-11 RX ORDER — BUPRENORPHINE HYDROCHLORIDE AND NALOXONE HYDROCHLORIDE DIHYDRATE 8; 2 MG/1; MG/1
1 TABLET SUBLINGUAL 3 TIMES DAILY
Qty: 42 TABLET | Refills: 0 | Status: SHIPPED | OUTPATIENT
Start: 2022-10-13 | End: 2022-10-25 | Stop reason: SDUPTHER

## 2022-10-14 DIAGNOSIS — M54.12 RIGHT CERVICAL RADICULOPATHY: ICD-10-CM

## 2022-10-17 RX ORDER — ONDANSETRON 4 MG/1
TABLET, ORALLY DISINTEGRATING ORAL
Qty: 21 TABLET | Refills: 0 | Status: SHIPPED | OUTPATIENT
Start: 2022-10-17

## 2022-10-17 RX ORDER — CYCLOBENZAPRINE HCL 10 MG
TABLET ORAL
Qty: 30 TABLET | Refills: 0 | Status: SHIPPED | OUTPATIENT
Start: 2022-10-17

## 2022-10-17 RX ORDER — CLONIDINE HYDROCHLORIDE 0.1 MG/1
TABLET ORAL
Qty: 36 TABLET | Refills: 0 | Status: SHIPPED | OUTPATIENT
Start: 2022-10-17

## 2022-10-19 ENCOUNTER — HOSPITAL ENCOUNTER (OUTPATIENT)
Age: 57
Setting detail: SPECIMEN
Discharge: HOME | End: 2022-10-19

## 2022-10-19 ENCOUNTER — OFFICE VISIT (OUTPATIENT)
Dept: PODIATRY | Age: 57
End: 2022-10-19
Payer: MEDICARE

## 2022-10-19 VITALS — HEIGHT: 72 IN | WEIGHT: 271 LBS | BODY MASS INDEX: 36.7 KG/M2

## 2022-10-19 DIAGNOSIS — L97.522 ULCER OF LEFT FOOT, WITH FAT LAYER EXPOSED (HCC): ICD-10-CM

## 2022-10-19 DIAGNOSIS — E11.621 TYPE 2 DIABETES MELLITUS WITH FOOT ULCER, WITHOUT LONG-TERM CURRENT USE OF INSULIN (HCC): ICD-10-CM

## 2022-10-19 DIAGNOSIS — L97.509 TYPE 2 DIABETES MELLITUS WITH FOOT ULCER, WITHOUT LONG-TERM CURRENT USE OF INSULIN (HCC): ICD-10-CM

## 2022-10-19 DIAGNOSIS — Z89.431 STATUS POST TRANSMETATARSAL AMPUTATION OF RIGHT FOOT (HCC): Primary | ICD-10-CM

## 2022-10-19 DIAGNOSIS — Z91.199 NON COMPLIANCE WITH MEDICAL TREATMENT: ICD-10-CM

## 2022-10-19 DIAGNOSIS — Z89.432 STATUS POST TRANSMETATARSAL AMPUTATION OF FOOT, LEFT (HCC): ICD-10-CM

## 2022-10-19 DIAGNOSIS — E11.42 TYPE 2 DIABETES MELLITUS WITH DIABETIC POLYNEUROPATHY, WITHOUT LONG-TERM CURRENT USE OF INSULIN (HCC): ICD-10-CM

## 2022-10-19 DIAGNOSIS — Z89.431 STATUS POST TRANSMETATARSAL AMPUTATION OF RIGHT FOOT (HCC): ICD-10-CM

## 2022-10-19 DIAGNOSIS — L97.513 ULCERATED, FOOT, RIGHT, WITH NECROSIS OF MUSCLE (HCC): ICD-10-CM

## 2022-10-19 PROCEDURE — 3044F HG A1C LEVEL LT 7.0%: CPT | Performed by: PODIATRIST

## 2022-10-19 PROCEDURE — G8417 CALC BMI ABV UP PARAM F/U: HCPCS | Performed by: PODIATRIST

## 2022-10-19 PROCEDURE — G8482 FLU IMMUNIZE ORDER/ADMIN: HCPCS | Performed by: PODIATRIST

## 2022-10-19 PROCEDURE — 3017F COLORECTAL CA SCREEN DOC REV: CPT | Performed by: PODIATRIST

## 2022-10-19 PROCEDURE — 11043 DBRDMT MUSC&/FSCA 1ST 20/<: CPT | Performed by: PODIATRIST

## 2022-10-19 PROCEDURE — 99213 OFFICE O/P EST LOW 20 MIN: CPT | Performed by: PODIATRIST

## 2022-10-19 PROCEDURE — G8427 DOCREV CUR MEDS BY ELIG CLIN: HCPCS | Performed by: PODIATRIST

## 2022-10-19 PROCEDURE — 1036F TOBACCO NON-USER: CPT | Performed by: PODIATRIST

## 2022-10-19 PROCEDURE — 2022F DILAT RTA XM EVC RTNOPTHY: CPT | Performed by: PODIATRIST

## 2022-10-19 NOTE — PROGRESS NOTES
[facility]  Return Patient  Chief Complaint   Patient presents with    Wound Check     B/l wound fup, right foot has some drainage    Diabetes     Last blood sugar 133         Grecia Snell is a 61y.o. year old male who is here for follow up ulcerations bilateral feet, S/P TMA right and left. The left is now healed, right has opened up in a new place. He thinks its due to significant weight gain. I have ordered diabetic shoes in the past but he never followed up to get them. Wearing crocs. .  Vital signs are stable. Using Silvercel daily, maybe. Using a Q-tip to probe into the wound on the right. Getting some drainage. Pain level is 3. Patient denies N/V/F/C. Review of Systems    Vascular: DP and PT pulses palpable 2/4, Right Foot and 2/4 on the Left Foot. CFT <Na seconds, Right Foot and <NA seconds on the Left Foot. Edema is minimal ,  Right Foot and absenton the Left Foot. Neurological:   Sensation absent  to light touch to level of digits, both feet. Musculoskeletal:   Muscle strength is 4/5 on the Right Foot and 4/5 on the Left Foot. Structural deformities are present on the Right Foot and present on the Left Foot. Integument:  Warm, dry, supple both feet. Incisions are healing well. Wound dehiscence is absent. Infection is absent. Wound #:   1    diabetic foot ulcer   right leg anterior ankle    Wound measurements:  #1  Healed      Wound #:   2    diabetic foot ulcer   right foot along incision for the TMA    Wound measurements:  #1  3 mm by 2 mm  by   8 mm        Wound Depth: fascial and muscle.     Drainage:    moderate Type:serosanguinous  Wound Bed status:   Granulation Tissue: 40%   Necrotic 60%  Type: slough, fibrin  Epithelialization 0%   Hypergranular 0%   Periwound hyperkeratosis:  absent  Erythema absent    Odor:no  Maceration:no  Undermining/tract/tunneling:yes  Culture taken:   yes              Wound #:   3    diabetic foot ulcer   left foot    Wound measurements:  #1  healed    Radiographs: Three weightbearing views (AP, Oblique, and Lateral) of the right foot were obtained in the office today and reviewed, revealing no acute fracture, dislocation, or radioopaque foreign body/tumor. Overall alignment is satisfactory. Transmetatarsal amputation present with some bony regrowth of the second metatarsal and the fifth metatarsal. No erosive or lytic changes. Electronically signed by Dylan Solorio DPM       Assesment : Post operative progress stable. Diagnosis Orders   1. Status post transmetatarsal amputation of right foot ([address]        2. Status post transmetatarsal amputation of foot, left ([address]        3. Ulcer of left foot, with fat layer exposed ([address]        4. Type 2 diabetes mellitus with foot ulcer, without long-term current use of insulin ([address]        5. Non compliance with medical treatment        6. Type 2 diabetes mellitus with diabetic polyneuropathy, without long-term current use of insulin ([address]              Plan: Pt was evaluated and examined. Patient was given personalized discharge instructions. Pt will follow up in 1 months or sooner if any problems arise. Diagnosis was discussed with the pt and all of their questions were answered in detail. Proper foot hygiene and care was discussed with the pt. Patient to check feet daily and contact the office with any questions/problems/concerns. Other comorbidity noted and will be managed by PCP. Procedure Note right foot  Indications:  Based on my examination of this patient's wound(s)/ulcer(s) today, debridement is required to promote healing and evaluate the wound base.     Performed by: Dylan Solorio DPM    Consent obtained:  Yes    Time out taken:  Yes    Pain Control:         Wound Location: right  Pre Debridement Measurements:  Are located in the Wound/Ulcer Documentation Flow Sheet    Wound/Ulcer #: left foot ulcerations    Procedure in Detail: Lidocaine gel soaked gauze was applied at beginning of wound evaluation. The wound(s) was excisionally debrided sharply of fibrotic, necrotic, and hyperkeratotic tissue, including a layer of surrounding healthy tissue using curette, scissors and forceps. The wound was debrided down through and including muscle, fascia    Percent of Wound Debrided: 100%    Total Surface Area Debrided:   sq cm     Bleeding: Minimal      Plan for wound  Dress per physician order  Treatment: silvercel to ulcerations  right foot  DSD  Culture taken  Discussed proper shoes today. Will write for diabetic shoes again, with orthotics and toe filler, with carbon fiber foot plates bilateral.     Diabetic shoes and insoles: This patient would benefit from extra depth diabetic shoes and custom inserts. I feel he/she qualifies due to the above performed physical exam and diagnosis. I will do the appropriate paperwork and send it to their primary care physician. Then the patient will be measured for shoes and custom inserts to properly off load and protect his/her feet. Dressing Documentation    This patient needs a dressings to aid in healing of the ulceration. I am prescribing silvercel, as the primary dressing  Sequence of dressing: silvercel, 4x4 gauze, kerlix roll, secure with tape  Frequency of change: every other day  Duration of supplies: 30 days    1) The ulceration has been surgically debrided on 10/19/22   2) The ulceration is located on the right ankle  3) The ulceration was assessed on 10/19/22   4) Type of ulceration:  diabetic  5) See above for size of ulceration(s)  6) Amount of drainage: moderate to severe    No weight       1. Discussed appropriate home care of this wound. 2. Dressings:   No orders of the defined types were placed in this encounter. 3. Follow up: 4 week. 4. Detailed home instructions and education material given to patient prior to discharge.        No orders of the defined types were placed in this encounter.

## 2022-10-20 ENCOUNTER — OFFICE VISIT (OUTPATIENT)
Dept: PRIMARY CARE CLINIC | Age: 57
End: 2022-10-20
Payer: MEDICARE

## 2022-10-20 VITALS
OXYGEN SATURATION: 98 % | DIASTOLIC BLOOD PRESSURE: 72 MMHG | BODY MASS INDEX: 37.03 KG/M2 | HEART RATE: 71 BPM | SYSTOLIC BLOOD PRESSURE: 124 MMHG | WEIGHT: 273 LBS

## 2022-10-20 DIAGNOSIS — Z79.899 HIGH RISK MEDICATION USE: ICD-10-CM

## 2022-10-20 DIAGNOSIS — Z79.899 MEDICATION MANAGEMENT: ICD-10-CM

## 2022-10-20 DIAGNOSIS — F34.1 DYSTHYMIA: ICD-10-CM

## 2022-10-20 DIAGNOSIS — F11.20 UNCOMPLICATED OPIOID DEPENDENCE (HCC): Primary | ICD-10-CM

## 2022-10-20 PROCEDURE — G8417 CALC BMI ABV UP PARAM F/U: HCPCS | Performed by: NURSE PRACTITIONER

## 2022-10-20 PROCEDURE — 3017F COLORECTAL CA SCREEN DOC REV: CPT | Performed by: NURSE PRACTITIONER

## 2022-10-20 PROCEDURE — 1036F TOBACCO NON-USER: CPT | Performed by: NURSE PRACTITIONER

## 2022-10-20 PROCEDURE — 80305 DRUG TEST PRSMV DIR OPT OBS: CPT | Performed by: NURSE PRACTITIONER

## 2022-10-20 PROCEDURE — G8482 FLU IMMUNIZE ORDER/ADMIN: HCPCS | Performed by: NURSE PRACTITIONER

## 2022-10-20 PROCEDURE — 99213 OFFICE O/P EST LOW 20 MIN: CPT | Performed by: NURSE PRACTITIONER

## 2022-10-20 PROCEDURE — G8427 DOCREV CUR MEDS BY ELIG CLIN: HCPCS | Performed by: NURSE PRACTITIONER

## 2022-10-20 ASSESSMENT — ENCOUNTER SYMPTOMS
NAUSEA: 0
COUGH: 0
SHORTNESS OF BREATH: 0
CONSTIPATION: 1

## 2022-10-20 NOTE — PROGRESS NOTES
[address] [facility] PRIMARY CARE  [address] [facility] [address]  Dept: [address] Lisa Cárdenas is a 61 y.o. male Established patient, who presents today for his medical conditions/complaints as noted below. Chief Complaint   Patient presents with    Medication Management     He is here today to go over his last drug screening. HPI:     HPI  Last drug test was inconsistence with prescription medication. He has gained some weight. He is have some swelling in his feet and has a small open area. Both feet has open areas. He saw Dr. Martha Page yesterday. Dr. Martha Page did x-ray. Dr. Martha Page did a wound culture    Pain is increased  Suboxone does nothing for pain  He is smoking some marijuana for pain. States he has not had any opioid medications for months. He states marijuana comes from legal dispenser. States he would not need Suboxone if he was taking oxycodone.     Reviewed prior notes: None   Reviewed previous:  Labs    LDL Calculated (mg/dL)   Date Value   03/08/2020 74   03/29/2019 82       (goal LDL is <100)   AST (U/L)   Date Value   07/30/2022 17     ALT (U/L)   Date Value   07/30/2022 9     BUN (mg/dL)   Date Value   07/30/2022 16     Hemoglobin A1C (%)   Date Value   02/26/2022 6.3 (H)     TSH (mIU/L)   Date Value   02/26/2022 0.58     BP Readings from Last 3 Encounters:   10/20/22 124/72   10/06/22 100/64   09/29/22 110/76          (goal 120/80)    Past Medical History:   Diagnosis Date    Acute metabolic encephalopathy     CLAUDIA (acute kidney injury) (HCC)     Anemia     Anxiety     ARF (acute renal failure) (HCC)     Arthritis     lower back, knees    BPH (benign prostatic hyperplasia)     Chronic kidney disease     prostate    Depression     Diabetes mellitus (HCC)     Diabetic neuropathy (HCC)     GERD (gastroesophageal reflux disease)     Hyperglycemia     Hyperkalemia     Hypertension     Hypothyroidism     Impacted tooth Necrosis of bone of foot ([facility] [address]     Osteomyelitis of left foot ([facility] [address]     PAD (peripheral artery disease) (HCC)     Pneumonia     Septic shock (HCC)     Type 2 diabetes mellitus ([facility] [address]       Past Surgical History:   Procedure Laterality Date    AMPUTATION Right 01/21/2022    TRANSMETARSAL AMPUTATION FOOT, TENDOACHILLES LENGTHENING (Right Foot)    APPENDECTOMY      BACK SURGERY      Lower x 4. Had abscess after appendectomy.      FOOT AMPUTATION Left 1/4/2020    TRANSMETATARSAL FOOT AMPUTATION, REMOVAL OF FOREIGN BODY performed by Felisa Lay DPM at [facility] Right 2/25/2022    REVISION TRANSMETATARSAL AMPUTATION performed by Felisa Lay DPM at [address] [place] [address] Left 01/04/2020    TRANSMETATARSAL FOOT AMPUTATION, REMOVAL OF FOREIGN BODY    KNEE SURGERY Right     scope    TOE AMPUTATION Bilateral 3/6/2017    TOE AMPUTATION LEFT HALLUX AND 2ND DIGIT AND RIGHT 2ND DIGIT performed by Holley Vazquez DPM at [address] [facility] Right 1/21/2022    TRANSMETARSAL AMPUTATION FOOT, TENDOACHILLES LENGTHENING performed by Felisa Lay DPM at [facility] N/A 5/20/2019    EGD WITH BIOPSY performed by Chinyere Molina MD at [address]       Family History   Problem Relation Age of Onset    Diabetes Father     Cancer Maternal Grandfather         Colon Cancer; Prostate Cancer    No Known Problems Mother        Social History     Tobacco Use    Smoking status: Never    Smokeless tobacco: Never   Substance Use Topics    Alcohol use: No      Current Outpatient Medications   Medication Sig Dispense Refill    cyclobenzaprine (FLEXERIL) 10 MG tablet TAKE ONE TABLET BY MOUTH TWICE A DAY AS NEEDED FOR MUSCLE SPASMS 30 tablet 0    ondansetron (ZOFRAN-ODT) 4 MG disintegrating tablet DISSOLVE ONE TABLET BY MOUTH THREE TIMES A DAY AS NEEDED FOR NAUSEA OR VOMITING 21 tablet 0    cloNIDine (CATAPRES) 0.1 MG tablet TAKE ONE TABLET BY MOUTH TWICE A DAY 36 tablet 0    buprenorphine-naloxone (SUBOXONE) 8-2 MG SUBL SL tablet Place 1 tablet under the tongue 3 times daily for 14 days. 42 tablet 0    diazePAM (VALIUM) 5 MG tablet TAKE ONE TABLET BY MOUTH EVERY 8 HOURS AS NEEDED FOR ANXIETY 90 tablet 0    ONETOUCH ULTRA strip TEST TWO TIMES A DAY AS NEEDED 50 strip 0    Lancets (ONETOUCH DELICA PLUS [id number]) MISC USE TO TEST TWICE A  each 5    PARoxetine (PAXIL) 20 MG tablet TAKE FOUR TABLETS BY MOUTH EVERY MORNING 120 tablet 5    ferrous sulfate (FE TABS 325) 325 (65 Fe) MG EC tablet Take 1 tablet by mouth daily (with breakfast) 90 tablet 3    levothyroxine (SYNTHROID) 50 MCG tablet Take 1 tablet by mouth daily 90 tablet 3    fluocinonide (LIDEX) 0.05 % ointment Apply 1 gm  topically 2 times daily to legs 60 g 5    sodium hypochlorite (DAKINS) 0.125 % SOLN external solution APPLY TOPICALLY EVERY 12 HOURS. APPLY AS A WET TO DRY DRESSING TWO TIMES A DAY, CAN ALSO USE TO CLEANSE WOUND 437 mL 1    metFORMIN (GLUCOPHAGE) 500 MG tablet Take 0.5 tablets by mouth daily (with breakfast) 45 tablet 3    NARCAN 4 MG/0.1ML LIQD nasal spray 4 mg by Nasal route as needed for Opioid Reversal       lactobacillus (CULTURELLE) capsule Take 1 capsule by mouth 2 times daily (with meals) 60 capsule 0    pantoprazole (PROTONIX) 40 MG tablet Take 1 tablet by mouth every morning (before breakfast) 30 tablet 11    povidone-iodine (BETADINE) 7.5 % external solution Apply to wound. Apply as a wet to dry dressing 1 Bottle 1    Wound Dressings (ADAPTIC NON-ADHERING DRESSING) PADS Apply 1 Units topically 2 times daily 60 each 5    Gauze Pads & Dressings (COMBINE ABD) 5\"X9\" PADS 1 Units by Does not apply route 2 times daily 60 each 5    Gauze Pads & Dressings (KERLIX GAUZE ROLL LARGE) [facility] 1 Units by Does not apply route 2 times daily 60 each 5     No current facility-administered medications for this visit.      Allergies   Allergen Reactions    Bactrim [Sulfamethoxazole-Trimethoprim] reported renal failure    Fiorinal [Butalbital-Aspirin-Caffeine] Other (See Comments)     Generalized blisters    Statins Other (See Comments)     Muscle aches    Tylenol [Acetaminophen]      Pt states it shuts down his kidneys    Peanut Butter Flavor [Flavoring Agent] Nausea And Vomiting       Health Maintenance   Topic Date Due    COVID-19 Vaccine (1) Never done    HIV screen  Never done    Diabetic retinal exam  Never done    Hepatitis B vaccine (1 of 3 - Risk 3-dose series) Never done    Shingles vaccine (1 of 2) Never done    Colorectal Cancer Screen  12/16/2020    Diabetic microalbuminuria test  09/03/2022    Lipids  11/17/2022    A1C test (Diabetic or Prediabetic)  02/26/2023    Depression Monitoring  04/04/2023    DTaP/Tdap/Td vaccine (2 - Td or Tdap) 04/24/2028    Pneumococcal 0-64 years Vaccine (3 - PPSV23 or PCV20) 06/04/2029    Flu vaccine  Completed    Hepatitis C screen  Completed    Hepatitis A vaccine  Aged Out    Hib vaccine  Aged Out    Meningococcal (ACWY) vaccine  Aged Out       Subjective:      Review of Systems   Constitutional:  Negative for chills and fever. Respiratory:  Negative for cough and shortness of breath. Gastrointestinal:  Positive for constipation. Negative for nausea. Takes stool softener     Genitourinary:  Negative for difficulty urinating and dysuria. Musculoskeletal:  Positive for gait problem. Foot pain   Skin:  Positive for wound. Negative for rash. Neurological:  Negative for dizziness and light-headedness. Psychiatric/Behavioral:  Positive for sleep disturbance (worse with suboxone). The patient is nervous/anxious. Objective:     /72   Pulse 71   Wt 273 lb (123.8 kg)   SpO2 98%   BMI 37.03 kg/m²   Physical Exam  Vitals and nursing note reviewed. Constitutional:       General: He is not in acute distress. Appearance: He is well-developed. He is not ill-appearing. HENT:      Head: Normocephalic and atraumatic.       Right Ear: External ear normal.      Left Ear: External ear normal.   Eyes:      General: No scleral icterus. Right eye: No discharge. Left eye: No discharge. Conjunctiva/sclera: Conjunctivae normal.   Neck:      Thyroid: No thyromegaly. Trachea: No tracheal deviation. Cardiovascular:      Rate and Rhythm: Normal rate and regular rhythm. Heart sounds: Normal heart sounds. Pulmonary:      Effort: Pulmonary effort is normal. No respiratory distress. Breath sounds: Normal breath sounds. No wheezing. Abdominal:      General: Bowel sounds are normal.      Palpations: Abdomen is soft. Musculoskeletal:      Cervical back: Decreased range of motion. Right lower leg: Edema present. Feet:      Comments: Right foot 5 toes amputated. Lymphadenopathy:      Cervical: No cervical adenopathy. Skin:     General: Skin is warm. Findings: No rash. Neurological:      Mental Status: He is alert and oriented to person, place, and time. Psychiatric:         Mood and Affect: Mood normal.         Behavior: Behavior normal.         Thought Content: Thought content normal.       Assessment/Plan:   1. Uncomplicated opioid dependence ([address]  2. Medication management  3. High risk medication use  -     POCT Rapid Drug Screen     Send urine out for further testing. Return in about 3 weeks (around 11/10/2022) for opioid dependence. Data Unavailable     Orders Placed This Encounter   Procedures    POCT Rapid Drug Screen     No orders of the defined types were placed in this encounter. Patient given educational materials - see patient instructions. Discussed use, benefit, and side effects of prescribed medications. All patient questions answered. Pt voiced understanding. Reviewed health maintenance. Instructed to continue current medications, diet and exercise. Patient agreed with treatment plan. Follow up as directed.      Electronically signed by MALISSA Pierre CNP on 10/20/2022 at 2:49 PM

## 2022-10-21 LAB
CULTURE: ABNORMAL
CULTURE: ABNORMAL
DIRECT EXAM: ABNORMAL
DIRECT EXAM: ABNORMAL
SPECIMEN DESCRIPTION: ABNORMAL

## 2022-10-21 RX ORDER — DIAZEPAM 5 MG/1
TABLET ORAL
Qty: 90 TABLET | Refills: 0 | Status: SHIPPED | OUTPATIENT
Start: 2022-10-21 | End: 2022-11-21

## 2022-10-21 RX ORDER — PAROXETINE HYDROCHLORIDE 20 MG/1
TABLET, FILM COATED ORAL
Qty: 120 TABLET | Refills: 5 | Status: SHIPPED | OUTPATIENT
Start: 2022-10-21

## 2022-10-25 DIAGNOSIS — F11.20 UNCOMPLICATED OPIOID DEPENDENCE (HCC): ICD-10-CM

## 2022-10-25 RX ORDER — DOXYCYCLINE HYCLATE 100 MG/1
100 CAPSULE ORAL 2 TIMES DAILY
Qty: 28 CAPSULE | Refills: 0 | Status: SHIPPED | OUTPATIENT
Start: 2022-10-25 | End: 2022-11-08

## 2022-10-25 RX ORDER — BUPRENORPHINE HYDROCHLORIDE AND NALOXONE HYDROCHLORIDE DIHYDRATE 8; 2 MG/1; MG/1
1 TABLET SUBLINGUAL 3 TIMES DAILY
Qty: 42 TABLET | Refills: 0 | Status: SHIPPED | OUTPATIENT
Start: 2022-10-25 | End: 2022-10-25 | Stop reason: SDUPTHER

## 2022-10-25 RX ORDER — BUPRENORPHINE HYDROCHLORIDE AND NALOXONE HYDROCHLORIDE DIHYDRATE 8; 2 MG/1; MG/1
1 TABLET SUBLINGUAL 3 TIMES DAILY
Qty: 42 TABLET | Refills: 0 | Status: SHIPPED | OUTPATIENT
Start: 2022-10-25 | End: 2022-11-07 | Stop reason: SDUPTHER

## 2022-10-25 NOTE — TELEPHONE ENCOUNTER
LAST VISIT:   10/20/2022     Future Appointments   Date Time Provider Tasha Prado   11/10/2022  2:15 PM MALISSA Joseph - CNP [facility]   11/16/2022  2:30 PM Shirley Collado, [address] [place], [place] [address]shawna [address]

## 2022-10-25 NOTE — TELEPHONE ENCOUNTER
----- Message from Perri Cole DPM sent at 10/24/2022 10:43 AM EDT -----  Please send Doxy  ----- Message -----  From: Laurent Conde Incoming Lab Results From [facility]  Sent: 10/24/2022   8:22 AM EDT  To: Perri Cole DPM

## 2022-11-04 DIAGNOSIS — F11.20 UNCOMPLICATED OPIOID DEPENDENCE (HCC): ICD-10-CM

## 2022-11-07 DIAGNOSIS — F11.20 UNCOMPLICATED OPIOID DEPENDENCE (HCC): ICD-10-CM

## 2022-11-07 DIAGNOSIS — M54.12 RIGHT CERVICAL RADICULOPATHY: ICD-10-CM

## 2022-11-07 RX ORDER — PROMETHAZINE HYDROCHLORIDE 25 MG/1
TABLET ORAL
Qty: 30 TABLET | Refills: 0 | Status: SHIPPED | OUTPATIENT
Start: 2022-11-07 | End: 2022-11-25 | Stop reason: SDUPTHER

## 2022-11-07 RX ORDER — BUPRENORPHINE HYDROCHLORIDE AND NALOXONE HYDROCHLORIDE DIHYDRATE 8; 2 MG/1; MG/1
1 TABLET SUBLINGUAL 3 TIMES DAILY
Qty: 42 TABLET | Refills: 0 | Status: SHIPPED | OUTPATIENT
Start: 2022-11-07 | End: 2022-11-21 | Stop reason: SDUPTHER

## 2022-11-07 RX ORDER — CYCLOBENZAPRINE HCL 10 MG
10 TABLET ORAL 2 TIMES DAILY PRN
Qty: 30 TABLET | Refills: 0 | Status: SHIPPED | OUTPATIENT
Start: 2022-11-07 | End: 2022-11-25 | Stop reason: SDUPTHER

## 2022-11-07 RX ORDER — ONDANSETRON 4 MG/1
TABLET, ORALLY DISINTEGRATING ORAL
Qty: 21 TABLET | Refills: 0 | Status: SHIPPED | OUTPATIENT
Start: 2022-11-07 | End: 2022-11-25 | Stop reason: SDUPTHER

## 2022-11-07 RX ORDER — PROMETHAZINE HYDROCHLORIDE 25 MG/1
25 TABLET ORAL EVERY 6 HOURS PRN
Qty: 30 TABLET | Refills: 0 | OUTPATIENT
Start: 2022-11-07

## 2022-11-10 ENCOUNTER — OFFICE VISIT (OUTPATIENT)
Dept: PRIMARY CARE CLINIC | Age: 57
End: 2022-11-10
Payer: MEDICARE

## 2022-11-10 VITALS
DIASTOLIC BLOOD PRESSURE: 64 MMHG | WEIGHT: 281 LBS | OXYGEN SATURATION: 100 % | HEART RATE: 92 BPM | SYSTOLIC BLOOD PRESSURE: 100 MMHG | BODY MASS INDEX: 38.11 KG/M2

## 2022-11-10 DIAGNOSIS — E03.9 ACQUIRED HYPOTHYROIDISM: ICD-10-CM

## 2022-11-10 DIAGNOSIS — R53.83 OTHER FATIGUE: ICD-10-CM

## 2022-11-10 DIAGNOSIS — R60.0 LOCALIZED EDEMA: ICD-10-CM

## 2022-11-10 DIAGNOSIS — Z13.220 ENCOUNTER FOR LIPID SCREENING FOR CARDIOVASCULAR DISEASE: ICD-10-CM

## 2022-11-10 DIAGNOSIS — Z13.6 ENCOUNTER FOR LIPID SCREENING FOR CARDIOVASCULAR DISEASE: ICD-10-CM

## 2022-11-10 DIAGNOSIS — Z79.899 MEDICATION MANAGEMENT: Primary | ICD-10-CM

## 2022-11-10 PROCEDURE — 3078F DIAST BP <80 MM HG: CPT | Performed by: NURSE PRACTITIONER

## 2022-11-10 PROCEDURE — 3074F SYST BP LT 130 MM HG: CPT | Performed by: NURSE PRACTITIONER

## 2022-11-10 PROCEDURE — 80305 DRUG TEST PRSMV DIR OPT OBS: CPT | Performed by: NURSE PRACTITIONER

## 2022-11-10 PROCEDURE — G8482 FLU IMMUNIZE ORDER/ADMIN: HCPCS | Performed by: NURSE PRACTITIONER

## 2022-11-10 PROCEDURE — 3017F COLORECTAL CA SCREEN DOC REV: CPT | Performed by: NURSE PRACTITIONER

## 2022-11-10 PROCEDURE — G8417 CALC BMI ABV UP PARAM F/U: HCPCS | Performed by: NURSE PRACTITIONER

## 2022-11-10 PROCEDURE — G8427 DOCREV CUR MEDS BY ELIG CLIN: HCPCS | Performed by: NURSE PRACTITIONER

## 2022-11-10 PROCEDURE — 99214 OFFICE O/P EST MOD 30 MIN: CPT | Performed by: NURSE PRACTITIONER

## 2022-11-10 PROCEDURE — 1036F TOBACCO NON-USER: CPT | Performed by: NURSE PRACTITIONER

## 2022-11-10 RX ORDER — FUROSEMIDE 20 MG/1
20 TABLET ORAL DAILY
Qty: 30 TABLET | Refills: 0 | Status: SHIPPED | OUTPATIENT
Start: 2022-11-10

## 2022-11-10 ASSESSMENT — ENCOUNTER SYMPTOMS
COUGH: 0
CONSTIPATION: 1
BACK PAIN: 1
SINUS PAIN: 0
NAUSEA: 0
SHORTNESS OF BREATH: 1
DIARRHEA: 0

## 2022-11-10 NOTE — PROGRESS NOTES
[address] [facility] PRIMARY CARE  [address] [facility] [address]  Dept: Tahira Mace Apoorva Benavides is a 61 y.o. male Established patient, who presents today for his medical conditions/complaints as noted below. Chief Complaint   Patient presents with    Medication Check     Here today for check on suboxone. Says he has gained quite a bit of weight since being on the Suboxone. Also having some swelling from the waist down, the last surgery site from Dr. Adrianna Smith is stretching the sutures and he is concerned. HPI:     HPI  Weight it up 8 lbs in the past month. He states that he is not eating much. However he is not very active. He states he is only having small bowel movements. He gets bloated. He does not think he is emptying. He is taking some stool softeners. He is requesting blood work due to weight gain and edema  He is taking suboxone 3 times per day at different times per day.   No street drugs      Reviewed prior notes: None   Reviewed previous:        LDL Calculated (mg/dL)   Date Value   03/08/2020 74   03/29/2019 82       (goal LDL is <100)   AST (U/L)   Date Value   07/30/2022 17     ALT (U/L)   Date Value   07/30/2022 9     BUN (mg/dL)   Date Value   07/30/2022 16     Hemoglobin A1C (%)   Date Value   02/26/2022 6.3 (H)     TSH (mIU/L)   Date Value   02/26/2022 0.58     BP Readings from Last 3 Encounters:   11/10/22 100/64   10/20/22 124/72   10/06/22 100/64          (goal 120/80)    Past Medical History:   Diagnosis Date    Acute metabolic encephalopathy     CLAUDIA (acute kidney injury) (HCC)     Anemia     Anxiety     ARF (acute renal failure) (HCC)     Arthritis     lower back, knees    BPH (benign prostatic hyperplasia)     Chronic kidney disease     prostate    Depression     Diabetes mellitus (HCC)     Diabetic neuropathy (HCC)     GERD (gastroesophageal reflux disease)     Hyperglycemia     Hyperkalemia     Hypertension Hypothyroidism     Impacted tooth     Necrosis of bone of foot (HCC)     Osteomyelitis of left foot (HCC)     PAD (peripheral artery disease) (HCC)     Pneumonia     Septic shock ([facility] [address]     Type 2 diabetes mellitus ([facility] [address]       Past Surgical History:   Procedure Laterality Date    AMPUTATION Right 01/21/2022    TRANSMETARSAL AMPUTATION FOOT, TENDOACHILLES LENGTHENING (Right Foot)    APPENDECTOMY      BACK SURGERY      Lower x 4. Had abscess after appendectomy.      FOOT AMPUTATION Left 1/4/2020    TRANSMETATARSAL FOOT AMPUTATION, REMOVAL OF FOREIGN BODY performed by Antony Kidd DPM at [facility] Right 2/25/2022    REVISION TRANSMETATARSAL AMPUTATION performed by Antony Kidd DPM at [address] [place] [address] Left 01/04/2020    TRANSMETATARSAL FOOT AMPUTATION, REMOVAL OF FOREIGN BODY    KNEE SURGERY Right     scope    TOE AMPUTATION Bilateral 3/6/2017    TOE AMPUTATION LEFT HALLUX AND 2ND DIGIT AND RIGHT 2ND DIGIT performed by Annie Allan DPM at [address] [facility] Right 1/21/2022    TRANSMETARSAL AMPUTATION FOOT, TENDOACHILLES LENGTHENING performed by Antony Kidd DPM at [facility] [address] N/A 5/20/2019    EGD WITH BIOPSY performed by Yaw Plunkett MD at [address] History   Problem Relation Age of Onset    Diabetes Father     Cancer Maternal Grandfather         Colon Cancer; Prostate Cancer    No Known Problems Mother        Social History     Tobacco Use    Smoking status: Never    Smokeless tobacco: Never   Substance Use Topics    Alcohol use: No      Current Outpatient Medications   Medication Sig Dispense Refill    furosemide (LASIX) 20 MG tablet Take 1 tablet by mouth daily 30 tablet 0    promethazine (PHENERGAN) 25 MG tablet TAKE ONE TABLET BY MOUTH EVERY 6 HOURS AS NEEDED FOR NAUSEA 30 tablet 0    ondansetron (ZOFRAN-ODT) 4 MG disintegrating tablet DISSOLVE ONE TABLET BY MOUTH THREE TIMES A DAY AS NEEDED FOR NAUSEA OR VOMITING 21 tablet 0    buprenorphine-naloxone (SUBOXONE) 8-2 MG SUBL SL tablet Place 1 tablet under the tongue 3 times daily for 14 days. 42 tablet 0    cyclobenzaprine (FLEXERIL) 10 MG tablet Take 1 tablet by mouth 2 times daily as needed for Muscle spasms 30 tablet 0    diazePAM (VALIUM) 5 MG tablet TAKE ONE TABLET BY MOUTH EVERY 8 HOURS AS NEEDED FOR ANXIETY 90 tablet 0    PARoxetine (PAXIL) 20 MG tablet TAKE FOUR TABLETS BY MOUTH EVERY MORNING 120 tablet 5    cloNIDine (CATAPRES) 0.1 MG tablet TAKE ONE TABLET BY MOUTH TWICE A DAY 36 tablet 0    ONETOUCH ULTRA strip TEST TWO TIMES A DAY AS NEEDED 50 strip 0    Lancets (ONETOUCH DELICA PLUS [id number]) MISC USE TO TEST TWICE A  each 5    ferrous sulfate (FE TABS 325) 325 (65 Fe) MG EC tablet Take 1 tablet by mouth daily (with breakfast) 90 tablet 3    levothyroxine (SYNTHROID) 50 MCG tablet Take 1 tablet by mouth daily 90 tablet 3    fluocinonide (LIDEX) 0.05 % ointment Apply 1 gm  topically 2 times daily to legs 60 g 5    sodium hypochlorite (DAKINS) 0.125 % SOLN external solution APPLY TOPICALLY EVERY 12 HOURS. APPLY AS A WET TO DRY DRESSING TWO TIMES A DAY, CAN ALSO USE TO CLEANSE WOUND 437 mL 1    metFORMIN (GLUCOPHAGE) 500 MG tablet Take 0.5 tablets by mouth daily (with breakfast) 45 tablet 3    NARCAN 4 MG/0.1ML LIQD nasal spray 4 mg by Nasal route as needed for Opioid Reversal       lactobacillus (CULTURELLE) capsule Take 1 capsule by mouth 2 times daily (with meals) 60 capsule 0    pantoprazole (PROTONIX) 40 MG tablet Take 1 tablet by mouth every morning (before breakfast) 30 tablet 11    povidone-iodine (BETADINE) 7.5 % external solution Apply to wound. Apply as a wet to dry dressing 1 Bottle 1    Wound Dressings (ADAPTIC NON-ADHERING DRESSING) PADS Apply 1 Units topically 2 times daily 60 each 5    Gauze Pads & Dressings (COMBINE ABD) 5\"X9\" PADS 1 Units by Does not apply route 2 times daily 60 each 5    Gauze Pads & Dressings [facility] GAUZE ROLL LARGE) MISC 1 Units by Does not apply route 2 times daily 60 each 5     No current facility-administered medications for this visit. Allergies   Allergen Reactions    Bactrim [Sulfamethoxazole-Trimethoprim]      reported renal failure    Fiorinal [Butalbital-Aspirin-Caffeine] Other (See Comments)     Generalized blisters    Statins Other (See Comments)     Muscle aches    Tylenol [Acetaminophen]      Pt states it shuts down his kidneys    Peanut Butter Flavor [Flavoring Agent] Nausea And Vomiting       Health Maintenance   Topic Date Due    COVID-19 Vaccine (1) Never done    HIV screen  Never done    Diabetic retinal exam  Never done    Hepatitis B vaccine (1 of 3 - Risk 3-dose series) Never done    Shingles vaccine (1 of 2) Never done    Colorectal Cancer Screen  12/16/2020    Diabetic microalbuminuria test  09/03/2022    Lipids  11/17/2022    A1C test (Diabetic or Prediabetic)  02/26/2023    Depression Monitoring  04/04/2023    DTaP/Tdap/Td vaccine (2 - Td or Tdap) 04/24/2028    Pneumococcal 0-64 years Vaccine (3 - PPSV23 if available, else PCV20) 06/04/2029    Flu vaccine  Completed    Hepatitis C screen  Completed    Hepatitis A vaccine  Aged Out    Hib vaccine  Aged Out    Meningococcal (ACWY) vaccine  Aged Out       Subjective:      Review of Systems   Constitutional:  Positive for fatigue. Negative for activity change and chills. HENT:  Negative for congestion, ear pain and sinus pain. Respiratory:  Positive for shortness of breath. Negative for cough. Cardiovascular:  Positive for leg swelling. Negative for chest pain and palpitations. Gastrointestinal:  Positive for constipation. Negative for diarrhea and nausea. Endocrine: Negative for polydipsia, polyphagia and polyuria. Genitourinary:  Negative for difficulty urinating, dysuria and frequency. Musculoskeletal:  Positive for arthralgias, back pain and gait problem. Neurological:  Positive for weakness.  Negative for headaches. Psychiatric/Behavioral:  Positive for sleep disturbance. Negative for confusion and dysphoric mood. Objective:     /64   Pulse 92   Wt 281 lb (127.5 kg)   SpO2 100%   BMI 38.11 kg/m²   Physical Exam  Vitals and nursing note reviewed. Constitutional:       General: He is not in acute distress. Appearance: He is well-developed. He is obese. He is not ill-appearing. HENT:      Head: Normocephalic and atraumatic. Right Ear: External ear normal.      Left Ear: External ear normal.   Eyes:      General: No scleral icterus. Right eye: No discharge. Left eye: No discharge. Conjunctiva/sclera: Conjunctivae normal.   Neck:      Thyroid: No thyromegaly. Trachea: No tracheal deviation. Cardiovascular:      Rate and Rhythm: Normal rate and regular rhythm. Heart sounds: Normal heart sounds. Pulmonary:      Effort: Pulmonary effort is normal. No respiratory distress. Breath sounds: Normal breath sounds. No wheezing. Musculoskeletal:      Lumbar back: Tenderness present. Right lower le+ Pitting Edema present. Left lower le+ Pitting Edema present. Lymphadenopathy:      Cervical: No cervical adenopathy. Skin:     General: Skin is warm. Findings: No rash. Neurological:      Mental Status: He is alert and oriented to person, place, and time. Psychiatric:         Mood and Affect: Mood normal.         Behavior: Behavior normal.         Thought Content: Thought content normal.       Assessment/Plan:   1. Medication management  -     POCT Rapid Drug Screen  2. Other fatigue  -     CBC with Auto Differential; Future  -     Comprehensive Metabolic Panel, Fasting; Future  -     TSH With Reflex Ft4; Future  3. Localized edema  -     CBC with Auto Differential; Future  -     Comprehensive Metabolic Panel, Fasting; Future  -     furosemide (LASIX) 20 MG tablet; Take 1 tablet by mouth daily, Disp-30 tablet, R-0Normal  4.  Acquired hypothyroidism  -     CBC with Auto Differential; Future  -     Comprehensive Metabolic Panel, Fasting; Future  -     TSH With Reflex Ft4; Future  5. Encounter for lipid screening for cardiovascular disease  -     Lipid, Fasting; Future     Controlled substances monitoring: possible medication side effects, risk of tolerance and/or dependence, and alternative treatments discussed, no signs of potential drug abuse or diversion identified, [place]AR[place] report reviewed today-  , and random urine drug screen sent today. Return in about 4 weeks (around 12/8/2022) for diabetes and suboxone check. Data Unavailable     Orders Placed This Encounter   Procedures    CBC with Auto Differential     Standing Status:   Future     Standing Expiration Date:   11/10/2023    Comprehensive Metabolic Panel, Fasting     Standing Status:   Future     Standing Expiration Date:   11/10/2023    Lipid, Fasting     Standing Status:   Future     Standing Expiration Date:   11/10/2023    TSH With Reflex Ft4     Standing Status:   Future     Standing Expiration Date:   11/10/2023    POCT Rapid Drug Screen     Orders Placed This Encounter   Medications    furosemide (LASIX) 20 MG tablet     Sig: Take 1 tablet by mouth daily     Dispense:  30 tablet     Refill:  0       Patient given educational materials - see patient instructions. Discussed use, benefit, and side effects of prescribed medications. All patient questions answered. Pt voiced understanding. Reviewed health maintenance. Instructed to continue current medications, diet and exercise. Patient agreed with treatment plan. Follow up as directed.      Electronically signed by MALISSA Clark CNP on 11/10/2022 at 3:28 PM

## 2022-11-16 ENCOUNTER — OFFICE VISIT (OUTPATIENT)
Dept: PODIATRY | Age: 57
End: 2022-11-16
Payer: MEDICARE

## 2022-11-16 VITALS — WEIGHT: 280 LBS | BODY MASS INDEX: 37.93 KG/M2 | HEIGHT: 72 IN

## 2022-11-16 DIAGNOSIS — L97.509 TYPE 2 DIABETES MELLITUS WITH FOOT ULCER, WITHOUT LONG-TERM CURRENT USE OF INSULIN (HCC): ICD-10-CM

## 2022-11-16 DIAGNOSIS — E11.42 TYPE 2 DIABETES MELLITUS WITH DIABETIC POLYNEUROPATHY, WITHOUT LONG-TERM CURRENT USE OF INSULIN (HCC): ICD-10-CM

## 2022-11-16 DIAGNOSIS — E11.621 TYPE 2 DIABETES MELLITUS WITH FOOT ULCER, WITHOUT LONG-TERM CURRENT USE OF INSULIN (HCC): ICD-10-CM

## 2022-11-16 DIAGNOSIS — Z91.199 NON COMPLIANCE WITH MEDICAL TREATMENT: ICD-10-CM

## 2022-11-16 DIAGNOSIS — Z89.431 STATUS POST TRANSMETATARSAL AMPUTATION OF RIGHT FOOT (HCC): Primary | ICD-10-CM

## 2022-11-16 DIAGNOSIS — L97.522 ULCER OF LEFT FOOT, WITH FAT LAYER EXPOSED (HCC): ICD-10-CM

## 2022-11-16 DIAGNOSIS — Z89.432 STATUS POST TRANSMETATARSAL AMPUTATION OF FOOT, LEFT (HCC): ICD-10-CM

## 2022-11-16 LAB
ALBUMIN SERPL-MCNC: NORMAL G/DL
ALP BLD-CCNC: NORMAL U/L
ALT SERPL-CCNC: NORMAL U/L
AST SERPL-CCNC: NORMAL U/L
BASOPHILS ABSOLUTE: NORMAL
BASOPHILS RELATIVE PERCENT: NORMAL
BILIRUB SERPL-MCNC: NORMAL MG/DL
BUN BLDV-MCNC: NORMAL MG/DL
CALCIUM SERPL-MCNC: NORMAL MG/DL
CHLORIDE BLD-SCNC: NORMAL MMOL/L
CHOLESTEROL, FASTING: 166
CO2: NORMAL
CREAT SERPL-MCNC: NORMAL MG/DL
EOSINOPHILS ABSOLUTE: NORMAL
EOSINOPHILS RELATIVE PERCENT: NORMAL
GLUCOSE FASTING: 107 MG/DL
HCT VFR BLD CALC: NORMAL %
HDLC SERPL-MCNC: 34 MG/DL (ref 35–70)
HEMOGLOBIN: NORMAL
LDL CHOLESTEROL CALCULATED: 82 MG/DL (ref 0–160)
LYMPHOCYTES ABSOLUTE: NORMAL
LYMPHOCYTES RELATIVE PERCENT: NORMAL
MCH RBC QN AUTO: NORMAL PG
MCHC RBC AUTO-ENTMCNC: NORMAL G/DL
MCV RBC AUTO: NORMAL FL
MONOCYTES ABSOLUTE: NORMAL
MONOCYTES RELATIVE PERCENT: NORMAL
NEUTROPHILS ABSOLUTE: NORMAL
NEUTROPHILS RELATIVE PERCENT: NORMAL
PDW BLD-RTO: NORMAL %
PLATELET # BLD: NORMAL 10*3/UL
PMV BLD AUTO: NORMAL FL
POTASSIUM SERPL-SCNC: NORMAL MMOL/L
RBC # BLD: NORMAL 10*6/UL
SODIUM BLD-SCNC: NORMAL MMOL/L
TOTAL PROTEIN: NORMAL
TRIGLYCERIDE, FASTING: 250
TSH SERPL DL<=0.05 MIU/L-ACNC: NORMAL M[IU]/L
WBC # BLD: NORMAL 10*3/UL

## 2022-11-16 PROCEDURE — 99213 OFFICE O/P EST LOW 20 MIN: CPT | Performed by: PODIATRIST

## 2022-11-16 PROCEDURE — G8417 CALC BMI ABV UP PARAM F/U: HCPCS | Performed by: PODIATRIST

## 2022-11-16 PROCEDURE — G8427 DOCREV CUR MEDS BY ELIG CLIN: HCPCS | Performed by: PODIATRIST

## 2022-11-16 PROCEDURE — 3044F HG A1C LEVEL LT 7.0%: CPT | Performed by: PODIATRIST

## 2022-11-16 PROCEDURE — G8482 FLU IMMUNIZE ORDER/ADMIN: HCPCS | Performed by: PODIATRIST

## 2022-11-16 PROCEDURE — 2022F DILAT RTA XM EVC RTNOPTHY: CPT | Performed by: PODIATRIST

## 2022-11-16 PROCEDURE — 1036F TOBACCO NON-USER: CPT | Performed by: PODIATRIST

## 2022-11-16 PROCEDURE — 3017F COLORECTAL CA SCREEN DOC REV: CPT | Performed by: PODIATRIST

## 2022-11-16 NOTE — PROGRESS NOTES
[facility]  Return Patient  Chief Complaint   Patient presents with    Wound Check     B/l wound fup    Diabetes     Last blood sugar 102         José Miguel Aguilar is a 61y.o. year old male who is here for follow up ulcerations bilateral feet, S/P TMA right and left. The left is now healed, right has also healed. I have ordered diabetic shoes in the past but still has not gotten them. Wearing crocs. .  Vital signs are stable. Using Silvercel daily, maybe. Pain level is 3. Patient denies N/V/F/C. Review of Systems    Vascular: DP and PT pulses palpable 2/4, Right Foot and 2/4 on the Left Foot. CFT <Na seconds, Right Foot and <NA seconds on the Left Foot. Edema is absent ,  Right Foot and absenton the Left Foot. Neurological:   Sensation absent  to light touch to level of digits, both feet. Musculoskeletal:   Muscle strength is 4/5 on the Right Foot and 4/5 on the Left Foot. Structural deformities are present on the Right Foot and present on the Left Foot. Integument:  Warm, dry, supple both feet. Incisions are healing well. Wound dehiscence is absent. Infection is absent. Wound #:   1    diabetic foot ulcer   right leg anterior ankle    Wound measurements:  #1  Healed      Wound #:   2    diabetic foot ulcer   right foot along incision for the TMA    Wound measurements:  #1  healed      Wound #:   3    diabetic foot ulcer   left foot    Wound measurements:  #1  healed    Radiographs: Three weightbearing views (AP, Oblique, and Lateral) of the right foot were obtained in the office today and reviewed, revealing no acute fracture, dislocation, or radioopaque foreign body/tumor. Overall alignment is satisfactory. Transmetatarsal amputation present with some bony regrowth of the second metatarsal and the fifth metatarsal. No erosive or lytic changes. Electronically signed by Natalya Castro DPM       Assesment : Post operative progress stable.    Diagnosis Orders   1. Status post transmetatarsal amputation of right foot ([address]        2. Status post transmetatarsal amputation of foot, left ([address]        3. Ulcer of left foot, with fat layer exposed ([address]        4. Type 2 diabetes mellitus with foot ulcer, without long-term current use of insulin ([address]        5. Non compliance with medical treatment        6. Type 2 diabetes mellitus with diabetic polyneuropathy, without long-term current use of insulin ([address]              Plan: Pt was evaluated and examined. Patient was given personalized discharge instructions. Pt will follow up in 1 months or sooner if any problems arise. Diagnosis was discussed with the pt and all of their questions were answered in detail. Proper foot hygiene and care was discussed with the pt. Patient to check feet daily and contact the office with any questions/problems/concerns. Other comorbidity noted and will be managed by PCP. Diabetic shoes and insoles: This patient would benefit from extra depth diabetic shoes and custom inserts. I feel he/she qualifies due to the above performed physical exam and diagnosis. I will do the appropriate paperwork and send it to their primary care physician. Then the patient will be measured for shoes and custom inserts to properly off load and protect his/her feet. 1. Discussed appropriate home care of this wound. 2. Dressings:   No orders of the defined types were placed in this encounter. 3. Follow up: 4 week. 4. Detailed home instructions and education material given to patient prior to discharge. No orders of the defined types were placed in this encounter. Billing Type: Third-Party Bill Depth Of Biopsy: dermis X Size Of Lesion In Cm: 0 Was A Bandage Applied: Yes Cryotherapy Text: The wound bed was treated with cryotherapy after the biopsy was performed. Electrodesiccation And Curettage Text: The wound bed was treated with electrodesiccation and curettage after the biopsy was performed. Biopsy Type: H and E Detail Level: Detailed Destruction After The Procedure: No Biopsy Method: Dermablade Curettage Text: The wound bed was treated with curettage after the biopsy was performed. Notification Instructions: Patient will be notified of biopsy results. However, patient instructed to call the office if not contacted within 2 weeks. Hemostasis: Drysol Wound Care: Petrolatum Type Of Destruction Used: Curettage Anesthesia Type: 1% lidocaine with epinephrine Silver Nitrate Text: The wound bed was treated with silver nitrate after the biopsy was performed. Post-Care Instructions: I reviewed with the patient in detail post-care instructions. Patient is to keep the biopsy site dry overnight, and then apply vaseline daily until healed. Patient may apply hydrogen peroxide soaks to remove any crusting. Dressing: Band-Aid Electrodesiccation Text: The wound bed was treated with electrodesiccation after the biopsy was performed. Anesthesia Volume In Cc: 0.5 Consent: Written consent was obtained and risks were reviewed including but not limited to scarring, infection, bleeding, scabbing, incomplete removal, nerve damage and allergy to anesthesia.

## 2022-11-17 DIAGNOSIS — R60.0 LOCALIZED EDEMA: ICD-10-CM

## 2022-11-17 DIAGNOSIS — Z13.220 ENCOUNTER FOR LIPID SCREENING FOR CARDIOVASCULAR DISEASE: ICD-10-CM

## 2022-11-17 DIAGNOSIS — R53.83 OTHER FATIGUE: ICD-10-CM

## 2022-11-17 DIAGNOSIS — E03.9 ACQUIRED HYPOTHYROIDISM: ICD-10-CM

## 2022-11-17 DIAGNOSIS — E78.1 HYPERTRIGLYCERIDEMIA: Primary | ICD-10-CM

## 2022-11-17 DIAGNOSIS — Z13.6 ENCOUNTER FOR LIPID SCREENING FOR CARDIOVASCULAR DISEASE: ICD-10-CM

## 2022-11-17 RX ORDER — LANOLIN ALCOHOL/MO/W.PET/CERES
500 CREAM (GRAM) TOPICAL 2 TIMES DAILY WITH MEALS
Qty: 60 TABLET | Refills: 2 | Status: SHIPPED | OUTPATIENT
Start: 2022-11-17

## 2022-11-17 NOTE — PROGRESS NOTES
Triglycerides are elevated. Recommend decrease carbohydrate and sugar intake. Increase daily activity and start Niacin 500 mg 2x/day.

## 2022-11-21 DIAGNOSIS — F11.20 UNCOMPLICATED OPIOID DEPENDENCE (HCC): ICD-10-CM

## 2022-11-21 RX ORDER — BUPRENORPHINE HYDROCHLORIDE AND NALOXONE HYDROCHLORIDE DIHYDRATE 8; 2 MG/1; MG/1
1 TABLET SUBLINGUAL 3 TIMES DAILY
Qty: 54 TABLET | Refills: 0 | Status: SHIPPED | OUTPATIENT
Start: 2022-11-21 | End: 2022-12-09

## 2022-11-23 DIAGNOSIS — F34.1 DYSTHYMIA: ICD-10-CM

## 2022-11-23 DIAGNOSIS — F11.20 UNCOMPLICATED OPIOID DEPENDENCE (HCC): ICD-10-CM

## 2022-11-23 DIAGNOSIS — M54.12 RIGHT CERVICAL RADICULOPATHY: ICD-10-CM

## 2022-11-25 RX ORDER — CYCLOBENZAPRINE HCL 10 MG
10 TABLET ORAL 2 TIMES DAILY PRN
Qty: 30 TABLET | Refills: 0 | Status: SHIPPED | OUTPATIENT
Start: 2022-11-25

## 2022-11-25 RX ORDER — PROMETHAZINE HYDROCHLORIDE 25 MG/1
TABLET ORAL
Qty: 30 TABLET | Refills: 0 | Status: SHIPPED | OUTPATIENT
Start: 2022-11-25

## 2022-11-25 RX ORDER — ONDANSETRON 4 MG/1
TABLET, ORALLY DISINTEGRATING ORAL
Qty: 21 TABLET | Refills: 0 | Status: SHIPPED | OUTPATIENT
Start: 2022-11-25

## 2022-11-25 RX ORDER — DIAZEPAM 5 MG/1
TABLET ORAL
Qty: 90 TABLET | Refills: 0 | Status: SHIPPED | OUTPATIENT
Start: 2022-11-25 | End: 2022-12-24

## 2022-11-25 RX ORDER — CLONIDINE HYDROCHLORIDE 0.1 MG/1
TABLET ORAL
Qty: 36 TABLET | Refills: 0 | Status: SHIPPED | OUTPATIENT
Start: 2022-11-25

## 2022-11-28 RX ORDER — LEVOTHYROXINE SODIUM 0.05 MG/1
TABLET ORAL
Qty: 30 TABLET | Refills: 0 | Status: SHIPPED | OUTPATIENT
Start: 2022-11-28

## 2022-12-08 ENCOUNTER — OFFICE VISIT (OUTPATIENT)
Dept: PRIMARY CARE CLINIC | Age: 57
End: 2022-12-08
Payer: MEDICARE

## 2022-12-08 VITALS
SYSTOLIC BLOOD PRESSURE: 112 MMHG | WEIGHT: 288.6 LBS | HEART RATE: 113 BPM | DIASTOLIC BLOOD PRESSURE: 68 MMHG | OXYGEN SATURATION: 98 % | BODY MASS INDEX: 39.14 KG/M2

## 2022-12-08 DIAGNOSIS — Z79.899 HIGH RISK MEDICATION USE: ICD-10-CM

## 2022-12-08 DIAGNOSIS — F11.20 UNCOMPLICATED OPIOID DEPENDENCE (HCC): Primary | ICD-10-CM

## 2022-12-08 DIAGNOSIS — R19.8 IRREGULAR BOWEL HABITS: ICD-10-CM

## 2022-12-08 DIAGNOSIS — E11.621 TYPE 2 DIABETES MELLITUS WITH FOOT ULCER, WITH LONG-TERM CURRENT USE OF INSULIN (HCC): ICD-10-CM

## 2022-12-08 DIAGNOSIS — Z79.899 MEDICATION MANAGEMENT: ICD-10-CM

## 2022-12-08 DIAGNOSIS — Z79.4 TYPE 2 DIABETES MELLITUS WITH FOOT ULCER, WITH LONG-TERM CURRENT USE OF INSULIN (HCC): ICD-10-CM

## 2022-12-08 DIAGNOSIS — L97.509 TYPE 2 DIABETES MELLITUS WITH FOOT ULCER, WITH LONG-TERM CURRENT USE OF INSULIN (HCC): ICD-10-CM

## 2022-12-08 DIAGNOSIS — Z12.11 COLON CANCER SCREENING: ICD-10-CM

## 2022-12-08 LAB
ALCOHOL URINE: ABNORMAL
AMPHETAMINE SCREEN, URINE: ABNORMAL
BARBITURATE SCREEN, URINE: ABNORMAL
BENZODIAZEPINE SCREEN, URINE: POSITIVE
BUPRENORPHINE URINE: POSITIVE
COCAINE METABOLITE SCREEN URINE: ABNORMAL
CREATININE URINE POCT: 300
FENTANYL SCREEN, URINE: ABNORMAL
GABAPENTIN SCREEN, URINE: ABNORMAL
HBA1C MFR BLD: 6.3 %
MDMA URINE: ABNORMAL
METHADONE SCREEN, URINE: ABNORMAL
METHAMPHETAMINE, URINE: ABNORMAL
MICROALBUMIN/CREAT 24H UR: 80 MG/G{CREAT}
MICROALBUMIN/CREAT UR-RTO: 30
OPIATE SCREEN URINE: ABNORMAL
OXYCODONE SCREEN URINE: ABNORMAL
PHENCYCLIDINE SCREEN URINE: ABNORMAL
PROPOXYPHENE SCREEN, URINE: ABNORMAL
SYNTHETIC CANNABINOIDS(K2) SCREEN, URINE: ABNORMAL
THC SCREEN, URINE: POSITIVE
TRAMADOL SCREEN URINE: ABNORMAL
TRICYCLIC ANTIDEPRESSANTS, UR: POSITIVE

## 2022-12-08 PROCEDURE — 3044F HG A1C LEVEL LT 7.0%: CPT | Performed by: NURSE PRACTITIONER

## 2022-12-08 PROCEDURE — 3017F COLORECTAL CA SCREEN DOC REV: CPT | Performed by: NURSE PRACTITIONER

## 2022-12-08 PROCEDURE — 3074F SYST BP LT 130 MM HG: CPT | Performed by: NURSE PRACTITIONER

## 2022-12-08 PROCEDURE — G8417 CALC BMI ABV UP PARAM F/U: HCPCS | Performed by: NURSE PRACTITIONER

## 2022-12-08 PROCEDURE — 83036 HEMOGLOBIN GLYCOSYLATED A1C: CPT | Performed by: NURSE PRACTITIONER

## 2022-12-08 PROCEDURE — G8427 DOCREV CUR MEDS BY ELIG CLIN: HCPCS | Performed by: NURSE PRACTITIONER

## 2022-12-08 PROCEDURE — 99213 OFFICE O/P EST LOW 20 MIN: CPT | Performed by: NURSE PRACTITIONER

## 2022-12-08 PROCEDURE — 80305 DRUG TEST PRSMV DIR OPT OBS: CPT | Performed by: NURSE PRACTITIONER

## 2022-12-08 PROCEDURE — 1036F TOBACCO NON-USER: CPT | Performed by: NURSE PRACTITIONER

## 2022-12-08 PROCEDURE — 2022F DILAT RTA XM EVC RTNOPTHY: CPT | Performed by: NURSE PRACTITIONER

## 2022-12-08 PROCEDURE — 82044 UR ALBUMIN SEMIQUANTITATIVE: CPT | Performed by: NURSE PRACTITIONER

## 2022-12-08 PROCEDURE — G8482 FLU IMMUNIZE ORDER/ADMIN: HCPCS | Performed by: NURSE PRACTITIONER

## 2022-12-08 PROCEDURE — 3078F DIAST BP <80 MM HG: CPT | Performed by: NURSE PRACTITIONER

## 2022-12-08 RX ORDER — LACTOBACILLUS RHAMNOSUS GG 10B CELL
1 CAPSULE ORAL 2 TIMES DAILY
Qty: 60 CAPSULE | Refills: 2 | Status: SHIPPED | OUTPATIENT
Start: 2022-12-08

## 2022-12-08 RX ORDER — BUPRENORPHINE HYDROCHLORIDE AND NALOXONE HYDROCHLORIDE DIHYDRATE 8; 2 MG/1; MG/1
1 TABLET SUBLINGUAL 3 TIMES DAILY
Qty: 54 TABLET | Refills: 0 | Status: SHIPPED | OUTPATIENT
Start: 2022-12-08 | End: 2022-12-26

## 2022-12-08 NOTE — PROGRESS NOTES
[facility] PRIMARY CARE  [address] [facility] [address]  Dept: Tahira Mace Philip Sequeira is a 61 y.o. male Established patient, who presents today for his medical conditions/complaints as noted below. Chief Complaint   Patient presents with    Diabetes    Medication Check       HPI:     Diabetes  He presents for his follow-up diabetic visit. He has type 2 diabetes mellitus. Risk factors for coronary artery disease include obesity, male sex, hypertension, sedentary lifestyle and diabetes mellitus. Current diabetic treatment includes oral agent (monotherapy). His weight is increasing steadily. He is following a generally healthy (however he needs to eat soft stuff because he does not have teeth) diet. He rarely participates in exercise. There is no change in his home blood glucose trend. (He usually checks blood sugar late morning to early afteroon and it typically runs 100-110. If blood sugar is high or low he will check it more often. Today blood sugar was 190 so he will check it again. He was a little shaky) An ACE inhibitor/angiotensin II receptor blocker is not being taken.            Reviewed prior notes: None   Reviewed previous:        LDL Calculated (mg/dL)   Date Value   11/16/2022 82   03/08/2020 74   03/29/2019 82       (goal LDL is <100)   AST (U/L)   Date Value   07/30/2022 17     ALT (U/L)   Date Value   07/30/2022 9     BUN (mg/dL)   Date Value   07/30/2022 16     Hemoglobin A1C (%)   Date Value   12/08/2022 6.3     TSH (mIU/L)   Date Value   02/26/2022 0.58     BP Readings from Last 3 Encounters:   12/08/22 112/68   11/10/22 100/64   10/20/22 124/72          (goal 120/80)    Past Medical History:   Diagnosis Date    Acute metabolic encephalopathy     CLAUDIA (acute kidney injury) (HCC)     Anemia     Anxiety     ARF (acute renal failure) (HCC)     Arthritis     lower back, knees    BPH (benign prostatic hyperplasia)     Chronic kidney disease     prostate    Depression     Diabetes mellitus ([facility]     Diabetic neuropathy (HCC)     GERD (gastroesophageal reflux disease)     Hyperglycemia     Hyperkalemia     Hypertension     Hypothyroidism     Impacted tooth     Necrosis of bone of foot (HCC)     Osteomyelitis of left foot (HCC)     PAD (peripheral artery disease) (HCC)     Pneumonia     Septic shock (HCC)     Type 2 diabetes mellitus ([facility]       Past Surgical History:   Procedure Laterality Date    AMPUTATION Right 01/21/2022    TRANSMETARSAL AMPUTATION FOOT, TENDOACHILLES LENGTHENING (Right Foot)    APPENDECTOMY      BACK SURGERY      Lower x 4. Had abscess after appendectomy.      FOOT AMPUTATION Left 1/4/2020    TRANSMETATARSAL FOOT AMPUTATION, REMOVAL OF FOREIGN BODY performed by Carlos Cuevas DPM at [facility] Right 2/25/2022    REVISION TRANSMETATARSAL AMPUTATION performed by Carlos Cuevas DPM at [address] [place] [address] Left 01/04/2020    TRANSMETATARSAL FOOT AMPUTATION, REMOVAL OF FOREIGN BODY    KNEE SURGERY Right     scope    TOE AMPUTATION Bilateral 3/6/2017    TOE AMPUTATION LEFT HALLUX AND 2ND DIGIT AND RIGHT 2ND DIGIT performed by Edi Singletary DPM at [address] [place] [address] Right 1/21/2022    TRANSMETARSAL AMPUTATION FOOT, TENDOACHILLES LENGTHENING performed by Carlos Cuevas DPM at [facility] N/A 5/20/2019    EGD WITH BIOPSY performed by Leobardo Carter MD at [address] [place] [address] History   Problem Relation Age of Onset    Diabetes Father     Cancer Maternal Grandfather         Colon Cancer; Prostate Cancer    No Known Problems Mother        Social History     Tobacco Use    Smoking status: Never    Smokeless tobacco: Never   Substance Use Topics    Alcohol use: No      Current Outpatient Medications   Medication Sig Dispense Refill    buprenorphine-naloxone (SUBOXONE) 8-2 MG SUBL SL tablet Place 1 tablet under the tongue 3 times daily for 18 days. 54 tablet 0    lactobacillus (CULTURELLE) capsule Take 1 capsule by mouth 2 times daily 60 capsule 2    levothyroxine (SYNTHROID) 50 MCG tablet TAKE ONE TABLET BY MOUTH DAILY 30 tablet 0    cloNIDine (CATAPRES) 0.1 MG tablet TAKE ONE TABLET BY MOUTH TWICE A DAY 36 tablet 0    promethazine (PHENERGAN) 25 MG tablet TAKE ONE TABLET BY MOUTH EVERY 6 HOURS AS NEEDED FOR NAUSEA 30 tablet 0    cyclobenzaprine (FLEXERIL) 10 MG tablet Take 1 tablet by mouth 2 times daily as needed for Muscle spasms 30 tablet 0    ondansetron (ZOFRAN-ODT) 4 MG disintegrating tablet DISSOLVE ONE TABLET BY MOUTH THREE TIMES A DAY AS NEEDED FOR NAUSEA OR VOMITING 21 tablet 0    diazePAM (VALIUM) 5 MG tablet TAKE ONE TABLET BY MOUTH EVERY 8 HOURS AS NEEDED FOR ANXIETY 90 tablet 0    niacin (SLO-NIACIN) 500 MG extended release tablet Take 1 tablet by mouth 2 times daily (with meals) 60 tablet 2    furosemide (LASIX) 20 MG tablet Take 1 tablet by mouth daily 30 tablet 0    PARoxetine (PAXIL) 20 MG tablet TAKE FOUR TABLETS BY MOUTH EVERY MORNING 120 tablet 5    ONETOUCH ULTRA strip TEST TWO TIMES A DAY AS NEEDED 50 strip 0    Lancets (ONETOUCH DELICA PLUS [id number]) MISC USE TO TEST TWICE A  each 5    ferrous sulfate (FE TABS 325) 325 (65 Fe) MG EC tablet Take 1 tablet by mouth daily (with breakfast) 90 tablet 3    fluocinonide (LIDEX) 0.05 % ointment Apply 1 gm  topically 2 times daily to legs 60 g 5    sodium hypochlorite (DAKINS) 0.125 % SOLN external solution APPLY TOPICALLY EVERY 12 HOURS.  APPLY AS A WET TO DRY DRESSING TWO TIMES A DAY, CAN ALSO USE TO CLEANSE WOUND 437 mL 1    metFORMIN (GLUCOPHAGE) 500 MG tablet Take 0.5 tablets by mouth daily (with breakfast) 45 tablet 3    NARCAN 4 MG/0.1ML LIQD nasal spray 4 mg by Nasal route as needed for Opioid Reversal       pantoprazole (PROTONIX) 40 MG tablet Take 1 tablet by mouth every morning (before breakfast) 30 tablet 11    povidone-iodine (BETADINE) 7.5 % external solution Apply to wound. Apply as a wet to dry dressing 1 Bottle 1    Wound Dressings (ADAPTIC NON-ADHERING DRESSING) PADS Apply 1 Units topically 2 times daily 60 each 5    Gauze Pads & Dressings (COMBINE ABD) 5\"X9\" PADS 1 Units by Does not apply route 2 times daily 60 each 5    Gauze Pads & Dressings (KERLIX GAUZE ROLL LARGE) MISC 1 Units by Does not apply route 2 times daily 60 each 5     No current facility-administered medications for this visit. Allergies   Allergen Reactions    Bactrim [Sulfamethoxazole-Trimethoprim]      reported renal failure    Fiorinal [Butalbital-Aspirin-Caffeine] Other (See Comments)     Generalized blisters    Statins Other (See Comments)     Muscle aches    Tylenol [Acetaminophen]      Pt states it shuts down his kidneys    Peanut Butter Flavor [Flavoring Agent] Nausea And Vomiting       Health Maintenance   Topic Date Due    COVID-19 Vaccine (1) Never done    HIV screen  Never done    Diabetic retinal exam  Never done    Hepatitis B vaccine (1 of 3 - Risk 3-dose series) Never done    Shingles vaccine (1 of 2) Never done    Colorectal Cancer Screen  12/16/2020    Depression Monitoring  04/04/2023    Lipids  11/16/2023    A1C test (Diabetic or Prediabetic)  12/08/2023    Diabetic microalbuminuria test  12/08/2023    DTaP/Tdap/Td vaccine (2 - Td or Tdap) 04/24/2028    Pneumococcal 0-64 years Vaccine (3 - PPSV23 if available, else PCV20) 06/04/2029    Flu vaccine  Completed    Hepatitis C screen  Completed    Hepatitis A vaccine  Aged Out    Hib vaccine  Aged Out    Meningococcal (ACWY) vaccine  Aged Out       Subjective:      Review of Systems    Objective:     /68   Pulse (!) 113   Wt 288 lb 9.6 oz (130.9 kg)   SpO2 98%   BMI 39.14 kg/m²   Physical Exam    Assessment/Plan:   1. Uncomplicated opioid dependence (HCC)  -     POCT Rapid Drug Screen  -     buprenorphine-naloxone (SUBOXONE) 8-2 MG SUBL SL tablet; Place 1 tablet under the tongue 3 times daily for 18 days. , Disp-54 tablet, R-0Normal  2. High risk medication use  -     POCT Rapid Drug Screen  3. Medication management  -     POCT Rapid Drug Screen  4. Type 2 diabetes mellitus with foot ulcer, with long-term current use of insulin (HCC)  -     POCT glycosylated hemoglobin (Hb A1C)  -     POCT microalbumin  5. Irregular bowel habits  -     lactobacillus (CULTURELLE) capsule; Take 1 capsule by mouth 2 times daily, Disp-60 capsule, R-2Normal  6. Colon cancer screening  -     Fecal DNA Colorectal cancer screening (Cologuard)       Continue suboxone-    Return in about 4 weeks (around 1/5/2023) for suboxone. Data Unavailable     Orders Placed This Encounter   Procedures    Fecal DNA Colorectal cancer screening (Cologuard)    POCT glycosylated hemoglobin (Hb A1C)    POCT Rapid Drug Screen    POCT microalbumin     Orders Placed This Encounter   Medications    buprenorphine-naloxone (SUBOXONE) 8-2 MG SUBL SL tablet     Sig: Place 1 tablet under the tongue 3 times daily for 18 days. Dispense:  54 tablet     Refill:  0     NADEAN:  [id number]    lactobacillus (CULTURELLE) capsule     Sig: Take 1 capsule by mouth 2 times daily     Dispense:  60 capsule     Refill:  2       Patient given educational materials - see patient instructions. Discussed use, benefit, and side effects of prescribed medications. All patient questions answered. Pt voiced understanding. Reviewed health maintenance. Instructed to continue current medications, diet and exercise. Patient agreed with treatment plan. Follow up as directed.      Electronically signed by MALISSA Dumont CNP on 12/8/2022 at 12:03 PM

## 2022-12-10 DIAGNOSIS — L97.509 TYPE 2 DIABETES MELLITUS WITH FOOT ULCER, WITH LONG-TERM CURRENT USE OF INSULIN (HCC): ICD-10-CM

## 2022-12-10 DIAGNOSIS — E11.621 TYPE 2 DIABETES MELLITUS WITH FOOT ULCER, WITH LONG-TERM CURRENT USE OF INSULIN (HCC): ICD-10-CM

## 2022-12-10 DIAGNOSIS — Z79.4 TYPE 2 DIABETES MELLITUS WITH FOOT ULCER, WITH LONG-TERM CURRENT USE OF INSULIN (HCC): ICD-10-CM

## 2022-12-12 RX ORDER — BLOOD SUGAR DIAGNOSTIC
STRIP MISCELLANEOUS
Qty: 50 STRIP | Refills: 0 | Status: SHIPPED | OUTPATIENT
Start: 2022-12-12

## 2022-12-21 DIAGNOSIS — F34.1 DYSTHYMIA: ICD-10-CM

## 2022-12-21 DIAGNOSIS — F11.20 UNCOMPLICATED OPIOID DEPENDENCE (HCC): ICD-10-CM

## 2022-12-21 DIAGNOSIS — M54.12 RIGHT CERVICAL RADICULOPATHY: ICD-10-CM

## 2022-12-21 RX ORDER — CLONIDINE HYDROCHLORIDE 0.1 MG/1
TABLET ORAL
Qty: 36 TABLET | Refills: 0 | Status: SHIPPED | OUTPATIENT
Start: 2022-12-21

## 2022-12-21 RX ORDER — PROMETHAZINE HYDROCHLORIDE 25 MG/1
TABLET ORAL
Qty: 30 TABLET | Refills: 0 | Status: SHIPPED | OUTPATIENT
Start: 2022-12-21

## 2022-12-21 RX ORDER — CYCLOBENZAPRINE HCL 10 MG
10 TABLET ORAL 2 TIMES DAILY PRN
Qty: 30 TABLET | Refills: 0 | Status: SHIPPED | OUTPATIENT
Start: 2022-12-21

## 2022-12-21 RX ORDER — DIAZEPAM 5 MG/1
TABLET ORAL
Qty: 90 TABLET | Refills: 0 | Status: SHIPPED | OUTPATIENT
Start: 2022-12-21 | End: 2023-01-19

## 2022-12-22 DIAGNOSIS — F11.20 UNCOMPLICATED OPIOID DEPENDENCE (HCC): ICD-10-CM

## 2022-12-22 RX ORDER — ONDANSETRON 4 MG/1
TABLET, ORALLY DISINTEGRATING ORAL
Qty: 21 TABLET | Refills: 0 | Status: SHIPPED | OUTPATIENT
Start: 2022-12-22

## 2022-12-22 RX ORDER — BUPRENORPHINE HYDROCHLORIDE AND NALOXONE HYDROCHLORIDE DIHYDRATE 8; 2 MG/1; MG/1
1 TABLET SUBLINGUAL 3 TIMES DAILY
Qty: 54 TABLET | Refills: 0 | Status: SHIPPED | OUTPATIENT
Start: 2022-12-22 | End: 2023-01-09

## 2022-12-30 DIAGNOSIS — F34.1 DYSTHYMIA: ICD-10-CM

## 2022-12-30 RX ORDER — DIAZEPAM 5 MG/1
TABLET ORAL
Qty: 90 TABLET | Refills: 0 | Status: SHIPPED | OUTPATIENT
Start: 2022-12-30 | End: 2023-01-28

## 2023-01-10 DIAGNOSIS — F11.20 UNCOMPLICATED OPIOID DEPENDENCE (HCC): ICD-10-CM

## 2023-01-10 DIAGNOSIS — M54.12 RIGHT CERVICAL RADICULOPATHY: ICD-10-CM

## 2023-01-10 RX ORDER — CLONIDINE HYDROCHLORIDE 0.1 MG/1
TABLET ORAL
Qty: 36 TABLET | Refills: 0 | Status: SHIPPED | OUTPATIENT
Start: 2023-01-10

## 2023-01-10 RX ORDER — PROMETHAZINE HYDROCHLORIDE 25 MG/1
TABLET ORAL
Qty: 30 TABLET | Refills: 0 | Status: SHIPPED | OUTPATIENT
Start: 2023-01-10

## 2023-01-10 RX ORDER — ONDANSETRON 4 MG/1
TABLET, ORALLY DISINTEGRATING ORAL
Qty: 21 TABLET | Refills: 0 | Status: SHIPPED | OUTPATIENT
Start: 2023-01-10

## 2023-01-10 RX ORDER — CYCLOBENZAPRINE HCL 10 MG
10 TABLET ORAL 2 TIMES DAILY PRN
Qty: 30 TABLET | Refills: 0 | Status: SHIPPED | OUTPATIENT
Start: 2023-01-10

## 2023-01-11 RX ORDER — BUPRENORPHINE HYDROCHLORIDE AND NALOXONE HYDROCHLORIDE DIHYDRATE 8; 2 MG/1; MG/1
1 TABLET SUBLINGUAL 3 TIMES DAILY
Qty: 54 TABLET | Refills: 0 | Status: SHIPPED | OUTPATIENT
Start: 2023-01-11 | End: 2023-01-29

## 2023-01-24 DIAGNOSIS — M54.12 RIGHT CERVICAL RADICULOPATHY: ICD-10-CM

## 2023-01-24 RX ORDER — CYCLOBENZAPRINE HCL 10 MG
TABLET ORAL
Qty: 30 TABLET | Refills: 0 | OUTPATIENT
Start: 2023-01-24

## 2023-01-27 RX ORDER — CLONIDINE HYDROCHLORIDE 0.1 MG/1
TABLET ORAL
Qty: 36 TABLET | Refills: 0 | Status: SHIPPED | OUTPATIENT
Start: 2023-01-27

## 2023-01-30 DIAGNOSIS — F34.1 DYSTHYMIA: ICD-10-CM

## 2023-01-30 DIAGNOSIS — F11.20 UNCOMPLICATED OPIOID DEPENDENCE (HCC): ICD-10-CM

## 2023-01-30 RX ORDER — DIAZEPAM 5 MG/1
TABLET ORAL
Qty: 90 TABLET | Refills: 0 | Status: SHIPPED | OUTPATIENT
Start: 2023-01-30 | End: 2023-02-28

## 2023-01-30 RX ORDER — BUPRENORPHINE HYDROCHLORIDE AND NALOXONE HYDROCHLORIDE DIHYDRATE 8; 2 MG/1; MG/1
1 TABLET SUBLINGUAL 3 TIMES DAILY
Qty: 54 TABLET | Refills: 0 | Status: SHIPPED | OUTPATIENT
Start: 2023-01-30 | End: 2023-02-17

## 2023-01-30 NOTE — TELEPHONE ENCOUNTER
LAST VISIT:   12/8/2022     Future Appointments   Date Time Provider Tasha Prado   2/2/2023  3:00 PM [facility]   2/8/2023  3:45 PM Coretta Gore [address]

## 2023-01-31 DIAGNOSIS — R60.0 LOCALIZED EDEMA: ICD-10-CM

## 2023-01-31 DIAGNOSIS — M54.12 RIGHT CERVICAL RADICULOPATHY: ICD-10-CM

## 2023-01-31 DIAGNOSIS — F11.20 UNCOMPLICATED OPIOID DEPENDENCE (HCC): ICD-10-CM

## 2023-01-31 RX ORDER — PROMETHAZINE HYDROCHLORIDE 25 MG/1
TABLET ORAL
Qty: 30 TABLET | Refills: 0 | Status: SHIPPED | OUTPATIENT
Start: 2023-01-31

## 2023-01-31 RX ORDER — LEVOTHYROXINE SODIUM 0.05 MG/1
TABLET ORAL
Qty: 30 TABLET | Refills: 0 | Status: SHIPPED | OUTPATIENT
Start: 2023-01-31

## 2023-01-31 RX ORDER — FUROSEMIDE 20 MG/1
TABLET ORAL
Qty: 30 TABLET | Refills: 0 | Status: SHIPPED | OUTPATIENT
Start: 2023-01-31

## 2023-01-31 RX ORDER — ONDANSETRON 4 MG/1
TABLET, ORALLY DISINTEGRATING ORAL
Qty: 21 TABLET | Refills: 0 | Status: SHIPPED | OUTPATIENT
Start: 2023-01-31

## 2023-01-31 RX ORDER — CYCLOBENZAPRINE HCL 10 MG
TABLET ORAL
Qty: 30 TABLET | Refills: 0 | Status: SHIPPED | OUTPATIENT
Start: 2023-01-31

## 2023-02-02 ENCOUNTER — OFFICE VISIT (OUTPATIENT)
Dept: PRIMARY CARE CLINIC | Age: 58
End: 2023-02-02
Payer: MEDICAID

## 2023-02-02 VITALS
SYSTOLIC BLOOD PRESSURE: 144 MMHG | BODY MASS INDEX: 40.77 KG/M2 | OXYGEN SATURATION: 98 % | DIASTOLIC BLOOD PRESSURE: 82 MMHG | WEIGHT: 300.6 LBS | HEART RATE: 110 BPM

## 2023-02-02 DIAGNOSIS — K59.00 CONSTIPATION, UNSPECIFIED CONSTIPATION TYPE: ICD-10-CM

## 2023-02-02 DIAGNOSIS — B37.2 CUTANEOUS CANDIDIASIS: ICD-10-CM

## 2023-02-02 DIAGNOSIS — F11.20 UNCOMPLICATED OPIOID DEPENDENCE (HCC): Primary | ICD-10-CM

## 2023-02-02 LAB
ALCOHOL URINE: NORMAL
AMPHETAMINE SCREEN, URINE: NORMAL
BARBITURATE SCREEN, URINE: NORMAL
BENZODIAZEPINE SCREEN, URINE: NORMAL
BUPRENORPHINE URINE: POSITIVE
COCAINE METABOLITE SCREEN URINE: NORMAL
FENTANYL SCREEN, URINE: NORMAL
GABAPENTIN SCREEN, URINE: NORMAL
MDMA URINE: NORMAL
METHADONE SCREEN, URINE: NORMAL
METHAMPHETAMINE, URINE: NORMAL
OPIATE SCREEN URINE: NORMAL
OXYCODONE SCREEN URINE: NORMAL
PHENCYCLIDINE SCREEN URINE: NORMAL
PROPOXYPHENE SCREEN, URINE: NORMAL
SYNTHETIC CANNABINOIDS(K2) SCREEN, URINE: NORMAL
THC SCREEN, URINE: POSITIVE
TRAMADOL SCREEN URINE: NORMAL
TRICYCLIC ANTIDEPRESSANTS, UR: POSITIVE

## 2023-02-02 PROCEDURE — 80305 DRUG TEST PRSMV DIR OPT OBS: CPT | Performed by: NURSE PRACTITIONER

## 2023-02-02 PROCEDURE — 3079F DIAST BP 80-89 MM HG: CPT | Performed by: NURSE PRACTITIONER

## 2023-02-02 PROCEDURE — 3077F SYST BP >= 140 MM HG: CPT | Performed by: NURSE PRACTITIONER

## 2023-02-02 PROCEDURE — 99214 OFFICE O/P EST MOD 30 MIN: CPT | Performed by: NURSE PRACTITIONER

## 2023-02-02 RX ORDER — NYSTATIN 100000 U/G
CREAM TOPICAL
Qty: 30 G | Refills: 2 | Status: SHIPPED | OUTPATIENT
Start: 2023-02-02

## 2023-02-02 RX ORDER — POLYETHYLENE GLYCOL 3350 17 G/17G
17 POWDER, FOR SOLUTION ORAL DAILY
Qty: 510 G | Refills: 5 | Status: SHIPPED | OUTPATIENT
Start: 2023-02-02

## 2023-02-02 ASSESSMENT — ENCOUNTER SYMPTOMS
CONSTIPATION: 1
BACK PAIN: 1
COUGH: 0
SHORTNESS OF BREATH: 0

## 2023-02-02 NOTE — PROGRESS NOTES
[address] [facility]  [address] [address]  [facility] [address]  Dept: [address] Kiran Rolle is a 61 y.o. male Established patient, who presents today for his medical conditions/complaints as noted below. Chief Complaint   Patient presents with    Medication Check     suboxone       HPI:     HPI  His mother will need surgery and he has to provide additional care for her. Last week he  missed his appointment due to  having to take his mom to an appointment. He is having some additional lower back and thigh pain. He is having some swelling in his feet and and that causes pain. He takes one suboxone early in the morning and one about lunch time and then he holds off and takes the last one with supper. He does have some difficulty swelling. He forgot to bring his pills with him. No street drugs  He is taking the valium 1-3x/day due to anxiety. He takes stool softener and eats prunes for constipation.     Reviewed prior notes: None   Reviewed previous:        LDL Calculated (mg/dL)   Date Value   11/16/2022 82   03/08/2020 74   03/29/2019 82       (goal LDL is <100)   AST (U/L)   Date Value   07/30/2022 17     ALT (U/L)   Date Value   07/30/2022 9     BUN (mg/dL)   Date Value   07/30/2022 16     Hemoglobin A1C (%)   Date Value   12/08/2022 6.3     TSH (mIU/L)   Date Value   02/26/2022 0.58     BP Readings from Last 3 Encounters:   02/02/23 (!) 144/82   12/08/22 112/68   11/10/22 100/64          (goal 120/80)    Past Medical History:   Diagnosis Date    Acute metabolic encephalopathy     CLAUDIA (acute kidney injury) (HCC)     Anemia     Anxiety     ARF (acute renal failure) (HCC)     Arthritis     lower back, knees    BPH (benign prostatic hyperplasia)     Chronic kidney disease     prostate    Depression     Diabetes mellitus (HCC)     Diabetic neuropathy (HCC)     GERD (gastroesophageal reflux disease)     Hyperglycemia     Hyperkalemia Hypertension     Hypothyroidism     Impacted tooth     Necrosis of bone of foot ([facility] [address]     Osteomyelitis of left foot (HCC)     PAD (peripheral artery disease) (HCC)     Pneumonia     Septic shock (HCC)     Type 2 diabetes mellitus ([facility] [address]       Past Surgical History:   Procedure Laterality Date    AMPUTATION Right 01/21/2022    TRANSMETARSAL AMPUTATION FOOT, TENDOACHILLES LENGTHENING (Right Foot)    APPENDECTOMY      BACK SURGERY      Lower x 4. Had abscess after appendectomy. FOOT AMPUTATION Left 1/4/2020    TRANSMETATARSAL FOOT AMPUTATION, REMOVAL OF FOREIGN BODY performed by Desmond Paulino DPM at [facility] Right 2/25/2022    REVISION TRANSMETATARSAL AMPUTATION performed by Desmond Paulino DPM at [address] [place] [address] Left 01/04/2020    TRANSMETATARSAL FOOT AMPUTATION, REMOVAL OF FOREIGN BODY    KNEE SURGERY Right     scope    TOE AMPUTATION Bilateral 3/6/2017    TOE AMPUTATION LEFT HALLUX AND 2ND DIGIT AND RIGHT 2ND DIGIT performed by Pascual Britton DPM at [address] Right 1/21/2022    TRANSMETARSAL AMPUTATION FOOT, TENDOACHILLES LENGTHENING performed by Desmond Paulino DPM at [facility] N/A 5/20/2019    EGD WITH BIOPSY performed by Benito Lennox, MD at [address] History   Problem Relation Age of Onset    Diabetes Father     Cancer Maternal Grandfather         Colon Cancer; Prostate Cancer    No Known Problems Mother        Social History     Tobacco Use    Smoking status: Never    Smokeless tobacco: Never   Substance Use Topics    Alcohol use: No      Current Outpatient Medications   Medication Sig Dispense Refill    nystatin (MYCOSTATIN) 824042 UNIT/GM cream Apply topically 2 times daily.  30 g 2    polyethylene glycol (MIRALAX) 17 GM/SCOOP powder Take 17 g by mouth daily 510 g 5    promethazine (PHENERGAN) 25 MG tablet TAKE ONE TABLET BY MOUTH EVERY 6 HOURS AS NEEDED FOR NAUSEA 30 tablet 0 cyclobenzaprine (FLEXERIL) 10 MG tablet TAKE ONE TABLET BY MOUTH TWICE A DAY AS NEEDED FOR MUSCLE SPASMS 30 tablet 0    furosemide (LASIX) 20 MG tablet TAKE ONE TABLET BY MOUTH DAILY 30 tablet 0    levothyroxine (SYNTHROID) 50 MCG tablet TAKE ONE TABLET BY MOUTH DAILY 30 tablet 0    ondansetron (ZOFRAN-ODT) 4 MG disintegrating tablet DISSOLVE ONE TABLET BY MOUTH THREE TIMES A DAY AS NEEDED FOR NAUSEA OR VOMITING 21 tablet 0    buprenorphine-naloxone (SUBOXONE) 8-2 MG SUBL SL tablet Place 1 tablet under the tongue 3 times daily for 18 days. 54 tablet 0    diazePAM (VALIUM) 5 MG tablet TAKE ONE TABLET BY MOUTH EVERY 8 HOURS AS NEEDED FOR ANXIETY 90 tablet 0    cloNIDine (CATAPRES) 0.1 MG tablet TAKE ONE TABLET BY MOUTH TWICE A DAY 36 tablet 0    cloNIDine (CATAPRES) 0.1 MG tablet TAKE ONE TABLET BY MOUTH TWICE A DAY 36 tablet 0    ONETOUCH ULTRA strip TEST TWO TIMES A DAY AS NEEDED 50 strip 0    lactobacillus (CULTURELLE) capsule Take 1 capsule by mouth 2 times daily 60 capsule 2    niacin (SLO-NIACIN) 500 MG extended release tablet Take 1 tablet by mouth 2 times daily (with meals) 60 tablet 2    PARoxetine (PAXIL) 20 MG tablet TAKE FOUR TABLETS BY MOUTH EVERY MORNING 120 tablet 5    Lancets (ONETOUCH DELICA PLUS BOQUJP01S) MISC USE TO TEST TWICE A  each 5    ferrous sulfate (FE TABS 325) 325 (65 Fe) MG EC tablet Take 1 tablet by mouth daily (with breakfast) 90 tablet 3    fluocinonide (LIDEX) 0.05 % ointment Apply 1 gm  topically 2 times daily to legs 60 g 5    sodium hypochlorite (DAKINS) 0.125 % SOLN external solution APPLY TOPICALLY EVERY 12 HOURS.  APPLY AS A WET TO DRY DRESSING TWO TIMES A DAY, CAN ALSO USE TO CLEANSE WOUND 437 mL 1    metFORMIN (GLUCOPHAGE) 500 MG tablet Take 0.5 tablets by mouth daily (with breakfast) 45 tablet 3    NARCAN 4 MG/0.1ML LIQD nasal spray 4 mg by Nasal route as needed for Opioid Reversal       pantoprazole (PROTONIX) 40 MG tablet Take 1 tablet by mouth every morning (before breakfast) 30 tablet 11    povidone-iodine (BETADINE) 7.5 % external solution Apply to wound. Apply as a wet to dry dressing 1 Bottle 1    Wound Dressings (ADAPTIC NON-ADHERING DRESSING) PADS Apply 1 Units topically 2 times daily 60 each 5    Gauze Pads & Dressings (COMBINE ABD) 5\"X9\" PADS 1 Units by Does not apply route 2 times daily 60 each 5    Gauze Pads & Dressings (KERLIX GAUZE ROLL LARGE) MISC 1 Units by Does not apply route 2 times daily 60 each 5     No current facility-administered medications for this visit. Allergies   Allergen Reactions    Bactrim [Sulfamethoxazole-Trimethoprim]      reported renal failure    Fiorinal [Butalbital-Aspirin-Caffeine] Other (See Comments)     Generalized blisters    Statins Other (See Comments)     Muscle aches    Tylenol [Acetaminophen]      Pt states it shuts down his kidneys    Peanut Butter Flavor [Flavoring Agent] Nausea And Vomiting       Health Maintenance   Topic Date Due    COVID-19 Vaccine (1) Never done    HIV screen  Never done    Diabetic retinal exam  Never done    Hepatitis B vaccine (1 of 3 - Risk 3-dose series) Never done    Shingles vaccine (1 of 2) Never done    Colorectal Cancer Screen  12/16/2020    Depression Monitoring  04/04/2023    GFR test (Diabetes, CKD 3-4, OR last GFR 15-59)  07/30/2023    Lipids  11/16/2023    A1C test (Diabetic or Prediabetic)  12/08/2023    Diabetic Alb to Cr ratio (uACR) test  12/08/2023    DTaP/Tdap/Td vaccine (2 - Td or Tdap) 04/24/2028    Pneumococcal 0-64 years Vaccine (3 - PPSV23 if available, else PCV20) 06/04/2029    Flu vaccine  Completed    Hepatitis C screen  Completed    Hepatitis A vaccine  Aged Out    Hib vaccine  Aged Out    Meningococcal (ACWY) vaccine  Aged Out       Subjective:      Review of Systems   Constitutional:  Positive for fatigue. Negative for appetite change, chills and fever. HENT:  Negative for congestion. Respiratory:  Negative for cough and shortness of breath.     Cardiovascular: Positive for leg swelling. Negative for palpitations. Gastrointestinal:  Positive for constipation. Musculoskeletal:  Positive for arthralgias, back pain and gait problem. Skin:  Positive for rash (increases in groin and under abdomen). Negative for wound. Neurological:  Positive for dizziness, light-headedness and headaches. Psychiatric/Behavioral:  Positive for dysphoric mood and sleep disturbance. The patient is nervous/anxious. Objective:     BP (!) 144/82 (Site: Left Upper Arm, Position: Sitting, Cuff Size: Large Adult)   Pulse (!) 110   Wt (!) 300 lb 9.6 oz (136.4 kg)   SpO2 98%   BMI 40.77 kg/m²   Physical Exam  Vitals and nursing note reviewed. Constitutional:       Appearance: He is obese. HENT:      Head: Normocephalic and atraumatic. Right Ear: External ear normal.      Left Ear: External ear normal.   Eyes:      Extraocular Movements: Extraocular movements intact. Pupils: Pupils are equal, round, and reactive to light. Cardiovascular:      Rate and Rhythm: Normal rate and regular rhythm. Heart sounds: Normal heart sounds. Pulmonary:      Effort: Pulmonary effort is normal.      Breath sounds: Normal breath sounds. Abdominal:      General: Bowel sounds are normal.      Palpations: Abdomen is soft. Musculoskeletal:      Right lower le+ Pitting Edema present. Left lower le+ Pitting Edema present. Skin:     Findings: Erythema and rash present. Comments: Macular dark red rash with satellite lesion in bilateral groin and under abdominal fold    Neurological:      Mental Status: He is alert and oriented to person, place, and time. Psychiatric:         Mood and Affect: Mood is anxious. Behavior: Behavior normal.         Thought Content:  Thought content normal.     Controlled substances monitoring: possible medication side effects, risk of tolerance and/or dependence, and alternative treatments discussed, no signs of potential drug abuse or diversion identified, OARRS report reviewed today- activity consistent with treatment plan, and random urine drug screen sent today. Assessment/Plan:   1. Uncomplicated opioid dependence ([address]  -     POCT Rapid Drug Screen  2. Cutaneous candidiasis  -     nystatin (MYCOSTATIN) 593869 UNIT/GM cream; Apply topically 2 times daily. , Disp-30 g, R-2, Normal  3. Constipation, unspecified constipation type  -     polyethylene glycol (MIRALAX) 17 GM/SCOOP powder; Take 17 g by mouth daily, Disp-510 g, R-5Normal     Nystatin cream for rash 2x/day  Miralax daily for constipation  Continue suboxone- 3x/day  Continue diazepam as needed   Return in about 4 weeks (around 3/2/2023) for Approx 4 weeks with Dr. Elizabeth Vazquez and approx 8 weeks with Cole Lin. Data Unavailable     Orders Placed This Encounter   Procedures    POCT Rapid Drug Screen     Orders Placed This Encounter   Medications    nystatin (MYCOSTATIN) 462147 UNIT/GM cream     Sig: Apply topically 2 times daily. Dispense:  30 g     Refill:  2    polyethylene glycol (MIRALAX) 17 GM/SCOOP powder     Sig: Take 17 g by mouth daily     Dispense:  510 g     Refill:  5       Patient given educational materials - see patient instructions. Discussed use, benefit, and side effects of prescribed medications. All patient questions answered. Pt voiced understanding. Reviewed health maintenance. Instructed to continue current medications, diet and exercise. Patient agreed with treatment plan. Follow up as directed.      Electronically signed by MALISSA Booth CNP on 2/2/2023 at 3:42 PM

## 2023-02-02 NOTE — PATIENT INSTRUCTIONS
Nystatin cream for rash 2x/day  Miralax daily for constipation  Continue suboxone- 3x/day  Continue diazepam as needed

## 2023-02-08 ENCOUNTER — OFFICE VISIT (OUTPATIENT)
Dept: PODIATRY | Age: 58
End: 2023-02-08
Payer: MEDICAID

## 2023-02-08 VITALS — BODY MASS INDEX: 40.63 KG/M2 | HEIGHT: 72 IN | WEIGHT: 300 LBS

## 2023-02-08 DIAGNOSIS — Z89.431 STATUS POST TRANSMETATARSAL AMPUTATION OF RIGHT FOOT (HCC): Primary | ICD-10-CM

## 2023-02-08 DIAGNOSIS — L97.509 TYPE 2 DIABETES MELLITUS WITH FOOT ULCER, WITHOUT LONG-TERM CURRENT USE OF INSULIN (HCC): ICD-10-CM

## 2023-02-08 DIAGNOSIS — Z89.432 STATUS POST TRANSMETATARSAL AMPUTATION OF FOOT, LEFT (HCC): ICD-10-CM

## 2023-02-08 DIAGNOSIS — Z91.199 NON COMPLIANCE WITH MEDICAL TREATMENT: ICD-10-CM

## 2023-02-08 DIAGNOSIS — E11.42 TYPE 2 DIABETES MELLITUS WITH DIABETIC POLYNEUROPATHY, WITHOUT LONG-TERM CURRENT USE OF INSULIN (HCC): ICD-10-CM

## 2023-02-08 DIAGNOSIS — E11.621 TYPE 2 DIABETES MELLITUS WITH FOOT ULCER, WITHOUT LONG-TERM CURRENT USE OF INSULIN (HCC): ICD-10-CM

## 2023-02-08 PROCEDURE — 99213 OFFICE O/P EST LOW 20 MIN: CPT | Performed by: PODIATRIST

## 2023-02-08 NOTE — PROGRESS NOTES
[facility]  Return Patient  Chief Complaint   Patient presents with    Wound Check     B/l fup    Diabetes     Last blood sugar 119         Jane Golden is a 61y.o. year old male who is here for follow up ulcerations bilateral feet, S/P TMA right and left. The left is now healed, right has also healed. I have ordered diabetic shoes in the past but still has not gotten them. Wearing crocs. .  Vital signs are stable. Pain level is 3. Patient denies N/V/F/C. On Saboxone. Follows with Madhu Altamirano. Review of Systems    Vascular: DP and PT pulses palpable 2/4, Right Foot and 2/4 on the Left Foot. CFT <Na seconds, Right Foot and <NA seconds on the Left Foot. Edema is absent ,  Right Foot and absenton the Left Foot. Neurological:   Sensation absent  to light touch to level of digits, both feet. Musculoskeletal:   Muscle strength is 4/5 on the Right Foot and 4/5 on the Left Foot. Structural deformities are present on the Right Foot and present on the Left Foot. Integument:  Warm, dry, supple both feet. Incisions are healing well. Wound dehiscence is absent. Infection is absent. Wound #:   1    diabetic foot ulcer   right leg anterior ankle    Wound measurements:  #1  Healed      Wound #:   2    diabetic foot ulcer   right foot along incision for the TMA    Wound measurements:  #1  healed      Wound #:   3    diabetic foot ulcer   left foot    Wound measurements:  #1  healed    Radiographs: Three weightbearing views (AP, Oblique, and Lateral) of the right foot were obtained in the office today and reviewed, revealing no acute fracture, dislocation, or radioopaque foreign body/tumor. Overall alignment is satisfactory. Transmetatarsal amputation present with some bony regrowth of the second metatarsal and the fifth metatarsal. No erosive or lytic changes. Electronically signed by Pedro Arevalo DPM       Assesment : Post operative progress stable. Diagnosis Orders   1. Status post transmetatarsal amputation of right foot ([facility] [address]        2. Status post transmetatarsal amputation of foot, left ([address]        3. Type 2 diabetes mellitus with foot ulcer, without long-term current use of insulin ([facility] [address]        4. Type 2 diabetes mellitus with diabetic polyneuropathy, without long-term current use of insulin ([facility] [address]        5. Non compliance with medical treatment              Plan: Pt was evaluated and examined. Patient was given personalized discharge instructions. Pt will follow up in 6 months or sooner if any problems arise. Diagnosis was discussed with the pt and all of their questions were answered in detail. Proper foot hygiene and care was discussed with the pt. Patient to check feet daily and contact the office with any questions/problems/concerns. Other comorbidity noted and will be managed by PCP. Diabetic shoes and insoles: This patient would benefit from extra depth diabetic shoes and custom inserts. I feel he/she qualifies due to the above performed physical exam and diagnosis. I will do the appropriate paperwork and send it to their primary care physician. Then the patient will be measured for shoes and custom inserts to properly off load and protect his/her feet. Diabetic foot examination performed this visit. The exam included neurological sensory exam, a 10-g monofilament and pinprick sensation, vibration using a 128-Hz tuning fork, ankle reflexes, visual skin inspection, vascular exam including assessment of pedal pulses, orthopedic exam for deformities, and shoe inspection. Increased risk factors noted on the diabetic foot exam include decreased sensory exam and peripheral neuropathy. Shoegear inspected and found to be appropriate size and wear. 1. Discussed appropriate home care of this wound. 2. Dressings:   No orders of the defined types were placed in this encounter. 3. Follow up: 6 months  4.  Detailed home instructions and education material given to patient prior to discharge. No orders of the defined types were placed in this encounter.

## 2023-02-16 DIAGNOSIS — F11.20 UNCOMPLICATED OPIOID DEPENDENCE (HCC): ICD-10-CM

## 2023-02-17 RX ORDER — BUPRENORPHINE HYDROCHLORIDE AND NALOXONE HYDROCHLORIDE DIHYDRATE 8; 2 MG/1; MG/1
1 TABLET SUBLINGUAL 3 TIMES DAILY
Qty: 54 TABLET | Refills: 0 | OUTPATIENT
Start: 2023-02-17 | End: 2023-03-07

## 2023-02-18 ENCOUNTER — TELEPHONE (OUTPATIENT)
Dept: OTHER | Age: 58
End: 2023-02-18

## 2023-02-18 DIAGNOSIS — F11.20 UNCOMPLICATED OPIOID DEPENDENCE (HCC): ICD-10-CM

## 2023-02-19 RX ORDER — BUPRENORPHINE HYDROCHLORIDE AND NALOXONE HYDROCHLORIDE DIHYDRATE 8; 2 MG/1; MG/1
1 TABLET SUBLINGUAL 3 TIMES DAILY
Qty: 54 TABLET | Refills: 0 | Status: SHIPPED | OUTPATIENT
Start: 2023-02-19 | End: 2023-03-07 | Stop reason: SDUPTHER

## 2023-02-24 DIAGNOSIS — L97.509 TYPE 2 DIABETES MELLITUS WITH FOOT ULCER, WITH LONG-TERM CURRENT USE OF INSULIN (HCC): ICD-10-CM

## 2023-02-24 DIAGNOSIS — E11.621 TYPE 2 DIABETES MELLITUS WITH FOOT ULCER, WITH LONG-TERM CURRENT USE OF INSULIN (HCC): ICD-10-CM

## 2023-02-24 DIAGNOSIS — Z79.4 TYPE 2 DIABETES MELLITUS WITH FOOT ULCER, WITH LONG-TERM CURRENT USE OF INSULIN (HCC): ICD-10-CM

## 2023-02-27 DIAGNOSIS — Z79.4 TYPE 2 DIABETES MELLITUS WITH FOOT ULCER, WITH LONG-TERM CURRENT USE OF INSULIN (HCC): ICD-10-CM

## 2023-02-27 DIAGNOSIS — L97.509 TYPE 2 DIABETES MELLITUS WITH FOOT ULCER, WITH LONG-TERM CURRENT USE OF INSULIN (HCC): ICD-10-CM

## 2023-02-27 DIAGNOSIS — E11.621 TYPE 2 DIABETES MELLITUS WITH FOOT ULCER, WITH LONG-TERM CURRENT USE OF INSULIN (HCC): ICD-10-CM

## 2023-02-27 RX ORDER — BLOOD SUGAR DIAGNOSTIC
STRIP MISCELLANEOUS
Qty: 100 STRIP | Refills: 0 | Status: SHIPPED | OUTPATIENT
Start: 2023-02-27 | End: 2023-02-27 | Stop reason: SDUPTHER

## 2023-02-27 RX ORDER — BLOOD SUGAR DIAGNOSTIC
STRIP MISCELLANEOUS
Qty: 100 STRIP | Refills: 5 | Status: SHIPPED | OUTPATIENT
Start: 2023-02-27

## 2023-03-03 DIAGNOSIS — M54.12 RIGHT CERVICAL RADICULOPATHY: ICD-10-CM

## 2023-03-03 RX ORDER — LEVOTHYROXINE SODIUM 0.05 MG/1
TABLET ORAL
Qty: 30 TABLET | Refills: 0 | Status: SHIPPED | OUTPATIENT
Start: 2023-03-03

## 2023-03-03 RX ORDER — ONDANSETRON 4 MG/1
TABLET, ORALLY DISINTEGRATING ORAL
Qty: 21 TABLET | Refills: 0 | Status: SHIPPED | OUTPATIENT
Start: 2023-03-03

## 2023-03-03 RX ORDER — CYCLOBENZAPRINE HCL 10 MG
TABLET ORAL
Qty: 30 TABLET | Refills: 0 | Status: SHIPPED | OUTPATIENT
Start: 2023-03-03

## 2023-03-07 DIAGNOSIS — F11.20 UNCOMPLICATED OPIOID DEPENDENCE (HCC): ICD-10-CM

## 2023-03-07 DIAGNOSIS — R60.0 LOCALIZED EDEMA: ICD-10-CM

## 2023-03-07 RX ORDER — BUPRENORPHINE HYDROCHLORIDE AND NALOXONE HYDROCHLORIDE DIHYDRATE 8; 2 MG/1; MG/1
1 TABLET SUBLINGUAL 3 TIMES DAILY
Qty: 54 TABLET | Refills: 0 | Status: SHIPPED | OUTPATIENT
Start: 2023-03-07 | End: 2023-03-25

## 2023-03-07 RX ORDER — PROMETHAZINE HYDROCHLORIDE 25 MG/1
TABLET ORAL
Qty: 30 TABLET | Refills: 0 | Status: SHIPPED | OUTPATIENT
Start: 2023-03-07

## 2023-03-07 RX ORDER — CLONIDINE HYDROCHLORIDE 0.1 MG/1
TABLET ORAL
Qty: 36 TABLET | Refills: 0 | Status: SHIPPED | OUTPATIENT
Start: 2023-03-07

## 2023-03-07 RX ORDER — DOXYCYCLINE HYCLATE 100 MG/1
CAPSULE ORAL
Qty: 28 CAPSULE | Refills: 0 | Status: SHIPPED | OUTPATIENT
Start: 2023-03-07

## 2023-03-07 RX ORDER — FUROSEMIDE 20 MG/1
20 TABLET ORAL DAILY
Qty: 30 TABLET | Refills: 0 | Status: SHIPPED | OUTPATIENT
Start: 2023-03-07

## 2023-03-07 NOTE — TELEPHONE ENCOUNTER
Please Approve or Refuse.        Next Visit Date:  8/9/2023   Last Visit Date: 2/8/2023    Hemoglobin A1C (%)   Date Value   12/08/2022 6.3   02/26/2022 6.3 (H)   01/18/2022 6.1 (H)             ( goal A1C is < 7)   BP Readings from Last 3 Encounters:   02/02/23 (!) 144/82   12/08/22 112/68   11/10/22 100/64          (goal 120/80)  BUN   Date Value Ref Range Status   07/30/2022 16 6 - 20 mg/dL Final     Creatinine   Date Value Ref Range Status   07/30/2022 0.92 0.70 - 1.20 mg/dL Final     Potassium   Date Value Ref Range Status   07/30/2022 3.7 3.7 - 5.3 mmol/L Final

## 2023-03-14 ENCOUNTER — OFFICE VISIT (OUTPATIENT)
Dept: PRIMARY CARE CLINIC | Age: 58
End: 2023-03-14

## 2023-03-14 VITALS — WEIGHT: 300 LBS | BODY MASS INDEX: 40.69 KG/M2 | OXYGEN SATURATION: 93 % | HEART RATE: 112 BPM

## 2023-03-14 DIAGNOSIS — Z13.220 ENCOUNTER FOR LIPID SCREENING FOR CARDIOVASCULAR DISEASE: ICD-10-CM

## 2023-03-14 DIAGNOSIS — Z13.6 ENCOUNTER FOR LIPID SCREENING FOR CARDIOVASCULAR DISEASE: ICD-10-CM

## 2023-03-14 DIAGNOSIS — D64.9 ANEMIA, UNSPECIFIED TYPE: ICD-10-CM

## 2023-03-14 DIAGNOSIS — E11.621 TYPE 2 DIABETES MELLITUS WITH FOOT ULCER, WITHOUT LONG-TERM CURRENT USE OF INSULIN (HCC): Primary | ICD-10-CM

## 2023-03-14 DIAGNOSIS — R60.0 LOCALIZED EDEMA: ICD-10-CM

## 2023-03-14 DIAGNOSIS — E03.9 ACQUIRED HYPOTHYROIDISM: ICD-10-CM

## 2023-03-14 DIAGNOSIS — I10 ESSENTIAL HYPERTENSION: ICD-10-CM

## 2023-03-14 DIAGNOSIS — Z12.5 SCREENING PSA (PROSTATE SPECIFIC ANTIGEN): ICD-10-CM

## 2023-03-14 DIAGNOSIS — Z00.00 ANNUAL PHYSICAL EXAM: ICD-10-CM

## 2023-03-14 DIAGNOSIS — Z12.11 ENCOUNTER FOR SCREENING FOR MALIGNANT NEOPLASM OF COLON: ICD-10-CM

## 2023-03-14 DIAGNOSIS — L97.509 TYPE 2 DIABETES MELLITUS WITH FOOT ULCER, WITHOUT LONG-TERM CURRENT USE OF INSULIN (HCC): Primary | ICD-10-CM

## 2023-03-14 LAB — HBA1C MFR BLD: 6.7 %

## 2023-03-14 RX ORDER — LEVOTHYROXINE SODIUM 0.05 MG/1
50 TABLET ORAL DAILY
Qty: 90 TABLET | Refills: 1 | Status: SHIPPED | OUTPATIENT
Start: 2023-03-14

## 2023-03-14 RX ORDER — FUROSEMIDE 20 MG/1
20 TABLET ORAL DAILY
Qty: 90 TABLET | Refills: 1 | Status: SHIPPED | OUTPATIENT
Start: 2023-03-14

## 2023-03-14 RX ORDER — CLONIDINE HYDROCHLORIDE 0.1 MG/1
0.1 TABLET ORAL 2 TIMES DAILY
Qty: 180 TABLET | Refills: 1 | Status: SHIPPED | OUTPATIENT
Start: 2023-03-14

## 2023-03-14 SDOH — ECONOMIC STABILITY: INCOME INSECURITY: HOW HARD IS IT FOR YOU TO PAY FOR THE VERY BASICS LIKE FOOD, HOUSING, MEDICAL CARE, AND HEATING?: NOT HARD AT ALL

## 2023-03-14 SDOH — ECONOMIC STABILITY: FOOD INSECURITY: WITHIN THE PAST 12 MONTHS, THE FOOD YOU BOUGHT JUST DIDN'T LAST AND YOU DIDN'T HAVE MONEY TO GET MORE.: NEVER TRUE

## 2023-03-14 SDOH — ECONOMIC STABILITY: FOOD INSECURITY: WITHIN THE PAST 12 MONTHS, YOU WORRIED THAT YOUR FOOD WOULD RUN OUT BEFORE YOU GOT MONEY TO BUY MORE.: NEVER TRUE

## 2023-03-14 SDOH — ECONOMIC STABILITY: HOUSING INSECURITY
IN THE LAST 12 MONTHS, WAS THERE A TIME WHEN YOU DID NOT HAVE A STEADY PLACE TO SLEEP OR SLEPT IN A SHELTER (INCLUDING NOW)?: NO

## 2023-03-14 ASSESSMENT — PATIENT HEALTH QUESTIONNAIRE - PHQ9
3. TROUBLE FALLING OR STAYING ASLEEP: 3
6. FEELING BAD ABOUT YOURSELF - OR THAT YOU ARE A FAILURE OR HAVE LET YOURSELF OR YOUR FAMILY DOWN: 0
SUM OF ALL RESPONSES TO PHQ QUESTIONS 1-9: 10
SUM OF ALL RESPONSES TO PHQ9 QUESTIONS 1 & 2: 1
7. TROUBLE CONCENTRATING ON THINGS, SUCH AS READING THE NEWSPAPER OR WATCHING TELEVISION: 0
2. FEELING DOWN, DEPRESSED OR HOPELESS: 1
SUM OF ALL RESPONSES TO PHQ QUESTIONS 1-9: 10
10. IF YOU CHECKED OFF ANY PROBLEMS, HOW DIFFICULT HAVE THESE PROBLEMS MADE IT FOR YOU TO DO YOUR WORK, TAKE CARE OF THINGS AT HOME, OR GET ALONG WITH OTHER PEOPLE: 1
4. FEELING TIRED OR HAVING LITTLE ENERGY: 3
SUM OF ALL RESPONSES TO PHQ QUESTIONS 1-9: 10
1. LITTLE INTEREST OR PLEASURE IN DOING THINGS: 0
9. THOUGHTS THAT YOU WOULD BE BETTER OFF DEAD, OR OF HURTING YOURSELF: 0
SUM OF ALL RESPONSES TO PHQ QUESTIONS 1-9: 10
8. MOVING OR SPEAKING SO SLOWLY THAT OTHER PEOPLE COULD HAVE NOTICED. OR THE OPPOSITE, BEING SO FIGETY OR RESTLESS THAT YOU HAVE BEEN MOVING AROUND A LOT MORE THAN USUAL: 0
5. POOR APPETITE OR OVEREATING: 3

## 2023-03-14 ASSESSMENT — ENCOUNTER SYMPTOMS
WHEEZING: 0
EYE DISCHARGE: 0
SORE THROAT: 0
EYE REDNESS: 0
DIARRHEA: 0
ABDOMINAL PAIN: 0
VOMITING: 0
RHINORRHEA: 0
NAUSEA: 0
COUGH: 0
SHORTNESS OF BREATH: 0

## 2023-03-14 NOTE — PROGRESS NOTES
[address] [facility] PRIMARY CARE  [address] [facility] [address]  Dept: Tahira Mace Delgado Jones is a 61 y.o. male Established patient, who presents today for his medical conditions/complaints as noted below. Chief Complaint   Patient presents with    Diabetes    Discuss Medications     States since starting suboxone he is having trouble keeping sugar in range       HPI:     HPI  Pt states switching to suboxone, sugars running higher. Pt states no further open sores. Had amputation of toes on left foot. Had amputations of toes on right foot as well. No further skin breakdown. No orthotics in shoes. Mood doing ok, not down, sad or tearful.      Reviewed prior notes None  Reviewed previous Labs    LDL Calculated (mg/dL)   Date Value   11/16/2022 82   03/08/2020 74   03/29/2019 82       (goal LDL is <100)   AST (U/L)   Date Value   07/30/2022 17     ALT (U/L)   Date Value   07/30/2022 9     BUN (mg/dL)   Date Value   07/30/2022 16     Hemoglobin A1C (%)   Date Value   03/14/2023 6.7     TSH (mIU/L)   Date Value   02/26/2022 0.58     BP Readings from Last 3 Encounters:   02/02/23 (!) 144/82   12/08/22 112/68   11/10/22 100/64          (goal 120/80)    Past Medical History:   Diagnosis Date    Acute metabolic encephalopathy     CLAUDIA (acute kidney injury) (HCC)     Anemia     Anxiety     ARF (acute renal failure) (HCC)     Arthritis     lower back, knees    BPH (benign prostatic hyperplasia)     Chronic kidney disease     prostate    Depression     Diabetes mellitus (HCC)     Diabetic neuropathy (HCC)     GERD (gastroesophageal reflux disease)     Hyperglycemia     Hyperkalemia     Hypertension     Hypothyroidism     Impacted tooth     Necrosis of bone of foot (HCC)     Osteomyelitis of left foot (HCC)     PAD (peripheral artery disease) (HCC)     Pneumonia     Septic shock (HCC)     Type 2 diabetes mellitus ([address]       Past Surgical History:   Procedure Laterality Date    AMPUTATION Right 01/21/2022    TRANSMETARSAL AMPUTATION FOOT, TENDOACHILLES LENGTHENING (Right Foot)    APPENDECTOMY      BACK SURGERY      Lower x 4. Had abscess after appendectomy.      FOOT AMPUTATION Left 1/4/2020    TRANSMETATARSAL FOOT AMPUTATION, REMOVAL OF FOREIGN BODY performed by Chava Venegas DPM at [facility] Right 2/25/2022    REVISION TRANSMETATARSAL AMPUTATION performed by Chava Venegas DPM at [address] Left 01/04/2020    TRANSMETATARSAL FOOT AMPUTATION, REMOVAL OF FOREIGN BODY    KNEE SURGERY Right     scope    TOE AMPUTATION Bilateral 3/6/2017    TOE AMPUTATION LEFT HALLUX AND 2ND DIGIT AND RIGHT 2ND DIGIT performed by Albino Diop DPM at [facility] Right 1/21/2022    TRANSMETARSAL AMPUTATION FOOT, TENDOACHILLES LENGTHENING performed by Chava Venegas DPM at [facility] N/A 5/20/2019    EGD WITH BIOPSY performed by Nancy Dawn MD at [address]       Family History   Problem Relation Age of Onset    Diabetes Father     Cancer Maternal Grandfather         Colon Cancer; Prostate Cancer    No Known Problems Mother        Social History     Tobacco Use    Smoking status: Never    Smokeless tobacco: Never   Substance Use Topics    Alcohol use: No      Current Outpatient Medications   Medication Sig Dispense Refill    levothyroxine (SYNTHROID) 50 MCG tablet Take 1 tablet by mouth Daily 90 tablet 1    cloNIDine (CATAPRES) 0.1 MG tablet Take 1 tablet by mouth 2 times daily Take One Tablet By Mouth Twice A Day 180 tablet 1    furosemide (LASIX) 20 MG tablet Take 1 tablet by mouth daily 90 tablet 1    metFORMIN (GLUCOPHAGE) 500 MG tablet Take 2 tablets by mouth daily (with breakfast) 90 tablet 3    doxycycline hyclate (VIBRAMYCIN) 100 MG capsule TAKE ONE CAPSULE BY MOUTH TWICE A DAY FOR 14 DAYS 28 capsule 0    buprenorphine-naloxone (SUBOXONE) 8-2 MG SUBL SL tablet Place 1 tablet under the tongue 3 times daily for 18 days. 54 tablet 0    promethazine (PHENERGAN) 25 MG tablet Take One Tablet By Mouth Every 6 Hours As Needed For Nausea 30 tablet 0    cyclobenzaprine (FLEXERIL) 10 MG tablet TAKE ONE TABLET BY MOUTH TWICE A DAY AS NEEDED FOR MUSCLE SPASMS 30 tablet 0    ondansetron (ZOFRAN-ODT) 4 MG disintegrating tablet DISSOLVE ONE TABLET BY MOUTH THREE TIMES A DAY AS NEEDED FOR NAUSEA OR VOMITING 21 tablet 0    blood glucose test strips (ONETOUCH ULTRA) strip Pt test twice daily. E11.9 100 strip 5    nystatin (MYCOSTATIN) 124422 UNIT/GM cream Apply topically 2 times daily. 30 g 2    polyethylene glycol (MIRALAX) 17 GM/SCOOP powder Take 17 g by mouth daily 510 g 5    lactobacillus (CULTURELLE) capsule Take 1 capsule by mouth 2 times daily 60 capsule 2    niacin (SLO-NIACIN) 500 MG extended release tablet Take 1 tablet by mouth 2 times daily (with meals) 60 tablet 2    PARoxetine (PAXIL) 20 MG tablet TAKE FOUR TABLETS BY MOUTH EVERY MORNING 120 tablet 5    Lancets (ONETOUCH DELICA PLUS QGHGCI27P) MISC USE TO TEST TWICE A  each 5    ferrous sulfate (FE TABS 325) 325 (65 Fe) MG EC tablet Take 1 tablet by mouth daily (with breakfast) 90 tablet 3    fluocinonide (LIDEX) 0.05 % ointment Apply 1 gm  topically 2 times daily to legs 60 g 5    sodium hypochlorite (DAKINS) 0.125 % SOLN external solution APPLY TOPICALLY EVERY 12 HOURS. APPLY AS A WET TO DRY DRESSING TWO TIMES A DAY, CAN ALSO USE TO CLEANSE WOUND 437 mL 1    NARCAN 4 MG/0.1ML LIQD nasal spray 4 mg by Nasal route as needed for Opioid Reversal       pantoprazole (PROTONIX) 40 MG tablet Take 1 tablet by mouth every morning (before breakfast) 30 tablet 11    povidone-iodine (BETADINE) 7.5 % external solution Apply to wound. Apply as a wet to dry dressing 1 Bottle 1    Wound Dressings (ADAPTIC NON-ADHERING DRESSING) PADS Apply 1 Units topically 2 times daily 60 each 5    Gauze Pads & Dressings (COMBINE ABD) 5\"X9\" PADS 1 Units by Does not apply route 2 times daily 60 each 5    Gauze Pads & Dressings (KERLIX GAUZE ROLL LARGE) MISC 1 Units by Does not apply route 2 times daily 60 each 5     No current facility-administered medications for this visit. Allergies   Allergen Reactions    Bactrim [Sulfamethoxazole-Trimethoprim]      reported renal failure    Fiorinal [Butalbital-Aspirin-Caffeine] Other (See Comments)     Generalized blisters    Statins Other (See Comments)     Muscle aches    Tylenol [Acetaminophen]      Pt states it shuts down his kidneys    Peanut Butter Flavor [Flavoring Agent] Nausea And Vomiting       Health Maintenance   Topic Date Due    COVID-19 Vaccine (1) Never done    HIV screen  Never done    Diabetic retinal exam  Never done    Hepatitis B vaccine (1 of 3 - Risk 3-dose series) Never done    Shingles vaccine (1 of 2) Never done    Colorectal Cancer Screen  12/16/2020    Depression Monitoring  04/04/2023    GFR test (Diabetes, CKD 3-4, OR last GFR 15-59)  07/30/2023    Lipids  11/16/2023    Diabetic Alb to Cr ratio (uACR) test  12/08/2023    A1C test (Diabetic or Prediabetic)  03/14/2024    DTaP/Tdap/Td vaccine (2 - Td or Tdap) 04/24/2028    Pneumococcal 0-64 years Vaccine (3 - PPSV23 if available, else PCV20) 06/04/2029    Flu vaccine  Completed    Hepatitis C screen  Completed    Hepatitis A vaccine  Aged Out    Hib vaccine  Aged Out    Meningococcal (ACWY) vaccine  Aged Out       Subjective:      Review of Systems   Constitutional:  Negative for chills and fever. HENT:  Negative for rhinorrhea and sore throat. Eyes:  Negative for discharge and redness. Respiratory:  Negative for cough, shortness of breath and wheezing. Cardiovascular:  Negative for chest pain and palpitations. Gastrointestinal:  Negative for abdominal pain, diarrhea, nausea and vomiting. Genitourinary:  Negative for dysuria and frequency. Musculoskeletal:  Negative for arthralgias and myalgias.    Neurological:  Negative for dizziness, light-headedness and headaches. Psychiatric/Behavioral:  Negative for sleep disturbance. Objective:     Pulse (!) 112   Wt 300 lb (136.1 kg)   SpO2 93%   BMI 40.69 kg/m²   Physical Exam  Vitals and nursing note reviewed. Constitutional:       General: He is not in acute distress. Appearance: He is well-developed. He is not ill-appearing. HENT:      Head: Normocephalic and atraumatic. Right Ear: External ear normal.      Left Ear: External ear normal.   Eyes:      General: No scleral icterus. Right eye: No discharge. Left eye: No discharge. Conjunctiva/sclera: Conjunctivae normal.   Neck:      Thyroid: No thyromegaly. Trachea: No tracheal deviation. Cardiovascular:      Rate and Rhythm: Normal rate and regular rhythm. Heart sounds: Normal heart sounds. Pulmonary:      Effort: Pulmonary effort is normal. No respiratory distress. Breath sounds: Normal breath sounds. No wheezing. Musculoskeletal:      Comments: Patient with bilateral amputations of both distal feet. No open sores are noted   Lymphadenopathy:      Cervical: No cervical adenopathy. Skin:     General: Skin is warm. Findings: No rash. Neurological:      Mental Status: He is alert and oriented to person, place, and time. Psychiatric:         Mood and Affect: Mood normal.         Behavior: Behavior normal.         Thought Content: Thought content normal.       Assessment:       Diagnosis Orders   1. Type 2 diabetes mellitus with foot ulcer, without long-term current use of insulin ([facility])  POCT glycosylated hemoglobin (Hb A1C)      2. Localized edema  furosemide (LASIX) 20 MG tablet      3. Essential hypertension        4. Encounter for lipid screening for cardiovascular disease  Lipid, Fasting      5. Screening PSA (prostate specific antigen)  PSA Screening      6. Anemia, unspecified type  CBC with Auto Differential    Iron and TIBC    Ferritin      7.  Acquired hypothyroidism  levothyroxine (SYNTHROID) 50 MCG tablet    T4, Free    TSH      8. Annual physical exam  Basic Metabolic Panel, Fasting    Hepatic Function Panel      9. Encounter for screening for malignant neoplasm of colon  Juan Manuel Maddox MD, Colorectal [facility]:   Patient education Shingrix  Increase metformin to 2 tablets daily on most days  Overall blood work ordered  Colorectal surgeon for screening colonoscopy    Return in about 4 months (around 7/14/2023) for DIABETES. Orders Placed This Encounter   Procedures    Lipid, Fasting     Standing Status:   Future     Standing Expiration Date:   7/44/8889    Basic Metabolic Panel, Fasting     Standing Status:   Future     Standing Expiration Date:   6/14/2023    Hepatic Function Panel     Standing Status:   Future     Standing Expiration Date:   6/14/2023    PSA Screening     Standing Status:   Future     Standing Expiration Date:   6/14/2023    T4, Free     Standing Status:   Future     Standing Expiration Date:   3/14/2024    TSH     Standing Status:   Future     Standing Expiration Date:   3/14/2024    CBC with Auto Differential     Standing Status:   Future     Standing Expiration Date:   3/14/2024    Iron and TIBC     Standing Status:   Future     Standing Expiration Date:   3/14/2024     Order Specific Question:   Is Patient Fasting? Answer:   yes     Order Specific Question:   No of Hours?      Answer:   10    Ferritin     Standing Status:   Future     Standing Expiration Date:   3/14/2024    ALBINO Bonilla MD, Colorectal Surgery, [place]     Referral Priority:   Routine     Referral Type:   Eval and Treat     Referral Reason:   Specialty Services Required     Referred to Provider:   Cecy Estevez MD     Requested Specialty:   Colon and Rectal Surgery     Number of Visits Requested:   1    POCT glycosylated hemoglobin (Hb A1C)     Orders Placed This Encounter   Medications    levothyroxine (SYNTHROID) 50 MCG tablet     Sig: Take 1 tablet by mouth Daily     Dispense:  90 tablet     Refill:  1    cloNIDine (CATAPRES) 0.1 MG tablet     Sig: Take 1 tablet by mouth 2 times daily Take One Tablet By Mouth Twice A Day     Dispense:  180 tablet     Refill:  1    furosemide (LASIX) 20 MG tablet     Sig: Take 1 tablet by mouth daily     Dispense:  90 tablet     Refill:  1    metFORMIN (GLUCOPHAGE) 500 MG tablet     Sig: Take 2 tablets by mouth daily (with breakfast)     Dispense:  90 tablet     Refill:  3       Patient given educational materials - see patient instructions. Discussed use, benefit, and side effects of prescribed medications. All patient questions answered. Pt voiced understanding. Reviewed health maintenance. Instructed to continue current medications, diet andexercise. Patient agreed with treatment plan. Follow up as directed.      Electronicallysigned by Sang Byers MD on 3/14/2023 at 2:29 PM

## 2023-03-22 DIAGNOSIS — F11.20 UNCOMPLICATED OPIOID DEPENDENCE (HCC): ICD-10-CM

## 2023-03-22 RX ORDER — BUPRENORPHINE HYDROCHLORIDE AND NALOXONE HYDROCHLORIDE DIHYDRATE 8; 2 MG/1; MG/1
1 TABLET SUBLINGUAL 3 TIMES DAILY
Qty: 54 TABLET | Refills: 0 | Status: SHIPPED | OUTPATIENT
Start: 2023-03-22 | End: 2023-04-09

## 2023-04-03 DIAGNOSIS — R60.0 LOCALIZED EDEMA: ICD-10-CM

## 2023-04-03 DIAGNOSIS — F34.1 DYSTHYMIA: ICD-10-CM

## 2023-04-03 DIAGNOSIS — M54.12 RIGHT CERVICAL RADICULOPATHY: ICD-10-CM

## 2023-04-03 DIAGNOSIS — E03.9 ACQUIRED HYPOTHYROIDISM: ICD-10-CM

## 2023-04-03 DIAGNOSIS — F11.20 UNCOMPLICATED OPIOID DEPENDENCE (HCC): ICD-10-CM

## 2023-04-03 RX ORDER — PROMETHAZINE HYDROCHLORIDE 25 MG/1
TABLET ORAL
Qty: 30 TABLET | Refills: 3 | Status: SHIPPED | OUTPATIENT
Start: 2023-04-03

## 2023-04-03 RX ORDER — DIAZEPAM 5 MG/1
TABLET ORAL
Qty: 90 TABLET | Refills: 0 | Status: SHIPPED | OUTPATIENT
Start: 2023-04-03 | End: 2023-05-02

## 2023-04-03 RX ORDER — FUROSEMIDE 20 MG/1
20 TABLET ORAL DAILY
Qty: 90 TABLET | Refills: 3 | Status: SHIPPED | OUTPATIENT
Start: 2023-04-03

## 2023-04-03 RX ORDER — LEVOTHYROXINE SODIUM 0.05 MG/1
50 TABLET ORAL DAILY
Qty: 90 TABLET | Refills: 3 | Status: SHIPPED | OUTPATIENT
Start: 2023-04-03

## 2023-04-03 RX ORDER — ONDANSETRON 4 MG/1
TABLET, ORALLY DISINTEGRATING ORAL
Qty: 30 TABLET | Refills: 3 | Status: SHIPPED | OUTPATIENT
Start: 2023-04-03

## 2023-04-03 RX ORDER — CLONIDINE HYDROCHLORIDE 0.1 MG/1
0.1 TABLET ORAL 2 TIMES DAILY
Qty: 180 TABLET | Refills: 3 | Status: SHIPPED | OUTPATIENT
Start: 2023-04-03

## 2023-04-03 RX ORDER — CYCLOBENZAPRINE HCL 10 MG
TABLET ORAL
Qty: 30 TABLET | Refills: 3 | Status: SHIPPED | OUTPATIENT
Start: 2023-04-03

## 2023-04-26 DIAGNOSIS — F11.20 UNCOMPLICATED OPIOID DEPENDENCE (HCC): ICD-10-CM

## 2023-04-26 RX ORDER — BUPRENORPHINE HYDROCHLORIDE AND NALOXONE HYDROCHLORIDE DIHYDRATE 8; 2 MG/1; MG/1
1 TABLET SUBLINGUAL 3 TIMES DAILY
Qty: 54 TABLET | Refills: 0 | Status: SHIPPED | OUTPATIENT
Start: 2023-04-26 | End: 2023-05-14

## 2023-05-01 RX ORDER — PAROXETINE HYDROCHLORIDE 20 MG/1
TABLET, FILM COATED ORAL
Qty: 120 TABLET | Refills: 5 | Status: SHIPPED | OUTPATIENT
Start: 2023-05-01

## 2023-05-01 NOTE — TELEPHONE ENCOUNTER
LAST VISIT:   4/13/2023     Future Appointments   Date Time Provider Tasha Guevarai   5/18/2023  2:00 PM [facility]ISSA [facility]   7/18/2023  2:40 PM MD DIEGO Prabhakar [facility]   8/9/2023  2:00 PM Elizabeth Velasquez, [address]

## 2023-05-03 NOTE — TELEPHONE ENCOUNTER
Patient wife called office requesting refill.  Wife states patient was told by dr cruz he will increase metformin     Please advise Detail Level: Simple Render Risk Assessment In Note?: no Comment: Will monitor; photos taken today

## 2023-05-11 DIAGNOSIS — F34.1 DYSTHYMIA: ICD-10-CM

## 2023-05-11 NOTE — TELEPHONE ENCOUNTER
LAST VISIT:   4/13/2023     Future Appointments   Date Time Provider Tasha Guevarai   5/18/2023  2:00 PM [facility]   7/18/2023  2:40 PM MD [facility]IEGO [facility]   8/9/2023  2:00 PM Srini Rivera, [address] [place] [address]

## 2023-05-12 RX ORDER — DIAZEPAM 5 MG/1
TABLET ORAL
Qty: 90 TABLET | Refills: 0 | Status: SHIPPED | OUTPATIENT
Start: 2023-05-12 | End: 2023-06-10

## 2023-05-18 ENCOUNTER — OFFICE VISIT (OUTPATIENT)
Dept: PRIMARY CARE CLINIC | Age: 58
End: 2023-05-18
Payer: MEDICAID

## 2023-05-18 VITALS
HEART RATE: 80 BPM | BODY MASS INDEX: 41.04 KG/M2 | HEIGHT: 72 IN | SYSTOLIC BLOOD PRESSURE: 122 MMHG | WEIGHT: 303 LBS | DIASTOLIC BLOOD PRESSURE: 80 MMHG | OXYGEN SATURATION: 96 %

## 2023-05-18 DIAGNOSIS — F11.20 UNCOMPLICATED OPIOID DEPENDENCE (HCC): ICD-10-CM

## 2023-05-18 PROCEDURE — 3074F SYST BP LT 130 MM HG: CPT | Performed by: NURSE PRACTITIONER

## 2023-05-18 PROCEDURE — 99213 OFFICE O/P EST LOW 20 MIN: CPT | Performed by: NURSE PRACTITIONER

## 2023-05-18 PROCEDURE — 3079F DIAST BP 80-89 MM HG: CPT | Performed by: NURSE PRACTITIONER

## 2023-05-18 RX ORDER — BUPRENORPHINE HYDROCHLORIDE AND NALOXONE HYDROCHLORIDE DIHYDRATE 8; 2 MG/1; MG/1
1 TABLET SUBLINGUAL 3 TIMES DAILY
Qty: 90 TABLET | Refills: 0 | Status: SHIPPED | OUTPATIENT
Start: 2023-05-18 | End: 2023-06-17

## 2023-05-18 ASSESSMENT — ENCOUNTER SYMPTOMS
SHORTNESS OF BREATH: 0
COUGH: 0
NAUSEA: 0
DIARRHEA: 0
CONSTIPATION: 0

## 2023-05-18 NOTE — PROGRESS NOTES
[address] [facility] PRIMARY CARE  [address]  [facility] [address]  Dept: Tahira Mace Gui Almanzar is a 61 y.o. male Established patient, who presents today for his medical conditions/complaints as noted below. Chief Complaint   Patient presents with    Medication Check     Patient is here today for DM follow up. HPI:     HPI   Suboxone check - today. Diabetic check due next month  No street drugs  He still has significant pain  He had amputations of toes on both feet. The swelling in his feet and legs fluctuates. Swelling is effecting calves.     Reviewed prior notes: None   Reviewed previous:        LDL Calculated (mg/dL)   Date Value   11/16/2022 82   03/08/2020 74   03/29/2019 82       (goal LDL is <100)   AST (U/L)   Date Value   07/30/2022 17     ALT (U/L)   Date Value   07/30/2022 9     BUN (mg/dL)   Date Value   07/30/2022 16     Hemoglobin A1C (%)   Date Value   03/14/2023 6.7     TSH (mIU/L)   Date Value   02/26/2022 0.58     BP Readings from Last 3 Encounters:   05/18/23 122/80   04/13/23 (!) 144/80   02/02/23 (!) 144/82          (goal 120/80)    Past Medical History:   Diagnosis Date    Acute metabolic encephalopathy     CLAUDIA (acute kidney injury) ([address]     Anemia     Anxiety     ARF (acute renal failure) (HCC)     Arthritis     lower back, knees    BPH (benign prostatic hyperplasia)     Chronic kidney disease     prostate    Depression     Diabetes mellitus (HCC)     Diabetic neuropathy (HCC)     GERD (gastroesophageal reflux disease)     Hyperglycemia     Hyperkalemia     Hypertension     Hypothyroidism     Impacted tooth     Necrosis of bone of foot (HCC)     Osteomyelitis of left foot (HCC)     PAD (peripheral artery disease) (HCC)     Pneumonia     Septic shock ([address]     Type 2 diabetes mellitus ([address]       Past Surgical History:   Procedure Laterality Date    AMPUTATION Right 01/21/2022    TRANSMETARSAL AMPUTATION FOOT, TENDOACHILLES

## 2023-07-08 DIAGNOSIS — B37.2 CUTANEOUS CANDIDIASIS: ICD-10-CM

## 2023-07-08 DIAGNOSIS — F11.20 UNCOMPLICATED OPIOID DEPENDENCE (HCC): ICD-10-CM

## 2023-07-08 DIAGNOSIS — L97.509 TYPE 2 DIABETES MELLITUS WITH FOOT ULCER, WITH LONG-TERM CURRENT USE OF INSULIN (HCC): ICD-10-CM

## 2023-07-08 DIAGNOSIS — Z79.4 TYPE 2 DIABETES MELLITUS WITH FOOT ULCER, WITH LONG-TERM CURRENT USE OF INSULIN (HCC): ICD-10-CM

## 2023-07-08 DIAGNOSIS — E11.621 TYPE 2 DIABETES MELLITUS WITH FOOT ULCER, WITH LONG-TERM CURRENT USE OF INSULIN (HCC): ICD-10-CM

## 2023-07-10 RX ORDER — LANCETS 33 GAUGE
EACH MISCELLANEOUS
Qty: 100 EACH | Refills: 0 | Status: SHIPPED | OUTPATIENT
Start: 2023-07-10

## 2023-07-10 RX ORDER — NYSTATIN 100000 U/G
CREAM TOPICAL
Qty: 30 G | Refills: 2 | Status: SHIPPED | OUTPATIENT
Start: 2023-07-10

## 2023-07-10 RX ORDER — PROMETHAZINE HYDROCHLORIDE 25 MG/1
TABLET ORAL
Qty: 30 TABLET | Refills: 3 | Status: SHIPPED | OUTPATIENT
Start: 2023-07-10

## 2023-07-10 NOTE — TELEPHONE ENCOUNTER
LAST VISIT:   6/15/2023     Future Appointments   Date Time Provider [address]   7/18/2023  2:40 PM MD DIEGO Rubio [facility]   8/9/2023  2:00 PM Bryce Cabot,  [facility]   8/17/2023  3:00 PM [facility]

## 2023-07-10 NOTE — TELEPHONE ENCOUNTER
LAST VISIT:   6/15/2023     Future Appointments   Date Time Provider [address]   7/18/2023  2:40 PM MD DIEGO Rubio [facility]   8/9/2023  2:00 PM Bryce Cabot,  [facility]   8/17/2023  3:00 PM [facility]

## 2023-07-11 DIAGNOSIS — R56.9 SEIZURE-LIKE ACTIVITY (HCC): ICD-10-CM

## 2023-07-11 DIAGNOSIS — F41.9 ANXIETY DISORDER, UNSPECIFIED TYPE: ICD-10-CM

## 2023-07-11 RX ORDER — DIAZEPAM 5 MG/1
5 TABLET ORAL EVERY 8 HOURS PRN
Qty: 90 TABLET | Refills: 0 | Status: SHIPPED | OUTPATIENT
Start: 2023-07-11 | End: 2023-08-10

## 2023-07-11 NOTE — TELEPHONE ENCOUNTER
LAST VISIT:   6/15/2023     Future Appointments   Date Time Provider [address]   7/18/2023  2:40 PM MD DIEGO Rodarte [facility]   8/9/2023  2:00 PM Tara Fuentes  S [facility]   8/17/2023  3:00 PM MALISSA Natarajan - [facility]

## 2023-07-13 DIAGNOSIS — F11.20 UNCOMPLICATED OPIOID DEPENDENCE (HCC): ICD-10-CM

## 2023-07-13 RX ORDER — BUPRENORPHINE HYDROCHLORIDE AND NALOXONE HYDROCHLORIDE DIHYDRATE 8; 2 MG/1; MG/1
1 TABLET SUBLINGUAL 3 TIMES DAILY
Qty: 90 TABLET | Refills: 0 | Status: SHIPPED | OUTPATIENT
Start: 2023-07-13 | End: 2023-08-12

## 2023-08-14 DIAGNOSIS — F41.9 ANXIETY DISORDER, UNSPECIFIED TYPE: ICD-10-CM

## 2023-08-14 DIAGNOSIS — R56.9 SEIZURE-LIKE ACTIVITY (HCC): ICD-10-CM

## 2023-08-14 DIAGNOSIS — M54.12 RIGHT CERVICAL RADICULOPATHY: ICD-10-CM

## 2023-08-14 DIAGNOSIS — F11.20 UNCOMPLICATED OPIOID DEPENDENCE (HCC): ICD-10-CM

## 2023-08-14 RX ORDER — CYCLOBENZAPRINE HCL 10 MG
TABLET ORAL
Qty: 30 TABLET | Refills: 3 | Status: SHIPPED | OUTPATIENT
Start: 2023-08-14

## 2023-08-14 RX ORDER — BUPRENORPHINE HYDROCHLORIDE AND NALOXONE HYDROCHLORIDE DIHYDRATE 8; 2 MG/1; MG/1
1 TABLET SUBLINGUAL 3 TIMES DAILY
Qty: 90 TABLET | Refills: 0 | Status: SHIPPED | OUTPATIENT
Start: 2023-08-14 | End: 2023-09-13

## 2023-08-14 RX ORDER — DIAZEPAM 5 MG/1
5 TABLET ORAL EVERY 8 HOURS PRN
Qty: 90 TABLET | Refills: 0 | Status: SHIPPED | OUTPATIENT
Start: 2023-08-14 | End: 2023-09-13

## 2023-08-14 NOTE — TELEPHONE ENCOUNTER
LAST VISIT:   6/15/2023     Future Appointments   Date Time Provider [address]   9/7/2023  2:45 PM [facility]   9/14/2023  4:15 PM MALISSA Natarajan CNP [facility]   9/18/2023  3:30 PM MD DIEGO Rodarte  [facility]

## 2023-09-13 DIAGNOSIS — F41.9 ANXIETY DISORDER, UNSPECIFIED TYPE: ICD-10-CM

## 2023-09-13 DIAGNOSIS — R56.9 SEIZURE-LIKE ACTIVITY (HCC): ICD-10-CM

## 2023-09-13 DIAGNOSIS — F11.20 UNCOMPLICATED OPIOID DEPENDENCE (HCC): ICD-10-CM

## 2023-09-13 RX ORDER — DIAZEPAM 5 MG/1
5 TABLET ORAL EVERY 8 HOURS PRN
Qty: 90 TABLET | Refills: 0 | Status: SHIPPED | OUTPATIENT
Start: 2023-09-13 | End: 2023-10-13

## 2023-09-13 RX ORDER — BUPRENORPHINE HYDROCHLORIDE AND NALOXONE HYDROCHLORIDE DIHYDRATE 8; 2 MG/1; MG/1
1 TABLET SUBLINGUAL 3 TIMES DAILY
Qty: 90 TABLET | Refills: 0 | Status: SHIPPED | OUTPATIENT
Start: 2023-09-13 | End: 2023-09-15 | Stop reason: SDUPTHER

## 2023-09-13 NOTE — TELEPHONE ENCOUNTER
Please Approve or Refuse.   Send to Pharmacy per Pt's Request:      Next Visit Date:  9/14/2023   Last Visit Date: 6/15/2023    Hemoglobin A1C (%)   Date Value   03/14/2023 6.7   12/08/2022 6.3   02/26/2022 6.3 (H)             ( goal A1C is < 7)   BP Readings from Last 3 Encounters:   06/15/23 108/76   05/18/23 122/80   04/13/23 (!) 144/80          (goal 120/80)  BUN   Date Value Ref Range Status   07/30/2022 16 6 - 20 mg/dL Final     Creatinine   Date Value Ref Range Status   07/30/2022 0.92 0.70 - 1.20 mg/dL Final     Potassium   Date Value Ref Range Status   07/30/2022 3.7 3.7 - 5.3 mmol/L Final

## 2023-09-14 ENCOUNTER — OFFICE VISIT (OUTPATIENT)
Dept: PRIMARY CARE CLINIC | Age: 58
End: 2023-09-14
Payer: COMMERCIAL

## 2023-09-14 VITALS
HEART RATE: 92 BPM | OXYGEN SATURATION: 94 % | DIASTOLIC BLOOD PRESSURE: 60 MMHG | SYSTOLIC BLOOD PRESSURE: 142 MMHG | BODY MASS INDEX: 40.85 KG/M2 | WEIGHT: 301.6 LBS | HEIGHT: 72 IN

## 2023-09-14 DIAGNOSIS — F11.20 UNCOMPLICATED OPIOID DEPENDENCE (HCC): Primary | ICD-10-CM

## 2023-09-14 DIAGNOSIS — Z12.5 SCREENING PSA (PROSTATE SPECIFIC ANTIGEN): ICD-10-CM

## 2023-09-14 DIAGNOSIS — R56.9 SEIZURE-LIKE ACTIVITY (HCC): ICD-10-CM

## 2023-09-14 DIAGNOSIS — R60.0 LOCALIZED EDEMA: ICD-10-CM

## 2023-09-14 DIAGNOSIS — Z13.220 LIPID SCREENING: ICD-10-CM

## 2023-09-14 DIAGNOSIS — I10 ESSENTIAL HYPERTENSION: ICD-10-CM

## 2023-09-14 PROCEDURE — 99213 OFFICE O/P EST LOW 20 MIN: CPT | Performed by: NURSE PRACTITIONER

## 2023-09-14 PROCEDURE — G8427 DOCREV CUR MEDS BY ELIG CLIN: HCPCS | Performed by: NURSE PRACTITIONER

## 2023-09-14 PROCEDURE — 3077F SYST BP >= 140 MM HG: CPT | Performed by: NURSE PRACTITIONER

## 2023-09-14 PROCEDURE — 3017F COLORECTAL CA SCREEN DOC REV: CPT | Performed by: NURSE PRACTITIONER

## 2023-09-14 PROCEDURE — G8417 CALC BMI ABV UP PARAM F/U: HCPCS | Performed by: NURSE PRACTITIONER

## 2023-09-14 PROCEDURE — 1036F TOBACCO NON-USER: CPT | Performed by: NURSE PRACTITIONER

## 2023-09-14 PROCEDURE — 3078F DIAST BP <80 MM HG: CPT | Performed by: NURSE PRACTITIONER

## 2023-09-14 RX ORDER — FUROSEMIDE 20 MG/1
20 TABLET ORAL 2 TIMES DAILY
Qty: 180 TABLET | Refills: 1 | Status: SHIPPED | OUTPATIENT
Start: 2023-09-14

## 2023-09-14 ASSESSMENT — ENCOUNTER SYMPTOMS
BACK PAIN: 1
SHORTNESS OF BREATH: 0
COUGH: 0

## 2023-09-14 NOTE — PROGRESS NOTES
[address] PRIMARY CARE  [address] [facility] [address]  Dept: [id number] Lorenzo is a 60 y.o. male Established patient, who presents today for his medical conditions/complaints as noted below. Chief Complaint   Patient presents with    Medication Check       HPI:     HPI   He is having a lot of lower back and buttock pain. He is unsteady on feet. He is having some swelling in feet. If he keeps feet elevated then swelling is improved but he has back pain sitting and laying all the time. He has not been able to get into Dr. Ricki Whatley  He states he can not take meloxicam, ibuprofen and gabapentin tear up is stomach.   He does not use acetaminophen     Reviewed prior notes: None   Reviewed previous:        LDL Calculated (mg/dL)   Date Value   11/16/2022 82   03/08/2020 74   03/29/2019 82       (goal LDL is <100)   AST (U/L)   Date Value   07/30/2022 17     ALT (U/L)   Date Value   07/30/2022 9     BUN (mg/dL)   Date Value   07/30/2022 16     Hemoglobin A1C (%)   Date Value   03/14/2023 6.7     TSH (mIU/L)   Date Value   02/26/2022 0.58     BP Readings from Last 3 Encounters:   09/14/23 (!) 142/60   06/15/23 108/76   05/18/23 122/80          (goal 120/80)    Past Medical History:   Diagnosis Date    Acute metabolic encephalopathy     CLAUDIA (acute kidney injury) (HCC)     Anemia     Anxiety     ARF (acute renal failure) (HCC)     Arthritis     lower back, knees    BPH (benign prostatic hyperplasia)     Chronic kidney disease     prostate    Depression     Diabetes mellitus (HCC)     Diabetic neuropathy (HCC)     GERD (gastroesophageal reflux disease)     Hyperglycemia     Hyperkalemia     Hypertension     Hypothyroidism     Impacted tooth     Necrosis of bone of foot (HCC)     Osteomyelitis of left foot (HCC)     PAD (peripheral artery disease) (HCC)     Pneumonia     Septic shock (HCC)     Type 2 diabetes mellitus ([address])       Past Surgical History:

## 2023-09-15 DIAGNOSIS — F11.20 UNCOMPLICATED OPIOID DEPENDENCE (HCC): ICD-10-CM

## 2023-09-15 RX ORDER — BUPRENORPHINE HYDROCHLORIDE AND NALOXONE HYDROCHLORIDE DIHYDRATE 8; 2 MG/1; MG/1
1 TABLET SUBLINGUAL 3 TIMES DAILY
Qty: 90 TABLET | Refills: 0 | Status: SHIPPED | OUTPATIENT
Start: 2023-09-15 | End: 2023-10-15

## 2023-09-15 NOTE — TELEPHONE ENCOUNTER
LAST VISIT:   9/14/2023     Future Appointments   Date Time Provider [address]   9/18/2023  3:30 PM MD DIEGO Smith  [id number]   10/5/2023  2:45 PM Katie Borrero DPM [id number] [address] is out of the [facility] and would like it sent to [facility] on [address] in [facility].  [phone] option 0

## 2023-09-18 ENCOUNTER — OFFICE VISIT (OUTPATIENT)
Dept: PRIMARY CARE CLINIC | Age: 58
End: 2023-09-18
Payer: COMMERCIAL

## 2023-09-18 VITALS
BODY MASS INDEX: 40.99 KG/M2 | OXYGEN SATURATION: 97 % | HEART RATE: 97 BPM | HEIGHT: 72 IN | DIASTOLIC BLOOD PRESSURE: 82 MMHG | SYSTOLIC BLOOD PRESSURE: 138 MMHG | WEIGHT: 302.6 LBS

## 2023-09-18 DIAGNOSIS — Z00.00 ANNUAL PHYSICAL EXAM: ICD-10-CM

## 2023-09-18 DIAGNOSIS — Z12.5 SCREENING PSA (PROSTATE SPECIFIC ANTIGEN): ICD-10-CM

## 2023-09-18 DIAGNOSIS — Z23 NEED FOR VACCINATION: ICD-10-CM

## 2023-09-18 DIAGNOSIS — E11.621 TYPE 2 DIABETES MELLITUS WITH FOOT ULCER, WITH LONG-TERM CURRENT USE OF INSULIN (HCC): Primary | ICD-10-CM

## 2023-09-18 DIAGNOSIS — L97.509 TYPE 2 DIABETES MELLITUS WITH FOOT ULCER, WITH LONG-TERM CURRENT USE OF INSULIN (HCC): Primary | ICD-10-CM

## 2023-09-18 DIAGNOSIS — Z13.6 ENCOUNTER FOR LIPID SCREENING FOR CARDIOVASCULAR DISEASE: ICD-10-CM

## 2023-09-18 DIAGNOSIS — I10 ESSENTIAL HYPERTENSION: ICD-10-CM

## 2023-09-18 DIAGNOSIS — F11.90 CHRONIC, CONTINUOUS USE OF OPIOIDS: ICD-10-CM

## 2023-09-18 DIAGNOSIS — Z13.220 ENCOUNTER FOR LIPID SCREENING FOR CARDIOVASCULAR DISEASE: ICD-10-CM

## 2023-09-18 DIAGNOSIS — Z79.4 TYPE 2 DIABETES MELLITUS WITH FOOT ULCER, WITH LONG-TERM CURRENT USE OF INSULIN (HCC): Primary | ICD-10-CM

## 2023-09-18 PROBLEM — N19 RENAL FAILURE: Status: RESOLVED | Noted: 2019-12-28 | Resolved: 2023-09-18

## 2023-09-18 PROBLEM — M86.172 OSTEOMYELITIS OF FOOT, LEFT, ACUTE (HCC): Status: RESOLVED | Noted: 2017-07-20 | Resolved: 2023-09-18

## 2023-09-18 LAB — HBA1C MFR BLD: 6.8 %

## 2023-09-18 PROCEDURE — 3044F HG A1C LEVEL LT 7.0%: CPT | Performed by: FAMILY MEDICINE

## 2023-09-18 PROCEDURE — 3075F SYST BP GE 130 - 139MM HG: CPT | Performed by: FAMILY MEDICINE

## 2023-09-18 PROCEDURE — 3017F COLORECTAL CA SCREEN DOC REV: CPT | Performed by: FAMILY MEDICINE

## 2023-09-18 PROCEDURE — 90471 IMMUNIZATION ADMIN: CPT | Performed by: FAMILY MEDICINE

## 2023-09-18 PROCEDURE — 3079F DIAST BP 80-89 MM HG: CPT | Performed by: FAMILY MEDICINE

## 2023-09-18 PROCEDURE — 83036 HEMOGLOBIN GLYCOSYLATED A1C: CPT | Performed by: FAMILY MEDICINE

## 2023-09-18 PROCEDURE — 2022F DILAT RTA XM EVC RTNOPTHY: CPT | Performed by: FAMILY MEDICINE

## 2023-09-18 PROCEDURE — 99214 OFFICE O/P EST MOD 30 MIN: CPT | Performed by: FAMILY MEDICINE

## 2023-09-18 PROCEDURE — 1036F TOBACCO NON-USER: CPT | Performed by: FAMILY MEDICINE

## 2023-09-18 PROCEDURE — G8417 CALC BMI ABV UP PARAM F/U: HCPCS | Performed by: FAMILY MEDICINE

## 2023-09-18 PROCEDURE — G8427 DOCREV CUR MEDS BY ELIG CLIN: HCPCS | Performed by: FAMILY MEDICINE

## 2023-09-18 PROCEDURE — 90674 CCIIV4 VAC NO PRSV 0.5 ML IM: CPT | Performed by: FAMILY MEDICINE

## 2023-09-18 ASSESSMENT — ENCOUNTER SYMPTOMS
NAUSEA: 0
ABDOMINAL PAIN: 0
DIARRHEA: 0
EYE REDNESS: 0
SORE THROAT: 0
SHORTNESS OF BREATH: 0
RHINORRHEA: 0
VOMITING: 0
WHEEZING: 0
COUGH: 0
EYE DISCHARGE: 0

## 2023-09-18 NOTE — PROGRESS NOTES
by mouth every morning (before breakfast) (Patient not taking: Reported on 9/18/2023) 30 tablet 11    povidone-iodine (BETADINE) 7.5 % external solution Apply to wound. Apply as a wet to dry dressing (Patient not taking: Reported on 9/18/2023) 1 Bottle 1    Wound Dressings (ADAPTIC NON-ADHERING DRESSING) PADS Apply 1 Units topically 2 times daily (Patient not taking: Reported on 9/18/2023) 60 each 5    Gauze Pads & Dressings (COMBINE ABD) 5\"X9\" PADS 1 Units by Does not apply route 2 times daily 60 each 5    Gauze Pads & Dressings (KERLIX GAUZE ROLL LARGE) MISC 1 Units by Does not apply route 2 times daily 60 each 5     No current facility-administered medications for this visit.      Allergies   Allergen Reactions    Bactrim [Sulfamethoxazole-Trimethoprim]      reported renal failure    Fiorinal [Butalbital-Aspirin-Caffeine] Other (See Comments)     Generalized blisters    Statins Other (See Comments)     Muscle aches    Tylenol [Acetaminophen]      Pt states it shuts down his kidneys    Peanut Butter Flavor [Flavoring Agent] Nausea And Vomiting       Health Maintenance   Topic Date Due    Hepatitis B vaccine (1 of 3 - 3-dose series) Never done    COVID-19 Vaccine (1) Never done    HIV screen  Never done    Diabetic retinal exam  Never done    Shingles vaccine (1 of 2) Never done    Colorectal Cancer Screen  12/16/2020    GFR test (Diabetes, CKD 3-4, OR last GFR 15-59)  07/30/2023    Lipids  11/16/2023    Diabetic Alb to Cr ratio (uACR) test  12/08/2023    Depression Monitoring  03/14/2024    A1C test (Diabetic or Prediabetic)  09/18/2024    DTaP/Tdap/Td vaccine (2 - Td or Tdap) 04/24/2028    Pneumococcal 0-64 years Vaccine (3 - PPSV23 or PCV20) 06/04/2029    Flu vaccine  Completed    Hepatitis C screen  Completed    Hepatitis A vaccine  Aged Out    Hib vaccine  Aged Out    Meningococcal (ACWY) vaccine  Aged Out    Prostate Specific Antigen (PSA) Screening or Monitoring  Discontinued       Subjective:      Review of

## 2023-10-05 ENCOUNTER — OFFICE VISIT (OUTPATIENT)
Dept: PODIATRY | Age: 58
End: 2023-10-05
Payer: COMMERCIAL

## 2023-10-05 VITALS — BODY MASS INDEX: 40.9 KG/M2 | HEIGHT: 72 IN | WEIGHT: 302 LBS

## 2023-10-05 DIAGNOSIS — Z89.431 STATUS POST TRANSMETATARSAL AMPUTATION OF RIGHT FOOT (HCC): ICD-10-CM

## 2023-10-05 DIAGNOSIS — M79.2 NEUROPATHIC PAIN: ICD-10-CM

## 2023-10-05 DIAGNOSIS — E11.621 TYPE 2 DIABETES MELLITUS WITH FOOT ULCER, WITHOUT LONG-TERM CURRENT USE OF INSULIN (HCC): Primary | ICD-10-CM

## 2023-10-05 DIAGNOSIS — Z89.432 STATUS POST TRANSMETATARSAL AMPUTATION OF FOOT, LEFT (HCC): ICD-10-CM

## 2023-10-05 DIAGNOSIS — L97.509 TYPE 2 DIABETES MELLITUS WITH FOOT ULCER, WITHOUT LONG-TERM CURRENT USE OF INSULIN (HCC): Primary | ICD-10-CM

## 2023-10-05 DIAGNOSIS — Z91.199 NON COMPLIANCE WITH MEDICAL TREATMENT: ICD-10-CM

## 2023-10-05 DIAGNOSIS — R26.2 PATIENT UNABLE TO WALK 150 FEET: ICD-10-CM

## 2023-10-05 PROCEDURE — 2022F DILAT RTA XM EVC RTNOPTHY: CPT | Performed by: PODIATRIST

## 2023-10-05 PROCEDURE — 3017F COLORECTAL CA SCREEN DOC REV: CPT | Performed by: PODIATRIST

## 2023-10-05 PROCEDURE — 1036F TOBACCO NON-USER: CPT | Performed by: PODIATRIST

## 2023-10-05 PROCEDURE — G8417 CALC BMI ABV UP PARAM F/U: HCPCS | Performed by: PODIATRIST

## 2023-10-05 PROCEDURE — 3044F HG A1C LEVEL LT 7.0%: CPT | Performed by: PODIATRIST

## 2023-10-05 PROCEDURE — G8427 DOCREV CUR MEDS BY ELIG CLIN: HCPCS | Performed by: PODIATRIST

## 2023-10-05 PROCEDURE — G8482 FLU IMMUNIZE ORDER/ADMIN: HCPCS | Performed by: PODIATRIST

## 2023-10-05 PROCEDURE — 99214 OFFICE O/P EST MOD 30 MIN: CPT | Performed by: PODIATRIST

## 2023-10-05 NOTE — PROGRESS NOTES
[facility]  Return Patient  Chief Complaint   Patient presents with    Diabetes     Diabetic foot check/ last blood sugar: [address]lap is a 60y.o. year old male who is here for follow up ulcerations bilateral feet, I have not seen him in 8 months. S/P TMA right and left. The left is now healed, right has also healed. Has gained weight since on Saboxone. Having pain in feet and legs. Has not gotten diabetic shoes ordered several times. Wearing crocs. .  Vital signs are stable. Patient denies N/V/F/C. On Saboxone. Follows with Martha Bran. Review of Systems    Vascular: DP and PT pulses palpable 2/4, Right Foot and 2/4 on the Left Foot. CFT <Na seconds, Right Foot and <NA seconds on the Left Foot. Edema is absent ,  Right Foot and absenton the Left Foot. Neurological:   Sensation absent  to light touch to level of digits, both feet. Musculoskeletal:   Muscle strength is 4/5 on the Right Foot and 4/5 on the Left Foot. Structural deformities are present on the Right Foot and present on the Left Foot. TMA bilateral, no equinus    Integument:  Warm, dry, supple both feet. Incisions are healing well. Wound dehiscence is absent. Infection is absent. Wound #:   1    diabetic foot ulcer   right leg anterior ankle    Wound measurements:  #1  Healed      Wound #:   2    diabetic foot ulcer   right foot along incision for the TMA    Wound measurements:  #1  healed      Wound #:   3    diabetic foot ulcer   left foot    Wound measurements:  #1  healed       Assesment :    Diagnosis Orders   1. Type 2 diabetes mellitus with foot ulcer, without long-term current use of insulin ([facility])  Amb External Referral For Orthotics      2. Patient unable to walk 150 feet  Handicap Placard MISC      3. Status post transmetatarsal amputation of right foot ([address])  Amb External Referral For Orthotics      4.  Status post transmetatarsal amputation of foot, left ([facility])  Amb External

## 2023-10-11 DIAGNOSIS — F11.20 UNCOMPLICATED OPIOID DEPENDENCE (HCC): ICD-10-CM

## 2023-10-11 RX ORDER — BUPRENORPHINE HYDROCHLORIDE AND NALOXONE HYDROCHLORIDE DIHYDRATE 8; 2 MG/1; MG/1
1 TABLET SUBLINGUAL 3 TIMES DAILY
Qty: 90 TABLET | Refills: 0 | Status: SHIPPED | OUTPATIENT
Start: 2023-10-11 | End: 2023-11-10

## 2023-10-11 NOTE — TELEPHONE ENCOUNTER
LAST VISIT:   9/18/2023     Future Appointments   Date Time Provider [address]   11/2/2023  1:00 PM MALISSA Berry - CNP [facility]   4/4/2024  3:00 PM Kwesi Amaro, [address]

## 2023-10-13 DIAGNOSIS — R56.9 SEIZURE-LIKE ACTIVITY (HCC): ICD-10-CM

## 2023-10-13 DIAGNOSIS — F41.9 ANXIETY DISORDER, UNSPECIFIED TYPE: ICD-10-CM

## 2023-10-16 RX ORDER — PANTOPRAZOLE SODIUM 40 MG/1
40 TABLET, DELAYED RELEASE ORAL
Qty: 30 TABLET | Refills: 11 | Status: SHIPPED | OUTPATIENT
Start: 2023-10-16

## 2023-10-16 RX ORDER — DIAZEPAM 5 MG/1
5 TABLET ORAL EVERY 8 HOURS PRN
Qty: 90 TABLET | Refills: 0 | Status: SHIPPED | OUTPATIENT
Start: 2023-10-16 | End: 2023-11-15

## 2023-11-13 DIAGNOSIS — F11.20 UNCOMPLICATED OPIOID DEPENDENCE (HCC): ICD-10-CM

## 2023-11-13 RX ORDER — BUPRENORPHINE HYDROCHLORIDE AND NALOXONE HYDROCHLORIDE DIHYDRATE 8; 2 MG/1; MG/1
1 TABLET SUBLINGUAL 3 TIMES DAILY
Qty: 90 TABLET | Refills: 0 | Status: SHIPPED | OUTPATIENT
Start: 2023-11-13 | End: 2023-12-13

## 2023-11-13 NOTE — TELEPHONE ENCOUNTER
LAST VISIT:   9/18/2023     Future Appointments   Date Time Provider [address]   11/16/2023  4:00 PM MALISSA Mccracken - CNP [facility]   4/4/2024  3:00 PM Guillermo King, [address]

## 2023-11-15 DIAGNOSIS — R56.9 SEIZURE-LIKE ACTIVITY (HCC): ICD-10-CM

## 2023-11-15 DIAGNOSIS — F41.9 ANXIETY DISORDER, UNSPECIFIED TYPE: ICD-10-CM

## 2023-11-15 NOTE — TELEPHONE ENCOUNTER
LAST VISIT:   9/18/2023     Future Appointments   Date Time Provider [address]   11/16/2023  4:00 PM MALISSA [facility]ares [facility]   4/4/2024  3:00 PM Olegario Navarrete, [address]

## 2023-11-16 ENCOUNTER — OFFICE VISIT (OUTPATIENT)
Dept: PRIMARY CARE CLINIC | Age: 58
End: 2023-11-16
Payer: MEDICAID

## 2023-11-16 VITALS
HEIGHT: 72 IN | HEART RATE: 109 BPM | SYSTOLIC BLOOD PRESSURE: 136 MMHG | WEIGHT: 298.4 LBS | BODY MASS INDEX: 40.42 KG/M2 | OXYGEN SATURATION: 91 % | DIASTOLIC BLOOD PRESSURE: 88 MMHG

## 2023-11-16 DIAGNOSIS — R56.9 SEIZURE-LIKE ACTIVITY (HCC): ICD-10-CM

## 2023-11-16 DIAGNOSIS — F11.20 UNCOMPLICATED OPIOID DEPENDENCE (HCC): ICD-10-CM

## 2023-11-16 DIAGNOSIS — F41.9 ANXIETY DISORDER, UNSPECIFIED TYPE: ICD-10-CM

## 2023-11-16 DIAGNOSIS — Z79.899 HIGH RISK MEDICATION USE: Primary | ICD-10-CM

## 2023-11-16 PROCEDURE — 99213 OFFICE O/P EST LOW 20 MIN: CPT | Performed by: NURSE PRACTITIONER

## 2023-11-16 PROCEDURE — 3075F SYST BP GE 130 - 139MM HG: CPT | Performed by: NURSE PRACTITIONER

## 2023-11-16 PROCEDURE — 3079F DIAST BP 80-89 MM HG: CPT | Performed by: NURSE PRACTITIONER

## 2023-11-16 PROCEDURE — 80305 DRUG TEST PRSMV DIR OPT OBS: CPT | Performed by: NURSE PRACTITIONER

## 2023-11-16 RX ORDER — DIAZEPAM 5 MG/1
5 TABLET ORAL EVERY 8 HOURS PRN
Qty: 90 TABLET | Refills: 0 | OUTPATIENT
Start: 2023-11-16 | End: 2023-12-16

## 2023-11-16 ASSESSMENT — ENCOUNTER SYMPTOMS
COUGH: 0
BACK PAIN: 1
CONSTIPATION: 1
SHORTNESS OF BREATH: 0

## 2023-11-16 NOTE — PROGRESS NOTES
[address] [address] [facility]  [address] [facility] [address]  Dept: [address] Ora Bro is a 60 y.o. male Established patient, who presents today for his medical conditions/complaints as noted below. Chief Complaint   Patient presents with    Medication Check     Suboxone       HPI:     HPI   He is having a lot of pain  He is also having constipation. Discussed different pain treatment options including changing suboxone and  methadone. Limits to amount buprenorphine I can prescribe. I do not prescribe methadone  I offered to send to pain clinic or methadone clinic and patient declined.     Reviewed prior notes: Previous PCP and podiatrist    Reviewed previous:        LDL Calculated (mg/dL)   Date Value   11/16/2022 82   03/08/2020 74   03/29/2019 82       (goal LDL is <100)   AST (U/L)   Date Value   07/30/2022 17     ALT (U/L)   Date Value   07/30/2022 9     BUN (mg/dL)   Date Value   07/30/2022 16     Hemoglobin A1C (%)   Date Value   09/18/2023 6.8     TSH (mIU/L)   Date Value   02/26/2022 0.58     BP Readings from Last 3 Encounters:   11/16/23 136/88   09/18/23 138/82   09/14/23 (!) 142/60          (goal 120/80)    Past Medical History:   Diagnosis Date    Acute metabolic encephalopathy     CLAUDIA (acute kidney injury) (HCC)     Anemia     Anxiety     ARF (acute renal failure) (HCC)     Arthritis     lower back, knees    BPH (benign prostatic hyperplasia)     Chronic kidney disease     prostate    Depression     Diabetes mellitus (HCC)     Diabetic neuropathy (HCC)     GERD (gastroesophageal reflux disease)     Hyperglycemia     Hyperkalemia     Hypertension     Hypothyroidism     Impacted tooth     Necrosis of bone of foot (HCC)     Osteomyelitis of left foot (HCC)     PAD (peripheral artery disease) (HCC)     Pneumonia     Septic shock (HCC)     Type 2 diabetes mellitus ([address])       Past Surgical History:   Procedure Laterality Date

## 2023-11-17 DIAGNOSIS — R56.9 SEIZURE-LIKE ACTIVITY (HCC): ICD-10-CM

## 2023-11-17 DIAGNOSIS — F41.9 ANXIETY DISORDER, UNSPECIFIED TYPE: ICD-10-CM

## 2023-11-17 RX ORDER — DIAZEPAM 5 MG/1
5 TABLET ORAL EVERY 8 HOURS PRN
Qty: 90 TABLET | Refills: 0 | OUTPATIENT
Start: 2023-11-17 | End: 2023-12-17

## 2023-11-17 NOTE — TELEPHONE ENCOUNTER
Pt's wife called stating they have requested his Valium twice and both times it has been denied by Dr. Laurence Qiu. His wife keeps stating that he cannot stop taking it, he needs to be weaned off. Dr. Laurence Qiu would like to know if he can take Valium while receiving Suboxone. Pt was in the office to see you on 11/06/23 and did not discuss this matter with you. Please advise.

## 2023-11-17 NOTE — TELEPHONE ENCOUNTER
As noted 11/15/2023 refill request. It is not recommended that patient take Suboxone and benzodiazapine together. I told him yesterday, due to respiratory depression it is not recommended he take suboxne and a benzodiazepine.

## 2023-11-29 DIAGNOSIS — M54.12 RIGHT CERVICAL RADICULOPATHY: ICD-10-CM

## 2023-11-30 RX ORDER — CYCLOBENZAPRINE HCL 10 MG
TABLET ORAL
Qty: 30 TABLET | Refills: 0 | Status: SHIPPED | OUTPATIENT
Start: 2023-11-30

## 2023-11-30 NOTE — H&P
Normocephalic, no lesions, without obvious abnormality. Neck: supple, symmetrical, trachea midline  Lungs: clear to auscultation bilaterally  Heart: regular rate and rhythm, S1, S2 normal, no murmur, click, rub or gallop  Abdomen: soft, non-tender; bowel sounds normal; no masses,  no organomegaly  Extremities: extremities normal, atraumatic, no cyanosis or edema and heel wound dressed  Neurologic: Mental status: Alert, oriented, thought content appropriate     CBC:        Recent Labs      02/10/18   1324  02/11/18   0618   WBC  12.5*  8.1   HGB  10.9*  11.7*   PLT  260  265      BMP:    Recent Labs      02/10/18   1309  02/11/18   0618   NA  132*  135   K  4.9  4.5   CL  91*  98   CO2  21  22   BUN  60*  50*   CREATININE  8.57*  5.84*   GLUCOSE  146*  107*      Hepatic: No results for input(s): AST, ALT, ALB, BILITOT, ALKPHOS in the last 72 hours. Troponin: No results for input(s): TROPONINI in the last 72 hours. BNP: No results for input(s): BNP in the last 72 hours. Lipids: No results for input(s): CHOL, HDL in the last 72 hours.     Invalid input(s): LDLCALCU  ABGs: No results found for: PHART, PO2ART, ROT0VDL  INR: No results for input(s): INR in the last 72 hours. -----------------------------------------------------------------  PA/lat CXR:   EKG:      Assessment and Plan 1.              Patient Active Problem List   Diagnosis Code    Anxiety disorder F41.9    Benign prostatic hyperplasia N40.0    Depression F32.9    Dermatitis L30.9    Edema R60.9    Esophageal reflux K21.9    Essential hypertension I10    Hypothyroidism E03.9    Prostate cancer screening Z12.5    Tinea corporis B35.4    Type 2 diabetes mellitus (HCC) E11.9    Right cervical radiculopathy M54.12    Cellulitis of toe of left foot L03.032    Cellulitis of left foot L03. 80    Diabetic neuropathy (HCC) E11.40    Osteomyelitis ([address] M86.9    Skin ulcer of left foot with necrosis of bone ([facility]) L97.524    Type 2 diabetes mellitus with foot ulcer, without long-term current use of insulin ([facility]) E11.621, L97.509    Type 2 diabetes mellitus with diabetic polyneuropathy, without long-term current use of insulin ([facility]) E11.42    Cellulitis of foot, left L03. 116    Osteomyelitis of foot, left, acute ([facility]) M86.172    Non compliance with medical treatment Z91.19    Cellulitis L03.90    Acute renal failure (ARF) ([facility]) N17.9    Non-traumatic rhabdomyolysis M62.82         Electronically signed by Mayelin Crocker MD on 2/11/2018 at 11:22 AM    Initiate Treatment: triamcinolone acetonide 0.1 % topical ointment Twice daily\\nQuantity: 80.0 g  Days Supply: 30\\nSig: Apply to the affected areas of eczema on the back twice daily x 4 weeks then stop Detail Level: Zone

## 2023-12-11 DIAGNOSIS — F41.9 ANXIETY DISORDER, UNSPECIFIED TYPE: ICD-10-CM

## 2023-12-11 DIAGNOSIS — F11.20 UNCOMPLICATED OPIOID DEPENDENCE (HCC): ICD-10-CM

## 2023-12-11 DIAGNOSIS — R56.9 SEIZURE-LIKE ACTIVITY (HCC): ICD-10-CM

## 2023-12-11 RX ORDER — DIAZEPAM 2 MG/1
2 TABLET ORAL EVERY 12 HOURS PRN
Qty: 28 TABLET | Refills: 0 | Status: SHIPPED | OUTPATIENT
Start: 2023-12-11 | End: 2023-12-25

## 2023-12-11 RX ORDER — BUPRENORPHINE HYDROCHLORIDE AND NALOXONE HYDROCHLORIDE DIHYDRATE 8; 2 MG/1; MG/1
1 TABLET SUBLINGUAL 3 TIMES DAILY
Qty: 90 TABLET | Refills: 0 | Status: SHIPPED | OUTPATIENT
Start: 2023-12-11 | End: 2023-12-12 | Stop reason: RX

## 2023-12-12 ENCOUNTER — TELEPHONE (OUTPATIENT)
Dept: PRIMARY CARE CLINIC | Age: 58
End: 2023-12-12

## 2023-12-12 DIAGNOSIS — F11.20 UNCOMPLICATED OPIOID DEPENDENCE (HCC): Primary | ICD-10-CM

## 2023-12-12 RX ORDER — BUPRENORPHINE AND NALOXONE 8; 2 MG/1; MG/1
1 FILM, SOLUBLE BUCCAL; SUBLINGUAL 3 TIMES DAILY
Qty: 90 FILM | Refills: 0 | Status: SHIPPED | OUTPATIENT
Start: 2023-12-12 | End: 2024-01-11

## 2023-12-12 NOTE — TELEPHONE ENCOUNTER
Pt's spouse calling. States that they are not making the Suboxone tabs anymore and she needs you to send in the films to [facility] listed.

## 2023-12-15 RX ORDER — PAROXETINE HYDROCHLORIDE 20 MG/1
TABLET, FILM COATED ORAL
Qty: 120 TABLET | Refills: 0 | Status: SHIPPED | OUTPATIENT
Start: 2023-12-15

## 2024-01-12 DIAGNOSIS — F11.20 UNCOMPLICATED OPIOID DEPENDENCE (HCC): ICD-10-CM

## 2024-01-12 RX ORDER — BUPRENORPHINE AND NALOXONE 8; 2 MG/1; MG/1
1 FILM, SOLUBLE BUCCAL; SUBLINGUAL 3 TIMES DAILY
Qty: 90 FILM | Refills: 0 | Status: SHIPPED | OUTPATIENT
Start: 2024-01-12 | End: 2024-02-11

## 2024-01-12 NOTE — TELEPHONE ENCOUNTER
LAST VISIT:   11/16/2023     Future Appointments   Date Time Provider Department Center   1/15/2024  3:00 PM Maynor Jane, MALISSA - CNP [facility]   4/4/2024  3:00 PM Yakelin Londono, IM[facility]       Pharmacy verified? Yes    [id number] - [place], [place] - [address] [facility] [phone] -  [phone]

## 2024-01-13 DIAGNOSIS — F11.20 UNCOMPLICATED OPIOID DEPENDENCE (HCC): ICD-10-CM

## 2024-01-15 ENCOUNTER — OFFICE VISIT (OUTPATIENT)
Dept: PRIMARY CARE CLINIC | Age: 59
End: 2024-01-15
Payer: MEDICAID

## 2024-01-15 VITALS — SYSTOLIC BLOOD PRESSURE: 132 MMHG | HEART RATE: 74 BPM | OXYGEN SATURATION: 92 % | DIASTOLIC BLOOD PRESSURE: 70 MMHG

## 2024-01-15 DIAGNOSIS — Z79.899 MEDICATION MANAGEMENT: ICD-10-CM

## 2024-01-15 DIAGNOSIS — F41.9 ANXIETY DISORDER, UNSPECIFIED TYPE: ICD-10-CM

## 2024-01-15 DIAGNOSIS — F11.20 UNCOMPLICATED OPIOID DEPENDENCE (HCC): ICD-10-CM

## 2024-01-15 DIAGNOSIS — Z79.899 HIGH RISK MEDICATION USE: Primary | ICD-10-CM

## 2024-01-15 PROCEDURE — 3078F DIAST BP <80 MM HG: CPT | Performed by: NURSE PRACTITIONER

## 2024-01-15 PROCEDURE — 99213 OFFICE O/P EST LOW 20 MIN: CPT | Performed by: NURSE PRACTITIONER

## 2024-01-15 PROCEDURE — 80305 DRUG TEST PRSMV DIR OPT OBS: CPT | Performed by: NURSE PRACTITIONER

## 2024-01-15 PROCEDURE — 3075F SYST BP GE 130 - 139MM HG: CPT | Performed by: NURSE PRACTITIONER

## 2024-01-15 RX ORDER — DIAZEPAM 2 MG/1
2 TABLET ORAL EVERY 12 HOURS PRN
COMMUNITY
End: 2024-01-15 | Stop reason: SDUPTHER

## 2024-01-15 RX ORDER — BUPRENORPHINE AND NALOXONE 8; 2 MG/1; MG/1
FILM, SOLUBLE BUCCAL; SUBLINGUAL
Qty: 90 FILM | Refills: 0 | OUTPATIENT
Start: 2024-01-15

## 2024-01-15 RX ORDER — BUPRENORPHINE AND NALOXONE 8; 2 MG/1; MG/1
1 FILM, SOLUBLE BUCCAL; SUBLINGUAL 3 TIMES DAILY
Qty: 90 FILM | Refills: 0 | Status: SHIPPED | OUTPATIENT
Start: 2024-01-15 | End: 2024-02-14

## 2024-01-15 RX ORDER — DIAZEPAM 2 MG/1
2 TABLET ORAL DAILY PRN
Qty: 30 TABLET | Refills: 0 | Status: SHIPPED | OUTPATIENT
Start: 2024-01-15 | End: 2024-02-14

## 2024-01-15 ASSESSMENT — PATIENT HEALTH QUESTIONNAIRE - PHQ9
2. FEELING DOWN, DEPRESSED OR HOPELESS: 1
7. TROUBLE CONCENTRATING ON THINGS, SUCH AS READING THE NEWSPAPER OR WATCHING TELEVISION: 0
SUM OF ALL RESPONSES TO PHQ QUESTIONS 1-9: 2
SUM OF ALL RESPONSES TO PHQ QUESTIONS 1-9: 2
6. FEELING BAD ABOUT YOURSELF - OR THAT YOU ARE A FAILURE OR HAVE LET YOURSELF OR YOUR FAMILY DOWN: 0
8. MOVING OR SPEAKING SO SLOWLY THAT OTHER PEOPLE COULD HAVE NOTICED. OR THE OPPOSITE, BEING SO FIGETY OR RESTLESS THAT YOU HAVE BEEN MOVING AROUND A LOT MORE THAN USUAL: 0
9. THOUGHTS THAT YOU WOULD BE BETTER OFF DEAD, OR OF HURTING YOURSELF: 0
SUM OF ALL RESPONSES TO PHQ9 QUESTIONS 1 & 2: 2
1. LITTLE INTEREST OR PLEASURE IN DOING THINGS: 1
4. FEELING TIRED OR HAVING LITTLE ENERGY: 0
SUM OF ALL RESPONSES TO PHQ QUESTIONS 1-9: 2
SUM OF ALL RESPONSES TO PHQ QUESTIONS 1-9: 2
10. IF YOU CHECKED OFF ANY PROBLEMS, HOW DIFFICULT HAVE THESE PROBLEMS MADE IT FOR YOU TO DO YOUR WORK, TAKE CARE OF THINGS AT HOME, OR GET ALONG WITH OTHER PEOPLE: 0
5. POOR APPETITE OR OVEREATING: 0
3. TROUBLE FALLING OR STAYING ASLEEP: 0

## 2024-01-15 ASSESSMENT — ENCOUNTER SYMPTOMS
NAUSEA: 0
SORE THROAT: 0
DIARRHEA: 1
SHORTNESS OF BREATH: 0
VOMITING: 0
RHINORRHEA: 0
ABDOMINAL PAIN: 0
EYE DISCHARGE: 0
COUGH: 0
WHEEZING: 0
CONSTIPATION: 1
EYE REDNESS: 0

## 2024-01-15 NOTE — PROGRESS NOTES
Sig: Take 1 tablet by mouth daily as needed for Anxiety for up to 30 days. Max Daily Amount: 2 mg     Dispense:  30 tablet     Refill:  0       Patient given educational materials - see patient instructions.  Discussed use, benefit, and side effects of prescribed medications.  All patient questions answered. Pt voiced understanding. Reviewed health maintenance.  Instructed to continue current medications, diet and exercise.  Patient agreed with treatment plan. Follow up as directed.     Electronically signed by MALISSA Bonds CNP on 1/15/2024 at 3:55 PM

## 2024-01-17 RX ORDER — PAROXETINE HYDROCHLORIDE 20 MG/1
TABLET, FILM COATED ORAL
Qty: 120 TABLET | Refills: 0 | Status: SHIPPED | OUTPATIENT
Start: 2024-01-17

## 2024-01-22 ENCOUNTER — TELEPHONE (OUTPATIENT)
Dept: PRIMARY CARE CLINIC | Age: 59
End: 2024-01-22

## 2024-01-22 RX ORDER — NITROFURANTOIN 25; 75 MG/1; MG/1
100 CAPSULE ORAL 2 TIMES DAILY
Qty: 14 CAPSULE | Refills: 0 | Status: SHIPPED | OUTPATIENT
Start: 2024-01-22 | End: 2024-01-29

## 2024-01-22 RX ORDER — PHENAZOPYRIDINE HYDROCHLORIDE 200 MG/1
200 TABLET, FILM COATED ORAL 3 TIMES DAILY PRN
Qty: 9 TABLET | Refills: 0 | Status: SHIPPED | OUTPATIENT
Start: 2024-01-22 | End: 2024-01-25

## 2024-01-22 NOTE — TELEPHONE ENCOUNTER
Pt's spouse calling. Asking if you would send in an abx and peridium due to a uti. Pt c/o urinary burning, frequency. No fever. Uses [facility] on [address] listed.

## 2024-01-31 NOTE — TELEPHONE ENCOUNTER
LAST VISIT:   11/16/2023     Future Appointments   Date Time Provider Department Center   4/4/2024  3:00 PM Yakelin Londono DPM [facility]       Pharmacy verified? Yes       [id number] - [place], [place] - [address] [facility] [phone] - F [phone]  [address] [facility] [address]  Phone: [phone] Fax: [phone]

## 2024-02-01 RX ORDER — PHENAZOPYRIDINE HYDROCHLORIDE 200 MG/1
TABLET, FILM COATED ORAL
Qty: 9 TABLET | Refills: 0 | Status: SHIPPED | OUTPATIENT
Start: 2024-02-01

## 2024-02-05 DIAGNOSIS — E11.621 TYPE 2 DIABETES MELLITUS WITH FOOT ULCER, WITH LONG-TERM CURRENT USE OF INSULIN (HCC): ICD-10-CM

## 2024-02-05 DIAGNOSIS — E11.9 TYPE 2 DIABETES MELLITUS WITHOUT COMPLICATION (HCC): ICD-10-CM

## 2024-02-05 DIAGNOSIS — K59.00 CONSTIPATION, UNSPECIFIED CONSTIPATION TYPE: ICD-10-CM

## 2024-02-05 DIAGNOSIS — L97.509 TYPE 2 DIABETES MELLITUS WITH FOOT ULCER, WITH LONG-TERM CURRENT USE OF INSULIN (HCC): ICD-10-CM

## 2024-02-05 DIAGNOSIS — Z79.4 TYPE 2 DIABETES MELLITUS WITH FOOT ULCER, WITH LONG-TERM CURRENT USE OF INSULIN (HCC): ICD-10-CM

## 2024-02-05 RX ORDER — POLYETHYLENE GLYCOL 3350 17 G/17G
17 POWDER, FOR SOLUTION ORAL DAILY
Qty: 510 G | Refills: 5 | Status: SHIPPED | OUTPATIENT
Start: 2024-02-05

## 2024-02-05 RX ORDER — BLOOD-GLUCOSE METER
1 EACH MISCELLANEOUS DAILY
Qty: 1 KIT | Refills: 5 | Status: SHIPPED | OUTPATIENT
Start: 2024-02-05

## 2024-02-05 RX ORDER — NITROFURANTOIN 25; 75 MG/1; MG/1
100 CAPSULE ORAL 2 TIMES DAILY
Qty: 14 CAPSULE | Refills: 0 | Status: SHIPPED | OUTPATIENT
Start: 2024-02-05 | End: 2024-02-12

## 2024-02-05 RX ORDER — LANCETS 33 GAUGE
EACH MISCELLANEOUS
Qty: 100 EACH | Refills: 0 | Status: SHIPPED | OUTPATIENT
Start: 2024-02-05

## 2024-02-05 NOTE — TELEPHONE ENCOUNTER
Pt's wife called requesting refills, states abx is for a UTI that he needs another round of, still having sx's.       Please approve or deny

## 2024-02-09 ENCOUNTER — TELEPHONE (OUTPATIENT)
Dept: PRIMARY CARE CLINIC | Age: 59
End: 2024-02-09

## 2024-02-09 DIAGNOSIS — E11.9 TYPE 2 DIABETES MELLITUS WITHOUT COMPLICATION (HCC): ICD-10-CM

## 2024-02-09 NOTE — TELEPHONE ENCOUNTER
Pt's wife is calling for a refill of VICTOZA pen needle for the injection pen.      LAST VISIT:   11/16/2023     Future Appointments   Date Time Provider Department Center   4/4/2024  3:00 [facility]   4/9/2024  3:00 PM Maynor Jane, MALISSA - CNP [facility]       Pharmacy verified? Yes       [id number] - [place], [place] - [address] [facility] [phone] - F [phone]  [address] [facility] [address]  Phone: [phone] Fax: [phone]

## 2024-02-14 ENCOUNTER — TELEPHONE (OUTPATIENT)
Dept: PRIMARY CARE CLINIC | Age: 59
End: 2024-02-14

## 2024-02-14 DIAGNOSIS — F11.20 UNCOMPLICATED OPIOID DEPENDENCE (HCC): ICD-10-CM

## 2024-02-14 RX ORDER — BUPRENORPHINE HYDROCHLORIDE AND NALOXONE HYDROCHLORIDE DIHYDRATE 8; 2 MG/1; MG/1
1 TABLET SUBLINGUAL 3 TIMES DAILY
Qty: 90 TABLET | Refills: 0 | Status: SHIPPED | OUTPATIENT
Start: 2024-02-14 | End: 2024-03-15

## 2024-02-14 NOTE — TELEPHONE ENCOUNTER
Pt needs a rx for the Suboxone, but does not want the film, wants the tablets instead.    Uses [facility] on [address] listed.

## 2024-02-19 RX ORDER — PAROXETINE HYDROCHLORIDE 20 MG/1
TABLET, FILM COATED ORAL
Qty: 120 TABLET | Refills: 0 | Status: SHIPPED | OUTPATIENT
Start: 2024-02-19

## 2024-02-19 NOTE — TELEPHONE ENCOUNTER
LAST VISIT:   1/15/2024     Future Appointments   Date Time Provider Department Center   4/4/2024  3:00 PM Yakelin Londono, DPM [facility]   4/9/2024  3:00 PM Maynor Jane, APRN - CNP [facility]   Pt is in need of Victoza injectable needles. The NovoFine® 32G Tip needle is recommended for use with your Victoza® pen     Pharmacy verified? Yes      [id number] - [place], [place] - [address] [facility] [phone] - F [phone]  [address] [facility] [address]  Phone: [phone] Fax: [phone]

## 2024-02-23 NOTE — TELEPHONE ENCOUNTER
LAST VISIT:   11/16/2023     Future Appointments   Date Time Provider Department Center   4/4/2024  3:00 PM Yakelin Londono DPM [facility]   4/9/2024  3:00 PM Maynor Jane, MAL[facility]

## 2024-03-11 DIAGNOSIS — F11.20 UNCOMPLICATED OPIOID DEPENDENCE (HCC): ICD-10-CM

## 2024-03-11 RX ORDER — BUPRENORPHINE HYDROCHLORIDE AND NALOXONE HYDROCHLORIDE DIHYDRATE 8; 2 MG/1; MG/1
1 TABLET SUBLINGUAL 3 TIMES DAILY
Qty: 90 TABLET | Refills: 0 | Status: SHIPPED | OUTPATIENT
Start: 2024-03-11 | End: 2024-04-10

## 2024-03-28 DIAGNOSIS — E03.9 ACQUIRED HYPOTHYROIDISM: ICD-10-CM

## 2024-03-28 RX ORDER — PAROXETINE HYDROCHLORIDE 20 MG/1
TABLET, FILM COATED ORAL
Qty: 120 TABLET | Refills: 5 | Status: SHIPPED | OUTPATIENT
Start: 2024-03-28

## 2024-03-28 RX ORDER — LEVOTHYROXINE SODIUM 0.05 MG/1
50 TABLET ORAL DAILY
Qty: 90 TABLET | Refills: 0 | Status: SHIPPED | OUTPATIENT
Start: 2024-03-28

## 2024-03-28 NOTE — TELEPHONE ENCOUNTER
LAST VISIT:   11/16/2023     Future Appointments   Date Time Provider Department Center   4/4/2024  3:00 PM Yakelin Londono D[facility]   4/9/2024  3:00 PM Maynor Jane, MALISSA [facility]

## 2024-03-29 DIAGNOSIS — E11.621 TYPE 2 DIABETES MELLITUS WITH FOOT ULCER, WITH LONG-TERM CURRENT USE OF INSULIN (HCC): ICD-10-CM

## 2024-03-29 DIAGNOSIS — L97.509 TYPE 2 DIABETES MELLITUS WITH FOOT ULCER, WITH LONG-TERM CURRENT USE OF INSULIN (HCC): ICD-10-CM

## 2024-03-29 DIAGNOSIS — Z79.4 TYPE 2 DIABETES MELLITUS WITH FOOT ULCER, WITH LONG-TERM CURRENT USE OF INSULIN (HCC): ICD-10-CM

## 2024-04-01 RX ORDER — BLOOD SUGAR DIAGNOSTIC
STRIP MISCELLANEOUS
Qty: 100 EACH | Refills: 1 | Status: SHIPPED | OUTPATIENT
Start: 2024-04-01

## 2024-04-04 ENCOUNTER — OFFICE VISIT (OUTPATIENT)
Dept: PODIATRY | Age: 59
End: 2024-04-04
Payer: MEDICAID

## 2024-04-04 VITALS — BODY MASS INDEX: 37.25 KG/M2 | HEIGHT: 72 IN | WEIGHT: 275 LBS

## 2024-04-04 DIAGNOSIS — M79.2 NEUROPATHIC PAIN: ICD-10-CM

## 2024-04-04 DIAGNOSIS — L97.509 TYPE 2 DIABETES MELLITUS WITH FOOT ULCER, WITHOUT LONG-TERM CURRENT USE OF INSULIN (HCC): ICD-10-CM

## 2024-04-04 DIAGNOSIS — Z89.431 STATUS POST TRANSMETATARSAL AMPUTATION OF RIGHT FOOT (HCC): ICD-10-CM

## 2024-04-04 DIAGNOSIS — E11.621 TYPE 2 DIABETES MELLITUS WITH FOOT ULCER, WITHOUT LONG-TERM CURRENT USE OF INSULIN (HCC): ICD-10-CM

## 2024-04-04 DIAGNOSIS — Z89.432 STATUS POST TRANSMETATARSAL AMPUTATION OF FOOT, LEFT (HCC): ICD-10-CM

## 2024-04-04 DIAGNOSIS — R26.2 PATIENT UNABLE TO WALK 150 FEET: Primary | ICD-10-CM

## 2024-04-04 PROCEDURE — 99213 OFFICE O/P EST LOW 20 MIN: CPT | Performed by: PODIATRIST

## 2024-04-04 ASSESSMENT — ENCOUNTER SYMPTOMS: COLOR CHANGE: 0

## 2024-04-04 NOTE — PROGRESS NOTES
arise. Diagnosis was discussed with the pt and all of their questions were answered in detail. Proper foot hygiene and care was discussed with the pt.  Patient to check feet daily and contact the office with any questions/problems/concerns.  Other comorbidity noted and will be managed by PCP.     Time taken to discuss the reasons for diabetic shoes and insoles    Diabetic shoes and insoles: This patient would benefit from extra depth diabetic shoes and custom inserts.  I feel he/she qualifies due to the above performed physical exam and diagnosis. I will do the appropriate paperwork and send it to their primary care physician. Then the patient will be measured for shoes and custom inserts to properly off load and protect his/her feet.       Diabetic foot examination performed this visit.  The exam included neurological sensory exam, a 10-g monofilament and pinprick sensation, vibration using a 128-Hz tuning fork, ankle reflexes, visual skin inspection, vascular exam including assessment of pedal pulses, orthopedic exam for deformities, and shoe inspection.  Increased risk factors noted on the diabetic foot exam include decreased sensory exam and peripheral neuropathy.  Shoegear inspected and found to be appropriate size and wear.       All labs were reviewed and all imagining including the above findings were reviewed PRIOR to and during the patients arrival and with the patient today.  Previous patient encounter was reviewed.  Encounters from the patients other medical providers were reviewed and noted.      Time was spent educating the patient and their families/caregivers on proper care of the feet and ankles.  All the above diagnosis were addressed at todays visit and all questions were answered.  Time spent with patient and patient care issues above the usual time needed for assessment and treatment was: [] 15-20 min  [x] 21-30 min  [] 31-44 min  [] 45 min or more. This time also included charting after the

## 2024-04-11 DIAGNOSIS — F11.20 UNCOMPLICATED OPIOID DEPENDENCE (HCC): ICD-10-CM

## 2024-04-11 RX ORDER — BUPRENORPHINE HYDROCHLORIDE AND NALOXONE HYDROCHLORIDE DIHYDRATE 8; 2 MG/1; MG/1
1 TABLET SUBLINGUAL 3 TIMES DAILY
Qty: 90 TABLET | Refills: 0 | Status: SHIPPED | OUTPATIENT
Start: 2024-04-11 | End: 2024-05-11

## 2024-04-18 ENCOUNTER — OFFICE VISIT (OUTPATIENT)
Dept: PRIMARY CARE CLINIC | Age: 59
End: 2024-04-18
Payer: MEDICAID

## 2024-04-18 ENCOUNTER — HOSPITAL ENCOUNTER (OUTPATIENT)
Age: 59
Setting detail: SPECIMEN
Discharge: HOME | End: 2024-04-18

## 2024-04-18 VITALS
SYSTOLIC BLOOD PRESSURE: 130 MMHG | HEIGHT: 72 IN | BODY MASS INDEX: 36.84 KG/M2 | HEART RATE: 108 BPM | WEIGHT: 272 LBS | OXYGEN SATURATION: 92 % | DIASTOLIC BLOOD PRESSURE: 78 MMHG

## 2024-04-18 DIAGNOSIS — I10 ESSENTIAL HYPERTENSION: ICD-10-CM

## 2024-04-18 DIAGNOSIS — R30.0 DYSURIA: ICD-10-CM

## 2024-04-18 DIAGNOSIS — Z13.220 LIPID SCREENING: ICD-10-CM

## 2024-04-18 DIAGNOSIS — Z12.5 SCREENING PSA (PROSTATE SPECIFIC ANTIGEN): ICD-10-CM

## 2024-04-18 DIAGNOSIS — E11.621 TYPE 2 DIABETES MELLITUS WITH FOOT ULCER, WITH LONG-TERM CURRENT USE OF INSULIN (HCC): Primary | ICD-10-CM

## 2024-04-18 DIAGNOSIS — Z79.4 TYPE 2 DIABETES MELLITUS WITH FOOT ULCER, WITH LONG-TERM CURRENT USE OF INSULIN (HCC): Primary | ICD-10-CM

## 2024-04-18 DIAGNOSIS — F11.20 UNCOMPLICATED OPIOID DEPENDENCE (HCC): ICD-10-CM

## 2024-04-18 DIAGNOSIS — L97.509 TYPE 2 DIABETES MELLITUS WITH FOOT ULCER, WITH LONG-TERM CURRENT USE OF INSULIN (HCC): Primary | ICD-10-CM

## 2024-04-18 LAB
ALCOHOL URINE: NORMAL
AMPHETAMINE SCREEN, URINE: NORMAL
BARBITURATE SCREEN, URINE: NORMAL
BENZODIAZEPINE SCREEN, URINE: NORMAL
BILIRUBIN, POC: NORMAL
BLOOD URINE, POC: NORMAL
BUPRENORPHINE URINE: NORMAL
CLARITY, POC: CLEAR
COCAINE METABOLITE SCREEN URINE: NORMAL
COLOR, POC: YELLOW
CREATININE URINE POCT: 100
FENTANYL SCREEN, URINE: NORMAL
GABAPENTIN SCREEN, URINE: NORMAL
GLUCOSE URINE, POC: NORMAL
HBA1C MFR BLD: 6 %
KETONES, POC: NORMAL
LEUKOCYTE EST, POC: NORMAL
MDMA URINE: NORMAL
METHADONE SCREEN, URINE: NORMAL
METHAMPHETAMINE, URINE: NORMAL
MICROALBUMIN/CREAT 24H UR: 30 MG/G{CREAT}
MICROALBUMIN/CREAT UR-RTO: <30
NITRITE, POC: NORMAL
OPIATE SCREEN URINE: NORMAL
OXYCODONE SCREEN URINE: NORMAL
PH, POC: 6.5
PHENCYCLIDINE SCREEN URINE: NORMAL
PROPOXYPHENE SCREEN, URINE: NORMAL
PROTEIN, POC: NORMAL
SPECIFIC GRAVITY, POC: 1.01
SYNTHETIC CANNABINOIDS(K2) SCREEN, URINE: NORMAL
THC SCREEN, URINE: NORMAL
TRAMADOL SCREEN URINE: NORMAL
TRICYCLIC ANTIDEPRESSANTS, UR: NORMAL
UROBILINOGEN, POC: 0.2

## 2024-04-18 PROCEDURE — 3044F HG A1C LEVEL LT 7.0%: CPT | Performed by: NURSE PRACTITIONER

## 2024-04-18 PROCEDURE — 83036 HEMOGLOBIN GLYCOSYLATED A1C: CPT | Performed by: NURSE PRACTITIONER

## 2024-04-18 PROCEDURE — 99214 OFFICE O/P EST MOD 30 MIN: CPT | Performed by: NURSE PRACTITIONER

## 2024-04-18 PROCEDURE — 3078F DIAST BP <80 MM HG: CPT | Performed by: NURSE PRACTITIONER

## 2024-04-18 PROCEDURE — 82044 UR ALBUMIN SEMIQUANTITATIVE: CPT | Performed by: NURSE PRACTITIONER

## 2024-04-18 PROCEDURE — 3075F SYST BP GE 130 - 139MM HG: CPT | Performed by: NURSE PRACTITIONER

## 2024-04-18 PROCEDURE — 81003 URINALYSIS AUTO W/O SCOPE: CPT | Performed by: NURSE PRACTITIONER

## 2024-04-18 PROCEDURE — 80305 DRUG TEST PRSMV DIR OPT OBS: CPT | Performed by: NURSE PRACTITIONER

## 2024-04-18 SDOH — ECONOMIC STABILITY: INCOME INSECURITY: HOW HARD IS IT FOR YOU TO PAY FOR THE VERY BASICS LIKE FOOD, HOUSING, MEDICAL CARE, AND HEATING?: NOT VERY HARD

## 2024-04-18 SDOH — ECONOMIC STABILITY: FOOD INSECURITY: WITHIN THE PAST 12 MONTHS, YOU WORRIED THAT YOUR FOOD WOULD RUN OUT BEFORE YOU GOT MONEY TO BUY MORE.: NEVER TRUE

## 2024-04-18 SDOH — ECONOMIC STABILITY: FOOD INSECURITY: WITHIN THE PAST 12 MONTHS, THE FOOD YOU BOUGHT JUST DIDN'T LAST AND YOU DIDN'T HAVE MONEY TO GET MORE.: NEVER TRUE

## 2024-04-18 ASSESSMENT — ENCOUNTER SYMPTOMS
SHORTNESS OF BREATH: 0
BACK PAIN: 1
VISUAL CHANGE: 1
CONSTIPATION: 1
SORE THROAT: 0
BLURRED VISION: 0
COUGH: 0

## 2024-04-18 NOTE — PATIENT INSTRUCTIONS
Continue suboxone 3x/day  Continue to wean off diazepam  Continue current diet and diabetes plan it is working well.  Complete lab work

## 2024-04-18 NOTE — PROGRESS NOTES
Physical Exam  Vitals and nursing note reviewed.   Constitutional:       General: He is not in acute distress.     Appearance: He is well-developed. He is not ill-appearing.   HENT:      Head: Normocephalic and atraumatic.      Right Ear: Tympanic membrane, ear canal and external ear normal.      Left Ear: Tympanic membrane, ear canal and external ear normal.      Mouth/Throat:      Mouth: Mucous membranes are moist.      Pharynx: Oropharynx is clear.   Eyes:      General: No scleral icterus.        Right eye: No discharge.         Left eye: No discharge.      Conjunctiva/sclera: Conjunctivae normal.   Neck:      Thyroid: No thyromegaly.      Trachea: No tracheal deviation.   Cardiovascular:      Rate and Rhythm: Normal rate and regular rhythm.      Heart sounds: Normal heart sounds.   Pulmonary:      Effort: Pulmonary effort is normal. No respiratory distress.      Breath sounds: Normal breath sounds. No wheezing.   Abdominal:      General: Bowel sounds are normal.      Palpations: Abdomen is soft.      Tenderness: There is abdominal tenderness.   Musculoskeletal:      Right lower le+ Pitting Edema present.      Left lower le+ Pitting Edema present.   Lymphadenopathy:      Cervical: No cervical adenopathy.   Skin:     General: Skin is warm.      Findings: No rash.   Neurological:      Mental Status: He is alert and oriented to person, place, and time.   Psychiatric:         Mood and Affect: Mood normal.         Behavior: Behavior normal.         Thought Content: Thought content normal.       Controlled substances monitoring: possible medication side effects, risk of tolerance and/or dependence, and alternative treatments discussed, no signs of potential drug abuse or diversion identified, and OARRS report reviewed today- activity consistent with treatment plan.   Assessment/Plan:   1. Type 2 diabetes mellitus with foot ulcer, with long-term current use of insulin (HCC)  -     POCT glycosylated hemoglobin

## 2024-04-20 LAB
MICROORGANISM SPEC CULT: ABNORMAL
SPECIMEN DESCRIPTION: ABNORMAL

## 2024-04-22 ENCOUNTER — TELEPHONE (OUTPATIENT)
Dept: PRIMARY CARE CLINIC | Age: 59
End: 2024-04-22

## 2024-04-22 DIAGNOSIS — N39.0 URINARY TRACT INFECTION WITHOUT HEMATURIA, SITE UNSPECIFIED: Primary | ICD-10-CM

## 2024-04-22 RX ORDER — LEVOFLOXACIN 500 MG/1
500 TABLET, FILM COATED ORAL DAILY
Qty: 7 TABLET | Refills: 0 | Status: SHIPPED | OUTPATIENT
Start: 2024-04-22 | End: 2024-04-29

## 2024-04-22 NOTE — TELEPHONE ENCOUNTER
Continue Suboxone 3 times per day.  If you would like referral to pain management I can refer him to pain management.

## 2024-04-22 NOTE — TELEPHONE ENCOUNTER
Pt was notified his medication was sent over regarding his urine culture but he's asking for something for the pain.    Please advise.

## 2024-04-23 ENCOUNTER — TELEPHONE (OUTPATIENT)
Dept: PRIMARY CARE CLINIC | Age: 59
End: 2024-04-23

## 2024-04-23 RX ORDER — AZITHROMYCIN 250 MG/1
TABLET, FILM COATED ORAL
Qty: 6 TABLET | Refills: 0 | Status: SHIPPED | OUTPATIENT
Start: 2024-04-23 | End: 2024-05-03

## 2024-04-23 NOTE — TELEPHONE ENCOUNTER
Patient bought a product over the counter for UTI pain.  States he is doing better.  Does not want referral to pain management.

## 2024-05-07 DIAGNOSIS — F11.20 UNCOMPLICATED OPIOID DEPENDENCE (HCC): ICD-10-CM

## 2024-05-07 RX ORDER — BUPRENORPHINE HYDROCHLORIDE AND NALOXONE HYDROCHLORIDE DIHYDRATE 8; 2 MG/1; MG/1
1 TABLET SUBLINGUAL 3 TIMES DAILY
Qty: 90 TABLET | Refills: 0 | Status: SHIPPED | OUTPATIENT
Start: 2024-05-07 | End: 2024-06-06

## 2024-06-10 DIAGNOSIS — R60.0 LOCALIZED EDEMA: ICD-10-CM

## 2024-06-10 DIAGNOSIS — F11.20 UNCOMPLICATED OPIOID DEPENDENCE (HCC): ICD-10-CM

## 2024-06-10 RX ORDER — BUPRENORPHINE HYDROCHLORIDE AND NALOXONE HYDROCHLORIDE DIHYDRATE 8; 2 MG/1; MG/1
1 TABLET SUBLINGUAL 3 TIMES DAILY
Qty: 90 TABLET | Refills: 0 | Status: SHIPPED | OUTPATIENT
Start: 2024-06-10 | End: 2024-07-10

## 2024-06-10 RX ORDER — FUROSEMIDE 20 MG/1
20 TABLET ORAL 2 TIMES DAILY
Qty: 180 TABLET | Refills: 0 | Status: SHIPPED | OUTPATIENT
Start: 2024-06-10

## 2024-06-13 ENCOUNTER — TELEPHONE (OUTPATIENT)
Dept: PRIMARY CARE CLINIC | Age: 59
End: 2024-06-13

## 2024-06-13 NOTE — TELEPHONE ENCOUNTER
Spouse advised to take Rick to the emergency room as Maynor doesn't want to take the chance of picking the wrong antibiotic.  Advised patient could go septic causing more problems if condition is not treated correctly.  Told her Maynor states delay in proper treatment could cause more problems as well.  Spouse voices understanding.

## 2024-06-13 NOTE — TELEPHONE ENCOUNTER
If patient is too weak to come to the office then I do not want to take a chance  of getting the wrong antibiotic and having him get worse over the next week and possibly going septic.  He should go to the emergency department.  The delay in the correct treatment may cause more problems.

## 2024-06-18 NOTE — PROGRESS NOTES
[facility]  Internal Medicine Teaching Residency Program  Inpatient Daily Progress Note  ______________________________________________________________________________    Patient: Tomi Stokes  YOB: 1965   XAY:[id number]    Acct: [de-identified]     Room: [address] [place] [address]  Admit date: 2022  Today's date: 22  Number of days in the hospital: 5    SUBJECTIVE   Admitting Diagnosis: Cellulitis  CC: Right foot pain  Patient examined at bedside  Afebrile, vitally stable  No acute issues overnight  BS stable  BP stable  S/p TMA right foot day 1    ROS:  Constitutional:  negative for chills, fevers, sweats  Respiratory:  negative for cough, dyspnea on exertion, hemoptysis, shortness of breath, wheezing  Cardiovascular:  negative for chest pain, chest pressure/discomfort, lower extremity edema, palpitations  Gastrointestinal:  negative for abdominal pain, constipation, diarrhea, nausea, vomiting  Neurological:  negative for dizziness, headache  BRIEF HISTORY     30-year-old male with PMH of type 2 diabetes mellitus, hypertension, hypothyroidism, s/p TMA left foot, s/p right first and second digit amputation who is noncompliant with follow-up and medication presented with right foot pain. Found to have cellulitis/osteomyelitis. Podiatry is the primary. Blood glucose level and blood pressure are well controlled. Continue with the same treatment. Transmetatarsal amputation of the right foot,Achilles tendon lengthening,adjacent tissue transfer by podiatry today.   We will follow    OBJECTIVE     Vital Signs:  /61   Pulse 74   Temp 98.4 °F (36.9 °C) (Oral)   Resp 18   Ht 6' 2\" (1.88 m)   Wt 190 lb (86.2 kg)   SpO2 100%   BMI 24.39 kg/m²     Temp (24hrs), Av.8 °F (36.6 °C), Min:97 °F (36.1 °C), Max:98.4 °F (36.9 °C)    In: 1700   Out: 2425 [Urine:2425]    Physical Exam:  Constitutional: Alert, oriented, cooperative and in no apparent distress. Head:normocephalic and atraumatic. EENT:  PERRLA. No conjunctival injections. Septum was midline, mucosa was without erythema, exudates or cobblestoning. No thrush was noted. Respiratory: Chest was symmetrical without dullness to percussion. Breath sounds bilaterally were clear to auscultation. There were no wheezes, rhonchi or rales. There is no intercostal retraction or use of accessory muscles. No egophony noted. Cardiovascular: Regular without murmur, clicks, gallops or rubs. Abdomen: Slightly rounded and soft without organomegaly. No rebound, rigidity or guarding was appreciated. Musculoskeletal: Normal curvature of the spine. No gross muscle weakness. Extremities: Left lower extremity TMA, right lower extremity Transmetatarsal amputation of the right foot,Achilles tendon lengthening,adjacent tissue transfer.   Dressing applied  Neurological/Psychiatric: The patient's general behavior, level of consciousness, thought content and emotional status is normal.        Medications:  Scheduled Medications:    ferrous sulfate  325 mg Oral Daily with breakfast    insulin lispro  0-6 Units SubCUTAneous TID WC    insulin lispro  0-3 Units SubCUTAneous Nightly    sodium chloride flush  5-40 mL IntraVENous 2 times per day    enoxaparin  40 mg SubCUTAneous Daily    cefepime  2,000 mg IntraVENous Q12H    vancomycin (VANCOCIN) intermittent dosing (placeholder)   Other RX Placeholder    sodium hypochlorite   Irrigation Daily    buPROPion  300 mg Oral Daily    levothyroxine  50 mcg Oral Daily    lisinopril  10 mg Oral Daily    morphine  60 mg Oral BID    pantoprazole  40 mg Oral QAM AC    vancomycin  1,250 mg IntraVENous Q12H    lactobacillus  1 capsule Oral BID WC    PARoxetine  40 mg Oral QAM     Continuous Infusions:    dextrose      sodium chloride      dextrose       PRN Medicationsmorphine, 4 mg, Q4H PRN   Or  morphine, 2 mg, Q4H PRN  diazePAM, 5 mg, Q8H PRN  glucose, 15 g, Osteomyelitis cannot be excluded. Soft tissue edema is concerning for cellulitis. Questionable faint marrow edema within the 4th metatarsal diaphysis, cuboid, and visualized calcaneus. Findings are very nonspecific and may be reactive, secondary to osseous injuries, or possibly be artifactual.       ASSESSMENT & PLAN     1. Right foot cellulitis/ Osteomyelitis. POD 1. Transmetatarsal amputation of the right foot,Achilles tendon lengthening,adjacent tissue transfer by podiatry   Continue on vancomycin and cefepime.  Control pain.  Follow ID recommendations. .  2. Hypertension. Continue on lisinopril 10.  Monitor BP  3. Diabetes mellitus type 2.  HbA1c 5.3 8/21.  On metformin 250 daily at home.  MDSS, hypoglycemia protocol.  POCT glucose q6.  4. Hypothyroidism.  Continue on Synthroid 50 daily.  TSH 0.62 4/20.  Repeat TSH  5. Anxiety: continues on paroxetine 40 daily     DVT ppx : Lovenox  GI ppx: Pepcid  PT/OT: Following  Discharge Planning / SW: CM to assist with    Dolores Flores MD  Internal Medicine Resident, PGY-1  [address], [place]  1/22/2022, 5:26 AM        I have discussed the care of Tristian Yoder , including pertinent history and exam findings,    today with the resident. I have seen and examined the patient and the key elements of all parts of the encounter have been performed by me . I agree with the assessment, plan and orders as documented by the resident. Principal Problem:    Cellulitis  Active Problems:    Essential hypertension    Hypothyroidism    Type 2 diabetes mellitus (HCC)    Diabetic infection of right foot ([address]    Osteomyelitis of foot, right, acute ([address]    Cellulitis of foot, right    Acquired posterior equinus, right  Resolved Problems:    * No resolved hospital problems. *        Overall  course ;                                   are improving over time.         Patient s/p surgery  Much improved  Has history of diabetes, diabetic neuropathy Electronically signed by Suhas Fu MD occipital region

## 2024-06-25 ENCOUNTER — APPOINTMENT (OUTPATIENT)
Dept: GENERAL RADIOLOGY | Age: 59
DRG: 463 | End: 2024-06-25
Payer: MEDICAID

## 2024-06-25 ENCOUNTER — HOSPITAL ENCOUNTER (INPATIENT)
Age: 59
LOS: 3 days | Discharge: HOME | DRG: 463 | End: 2024-06-28
Attending: STUDENT IN AN ORGANIZED HEALTH CARE EDUCATION/TRAINING PROGRAM | Admitting: INTERNAL MEDICINE
Payer: MEDICAID

## 2024-06-25 ENCOUNTER — APPOINTMENT (OUTPATIENT)
Dept: CT IMAGING | Age: 59
DRG: 463 | End: 2024-06-25
Payer: MEDICAID

## 2024-06-25 DIAGNOSIS — R07.9 CHEST PAIN, UNSPECIFIED TYPE: Primary | ICD-10-CM

## 2024-06-25 DIAGNOSIS — E87.6 HYPOKALEMIA: ICD-10-CM

## 2024-06-25 PROBLEM — N39.0 COMPLICATED UTI (URINARY TRACT INFECTION): Status: ACTIVE | Noted: 2024-06-25

## 2024-06-25 LAB
ALBUMIN SERPL-MCNC: 3.7 G/DL (ref 3.5–5.2)
ALP SERPL-CCNC: 93 U/L (ref 40–129)
ALT SERPL-CCNC: 19 U/L (ref 5–41)
ANION GAP SERPL CALCULATED.3IONS-SCNC: 14 MMOL/L (ref 9–17)
AST SERPL-CCNC: 21 U/L
BACTERIA URNS QL MICRO: ABNORMAL
BASOPHILS # BLD: 0 K/UL (ref 0–0.2)
BASOPHILS NFR BLD: 0 % (ref 0–2)
BILIRUB SERPL-MCNC: 0.6 MG/DL (ref 0.3–1.2)
BILIRUB UR QL STRIP: NEGATIVE
BUN SERPL-MCNC: 16 MG/DL (ref 8–23)
CALCIUM SERPL-MCNC: 8.9 MG/DL (ref 8.6–10.4)
CHLORIDE SERPL-SCNC: 95 MMOL/L (ref 98–107)
CLARITY UR: ABNORMAL
CO2 SERPL-SCNC: 29 MMOL/L (ref 20–31)
COLOR UR: YELLOW
CREAT SERPL-MCNC: 1.1 MG/DL (ref 0.7–1.2)
EOSINOPHIL # BLD: 0 K/UL (ref 0–0.4)
EOSINOPHILS RELATIVE PERCENT: 0 % (ref 0–4)
EPI CELLS #/AREA URNS HPF: ABNORMAL /HPF
ERYTHROCYTE [DISTWIDTH] IN BLOOD BY AUTOMATED COUNT: 13 % (ref 11.5–14.9)
GFR, ESTIMATED: 77 ML/MIN/1.73M2
GLUCOSE SERPL-MCNC: 125 MG/DL (ref 70–99)
GLUCOSE UR STRIP-MCNC: NEGATIVE MG/DL
HCT VFR BLD AUTO: 39.7 % (ref 41–53)
HGB BLD-MCNC: 13.2 G/DL (ref 13.5–17.5)
HGB UR QL STRIP.AUTO: ABNORMAL
KETONES UR STRIP-MCNC: NEGATIVE MG/DL
LACTATE BLDV-SCNC: 0.9 MMOL/L (ref 0.5–2.2)
LEUKOCYTE ESTERASE UR QL STRIP: ABNORMAL
LYMPHOCYTES NFR BLD: 0.9 K/UL (ref 1–4.8)
LYMPHOCYTES RELATIVE PERCENT: 10 % (ref 24–44)
MAGNESIUM SERPL-MCNC: 2 MG/DL (ref 1.6–2.6)
MCH RBC QN AUTO: 31.9 PG (ref 26–34)
MCHC RBC AUTO-ENTMCNC: 33.4 G/DL (ref 31–37)
MCV RBC AUTO: 95.7 FL (ref 80–100)
MONOCYTES NFR BLD: 1.3 K/UL (ref 0.1–1.3)
MONOCYTES NFR BLD: 15 % (ref 1–7)
NEUTROPHILS NFR BLD: 75 % (ref 36–66)
NEUTS SEG NFR BLD: 6.3 K/UL (ref 1.3–9.1)
NITRITE UR QL STRIP: POSITIVE
PH UR STRIP: 8 [PH] (ref 5–8)
PLATELET # BLD AUTO: 252 K/UL (ref 150–450)
PMV BLD AUTO: 8.3 FL (ref 6–12)
POTASSIUM SERPL-SCNC: 2.8 MMOL/L (ref 3.7–5.3)
PROT SERPL-MCNC: 8 G/DL (ref 6.4–8.3)
PROT UR STRIP-MCNC: ABNORMAL MG/DL
RBC # BLD AUTO: 4.15 M/UL (ref 4.5–5.9)
RBC #/AREA URNS HPF: ABNORMAL /HPF
SODIUM SERPL-SCNC: 138 MMOL/L (ref 135–144)
SP GR UR STRIP: 1.02 (ref 1–1.03)
TROPONIN I SERPL HS-MCNC: 8 NG/L (ref 0–22)
UROBILINOGEN UR STRIP-ACNC: NORMAL EU/DL (ref 0–1)
WBC #/AREA URNS HPF: ABNORMAL /HPF
WBC OTHER # BLD: 8.5 K/UL (ref 3.5–11)

## 2024-06-25 PROCEDURE — 81001 URINALYSIS AUTO W/SCOPE: CPT

## 2024-06-25 PROCEDURE — 96374 THER/PROPH/DIAG INJ IV PUSH: CPT

## 2024-06-25 PROCEDURE — 99285 EMERGENCY DEPT VISIT HI MDM: CPT

## 2024-06-25 PROCEDURE — 85025 COMPLETE CBC W/AUTO DIFF WBC: CPT

## 2024-06-25 PROCEDURE — 80053 COMPREHEN METABOLIC PANEL: CPT

## 2024-06-25 PROCEDURE — 93005 ELECTROCARDIOGRAM TRACING: CPT

## 2024-06-25 PROCEDURE — 83735 ASSAY OF MAGNESIUM: CPT

## 2024-06-25 PROCEDURE — 87088 URINE BACTERIA CULTURE: CPT

## 2024-06-25 PROCEDURE — 87086 URINE CULTURE/COLONY COUNT: CPT

## 2024-06-25 PROCEDURE — 74177 CT ABD & PELVIS W/CONTRAST: CPT

## 2024-06-25 PROCEDURE — 6360000004 HC RX CONTRAST MEDICATION: Performed by: STUDENT IN AN ORGANIZED HEALTH CARE EDUCATION/TRAINING PROGRAM

## 2024-06-25 PROCEDURE — 93005 ELECTROCARDIOGRAM TRACING: CPT | Performed by: EMERGENCY MEDICINE

## 2024-06-25 PROCEDURE — 6370000000 HC RX 637 (ALT 250 FOR IP): Performed by: STUDENT IN AN ORGANIZED HEALTH CARE EDUCATION/TRAINING PROGRAM

## 2024-06-25 PROCEDURE — 84484 ASSAY OF TROPONIN QUANT: CPT

## 2024-06-25 PROCEDURE — 2580000003 HC RX 258: Performed by: STUDENT IN AN ORGANIZED HEALTH CARE EDUCATION/TRAINING PROGRAM

## 2024-06-25 PROCEDURE — 83605 ASSAY OF LACTIC ACID: CPT

## 2024-06-25 PROCEDURE — 87186 SC STD MICRODIL/AGAR DIL: CPT

## 2024-06-25 PROCEDURE — 6360000002 HC RX W HCPCS: Performed by: STUDENT IN AN ORGANIZED HEALTH CARE EDUCATION/TRAINING PROGRAM

## 2024-06-25 PROCEDURE — 2580000003 HC RX 258

## 2024-06-25 PROCEDURE — 71045 X-RAY EXAM CHEST 1 VIEW: CPT

## 2024-06-25 PROCEDURE — 36415 COLL VENOUS BLD VENIPUNCTURE: CPT

## 2024-06-25 PROCEDURE — 2060000000 HC ICU INTERMEDIATE R&B

## 2024-06-25 PROCEDURE — 6360000002 HC RX W HCPCS

## 2024-06-25 RX ORDER — 0.9 % SODIUM CHLORIDE 0.9 %
100 INTRAVENOUS SOLUTION INTRAVENOUS ONCE
Status: COMPLETED | OUTPATIENT
Start: 2024-06-25 | End: 2024-06-25

## 2024-06-25 RX ORDER — SODIUM CHLORIDE 0.9 % (FLUSH) 0.9 %
10 SYRINGE (ML) INJECTION ONCE
Status: COMPLETED | OUTPATIENT
Start: 2024-06-25 | End: 2024-06-25

## 2024-06-25 RX ORDER — POTASSIUM CHLORIDE 7.45 MG/ML
10 INJECTION INTRAVENOUS
Status: DISPENSED | OUTPATIENT
Start: 2024-06-25 | End: 2024-06-26

## 2024-06-25 RX ADMIN — IOPAMIDOL 75 ML: 755 INJECTION, SOLUTION INTRAVENOUS at 22:46

## 2024-06-25 RX ADMIN — SODIUM CHLORIDE, PRESERVATIVE FREE 10 ML: 5 INJECTION INTRAVENOUS at 22:46

## 2024-06-25 RX ADMIN — POTASSIUM BICARBONATE 40 MEQ: 782 TABLET, EFFERVESCENT ORAL at 23:31

## 2024-06-25 RX ADMIN — SODIUM CHLORIDE 100 ML: 9 INJECTION, SOLUTION INTRAVENOUS at 22:58

## 2024-06-25 RX ADMIN — CEFTRIAXONE SODIUM 1000 MG: 1 INJECTION, POWDER, FOR SOLUTION INTRAMUSCULAR; INTRAVENOUS at 23:49

## 2024-06-25 RX ADMIN — POTASSIUM CHLORIDE 10 MEQ: 7.46 INJECTION, SOLUTION INTRAVENOUS at 23:40

## 2024-06-25 ASSESSMENT — PAIN DESCRIPTION - ORIENTATION: ORIENTATION: LEFT

## 2024-06-25 ASSESSMENT — PAIN DESCRIPTION - DESCRIPTORS: DESCRIPTORS: PRESSURE

## 2024-06-25 ASSESSMENT — ENCOUNTER SYMPTOMS: SHORTNESS OF BREATH: 1

## 2024-06-25 ASSESSMENT — LIFESTYLE VARIABLES
HOW MANY STANDARD DRINKS CONTAINING ALCOHOL DO YOU HAVE ON A TYPICAL DAY: PATIENT DOES NOT DRINK
HOW OFTEN DO YOU HAVE A DRINK CONTAINING ALCOHOL: NEVER

## 2024-06-25 ASSESSMENT — PAIN DESCRIPTION - LOCATION: LOCATION: CHEST

## 2024-06-25 ASSESSMENT — PAIN SCALES - GENERAL: PAINLEVEL_OUTOF10: 5

## 2024-06-25 ASSESSMENT — PAIN - FUNCTIONAL ASSESSMENT: PAIN_FUNCTIONAL_ASSESSMENT: 0-10

## 2024-06-25 NOTE — ED TRIAGE NOTES
Mode of arrival (squad #, walk in, police, etc) : walk in / wheelchair        Chief complaint(s): L sided chest pressure, headache, shortness of breath, dizziness        Arrival Note (brief scenario, treatment PTA, etc).: Patient reports having stage 3 hemorrhoids 2 months ago then started having UTI (2 rounds antibiotic - did not work) and to this date, he started to have L sided chest pressure, headache, shortness of breath and dizziness. Fever at home 100.3F. Patient sent here by PCP to get checked for sepsis.        C= \"Have you ever felt that you should Cut down on your drinking?\"  No  A= \"Have people Annoyed you by criticizing your drinking?\"  No  G= \"Have you ever felt bad or Guilty about your drinking?\"  No  E= \"Have you ever had a drink as an Eye-opener first thing in the morning to steady your nerves or to help a hangover?\"  No      Deferred []      Reason for deferring: N/A    *If yes to two or more: probable alcohol abuse.*

## 2024-06-26 ENCOUNTER — APPOINTMENT (OUTPATIENT)
Age: 59
DRG: 463 | End: 2024-06-26
Payer: MEDICAID

## 2024-06-26 PROBLEM — E44.1 MILD MALNUTRITION (HCC): Status: ACTIVE | Noted: 2024-06-26

## 2024-06-26 LAB
ANION GAP SERPL CALCULATED.3IONS-SCNC: 12 MMOL/L (ref 9–17)
BNP SERPL-MCNC: 298 PG/ML
BUN SERPL-MCNC: 14 MG/DL (ref 8–23)
CALCIUM SERPL-MCNC: 8.7 MG/DL (ref 8.6–10.4)
CHLORIDE SERPL-SCNC: 95 MMOL/L (ref 98–107)
CO2 SERPL-SCNC: 31 MMOL/L (ref 20–31)
CREAT SERPL-MCNC: 1.1 MG/DL (ref 0.7–1.2)
EKG ATRIAL RATE: 80 BPM
EKG P AXIS: 48 DEGREES
EKG P-R INTERVAL: 178 MS
EKG Q-T INTERVAL: 400 MS
EKG QRS DURATION: 96 MS
EKG QTC CALCULATION (BAZETT): 461 MS
EKG R AXIS: -20 DEGREES
EKG T AXIS: 44 DEGREES
EKG VENTRICULAR RATE: 80 BPM
ERYTHROCYTE [DISTWIDTH] IN BLOOD BY AUTOMATED COUNT: 12.9 % (ref 11.5–14.9)
EST. AVERAGE GLUCOSE BLD GHB EST-MCNC: 120 MG/DL
GFR, ESTIMATED: 77 ML/MIN/1.73M2
GLUCOSE BLD-MCNC: 102 MG/DL (ref 75–110)
GLUCOSE BLD-MCNC: 107 MG/DL (ref 75–110)
GLUCOSE BLD-MCNC: 121 MG/DL (ref 75–110)
GLUCOSE BLD-MCNC: 128 MG/DL (ref 75–110)
GLUCOSE BLD-MCNC: 130 MG/DL (ref 75–110)
GLUCOSE SERPL-MCNC: 101 MG/DL (ref 70–99)
HBA1C MFR BLD: 5.8 % (ref 4–6)
HCT VFR BLD AUTO: 37.6 % (ref 41–53)
HGB BLD-MCNC: 12.7 G/DL (ref 13.5–17.5)
MAGNESIUM SERPL-MCNC: 1.8 MG/DL (ref 1.6–2.6)
MCH RBC QN AUTO: 32.7 PG (ref 26–34)
MCHC RBC AUTO-ENTMCNC: 33.9 G/DL (ref 31–37)
MCV RBC AUTO: 96.5 FL (ref 80–100)
PLATELET # BLD AUTO: 244 K/UL (ref 150–450)
PMV BLD AUTO: 8.3 FL (ref 6–12)
POTASSIUM SERPL-SCNC: 3.4 MMOL/L (ref 3.7–5.3)
RBC # BLD AUTO: 3.9 M/UL (ref 4.5–5.9)
SODIUM SERPL-SCNC: 138 MMOL/L (ref 135–144)
TROPONIN I SERPL HS-MCNC: 9 NG/L (ref 0–22)
TSH SERPL DL<=0.05 MIU/L-ACNC: 1.5 UIU/ML (ref 0.3–5)
WBC OTHER # BLD: 7.8 K/UL (ref 3.5–11)

## 2024-06-26 PROCEDURE — 99223 1ST HOSP IP/OBS HIGH 75: CPT | Performed by: INTERNAL MEDICINE

## 2024-06-26 PROCEDURE — 84443 ASSAY THYROID STIM HORMONE: CPT

## 2024-06-26 PROCEDURE — 83036 HEMOGLOBIN GLYCOSYLATED A1C: CPT

## 2024-06-26 PROCEDURE — 82947 ASSAY GLUCOSE BLOOD QUANT: CPT

## 2024-06-26 PROCEDURE — 80048 BASIC METABOLIC PNL TOTAL CA: CPT

## 2024-06-26 PROCEDURE — 2580000003 HC RX 258: Performed by: NURSE PRACTITIONER

## 2024-06-26 PROCEDURE — 36415 COLL VENOUS BLD VENIPUNCTURE: CPT

## 2024-06-26 PROCEDURE — 6360000002 HC RX W HCPCS: Performed by: NURSE PRACTITIONER

## 2024-06-26 PROCEDURE — 83735 ASSAY OF MAGNESIUM: CPT

## 2024-06-26 PROCEDURE — 93010 ELECTROCARDIOGRAM REPORT: CPT | Performed by: INTERNAL MEDICINE

## 2024-06-26 PROCEDURE — 93306 TTE W/DOPPLER COMPLETE: CPT

## 2024-06-26 PROCEDURE — 85027 COMPLETE CBC AUTOMATED: CPT

## 2024-06-26 PROCEDURE — 83880 ASSAY OF NATRIURETIC PEPTIDE: CPT

## 2024-06-26 PROCEDURE — 93306 TTE W/DOPPLER COMPLETE: CPT | Performed by: INTERNAL MEDICINE

## 2024-06-26 PROCEDURE — 2060000000 HC ICU INTERMEDIATE R&B

## 2024-06-26 PROCEDURE — 84484 ASSAY OF TROPONIN QUANT: CPT

## 2024-06-26 PROCEDURE — 6370000000 HC RX 637 (ALT 250 FOR IP): Performed by: NURSE PRACTITIONER

## 2024-06-26 PROCEDURE — 6360000002 HC RX W HCPCS: Performed by: STUDENT IN AN ORGANIZED HEALTH CARE EDUCATION/TRAINING PROGRAM

## 2024-06-26 RX ORDER — CLONIDINE HYDROCHLORIDE 0.1 MG/1
0.1 TABLET ORAL 2 TIMES DAILY
Status: DISCONTINUED | OUTPATIENT
Start: 2024-06-26 | End: 2024-06-28 | Stop reason: HOSPADM

## 2024-06-26 RX ORDER — ONDANSETRON 4 MG/1
4 TABLET, ORALLY DISINTEGRATING ORAL EVERY 8 HOURS PRN
Status: DISCONTINUED | OUTPATIENT
Start: 2024-06-26 | End: 2024-06-28 | Stop reason: HOSPADM

## 2024-06-26 RX ORDER — SODIUM CHLORIDE 0.9 % (FLUSH) 0.9 %
5-40 SYRINGE (ML) INJECTION EVERY 12 HOURS SCHEDULED
Status: DISCONTINUED | OUTPATIENT
Start: 2024-06-26 | End: 2024-06-28 | Stop reason: HOSPADM

## 2024-06-26 RX ORDER — PHENAZOPYRIDINE HYDROCHLORIDE 100 MG/1
200 TABLET, FILM COATED ORAL 3 TIMES DAILY PRN
Status: DISCONTINUED | OUTPATIENT
Start: 2024-06-26 | End: 2024-06-28 | Stop reason: HOSPADM

## 2024-06-26 RX ORDER — POTASSIUM CHLORIDE 20 MEQ/1
40 TABLET, EXTENDED RELEASE ORAL PRN
Status: DISCONTINUED | OUTPATIENT
Start: 2024-06-26 | End: 2024-06-28 | Stop reason: HOSPADM

## 2024-06-26 RX ORDER — SODIUM CHLORIDE 0.9 % (FLUSH) 0.9 %
5-40 SYRINGE (ML) INJECTION PRN
Status: DISCONTINUED | OUTPATIENT
Start: 2024-06-26 | End: 2024-06-28 | Stop reason: HOSPADM

## 2024-06-26 RX ORDER — INSULIN LISPRO 100 [IU]/ML
0-4 INJECTION, SOLUTION INTRAVENOUS; SUBCUTANEOUS NIGHTLY
Status: DISCONTINUED | OUTPATIENT
Start: 2024-06-26 | End: 2024-06-28 | Stop reason: HOSPADM

## 2024-06-26 RX ORDER — LACTOBACILLUS RHAMNOSUS GG 10B CELL
1 CAPSULE ORAL DAILY
Status: DISCONTINUED | OUTPATIENT
Start: 2024-06-26 | End: 2024-06-28 | Stop reason: HOSPADM

## 2024-06-26 RX ORDER — FUROSEMIDE 40 MG/1
40 TABLET ORAL DAILY
Status: DISCONTINUED | OUTPATIENT
Start: 2024-06-26 | End: 2024-06-28 | Stop reason: HOSPADM

## 2024-06-26 RX ORDER — CYCLOBENZAPRINE HCL 10 MG
10 TABLET ORAL 2 TIMES DAILY PRN
Status: DISCONTINUED | OUTPATIENT
Start: 2024-06-26 | End: 2024-06-28 | Stop reason: HOSPADM

## 2024-06-26 RX ORDER — ENOXAPARIN SODIUM 100 MG/ML
30 INJECTION SUBCUTANEOUS 2 TIMES DAILY
Status: DISCONTINUED | OUTPATIENT
Start: 2024-06-26 | End: 2024-06-28 | Stop reason: HOSPADM

## 2024-06-26 RX ORDER — PAROXETINE HYDROCHLORIDE 20 MG/1
80 TABLET, FILM COATED ORAL DAILY
Status: DISCONTINUED | OUTPATIENT
Start: 2024-06-26 | End: 2024-06-28 | Stop reason: HOSPADM

## 2024-06-26 RX ORDER — ONDANSETRON 2 MG/ML
4 INJECTION INTRAMUSCULAR; INTRAVENOUS EVERY 6 HOURS PRN
Status: DISCONTINUED | OUTPATIENT
Start: 2024-06-26 | End: 2024-06-28 | Stop reason: HOSPADM

## 2024-06-26 RX ORDER — POTASSIUM CHLORIDE 7.45 MG/ML
10 INJECTION INTRAVENOUS PRN
Status: DISCONTINUED | OUTPATIENT
Start: 2024-06-26 | End: 2024-06-28 | Stop reason: HOSPADM

## 2024-06-26 RX ORDER — PROMETHAZINE HYDROCHLORIDE 25 MG/1
25 TABLET ORAL EVERY 6 HOURS PRN
Status: DISCONTINUED | OUTPATIENT
Start: 2024-06-26 | End: 2024-06-28 | Stop reason: HOSPADM

## 2024-06-26 RX ORDER — POLYETHYLENE GLYCOL 3350 17 G/17G
17 POWDER, FOR SOLUTION ORAL DAILY PRN
Status: DISCONTINUED | OUTPATIENT
Start: 2024-06-26 | End: 2024-06-28 | Stop reason: HOSPADM

## 2024-06-26 RX ORDER — ASPIRIN 81 MG/1
81 TABLET, CHEWABLE ORAL DAILY
Status: DISCONTINUED | OUTPATIENT
Start: 2024-06-26 | End: 2024-06-28 | Stop reason: HOSPADM

## 2024-06-26 RX ORDER — INSULIN LISPRO 100 [IU]/ML
0-8 INJECTION, SOLUTION INTRAVENOUS; SUBCUTANEOUS
Status: DISCONTINUED | OUTPATIENT
Start: 2024-06-26 | End: 2024-06-28 | Stop reason: HOSPADM

## 2024-06-26 RX ORDER — FERROUS SULFATE 325(65) MG
325 TABLET ORAL
Status: DISCONTINUED | OUTPATIENT
Start: 2024-06-26 | End: 2024-06-28 | Stop reason: HOSPADM

## 2024-06-26 RX ORDER — LEVOTHYROXINE SODIUM 0.05 MG/1
50 TABLET ORAL
Status: DISCONTINUED | OUTPATIENT
Start: 2024-06-26 | End: 2024-06-28 | Stop reason: HOSPADM

## 2024-06-26 RX ORDER — DEXTROSE MONOHYDRATE 100 MG/ML
INJECTION, SOLUTION INTRAVENOUS CONTINUOUS PRN
Status: DISCONTINUED | OUTPATIENT
Start: 2024-06-26 | End: 2024-06-28 | Stop reason: HOSPADM

## 2024-06-26 RX ORDER — MAGNESIUM SULFATE HEPTAHYDRATE 40 MG/ML
2000 INJECTION, SOLUTION INTRAVENOUS PRN
Status: DISCONTINUED | OUTPATIENT
Start: 2024-06-26 | End: 2024-06-28 | Stop reason: HOSPADM

## 2024-06-26 RX ORDER — SODIUM CHLORIDE 9 MG/ML
INJECTION, SOLUTION INTRAVENOUS PRN
Status: DISCONTINUED | OUTPATIENT
Start: 2024-06-26 | End: 2024-06-28 | Stop reason: HOSPADM

## 2024-06-26 RX ORDER — BUPRENORPHINE HYDROCHLORIDE AND NALOXONE HYDROCHLORIDE DIHYDRATE 8; 2 MG/1; MG/1
3 TABLET SUBLINGUAL DAILY
Status: DISCONTINUED | OUTPATIENT
Start: 2024-06-26 | End: 2024-06-28 | Stop reason: HOSPADM

## 2024-06-26 RX ADMIN — PAROXETINE HYDROCHLORIDE 80 MG: 20 TABLET, FILM COATED ORAL at 09:11

## 2024-06-26 RX ADMIN — LEVOTHYROXINE SODIUM 50 MCG: 0.05 TABLET ORAL at 09:11

## 2024-06-26 RX ADMIN — PHENAZOPYRIDINE HYDROCHLORIDE 200 MG: 200 TABLET ORAL at 22:57

## 2024-06-26 RX ADMIN — Medication 1 CAPSULE: at 09:10

## 2024-06-26 RX ADMIN — POTASSIUM CHLORIDE 10 MEQ: 7.46 INJECTION, SOLUTION INTRAVENOUS at 01:12

## 2024-06-26 RX ADMIN — BUPRENORPHINE HYDROCHLORIDE AND NALOXONE HYDROCHLORIDE DIHYDRATE 3 TABLET: 8; 2 TABLET SUBLINGUAL at 09:19

## 2024-06-26 RX ADMIN — SODIUM CHLORIDE, PRESERVATIVE FREE 10 ML: 5 INJECTION INTRAVENOUS at 09:10

## 2024-06-26 RX ADMIN — CYCLOBENZAPRINE 10 MG: 10 TABLET, FILM COATED ORAL at 22:39

## 2024-06-26 RX ADMIN — POTASSIUM CHLORIDE 10 MEQ: 7.46 INJECTION, SOLUTION INTRAVENOUS at 17:03

## 2024-06-26 RX ADMIN — SODIUM CHLORIDE: 9 INJECTION, SOLUTION INTRAVENOUS at 17:03

## 2024-06-26 RX ADMIN — FUROSEMIDE 40 MG: 40 TABLET ORAL at 09:11

## 2024-06-26 RX ADMIN — POTASSIUM CHLORIDE 10 MEQ: 7.46 INJECTION, SOLUTION INTRAVENOUS at 01:11

## 2024-06-26 RX ADMIN — ONDANSETRON 4 MG: 2 INJECTION INTRAMUSCULAR; INTRAVENOUS at 16:59

## 2024-06-26 RX ADMIN — POTASSIUM CHLORIDE 10 MEQ: 7.46 INJECTION, SOLUTION INTRAVENOUS at 02:14

## 2024-06-26 RX ADMIN — CLONIDINE HYDROCHLORIDE 0.1 MG: 0.1 TABLET ORAL at 09:11

## 2024-06-26 ASSESSMENT — PAIN SCALES - GENERAL
PAINLEVEL_OUTOF10: 0
PAINLEVEL_OUTOF10: 0

## 2024-06-26 NOTE — PROGRESS NOTES
[facility]   OCCUPATIONAL THERAPY MISSED TREATMENT NOTE   INPATIENT   Date: 24  Patient Name: Rick Osullivan       Room:   MRN: [id number]   Account #: [id number]    : 1965  (59 y.o.)  Gender: male     REASON FOR MISSED TREATMENT:  Pt states \"I don't do therapy\".  He reports that he is up per self with no concerns regarding functioning at home when discharged from hospital.  Family in room does not voice any concerns either.    -   OT being discontinued at this time. Patient functioning at Premorbid Level  No further needs  0831    Electronically signed by JAMIL Martínez on 24 at 8:43 AM EDT

## 2024-06-26 NOTE — PROGRESS NOTES
Pharmacy Medication History Note      List of current medications patient is taking is complete.    Source of information: Encounters, [facility], [facility]    Changes made to medication list:  Medications removed (include reason, ex. therapy complete or physician discontinued, noncompliance):  Clonidine 0.1 mg - noncompliance (last filled 01/2024)  Cyclobenzaprine 10 mg - therapy complete  Liraglutide (Victoza) - therapy not available (last filled 04/2024)  Narcan - Therapy not needed  Niacin 500 mg ER - Therapy completed  Zofran- ODT - Therapy completed  Pyridium 200 mg - Therapy Completed  Promethazine 25 mg - Therapy Completed    Medications added/doses adjusted:  Suboxone 8/2 mg SL Tabs - 1 po TID per [facility]  Lasix 20 mg Tabs - 2 (40 mg total) po QD  Culturelle - 1 po QD  Victoza - Medication not available  Paroxetine 20 mg tabs - 4 tabs (80 mg total) QAM      Current Home Medication List at Time of Admission:  Prior to Admission medications    Medication Sig Start Date End Date Taking? Authorizing Provider   buprenorphine-naloxone (SUBOXONE) 8-2 MG SUBL SL tablet Place 1 tablet under the tongue 3 times daily for 30 days. Max Daily Amount: 3 tablets  Patient taking differently: Place 1 tablet under the tongue in the morning, at noon, and at bedtime. 6/10/24 7/10/24 Yes Maynor Jane APRN - CNP   metFORMIN (GLUCOPHAGE) 500 MG tablet Take 2 tablets by mouth daily (with breakfast) 6/10/24  Yes Maynor Jane APRN - CNP   furosemide (LASIX) 20 MG tablet Take 1 tablet by mouth 2 times daily  Patient taking differently: Take 2 tablets by mouth daily 6/10/24  Yes Maynor Jane APRN - CNP   PARoxetine (PAXIL) 20 MG tablet TAKE 4 TABLETS BY MOUTH EVERY MORNING 3/28/24  Yes Aldo Ortega MD   levothyroxine (SYNTHROID) 50 MCG tablet TAKE 1 TABLET BY MOUTH EVERY DAY 3/28/24  Yes Aldo Ortega MD   Liraglutide (VICTOZA) 18 MG/3ML SOPN SC injection Inject 1.8 mg into the skin daily  Patient

## 2024-06-26 NOTE — PROGRESS NOTES
Writer responded to consult. PT and his wife were in room welcomed the visit.      06/26/24 1516   Encounter Summary   Encounter Overview/Reason Spiritual/Emotional Needs   Service Provided For Patient and family together   Referral/Consult From Nurse   Last Encounter  06/26/24   Complexity of Encounter Moderate   Spiritual/Emotional needs   Type Spiritual Support   Assessment/Intervention/Outcome   Assessment Calm;Hopeful;Peaceful   Intervention Active listening;Prayer (assurance of)/[place];Sustaining Presence/Ministry of presence;Explored/Affirmed feelings, thoughts, concerns   Outcome Acceptance;Comfort;Coping;Expressed feelings, needs, and concerns;Receptive;Engaged in conversation

## 2024-06-26 NOTE — PROGRESS NOTES
Providers,    Liraglutide (Victoza) is a non-formulary medication for which there is no therapeutic interchange available while inpatient. Please have patient continue it at home if desired. Feel free to contact the pharmacy for alternative therapies.    Informed by [facility] that patient last filled Victoza 04/2024. No current fills due to Medication Backorder issues.     Thank you,  Matt Gutierrez, PharmD. [facility]  6/26/2024  11:03 AM

## 2024-06-26 NOTE — ED PROVIDER NOTES
[facility]  Emergency Department Encounter  Emergency Medicine Resident     Pt Name:Rick Osullivan  MRN: [id number]  Birthdate 1965  Date of evaluation: 6/25/24  PCP:  Aldo Ortega MD  Note Started: 8:05 PM EDT      CHIEF COMPLAINT       Chief Complaint   Patient presents with    Chest Pain    Dizziness    Shortness of Breath    Fever    Headache       HISTORY OF PRESENT ILLNESS  (Location/Symptom, Timing/Onset, Context/Setting, Quality, Duration, Modifying Factors, Severity.)      Rick Osullivan is a 59 y.o. male who presents with multiple complaints.  Patient states that he had chest pain shortness of breath lightheadedness for the past 4 days.  Does not have an exertional component to it.  States he had a stress test previously does not remember when but it was negative and he has not had a heart catheterization.  States about a month ago he was diagnosed with a UTI has been on 2 different antibiotics for that as but persistently has dysuria.  States he started to have have a fever chills.  Was told to come in by his PCP to be evaluated for his heart as well and UTI concern for possible sepsis    PAST MEDICAL / SURGICAL / SOCIAL / FAMILY HISTORY      has a past medical history of Acute metabolic encephalopathy, CLAUDIA (acute kidney injury) ([facility]), Anemia, Anxiety, ARF (acute renal failure) ([facility]), Arthritis, BPH (benign prostatic hyperplasia), Chronic kidney disease, Depression, Diabetes mellitus ([facility]), Diabetic neuropathy ([facility]), GERD (gastroesophageal reflux disease), Hyperglycemia, Hyperkalemia, Hypertension, Hypothyroidism, Impacted tooth, Necrosis of bone of foot ([facility]), Osteomyelitis of left foot ([facility]), PAD (peripheral artery disease) ([facility]), Pneumonia, Septic shock ([facility]), and Type 2 diabetes mellitus ([facility]).       has a past surgical history that includes Appendectomy; knee surgery (Right); Tonsillectomy; back surgery; Toe amputation (Bilateral, 3/6/2017); Upper

## 2024-06-26 NOTE — ED NOTES
Report given to ILIR Mckinney from [facility].   Report method by phone   The following was reviewed with receiving RN:   Current vital signs:  BP (!) 142/52   Pulse 95   Temp 99.5 °F (37.5 °C) (Oral)   Resp 20   Ht 1.829 m (6')   Wt 108.9 kg (240 lb)   SpO2 95%   BMI 32.55 kg/m²                MEWS Score: 1     Any medication or safety alerts were reviewed. Any pending diagnostics and notifications were also reviewed, as well as any safety concerns or issues, abnormal labs, abnormal imaging, and abnormal assessment findings. Questions were answered.

## 2024-06-26 NOTE — PROGRESS NOTES
[facility] Internal Medicine  Karlos Castro MD; Mukesh Mccoy MD; Marco Bowen MD; MD Radha Whitaker MD; Drew Mccoy MD  [facility] Internal Medicine   IN-PATIENT SERVICE  [facility]                 Date:   6/26/2024  Patientname:  Rick Osullivan  Date of admission:  6/25/2024  8:02 PM  MRN:   [id number]  Account:  [id number]  YOB: 1965  PCP:    Aldo Ortega MD  Room:   2088/2088-01  Code Status:    Full Code      Chief Complaint:     Chief Complaint   Patient presents with    Chest Pain    Dizziness    Shortness of Breath    Fever    Headache       History of Present Illness:     Rick Osullivan is a 59 y.o. Non- / non  male who presents with Chest Pain, Dizziness, Shortness of Breath, Fever, and Headache and is admitted to the hospital for the management of Complicated UTI (urinary tract infection). Medical history significant for HTN, CKD, PAD, DM type 2, s/p transmetatarsal amputation of left foot, hypothyroidism, BPH, opioid dependence on suboxone. Patient reports being sent to ED by PCP for evaluation due to unsuccessful outpatient treatment of UTI, concern for sepsis with reported fevers and increased weakness. Afebrile with stable vitals in ED. Lactic 0.9, WBC 8.5. Urinalysis is significantly infected. Culture pending, started on IV rocephin base on previous urine culture 4/18/24 showing Proteus mirabilis with sensitivity to Rocephin. CT abdomen pelvis reveals multiple nonobstructive renal calculus and multiple right renal cortical cysts. Patient Initially reported chest pain but further describes as a pressure across his chest that he experienced when exerting self more due to increased frequency of urination.  Admits to shortness of breath with the episode of pressure which lasted only a brief period. Also reports feeling anxious at the time, states that he does not feel that the pain in heart related.  infection  -previous urine culture 4/18/24 shows Proteus mirabilis susceptible to Rocephin  -IV rocephin ordered in ED  -Urine culture pending  -No leukocytosis, lactic 0.9    Chest Pain  -Describes as non-cardiac pain with pressure across chest for brief.  -Symptoms have resolved  -Troponin 8, 9  -EKG -NSR with nonspecific T wave changes  -Check magnesium, BNP, TSH, Lipid panel, & HgbA1c in am  -Check 2D echocardiogram  -Pain/nausea control  -EKG PRN chest pain    Diabetes Mellitus  -Continue home dose insulin  -hold oral hypoglycemics/metformin for now  -POCT ac and hs with sliding scale coverage  -diabetic diet  -hypoglycemia protocol      Lovenox for DVT prophylaxis    Full code       MALISSA BEAUCHAMP - NP  6/26/2024  2:53 AM      Please note that this chart was generated using voice recognition Dragon dictation software.  Although every effort was made to ensure the accuracy of this automated transcription, some errors in transcription may [place] [address].   Phone [phone]

## 2024-06-26 NOTE — ED PROVIDER NOTES
EMERGENCY DEPARTMENT ENCOUNTER   ATTENDING ATTESTATION     Pt Name: Rick Osullivan  MRN: [id number]  Birthdate 1965  Date of evaluation: 6/25/24       Rick Osullivan is a 59 y.o. male who presents with Chest Pain, Dizziness, Shortness of Breath, Fever, and Headache    Patient presenting with chest pain, shortness of breath, fever    Recent treatment for UTI    Plan is infectious workup and cardiac workup    MDM:     Workup reveals severe hypokalemia and a UTI    Also high risk chest pain will admit to negative cardiac enzymes    No evidence of sepsis    Vitals:   Vitals:    06/25/24 1950 06/25/24 2139 06/26/24 0000 06/26/24 0053   BP: (!) 142/52  (!) 128/108 137/76   Pulse: 95  79 79   Resp: 20  14 18   Temp: 98.2 °F (36.8 °C) 99.5 °F (37.5 °C) 99.3 °F (37.4 °C) 99.6 °F (37.6 °C)   TempSrc: Oral Oral Oral Oral   SpO2: 95%  95% 95%   Weight: 108.9 kg (240 lb)      Height: 1.829 m (6')            I personally saw and examined the patient. I have reviewed and agree with the resident's findings, including all diagnostic interpretations and treatment plan as written. I was present for the key portions of any procedures performed and the inclusive time noted for any critical care statement.    Saqib Meza MD  Attending Emergency Physician           Saqib Meza MD  06/26/24 0100

## 2024-06-26 NOTE — PROGRESS NOTES
Comprehensive Nutrition Assessment    Type and Reason for Visit:  Initial, Positive Nutrition Screen (Poor appetie, intake, wt loss.)    Nutrition Recommendations/Plan:   Continue current soft foods diet  Add Ensure High protein twice daily and Ensure Clear once daily.     Malnutrition Assessment:  Malnutrition Status:  Mild malnutrition (06/26/24 1517)    Context:  Acute Illness     Findings of the 6 clinical characteristics of malnutrition:  Energy Intake:  75% or less of estimated energy requirements for 7 or more days  Weight Loss:  Greater than 7.5% over 3 months     Body Fat Loss:  Unable to assess     Muscle Mass Loss:  Unable to assess    Fluid Accumulation:  No significant fluid accumulation     Strength:  Not Performed    Nutrition Assessment:    Pt is admitted for UTI, with PMH of DM2, HTN, ARF, GERD, PAD, hypothyroidism, depression. Pt reports normal home eating pattern being three Ensure oral supplements per day d/t having no teeth. It was added \"soups, pudding, gelatin, soft foods\" to pt's tray orders. Ensure High Protein twice daily, and Ensure Clear once daily to be ordered this date.    Nutrition Related Findings:    Pt reports cramping, diarrhea, abdominal pain, decreased appetite. Labs: K 3.4, Gluc 101. Edema: 1+ generalized. Wound Type: None       Current Nutrition Intake & Therapies:    Average Meal Intake: 0%  Average Supplements Intake: 51-75%  ADULT DIET; Regular; 4 carb choices (60 gm/meal); No Caffeine  ADULT ORAL NUTRITION SUPPLEMENT; Breakfast, Dinner; Low Calorie/High Protein Oral Supplement  ADULT ORAL NUTRITION SUPPLEMENT; Lunch; Clear Liquid Oral Supplement    Anthropometric Measures:  Height: 182.9 cm (6' 0.01\")  Ideal Body Weight (IBW): 178 lbs (81 kg)    Admission Body Weight: 108.9 kg (240 lb 1.3 oz)  Current Body Weight: 108.9 kg (240 lb 1.3 oz), 134.9 % IBW. Weight Source: Stated  Current BMI (kg/m2): 32.6  Usual Body Weight: 124.7 kg (274 lb 14.6 oz) (4/4/2024 (~3mo.)

## 2024-06-26 NOTE — CARE COORDINATION
Case Management Assessment  Initial Evaluation    Date/Time of Evaluation: 6/26/2024 2:40 PM  Assessment Completed by: Emma Elam RN    If patient is discharged prior to next notation, then this note serves as note for discharge by case management.    Patient Name: Rick Osullivan                   YOB: 1965  Diagnosis: Hypokalemia [E87.6]  Complicated UTI (urinary tract infection) [N39.0]  Chest pain, unspecified type [R07.9]                   Date / Time: 6/25/2024  8:02 PM    Patient Admission Status: Inpatient   Readmission Risk (Low < 19, Mod (19-27), High > 27): Readmission Risk Score: 10.8    Current PCP: Aldo Ortega MD  PCP verified by CM? No    Chart Reviewed: No      History Provided by: Patient  Patient Orientation: Alert and Oriented    Patient Cognition: Alert    Hospitalization in the last 30 days (Readmission):  Yes    If yes, Readmission Assessment in CM Navigator will be completed.    Advance Directives:      Code Status: Full Code   Patient's Primary Decision Maker is: Legal Next of Kin      Discharge Planning:    Patient lives with: Spouse/Significant Other Type of Home: House  Primary Care Giver: Self  Patient Support Systems include: Spouse/Significant Other, Family Members   Current Financial resources: Medicaid  Current community resources:    Current services prior to admission: None            Current DME:              Type of Home Care services:  None    ADLS  Prior functional level: Independent in ADLs/IADLs  Current functional level: Independent in ADLs/IADLs    PT AM-PAC:   /24  OT AM-PAC:   /24    Family can provide assistance at DC: Yes  Would you like Case Management to discuss the discharge plan with any other family members/significant others, and if so, who? Yes  Plans to Return to Present Housing: Yes  Other Identified Issues/Barriers to RETURNING to current housing: no  Potential Assistance needed at discharge: N/A            Potential DME:

## 2024-06-26 NOTE — H&P
[facility] Internal Medicine  Karlos Castro MD; Mukesh Mccoy MD; Marco Bowen MD; MD Radha Whitaker MD; Drew Mccoy MD; Yanira Rucker MD      HISTORY AND PHYSICAL EXAMINATION            Date:   6/26/2024  Patient name:  Rick Osullivan  MRN:   [id number]  Account:  [id number]  YOB: 1965  PCP:    Aldo Ortega MD  Code Status:    Full Code    Chief Complaint:     Chief Complaint   Patient presents with    Chest Pain    Dizziness    Shortness of Breath    Fever    Headache         History Obtained From:     Patient, EMR, nursing staff    HPI     This patient is a 59 y.o. Non- / non  malewho presents with    Rick Osullivan is a 59 y.o. Non- / non  male who presents with Chest Pain, Dizziness, Shortness of Breath, Fever, and Headache and is admitted to the hospital for the management of Complicated UTI (urinary tract infection). Medical history significant for HTN, CKD, PAD, DM type 2, s/p transmetatarsal amputation of left foot, hypothyroidism, BPH, opioid dependence on suboxone. Patient reports being sent to ED by PCP for evaluation due to unsuccessful outpatient treatment of UTI, concern for sepsis with reported fevers and increased weakness. Afebrile with stable vitals in ED. Lactic 0.9, WBC 8.5. Urinalysis is significantly infected. Culture pending, started on IV rocephin base on previous urine culture 4/18/24 showing Proteus mirabilis with sensitivity to Rocephin. CT abdomen pelvis reveals multiple nonobstructive renal calculus and multiple right renal cortical cysts. Patient Initially reported chest pain but further describes as a pressure across his chest that he experienced when exerting self more due to increased frequency of urination.  Admits to shortness of breath with the episode of pressure which lasted only a brief period. Also reports feeling anxious at the time, states that he does not feel that the pain in

## 2024-06-26 NOTE — PROGRESS NOTES
06/26/24 1251   Encounter Summary   Encounter Overview/Reason Volunteer Encounter   Service Provided For Patient   Referral/Consult From Rounding   Last Encounter  06/26/24   Complexity of Encounter Low   Spiritual/Emotional needs   Type Spiritual Support   Rituals, Rites and Sacraments   Type Religion Communion   Assessment/Intervention/Outcome   Intervention Prayer (assurance of)/[place]

## 2024-06-27 ENCOUNTER — APPOINTMENT (OUTPATIENT)
Dept: INTERVENTIONAL RADIOLOGY/VASCULAR | Age: 59
DRG: 463 | End: 2024-06-27
Payer: MEDICAID

## 2024-06-27 LAB
ANION GAP SERPL CALCULATED.3IONS-SCNC: 14 MMOL/L (ref 9–17)
BUN SERPL-MCNC: 11 MG/DL (ref 8–23)
CALCIUM SERPL-MCNC: 8.7 MG/DL (ref 8.6–10.4)
CHLORIDE SERPL-SCNC: 95 MMOL/L (ref 98–107)
CHOLEST SERPL-MCNC: 164 MG/DL
CHOLESTEROL/HDL RATIO: 6.8
CO2 SERPL-SCNC: 30 MMOL/L (ref 20–31)
CREAT SERPL-MCNC: 0.9 MG/DL (ref 0.7–1.2)
ECHO AO ROOT DIAM: 3.6 CM
ECHO AO ROOT INDEX: 1.57 CM/M2
ECHO AV AREA PEAK VELOCITY: 2.4 CM2
ECHO AV AREA VTI: 2.7 CM2
ECHO AV AREA/BSA PEAK VELOCITY: 1 CM2/M2
ECHO AV AREA/BSA VTI: 1.2 CM2/M2
ECHO AV MEAN GRADIENT: 4 MMHG
ECHO AV MEAN VELOCITY: 1 M/S
ECHO AV PEAK GRADIENT: 8 MMHG
ECHO AV PEAK VELOCITY: 1.4 M/S
ECHO AV VELOCITY RATIO: 0.79
ECHO AV VTI: 23.8 CM
ECHO BSA: 2.35 M2
ECHO LA AREA 2C: 10.2 CM2
ECHO LA AREA 4C: 15.5 CM2
ECHO LA DIAMETER INDEX: 1.3 CM/M2
ECHO LA DIAMETER: 3 CM
ECHO LA MAJOR AXIS: 5.8 CM
ECHO LA MINOR AXIS: 4.5 CM
ECHO LA TO AORTIC ROOT RATIO: 0.83
ECHO LA VOL MOD A2C: 19 ML (ref 18–58)
ECHO LA VOL MOD A4C: 32 ML (ref 18–58)
ECHO LA VOLUME INDEX MOD A2C: 8 ML/M2 (ref 16–34)
ECHO LA VOLUME INDEX MOD A4C: 14 ML/M2 (ref 16–34)
ECHO LV E' LATERAL VELOCITY: 14 CM/S
ECHO LV E' SEPTAL VELOCITY: 14 CM/S
ECHO LV FRACTIONAL SHORTENING: 32 % (ref 28–44)
ECHO LV INTERNAL DIMENSION DIASTOLE INDEX: 2.04 CM/M2
ECHO LV INTERNAL DIMENSION DIASTOLIC: 4.7 CM (ref 4.2–5.9)
ECHO LV INTERNAL DIMENSION SYSTOLIC INDEX: 1.39 CM/M2
ECHO LV INTERNAL DIMENSION SYSTOLIC: 3.2 CM
ECHO LV IVSD: 1.2 CM (ref 0.6–1)
ECHO LV MASS 2D: 212 G (ref 88–224)
ECHO LV MASS INDEX 2D: 92.2 G/M2 (ref 49–115)
ECHO LV POSTERIOR WALL DIASTOLIC: 1.2 CM (ref 0.6–1)
ECHO LV RELATIVE WALL THICKNESS RATIO: 0.51
ECHO LVOT AREA: 3.1 CM2
ECHO LVOT AV VTI INDEX: 0.85
ECHO LVOT DIAM: 2 CM
ECHO LVOT MEAN GRADIENT: 2 MMHG
ECHO LVOT PEAK GRADIENT: 5 MMHG
ECHO LVOT PEAK VELOCITY: 1.1 M/S
ECHO LVOT STROKE VOLUME INDEX: 27.7 ML/M2
ECHO LVOT SV: 63.7 ML
ECHO LVOT VTI: 20.3 CM
ECHO MV A VELOCITY: 0.64 M/S
ECHO MV E DECELERATION TIME (DT): 218 MS
ECHO MV E VELOCITY: 0.75 M/S
ECHO MV E/A RATIO: 1.17
ECHO MV E/E' LATERAL: 5.36
ECHO MV E/E' RATIO (AVERAGED): 5.36
ECHO MV E/E' SEPTAL: 5.36
ECHO RV TAPSE: 2.5 CM (ref 1.7–?)
ERYTHROCYTE [DISTWIDTH] IN BLOOD BY AUTOMATED COUNT: 13.6 % (ref 11.5–14.9)
GFR, ESTIMATED: >90 ML/MIN/1.73M2
GLUCOSE BLD-MCNC: 127 MG/DL (ref 75–110)
GLUCOSE BLD-MCNC: 147 MG/DL (ref 75–110)
GLUCOSE BLD-MCNC: 151 MG/DL (ref 75–110)
GLUCOSE BLD-MCNC: 167 MG/DL (ref 75–110)
GLUCOSE SERPL-MCNC: 124 MG/DL (ref 70–99)
HCT VFR BLD AUTO: 41.5 % (ref 41–53)
HDLC SERPL-MCNC: 24 MG/DL
HGB BLD-MCNC: 13.8 G/DL (ref 13.5–17.5)
LDLC SERPL CALC-MCNC: 108 MG/DL (ref 0–130)
MAGNESIUM SERPL-MCNC: 2 MG/DL (ref 1.6–2.6)
MCH RBC QN AUTO: 31.6 PG (ref 26–34)
MCHC RBC AUTO-ENTMCNC: 33.3 G/DL (ref 31–37)
MCV RBC AUTO: 94.9 FL (ref 80–100)
MICROORGANISM SPEC CULT: ABNORMAL
PLATELET # BLD AUTO: 223 K/UL (ref 150–450)
PMV BLD AUTO: 8.1 FL (ref 6–12)
POTASSIUM SERPL-SCNC: 2.9 MMOL/L (ref 3.7–5.3)
POTASSIUM SERPL-SCNC: 3.4 MMOL/L (ref 3.7–5.3)
RBC # BLD AUTO: 4.37 M/UL (ref 4.5–5.9)
SERVICE CMNT-IMP: ABNORMAL
SODIUM SERPL-SCNC: 139 MMOL/L (ref 135–144)
SPECIMEN DESCRIPTION: ABNORMAL
TRIGL SERPL-MCNC: 158 MG/DL
WBC OTHER # BLD: 7.7 K/UL (ref 3.5–11)

## 2024-06-27 PROCEDURE — 05HB33Z INSERTION OF INFUSION DEVICE INTO RIGHT BASILIC VEIN, PERCUTANEOUS APPROACH: ICD-10-PCS | Performed by: INTERNAL MEDICINE

## 2024-06-27 PROCEDURE — 6370000000 HC RX 637 (ALT 250 FOR IP): Performed by: NURSE PRACTITIONER

## 2024-06-27 PROCEDURE — 80061 LIPID PANEL: CPT

## 2024-06-27 PROCEDURE — 6360000002 HC RX W HCPCS: Performed by: NURSE PRACTITIONER

## 2024-06-27 PROCEDURE — 2580000003 HC RX 258: Performed by: NURSE PRACTITIONER

## 2024-06-27 PROCEDURE — 2709999900 IR US GUIDED NEEDLE PLACEMENT

## 2024-06-27 PROCEDURE — 85027 COMPLETE CBC AUTOMATED: CPT

## 2024-06-27 PROCEDURE — 80048 BASIC METABOLIC PNL TOTAL CA: CPT

## 2024-06-27 PROCEDURE — 99232 SBSQ HOSP IP/OBS MODERATE 35: CPT | Performed by: INTERNAL MEDICINE

## 2024-06-27 PROCEDURE — 36573 INSJ PICC RS&I 5 YR+: CPT

## 2024-06-27 PROCEDURE — 82947 ASSAY GLUCOSE BLOOD QUANT: CPT

## 2024-06-27 PROCEDURE — 36415 COLL VENOUS BLD VENIPUNCTURE: CPT

## 2024-06-27 PROCEDURE — 83735 ASSAY OF MAGNESIUM: CPT

## 2024-06-27 PROCEDURE — 84132 ASSAY OF SERUM POTASSIUM: CPT

## 2024-06-27 PROCEDURE — 2060000000 HC ICU INTERMEDIATE R&B

## 2024-06-27 RX ORDER — CEPHALEXIN 500 MG/1
500 CAPSULE ORAL 3 TIMES DAILY
Qty: 21 CAPSULE | Refills: 0 | Status: SHIPPED | OUTPATIENT
Start: 2024-06-27

## 2024-06-27 RX ORDER — ASPIRIN 81 MG/1
81 TABLET, CHEWABLE ORAL DAILY
Qty: 30 TABLET | Refills: 3 | Status: SHIPPED | OUTPATIENT
Start: 2024-06-28

## 2024-06-27 RX ADMIN — BUPRENORPHINE HYDROCHLORIDE AND NALOXONE HYDROCHLORIDE DIHYDRATE 3 TABLET: 8; 2 TABLET SUBLINGUAL at 08:30

## 2024-06-27 RX ADMIN — CEFTRIAXONE SODIUM 1000 MG: 1 INJECTION, POWDER, FOR SOLUTION INTRAMUSCULAR; INTRAVENOUS at 23:41

## 2024-06-27 RX ADMIN — PAROXETINE HYDROCHLORIDE 80 MG: 20 TABLET, FILM COATED ORAL at 08:29

## 2024-06-27 RX ADMIN — FUROSEMIDE 40 MG: 40 TABLET ORAL at 08:29

## 2024-06-27 RX ADMIN — Medication 1 CAPSULE: at 08:29

## 2024-06-27 RX ADMIN — CEFTRIAXONE SODIUM 1000 MG: 1 INJECTION, POWDER, FOR SOLUTION INTRAMUSCULAR; INTRAVENOUS at 01:10

## 2024-06-27 RX ADMIN — LEVOTHYROXINE SODIUM 50 MCG: 0.05 TABLET ORAL at 06:08

## 2024-06-27 RX ADMIN — POTASSIUM CHLORIDE 10 MEQ: 7.46 INJECTION, SOLUTION INTRAVENOUS at 08:32

## 2024-06-27 RX ADMIN — CLONIDINE HYDROCHLORIDE 0.1 MG: 0.1 TABLET ORAL at 08:29

## 2024-06-27 RX ADMIN — SODIUM CHLORIDE, PRESERVATIVE FREE 10 ML: 5 INJECTION INTRAVENOUS at 21:29

## 2024-06-27 RX ADMIN — POTASSIUM BICARBONATE 40 MEQ: 782 TABLET, EFFERVESCENT ORAL at 19:14

## 2024-06-27 RX ADMIN — POTASSIUM BICARBONATE 40 MEQ: 782 TABLET, EFFERVESCENT ORAL at 09:14

## 2024-06-27 RX ADMIN — SODIUM CHLORIDE: 9 INJECTION, SOLUTION INTRAVENOUS at 01:09

## 2024-06-27 RX ADMIN — CYCLOBENZAPRINE 10 MG: 10 TABLET, FILM COATED ORAL at 21:34

## 2024-06-27 ASSESSMENT — PAIN DESCRIPTION - DESCRIPTORS: DESCRIPTORS: SPASM

## 2024-06-27 ASSESSMENT — PAIN SCALES - GENERAL
PAINLEVEL_OUTOF10: 3
PAINLEVEL_OUTOF10: 0

## 2024-06-27 ASSESSMENT — PAIN DESCRIPTION - LOCATION: LOCATION: PELVIS

## 2024-06-27 NOTE — PROGRESS NOTES
[facility] Internal Medicine  Karlos Castro MD; Mukesh Mccoy MD; Marco Bowen MD; MD Radha Whitaker MD; Drew Mccoy MD; Yanira Rucker MD      HISTORY AND PHYSICAL EXAMINATION            Date:   6/27/2024  Patient name:  Rick Osullivan  MRN:   [id number]  Account:  [id number]  YOB: 1965  PCP:    Aldo Ortega MD  Code Status:    Full Code    Chief Complaint:     Chief Complaint   Patient presents with    Chest Pain    Dizziness    Shortness of Breath    Fever    Headache         History Obtained From:     Patient, EMR, nursing staff    HPI     This patient is a 59 y.o. Non- / non  malewho presents with    Rick Osullivan is a 59 y.o. Non- / non  male who presents with Chest Pain, Dizziness, Shortness of Breath, Fever, and Headache and is admitted to the hospital for the management of Complicated UTI (urinary tract infection). Medical history significant for HTN, CKD, PAD, DM type 2, s/p transmetatarsal amputation of left foot, hypothyroidism, BPH, opioid dependence on suboxone. Patient reports being sent to ED by PCP for evaluation due to unsuccessful outpatient treatment of UTI, concern for sepsis with reported fevers and increased weakness. Afebrile with stable vitals in ED. Lactic 0.9, WBC 8.5. Urinalysis is significantly infected. Culture pending, started on IV rocephin base on previous urine culture 4/18/24 showing Proteus mirabilis with sensitivity to Rocephin. CT abdomen pelvis reveals multiple nonobstructive renal calculus and multiple right renal cortical cysts. Patient Initially reported chest pain but further describes as a pressure across his chest that he experienced when exerting self more due to increased frequency of urination.  Admits to shortness of breath with the episode of pressure which lasted only a brief period. Also reports feeling anxious at the time, states that he does not feel that the pain in

## 2024-06-27 NOTE — PROGRESS NOTES
CLINICAL PHARMACY NOTE: MEDS TO BEDS    Total # of Prescriptions Filled: 1   The following medications were delivered to the patient:  Aspirin 81mg    Additional Documentation: 6/27/24 kbg delivered to pt wife in room 12:32pm

## 2024-06-27 NOTE — CARE COORDINATION
ONGOING DISCHARGE PLAN:    Patient is alert and oriented x4.    Spoke with patient regarding discharge plan and patient confirms that plan is still Home. Denies needs.      UTI, cx sensitivities back for proteus, IV rocephin    K 2.9 (replacing)    Echo pending      Will continue to follow for additional discharge needs.    If patient is discharged prior to next notation, then this note serves as note for discharge by case management.    Electronically signed by Kimber Osuna RN on 6/27/2024 at 12:23 PM

## 2024-06-27 NOTE — PROGRESS NOTES
Patient requested that RN stop potassium due to burning and suspected infiltration of IV. RN assessed IV, no signs of infiltration but patient states that it is burning and \"feels like it's in my muscle.\"Patient has a potassium of 2.9 and was educated on the risks of low potassium in relation to his heart, as patient is experiencing palpitations and chest pain since admission. RN did give oral potassium and notify provider.

## 2024-06-27 NOTE — PROGRESS NOTES
06/27/24 1739   Encounter Summary   Encounter Overview/Reason  Encounter   Service Provided For Patient   Last Encounter  06/27/24   Rituals, Rites and Sacraments   Type Sacrament of Sick

## 2024-06-27 NOTE — PROGRESS NOTES
06/27/24 1740   Encounter Summary   Encounter Overview/Reason Volunteer Encounter   Service Provided For Patient   Last Encounter  06/27/24   Rituals, Rites and Sacraments   Type [place] Communion

## 2024-06-27 NOTE — PLAN OF CARE
Problem: Discharge Planning  Goal: Discharge to home or other facility with appropriate resources  Outcome: Progressing  Flowsheets (Taken 6/27/2024 0839)  Discharge to home or other facility with appropriate resources: Identify barriers to discharge with patient and caregiver     Problem: Pain  Goal: Verbalizes/displays adequate comfort level or baseline comfort level  Outcome: Progressing     Problem: Safety - Adult  Goal: Free from fall injury  Outcome: Progressing     Problem: ABCDS Injury Assessment  Goal: Absence of physical injury  Outcome: Progressing  Flowsheets (Taken 6/27/2024 1702)  Absence of Physical Injury: Implement safety measures based on patient assessment     Problem: Chronic Conditions and Co-morbidities  Goal: Patient's chronic conditions and co-morbidity symptoms are monitored and maintained or improved  Recent Flowsheet Documentation  Taken 6/27/2024 0839 by Alicia Schmidt, RN  Care Plan - Patient's Chronic Conditions and Co-Morbidity Symptoms are Monitored and Maintained or Improved: Monitor and assess patient's chronic conditions and comorbid symptoms for stability, deterioration, or improvement

## 2024-06-28 ENCOUNTER — TELEPHONE (OUTPATIENT)
Dept: PRIMARY CARE CLINIC | Age: 59
End: 2024-06-28

## 2024-06-28 VITALS
BODY MASS INDEX: 33.18 KG/M2 | SYSTOLIC BLOOD PRESSURE: 108 MMHG | DIASTOLIC BLOOD PRESSURE: 71 MMHG | WEIGHT: 244.93 LBS | HEIGHT: 72 IN | OXYGEN SATURATION: 96 % | HEART RATE: 65 BPM | TEMPERATURE: 97.5 F | RESPIRATION RATE: 18 BRPM

## 2024-06-28 DIAGNOSIS — L97.509 TYPE 2 DIABETES MELLITUS WITH FOOT ULCER, WITHOUT LONG-TERM CURRENT USE OF INSULIN (HCC): Primary | ICD-10-CM

## 2024-06-28 DIAGNOSIS — E11.621 TYPE 2 DIABETES MELLITUS WITH FOOT ULCER, WITHOUT LONG-TERM CURRENT USE OF INSULIN (HCC): Primary | ICD-10-CM

## 2024-06-28 LAB
ANION GAP SERPL CALCULATED.3IONS-SCNC: 10 MMOL/L (ref 9–17)
BUN SERPL-MCNC: 13 MG/DL (ref 8–23)
CALCIUM SERPL-MCNC: 8.7 MG/DL (ref 8.6–10.4)
CHLORIDE SERPL-SCNC: 96 MMOL/L (ref 98–107)
CO2 SERPL-SCNC: 33 MMOL/L (ref 20–31)
CREAT SERPL-MCNC: 0.9 MG/DL (ref 0.7–1.2)
ERYTHROCYTE [DISTWIDTH] IN BLOOD BY AUTOMATED COUNT: 13.6 % (ref 11.5–14.9)
GFR, ESTIMATED: >90 ML/MIN/1.73M2
GLUCOSE BLD-MCNC: 130 MG/DL (ref 75–110)
GLUCOSE BLD-MCNC: 131 MG/DL (ref 75–110)
GLUCOSE SERPL-MCNC: 137 MG/DL (ref 70–99)
HCT VFR BLD AUTO: 37.8 % (ref 41–53)
HGB BLD-MCNC: 12.9 G/DL (ref 13.5–17.5)
MAGNESIUM SERPL-MCNC: 2 MG/DL (ref 1.6–2.6)
MCH RBC QN AUTO: 32.6 PG (ref 26–34)
MCHC RBC AUTO-ENTMCNC: 34.2 G/DL (ref 31–37)
MCV RBC AUTO: 95.3 FL (ref 80–100)
PLATELET # BLD AUTO: 263 K/UL (ref 150–450)
PMV BLD AUTO: 8.1 FL (ref 6–12)
POTASSIUM SERPL-SCNC: 3.3 MMOL/L (ref 3.7–5.3)
RBC # BLD AUTO: 3.97 M/UL (ref 4.5–5.9)
SODIUM SERPL-SCNC: 139 MMOL/L (ref 135–144)
WBC OTHER # BLD: 5.1 K/UL (ref 3.5–11)

## 2024-06-28 PROCEDURE — 6370000000 HC RX 637 (ALT 250 FOR IP): Performed by: NURSE PRACTITIONER

## 2024-06-28 PROCEDURE — 80048 BASIC METABOLIC PNL TOTAL CA: CPT

## 2024-06-28 PROCEDURE — 83735 ASSAY OF MAGNESIUM: CPT

## 2024-06-28 PROCEDURE — 6360000002 HC RX W HCPCS: Performed by: NURSE PRACTITIONER

## 2024-06-28 PROCEDURE — 82947 ASSAY GLUCOSE BLOOD QUANT: CPT

## 2024-06-28 PROCEDURE — 2580000003 HC RX 258: Performed by: NURSE PRACTITIONER

## 2024-06-28 PROCEDURE — 99239 HOSP IP/OBS DSCHRG MGMT >30: CPT | Performed by: INTERNAL MEDICINE

## 2024-06-28 PROCEDURE — 85027 COMPLETE CBC AUTOMATED: CPT

## 2024-06-28 RX ORDER — SEMAGLUTIDE 0.68 MG/ML
0.5 INJECTION, SOLUTION SUBCUTANEOUS WEEKLY
Qty: 3 ML | Refills: 0 | Status: SHIPPED | OUTPATIENT
Start: 2024-06-28

## 2024-06-28 RX ADMIN — FUROSEMIDE 40 MG: 40 TABLET ORAL at 08:07

## 2024-06-28 RX ADMIN — POTASSIUM BICARBONATE 40 MEQ: 782 TABLET, EFFERVESCENT ORAL at 08:06

## 2024-06-28 RX ADMIN — Medication 1 CAPSULE: at 08:07

## 2024-06-28 RX ADMIN — SODIUM CHLORIDE, PRESERVATIVE FREE 10 ML: 5 INJECTION INTRAVENOUS at 08:07

## 2024-06-28 RX ADMIN — ASPIRIN 81 MG 81 MG: 81 TABLET ORAL at 08:07

## 2024-06-28 RX ADMIN — CLONIDINE HYDROCHLORIDE 0.1 MG: 0.1 TABLET ORAL at 08:09

## 2024-06-28 RX ADMIN — PAROXETINE HYDROCHLORIDE 80 MG: 20 TABLET, FILM COATED ORAL at 08:07

## 2024-06-28 RX ADMIN — BUPRENORPHINE HYDROCHLORIDE AND NALOXONE HYDROCHLORIDE DIHYDRATE 3 TABLET: 8; 2 TABLET SUBLINGUAL at 08:07

## 2024-06-28 RX ADMIN — LEVOTHYROXINE SODIUM 50 MCG: 0.05 TABLET ORAL at 05:13

## 2024-06-28 NOTE — PLAN OF CARE
Problem: Discharge Planning  Goal: Discharge to home or other facility with appropriate resources  6/28/2024 0337 by Paola Paulino RN  Outcome: Progressing     Problem: Pain  Goal: Verbalizes/displays adequate comfort level or baseline comfort level  6/28/2024 0337 by Paola Paulino RN  Outcome: Progressing     Problem: Safety - Adult  Goal: Free from fall injury  6/28/2024 0337 by Paola Paulino RN  Outcome: Progressing  Note: The patient remained free from falls this shift, call light within reach, bed in locked and lowest position.  Side rails up x2.       Problem: Chronic Conditions and Co-morbidities  Goal: Patient's chronic conditions and co-morbidity symptoms are monitored and maintained or improved  Outcome: Progressing     Problem: Nutrition Deficit:  Goal: Optimize nutritional status  Outcome: Progressing     Problem: Skin/Tissue Integrity  Goal: Absence of new skin breakdown  Description: 1.  Monitor for areas of redness and/or skin breakdown  2.  Assess vascular access sites hourly  3.  Every 4-6 hours minimum:  Change oxygen saturation probe site  4.  Every 4-6 hours:  If on nasal continuous positive airway pressure, respiratory therapy assess nares and determine need for appliance change or resting period.  Outcome: Progressing     Problem: Metabolic/Fluid and Electrolytes - Adult  Goal: Electrolytes maintained within normal limits  Outcome: Progressing

## 2024-06-28 NOTE — CARE COORDINATION
ONGOING DISCHARGE PLAN:    Patient is alert and oriented x4.    Spoke with patient regarding discharge plan and patient confirms that plan is still Home. Denies needs.      UTI, cx sensitivities back for proteus, oral keflex    K 3.3 (replacED)    Echo EF 60-65%      Will continue to follow for additional discharge needs.    If patient is discharged prior to next notation, then this note serves as note for discharge by case management.    Electronically signed by Kimber Osuna RN on 6/28/2024 at 11:51 AM

## 2024-06-28 NOTE — TELEPHONE ENCOUNTER
Wife states victoza is on backorder and unable to get it, asking for an alternative to be sent to [facility] [place].

## 2024-06-28 NOTE — PROGRESS NOTES
Comprehensive Nutrition Assessment    Type and Reason for Visit:  Reassess    Nutrition Recommendations/Plan:   Continue current diet.  Continue Ensure High Protein with meals.     Malnutrition Assessment:  Malnutrition Status:  Mild malnutrition (06/26/24 1517)    Context:  Acute Illness     Findings of the 6 clinical characteristics of malnutrition:  Energy Intake:  75% or less of estimated energy requirements for 7 or more days  Weight Loss:  Greater than 7.5% over 3 months     Body Fat Loss:  Unable to assess     Muscle Mass Loss:  Unable to assess    Fluid Accumulation:  No significant fluid accumulation     Strength:  Not Performed    Nutrition Assessment:    Pt reports not liking the Ensure Clear, so it will be replaced for Ensure High Protein. Pt may be discharged today.    Nutrition Related Findings:    Pt reports cramping. Labs: K 3.3, Gluc 137. Trace edema generalized, RLE, LLE. BM 6/26. Meds reviewed. Wound Type: None       Current Nutrition Intake & Therapies:    Average Meal Intake: 0%  Average Supplements Intake: 51-75%  ADULT DIET; Regular; 4 carb choices (60 gm/meal); No Caffeine  ADULT ORAL NUTRITION SUPPLEMENT; Breakfast, Dinner, Lunch; Low Calorie/High Protein Oral Supplement    Anthropometric Measures:  Height: 182.9 cm (6' 0.01\")  Ideal Body Weight (IBW): 178 lbs (81 kg)    Admission Body Weight: 108.9 kg (240 lb 1.3 oz)  Current Body Weight: 111.1 kg (244 lb 14.9 oz), 134.9 % IBW. Weight Source: Bed Scale  Current BMI (kg/m2): 33.2  Usual Body Weight: 124.7 kg (274 lb 14.6 oz) (4/4/2024 (~3mo.) Stated)  % Weight Change (Calculated): -12.7  Weight Adjustment For: No Adjustment                 BMI Categories: Obese Class 1 (BMI 30.0-34.9)    Estimated Daily Nutrient Needs:  Energy Requirements Based On: Kcal/kg  Weight Used for Energy Requirements: Current  Energy (kcal/day): 7268-3320 kcal/day based on 15-18 kcal/kg  Weight Used for Protein Requirements: Ideal  Protein (g/day):  g/day

## 2024-06-28 NOTE — PROGRESS NOTES
06/28/24 1254   Encounter Summary   Encounter Overview/Reason Volunteer Encounter   Service Provided For Patient   Referral/Consult From Rounding   Last Encounter  06/28/24   Rituals, Rites and Sacraments   Type [facility] Communion

## 2024-06-30 LAB
EKG ATRIAL RATE: 95 BPM
EKG P AXIS: 32 DEGREES
EKG P-R INTERVAL: 160 MS
EKG Q-T INTERVAL: 578 MS
EKG QRS DURATION: 92 MS
EKG QTC CALCULATION (BAZETT): 726 MS
EKG R AXIS: -9 DEGREES
EKG T AXIS: 40 DEGREES
EKG VENTRICULAR RATE: 95 BPM

## 2024-07-01 ENCOUNTER — TELEPHONE (OUTPATIENT)
Dept: PRIMARY CARE CLINIC | Age: 59
End: 2024-07-01

## 2024-07-01 NOTE — PROGRESS NOTES
Physician Progress Note      PATIENT:               JOAN JOSÉ  CSN #:                  [id number]  :                       1965  ADMIT DATE:       2024 8:02 PM  DISCH DATE:        2024 3:30 PM  RESPONDING  PROVIDER #:        Radha Magaña MD          QUERY TEXT:    Patient admitted with UTI. Noted to have poor appetite/po intake, unplanned   weight loss, and dietician assessment with mild malnutrition diagnosis. If   possible, please document in progress notes and discharge summary if you are   evaluating and /or treating any of the following:    The medical record reflects the following:    Risk Factors: poor po intake/appetite, weight loss  Clinical Indicators: mild malnutrition per dietician per AND/ASPEN guidelines   as evidenced by Energy Intake:  75% or less of estimated energy requirements   for 7 or more days, Weight Loss:  Greater than 7.5% over 3 months  Treatment: Dietician consult, Ensure high protein BID and Ensure Clear daily    ASPEN Criteria:    https://aspenjournals.onlinelibrary.mauricio.com/doi/full/10.1177/257193570912131  5    Thank you!    Bill Olsen, HIGINION,RN, CRCR  RN Clinical   Options provided:  -- Protein calorie malnutrition mild  -- Other - I will add my own diagnosis  -- Disagree - Not applicable / Not valid  -- Disagree - Clinically unable to determine / Unknown  -- Refer to Clinical Documentation Reviewer    PROVIDER RESPONSE TEXT:    This patient has mild protein calorie malnutrition.    Query created by: Bill Olsen on 2024 4:50 PM      Electronically signed by:  Radha Magaña MD 2024 10:10 AM

## 2024-07-02 ENCOUNTER — TELEPHONE (OUTPATIENT)
Dept: PRIMARY CARE CLINIC | Age: 59
End: 2024-07-02

## 2024-07-02 NOTE — DISCHARGE SUMMARY
[facility] Internal Medicine    Marco Bowen MD; Evens Morel MD, Mukesh Mccoy MD, Yanira Rucker MD, Jd Valentin MD; Drew Mccoy MD      [facility] Internal Medicine  IN-PATIENT SERVICE   [facility]    Discharge Summary     Patient ID: Rick Osullivan  :  1965   MRN: [id number]     ACCOUNT:  [id number]   Patient's PCP: Aldo Ortega MD  Admit Date: 2024   Discharge Date: 2024     Length of Stay: 3  Code Status:  Full Code  Admitting Physician: Radha Valentin MD  Discharge Physician: Drew Mccoy MD     Active Discharge Diagnoses:     Hospital Problem Lists:  Principal Problem:    Complicated UTI (urinary tract infection)  Active Problems:    Mild malnutrition (HCC)  Resolved Problems:    * No resolved hospital problems. *      Admission Condition:  Serious      Discharged Condition: Stable     Hospital Stay:     Hospital Course:  Rick Osullivan is a 59 y.o. male who was admitted for the management of   Complicated UTI (urinary tract infection) , presented to ER with Chest Pain, Dizziness, Shortness of Breath, Fever, and Headache        Review of system:  Denies any nausea vomiting fever chills,  Denies any headaches or blurred vision,  Denies any chest pain   Denies any cough phlegm hemoptysis,  Denies any abdominal pain diarrhea constipation,  Denies any tingling tingling numbness weakness of arms or legs,   Skin no rash,    On examination,  Alert awake oriented x3,  S1-S2 present,  CTA bilateral,  Abdomen soft nontender nondistended bowel sounds present   Extremity no edema no calf tenderness,,  Skin no rash  CNS no focal neurological deficits   Significant therapeutic interventions:     Significant Diagnostic Studies:   Labs / Micro:  /71   Pulse 65   Temp 97.5 °F (36.4 °C)   Resp 18   Ht 1.829 m (6' 0.01\")   Wt 111.1 kg (244 lb 14.9 oz)   SpO2 96%   BMI 33.21 kg/m²   Temp (24hrs), 
B  [place] [id number]  [phone]    Follow up in 1 week(s)  hospital admission follow up    Aldo Ortega MD  [address]  [address]  [place] [id number]  [phone]             Activity: Resume as directed    Discharge Medications:      Medication List        START taking these medications      aspirin 81 MG chewable tablet  Take 1 tablet by mouth daily     cephALEXin 500 MG capsule  Commonly known as: KEFLEX  Take 1 capsule by mouth 3 times daily     Ozempic (0.25 or 0.5 MG/DOSE) 2 MG/3ML Sopn  Generic drug: Semaglutide(0.25 or 0.5MG/DOS)  Inject 0.5 mg into the skin once a week            CHANGE how you take these medications      buprenorphine-naloxone 8-2 MG Subl SL tablet  Commonly known as: SUBOXONE  Place 1 tablet under the tongue 3 times daily for 30 days. Max Daily Amount: 3 tablets  What changed: when to take this            CONTINUE taking these medications      ferrous sulfate 325 (65 Fe) MG EC tablet  Commonly known as: FE TABS 325  Take 1 tablet by mouth daily (with breakfast)     furosemide 20 MG tablet  Commonly known as: LASIX  Take 1 tablet by mouth 2 times daily     Handicap Placard Misc  by Does not apply route Patient cannot walk 150 feet duration: 5 years ending: 10/5/2028     Insulin Pen Needle 31G X 5 MM Misc  1 each by Does not apply route daily     lactobacillus capsule  Take 1 capsule by mouth 2 times daily     levothyroxine 50 MCG tablet  Commonly known as: SYNTHROID  TAKE 1 TABLET BY MOUTH EVERY DAY     Liraglutide 18 MG/3ML Sopn SC injection  Commonly known as: VICTOZA  Inject 1.8 mg into the skin daily     metFORMIN 500 MG tablet  Commonly known as: GLUCOPHAGE  Take 2 tablets by mouth daily (with breakfast)     ONE TOUCH ULTRA 2 w/Device Kit  1 kit by Other route daily     OneTouch Delica Plus [id number] Misc  ONE - TWICE A DAY     OneTouch Ultra strip  Generic drug: blood glucose test strips  USE TO TEST BLOOD SUGAR 2 TIMES A DAY     pantoprazole 40 MG

## 2024-07-02 NOTE — TELEPHONE ENCOUNTER
[facility] Transitions Initial Follow Up Call    Outreach made within 2 business days of discharge: Yes    Patient: Rick Osullivan Patient : 1965   MRN: [id number]  Reason for Admission: UTI  Discharge Date: 24       Spoke with: Patients wife    Discharge department/facility: [facility] Interactive Patient Contact:  Was patient able to fill all prescriptions: Yes  Was patient instructed to bring all medications to the follow-up visit: Yes  Is patient taking all medications as directed in the discharge summary? Yes  Does patient understand their discharge instructions: Yes  Does patient have questions or concerns that need addressed prior to 7-14 day follow up office visit: no    Scheduled appointment with PCP within 7-14 days  Patient did not want sooner appt, patient wanted to scheduled appt when wife had her appointment.   Follow Up  Future Appointments   Date Time Provider Department Center   7/15/2024  3:00 PM Maynor Jane APRN - CNP [facility]   2024  2:00 PM Aldo Ortega MD [facility]  [facility]   2024  3:15 PM [facility]       Ashleigh Oreilly MA

## 2024-07-03 ENCOUNTER — TELEPHONE (OUTPATIENT)
Dept: PRIMARY CARE CLINIC | Age: 59
End: 2024-07-03

## 2024-07-03 DIAGNOSIS — F11.20 UNCOMPLICATED OPIOID DEPENDENCE (HCC): ICD-10-CM

## 2024-07-03 RX ORDER — BUPRENORPHINE HYDROCHLORIDE AND NALOXONE HYDROCHLORIDE DIHYDRATE 8; 2 MG/1; MG/1
1 TABLET SUBLINGUAL 3 TIMES DAILY
Qty: 90 TABLET | Refills: 0 | Status: SHIPPED | OUTPATIENT
Start: 2024-07-03 | End: 2024-08-02

## 2024-07-05 NOTE — TELEPHONE ENCOUNTER
Dacia from [facility] states that the patients need 120 days each drugs, trial with 2 different drugs. Information should be faxed over. # [phone]

## 2024-07-08 ENCOUNTER — TELEPHONE (OUTPATIENT)
Dept: PRIMARY CARE CLINIC | Age: 59
End: 2024-07-08

## 2024-07-12 ENCOUNTER — TELEPHONE (OUTPATIENT)
Dept: PRIMARY CARE CLINIC | Age: 59
End: 2024-07-12

## 2024-07-12 NOTE — TELEPHONE ENCOUNTER
Wife states ozempic PA was denied. Asking for an alternative to be sent in. No denial letter received as of yet. Pharm [facility] [address].

## 2024-07-15 ENCOUNTER — OFFICE VISIT (OUTPATIENT)
Dept: PRIMARY CARE CLINIC | Age: 59
End: 2024-07-15
Payer: MEDICAID

## 2024-07-15 VITALS
HEIGHT: 72 IN | DIASTOLIC BLOOD PRESSURE: 78 MMHG | HEART RATE: 95 BPM | OXYGEN SATURATION: 97 % | BODY MASS INDEX: 32.64 KG/M2 | WEIGHT: 241 LBS | SYSTOLIC BLOOD PRESSURE: 110 MMHG

## 2024-07-15 DIAGNOSIS — R19.8 IRREGULAR BOWEL HABITS: ICD-10-CM

## 2024-07-15 DIAGNOSIS — F11.20 UNCOMPLICATED OPIOID DEPENDENCE (HCC): Primary | ICD-10-CM

## 2024-07-15 DIAGNOSIS — R60.0 LOCALIZED EDEMA: ICD-10-CM

## 2024-07-15 LAB

## 2024-07-15 PROCEDURE — 3074F SYST BP LT 130 MM HG: CPT | Performed by: NURSE PRACTITIONER

## 2024-07-15 PROCEDURE — 80305 DRUG TEST PRSMV DIR OPT OBS: CPT | Performed by: NURSE PRACTITIONER

## 2024-07-15 PROCEDURE — 99213 OFFICE O/P EST LOW 20 MIN: CPT | Performed by: NURSE PRACTITIONER

## 2024-07-15 PROCEDURE — 3078F DIAST BP <80 MM HG: CPT | Performed by: NURSE PRACTITIONER

## 2024-07-15 RX ORDER — LACTOBACILLUS RHAMNOSUS GG 10B CELL
1 CAPSULE ORAL DAILY
Qty: 90 CAPSULE | Refills: 0
Start: 2024-07-15

## 2024-07-15 RX ORDER — FUROSEMIDE 20 MG/1
20 TABLET ORAL 2 TIMES DAILY
Qty: 180 TABLET | Refills: 0
Start: 2024-07-15

## 2024-07-15 ASSESSMENT — ENCOUNTER SYMPTOMS
SHORTNESS OF BREATH: 0
EYE REDNESS: 0
SORE THROAT: 0
ABDOMINAL PAIN: 0
WHEEZING: 0
BACK PAIN: 1
RHINORRHEA: 0
ABDOMINAL DISTENTION: 1
EYE DISCHARGE: 0
NAUSEA: 0
VOMITING: 0
DIARRHEA: 0
COUGH: 0

## 2024-07-15 NOTE — PROGRESS NOTES
[facility]  [facility]IMARY [facility]ARE  [address] [address]  Dept: [phone]    Rick Osullivan is a 59 y.o. male Established patient, who presents today for his medical conditions/complaints as noted below.      Chief Complaint   Patient presents with    Diabetes    Anxiety       HPI:     Diabetes  Hypoglycemia symptoms include nervousness/anxiousness. Pertinent negatives for hypoglycemia include no dizziness or headaches. Pertinent negatives for diabetes include no chest pain.   Anxiety  Symptoms include nervous/anxious behavior. Patient reports no chest pain, dizziness, nausea, palpitations or shortness of breath.          He was able to  Victoza pen today.    He was hospitalized for a UTI 6/25-6/28. He finished antibiotic a couple days ago.  He is having no difficulties with urination.  ECHO and EKG were unremarkable.    He sees surgeon for stage 3 hemorrhoid next week.    Pain is the change in pain in feet and back about the same. 5-6/10.  Suboxone 3x/day. He takes whole tablet 3x/day  He has has some insomnia.  No street drugs.    Anxiety is the same.    Reviewed prior notes: None   Reviewed previous:  Labs and Hospital Records    No components found for: \"LDLCHOLESTEROL\", \"LDLCALC\"    (goal LDL is <100)   AST (U/L)   Date Value   06/25/2024 21     ALT (U/L)   Date Value   06/25/2024 19     BUN (mg/dL)   Date Value   06/28/2024 13     Hemoglobin A1C (%)   Date Value   06/26/2024 5.8     TSH (uIU/mL)   Date Value   06/26/2024 1.50     BP Readings from Last 3 Encounters:   07/15/24 110/78   06/28/24 108/71   04/18/24 130/78          (goal 120/80)    Past Medical History:   Diagnosis Date    Acute metabolic encephalopathy     CLAUDIA (acute kidney injury) (HCC)     Anemia     Anxiety     ARF (acute renal failure) (HCC)     Arthritis     lower back, knees    BPH (benign prostatic hyperplasia)     Chronic kidney disease     prostate    Depression     Diabetes

## 2024-08-07 DIAGNOSIS — F11.20 UNCOMPLICATED OPIOID DEPENDENCE (HCC): ICD-10-CM

## 2024-08-07 RX ORDER — BUPRENORPHINE HYDROCHLORIDE AND NALOXONE HYDROCHLORIDE DIHYDRATE 8; 2 MG/1; MG/1
1 TABLET SUBLINGUAL 3 TIMES DAILY
Qty: 90 TABLET | Refills: 0 | Status: SHIPPED | OUTPATIENT
Start: 2024-08-07 | End: 2024-09-06

## 2024-08-19 ENCOUNTER — TELEPHONE (OUTPATIENT)
Dept: PRIMARY CARE CLINIC | Age: 59
End: 2024-08-19

## 2024-08-19 NOTE — TELEPHONE ENCOUNTER
Gris (spouse) states patient tried to get a script filled for norco at the pharmacy however since patient is currently prescribed suboxone the pharmacy wants Maynor to give the okay to fill.    Gris states patient was given the norco script by his surgeon. Patient is planning on stopping the suboxone while on the norco if approved.    Pharmacy confirmed. Please advise.

## 2024-08-20 NOTE — TELEPHONE ENCOUNTER
Patients spouse states patients pain was not controlled on the suboxone, and no he has not been able to get the pain meds prescribed by the surgeon filled. The pharmacy will not fill because patient is on suboxone so they wanted the okay from a provider before they will fill the pain meds.

## 2024-08-20 NOTE — TELEPHONE ENCOUNTER
Adv pt Maynor is out of the office until next week.  When is he scheduled for surgery- and what surgery?  (Hemorrhoid??)

## 2024-08-21 NOTE — TELEPHONE ENCOUNTER
Okay for them to fell pain med, but stop suboxone while taking.  Otherwise he will have severe withdrawal symptoms and feel worse.

## 2024-08-28 DIAGNOSIS — E03.9 ACQUIRED HYPOTHYROIDISM: ICD-10-CM

## 2024-08-28 RX ORDER — PAROXETINE 20 MG/1
TABLET, FILM COATED ORAL
Qty: 120 TABLET | Refills: 5 | Status: SHIPPED | OUTPATIENT
Start: 2024-08-28

## 2024-08-28 RX ORDER — CLONIDINE HYDROCHLORIDE 0.1 MG/1
0.1 TABLET ORAL 2 TIMES DAILY
Qty: 180 TABLET | Refills: 3 | Status: SHIPPED | OUTPATIENT
Start: 2024-08-28

## 2024-08-28 RX ORDER — LEVOTHYROXINE SODIUM 50 UG/1
50 TABLET ORAL DAILY
Qty: 90 TABLET | Refills: 0 | Status: SHIPPED | OUTPATIENT
Start: 2024-08-28

## 2024-09-18 DIAGNOSIS — F11.20 UNCOMPLICATED OPIOID DEPENDENCE (HCC): ICD-10-CM

## 2024-09-18 RX ORDER — BUPRENORPHINE HYDROCHLORIDE AND NALOXONE HYDROCHLORIDE DIHYDRATE 8; 2 MG/1; MG/1
1 TABLET SUBLINGUAL 3 TIMES DAILY
Qty: 90 TABLET | Refills: 0 | Status: SHIPPED | OUTPATIENT
Start: 2024-09-18 | End: 2024-10-18

## 2024-10-04 ENCOUNTER — TELEPHONE (OUTPATIENT)
Dept: PRIMARY CARE CLINIC | Age: 59
End: 2024-10-04

## 2024-10-04 RX ORDER — DOXYCYCLINE HYCLATE 100 MG
100 TABLET ORAL 2 TIMES DAILY
Qty: 20 TABLET | Refills: 0 | Status: SHIPPED | OUTPATIENT
Start: 2024-10-04 | End: 2024-10-14

## 2024-10-04 NOTE — TELEPHONE ENCOUNTER
Wife called due to patient having a boil on the back of his head that is causing swelling and pain in right ear     Swelling on right side of face     Requesting antibiotics       [facility] [address]       Patient does not want to come in to be seen - stated by wife     Please advise     Cb [phone]

## 2024-10-14 DIAGNOSIS — L97.509 TYPE 2 DIABETES MELLITUS WITH FOOT ULCER, WITH LONG-TERM CURRENT USE OF INSULIN (HCC): ICD-10-CM

## 2024-10-14 DIAGNOSIS — F11.20 UNCOMPLICATED OPIOID DEPENDENCE (HCC): ICD-10-CM

## 2024-10-14 DIAGNOSIS — Z79.4 TYPE 2 DIABETES MELLITUS WITH FOOT ULCER, WITH LONG-TERM CURRENT USE OF INSULIN (HCC): ICD-10-CM

## 2024-10-14 DIAGNOSIS — E11.621 TYPE 2 DIABETES MELLITUS WITH FOOT ULCER, WITH LONG-TERM CURRENT USE OF INSULIN (HCC): ICD-10-CM

## 2024-10-14 RX ORDER — BUPRENORPHINE HYDROCHLORIDE AND NALOXONE HYDROCHLORIDE DIHYDRATE 8; 2 MG/1; MG/1
1 TABLET SUBLINGUAL 3 TIMES DAILY
Qty: 90 TABLET | Refills: 0 | Status: SHIPPED | OUTPATIENT
Start: 2024-10-14 | End: 2024-11-13

## 2024-10-14 RX ORDER — LANCETS 33 GAUGE
EACH MISCELLANEOUS
Qty: 100 EACH | Refills: 5 | Status: SHIPPED | OUTPATIENT
Start: 2024-10-14

## 2024-10-14 NOTE — TELEPHONE ENCOUNTER
LAST VISIT:   11/16/2023     Future Appointments   Date Time Provider Department Center   10/28/2024  1:00 PM [facility]   11/26/2024  2:45 PM Yakelin Londono DPM [facility]       Pharmacy verified? Yes    Patient states medication is due on 10/17/24.      [id number] - [place], [place] - [address] [phone] - F [phone]  [address] [address]  Phone: [phone] Fax: [phone]

## 2024-10-28 ENCOUNTER — OFFICE VISIT (OUTPATIENT)
Dept: PRIMARY CARE CLINIC | Age: 59
End: 2024-10-28
Payer: MEDICAID

## 2024-10-28 VITALS
HEART RATE: 94 BPM | SYSTOLIC BLOOD PRESSURE: 116 MMHG | OXYGEN SATURATION: 96 % | WEIGHT: 286 LBS | HEIGHT: 72 IN | DIASTOLIC BLOOD PRESSURE: 72 MMHG | BODY MASS INDEX: 38.74 KG/M2

## 2024-10-28 DIAGNOSIS — Z79.899 HIGH RISK MEDICATION USE: ICD-10-CM

## 2024-10-28 DIAGNOSIS — E11.9 TYPE 2 DIABETES MELLITUS WITHOUT COMPLICATION, WITHOUT LONG-TERM CURRENT USE OF INSULIN (HCC): ICD-10-CM

## 2024-10-28 DIAGNOSIS — M54.12 RIGHT CERVICAL RADICULOPATHY: ICD-10-CM

## 2024-10-28 DIAGNOSIS — Z23 NEED FOR VACCINATION: Primary | ICD-10-CM

## 2024-10-28 DIAGNOSIS — G57.93 NEUROPATHY INVOLVING BOTH LOWER EXTREMITIES: ICD-10-CM

## 2024-10-28 DIAGNOSIS — I10 ESSENTIAL HYPERTENSION: ICD-10-CM

## 2024-10-28 DIAGNOSIS — F11.20 UNCOMPLICATED OPIOID DEPENDENCE (HCC): ICD-10-CM

## 2024-10-28 DIAGNOSIS — L30.9 DERMATITIS: ICD-10-CM

## 2024-10-28 PROBLEM — R56.9 SEIZURES (HCC): Status: ACTIVE | Noted: 2019-12-28

## 2024-10-28 LAB
ALCOHOL URINE: NEGATIVE
AMPHETAMINE SCREEN URINE: NORMAL
BARBITURATE SCREEN URINE: NEGATIVE
BENZODIAZEPINE SCREEN, URINE: POSITIVE
BUPRENORPHINE URINE: NORMAL
COCAINE METABOLITE SCREEN URINE: NEGATIVE
CREATININE URINE POCT: ABNORMAL
FENTANYL SCREEN, URINE: NEGATIVE
GABAPENTIN SCREEN, URINE: NEGATIVE
HBA1C MFR BLD: 5.9 %
MDMA, URINE: NEGATIVE
METHADONE SCREEN, URINE: NEGATIVE
METHAMPHETAMINE, URINE: NEGATIVE
MICROALBUMIN/CREAT 24H UR: ABNORMAL MG/DL
MICROALBUMIN/CREAT UR-RTO: ABNORMAL MG/G
OPIATE SCREEN URINE: NEGATIVE
OXYCODONE SCREEN URINE: NEGATIVE
PHENCYCLIDINE SCREEN URINE: NEGATIVE
PROPOXYPHENE SCREEN, URINE: NEGATIVE
SYNTHETIC CANNABINOIDS(K2) SCREEN, URINE: NEGATIVE
THC SCREEN, URINE: POSITIVE
TRAMADOL SCREEN URINE: NEGATIVE
TRICYCLIC ANTIDEPRESSANTS, UR: NEGATIVE

## 2024-10-28 PROCEDURE — 82044 UR ALBUMIN SEMIQUANTITATIVE: CPT | Performed by: NURSE PRACTITIONER

## 2024-10-28 PROCEDURE — 3044F HG A1C LEVEL LT 7.0%: CPT | Performed by: NURSE PRACTITIONER

## 2024-10-28 PROCEDURE — 83036 HEMOGLOBIN GLYCOSYLATED A1C: CPT | Performed by: NURSE PRACTITIONER

## 2024-10-28 PROCEDURE — 3078F DIAST BP <80 MM HG: CPT | Performed by: NURSE PRACTITIONER

## 2024-10-28 PROCEDURE — 90661 CCIIV3 VAC ABX FR 0.5 ML IM: CPT | Performed by: NURSE PRACTITIONER

## 2024-10-28 PROCEDURE — 99214 OFFICE O/P EST MOD 30 MIN: CPT | Performed by: NURSE PRACTITIONER

## 2024-10-28 PROCEDURE — 80305 DRUG TEST PRSMV DIR OPT OBS: CPT | Performed by: NURSE PRACTITIONER

## 2024-10-28 PROCEDURE — 90471 IMMUNIZATION ADMIN: CPT | Performed by: NURSE PRACTITIONER

## 2024-10-28 PROCEDURE — 3074F SYST BP LT 130 MM HG: CPT | Performed by: NURSE PRACTITIONER

## 2024-10-28 RX ORDER — TRIAMCINOLONE ACETONIDE 1 MG/G
OINTMENT TOPICAL 2 TIMES DAILY
Qty: 453.6 G | Refills: 1 | Status: SHIPPED | OUTPATIENT
Start: 2024-10-28

## 2024-10-28 RX ORDER — GABAPENTIN 300 MG/1
300 CAPSULE ORAL 3 TIMES DAILY
Qty: 270 CAPSULE | Refills: 1 | Status: SHIPPED | OUTPATIENT
Start: 2024-10-28 | End: 2025-04-26

## 2024-10-28 RX ORDER — LIRAGLUTIDE 6 MG/ML
1.8 INJECTION SUBCUTANEOUS DAILY
Qty: 3 ADJUSTABLE DOSE PRE-FILLED PEN SYRINGE | Refills: 3 | Status: SHIPPED | OUTPATIENT
Start: 2024-10-28

## 2024-10-28 ASSESSMENT — ENCOUNTER SYMPTOMS
CONSTIPATION: 1
SINUS PAIN: 0
COUGH: 0
NAUSEA: 0
SINUS PRESSURE: 0
BACK PAIN: 1
SHORTNESS OF BREATH: 0

## 2024-10-28 NOTE — PROGRESS NOTES
[facility]  [facility] PRIMARY CARE  [address] [address]  Dept: [phone]    Rick Osullivan is a 59 y.o. male Established patient, who presents today for his medical conditions/complaints as noted below.      Chief Complaint   Patient presents with    Diabetes    Flu Vaccine       HPI:     Diabetes  Hypoglycemia symptoms include nervousness/anxiousness. Pertinent negatives for hypoglycemia include no dizziness or headaches. Associated symptoms include fatigue and weakness. Pertinent negatives for diabetes include no chest pain.      He request to try gabapentin again.  He states he has tried in the past and it did not seem to work. However due to ongoing feet and back pain he would like to try it again.  He rates pain 7/10.   He has numbness, tingling and burning. Sharp nerve pain. He gets a lot of phantom pain in both feet lately.    He is taking suboxone 3x/day.  He did take it today.  He has a little constipation.   He has increased Miralax to 2x/day.    He checks blood sugar daily.  Typically 100-105.  He is taking Victoza - it did come in to pharmacy.  He is also taking metformin .    He has had a couple small seizures in the last few months.    Reviewed prior notes:  surgeon    Reviewed previous:  Labs and Hospital Records    No components found for: \"LDLCHOLESTEROL\", \"LDLCALC\"    (goal LDL is <100)   AST (U/L)   Date Value   06/25/2024 21     ALT (U/L)   Date Value   06/25/2024 19     BUN (mg/dL)   Date Value   06/28/2024 13     Hemoglobin A1C (%)   Date Value   06/26/2024 5.8     TSH (uIU/mL)   Date Value   06/26/2024 1.50     BP Readings from Last 3 Encounters:   10/28/24 116/72   07/15/24 110/78   06/28/24 108/71          (goal 120/80)    Past Medical History:   Diagnosis Date    Acute metabolic encephalopathy     CLAUDIA (acute kidney injury) (HCC)     Anemia     Anxiety     ARF (acute renal failure) (HCC)     Arthritis     lower back, knees    BPH

## 2024-10-28 NOTE — PATIENT INSTRUCTIONS
Restart Gabapentin 300 mg 3x/day    Continue suboxone 3x/day  Continue Victoza and metformin.  Triamcinolone ointment to bilateral lower legs and feet 2x/day

## 2024-11-13 DIAGNOSIS — F11.20 UNCOMPLICATED OPIOID DEPENDENCE (HCC): ICD-10-CM

## 2024-11-13 RX ORDER — BUPRENORPHINE HYDROCHLORIDE AND NALOXONE HYDROCHLORIDE DIHYDRATE 8; 2 MG/1; MG/1
1 TABLET SUBLINGUAL 3 TIMES DAILY
Qty: 90 TABLET | Refills: 0 | Status: SHIPPED | OUTPATIENT
Start: 2024-11-13 | End: 2024-12-13

## 2024-11-26 ENCOUNTER — OFFICE VISIT (OUTPATIENT)
Dept: PODIATRY | Age: 59
End: 2024-11-26
Payer: MEDICAID

## 2024-11-26 VITALS — BODY MASS INDEX: 35.21 KG/M2 | WEIGHT: 260 LBS | HEIGHT: 72 IN

## 2024-11-26 DIAGNOSIS — Z89.432 STATUS POST TRANSMETATARSAL AMPUTATION OF FOOT, LEFT (HCC): ICD-10-CM

## 2024-11-26 DIAGNOSIS — E11.42 TYPE 2 DIABETES MELLITUS WITH DIABETIC POLYNEUROPATHY, WITHOUT LONG-TERM CURRENT USE OF INSULIN (HCC): ICD-10-CM

## 2024-11-26 DIAGNOSIS — E11.621 TYPE 2 DIABETES MELLITUS WITH FOOT ULCER, WITHOUT LONG-TERM CURRENT USE OF INSULIN (HCC): ICD-10-CM

## 2024-11-26 DIAGNOSIS — L97.509 TYPE 2 DIABETES MELLITUS WITH FOOT ULCER, WITHOUT LONG-TERM CURRENT USE OF INSULIN (HCC): ICD-10-CM

## 2024-11-26 DIAGNOSIS — M79.2 NEUROPATHIC PAIN: ICD-10-CM

## 2024-11-26 DIAGNOSIS — Z89.431 STATUS POST TRANSMETATARSAL AMPUTATION OF RIGHT FOOT (HCC): Primary | ICD-10-CM

## 2024-11-26 DIAGNOSIS — Z91.199 NON COMPLIANCE WITH MEDICAL TREATMENT: ICD-10-CM

## 2024-11-26 PROCEDURE — 99213 OFFICE O/P EST LOW 20 MIN: CPT | Performed by: PODIATRIST

## 2024-11-26 PROCEDURE — 3044F HG A1C LEVEL LT 7.0%: CPT | Performed by: PODIATRIST

## 2024-11-26 ASSESSMENT — ENCOUNTER SYMPTOMS: COLOR CHANGE: 0

## 2024-11-26 NOTE — PROGRESS NOTES
[facility]  Return Patient  Chief Complaint   Patient presents with    Diabetes     Last blood sugar 101  Diabetic foot check          Rick Osullivan is a 59 y.o. year old male who is here for follow up ulcerations bilateral feet and diabetic foot checkup, I have not seen him in 5-6 months. S/P TMA right and left. Both feet remain healed.  Pain in feet and legs is improved. Has not gotten diabetic shoes ordered several times. Wearing crocs. .  Vital signs are stable. BG contorlled recently.   Patient denies N/V/F/C.     On Saboxone. Follows with Maynor Martinez.     Review of Systems   Constitutional: Negative.    Endocrine: Negative.    Musculoskeletal: Negative.    Skin:  Negative for color change.       Vascular: DP and PT pulses palpable 2/4, Right Foot and 2/4 on the Left Foot.   CFT <Na seconds, Right Foot and <NA seconds on the Left Foot.  Edema is absent ,  Right Foot and absenton the Left Foot.      Neurological:   Sensation absent  to light touch to level of digits, both feet.      Musculoskeletal:   Muscle strength is 4/5 on the Right Foot and 4/5 on the Left Foot. Structural deformities are present on the Right Foot and present on the Left Foot.   TMA bilateral, no equinus.    Integument:  Warm, dry, supple both feet.  Incisions are healed bilaterally. Marked skin improvement to bilateral distal legs, no xerosis, well hydrated.             Assesment :    Diagnosis Orders   1. Status post transmetatarsal amputation of right foot ([facility])        2. Status post transmetatarsal amputation of foot, left ([facility])        3. Type 2 diabetes mellitus with foot ulcer, without long-term current use of insulin ([facility])        4. Neuropathic pain        5. Non compliance with medical treatment        6. Type 2 diabetes mellitus with diabetic polyneuropathy, without long-term current use of insulin ([facility])                Plan: Pt was evaluated and examined. Patient was given personalized discharge instructions.

## 2024-11-30 DIAGNOSIS — E03.9 ACQUIRED HYPOTHYROIDISM: ICD-10-CM

## 2024-12-02 RX ORDER — LEVOTHYROXINE SODIUM 50 UG/1
50 TABLET ORAL DAILY
Qty: 90 TABLET | Refills: 0 | Status: SHIPPED | OUTPATIENT
Start: 2024-12-02

## 2024-12-12 DIAGNOSIS — F11.20 UNCOMPLICATED OPIOID DEPENDENCE (HCC): ICD-10-CM

## 2024-12-12 RX ORDER — BUPRENORPHINE HYDROCHLORIDE AND NALOXONE HYDROCHLORIDE DIHYDRATE 8; 2 MG/1; MG/1
1 TABLET SUBLINGUAL 3 TIMES DAILY
Qty: 90 TABLET | Refills: 0 | Status: SHIPPED | OUTPATIENT
Start: 2024-12-12 | End: 2025-01-11

## 2024-12-28 DIAGNOSIS — M54.12 RIGHT CERVICAL RADICULOPATHY: ICD-10-CM

## 2024-12-30 RX ORDER — CYCLOBENZAPRINE HCL 10 MG
TABLET ORAL
Qty: 30 TABLET | Refills: 0 | OUTPATIENT
Start: 2024-12-30

## 2025-01-02 DIAGNOSIS — M54.12 RIGHT CERVICAL RADICULOPATHY: ICD-10-CM

## 2025-01-02 RX ORDER — CYCLOBENZAPRINE HCL 10 MG
TABLET ORAL
Qty: 30 TABLET | Refills: 0 | Status: SHIPPED | OUTPATIENT
Start: 2025-01-02

## 2025-01-13 DIAGNOSIS — F11.20 UNCOMPLICATED OPIOID DEPENDENCE (HCC): ICD-10-CM

## 2025-01-13 RX ORDER — BUPRENORPHINE HYDROCHLORIDE AND NALOXONE HYDROCHLORIDE DIHYDRATE 8; 2 MG/1; MG/1
1 TABLET SUBLINGUAL 3 TIMES DAILY
Qty: 90 TABLET | Refills: 0 | Status: SHIPPED | OUTPATIENT
Start: 2025-01-13 | End: 2025-02-12

## 2025-02-17 ENCOUNTER — TELEPHONE (OUTPATIENT)
Dept: PRIMARY CARE CLINIC | Age: 60
End: 2025-02-17

## 2025-02-17 DIAGNOSIS — F11.20 UNCOMPLICATED OPIOID DEPENDENCE (HCC): ICD-10-CM

## 2025-02-17 RX ORDER — BUPRENORPHINE HYDROCHLORIDE AND NALOXONE HYDROCHLORIDE DIHYDRATE 8; 2 MG/1; MG/1
1 TABLET SUBLINGUAL 3 TIMES DAILY
Qty: 90 TABLET | Refills: 0 | Status: SHIPPED | OUTPATIENT
Start: 2025-02-17 | End: 2025-03-19

## 2025-02-18 DIAGNOSIS — L97.509 TYPE 2 DIABETES MELLITUS WITH FOOT ULCER, WITH LONG-TERM CURRENT USE OF INSULIN (HCC): ICD-10-CM

## 2025-02-18 DIAGNOSIS — E11.621 TYPE 2 DIABETES MELLITUS WITH FOOT ULCER, WITH LONG-TERM CURRENT USE OF INSULIN (HCC): ICD-10-CM

## 2025-02-18 DIAGNOSIS — Z79.4 TYPE 2 DIABETES MELLITUS WITH FOOT ULCER, WITH LONG-TERM CURRENT USE OF INSULIN (HCC): ICD-10-CM

## 2025-02-18 RX ORDER — BLOOD SUGAR DIAGNOSTIC
STRIP MISCELLANEOUS
Qty: 100 EACH | Refills: 5 | Status: SHIPPED | OUTPATIENT
Start: 2025-02-18

## 2025-03-06 ENCOUNTER — OFFICE VISIT (OUTPATIENT)
Dept: PRIMARY CARE CLINIC | Age: 60
End: 2025-03-06

## 2025-03-06 ENCOUNTER — TELEPHONE (OUTPATIENT)
Dept: PRIMARY CARE CLINIC | Age: 60
End: 2025-03-06

## 2025-03-06 VITALS
DIASTOLIC BLOOD PRESSURE: 72 MMHG | WEIGHT: 268.8 LBS | BODY MASS INDEX: 36.41 KG/M2 | HEIGHT: 72 IN | OXYGEN SATURATION: 94 % | HEART RATE: 96 BPM | SYSTOLIC BLOOD PRESSURE: 116 MMHG

## 2025-03-06 DIAGNOSIS — G47.00 INSOMNIA, UNSPECIFIED TYPE: ICD-10-CM

## 2025-03-06 DIAGNOSIS — Z79.4 TYPE 2 DIABETES MELLITUS WITH FOOT ULCER, WITH LONG-TERM CURRENT USE OF INSULIN (HCC): Primary | ICD-10-CM

## 2025-03-06 DIAGNOSIS — M54.12 RIGHT CERVICAL RADICULOPATHY: ICD-10-CM

## 2025-03-06 DIAGNOSIS — E03.9 ACQUIRED HYPOTHYROIDISM: ICD-10-CM

## 2025-03-06 DIAGNOSIS — Z79.899 MEDICATION MANAGEMENT: ICD-10-CM

## 2025-03-06 DIAGNOSIS — Z12.5 SCREENING PSA (PROSTATE SPECIFIC ANTIGEN): ICD-10-CM

## 2025-03-06 DIAGNOSIS — E11.621 TYPE 2 DIABETES MELLITUS WITH FOOT ULCER, WITH LONG-TERM CURRENT USE OF INSULIN (HCC): Primary | ICD-10-CM

## 2025-03-06 DIAGNOSIS — Z13.220 LIPID SCREENING: ICD-10-CM

## 2025-03-06 DIAGNOSIS — Z79.899 HIGH RISK MEDICATION USE: ICD-10-CM

## 2025-03-06 DIAGNOSIS — L97.509 TYPE 2 DIABETES MELLITUS WITH FOOT ULCER, WITH LONG-TERM CURRENT USE OF INSULIN (HCC): Primary | ICD-10-CM

## 2025-03-06 DIAGNOSIS — G57.93 NEUROPATHY INVOLVING BOTH LOWER EXTREMITIES: ICD-10-CM

## 2025-03-06 DIAGNOSIS — F11.20 UNCOMPLICATED OPIOID DEPENDENCE (HCC): ICD-10-CM

## 2025-03-06 DIAGNOSIS — K59.00 CONSTIPATION, UNSPECIFIED CONSTIPATION TYPE: ICD-10-CM

## 2025-03-06 DIAGNOSIS — F41.9 ANXIETY DISORDER, UNSPECIFIED TYPE: ICD-10-CM

## 2025-03-06 LAB
ALCOHOL URINE: NORMAL
AMPHETAMINE SCREEN URINE: NORMAL
BARBITURATE SCREEN URINE: NORMAL
BENZODIAZEPINE SCREEN, URINE: NORMAL
BUPRENORPHINE URINE: NORMAL
COCAINE METABOLITE SCREEN URINE: NORMAL
FENTANYL SCREEN, URINE: NORMAL
GABAPENTIN SCREEN, URINE: NORMAL
HBA1C MFR BLD: 5.9 %
MDMA, URINE: NORMAL
METHADONE SCREEN, URINE: NORMAL
METHAMPHETAMINE, URINE: NORMAL
OPIATE SCREEN URINE: NORMAL
OXYCODONE SCREEN URINE: NORMAL
PHENCYCLIDINE SCREEN URINE: NORMAL
PROPOXYPHENE SCREEN, URINE: NORMAL
SYNTHETIC CANNABINOIDS(K2) SCREEN, URINE: NORMAL
THC SCREEN, URINE: NORMAL
TRAMADOL SCREEN URINE: NORMAL
TRICYCLIC ANTIDEPRESSANTS, UR: NORMAL

## 2025-03-06 RX ORDER — LEVOTHYROXINE SODIUM 50 UG/1
50 TABLET ORAL DAILY
Qty: 90 TABLET | Refills: 1 | Status: SHIPPED | OUTPATIENT
Start: 2025-03-06

## 2025-03-06 RX ORDER — PAROXETINE 20 MG/1
TABLET, FILM COATED ORAL
Qty: 120 TABLET | Refills: 5 | Status: SHIPPED | OUTPATIENT
Start: 2025-03-06

## 2025-03-06 RX ORDER — CLONIDINE HYDROCHLORIDE 0.1 MG/1
0.1 TABLET ORAL 2 TIMES DAILY
Qty: 180 TABLET | Refills: 1 | Status: SHIPPED | OUTPATIENT
Start: 2025-03-06

## 2025-03-06 RX ORDER — TRAZODONE HYDROCHLORIDE 50 MG/1
50 TABLET ORAL NIGHTLY PRN
Qty: 30 TABLET | Refills: 1 | Status: SHIPPED | OUTPATIENT
Start: 2025-03-06

## 2025-03-06 RX ORDER — CYCLOBENZAPRINE HCL 10 MG
TABLET ORAL
Qty: 30 TABLET | Refills: 0 | Status: SHIPPED | OUTPATIENT
Start: 2025-03-06

## 2025-03-06 SDOH — ECONOMIC STABILITY: FOOD INSECURITY: WITHIN THE PAST 12 MONTHS, THE FOOD YOU BOUGHT JUST DIDN'T LAST AND YOU DIDN'T HAVE MONEY TO GET MORE.: NEVER TRUE

## 2025-03-06 SDOH — ECONOMIC STABILITY: FOOD INSECURITY: WITHIN THE PAST 12 MONTHS, YOU WORRIED THAT YOUR FOOD WOULD RUN OUT BEFORE YOU GOT MONEY TO BUY MORE.: NEVER TRUE

## 2025-03-06 ASSESSMENT — PATIENT HEALTH QUESTIONNAIRE - PHQ9
1. LITTLE INTEREST OR PLEASURE IN DOING THINGS: NOT AT ALL
9. THOUGHTS THAT YOU WOULD BE BETTER OFF DEAD, OR OF HURTING YOURSELF: NOT AT ALL
SUM OF ALL RESPONSES TO PHQ QUESTIONS 1-9: 0
SUM OF ALL RESPONSES TO PHQ QUESTIONS 1-9: 0
4. FEELING TIRED OR HAVING LITTLE ENERGY: NOT AT ALL
8. MOVING OR SPEAKING SO SLOWLY THAT OTHER PEOPLE COULD HAVE NOTICED. OR THE OPPOSITE, BEING SO FIGETY OR RESTLESS THAT YOU HAVE BEEN MOVING AROUND A LOT MORE THAN USUAL: NOT AT ALL
7. TROUBLE CONCENTRATING ON THINGS, SUCH AS READING THE NEWSPAPER OR WATCHING TELEVISION: NOT AT ALL
SUM OF ALL RESPONSES TO PHQ QUESTIONS 1-9: 0
2. FEELING DOWN, DEPRESSED OR HOPELESS: NOT AT ALL
10. IF YOU CHECKED OFF ANY PROBLEMS, HOW DIFFICULT HAVE THESE PROBLEMS MADE IT FOR YOU TO DO YOUR WORK, TAKE CARE OF THINGS AT HOME, OR GET ALONG WITH OTHER PEOPLE: NOT DIFFICULT AT ALL
3. TROUBLE FALLING OR STAYING ASLEEP: NOT AT ALL
6. FEELING BAD ABOUT YOURSELF - OR THAT YOU ARE A FAILURE OR HAVE LET YOURSELF OR YOUR FAMILY DOWN: NOT AT ALL
SUM OF ALL RESPONSES TO PHQ QUESTIONS 1-9: 0
5. POOR APPETITE OR OVEREATING: NOT AT ALL

## 2025-03-06 ASSESSMENT — ENCOUNTER SYMPTOMS
DIARRHEA: 0
NAUSEA: 0
SHORTNESS OF BREATH: 0
ABDOMINAL PAIN: 0
VOMITING: 0
EYE DISCHARGE: 0
RHINORRHEA: 0
WHEEZING: 0
CONSTIPATION: 1
EYE REDNESS: 0
SORE THROAT: 0
COUGH: 0

## 2025-03-06 NOTE — PROGRESS NOTES
wheezing.   Abdominal:      General: Bowel sounds are normal.      Palpations: Abdomen is soft.      Tenderness: There is no abdominal tenderness.   Musculoskeletal:      Right lower le+ Pitting Edema present.      Left lower le+ Pitting Edema present.   Lymphadenopathy:      Head:      Right side of head: No submental, submandibular or tonsillar adenopathy.      Left side of head: No submental, submandibular or tonsillar adenopathy.      Cervical: No cervical adenopathy.   Skin:     General: Skin is warm.      Findings: No rash.   Neurological:      Mental Status: He is alert and oriented to person, place, and time.   Psychiatric:         Mood and Affect: Mood normal.         Behavior: Behavior normal.         Thought Content: Thought content normal.     Controlled substances monitoring: possible medication side effects, risk of tolerance and/or dependence, and alternative treatments discussed, no signs of potential drug abuse or diversion identified, OARRS report reviewed today- activity consistent with treatment plan, and random urine drug screen sent today.     Assessment/Plan:   1. Type 2 diabetes mellitus with foot ulcer, with long-term current use of insulin (HCC)  -     POCT glycosylated hemoglobin (Hb A1C)  -     Albumin/Creatinine Ratio, Urine; Future  -     Comprehensive Metabolic Panel; Future  -     CBC with Auto Differential; Future  2. Uncomplicated opioid dependence (HCC)  -     POCT Rapid Drug Screen  3. Neuropathy involving both lower extremities  -     Comprehensive Metabolic Panel; Future  -     CBC with Auto Differential; Future  4. High risk medication use  -     POCT Rapid Drug Screen  5. Constipation, unspecified constipation type  -     linaclotide (LINZESS) 145 MCG capsule; Take 1 capsule by mouth every morning (before breakfast), Disp-30 capsule, R-1Normal  6. Insomnia, unspecified type  -     traZODone (DESYREL) 50 MG tablet; Take 1 tablet by mouth nightly as needed for Sleep,

## 2025-03-06 NOTE — TELEPHONE ENCOUNTER
Patient wife called in asking for patients lab orders to be mailed to their home address.    -Labs mailed out on 3/6/25

## 2025-03-17 DIAGNOSIS — F11.20 UNCOMPLICATED OPIOID DEPENDENCE (HCC): ICD-10-CM

## 2025-03-17 RX ORDER — BUPRENORPHINE HYDROCHLORIDE AND NALOXONE HYDROCHLORIDE DIHYDRATE 8; 2 MG/1; MG/1
1 TABLET SUBLINGUAL 3 TIMES DAILY
Qty: 90 TABLET | Refills: 0 | Status: SHIPPED | OUTPATIENT
Start: 2025-03-17 | End: 2025-04-16

## 2025-04-01 DIAGNOSIS — K59.00 CONSTIPATION, UNSPECIFIED CONSTIPATION TYPE: ICD-10-CM

## 2025-04-01 RX ORDER — POLYETHYLENE GLYCOL 3350 17 G/17G
POWDER, FOR SOLUTION ORAL
Qty: 510 G | Refills: 5 | Status: SHIPPED | OUTPATIENT
Start: 2025-04-01

## 2025-04-04 DIAGNOSIS — M54.12 RIGHT CERVICAL RADICULOPATHY: ICD-10-CM

## 2025-04-07 RX ORDER — CYCLOBENZAPRINE HCL 10 MG
TABLET ORAL
Qty: 30 TABLET | Refills: 0 | Status: SHIPPED | OUTPATIENT
Start: 2025-04-07

## 2025-04-14 DIAGNOSIS — F11.20 UNCOMPLICATED OPIOID DEPENDENCE (HCC): ICD-10-CM

## 2025-04-14 RX ORDER — BUPRENORPHINE HYDROCHLORIDE AND NALOXONE HYDROCHLORIDE DIHYDRATE 8; 2 MG/1; MG/1
1 TABLET SUBLINGUAL 3 TIMES DAILY
Qty: 90 TABLET | Refills: 0 | Status: SHIPPED | OUTPATIENT
Start: 2025-04-14 | End: 2025-05-14

## 2025-04-14 NOTE — TELEPHONE ENCOUNTER
As of 04/14/25: Genevieve appears calm and cooperative.  She denies suicidal or homicidal ideation. She denies auditory or visual hallucinations. She is future-oriented.     Medications on discharge:  Continue Depakote ER 1500 mg at bedtime to help with mood stabilization and anger control.   Labs reviewed from 4/8/25: VPA level 80, CBC/diff showing low Hgb of 10.8, but appears to be at baseline, CMP within normal limits.  Patient advised to obtain VPA level, CBC/diff, and CMP in 1 week.  Continue Risperdal 2 mg daily at bedtime to help with mood  Continue Zoloft 100 mg daily to help with depressive symptoms  Continue trazodone 50 mg daily at bedtime for insomnia  Continue atarax 25 mg BID PRN for anxiety   Pt's spouse calling. States that pt is not going to take the Levaquin due to too many side effects listed for this med. Would like something else sent in.    [facility] on [address] listed.

## 2025-04-27 DIAGNOSIS — Z79.899 MEDICATION MANAGEMENT: ICD-10-CM

## 2025-04-27 DIAGNOSIS — K59.00 CONSTIPATION, UNSPECIFIED CONSTIPATION TYPE: ICD-10-CM

## 2025-04-27 DIAGNOSIS — G47.00 INSOMNIA, UNSPECIFIED TYPE: ICD-10-CM

## 2025-04-28 RX ORDER — LINACLOTIDE 145 UG/1
CAPSULE, GELATIN COATED ORAL
Qty: 30 CAPSULE | Refills: 0 | Status: SHIPPED | OUTPATIENT
Start: 2025-04-28

## 2025-04-28 RX ORDER — TRAZODONE HYDROCHLORIDE 50 MG/1
50 TABLET ORAL NIGHTLY PRN
Qty: 30 TABLET | Refills: 0 | Status: SHIPPED | OUTPATIENT
Start: 2025-04-28

## 2025-04-30 ENCOUNTER — TELEPHONE (OUTPATIENT)
Dept: PRIMARY CARE CLINIC | Age: 60
End: 2025-04-30

## 2025-04-30 NOTE — TELEPHONE ENCOUNTER
Patients wife called in asking for lab orders to be faxed to [facility] with fax # given of [phone].    -Faxed

## 2025-05-12 DIAGNOSIS — F11.20 UNCOMPLICATED OPIOID DEPENDENCE (HCC): ICD-10-CM

## 2025-05-12 RX ORDER — BUPRENORPHINE HYDROCHLORIDE AND NALOXONE HYDROCHLORIDE DIHYDRATE 8; 2 MG/1; MG/1
1 TABLET SUBLINGUAL 3 TIMES DAILY
Qty: 90 TABLET | Refills: 0 | Status: SHIPPED | OUTPATIENT
Start: 2025-05-12 | End: 2025-06-11

## 2025-05-14 DIAGNOSIS — M54.12 RIGHT CERVICAL RADICULOPATHY: ICD-10-CM

## 2025-05-14 LAB
ALBUMIN: NORMAL
ALP BLD-CCNC: NORMAL U/L
ALT SERPL-CCNC: NORMAL U/L
ANION GAP SERPL CALCULATED.3IONS-SCNC: NORMAL MMOL/L
AST SERPL-CCNC: NORMAL U/L
BILIRUB SERPL-MCNC: NORMAL MG/DL
BUN BLDV-MCNC: NORMAL MG/DL
CALCIUM SERPL-MCNC: NORMAL MG/DL
CHLORIDE BLD-SCNC: NORMAL MMOL/L
CHOLESTEROL, FASTING: 177
CO2: NORMAL
CREAT SERPL-MCNC: NORMAL MG/DL
GFR, ESTIMATED: 82
GLUCOSE BLD-MCNC: 133 MG/DL
HDLC SERPL-MCNC: 39 MG/DL (ref 35–70)
LDL CHOLESTEROL: 110
POTASSIUM SERPL-SCNC: NORMAL MMOL/L
PROSTATE SPECIFIC ANTIGEN: 0.31 NG/ML
SODIUM BLD-SCNC: NORMAL MMOL/L
TOTAL PROTEIN: NORMAL
TRIGLYCERIDE, FASTING: 140

## 2025-05-15 ENCOUNTER — RESULTS FOLLOW-UP (OUTPATIENT)
Dept: PRIMARY CARE CLINIC | Age: 60
End: 2025-05-15

## 2025-05-15 DIAGNOSIS — E11.621 TYPE 2 DIABETES MELLITUS WITH FOOT ULCER, WITH LONG-TERM CURRENT USE OF INSULIN (HCC): ICD-10-CM

## 2025-05-15 DIAGNOSIS — L97.509 TYPE 2 DIABETES MELLITUS WITH FOOT ULCER, WITH LONG-TERM CURRENT USE OF INSULIN (HCC): ICD-10-CM

## 2025-05-15 DIAGNOSIS — E03.9 ACQUIRED HYPOTHYROIDISM: ICD-10-CM

## 2025-05-15 DIAGNOSIS — Z79.4 TYPE 2 DIABETES MELLITUS WITH FOOT ULCER, WITH LONG-TERM CURRENT USE OF INSULIN (HCC): ICD-10-CM

## 2025-05-15 DIAGNOSIS — Z13.220 LIPID SCREENING: ICD-10-CM

## 2025-05-15 DIAGNOSIS — Z12.5 SCREENING PSA (PROSTATE SPECIFIC ANTIGEN): ICD-10-CM

## 2025-05-15 DIAGNOSIS — G57.93 NEUROPATHY INVOLVING BOTH LOWER EXTREMITIES: ICD-10-CM

## 2025-05-15 RX ORDER — CYCLOBENZAPRINE HCL 10 MG
TABLET ORAL
Qty: 30 TABLET | Refills: 0 | Status: SHIPPED | OUTPATIENT
Start: 2025-05-15

## 2025-05-27 ENCOUNTER — OFFICE VISIT (OUTPATIENT)
Dept: PODIATRY | Age: 60
End: 2025-05-27
Payer: MEDICAID

## 2025-05-27 VITALS — WEIGHT: 268 LBS | HEIGHT: 72 IN | BODY MASS INDEX: 36.3 KG/M2

## 2025-05-27 DIAGNOSIS — Z89.432 STATUS POST TRANSMETATARSAL AMPUTATION OF FOOT, LEFT (HCC): ICD-10-CM

## 2025-05-27 DIAGNOSIS — G47.00 INSOMNIA, UNSPECIFIED TYPE: ICD-10-CM

## 2025-05-27 DIAGNOSIS — E11.621 TYPE 2 DIABETES MELLITUS WITH FOOT ULCER, WITHOUT LONG-TERM CURRENT USE OF INSULIN (HCC): ICD-10-CM

## 2025-05-27 DIAGNOSIS — M79.672 LEFT FOOT PAIN: ICD-10-CM

## 2025-05-27 DIAGNOSIS — Z91.199 NON COMPLIANCE WITH MEDICAL TREATMENT: ICD-10-CM

## 2025-05-27 DIAGNOSIS — Z89.431 STATUS POST TRANSMETATARSAL AMPUTATION OF RIGHT FOOT (HCC): Primary | ICD-10-CM

## 2025-05-27 DIAGNOSIS — Z79.899 MEDICATION MANAGEMENT: ICD-10-CM

## 2025-05-27 DIAGNOSIS — M79.671 RIGHT FOOT PAIN: ICD-10-CM

## 2025-05-27 DIAGNOSIS — K59.00 CONSTIPATION, UNSPECIFIED CONSTIPATION TYPE: ICD-10-CM

## 2025-05-27 DIAGNOSIS — L97.509 TYPE 2 DIABETES MELLITUS WITH FOOT ULCER, WITHOUT LONG-TERM CURRENT USE OF INSULIN (HCC): ICD-10-CM

## 2025-05-27 DIAGNOSIS — M79.2 NEUROPATHIC PAIN: ICD-10-CM

## 2025-05-27 PROCEDURE — 99213 OFFICE O/P EST LOW 20 MIN: CPT | Performed by: PODIATRIST

## 2025-05-27 PROCEDURE — 3044F HG A1C LEVEL LT 7.0%: CPT | Performed by: PODIATRIST

## 2025-05-27 RX ORDER — TRAZODONE HYDROCHLORIDE 50 MG/1
50 TABLET ORAL NIGHTLY PRN
Qty: 30 TABLET | Refills: 0 | Status: SHIPPED | OUTPATIENT
Start: 2025-05-27

## 2025-05-27 RX ORDER — LINACLOTIDE 145 UG/1
CAPSULE, GELATIN COATED ORAL
Qty: 30 CAPSULE | Refills: 0 | Status: SHIPPED | OUTPATIENT
Start: 2025-05-27

## 2025-05-27 ASSESSMENT — ENCOUNTER SYMPTOMS: COLOR CHANGE: 0

## 2025-05-27 NOTE — PROGRESS NOTES
[facility]  Return Patient  Chief Complaint   Patient presents with    Diabetes     Last blood sugar: 92         Rick Osullivan is a 59 y.o. year old male who is here for follow up ulcerations bilateral feet and diabetic foot checkup, I have not seen him in 5-6 months. S/P TMA right and left. Both feet remain healed.  Pain in feet and legs is improved. Has not gotten diabetic shoes ordered several times. Wearing crocs. .  Vital signs are stable. BG contorlled recently.   Patient denies N/V/F/C.     On Saboxone. Follows with Maynor Martinez.     Review of Systems   Constitutional: Negative.    Endocrine: Negative.    Musculoskeletal: Negative.    Skin:  Negative for color change.       Vascular: DP and PT pulses palpable 2/4, Right Foot and 2/4 on the Left Foot.   CFT <Na seconds, Right Foot and <NA seconds on the Left Foot.  Edema is absent ,  Right Foot and absenton the Left Foot.      Neurological:   Sensation absent  to light touch to level of digits, both feet.      Musculoskeletal:   Muscle strength is 4/5 on the Right Foot and 4/5 on the Left Foot. Structural deformities are present on the Right Foot and present on the Left Foot.   TMA bilateral, no equinus.    Integument:  Warm, dry, supple both feet.  Incisions are healed bilaterally. Marked skin improvement to bilateral distal legs, no xerosis, well hydrated.             Radiographs: Three weightbearing views (AP, Oblique, and Lateral) of the right foot were obtained in the office today and reviewed, revealing no acute fracture, dislocation, or radioopaque foreign body/tumor. Overall alignment is satisfactory. Transmetatarsal amputation without complication      Radiographs: Three weightbearing views (AP, Oblique, and Lateral) of the left foot were obtained in the office today and reviewed, revealing no acute fracture, dislocation, or radioopaque foreign body/tumor. Overall alignment is satisfactory.Transmetatarsal amputation without

## 2025-06-09 DIAGNOSIS — F11.20 UNCOMPLICATED OPIOID DEPENDENCE (HCC): ICD-10-CM

## 2025-06-09 RX ORDER — BUPRENORPHINE HYDROCHLORIDE AND NALOXONE HYDROCHLORIDE DIHYDRATE 8; 2 MG/1; MG/1
1 TABLET SUBLINGUAL 3 TIMES DAILY
Qty: 90 TABLET | Refills: 0 | Status: SHIPPED | OUTPATIENT
Start: 2025-06-09 | End: 2025-07-09

## 2025-06-24 DIAGNOSIS — K59.00 CONSTIPATION, UNSPECIFIED CONSTIPATION TYPE: ICD-10-CM

## 2025-06-24 DIAGNOSIS — M54.12 RIGHT CERVICAL RADICULOPATHY: ICD-10-CM

## 2025-06-24 DIAGNOSIS — G47.00 INSOMNIA, UNSPECIFIED TYPE: ICD-10-CM

## 2025-06-24 DIAGNOSIS — Z79.899 MEDICATION MANAGEMENT: ICD-10-CM

## 2025-06-24 RX ORDER — CYCLOBENZAPRINE HCL 10 MG
TABLET ORAL
Qty: 30 TABLET | Refills: 0 | Status: SHIPPED | OUTPATIENT
Start: 2025-06-24

## 2025-06-24 RX ORDER — LINACLOTIDE 145 UG/1
CAPSULE, GELATIN COATED ORAL
Qty: 30 CAPSULE | Refills: 0 | Status: SHIPPED | OUTPATIENT
Start: 2025-06-24

## 2025-06-24 RX ORDER — TRAZODONE HYDROCHLORIDE 50 MG/1
50 TABLET ORAL NIGHTLY PRN
Qty: 30 TABLET | Refills: 0 | Status: SHIPPED | OUTPATIENT
Start: 2025-06-24

## 2025-07-07 DIAGNOSIS — F11.20 UNCOMPLICATED OPIOID DEPENDENCE (HCC): ICD-10-CM

## 2025-07-07 RX ORDER — BUPRENORPHINE HYDROCHLORIDE AND NALOXONE HYDROCHLORIDE DIHYDRATE 8; 2 MG/1; MG/1
1 TABLET SUBLINGUAL 3 TIMES DAILY
Qty: 63 TABLET | Refills: 0 | Status: SHIPPED | OUTPATIENT
Start: 2025-07-07 | End: 2025-07-28

## 2025-07-24 DIAGNOSIS — G47.00 INSOMNIA, UNSPECIFIED TYPE: ICD-10-CM

## 2025-07-24 DIAGNOSIS — K59.00 CONSTIPATION, UNSPECIFIED CONSTIPATION TYPE: ICD-10-CM

## 2025-07-24 DIAGNOSIS — M54.12 RIGHT CERVICAL RADICULOPATHY: ICD-10-CM

## 2025-07-24 DIAGNOSIS — Z79.899 MEDICATION MANAGEMENT: ICD-10-CM

## 2025-07-27 RX ORDER — CYCLOBENZAPRINE HCL 10 MG
TABLET ORAL
Qty: 30 TABLET | Refills: 0 | Status: SHIPPED | OUTPATIENT
Start: 2025-07-27

## 2025-07-27 RX ORDER — TRAZODONE HYDROCHLORIDE 50 MG/1
50 TABLET ORAL NIGHTLY PRN
Qty: 30 TABLET | Refills: 0 | Status: SHIPPED | OUTPATIENT
Start: 2025-07-27

## 2025-07-27 RX ORDER — LINACLOTIDE 145 UG/1
CAPSULE, GELATIN COATED ORAL
Qty: 30 CAPSULE | Refills: 0 | Status: SHIPPED | OUTPATIENT
Start: 2025-07-27

## 2025-07-28 ENCOUNTER — OFFICE VISIT (OUTPATIENT)
Dept: PRIMARY CARE CLINIC | Age: 60
End: 2025-07-28
Payer: MEDICAID

## 2025-07-28 VITALS
HEART RATE: 94 BPM | WEIGHT: 282.7 LBS | SYSTOLIC BLOOD PRESSURE: 118 MMHG | HEIGHT: 72 IN | OXYGEN SATURATION: 97 % | BODY MASS INDEX: 38.29 KG/M2 | DIASTOLIC BLOOD PRESSURE: 74 MMHG

## 2025-07-28 DIAGNOSIS — F11.20 UNCOMPLICATED OPIOID DEPENDENCE (HCC): ICD-10-CM

## 2025-07-28 DIAGNOSIS — R56.9 SEIZURES (HCC): ICD-10-CM

## 2025-07-28 DIAGNOSIS — L97.509 TYPE 2 DIABETES MELLITUS WITH FOOT ULCER, WITHOUT LONG-TERM CURRENT USE OF INSULIN (HCC): Primary | ICD-10-CM

## 2025-07-28 DIAGNOSIS — Z79.899 HIGH RISK MEDICATION USE: ICD-10-CM

## 2025-07-28 DIAGNOSIS — E11.621 TYPE 2 DIABETES MELLITUS WITH FOOT ULCER, WITHOUT LONG-TERM CURRENT USE OF INSULIN (HCC): Primary | ICD-10-CM

## 2025-07-28 PROBLEM — T81.30XA WOUND DEHISCENCE: Status: RESOLVED | Noted: 2022-02-24 | Resolved: 2025-07-28

## 2025-07-28 PROBLEM — N39.0 COMPLICATED UTI (URINARY TRACT INFECTION): Status: RESOLVED | Noted: 2024-06-25 | Resolved: 2025-07-28

## 2025-07-28 LAB — HBA1C MFR BLD: 6.1 %

## 2025-07-28 PROCEDURE — 3044F HG A1C LEVEL LT 7.0%: CPT | Performed by: NURSE PRACTITIONER

## 2025-07-28 PROCEDURE — 3074F SYST BP LT 130 MM HG: CPT | Performed by: NURSE PRACTITIONER

## 2025-07-28 PROCEDURE — 80305 DRUG TEST PRSMV DIR OPT OBS: CPT | Performed by: NURSE PRACTITIONER

## 2025-07-28 PROCEDURE — 99213 OFFICE O/P EST LOW 20 MIN: CPT | Performed by: NURSE PRACTITIONER

## 2025-07-28 PROCEDURE — 83036 HEMOGLOBIN GLYCOSYLATED A1C: CPT | Performed by: NURSE PRACTITIONER

## 2025-07-28 PROCEDURE — 3078F DIAST BP <80 MM HG: CPT | Performed by: NURSE PRACTITIONER

## 2025-07-28 RX ORDER — BUPRENORPHINE HYDROCHLORIDE AND NALOXONE HYDROCHLORIDE DIHYDRATE 8; 2 MG/1; MG/1
1 TABLET SUBLINGUAL 3 TIMES DAILY
Qty: 90 TABLET | Refills: 0 | Status: SHIPPED | OUTPATIENT
Start: 2025-07-28 | End: 2025-08-27

## 2025-07-28 ASSESSMENT — ENCOUNTER SYMPTOMS
NAUSEA: 1
SINUS PRESSURE: 0
SINUS PAIN: 0
CONSTIPATION: 1
COUGH: 0
SHORTNESS OF BREATH: 0
BACK PAIN: 1
ABDOMINAL PAIN: 1

## 2025-07-28 NOTE — PROGRESS NOTES
[facility] PHYSICIANS  [facility] PRIMARY [facility]ARE  [id number] [facility] [place] [address]  Dept: [phone]    Rick Osullivan is a 60 y.o. male Established patient, who presents today for his medical conditions/complaints as noted below.      Chief Complaint   Patient presents with    Diabetes     Last A1C: 5.9      Medication Check       HPI:     History of Present Illness  The patient presents for evaluation of constipation, diabetes, seizures, and medication management.    He is experiencing severe constipation, which he describes as a blockage. Despite undergoing a colonoscopy and adhering to the preparation instructions, he was only able to have 2 bowel movements. He reports that his condition has been progressively worsening over the past few months, causing significant discomfort. He is scheduled for a CT scan on 08/01/2025 at [facility] and has an appointment with Dr. Freedman on 08/08/2025. He also mentions that he will run out of Robaxin by Friday. He is currently taking Linzess, but it does not seem to be effective. He experiences nausea and generalized pain, which sometimes localizes in specific areas. He also reports frequent bowel sounds and bloating. His urination is affected by his bloating; when he is bloated, he only dribbles, but when the bloating subsides, he can urinate normally.    He continues to take metformin and Victoza as needed, approximately once or twice a week. He monitors his blood sugar daily, which has been stable recently, typically ranging from the upper 90's to low 100's, with the highest reading being 110.    He has had 2 minor seizures in the past 3 months, which occur when he looks up or reaches for something. He reports no use of illicit drugs.    He reports no changes in his vision but plans to have it checked again. He has noticed an increase in earwax production. He reports no headaches or dizziness. He experiences hot and cold chills, which

## 2025-08-25 DIAGNOSIS — F41.9 ANXIETY DISORDER, UNSPECIFIED TYPE: ICD-10-CM

## 2025-08-25 DIAGNOSIS — R60.0 LOCALIZED EDEMA: ICD-10-CM

## 2025-08-25 DIAGNOSIS — Z79.899 MEDICATION MANAGEMENT: ICD-10-CM

## 2025-08-25 RX ORDER — FUROSEMIDE 20 MG/1
20 TABLET ORAL 2 TIMES DAILY
Qty: 180 TABLET | Refills: 0 | Status: SHIPPED | OUTPATIENT
Start: 2025-08-25 | End: 2025-08-29

## 2025-08-25 RX ORDER — PAROXETINE 20 MG/1
TABLET, FILM COATED ORAL
Qty: 120 TABLET | Refills: 0 | Status: SHIPPED | OUTPATIENT
Start: 2025-08-25 | End: 2025-08-29

## 2025-08-26 DIAGNOSIS — F11.20 UNCOMPLICATED OPIOID DEPENDENCE (HCC): ICD-10-CM

## 2025-08-26 RX ORDER — BUPRENORPHINE HYDROCHLORIDE AND NALOXONE HYDROCHLORIDE DIHYDRATE 8; 2 MG/1; MG/1
1 TABLET SUBLINGUAL 3 TIMES DAILY
Qty: 90 TABLET | Refills: 0 | Status: SHIPPED | OUTPATIENT
Start: 2025-08-26 | End: 2025-09-25

## 2025-08-28 DIAGNOSIS — M54.12 RIGHT CERVICAL RADICULOPATHY: ICD-10-CM

## 2025-08-28 DIAGNOSIS — Z79.899 MEDICATION MANAGEMENT: ICD-10-CM

## 2025-08-28 DIAGNOSIS — F41.9 ANXIETY DISORDER, UNSPECIFIED TYPE: ICD-10-CM

## 2025-08-28 DIAGNOSIS — R60.0 LOCALIZED EDEMA: ICD-10-CM

## 2025-08-28 DIAGNOSIS — G47.00 INSOMNIA, UNSPECIFIED TYPE: ICD-10-CM

## 2025-08-29 RX ORDER — PAROXETINE 20 MG/1
TABLET, FILM COATED ORAL
Qty: 120 TABLET | Refills: 0 | Status: SHIPPED | OUTPATIENT
Start: 2025-08-29

## 2025-08-29 RX ORDER — TRAZODONE HYDROCHLORIDE 50 MG/1
50 TABLET ORAL NIGHTLY PRN
Qty: 30 TABLET | Refills: 0 | Status: SHIPPED | OUTPATIENT
Start: 2025-08-29

## 2025-08-29 RX ORDER — FUROSEMIDE 20 MG/1
20 TABLET ORAL 2 TIMES DAILY
Qty: 180 TABLET | Refills: 0 | Status: SHIPPED | OUTPATIENT
Start: 2025-08-29

## 2025-08-29 RX ORDER — CYCLOBENZAPRINE HCL 10 MG
TABLET ORAL
Qty: 30 TABLET | Refills: 0 | Status: SHIPPED | OUTPATIENT
Start: 2025-08-29

## (undated) DEVICE — SOLUTION SCRB 4OZ 4% CHG H2O AIDED FOR PREOPERATIVE SKIN

## (undated) DEVICE — SYRINGE IRRIG 60ML SFT PLIABLE BLB EZ TO GRP 1 HND USE W/

## (undated) DEVICE — SUTURE VCRL SZ 4-0 L18IN ABSRB UD VCRL POLYGLACTIN 910 COAT J109T

## (undated) DEVICE — SOLUTION IV IRRIG POUR BRL 0.9% SODIUM CHL 2F7124

## (undated) DEVICE — DRESSING PETRO W3XL3IN OIL EMUL N ADH GZ KNIT IMPREG CELOS

## (undated) DEVICE — 1200CC GUARDIAN II: Brand: GUARDIAN

## (undated) DEVICE — SUTURE VCRL + SZ 2-0 L18IN ABSRB CLR TIE POLYGLACTIN 910 VCP111G

## (undated) DEVICE — Z DISCONTINUED BY MEDLINE USE 2711682 TRAY SKIN PREP DRY W/ PREM GLV

## (undated) DEVICE — INTENDED FOR TISSUE SEPARATION, AND OTHER PROCEDURES THAT REQUIRE A SHARP SURGICAL BLADE TO PUNCTURE OR CUT.: Brand: BARD-PARKER ® CARBON RIB-BACK BLADES

## (undated) DEVICE — GLOVE ORANGE PI 7 1/2   MSG9075

## (undated) DEVICE — SUTURE VCRL SZ 3-0 L18IN ABSRB UD PS-2 L19MM 1/2 CIR J497G

## (undated) DEVICE — SMALL OSC. SAW BLADE, 9MM X 24.6MM X 0.38MM: Brand: MICROAIRE®

## (undated) DEVICE — TUBE IRRIG HNDPC HI FLO TP INTRPULS W/SUCTION TUBE

## (undated) DEVICE — GAUZE,SPONGE,FLUFF,6"X6.75",STRL,5/TRAY: Brand: MEDLINE

## (undated) DEVICE — SOLUTION IV 500ML 0.9% SOD CHL PH 5 INJ USP VIAFLX PLAS

## (undated) DEVICE — ZIMMER® STERILE DISPOSABLE TOURNIQUET CUFF WITH PLC, DUAL PORT, SINGLE BLADDER, 18 IN. (46 CM)

## (undated) DEVICE — BANDAGE,GAUZE,BULKEE II,4.5"X4.1YD,STRL: Brand: MEDLINE

## (undated) DEVICE — Device

## (undated) DEVICE — APPLICATOR MEDICATED 26 CC SOLUTION HI LT ORNG CHLORAPREP

## (undated) DEVICE — CANNULA NSL AD L2IN ETCO2 SAMP SFT CRUSH RESIST FEM AIRLFE

## (undated) DEVICE — ZIMMER® STERILE DISPOSABLE TOURNIQUET CUFF WITH PROTECTIVE SLEEVE AND PLC, DUAL PORT, SINGLE BLADDER, 34 IN. (86 CM)

## (undated) DEVICE — DRESSING PETRO W3XL8IN OIL EMUL N ADH GZ KNIT IMPREG CELOS

## (undated) DEVICE — JELLY,LUBE,STERILE,FLIP TOP,TUBE,2-OZ: Brand: MEDLINE

## (undated) DEVICE — GARMENT,MEDLINE,DVT,INT,CALF,MED, GEN2: Brand: MEDLINE

## (undated) DEVICE — Z DISCONTINUED NO SUB IDED DRAIN PENROSE L12IN 0.25IN USED TO PROMOTE DRNAGE IN OPN

## (undated) DEVICE — TOWEL,OR,DSP,ST,NATURAL,DLX,4/PK,20PK/CS: Brand: MEDLINE

## (undated) DEVICE — GLOVE ORANGE PI 8 1/2   MSG9085

## (undated) DEVICE — SOLUTION IV IRRIG WATER 1000ML POUR BRL 2F7114

## (undated) DEVICE — PREMIUM DRY TRAY LF: Brand: MEDLINE INDUSTRIES, INC.

## (undated) DEVICE — GOWN,AURORA,NONRNF,XL,30/CS: Brand: MEDLINE

## (undated) DEVICE — PAD,ABDOMINAL,8"X7.5",ST,LF,20/BX: Brand: MEDLINE INDUSTRIES, INC.

## (undated) DEVICE — CONTAINER,SPECIMEN,4OZ,OR STRL: Brand: MEDLINE

## (undated) DEVICE — DRESSING NEG PRSS M 18X12.5X3.3CM POLYUR FOR WND THER VAC

## (undated) DEVICE — DRAPE,REIN 53X77,STERILE: Brand: MEDLINE

## (undated) DEVICE — SUTURE MCRYL SZ 3-0 L27IN ABSRB UD L19MM PS-2 3/8 CIR PRIM Y427H

## (undated) DEVICE — BNDG,ELSTC,MATRIX,STRL,4"X5YD,LF,HOOK&LP: Brand: MEDLINE

## (undated) DEVICE — MEDI-VAC YANKAUER SUCTION HANDLE W/BULBOUS TIP: Brand: CARDINAL HEALTH

## (undated) DEVICE — SOLUTION PREP POVIDONE IOD FOR SKIN MUCOUS MEM PRIOR TO

## (undated) DEVICE — SET HNDPC W COAX BNE CLN TIP SUCT TB BTTRY PWR DISPOSABLE

## (undated) DEVICE — SUTURE VCRL + SZ 2-0 L54IN ABSRB VLT TIE POLYGLACTIN 910 VCP286G

## (undated) DEVICE — SPLINT QUICK STEP SCOTCHCAST 5 X 30

## (undated) DEVICE — DEFENDO AIR WATER SUCTION AND BIOPSY VALVE KIT FOR  OLYMPUS: Brand: DEFENDO AIR/WATER/SUCTION AND BIOPSY VALVE

## (undated) DEVICE — AIRWAY LARYN MASK CHILD SZ 4 30ML CUF FOR 50-70KG PT STD

## (undated) DEVICE — SUTURE NONABSORBABLE MONOFILAMENT 4-0 PS-2 18 IN BLU PROLENE 8682H

## (undated) DEVICE — SUTURE COAT VCRL + LIGAPAK LIG REEL 54 IN SZ 0 UD BRAID VCP287G

## (undated) DEVICE — GOWN,SIRUS,NONRNF,SETINSLV,XL,20/CS: Brand: MEDLINE

## (undated) DEVICE — SINGLE PORT MANIFOLD: Brand: NEPTUNE 2

## (undated) DEVICE — GLOVE SURG SZ 65 THK91MIL LTX FREE SYN POLYISOPRENE

## (undated) DEVICE — GLOVE ORTHO 8   MSG9480

## (undated) DEVICE — TUBING AMB

## (undated) DEVICE — BITEBLOCK 54FR W/ DENT RIM BLOX

## (undated) DEVICE — MERCY HEALTH ST CHARLES: Brand: MEDLINE INDUSTRIES, INC.

## (undated) DEVICE — GOWN,AURORA,NONREINFORCED,LARGE: Brand: MEDLINE

## (undated) DEVICE — SUTURE NONABSORBABLE MONOFILAMENT 2-0 FS 18 IN ETHILON 664H

## (undated) DEVICE — Z INACTIVE USE 2660664 SOLUTION IRRIG 3000ML 0.9% SOD CHL USP UROMATIC PLAS CONT

## (undated) DEVICE — SUTURE ETHLN SZ 3-0 L18IN NONABSORBABLE BLK FS-1 L24MM 3/8 663H

## (undated) DEVICE — CYSTO/BLADDER IRRIGATION SET, REGULATING CLAMP

## (undated) DEVICE — GLOVE ORANGE PI 8   MSG9080

## (undated) DEVICE — SUTURE NONABSORBABLE MONOFILAMENT 3-0 PS-1 18 IN BLK ETHILON 1663H

## (undated) DEVICE — SVMMC POD PK

## (undated) DEVICE — STRIP,CLOSURE,WOUND,MEDI-STRIP,1/2X4: Brand: MEDLINE

## (undated) DEVICE — SOLUTION SCRB 4OZ 10% POVIDONE IOD ANTIMIC BTL

## (undated) DEVICE — BLANKET WRM W29.9XL79.1IN UP BODY FORC AIR MISTRAL-AIR

## (undated) DEVICE — SINGLE-USE BIOPSY FORCEPS: Brand: RADIAL JAW 4

## (undated) DEVICE — NO USE 18 MONTHS GOWN STD LG  1153D

## (undated) DEVICE — STERILE POLYISOPRENE POWDER-FREE SURGICAL GLOVES WITH EMOLLIENT COATING: Brand: PROTEXIS

## (undated) DEVICE — SUTURE VIC + BR UD PS2 4-0 18IN VCP496G

## (undated) DEVICE — ULTRASONIC SET TUBE SONIC 1 SONICVAC DISP

## (undated) DEVICE — GLOVE ORANGE PI 7   MSG9070

## (undated) DEVICE — THIN OFFSET (9.0 X 0.38 X 25.0MM)

## (undated) DEVICE — DRESSING,GAUZE,XEROFORM,CURAD,1"X8",ST: Brand: CURAD

## (undated) DEVICE — SYRINGE MED 50ML LUERSLIP TIP

## (undated) DEVICE — DRAIN RND CHN 10FR 1/8 IN FULL FLUT

## (undated) DEVICE — GAUZE,SPONGE,4"X4",16PLY,XRAY,STRL,LF: Brand: MEDLINE

## (undated) DEVICE — LARGE TEAR CROSS CUT RASP, 7MM X 14MM: Brand: MICROAIRE®

## (undated) DEVICE — GOWN,POLY REINFORCED,LG: Brand: MEDLINE

## (undated) DEVICE — 7 FRENCH DRAIN SYSTEM, STERILE: Brand: TLS

## (undated) DEVICE — HYPODERMIC SAFETY NEEDLE: Brand: MAGELLAN

## (undated) DEVICE — SUTURE VCRL + SZ 2-0 L27IN ABSRB UD CT-2 L26MM 1/2 CIR TAPR VCP269H

## (undated) DEVICE — SUTURE ETHLN SZ 3-0 L18IN NONABSORBABLE BLK L24MM PS-1 3/8 1663G

## (undated) DEVICE — NEEDLE SPNL 18GA L3.5IN W/ QNCKE SHARPER BVL DURA CLICK

## (undated) DEVICE — SUTURE ETHLN SZ 2-0 L18IN NONABSORBABLE BLK L26MM FS 3/8 664G